# Patient Record
Sex: FEMALE | Race: WHITE | Employment: OTHER
[De-identification: names, ages, dates, MRNs, and addresses within clinical notes are randomized per-mention and may not be internally consistent; named-entity substitution may affect disease eponyms.]

---

## 2017-01-05 PROBLEM — I71.21 ASCENDING AORTIC ANEURYSM (HCC): Status: ACTIVE | Noted: 2017-01-05

## 2017-01-05 PROBLEM — J44.9 CHRONIC OBSTRUCTIVE PULMONARY DISEASE (HCC): Status: ACTIVE | Noted: 2017-01-05

## 2017-01-05 PROBLEM — E55.9 VITAMIN D DEFICIENCY: Status: ACTIVE | Noted: 2017-01-05

## 2017-01-05 PROBLEM — Z95.2 H/O MECHANICAL AORTIC VALVE REPLACEMENT: Status: ACTIVE | Noted: 2017-01-05

## 2017-01-05 PROBLEM — I10 ESSENTIAL HYPERTENSION: Status: ACTIVE | Noted: 2017-01-05

## 2017-01-05 PROBLEM — F32.A DEPRESSION: Status: ACTIVE | Noted: 2017-01-05

## 2017-01-05 PROBLEM — J45.909 UNCOMPLICATED ASTHMA: Status: ACTIVE | Noted: 2017-01-05

## 2017-01-05 PROBLEM — E78.5 HYPERLIPIDEMIA: Status: ACTIVE | Noted: 2017-01-05

## 2017-09-13 ENCOUNTER — TELEPHONE (OUTPATIENT)
Dept: CASE MANAGEMENT | Age: 57
End: 2017-09-13

## 2017-09-21 ENCOUNTER — TELEPHONE (OUTPATIENT)
Dept: CASE MANAGEMENT | Age: 57
End: 2017-09-21

## 2017-10-20 ENCOUNTER — TELEPHONE (OUTPATIENT)
Dept: CASE MANAGEMENT | Age: 57
End: 2017-10-20

## 2017-10-20 NOTE — TELEPHONE ENCOUNTER
No call, encounter opened to process CT Lung Screening.       CT Lung Screen 9/20/17 :  Impression   . 1. Atherosclerotic disease. 2. Emphysema. 3. Enlarged thoracic aorta, stable in the interval.       Lung - RADS Category 1 :  Negative -  No nodules and definitely benign   nodules - Continue annual lung CT screening in 12 months         Leanne Blanchard is a  61 y.o. female who is a current smoker with a 40 pack year smoking history. Smoking Cessation resources: mailed to patient.     Results reviewed by Dr. Yayo Stevenson . Letter mailed to patient  9/21/2017 encouraging her  to call her physician for results and follow up recommendations if needed.       10/20/2017 -    Patient requested information on smoking cessation. Smoking Cessation packet that contains \"Time to [address] [place], [place] [address] [facility] brochure and  Nav [facility] Quit Now? \"- [address] [place] [address],  both mailed to patients home. Riky Chau was  encouraged to call and talk to one of our counselors or our Lung Screening patient navigator for more information/support.      Clementina Iverson., R.T.(R)(T), Radiology Patient Navigator

## 2017-10-20 NOTE — LETTER
Medical Imaging Services      10/20/2017      Nola Dias  [address] [address]      Dear Nola Dias,    Thank you for participating in the [address].  We would like to let you know about a special program that is available only to our lung screening patients. The Tobacco Cessation Program at [facility] is offering telephone counseling sessions to help you quit smoking.   This program allows you to take advantage of counseling without coming in to the program.      Please call us at [phone] if you would like a referral.            Sincerely,        Pulmonary Nodule Program  [address]:  [phone]  F:  [phone]

## 2018-03-22 ENCOUNTER — NURSE ONLY (OUTPATIENT)
Dept: INTERNAL MEDICINE | Age: 58
End: 2018-03-22
Payer: MEDICARE

## 2018-03-22 DIAGNOSIS — Z95.2 H/O MECHANICAL AORTIC VALVE REPLACEMENT: Primary | ICD-10-CM

## 2018-03-22 LAB
INTERNATIONAL NORMALIZATION RATIO, POC: 3.2
PROTHROMBIN TIME, POC: 37.9

## 2018-03-22 PROCEDURE — 85610 PROTHROMBIN TIME: CPT

## 2018-03-22 PROCEDURE — 93793 ANTICOAG MGMT PT WARFARIN: CPT | Performed by: INTERNAL MEDICINE

## 2018-03-28 ENCOUNTER — HOSPITAL ENCOUNTER (EMERGENCY)
Age: 58
Discharge: HOME | End: 2018-03-28
Payer: MEDICARE

## 2018-03-28 VITALS
TEMPERATURE: 97 F | SYSTOLIC BLOOD PRESSURE: 141 MMHG | HEART RATE: 83 BPM | DIASTOLIC BLOOD PRESSURE: 65 MMHG | OXYGEN SATURATION: 94 % | RESPIRATION RATE: 17 BRPM | HEIGHT: 63 IN

## 2018-03-28 DIAGNOSIS — S61.212A LACERATION OF RIGHT MIDDLE FINGER WITHOUT FOREIGN BODY WITHOUT DAMAGE TO NAIL, INITIAL ENCOUNTER: Primary | ICD-10-CM

## 2018-03-28 LAB
INR BLD: 3.4
PROTHROMBIN TIME: 38.1 SEC (ref 9.3–12.4)

## 2018-03-28 PROCEDURE — 85610 PROTHROMBIN TIME: CPT

## 2018-03-28 PROCEDURE — 36415 COLL VENOUS BLD VENIPUNCTURE: CPT

## 2018-03-28 PROCEDURE — 99282 EMERGENCY DEPT VISIT SF MDM: CPT

## 2018-03-28 RX ORDER — DIAPER,BRIEF,INFANT-TODD,DISP
EACH MISCELLANEOUS ONCE
Status: DISCONTINUED | OUTPATIENT
Start: 2018-03-28 | End: 2018-03-28 | Stop reason: HOSPADM

## 2018-03-28 RX ORDER — LIDOCAINE HYDROCHLORIDE 10 MG/ML
5 INJECTION, SOLUTION INFILTRATION; PERINEURAL ONCE
Status: DISCONTINUED | OUTPATIENT
Start: 2018-03-28 | End: 2018-03-28 | Stop reason: HOSPADM

## 2018-03-29 ENCOUNTER — ANTI-COAG VISIT (OUTPATIENT)
Dept: INTERNAL MEDICINE | Age: 58
End: 2018-03-29
Payer: MEDICARE

## 2018-03-29 DIAGNOSIS — Z95.2 H/O MECHANICAL AORTIC VALVE REPLACEMENT: ICD-10-CM

## 2018-03-29 PROCEDURE — 93793 ANTICOAG MGMT PT WARFARIN: CPT | Performed by: INTERNAL MEDICINE

## 2018-03-29 RX ORDER — WARFARIN SODIUM 5 MG/1
TABLET ORAL
Qty: 30 TABLET | Refills: 0 | Status: SHIPPED | OUTPATIENT
Start: 2018-03-29 | End: 2018-04-18 | Stop reason: DRUGHIGH

## 2018-04-09 ENCOUNTER — OFFICE VISIT (OUTPATIENT)
Dept: INTERNAL MEDICINE | Age: 58
End: 2018-04-09
Payer: MEDICARE

## 2018-04-09 VITALS
TEMPERATURE: 98 F | DIASTOLIC BLOOD PRESSURE: 60 MMHG | WEIGHT: 167.7 LBS | SYSTOLIC BLOOD PRESSURE: 123 MMHG | BODY MASS INDEX: 28.63 KG/M2 | HEIGHT: 64 IN | OXYGEN SATURATION: 93 % | RESPIRATION RATE: 18 BRPM | HEART RATE: 93 BPM

## 2018-04-09 DIAGNOSIS — G47.00 INSOMNIA, UNSPECIFIED TYPE: ICD-10-CM

## 2018-04-09 DIAGNOSIS — J01.10 ACUTE NON-RECURRENT FRONTAL SINUSITIS: ICD-10-CM

## 2018-04-09 DIAGNOSIS — Z95.2 H/O MECHANICAL AORTIC VALVE REPLACEMENT: Primary | ICD-10-CM

## 2018-04-09 LAB
INTERNATIONAL NORMALIZATION RATIO, POC: 2.4
PROTHROMBIN TIME, POC: 29.3

## 2018-04-09 PROCEDURE — G8419 CALC BMI OUT NRM PARAM NOF/U: HCPCS | Performed by: STUDENT IN AN ORGANIZED HEALTH CARE EDUCATION/TRAINING PROGRAM

## 2018-04-09 PROCEDURE — 4004F PT TOBACCO SCREEN RCVD TLK: CPT | Performed by: STUDENT IN AN ORGANIZED HEALTH CARE EDUCATION/TRAINING PROGRAM

## 2018-04-09 PROCEDURE — 99213 OFFICE O/P EST LOW 20 MIN: CPT | Performed by: STUDENT IN AN ORGANIZED HEALTH CARE EDUCATION/TRAINING PROGRAM

## 2018-04-09 PROCEDURE — 85610 PROTHROMBIN TIME: CPT | Performed by: STUDENT IN AN ORGANIZED HEALTH CARE EDUCATION/TRAINING PROGRAM

## 2018-04-09 PROCEDURE — 99212 OFFICE O/P EST SF 10 MIN: CPT | Performed by: STUDENT IN AN ORGANIZED HEALTH CARE EDUCATION/TRAINING PROGRAM

## 2018-04-09 PROCEDURE — 3017F COLORECTAL CA SCREEN DOC REV: CPT | Performed by: STUDENT IN AN ORGANIZED HEALTH CARE EDUCATION/TRAINING PROGRAM

## 2018-04-09 PROCEDURE — G8427 DOCREV CUR MEDS BY ELIG CLIN: HCPCS | Performed by: STUDENT IN AN ORGANIZED HEALTH CARE EDUCATION/TRAINING PROGRAM

## 2018-04-09 RX ORDER — TRAZODONE HYDROCHLORIDE 100 MG/1
100 TABLET ORAL NIGHTLY
Qty: 30 TABLET | Refills: 1 | Status: SHIPPED | OUTPATIENT
Start: 2018-04-09 | End: 2018-04-18 | Stop reason: SDUPTHER

## 2018-04-09 RX ORDER — FLUTICASONE PROPIONATE 50 MCG
1 SPRAY, SUSPENSION (ML) NASAL DAILY
Qty: 1 BOTTLE | Refills: 3 | Status: SHIPPED | OUTPATIENT
Start: 2018-04-09 | End: 2018-07-18 | Stop reason: ALTCHOICE

## 2018-04-09 RX ORDER — AMOXICILLIN AND CLAVULANATE POTASSIUM 500; 125 MG/1; MG/1
1 TABLET, FILM COATED ORAL 2 TIMES DAILY
Qty: 14 TABLET | Refills: 0 | Status: SHIPPED | OUTPATIENT
Start: 2018-04-09 | End: 2018-07-11 | Stop reason: SDUPTHER

## 2018-04-09 ASSESSMENT — ENCOUNTER SYMPTOMS
COUGH: 0
ABDOMINAL PAIN: 0
HEARTBURN: 0
DIARRHEA: 0
NAUSEA: 0
SORE THROAT: 1
SINUS PAIN: 1
SHORTNESS OF BREATH: 1
SPUTUM PRODUCTION: 0
CONSTIPATION: 0
EYES NEGATIVE: 1
VOMITING: 0
WHEEZING: 0

## 2018-04-18 ENCOUNTER — OFFICE VISIT (OUTPATIENT)
Dept: INTERNAL MEDICINE | Age: 58
End: 2018-04-18
Payer: MEDICARE

## 2018-04-18 VITALS
RESPIRATION RATE: 18 BRPM | WEIGHT: 162.8 LBS | TEMPERATURE: 98.9 F | SYSTOLIC BLOOD PRESSURE: 126 MMHG | BODY MASS INDEX: 27.12 KG/M2 | HEIGHT: 65 IN | DIASTOLIC BLOOD PRESSURE: 74 MMHG | HEART RATE: 75 BPM

## 2018-04-18 DIAGNOSIS — G47.00 INSOMNIA, UNSPECIFIED TYPE: ICD-10-CM

## 2018-04-18 DIAGNOSIS — Z95.2 H/O MECHANICAL AORTIC VALVE REPLACEMENT: Primary | ICD-10-CM

## 2018-04-18 DIAGNOSIS — Z79.01 CHRONIC ANTICOAGULATION: ICD-10-CM

## 2018-04-18 DIAGNOSIS — H91.92 HEARING LOSS OF LEFT EAR, UNSPECIFIED HEARING LOSS TYPE: ICD-10-CM

## 2018-04-18 LAB
INTERNATIONAL NORMALIZATION RATIO, POC: 1.5
PROTHROMBIN TIME, POC: 17.8

## 2018-04-18 PROCEDURE — G8419 CALC BMI OUT NRM PARAM NOF/U: HCPCS | Performed by: INTERNAL MEDICINE

## 2018-04-18 PROCEDURE — G8427 DOCREV CUR MEDS BY ELIG CLIN: HCPCS | Performed by: INTERNAL MEDICINE

## 2018-04-18 PROCEDURE — 85610 PROTHROMBIN TIME: CPT | Performed by: INTERNAL MEDICINE

## 2018-04-18 PROCEDURE — 99213 OFFICE O/P EST LOW 20 MIN: CPT | Performed by: INTERNAL MEDICINE

## 2018-04-18 PROCEDURE — 4004F PT TOBACCO SCREEN RCVD TLK: CPT | Performed by: INTERNAL MEDICINE

## 2018-04-18 PROCEDURE — 99214 OFFICE O/P EST MOD 30 MIN: CPT | Performed by: INTERNAL MEDICINE

## 2018-04-18 PROCEDURE — 3014F SCREEN MAMMO DOC REV: CPT | Performed by: INTERNAL MEDICINE

## 2018-04-18 PROCEDURE — 3017F COLORECTAL CA SCREEN DOC REV: CPT | Performed by: INTERNAL MEDICINE

## 2018-04-18 RX ORDER — TRAZODONE HYDROCHLORIDE 100 MG/1
100 TABLET ORAL NIGHTLY
Qty: 30 TABLET | Refills: 2 | Status: SHIPPED | OUTPATIENT
Start: 2018-04-18 | End: 2018-07-18 | Stop reason: SDUPTHER

## 2018-04-18 RX ORDER — WARFARIN SODIUM 6 MG/1
6 TABLET ORAL DAILY
Qty: 30 TABLET | Refills: 3 | Status: SHIPPED | OUTPATIENT
Start: 2018-04-18 | End: 2018-07-18 | Stop reason: SDUPTHER

## 2018-04-18 ASSESSMENT — ENCOUNTER SYMPTOMS
HEARTBURN: 0
ABDOMINAL PAIN: 0
BLURRED VISION: 0
HEMOPTYSIS: 0
CONSTIPATION: 0
DIARRHEA: 0
COUGH: 0
NAUSEA: 0
SHORTNESS OF BREATH: 0
VOMITING: 0
DOUBLE VISION: 0
EYE PAIN: 0
BLOOD IN STOOL: 0

## 2018-04-20 ENCOUNTER — OFFICE VISIT (OUTPATIENT)
Dept: PULMONOLOGY | Age: 58
End: 2018-04-20
Payer: MEDICARE

## 2018-04-20 VITALS
DIASTOLIC BLOOD PRESSURE: 60 MMHG | BODY MASS INDEX: 26.99 KG/M2 | OXYGEN SATURATION: 93 % | HEART RATE: 76 BPM | HEIGHT: 65 IN | WEIGHT: 162 LBS | RESPIRATION RATE: 16 BRPM | TEMPERATURE: 99.8 F | SYSTOLIC BLOOD PRESSURE: 123 MMHG

## 2018-04-20 DIAGNOSIS — J44.9 CHRONIC OBSTRUCTIVE PULMONARY DISEASE, UNSPECIFIED COPD TYPE (HCC): ICD-10-CM

## 2018-04-20 PROCEDURE — 4004F PT TOBACCO SCREEN RCVD TLK: CPT | Performed by: INTERNAL MEDICINE

## 2018-04-20 PROCEDURE — 99213 OFFICE O/P EST LOW 20 MIN: CPT | Performed by: INTERNAL MEDICINE

## 2018-04-20 PROCEDURE — G8427 DOCREV CUR MEDS BY ELIG CLIN: HCPCS | Performed by: INTERNAL MEDICINE

## 2018-04-20 PROCEDURE — G8419 CALC BMI OUT NRM PARAM NOF/U: HCPCS | Performed by: INTERNAL MEDICINE

## 2018-04-20 PROCEDURE — 3014F SCREEN MAMMO DOC REV: CPT | Performed by: INTERNAL MEDICINE

## 2018-04-20 PROCEDURE — G8926 SPIRO NO PERF OR DOC: HCPCS | Performed by: INTERNAL MEDICINE

## 2018-04-20 PROCEDURE — 3017F COLORECTAL CA SCREEN DOC REV: CPT | Performed by: INTERNAL MEDICINE

## 2018-04-20 PROCEDURE — 3023F SPIROM DOC REV: CPT | Performed by: INTERNAL MEDICINE

## 2018-04-20 RX ORDER — PREDNISONE 20 MG/1
TABLET ORAL
Qty: 5 TABLET | Refills: 0 | Status: SHIPPED | OUTPATIENT
Start: 2018-04-20 | End: 2018-07-18 | Stop reason: ALTCHOICE

## 2018-05-07 ENCOUNTER — NURSE ONLY (OUTPATIENT)
Dept: INTERNAL MEDICINE | Age: 58
End: 2018-05-07
Payer: MEDICARE

## 2018-05-07 DIAGNOSIS — I71.21 ASCENDING AORTIC ANEURYSM: ICD-10-CM

## 2018-05-07 DIAGNOSIS — Z95.2 H/O MECHANICAL AORTIC VALVE REPLACEMENT: Primary | ICD-10-CM

## 2018-05-07 LAB
INTERNATIONAL NORMALIZATION RATIO, POC: 3.2
PROTHROMBIN TIME, POC: 38.5

## 2018-05-07 PROCEDURE — 85610 PROTHROMBIN TIME: CPT | Performed by: INTERNAL MEDICINE

## 2018-05-07 PROCEDURE — 93793 ANTICOAG MGMT PT WARFARIN: CPT | Performed by: INTERNAL MEDICINE

## 2018-05-10 ENCOUNTER — OFFICE VISIT (OUTPATIENT)
Dept: ENT CLINIC | Age: 58
End: 2018-05-10
Payer: MEDICARE

## 2018-05-10 ENCOUNTER — PROCEDURE VISIT (OUTPATIENT)
Dept: AUDIOLOGY | Age: 58
End: 2018-05-10
Payer: MEDICARE

## 2018-05-10 VITALS
SYSTOLIC BLOOD PRESSURE: 128 MMHG | BODY MASS INDEX: 27.32 KG/M2 | WEIGHT: 164 LBS | HEIGHT: 65 IN | DIASTOLIC BLOOD PRESSURE: 58 MMHG | HEART RATE: 77 BPM

## 2018-05-10 DIAGNOSIS — H93.12 TINNITUS OF LEFT EAR: ICD-10-CM

## 2018-05-10 DIAGNOSIS — H91.92 DECREASED HEARING OF LEFT EAR: ICD-10-CM

## 2018-05-10 DIAGNOSIS — J30.2 CHRONIC SEASONAL ALLERGIC RHINITIS, UNSPECIFIED TRIGGER: ICD-10-CM

## 2018-05-10 DIAGNOSIS — J32.9 CHRONIC SINUSITIS, UNSPECIFIED LOCATION: Primary | ICD-10-CM

## 2018-05-10 DIAGNOSIS — H93.8X3 EAR PRESSURE, BILATERAL: Primary | ICD-10-CM

## 2018-05-10 DIAGNOSIS — H69.82 DYSFUNCTION OF LEFT EUSTACHIAN TUBE: ICD-10-CM

## 2018-05-10 PROCEDURE — G8419 CALC BMI OUT NRM PARAM NOF/U: HCPCS | Performed by: PHYSICIAN ASSISTANT

## 2018-05-10 PROCEDURE — 99203 OFFICE O/P NEW LOW 30 MIN: CPT | Performed by: PHYSICIAN ASSISTANT

## 2018-05-10 PROCEDURE — 4004F PT TOBACCO SCREEN RCVD TLK: CPT | Performed by: PHYSICIAN ASSISTANT

## 2018-05-10 PROCEDURE — G8427 DOCREV CUR MEDS BY ELIG CLIN: HCPCS | Performed by: PHYSICIAN ASSISTANT

## 2018-05-10 PROCEDURE — 3017F COLORECTAL CA SCREEN DOC REV: CPT | Performed by: PHYSICIAN ASSISTANT

## 2018-05-10 PROCEDURE — 92557 COMPREHENSIVE HEARING TEST: CPT | Performed by: AUDIOLOGIST

## 2018-05-10 PROCEDURE — 92567 TYMPANOMETRY: CPT | Performed by: AUDIOLOGIST

## 2018-05-10 RX ORDER — AZELASTINE 1 MG/ML
2 SPRAY, METERED NASAL DAILY
Qty: 1 BOTTLE | Refills: 1 | Status: SHIPPED | OUTPATIENT
Start: 2018-05-10 | End: 2018-06-04 | Stop reason: SDUPTHER

## 2018-05-10 ASSESSMENT — ENCOUNTER SYMPTOMS
GASTROINTESTINAL NEGATIVE: 1
NAUSEA: 0
EYES NEGATIVE: 1
VOMITING: 0
SINUS PAIN: 0
RHINORRHEA: 0
RESPIRATORY NEGATIVE: 1
CHOKING: 0
COUGH: 0

## 2018-05-14 ENCOUNTER — NURSE ONLY (OUTPATIENT)
Dept: INTERNAL MEDICINE | Age: 58
End: 2018-05-14
Payer: MEDICARE

## 2018-05-14 DIAGNOSIS — Z95.2 H/O MECHANICAL AORTIC VALVE REPLACEMENT: Primary | ICD-10-CM

## 2018-05-14 LAB
INTERNATIONAL NORMALIZATION RATIO, POC: 2.8
PROTHROMBIN TIME, POC: 33.5

## 2018-05-14 PROCEDURE — 93793 ANTICOAG MGMT PT WARFARIN: CPT | Performed by: INTERNAL MEDICINE

## 2018-05-14 PROCEDURE — 85610 PROTHROMBIN TIME: CPT | Performed by: INTERNAL MEDICINE

## 2018-05-14 RX ORDER — WARFARIN SODIUM 5 MG/1
TABLET ORAL
Qty: 30 TABLET | Refills: 0 | Status: SHIPPED | OUTPATIENT
Start: 2018-05-14 | End: 2018-07-18 | Stop reason: ALTCHOICE

## 2018-05-18 RX ORDER — MONTELUKAST SODIUM 10 MG/1
TABLET ORAL
Qty: 30 TABLET | Refills: 3 | Status: SHIPPED | OUTPATIENT
Start: 2018-05-18 | End: 2018-09-08 | Stop reason: SDUPTHER

## 2018-05-21 ENCOUNTER — NURSE ONLY (OUTPATIENT)
Dept: INTERNAL MEDICINE | Age: 58
End: 2018-05-21
Payer: MEDICARE

## 2018-05-21 DIAGNOSIS — Z95.2 H/O MECHANICAL AORTIC VALVE REPLACEMENT: Primary | ICD-10-CM

## 2018-05-21 LAB
INTERNATIONAL NORMALIZATION RATIO, POC: 2.4
PROTHROMBIN TIME, POC: 29.2

## 2018-05-21 PROCEDURE — 85610 PROTHROMBIN TIME: CPT | Performed by: INTERNAL MEDICINE

## 2018-05-21 PROCEDURE — 93793 ANTICOAG MGMT PT WARFARIN: CPT | Performed by: INTERNAL MEDICINE

## 2018-06-04 ENCOUNTER — OFFICE VISIT (OUTPATIENT)
Dept: ENT CLINIC | Age: 58
End: 2018-06-04
Payer: MEDICARE

## 2018-06-04 VITALS
WEIGHT: 165.3 LBS | BODY MASS INDEX: 27.54 KG/M2 | HEART RATE: 82 BPM | SYSTOLIC BLOOD PRESSURE: 133 MMHG | DIASTOLIC BLOOD PRESSURE: 64 MMHG | HEIGHT: 65 IN

## 2018-06-04 DIAGNOSIS — H69.82 DYSFUNCTION OF LEFT EUSTACHIAN TUBE: ICD-10-CM

## 2018-06-04 DIAGNOSIS — J30.2 CHRONIC SEASONAL ALLERGIC RHINITIS, UNSPECIFIED TRIGGER: ICD-10-CM

## 2018-06-04 DIAGNOSIS — J32.9 CHRONIC SINUSITIS, UNSPECIFIED LOCATION: ICD-10-CM

## 2018-06-04 PROCEDURE — 4004F PT TOBACCO SCREEN RCVD TLK: CPT | Performed by: OTOLARYNGOLOGY

## 2018-06-04 PROCEDURE — G8427 DOCREV CUR MEDS BY ELIG CLIN: HCPCS | Performed by: OTOLARYNGOLOGY

## 2018-06-04 PROCEDURE — 3017F COLORECTAL CA SCREEN DOC REV: CPT | Performed by: OTOLARYNGOLOGY

## 2018-06-04 PROCEDURE — G8419 CALC BMI OUT NRM PARAM NOF/U: HCPCS | Performed by: OTOLARYNGOLOGY

## 2018-06-04 PROCEDURE — 99213 OFFICE O/P EST LOW 20 MIN: CPT | Performed by: OTOLARYNGOLOGY

## 2018-06-04 RX ORDER — AZELASTINE 1 MG/ML
2 SPRAY, METERED NASAL DAILY
Qty: 1 BOTTLE | Refills: 5 | Status: SHIPPED | OUTPATIENT
Start: 2018-06-04 | End: 2019-02-09 | Stop reason: SDUPTHER

## 2018-06-04 ASSESSMENT — ENCOUNTER SYMPTOMS
RHINORRHEA: 0
CHOKING: 0
EYES NEGATIVE: 1
SINUS PAIN: 0
RESPIRATORY NEGATIVE: 1
COUGH: 0
GASTROINTESTINAL NEGATIVE: 1
VOMITING: 0
NAUSEA: 0

## 2018-06-18 ENCOUNTER — NURSE ONLY (OUTPATIENT)
Dept: INTERNAL MEDICINE | Age: 58
End: 2018-06-18
Payer: MEDICARE

## 2018-06-18 DIAGNOSIS — Z95.2 H/O MECHANICAL AORTIC VALVE REPLACEMENT: Primary | ICD-10-CM

## 2018-06-18 LAB
INTERNATIONAL NORMALIZATION RATIO, POC: 1.5
PROTHROMBIN TIME, POC: 18.1

## 2018-06-18 PROCEDURE — 93793 ANTICOAG MGMT PT WARFARIN: CPT | Performed by: INTERNAL MEDICINE

## 2018-06-18 PROCEDURE — 85610 PROTHROMBIN TIME: CPT | Performed by: INTERNAL MEDICINE

## 2018-06-25 ENCOUNTER — OFFICE VISIT (OUTPATIENT)
Dept: INTERNAL MEDICINE | Age: 58
End: 2018-06-25
Payer: MEDICARE

## 2018-06-25 ENCOUNTER — HOSPITAL ENCOUNTER (OUTPATIENT)
Age: 58
Discharge: HOME | End: 2018-06-25
Payer: MEDICARE

## 2018-06-25 VITALS
HEART RATE: 88 BPM | DIASTOLIC BLOOD PRESSURE: 62 MMHG | WEIGHT: 162.8 LBS | RESPIRATION RATE: 18 BRPM | BODY MASS INDEX: 24.67 KG/M2 | TEMPERATURE: 98.2 F | SYSTOLIC BLOOD PRESSURE: 125 MMHG | HEIGHT: 68 IN

## 2018-06-25 DIAGNOSIS — N95.0 POSTMENOPAUSAL VAGINAL BLEEDING: ICD-10-CM

## 2018-06-25 DIAGNOSIS — R10.13 EPIGASTRIC PAIN: Primary | ICD-10-CM

## 2018-06-25 DIAGNOSIS — Z95.2 H/O MECHANICAL AORTIC VALVE REPLACEMENT: ICD-10-CM

## 2018-06-25 DIAGNOSIS — R10.13 EPIGASTRIC PAIN: ICD-10-CM

## 2018-06-25 LAB
ALBUMIN SERPL-MCNC: 4.2 G/DL (ref 3.5–5.2)
ALP BLD-CCNC: 58 U/L (ref 35–104)
ALT SERPL-CCNC: 18 U/L (ref 0–32)
ANION GAP SERPL CALCULATED.3IONS-SCNC: 12 MMOL/L (ref 7–16)
AST SERPL-CCNC: 17 U/L (ref 0–31)
BACTERIA: NORMAL /HPF
BASOPHILS ABSOLUTE: 0.05 E9/L (ref 0–0.2)
BASOPHILS RELATIVE PERCENT: 0.6 % (ref 0–2)
BILIRUB SERPL-MCNC: 0.3 MG/DL (ref 0–1.2)
BILIRUBIN DIRECT: <0.2 MG/DL (ref 0–0.3)
BILIRUBIN URINE: NEGATIVE
BILIRUBIN, INDIRECT: NORMAL MG/DL (ref 0–1)
BILIRUBIN, POC: ABNORMAL
BLOOD URINE, POC: ABNORMAL
BLOOD, URINE: ABNORMAL
BUN BLDV-MCNC: 13 MG/DL (ref 6–20)
CALCIUM SERPL-MCNC: 9.3 MG/DL (ref 8.6–10.2)
CHLORIDE BLD-SCNC: 103 MMOL/L (ref 98–107)
CLARITY, POC: CLEAR
CLARITY: CLEAR
CO2: 29 MMOL/L (ref 22–29)
COLOR, POC: YELLOW
COLOR: YELLOW
CREAT SERPL-MCNC: 0.8 MG/DL (ref 0.5–1)
EOSINOPHILS ABSOLUTE: 0.17 E9/L (ref 0.05–0.5)
EOSINOPHILS RELATIVE PERCENT: 1.9 % (ref 0–6)
GFR AFRICAN AMERICAN: >60
GFR NON-AFRICAN AMERICAN: >60 ML/MIN/1.73
GLUCOSE BLD-MCNC: 90 MG/DL (ref 74–109)
GLUCOSE URINE, POC: ABNORMAL
GLUCOSE URINE: NEGATIVE MG/DL
HCT VFR BLD CALC: 36.9 % (ref 34–48)
HEMOGLOBIN: 12.6 G/DL (ref 11.5–15.5)
IMMATURE GRANULOCYTES #: 0.04 E9/L
IMMATURE GRANULOCYTES %: 0.5 % (ref 0–5)
INTERNATIONAL NORMALIZATION RATIO, POC: 3.2
KETONES, POC: ABNORMAL
KETONES, URINE: NEGATIVE MG/DL
LEUKOCYTE EST, POC: ABNORMAL
LEUKOCYTE ESTERASE, URINE: NEGATIVE
LIPASE: 38 U/L (ref 13–60)
LYMPHOCYTES ABSOLUTE: 2.16 E9/L (ref 1.5–4)
LYMPHOCYTES RELATIVE PERCENT: 24.7 % (ref 20–42)
MCH RBC QN AUTO: 32.3 PG (ref 26–35)
MCHC RBC AUTO-ENTMCNC: 34.1 % (ref 32–34.5)
MCV RBC AUTO: 94.6 FL (ref 80–99.9)
MONOCYTES ABSOLUTE: 0.6 E9/L (ref 0.1–0.95)
MONOCYTES RELATIVE PERCENT: 6.8 % (ref 2–12)
NEUTROPHILS ABSOLUTE: 5.74 E9/L (ref 1.8–7.3)
NEUTROPHILS RELATIVE PERCENT: 65.5 % (ref 43–80)
NITRITE, POC: ABNORMAL
NITRITE, URINE: NEGATIVE
PDW BLD-RTO: 13.2 FL (ref 11.5–15)
PH UA: 5.5 (ref 5–9)
PH, POC: 5
PLATELET # BLD: 203 E9/L (ref 130–450)
PMV BLD AUTO: 10.2 FL (ref 7–12)
POTASSIUM SERPL-SCNC: 4.2 MMOL/L (ref 3.5–5)
PROTEIN UA: NEGATIVE MG/DL
PROTEIN, POC: ABNORMAL
PROTHROMBIN TIME, POC: 38.9
RBC # BLD: 3.9 E12/L (ref 3.5–5.5)
RBC UA: NORMAL /HPF (ref 0–2)
SODIUM BLD-SCNC: 144 MMOL/L (ref 132–146)
SPECIFIC GRAVITY UA: 1.02 (ref 1–1.03)
SPECIFIC GRAVITY, POC: 1.03
TOTAL PROTEIN: 6.6 G/DL (ref 6.4–8.3)
UROBILINOGEN, POC: 0.2
UROBILINOGEN, URINE: 0.2 E.U./DL
WBC # BLD: 8.8 E9/L (ref 4.5–11.5)
WBC UA: NORMAL /HPF (ref 0–5)

## 2018-06-25 PROCEDURE — 3017F COLORECTAL CA SCREEN DOC REV: CPT | Performed by: INTERNAL MEDICINE

## 2018-06-25 PROCEDURE — 99213 OFFICE O/P EST LOW 20 MIN: CPT | Performed by: INTERNAL MEDICINE

## 2018-06-25 PROCEDURE — 83690 ASSAY OF LIPASE: CPT

## 2018-06-25 PROCEDURE — 36415 COLL VENOUS BLD VENIPUNCTURE: CPT

## 2018-06-25 PROCEDURE — G8427 DOCREV CUR MEDS BY ELIG CLIN: HCPCS | Performed by: INTERNAL MEDICINE

## 2018-06-25 PROCEDURE — 4004F PT TOBACCO SCREEN RCVD TLK: CPT | Performed by: INTERNAL MEDICINE

## 2018-06-25 PROCEDURE — 99214 OFFICE O/P EST MOD 30 MIN: CPT | Performed by: INTERNAL MEDICINE

## 2018-06-25 PROCEDURE — 81001 URINALYSIS AUTO W/SCOPE: CPT

## 2018-06-25 PROCEDURE — G8420 CALC BMI NORM PARAMETERS: HCPCS | Performed by: INTERNAL MEDICINE

## 2018-06-25 PROCEDURE — 80048 BASIC METABOLIC PNL TOTAL CA: CPT

## 2018-06-25 PROCEDURE — 85610 PROTHROMBIN TIME: CPT | Performed by: INTERNAL MEDICINE

## 2018-06-25 PROCEDURE — 85025 COMPLETE CBC W/AUTO DIFF WBC: CPT

## 2018-06-25 PROCEDURE — 81002 URINALYSIS NONAUTO W/O SCOPE: CPT | Performed by: INTERNAL MEDICINE

## 2018-06-25 PROCEDURE — 80076 HEPATIC FUNCTION PANEL: CPT

## 2018-06-25 RX ORDER — OMEPRAZOLE 40 MG/1
40 CAPSULE, DELAYED RELEASE ORAL DAILY
Qty: 30 CAPSULE | Refills: 3 | Status: SHIPPED | OUTPATIENT
Start: 2018-06-25 | End: 2018-10-22 | Stop reason: SDUPTHER

## 2018-06-25 ASSESSMENT — ENCOUNTER SYMPTOMS
CONSTIPATION: 1
ABDOMINAL PAIN: 1
ORTHOPNEA: 0
BLOOD IN STOOL: 0
COUGH: 0
SHORTNESS OF BREATH: 0
HEMOPTYSIS: 0
VOMITING: 0
NAUSEA: 1
DIARRHEA: 1
SPUTUM PRODUCTION: 0
WHEEZING: 0

## 2018-07-02 ENCOUNTER — OFFICE VISIT (OUTPATIENT)
Dept: INTERNAL MEDICINE | Age: 58
End: 2018-07-02
Payer: MEDICARE

## 2018-07-02 VITALS
HEIGHT: 65 IN | RESPIRATION RATE: 16 BRPM | WEIGHT: 163.4 LBS | HEART RATE: 83 BPM | TEMPERATURE: 98 F | SYSTOLIC BLOOD PRESSURE: 120 MMHG | BODY MASS INDEX: 27.22 KG/M2 | DIASTOLIC BLOOD PRESSURE: 82 MMHG

## 2018-07-02 DIAGNOSIS — Z95.2 H/O MECHANICAL AORTIC VALVE REPLACEMENT: ICD-10-CM

## 2018-07-02 DIAGNOSIS — R10.13 EPIGASTRIC ABDOMINAL PAIN: Primary | ICD-10-CM

## 2018-07-02 DIAGNOSIS — I71.21 ASCENDING AORTIC ANEURYSM: ICD-10-CM

## 2018-07-02 LAB
INTERNATIONAL NORMALIZATION RATIO, POC: 2.7
PROTHROMBIN TIME, POC: 32.6

## 2018-07-02 PROCEDURE — 99213 OFFICE O/P EST LOW 20 MIN: CPT | Performed by: STUDENT IN AN ORGANIZED HEALTH CARE EDUCATION/TRAINING PROGRAM

## 2018-07-02 PROCEDURE — 3017F COLORECTAL CA SCREEN DOC REV: CPT | Performed by: STUDENT IN AN ORGANIZED HEALTH CARE EDUCATION/TRAINING PROGRAM

## 2018-07-02 PROCEDURE — G8427 DOCREV CUR MEDS BY ELIG CLIN: HCPCS | Performed by: STUDENT IN AN ORGANIZED HEALTH CARE EDUCATION/TRAINING PROGRAM

## 2018-07-02 PROCEDURE — G8419 CALC BMI OUT NRM PARAM NOF/U: HCPCS | Performed by: STUDENT IN AN ORGANIZED HEALTH CARE EDUCATION/TRAINING PROGRAM

## 2018-07-02 PROCEDURE — 85610 PROTHROMBIN TIME: CPT | Performed by: STUDENT IN AN ORGANIZED HEALTH CARE EDUCATION/TRAINING PROGRAM

## 2018-07-02 PROCEDURE — 4004F PT TOBACCO SCREEN RCVD TLK: CPT | Performed by: STUDENT IN AN ORGANIZED HEALTH CARE EDUCATION/TRAINING PROGRAM

## 2018-07-02 NOTE — PATIENT INSTRUCTIONS
Patient Education        Learning About Benefits From Quitting Smoking  How does quitting smoking make you healthier? If you're thinking about quitting smoking, you may have a few reasons to be smoke-free. Your health may be one of them. · When you quit smoking, you lower your risks for cancer, lung disease, heart attack, stroke, blood vessel disease, and blindness from macular degeneration. · When you're smoke-free, you get sick less often, and you heal faster. You are less likely to get colds, flu, bronchitis, and pneumonia. · As a nonsmoker, you may find that your mood is better and you are less stressed. When and how will you feel healthier? Quitting has real health benefits that start from day 1 of being smoke-free. And the longer you stay smoke-free, the healthier you get and the better you feel. The first hours  · After just 20 minutes, your blood pressure and heart rate go down. That means there's less stress on your heart and blood vessels. · Within 12 hours, the level of carbon monoxide in your blood drops back to normal. That makes room for more oxygen. With more oxygen in your body, you may notice that you have more energy than when you smoked. After 2 weeks  · Your lungs start to work better. · Your risk of heart attack starts to drop. After 1 month  · When your lungs are clear, you cough less and breathe deeper, so it's easier to be active. · Your sense of taste and smell return. That means you can enjoy food more than you have since you started smoking. Over the years  · After 1 year, your risk of heart disease is half what it would be if you kept smoking. · After 5 years, your risk of stroke starts to shrink. Within a few years after that, it's about the same as if you'd never smoked. · After 10 years, your risk of dying from lung cancer is cut by about half. And your risk for many other types of cancer is lower too. How would quitting help others in your life?   When you quit yourself  · You may get dizzy if you do not drink enough water. To prevent dehydration, drink plenty of fluids, enough so that your urine is light yellow or clear like water. Choose water and other caffeine-free clear liquids. If you have kidney, heart, or liver disease and have to limit fluids, talk with your doctor before you increase the amount of fluids you drink. · Exercise regularly to improve your strength, muscle tone, and balance. Walk if you can. Swimming may be a good choice if you cannot walk easily. · Have your vision and hearing checked each year or any time you notice a change. If you have trouble seeing and hearing, you might not be able to avoid objects and could lose your balance. · Know the side effects of the medicines you take. Ask your doctor or pharmacist whether the medicines you take can affect your balance. Sleeping pills or sedatives can affect your balance. · Limit the amount of alcohol you drink. Alcohol can impair your balance and other senses. · Ask your doctor whether calluses or corns on your feet need to be removed. If you wear loose-fitting shoes because of calluses or corns, you can lose your balance and fall. · Talk to your doctor if you have numbness in your feet. Preventing falls at home  · Remove raised doorway thresholds, throw rugs, and clutter. Repair loose carpet or raised areas in the floor. · Move furniture and electrical cords to keep them out of walking paths. · Use nonskid floor wax, and wipe up spills right away, especially on ceramic tile floors. · If you use a walker or cane, put rubber tips on it. If you use crutches, clean the bottoms of them regularly with an abrasive pad, such as steel wool. · Keep your house well lit, especially [facility], and outside walkways. Use night-lights in areas such as hallways and bathrooms.  Add extra light switches or use remote switches (such as switches that go on or off when you clap your hands) to make it easier to turn lights on if you have to get up during the night. · Install sturdy handrails on stairways. · Move items in your cabinets so that the things you use a lot are on the lower shelves (about waist level). · Keep a cordless phone and a flashlight with new batteries by your bed. If possible, put a phone in each of the main rooms of your house, or carry a cell phone in case you fall and cannot reach a phone. Or, you can wear a device around your neck or wrist. You push a button that sends a signal for help. · Wear low-heeled shoes that fit well and give your feet good support. Use footwear with nonskid soles. Check the heels and soles of your shoes for wear. Repair or replace worn heels or soles. · Do not wear socks without shoes on wood floors. · Walk on the grass when the sidewalks are slippery. If you live in an area that gets snow and ice in the winter, sprinkle salt on slippery steps and sidewalks. Preventing falls in the bath  · Install grab bars and nonskid mats inside and outside your shower or tub and near the toilet and sinks. · Use shower chairs and bath benches. · Use a hand-held shower head that will allow you to sit while showering. · Get into a tub or shower by putting the weaker leg in first. Get out of a tub or shower with your strong side first.  · Repair loose toilet seats and consider installing a raised toilet seat to make getting on and off the toilet easier. · Keep your bathroom door unlocked while you are in the shower. Where can you learn more? Go to https://chpepiceweb.Naviswiss. org and sign in to your MiTÃº account. Enter 0476 [id number] in the Sensdata box to learn more about \"Preventing Falls: Care Instructions. \"     If you do not have an account, please click on the \"Sign Up Now\" link. Current as of: May 12, 2017  Content Version: 11.6  © 9228-9966 Access Media 3, Incorporated. Care instructions adapted under license by [facility] ([facility]).  If you have questions about a medical condition or this instruction, always ask your healthcare professional. [place] [address] any warranty or liability for your use of this information.

## 2018-07-03 ENCOUNTER — TELEPHONE (OUTPATIENT)
Dept: SURGERY | Age: 58
End: 2018-07-03

## 2018-07-03 ENCOUNTER — HOSPITAL ENCOUNTER (OUTPATIENT)
Age: 58
Discharge: HOME | End: 2018-07-05
Payer: MEDICARE

## 2018-07-03 ENCOUNTER — OFFICE VISIT (OUTPATIENT)
Dept: OBGYN | Age: 58
End: 2018-07-03
Payer: MEDICARE

## 2018-07-03 VITALS
WEIGHT: 163 LBS | HEART RATE: 83 BPM | TEMPERATURE: 98.9 F | DIASTOLIC BLOOD PRESSURE: 52 MMHG | HEIGHT: 65 IN | SYSTOLIC BLOOD PRESSURE: 112 MMHG | BODY MASS INDEX: 27.16 KG/M2

## 2018-07-03 DIAGNOSIS — N95.0 POSTMENOPAUSAL VAGINAL BLEEDING: ICD-10-CM

## 2018-07-03 DIAGNOSIS — R10.13 EPIGASTRIC PAIN: ICD-10-CM

## 2018-07-03 DIAGNOSIS — N95.0 POSTMENOPAUSAL VAGINAL BLEEDING: Primary | ICD-10-CM

## 2018-07-03 PROCEDURE — 99213 OFFICE O/P EST LOW 20 MIN: CPT | Performed by: OBSTETRICS & GYNECOLOGY

## 2018-07-03 PROCEDURE — 99212 OFFICE O/P EST SF 10 MIN: CPT | Performed by: OBSTETRICS & GYNECOLOGY

## 2018-07-03 PROCEDURE — 87088 URINE BACTERIA CULTURE: CPT

## 2018-07-03 PROCEDURE — 87077 CULTURE AEROBIC IDENTIFY: CPT

## 2018-07-03 PROCEDURE — 4004F PT TOBACCO SCREEN RCVD TLK: CPT | Performed by: OBSTETRICS & GYNECOLOGY

## 2018-07-03 PROCEDURE — 87186 SC STD MICRODIL/AGAR DIL: CPT

## 2018-07-03 PROCEDURE — G8427 DOCREV CUR MEDS BY ELIG CLIN: HCPCS | Performed by: OBSTETRICS & GYNECOLOGY

## 2018-07-03 PROCEDURE — 3017F COLORECTAL CA SCREEN DOC REV: CPT | Performed by: OBSTETRICS & GYNECOLOGY

## 2018-07-03 PROCEDURE — G8419 CALC BMI OUT NRM PARAM NOF/U: HCPCS | Performed by: OBSTETRICS & GYNECOLOGY

## 2018-07-03 NOTE — TELEPHONE ENCOUNTER
Patient was referred to Dr Faina Hoover for epigastric abdominal pain by the IM clinic. Patient has seen Dr Curly Da Silva in the past so she would need to be scheduled with Dr Curly Da Silva. 1st attempt    JOSIAS Durbin attempted to contact patient to schedule her appointment. Patient did not answer so MA left message asking for patient to call office back at [phone].     Electronically signed by Nery Swann on 7/3/18 at 12:42 PM

## 2018-07-03 NOTE — PROGRESS NOTES
Here now for vaginal spotting. Hysterectomy 10 years ago. Had some abdominal cramps as well. Denies urinary symptoms except for nocturia  Noted the spotting on the toilet tissue. Hyst was done for endometriosis. Denies intercourse    Pelvic: Vulva:Clear, a bit atrophic   Vagina:Clear, vault clear. No bleeding or lacerations seen   Cx:Absent   Ut:Absent   Jaswinder:No masses. IMP: vaginal bleeding, r/o urinary tract infection    PLAN: Urine culture. See back prn.   If further bleeding may need to see urology or get a colonoscopy

## 2018-07-03 NOTE — PATIENT INSTRUCTIONS
Discharged as per Dr. Astrid Gaucher maintain well woman health--to return if symptomatic--Possible referral for colonoscopy or urology for followup

## 2018-07-05 LAB
ORGANISM: ABNORMAL
URINE CULTURE, ROUTINE: ABNORMAL
URINE CULTURE, ROUTINE: ABNORMAL

## 2018-07-06 DIAGNOSIS — N30.01 ACUTE CYSTITIS WITH HEMATURIA: Primary | ICD-10-CM

## 2018-07-06 RX ORDER — NITROFURANTOIN 25; 75 MG/1; MG/1
100 CAPSULE ORAL 2 TIMES DAILY
Qty: 20 CAPSULE | Refills: 0 | Status: SHIPPED | OUTPATIENT
Start: 2018-07-06 | End: 2018-07-16

## 2018-07-09 ENCOUNTER — TELEPHONE (OUTPATIENT)
Dept: OBGYN | Age: 58
End: 2018-07-09

## 2018-07-11 ENCOUNTER — TELEPHONE (OUTPATIENT)
Dept: PULMONOLOGY | Age: 58
End: 2018-07-11

## 2018-07-11 DIAGNOSIS — J01.10 ACUTE NON-RECURRENT FRONTAL SINUSITIS: ICD-10-CM

## 2018-07-11 RX ORDER — AMOXICILLIN AND CLAVULANATE POTASSIUM 500; 125 MG/1; MG/1
1 TABLET, FILM COATED ORAL 2 TIMES DAILY
Qty: 14 TABLET | Refills: 0 | Status: SHIPPED | OUTPATIENT
Start: 2018-07-11 | End: 2018-07-18

## 2018-07-18 ENCOUNTER — OFFICE VISIT (OUTPATIENT)
Dept: INTERNAL MEDICINE | Age: 58
End: 2018-07-18
Payer: MEDICARE

## 2018-07-18 ENCOUNTER — HOSPITAL ENCOUNTER (OUTPATIENT)
Age: 58
Discharge: HOME | End: 2018-07-18
Payer: MEDICARE

## 2018-07-18 VITALS
WEIGHT: 163 LBS | HEIGHT: 65 IN | DIASTOLIC BLOOD PRESSURE: 70 MMHG | OXYGEN SATURATION: 92 % | HEART RATE: 82 BPM | BODY MASS INDEX: 27.16 KG/M2 | RESPIRATION RATE: 18 BRPM | SYSTOLIC BLOOD PRESSURE: 110 MMHG | TEMPERATURE: 98.5 F

## 2018-07-18 DIAGNOSIS — Z95.2 H/O MECHANICAL AORTIC VALVE REPLACEMENT: ICD-10-CM

## 2018-07-18 DIAGNOSIS — G47.00 INSOMNIA, UNSPECIFIED TYPE: ICD-10-CM

## 2018-07-18 DIAGNOSIS — I10 ESSENTIAL HYPERTENSION: ICD-10-CM

## 2018-07-18 DIAGNOSIS — R19.7 DIARRHEA, UNSPECIFIED TYPE: ICD-10-CM

## 2018-07-18 DIAGNOSIS — F32.89 OTHER DEPRESSION: ICD-10-CM

## 2018-07-18 DIAGNOSIS — J44.9 COPD, MILD (HCC): ICD-10-CM

## 2018-07-18 DIAGNOSIS — E78.5 HYPERLIPIDEMIA, UNSPECIFIED HYPERLIPIDEMIA TYPE: ICD-10-CM

## 2018-07-18 DIAGNOSIS — F32.A DEPRESSION, UNSPECIFIED DEPRESSION TYPE: ICD-10-CM

## 2018-07-18 DIAGNOSIS — Z79.01 CHRONIC ANTICOAGULATION: ICD-10-CM

## 2018-07-18 DIAGNOSIS — N39.46 MIXED STRESS AND URGE URINARY INCONTINENCE: ICD-10-CM

## 2018-07-18 DIAGNOSIS — R19.7 DIARRHEA, UNSPECIFIED TYPE: Primary | ICD-10-CM

## 2018-07-18 LAB
ANION GAP SERPL CALCULATED.3IONS-SCNC: 12 MMOL/L (ref 7–16)
BASOPHILS ABSOLUTE: 0.04 E9/L (ref 0–0.2)
BASOPHILS RELATIVE PERCENT: 0.4 % (ref 0–2)
BUN BLDV-MCNC: 12 MG/DL (ref 6–20)
CALCIUM SERPL-MCNC: 9.2 MG/DL (ref 8.6–10.2)
CHLORIDE BLD-SCNC: 101 MMOL/L (ref 98–107)
CO2: 29 MMOL/L (ref 22–29)
CREAT SERPL-MCNC: 0.6 MG/DL (ref 0.5–1)
EOSINOPHILS ABSOLUTE: 0.19 E9/L (ref 0.05–0.5)
EOSINOPHILS RELATIVE PERCENT: 1.9 % (ref 0–6)
GFR AFRICAN AMERICAN: >60
GFR NON-AFRICAN AMERICAN: >60 ML/MIN/1.73
GLUCOSE BLD-MCNC: 82 MG/DL (ref 74–109)
HCT VFR BLD CALC: 35.5 % (ref 34–48)
HEMOGLOBIN: 12.3 G/DL (ref 11.5–15.5)
IMMATURE GRANULOCYTES #: 0.1 E9/L
IMMATURE GRANULOCYTES %: 1 % (ref 0–5)
LYMPHOCYTES ABSOLUTE: 2.52 E9/L (ref 1.5–4)
LYMPHOCYTES RELATIVE PERCENT: 24.8 % (ref 20–42)
MCH RBC QN AUTO: 31.8 PG (ref 26–35)
MCHC RBC AUTO-ENTMCNC: 34.6 % (ref 32–34.5)
MCV RBC AUTO: 91.7 FL (ref 80–99.9)
MONOCYTES ABSOLUTE: 0.79 E9/L (ref 0.1–0.95)
MONOCYTES RELATIVE PERCENT: 7.8 % (ref 2–12)
NEUTROPHILS ABSOLUTE: 6.54 E9/L (ref 1.8–7.3)
NEUTROPHILS RELATIVE PERCENT: 64.1 % (ref 43–80)
PDW BLD-RTO: 12.7 FL (ref 11.5–15)
PLATELET # BLD: 252 E9/L (ref 130–450)
PMV BLD AUTO: 9.5 FL (ref 7–12)
POTASSIUM SERPL-SCNC: 3.2 MMOL/L (ref 3.5–5)
RBC # BLD: 3.87 E12/L (ref 3.5–5.5)
SODIUM BLD-SCNC: 142 MMOL/L (ref 132–146)
WBC # BLD: 10.2 E9/L (ref 4.5–11.5)

## 2018-07-18 PROCEDURE — 3017F COLORECTAL CA SCREEN DOC REV: CPT | Performed by: INTERNAL MEDICINE

## 2018-07-18 PROCEDURE — G8926 SPIRO NO PERF OR DOC: HCPCS | Performed by: INTERNAL MEDICINE

## 2018-07-18 PROCEDURE — 80048 BASIC METABOLIC PNL TOTAL CA: CPT

## 2018-07-18 PROCEDURE — 36415 COLL VENOUS BLD VENIPUNCTURE: CPT

## 2018-07-18 PROCEDURE — 4004F PT TOBACCO SCREEN RCVD TLK: CPT | Performed by: INTERNAL MEDICINE

## 2018-07-18 PROCEDURE — G8419 CALC BMI OUT NRM PARAM NOF/U: HCPCS | Performed by: INTERNAL MEDICINE

## 2018-07-18 PROCEDURE — 3023F SPIROM DOC REV: CPT | Performed by: INTERNAL MEDICINE

## 2018-07-18 PROCEDURE — 99213 OFFICE O/P EST LOW 20 MIN: CPT | Performed by: INTERNAL MEDICINE

## 2018-07-18 PROCEDURE — 85025 COMPLETE CBC W/AUTO DIFF WBC: CPT

## 2018-07-18 PROCEDURE — 99214 OFFICE O/P EST MOD 30 MIN: CPT | Performed by: INTERNAL MEDICINE

## 2018-07-18 PROCEDURE — G8427 DOCREV CUR MEDS BY ELIG CLIN: HCPCS | Performed by: INTERNAL MEDICINE

## 2018-07-18 RX ORDER — SERTRALINE HYDROCHLORIDE 100 MG/1
TABLET, FILM COATED ORAL
Qty: 45 TABLET | Refills: 5 | Status: SHIPPED | OUTPATIENT
Start: 2018-07-18 | End: 2018-12-05 | Stop reason: SDUPTHER

## 2018-07-18 RX ORDER — LISINOPRIL 5 MG/1
TABLET ORAL
Qty: 60 TABLET | Refills: 5 | Status: SHIPPED | OUTPATIENT
Start: 2018-07-18 | End: 2018-12-05 | Stop reason: SDUPTHER

## 2018-07-18 RX ORDER — WARFARIN SODIUM 6 MG/1
6 TABLET ORAL DAILY
Qty: 30 TABLET | Refills: 3 | Status: SHIPPED | OUTPATIENT
Start: 2018-07-18 | End: 2018-12-05 | Stop reason: DRUGHIGH

## 2018-07-18 RX ORDER — ALBUTEROL SULFATE 90 UG/1
2 AEROSOL, METERED RESPIRATORY (INHALATION) EVERY 4 HOURS PRN
Qty: 1 INHALER | Refills: 3 | Status: SHIPPED | OUTPATIENT
Start: 2018-07-18 | End: 2018-10-26 | Stop reason: SDUPTHER

## 2018-07-18 RX ORDER — ATORVASTATIN CALCIUM 10 MG/1
10 TABLET, FILM COATED ORAL DAILY
Qty: 30 TABLET | Refills: 5 | Status: SHIPPED | OUTPATIENT
Start: 2018-07-18 | End: 2018-12-05 | Stop reason: SDUPTHER

## 2018-07-18 RX ORDER — TRAZODONE HYDROCHLORIDE 100 MG/1
100 TABLET ORAL NIGHTLY
Qty: 30 TABLET | Refills: 2 | Status: SHIPPED | OUTPATIENT
Start: 2018-07-18 | End: 2018-10-20 | Stop reason: SDUPTHER

## 2018-07-18 ASSESSMENT — ENCOUNTER SYMPTOMS
DIARRHEA: 1
ABDOMINAL PAIN: 1
HEARTBURN: 0
COUGH: 1
HEMOPTYSIS: 0
NAUSEA: 0
SHORTNESS OF BREATH: 0
EYE PAIN: 0
DOUBLE VISION: 0
SORE THROAT: 0
VOMITING: 0
BLURRED VISION: 0
BLOOD IN STOOL: 0
WHEEZING: 0

## 2018-07-18 NOTE — PROGRESS NOTES
Tameka Rodriguez 89  Internal Medicine Residency Program  [facility] Note      SUBJECTIVE:  CC: had concerns including Check-Up and Medication Refill. Luz Elena Sutherland presented to the [facility] for a routine visit. PMHx of h/o mechanical aortic valve replacement, chronic anticoagulation with coumadin, chronic urinary incontinence, COPD, tobacco abuse, restless leg syndrome, depression, GERD and vitamin D deficiency    Leak of urine when cough, urge to urinate, large amount of urine, frequency,   Urine Cx positive for E. Coli (07/03/2018) was prescribed with 10 day course of Abx of unknown by OBGYN. Does not improve urinary incontinence. Had a recent URI and treated with Augmentin for 7 days (07/11-07/18/2018) by Mary Parmar    Started to have watery diarrhea after taking antibiotics, more than 4x daily, sometimes has fecal incontinence, eating and drinking worsened it, associated with mild lower abdominal pain. Worsening depression, loss of interest of doing thing (don't like to around people, does not like to get up get up dressing herself, woken 3+ at night , cry for no reason for a months  Chronic urinary incontinence for about 5 years, wearing diaper at night. Headache daily located on top of head, constant during all day, felt witching in the face, sleeping make it better, does not like the migraine she had before    ENT  prescribed her with Azelastine nasal spray    Audiology vist with , Audiometry revealed borderline-normal-to-mild hearing loss, through the frequency hearing loss, bilaterally. Reliability was good. Speech reception thresholds were in good agreement with the pure tone averages, bilaterally. Speech discrimination scores were 100%, bilaterally at 55dBHL.    Tympanometry revealed normal middle ear peak pressure and compliance, bilaterally. Ipsilateral acoustic reflexes were present, bilaterally at 1000Hz.     Does not has to use albuterol very often, only use it when sweeping or any kind of exertion, use 1-2x a day. Still smokes 1/2- 1 PPD daily, was not prescribed with nicotine patch but not using it. Will revisit issue after health and depression getting, may consider smoking class. Last INR (07/02/2018)2.7 at goal taking warfarin 6 daily. Review of Systems   Constitutional: Positive for malaise/fatigue. Negative for chills and fever. HENT: Negative for congestion, hearing loss and sore throat. Eyes: Negative for blurred vision, double vision and pain. Respiratory: Positive for cough (chronic). Negative for hemoptysis, shortness of breath and wheezing. Cardiovascular: Negative for chest pain, claudication and leg swelling. Gastrointestinal: Positive for abdominal pain (lower quandra and epigastric) and diarrhea. Negative for blood in stool, heartburn, nausea and vomiting. Genitourinary: Positive for frequency and urgency. Negative for dysuria. Musculoskeletal: Negative for joint pain and myalgias. Skin: Negative for itching and rash. Neurological: Positive for headaches. Negative for dizziness and focal weakness. Psychiatric/Behavioral: Positive for depression. Negative for suicidal ideas.        Current Outpatient Prescriptions on File Prior to Visit   Medication Sig Dispense Refill    omeprazole (PRILOSEC) 40 MG delayed release capsule Take 1 capsule by mouth daily 30 capsule 3    azelastine (ASTELIN) 0.1 % nasal spray 2 sprays by Nasal route daily Use in each nostril as directed 1 Bottle 5    montelukast (SINGULAIR) 10 MG tablet take 1 tablet by mouth at bedtime 30 tablet 3    vitamin D (CHOLECALCIFEROL) 1000 units TABS tablet Take 1,000 Units by mouth 2 times daily      Calcium Carbonate-Vitamin D (OYSTER SHELL CALCIUM/D) 500-200 MG-UNIT TABS Take 1 tablet by mouth 2 times daily 60 tablet 5    rOPINIRole (REQUIP) 0.25 MG tablet Take 1 tablet by mouth 3 times daily 90 tablet 5    vitamin C (ASCORBIC ACID) 500 MG tablet Take 1,000

## 2018-07-18 NOTE — PATIENT INSTRUCTIONS
Patient Education   Thank you for coming to your appointment today. Please make sure to do the following:  - Get labs drawn  - Schedule your appointments in this Friday  - Continue the medications as listed    Referrals have been made to [facility] for urinary incontinence: If you do not hear from the office in 1 week, please call the number listed. If your symptoms worsen or do not improve, call for a sooner appointment or call [phone] for the nurse's line. Era Cam MD       Learning About Benefits From Quitting Smoking  How does quitting smoking make you healthier? If you're thinking about quitting smoking, you may have a few reasons to be smoke-free. Your health may be one of them. · When you quit smoking, you lower your risks for cancer, lung disease, heart attack, stroke, blood vessel disease, and blindness from macular degeneration. · When you're smoke-free, you get sick less often, and you heal faster. You are less likely to get colds, flu, bronchitis, and pneumonia. · As a nonsmoker, you may find that your mood is better and you are less stressed. When and how will you feel healthier? Quitting has real health benefits that start from day 1 of being smoke-free. And the longer you stay smoke-free, the healthier you get and the better you feel. The first hours  · After just 20 minutes, your blood pressure and heart rate go down. That means there's less stress on your heart and blood vessels. · Within 12 hours, the level of carbon monoxide in your blood drops back to normal. That makes room for more oxygen. With more oxygen in your body, you may notice that you have more energy than when you smoked. After 2 weeks  · Your lungs start to work better. · Your risk of heart attack starts to drop. After 1 month  · When your lungs are clear, you cough less and breathe deeper, so it's easier to be active. · Your sense of taste and smell return.  That means you can enjoy food more than you have since you started smoking. Over the years  · After 1 year, your risk of heart disease is half what it would be if you kept smoking. · After 5 years, your risk of stroke starts to shrink. Within a few years after that, it's about the same as if you'd never smoked. · After 10 years, your risk of dying from lung cancer is cut by about half. And your risk for many other types of cancer is lower too. How would quitting help others in your life? When you quit smoking, you improve the health of everyone who now breathes in your smoke. · Their heart, lung, and cancer risks drop, much like yours. · They are sick less. For babies and small children, living smoke-free means they're less likely to have ear infections, pneumonia, and bronchitis. · If you're a woman who is or will be pregnant someday, quitting smoking means a healthier . · Children who are close to you are less likely to become adult smokers. Where can you learn more? Go to https://WeembapeQuantum Technologies Worldwide.Beaumaris Networks. org and sign in to your EPIS account. Enter [phone] [id number] [phone] in the GPal box to learn more about \"Learning About Benefits From Quitting Smoking. \"     If you do not have an account, please click on the \"Sign Up Now\" link. Current as of: 2017  Content Version: 11.6  © 5002-6881 Walk Score, Incorporated. Care instructions adapted under license by [facility] ([facility]). If you have questions about a medical condition or this instruction, always ask your healthcare professional. [place] [address] any warranty or liability for your use of this information.

## 2018-07-18 NOTE — PROGRESS NOTES
[address]  Internal Medicine Clinic    Attending Physician Statement:  Gisele Clark M.D., F.A.C.P. I have discussed the case, including pertinent history and exam findings with the resident. I have seen and examined the patient and the key elements of the encounter have been performed by me. I agree with the assessment, plan and orders as documented by the resident. Patient here for routine follow up of medical problems. Chronic incontinence - refer to Dr. Yvonne Healy (GYN uro) for evaluation. COPD with recent exacerbation - doing well - s/p now off ATBx    Abdominal pain and diarrhea post antibiotic - check C Diff and BMP and CBC and have her back in 24-48 hours    Mechanical Aortic valve - stable on Coumadin    HTN - Stable - the current medical regimen is effective - continue present plan and medications - there are no side effects of the mediations at this point. Remainder of medical problems as per resident note.

## 2018-07-18 NOTE — PROGRESS NOTES
Pt received discharge instructions/lab scripts/AVS. Pt is to follow up in 2 days to see PCP per Dr. Ben Torrez. Appointment ha been made. Patient advised, verbalized understanding.  Pt referred to OB/GYN

## 2018-07-19 ENCOUNTER — TELEPHONE (OUTPATIENT)
Dept: INTERNAL MEDICINE | Age: 58
End: 2018-07-19

## 2018-07-19 NOTE — TELEPHONE ENCOUNTER
Eliezer Yanes from the lab called into [facility] IM at 3:04. A lab was ordered for CDiff, it could not be done because the sample needs to be a watery liquid consistency and it was not. The patient was not called by the lab, if this needs redone the patient will need to be called. Any questions  [phone]. Eliezer Yanes will not be there tomorrow.

## 2018-07-20 ENCOUNTER — OFFICE VISIT (OUTPATIENT)
Dept: INTERNAL MEDICINE | Age: 58
End: 2018-07-20
Payer: MEDICARE

## 2018-07-20 VITALS
BODY MASS INDEX: 27.17 KG/M2 | HEIGHT: 65 IN | DIASTOLIC BLOOD PRESSURE: 63 MMHG | TEMPERATURE: 98.8 F | SYSTOLIC BLOOD PRESSURE: 114 MMHG | WEIGHT: 163.1 LBS | RESPIRATION RATE: 16 BRPM | HEART RATE: 77 BPM

## 2018-07-20 DIAGNOSIS — E87.6 HYPOKALEMIA: ICD-10-CM

## 2018-07-20 DIAGNOSIS — R19.7 DIARRHEA, UNSPECIFIED TYPE: ICD-10-CM

## 2018-07-20 DIAGNOSIS — R32 INCONTINENCE OF URINE IN FEMALE: ICD-10-CM

## 2018-07-20 DIAGNOSIS — Z95.2 H/O MECHANICAL AORTIC VALVE REPLACEMENT: Primary | ICD-10-CM

## 2018-07-20 LAB
INTERNATIONAL NORMALIZATION RATIO, POC: 3.2
PROTHROMBIN TIME, POC: 38.6

## 2018-07-20 PROCEDURE — 3017F COLORECTAL CA SCREEN DOC REV: CPT | Performed by: STUDENT IN AN ORGANIZED HEALTH CARE EDUCATION/TRAINING PROGRAM

## 2018-07-20 PROCEDURE — G8419 CALC BMI OUT NRM PARAM NOF/U: HCPCS | Performed by: STUDENT IN AN ORGANIZED HEALTH CARE EDUCATION/TRAINING PROGRAM

## 2018-07-20 PROCEDURE — 99212 OFFICE O/P EST SF 10 MIN: CPT | Performed by: STUDENT IN AN ORGANIZED HEALTH CARE EDUCATION/TRAINING PROGRAM

## 2018-07-20 PROCEDURE — 99213 OFFICE O/P EST LOW 20 MIN: CPT | Performed by: STUDENT IN AN ORGANIZED HEALTH CARE EDUCATION/TRAINING PROGRAM

## 2018-07-20 PROCEDURE — 4004F PT TOBACCO SCREEN RCVD TLK: CPT | Performed by: STUDENT IN AN ORGANIZED HEALTH CARE EDUCATION/TRAINING PROGRAM

## 2018-07-20 PROCEDURE — G8427 DOCREV CUR MEDS BY ELIG CLIN: HCPCS | Performed by: STUDENT IN AN ORGANIZED HEALTH CARE EDUCATION/TRAINING PROGRAM

## 2018-07-20 PROCEDURE — 85610 PROTHROMBIN TIME: CPT | Performed by: STUDENT IN AN ORGANIZED HEALTH CARE EDUCATION/TRAINING PROGRAM

## 2018-07-20 RX ORDER — POTASSIUM CHLORIDE 20 MEQ/1
20 TABLET, EXTENDED RELEASE ORAL DAILY
Qty: 60 TABLET | Refills: 3 | Status: SHIPPED | OUTPATIENT
Start: 2018-07-20 | End: 2019-07-03 | Stop reason: ALTCHOICE

## 2018-07-20 NOTE — PROGRESS NOTES
Attending Physician Statement  I have discussed the case, including pertinent history and exam findings with the resident. I have seen and examined the patient and the key elements of the encounter have been performed by me. I agree with the assessment, plan and orders as documented by the resident. Patient is seen for an urgent care visit today. Presents to review meds today       Remainder of medical problems as per resident note.
16   Ht 5' 5\" (1.651 m)   Wt 163 lb 1.6 oz (74 kg)   BMI 27.14 kg/m²   General appearance: Alert, Awake, Oriented times 3, no distress  Lungs: Lungs clear to auscultation bilaterally. No rhonchi, crackles or wheezes  Heart: S1 S2  Regular rate and rhythm. No rub, murmur or gallop  Abdomen: Abdomen soft but obese, non-tender. non-distended BS normal. No masses, organomegaly, no guarding rebound or rigidity.   Extremities: No edema, Peripheral pulses palpable 2/4    ASSESSMENT/PLAN:  1. H/O mechanical aortic valve replacement    -Today, POCT INR is 3.2 on 6 mg Coumadin.   - Goal is 3, switch to alternating doses of 5mg/ 6mg   - Repeat INR in a week          I have reviewed my findings and recommendations with Kemar Yan and Dr Sujey Sears MD PGY1  7/20/2018 3:38 PM

## 2018-07-20 NOTE — PATIENT INSTRUCTIONS
· You passed out (lost consciousness).     · You have a seizure.    Call your doctor now or seek immediate medical care if:    · You feel weak or unusually tired.     · You have severe arm or leg cramps.     · You have tingling or numbness.     · You feel sick to your stomach, or you vomit.     · You have belly cramps.     · You feel bloated or constipated.     · You have to urinate a lot.     · You feel very thirsty most of the time.     · You are dizzy or lightheaded, or you feel like you may faint.     · You feel depressed, or you lose touch with reality.    Watch closely for changes in your health, and be sure to contact your doctor if:    · You do not get better as expected. Where can you learn more? Go to https://spigitpemarthaeb.Justworks. org and sign in to your Virtualmin account. Enter [id number] in the Expedit.us box to learn more about \"Hypokalemia: Care Instructions. \"     If you do not have an account, please click on the \"Sign Up Now\" link. Current as of: May 12, 2017  Content Version: 11.6  © 6492-4196 CivilGEO. Care instructions adapted under license by [facility] ([facility]). If you have questions about a medical condition or this instruction, always ask your healthcare professional. [address] any warranty or liability for your use of this information. Patient Education        Potassium-Rich Diet: Care Instructions  Your Care Instructions    Potassium is a mineral. It helps keep the right mix of fluids in your body. It also helps your nerves and muscles work as they should. You'll find it in milk and meats. It's also in all fresh foods, including fruits and vegetables. Most adults need about 5 grams of potassium a day. The foods you eat should supply all that you need. Some health conditions can cause a loss of potassium.  For example, kidney problems and stomach problems with vomiting and diarrhea can cause you to lose this mineral. Some medicines,

## 2018-07-23 ENCOUNTER — HOSPITAL ENCOUNTER (OUTPATIENT)
Age: 58
Discharge: HOME | End: 2018-07-23
Payer: MEDICARE

## 2018-07-23 DIAGNOSIS — E87.6 HYPOKALEMIA: ICD-10-CM

## 2018-07-23 DIAGNOSIS — Z95.2 H/O MECHANICAL AORTIC VALVE REPLACEMENT: ICD-10-CM

## 2018-07-23 LAB
ANION GAP SERPL CALCULATED.3IONS-SCNC: 11 MMOL/L (ref 7–16)
BUN BLDV-MCNC: 8 MG/DL (ref 6–20)
CALCIUM SERPL-MCNC: 9.4 MG/DL (ref 8.6–10.2)
CHLORIDE BLD-SCNC: 99 MMOL/L (ref 98–107)
CO2: 31 MMOL/L (ref 22–29)
CREAT SERPL-MCNC: 0.6 MG/DL (ref 0.5–1)
GFR AFRICAN AMERICAN: >60
GFR NON-AFRICAN AMERICAN: >60 ML/MIN/1.73
GLUCOSE BLD-MCNC: 77 MG/DL (ref 74–109)
POTASSIUM SERPL-SCNC: 4.5 MMOL/L (ref 3.5–5)
SODIUM BLD-SCNC: 141 MMOL/L (ref 132–146)

## 2018-07-23 PROCEDURE — 36415 COLL VENOUS BLD VENIPUNCTURE: CPT

## 2018-07-23 PROCEDURE — 80048 BASIC METABOLIC PNL TOTAL CA: CPT

## 2018-07-24 ENCOUNTER — TELEPHONE (OUTPATIENT)
Dept: INTERNAL MEDICINE | Age: 58
End: 2018-07-24

## 2018-07-27 ENCOUNTER — NURSE ONLY (OUTPATIENT)
Dept: INTERNAL MEDICINE | Age: 58
End: 2018-07-27
Payer: MEDICARE

## 2018-07-27 DIAGNOSIS — Z95.2 H/O MECHANICAL AORTIC VALVE REPLACEMENT: ICD-10-CM

## 2018-07-27 LAB
INTERNATIONAL NORMALIZATION RATIO, POC: 3
PROTHROMBIN TIME, POC: 35.9

## 2018-07-27 PROCEDURE — 93793 ANTICOAG MGMT PT WARFARIN: CPT | Performed by: INTERNAL MEDICINE

## 2018-07-27 PROCEDURE — 85610 PROTHROMBIN TIME: CPT | Performed by: INTERNAL MEDICINE

## 2018-07-27 NOTE — PATIENT INSTRUCTIONS
Continue same dose of coumadin, 5 mg tues/thurs and 6 mg all other days  Repeat INR in 2 weeks  Bleeding precautions as reviewed  Please call if any questions or concerns. Patient Education        Taking Warfarin Safely: Care Instructions  Your Care Instructions    Warfarin is a medicine that you take to prevent blood clots. It is often called a blood thinner. Doctors give warfarin (such as Coumadin) to reduce the risk of blood clots. You may be at risk for blood clots if you have atrial fibrillation or deep vein thrombosis. Some other health problems may also put you at risk. Warfarin slows the amount of time it takes for your blood to clot. It can cause bleeding problems. Even if you've been taking warfarin for a while, it's important to know how to take it safely. Foods and other medicines can affect the way warfarin works. Some can make warfarin work too well. This can cause bleeding problems. And some can make it work poorly, so that it does not prevent blood clots very well. You will need regular blood tests to check how long it takes for your blood to form a clot. This test is called a PT or prothrombin time test. The result of the test is called an INR level. Depending on the test results, your doctor or anticoagulation clinic may adjust your dose of warfarin. Follow-up care is a key part of your treatment and safety. Be sure to make and go to all appointments, and call your doctor if you are having problems. It's also a good idea to know your test results and keep a list of the medicines you take. How can you care for yourself at home? Take warfarin safely  · Take your warfarin at the same time each day. · If you miss a dose of warfarin, don't take an extra dose to make up for it. Your doctor can tell you exactly what to do so you don't take too much or too little. · Wear medical alert jewelry that lets others know that you take warfarin. You can buy this at most drugstores.   · Don't take warfarin

## 2018-08-07 ENCOUNTER — OFFICE VISIT (OUTPATIENT)
Dept: SURGERY | Age: 58
End: 2018-08-07
Payer: MEDICARE

## 2018-08-07 VITALS
HEART RATE: 89 BPM | BODY MASS INDEX: 27.32 KG/M2 | SYSTOLIC BLOOD PRESSURE: 102 MMHG | OXYGEN SATURATION: 94 % | HEIGHT: 65 IN | RESPIRATION RATE: 18 BRPM | WEIGHT: 164 LBS | DIASTOLIC BLOOD PRESSURE: 63 MMHG

## 2018-08-07 DIAGNOSIS — K21.9 GASTROESOPHAGEAL REFLUX DISEASE, ESOPHAGITIS PRESENCE NOT SPECIFIED: Primary | ICD-10-CM

## 2018-08-07 PROCEDURE — G8427 DOCREV CUR MEDS BY ELIG CLIN: HCPCS | Performed by: SURGERY

## 2018-08-07 PROCEDURE — 99212 OFFICE O/P EST SF 10 MIN: CPT | Performed by: SURGERY

## 2018-08-07 PROCEDURE — 3017F COLORECTAL CA SCREEN DOC REV: CPT | Performed by: SURGERY

## 2018-08-07 PROCEDURE — 4004F PT TOBACCO SCREEN RCVD TLK: CPT | Performed by: SURGERY

## 2018-08-07 PROCEDURE — G8419 CALC BMI OUT NRM PARAM NOF/U: HCPCS | Performed by: SURGERY

## 2018-08-07 NOTE — PROGRESS NOTES
GENERAL SURGERY  OFFICE NOTE  2018      HPI  Malinda Cyr is a 61 y.o. female who presents for evaluation of reflux. Recent suspected viral type illness and UTI 2018 - at that time felt as though she was having epigastric fullness not related to PO. History of reflux - on prilosec, dose increased 18. She states she feels no symptoms normally but things were exacerbated at time of illness. Denies nausea, vomiting, fevers, chills, CP, SOB, 1x blood w wiping 2 weeks ago - no melena or hematochezia, stable urinary incontinence, denies dysuria. Small weight gain, denies abdominal pain. Last colonoscopy <10 years ago - Cscope . Last EGD >10 years     Smokes - 1ppd 46 years. Occasional EtOH . Occasional naproxen. Denies steroids for COPD. Takes coumadin for mechanical valve. Stable SOB w exertion d/t COPD. Past Medical History:   Diagnosis Date    Anticoagulant long-term use     Anxiety     Asthma     CAD (coronary artery disease)     Chronic back pain     COPD (chronic obstructive pulmonary disease) (HCC)     Depression     GERD (gastroesophageal reflux disease)     Headache     Hyperlipidemia     Hypertension     Restless legs syndrome     Urinary incontinence        Past Surgical History:   Procedure Laterality Date    APPENDECTOMY      CARDIAC VALVE REPLACEMENT      Aortic valve  [facility]      SECTION      HYSTERECTOMY      TUBAL LIGATION         Medications Prior to Admission:    Prior to Admission medications    Medication Sig Start Date End Date Taking?  Authorizing Provider   Calcium Carbonate-Vitamin D (OYSTER SHELL CALCIUM/D) 500-200 MG-UNIT TABS take 1 tablet by mouth twice a day 18  Yes Clyde Hongsin, DO   mometasone-formoterol [facility]) 100-5 MCG/ACT inhaler Inhale 2 puffs into the lungs 2 times daily 18  Yes Heber Montoya MD   albuterol sulfate HFA (VENTOLIN HFA) 108 (90 Base) MCG/ACT inhaler Inhale 2 puffs into the lungs every 4 hours as needed for Wheezing 7/18/18  Yes Roberth Phillip MD   tiotropium (SPIRIVA RESPIMAT) 2.5 MCG/ACT AERS inhaler Inhale 2 puffs into the lungs daily 7/18/18  Yes Roberth Phillip MD   lisinopril (PRINIVIL;ZESTRIL) 5 MG tablet take 1 tablet by mouth twice a day 7/18/18  Yes Roberth Phillip MD   warfarin (COUMADIN) 6 MG tablet Take 1 tablet by mouth daily 7/18/18  Yes Roberth Phillip MD   sertraline (ZOLOFT) 100 MG tablet take 1 and 1/2 tablets by mouth once daily 7/18/18  Yes Roberth Phillip MD   traZODone (DESYREL) 100 MG tablet Take 1 tablet by mouth nightly 7/18/18  Yes Roberth Phillip MD   aspirin 81 MG tablet Take 1 tablet by mouth daily 7/18/18  Yes Roberth Phillip MD   atorvastatin (LIPITOR) 10 MG tablet Take 1 tablet by mouth daily 7/18/18  Yes Roberth Phillip MD   omeprazole (PRILOSEC) 40 MG delayed release capsule Take 1 capsule by mouth daily 6/25/18  Yes Frank Au DO   azelastine (ASTELIN) 0.1 % nasal spray 2 sprays by Nasal route daily Use in each nostril as directed 6/4/18  Yes OCOPER Zavala   montelukast (SINGULAIR) 10 MG tablet take 1 tablet by mouth at bedtime 5/18/18  Yes Wily Jamison MD   vitamin D (CHOLECALCIFEROL) 1000 units TABS tablet Take 1,000 Units by mouth 2 times daily   Yes Historical Provider, MD   rOPINIRole (REQUIP) 0.25 MG tablet Take 1 tablet by mouth 3 times daily 2/9/18  Yes Maryanne Reeves MD   vitamin C (ASCORBIC ACID) 500 MG tablet Take 1,000 mg by mouth daily   Yes Historical Provider, MD   niacin 500 MG tablet Take 2 tablets by mouth 2 times daily (with meals)  Patient taking differently: Take 1,000 mg by mouth 2 times daily (with meals) Takes OTC only 5/5/17  Yes Townsend Ormond   potassium chloride (KLOR-CON M) 20 MEQ extended release tablet Take 1 tablet by mouth daily 7/20/18   Jack Doe MD   nicotine (NICODERM CQ) 21 MG/24HR Place 1 patch onto the skin every 24 hours Please put 1 patch of 21 mg daily for 6 weeks, then use 1 patch of sounds  Heart:  Heart regular rate and rhythm, mechanical valve noted  Abdomen:  Soft, non-tender, non distended  Extremities: Extremities warm to touch, pink, with no edema. LABS:    CBC  No results for input(s): WBC, HGB, HCT, PLT in the last 72 hours. BMP  No results for input(s): NA, K, CL, CO2, BUN, CREATININE, CALCIUM in the last 72 hours. Invalid input(s): GLU  Liver Function  No results for input(s): AMYLASE, LIPASE, BILITOT, BILIDIR, AST, ALT, ALKPHOS, PROT, LABALBU in the last 72 hours. No results for input(s): LACTATE in the last 72 hours. No results for input(s): INR, PTT in the last 72 hours. Invalid input(s): PT    RADIOLOGY    No results found. ASSESSMENT:  61 y.o. female with GERD    PLAN:    Continue Prilosec  Advised smoking cessation and lifestyle modifications re: reflux disease  Would recommend endoscopy only if symptoms returned     Ricci Benavides DO  8/7/18  4:28 PM    Attending Supervising [de-identified] Attestation Statement  I performed a history and physical examination on the patient and discussed the management with the resident physician. I reviewed and agree with the findings and plan as documented in his note . Patient presents with 6 months of GERD. However since starting PPI and recovering from an URI, she has improved and no longer has any symptoms. She is under the age of 60 and symptoms have resolved, ok to continue PPI and RTC as needed.       Electronically signed by Vik Aguayo MD on 8/7/18 at 5:21 PM

## 2018-08-10 ENCOUNTER — NURSE ONLY (OUTPATIENT)
Dept: INTERNAL MEDICINE | Age: 58
End: 2018-08-10
Payer: MEDICARE

## 2018-08-10 DIAGNOSIS — Z95.2 H/O MECHANICAL AORTIC VALVE REPLACEMENT: ICD-10-CM

## 2018-08-10 LAB
INTERNATIONAL NORMALIZATION RATIO, POC: 2.8
PROTHROMBIN TIME, POC: 33.6

## 2018-08-10 PROCEDURE — 85610 PROTHROMBIN TIME: CPT | Performed by: INTERNAL MEDICINE

## 2018-08-10 PROCEDURE — 93793 ANTICOAG MGMT PT WARFARIN: CPT | Performed by: INTERNAL MEDICINE

## 2018-08-10 RX ORDER — WARFARIN SODIUM 5 MG/1
5 TABLET ORAL DAILY
Qty: 30 TABLET | Refills: 0 | Status: SHIPPED | OUTPATIENT
Start: 2018-08-10 | End: 2018-11-16 | Stop reason: SDUPTHER

## 2018-08-20 ENCOUNTER — TELEPHONE (OUTPATIENT)
Dept: CASE MANAGEMENT | Age: 58
End: 2018-08-20

## 2018-09-10 RX ORDER — MONTELUKAST SODIUM 10 MG/1
TABLET ORAL
Qty: 30 TABLET | Refills: 3 | Status: SHIPPED | OUTPATIENT
Start: 2018-09-10 | End: 2019-01-02 | Stop reason: SDUPTHER

## 2018-09-25 ENCOUNTER — HOSPITAL ENCOUNTER (OUTPATIENT)
Age: 58
Discharge: HOME | End: 2018-09-27
Payer: MEDICARE

## 2018-09-25 LAB
BACTERIA: ABNORMAL /HPF
BILIRUBIN URINE: NEGATIVE
BLOOD, URINE: ABNORMAL
CLARITY: CLEAR
COLOR: YELLOW
GLUCOSE URINE: NEGATIVE MG/DL
KETONES, URINE: NEGATIVE MG/DL
LEUKOCYTE ESTERASE, URINE: NEGATIVE
NITRITE, URINE: NEGATIVE
PH UA: 6 (ref 5–9)
PROTEIN UA: NEGATIVE MG/DL
RBC UA: ABNORMAL /HPF (ref 0–2)
SPECIFIC GRAVITY UA: 1.02 (ref 1–1.03)
UROBILINOGEN, URINE: 0.2 E.U./DL
WBC UA: ABNORMAL /HPF (ref 0–5)

## 2018-09-25 PROCEDURE — 87088 URINE BACTERIA CULTURE: CPT

## 2018-09-25 PROCEDURE — 87077 CULTURE AEROBIC IDENTIFY: CPT

## 2018-09-25 PROCEDURE — 81001 URINALYSIS AUTO W/SCOPE: CPT

## 2018-09-25 PROCEDURE — 87186 SC STD MICRODIL/AGAR DIL: CPT

## 2018-09-27 LAB
ORGANISM: ABNORMAL
URINE CULTURE, ROUTINE: ABNORMAL
URINE CULTURE, ROUTINE: ABNORMAL

## 2018-10-11 ENCOUNTER — OFFICE VISIT (OUTPATIENT)
Dept: PULMONOLOGY | Age: 58
End: 2018-10-11
Payer: MEDICARE

## 2018-10-11 VITALS
HEART RATE: 87 BPM | SYSTOLIC BLOOD PRESSURE: 126 MMHG | BODY MASS INDEX: 27.49 KG/M2 | RESPIRATION RATE: 16 BRPM | DIASTOLIC BLOOD PRESSURE: 59 MMHG | HEIGHT: 65 IN | WEIGHT: 165 LBS | TEMPERATURE: 98.7 F

## 2018-10-11 DIAGNOSIS — Z23 NEED FOR IMMUNIZATION AGAINST INFLUENZA: Primary | ICD-10-CM

## 2018-10-11 PROCEDURE — 99406 BEHAV CHNG SMOKING 3-10 MIN: CPT | Performed by: INTERNAL MEDICINE

## 2018-10-11 PROCEDURE — 4004F PT TOBACCO SCREEN RCVD TLK: CPT | Performed by: INTERNAL MEDICINE

## 2018-10-11 PROCEDURE — 90686 IIV4 VACC NO PRSV 0.5 ML IM: CPT

## 2018-10-11 PROCEDURE — G8427 DOCREV CUR MEDS BY ELIG CLIN: HCPCS | Performed by: INTERNAL MEDICINE

## 2018-10-11 PROCEDURE — 99213 OFFICE O/P EST LOW 20 MIN: CPT | Performed by: INTERNAL MEDICINE

## 2018-10-11 PROCEDURE — G8419 CALC BMI OUT NRM PARAM NOF/U: HCPCS | Performed by: INTERNAL MEDICINE

## 2018-10-11 PROCEDURE — G0008 ADMIN INFLUENZA VIRUS VAC: HCPCS

## 2018-10-11 PROCEDURE — 6360000002 HC RX W HCPCS

## 2018-10-11 PROCEDURE — 3017F COLORECTAL CA SCREEN DOC REV: CPT | Performed by: INTERNAL MEDICINE

## 2018-10-11 PROCEDURE — G8482 FLU IMMUNIZE ORDER/ADMIN: HCPCS | Performed by: INTERNAL MEDICINE

## 2018-10-11 RX ORDER — VARENICLINE TARTRATE 25 MG
KIT ORAL
Qty: 1 EACH | Refills: 1 | Status: SHIPPED | OUTPATIENT
Start: 2018-10-11 | End: 2019-04-30 | Stop reason: SDUPTHER

## 2018-10-11 NOTE — PROGRESS NOTES
Department of Internal Medicine  Division of Pulmonary, Critical Care & Sleep Medicine  Pulmonary [address] [facility]                                             Pulmonary Clinic Consult     I had the pleasure of seeing  Celina Herron in the [facility] regarding their COPD, lung CT screening for lung cancer      Chief complaint on file.   SOB ,Cough     HISTORY OF PRESENT ILLNESS:    Celina Herron is a 60y.o. year old female  follow with the internal medicine clinic came in for follow-up of CT scan that she has done in the workup for screening for lung cancer also she has a history of advanced COPD    The patient tells me that she has shortness of breath has been a chronic she cannot walk more than one block, and she stated that she gets short-winded sometimes less than one block    Also she stated that she has cough that has been chronic usually with clear sputum sometimes it gets yellow    The patient has to follow pulmonologist Nadya Ley and the albuterol rescue inhaler)  He should use the albuterol inhaler 3 times in average in a day and then she use her grandson nebulizer once to twice weekly    The patient does not feel energetic but she contributed that her shortness of breath    Unfortunately the patient is a smoker since the age of 24 she used to smoke 1 pack daily for the last 40 years and then she cut back to half pack daily    She denies any hemoptysis or weight loss, she denies any history of lung cancer in the family    She has open heart surgery with 2 valve replacement done in 2009 and she is on long-term anticoagulation      Today visit  She has been doing ok except that his SOB still about the same and she still with SOB  There is no fever or chills   She tried and work some and she want to try chantix as she tried in the past and work well ,so will try again     ALLERGIES:    Allergies   Allergen Reactions    Keflex [Cephalexin] Itching    Phenergan been no weight changes. No fevers, fatigue or weakness. Head: Patient denies any history of trauma, convulsive disorder or syncope. Skin:  Patient denies history of changes in pigmentation, eruptions or pruritus. No easy bruising or bleeding. EENT: no nasal congestion   Cardiovascular ,No chest pain ,No edema ,  Respiration:SOB:None   ,OTT :None   Gastrointestinal:No GI bleed ,no melena  ,no hematemesis    Musculoskeletal: no joint pain ,no swelling  Neurological:no , syncope. Denies twitching, convulsions, loss of consciousness or memory. Endocrine:  . No history of goiter, exophthalmos or dryness of skin. The patient has no history of diabetes. Hematopoietic:  No history of bleeding disorders or easy bruising. Rheumatic:  No connective tissue disease history or polyarthritis/inflammatory joint disease. PHYSICAL EXAMINATION:  Vitals:    10/11/18 1312   BP: (!) 126/59   Pulse: 87   Resp: 16   Temp: 98.7 °F (37.1 °C)     Constitutional: This is a well developed, well nourished 60y.o. year old female who is alert, oriented, coopative and in no apparent distress. Head was normocephalic and atraumatic. EENT: Mallampati class :  Extraocular muscles intact. External canals are patent and no discharge was appreciated. Septum was midline,   mucosa was without erythema, exudates or cobblestoning. No thrush was noted. Neck: Supple without thyromegaly. No elevated JVP. Trachea was midline. No carotid bruits were auscultated. Respiratory:decrease breath sound ,Rhonchi bilateral     Cardiovascular: Regular without murmur, clicks, gallops or rubs. There is no left or right ventricular heave. Pulses:  Carotid, radial and femoral pulses were equally bilaterally. Abdomen: Slightly rounded and soft without organomegaly. No rebound, rigidity or guarding was appreciated. Lymphatic: No lymphadenopathy.   Musculoskeletal: normal range of motion     Extremities: no edema ,or cyanosis   Skin:

## 2018-10-18 ENCOUNTER — HOSPITAL ENCOUNTER (OUTPATIENT)
Age: 58
Discharge: HOME | End: 2018-10-20
Payer: MEDICARE

## 2018-10-18 LAB
BACTERIA: ABNORMAL /HPF
BILIRUBIN URINE: NEGATIVE
BLOOD, URINE: ABNORMAL
CLARITY: CLEAR
COLOR: YELLOW
GLUCOSE URINE: NEGATIVE MG/DL
KETONES, URINE: NEGATIVE MG/DL
LEUKOCYTE ESTERASE, URINE: NEGATIVE
NITRITE, URINE: NEGATIVE
PH UA: 5.5 (ref 5–9)
PROTEIN UA: NEGATIVE MG/DL
RBC UA: ABNORMAL /HPF (ref 0–2)
SPECIFIC GRAVITY UA: >=1.03 (ref 1–1.03)
UROBILINOGEN, URINE: 0.2 E.U./DL
WBC UA: ABNORMAL /HPF (ref 0–5)

## 2018-10-18 PROCEDURE — 81001 URINALYSIS AUTO W/SCOPE: CPT

## 2018-10-18 PROCEDURE — 87088 URINE BACTERIA CULTURE: CPT

## 2018-10-20 DIAGNOSIS — G47.00 INSOMNIA, UNSPECIFIED TYPE: ICD-10-CM

## 2018-10-20 DIAGNOSIS — R10.13 EPIGASTRIC PAIN: ICD-10-CM

## 2018-10-20 LAB — URINE CULTURE, ROUTINE: NORMAL

## 2018-10-22 RX ORDER — TRAZODONE HYDROCHLORIDE 100 MG/1
TABLET ORAL
Qty: 30 TABLET | Refills: 0 | Status: SHIPPED | OUTPATIENT
Start: 2018-10-22 | End: 2018-12-05 | Stop reason: SDUPTHER

## 2018-10-22 RX ORDER — OMEPRAZOLE 40 MG/1
40 CAPSULE, DELAYED RELEASE ORAL DAILY
Qty: 30 CAPSULE | Refills: 0 | Status: SHIPPED | OUTPATIENT
Start: 2018-10-22 | End: 2018-11-24 | Stop reason: SDUPTHER

## 2018-11-05 DIAGNOSIS — J44.9 COPD, MILD (HCC): ICD-10-CM

## 2018-11-07 ENCOUNTER — TELEPHONE (OUTPATIENT)
Dept: PULMONOLOGY | Age: 58
End: 2018-11-07

## 2018-11-07 NOTE — TELEPHONE ENCOUNTER
Pt calling into office to report scratchy throat/sandpaper feeling started with increase in chatix dose. States it isn't hard to breathe but I have some sores in my mouth that have come and gone and also sometimes I get nausea from drinking water. Pt reports \"all I can drink is pop\". Pt encouraged to stop taking Chantix due to side effects she is describing. Pt really wants to try reducing the dosage of the pills; cutting them in half to see if maybe that will help decrease these side effects. Pt has pill splitter and will try cutting the 1mg tablets (blue tabs) in 1/2 and then report to nurse next week if the sores in her mouth are still an issue along with the other side effects. Pt really wants to keep the not smoking thing going., Encouraged pt to stop taking the Chantix if side effects continue or become worse. Pt agrees and will call office next week.

## 2018-11-08 ENCOUNTER — HOSPITAL ENCOUNTER (OUTPATIENT)
Age: 58
Discharge: HOME | End: 2018-11-10
Payer: MEDICARE

## 2018-11-08 ENCOUNTER — NURSE ONLY (OUTPATIENT)
Dept: INTERNAL MEDICINE | Age: 58
End: 2018-11-08
Payer: MEDICARE

## 2018-11-08 DIAGNOSIS — Z95.2 H/O MECHANICAL AORTIC VALVE REPLACEMENT: Primary | ICD-10-CM

## 2018-11-08 DIAGNOSIS — Z95.2 H/O MECHANICAL AORTIC VALVE REPLACEMENT: ICD-10-CM

## 2018-11-08 LAB
INR BLD: 4.7
PROTHROMBIN TIME: 53.3 SEC (ref 9.3–12.4)

## 2018-11-08 PROCEDURE — 36415 COLL VENOUS BLD VENIPUNCTURE: CPT

## 2018-11-08 PROCEDURE — 93793 ANTICOAG MGMT PT WARFARIN: CPT | Performed by: INTERNAL MEDICINE

## 2018-11-08 PROCEDURE — 85610 PROTHROMBIN TIME: CPT

## 2018-11-16 ENCOUNTER — HOSPITAL ENCOUNTER (OUTPATIENT)
Age: 58
Discharge: HOME | End: 2018-11-18
Payer: MEDICARE

## 2018-11-16 ENCOUNTER — NURSE ONLY (OUTPATIENT)
Dept: INTERNAL MEDICINE | Age: 58
End: 2018-11-16
Payer: MEDICARE

## 2018-11-16 DIAGNOSIS — Z95.2 H/O MECHANICAL AORTIC VALVE REPLACEMENT: ICD-10-CM

## 2018-11-16 LAB
INR BLD: 4.9
INTERNATIONAL NORMALIZATION RATIO, POC: 4.8
PROTHROMBIN TIME, POC: 57.6
PROTHROMBIN TIME: 55.5 SEC (ref 9.3–12.4)

## 2018-11-16 PROCEDURE — 85610 PROTHROMBIN TIME: CPT | Performed by: INTERNAL MEDICINE

## 2018-11-16 PROCEDURE — 36415 COLL VENOUS BLD VENIPUNCTURE: CPT

## 2018-11-16 PROCEDURE — 85610 PROTHROMBIN TIME: CPT

## 2018-11-16 PROCEDURE — 93793 ANTICOAG MGMT PT WARFARIN: CPT | Performed by: INTERNAL MEDICINE

## 2018-11-16 RX ORDER — NITROFURANTOIN 25; 75 MG/1; MG/1
100 CAPSULE ORAL 2 TIMES DAILY
COMMUNITY
End: 2018-12-05 | Stop reason: ALTCHOICE

## 2018-11-21 ENCOUNTER — NURSE ONLY (OUTPATIENT)
Dept: INTERNAL MEDICINE | Age: 58
End: 2018-11-21
Payer: MEDICARE

## 2018-11-21 DIAGNOSIS — Z95.2 H/O MECHANICAL AORTIC VALVE REPLACEMENT: Primary | ICD-10-CM

## 2018-11-21 LAB
INTERNATIONAL NORMALIZATION RATIO, POC: 1.2
PROTHROMBIN TIME, POC: 14.8

## 2018-11-21 PROCEDURE — 85610 PROTHROMBIN TIME: CPT | Performed by: INTERNAL MEDICINE

## 2018-11-21 PROCEDURE — 93793 ANTICOAG MGMT PT WARFARIN: CPT | Performed by: INTERNAL MEDICINE

## 2018-11-21 RX ORDER — WARFARIN SODIUM 4 MG/1
4 TABLET ORAL DAILY
Qty: 30 TABLET | Refills: 0 | Status: SHIPPED | OUTPATIENT
Start: 2018-11-21 | End: 2018-12-05 | Stop reason: SDUPTHER

## 2018-11-22 RX ORDER — WARFARIN SODIUM 5 MG/1
5 TABLET ORAL DAILY
Qty: 30 TABLET | Refills: 0 | Status: SHIPPED | OUTPATIENT
Start: 2018-11-22 | End: 2018-12-05 | Stop reason: ALTCHOICE

## 2018-11-22 RX ORDER — WARFARIN SODIUM 4 MG/1
4 TABLET ORAL DAILY
Qty: 30 TABLET | Refills: 0 | Status: SHIPPED | OUTPATIENT
Start: 2018-11-22 | End: 2018-12-05 | Stop reason: SDUPTHER

## 2018-11-24 DIAGNOSIS — R10.13 EPIGASTRIC PAIN: ICD-10-CM

## 2018-11-27 RX ORDER — OMEPRAZOLE 40 MG/1
CAPSULE, DELAYED RELEASE ORAL
Qty: 30 CAPSULE | Refills: 0 | Status: SHIPPED | OUTPATIENT
Start: 2018-11-27 | End: 2018-12-05 | Stop reason: SDUPTHER

## 2018-11-28 ENCOUNTER — TELEPHONE (OUTPATIENT)
Dept: INTERNAL MEDICINE | Age: 58
End: 2018-11-28

## 2018-11-28 ENCOUNTER — ANTI-COAG VISIT (OUTPATIENT)
Dept: INTERNAL MEDICINE | Age: 58
End: 2018-11-28

## 2018-11-28 ENCOUNTER — NURSE ONLY (OUTPATIENT)
Dept: INTERNAL MEDICINE | Age: 58
End: 2018-11-28
Payer: MEDICARE

## 2018-11-28 DIAGNOSIS — Z95.2 H/O MECHANICAL AORTIC VALVE REPLACEMENT: Primary | ICD-10-CM

## 2018-11-28 LAB
INTERNATIONAL NORMALIZATION RATIO, POC: 2.1
PROTHROMBIN TIME, POC: 25.1

## 2018-11-28 PROCEDURE — 85610 PROTHROMBIN TIME: CPT

## 2018-12-05 ENCOUNTER — OFFICE VISIT (OUTPATIENT)
Dept: INTERNAL MEDICINE | Age: 58
End: 2018-12-05
Payer: MEDICARE

## 2018-12-05 ENCOUNTER — TELEPHONE (OUTPATIENT)
Dept: INTERNAL MEDICINE | Age: 58
End: 2018-12-05

## 2018-12-05 VITALS
HEART RATE: 68 BPM | TEMPERATURE: 97.3 F | WEIGHT: 169.2 LBS | SYSTOLIC BLOOD PRESSURE: 134 MMHG | HEIGHT: 65 IN | RESPIRATION RATE: 16 BRPM | BODY MASS INDEX: 28.19 KG/M2 | DIASTOLIC BLOOD PRESSURE: 68 MMHG

## 2018-12-05 DIAGNOSIS — R29.6 FREQUENT FALLS: Primary | ICD-10-CM

## 2018-12-05 DIAGNOSIS — Z79.01 ANTICOAGULATED ON COUMADIN: ICD-10-CM

## 2018-12-05 DIAGNOSIS — Z13.1 DIABETES MELLITUS SCREENING: ICD-10-CM

## 2018-12-05 DIAGNOSIS — J44.9 COPD, MILD (HCC): ICD-10-CM

## 2018-12-05 DIAGNOSIS — G47.00 INSOMNIA, UNSPECIFIED TYPE: ICD-10-CM

## 2018-12-05 DIAGNOSIS — Z12.39 BREAST CANCER SCREENING: ICD-10-CM

## 2018-12-05 DIAGNOSIS — K21.9 GASTROESOPHAGEAL REFLUX DISEASE WITHOUT ESOPHAGITIS: ICD-10-CM

## 2018-12-05 DIAGNOSIS — G25.81 RESTLESS LEG SYNDROME: ICD-10-CM

## 2018-12-05 DIAGNOSIS — I10 ESSENTIAL HYPERTENSION: ICD-10-CM

## 2018-12-05 DIAGNOSIS — E55.9 VITAMIN D DEFICIENCY: ICD-10-CM

## 2018-12-05 DIAGNOSIS — F32.A DEPRESSION, UNSPECIFIED DEPRESSION TYPE: ICD-10-CM

## 2018-12-05 DIAGNOSIS — R10.13 EPIGASTRIC PAIN: ICD-10-CM

## 2018-12-05 DIAGNOSIS — Z12.2 ENCOUNTER FOR SCREENING FOR LUNG CANCER: ICD-10-CM

## 2018-12-05 DIAGNOSIS — E78.5 HYPERLIPIDEMIA, UNSPECIFIED HYPERLIPIDEMIA TYPE: ICD-10-CM

## 2018-12-05 DIAGNOSIS — Z87.891 PERSONAL HISTORY OF TOBACCO USE: ICD-10-CM

## 2018-12-05 DIAGNOSIS — Z53.20 LUNG CANCER SCREENING DECLINED BY PATIENT: ICD-10-CM

## 2018-12-05 PROBLEM — J45.909 UNCOMPLICATED ASTHMA: Status: RESOLVED | Noted: 2017-01-05 | Resolved: 2018-12-05

## 2018-12-05 LAB
HBA1C MFR BLD: 6.2 %
INTERNATIONAL NORMALIZATION RATIO, POC: 2.1
PROTHROMBIN TIME, POC: 25.6

## 2018-12-05 PROCEDURE — 99213 OFFICE O/P EST LOW 20 MIN: CPT | Performed by: INTERNAL MEDICINE

## 2018-12-05 PROCEDURE — 3017F COLORECTAL CA SCREEN DOC REV: CPT | Performed by: INTERNAL MEDICINE

## 2018-12-05 PROCEDURE — 85610 PROTHROMBIN TIME: CPT | Performed by: INTERNAL MEDICINE

## 2018-12-05 PROCEDURE — 3023F SPIROM DOC REV: CPT | Performed by: INTERNAL MEDICINE

## 2018-12-05 PROCEDURE — 4004F PT TOBACCO SCREEN RCVD TLK: CPT | Performed by: INTERNAL MEDICINE

## 2018-12-05 PROCEDURE — 99214 OFFICE O/P EST MOD 30 MIN: CPT | Performed by: INTERNAL MEDICINE

## 2018-12-05 PROCEDURE — 83036 HEMOGLOBIN GLYCOSYLATED A1C: CPT | Performed by: INTERNAL MEDICINE

## 2018-12-05 PROCEDURE — G8427 DOCREV CUR MEDS BY ELIG CLIN: HCPCS | Performed by: INTERNAL MEDICINE

## 2018-12-05 PROCEDURE — G8419 CALC BMI OUT NRM PARAM NOF/U: HCPCS | Performed by: INTERNAL MEDICINE

## 2018-12-05 PROCEDURE — G8482 FLU IMMUNIZE ORDER/ADMIN: HCPCS | Performed by: INTERNAL MEDICINE

## 2018-12-05 PROCEDURE — G8926 SPIRO NO PERF OR DOC: HCPCS | Performed by: INTERNAL MEDICINE

## 2018-12-05 PROCEDURE — G0296 VISIT TO DETERM LDCT ELIG: HCPCS | Performed by: INTERNAL MEDICINE

## 2018-12-05 RX ORDER — TRAZODONE HYDROCHLORIDE 50 MG/1
50 TABLET ORAL NIGHTLY
Qty: 14 TABLET | Refills: 0 | Status: SHIPPED | OUTPATIENT
Start: 2018-12-05 | End: 2019-01-16 | Stop reason: SDUPTHER

## 2018-12-05 RX ORDER — ATORVASTATIN CALCIUM 10 MG/1
10 TABLET, FILM COATED ORAL DAILY
Qty: 30 TABLET | Refills: 5 | Status: SHIPPED | OUTPATIENT
Start: 2018-12-05 | End: 2019-04-30 | Stop reason: SDUPTHER

## 2018-12-05 RX ORDER — OMEPRAZOLE 40 MG/1
40 CAPSULE, DELAYED RELEASE ORAL
Qty: 30 CAPSULE | Refills: 3 | Status: SHIPPED | OUTPATIENT
Start: 2018-12-05 | End: 2019-04-24 | Stop reason: SDUPTHER

## 2018-12-05 RX ORDER — LISINOPRIL 5 MG/1
TABLET ORAL
Qty: 60 TABLET | Refills: 5 | Status: SHIPPED | OUTPATIENT
Start: 2018-12-05 | End: 2019-04-30 | Stop reason: SDUPTHER

## 2018-12-05 RX ORDER — OXYBUTYNIN CHLORIDE 5 MG/1
5 TABLET ORAL 2 TIMES DAILY
COMMUNITY
End: 2019-07-03 | Stop reason: SINTOL

## 2018-12-05 RX ORDER — SERTRALINE HYDROCHLORIDE 100 MG/1
TABLET, FILM COATED ORAL
Qty: 45 TABLET | Refills: 5 | Status: SHIPPED | OUTPATIENT
Start: 2018-12-05 | End: 2019-04-30 | Stop reason: SDUPTHER

## 2018-12-05 RX ORDER — ALBUTEROL SULFATE 90 UG/1
1 AEROSOL, METERED RESPIRATORY (INHALATION) EVERY 4 HOURS PRN
Qty: 18 G | Refills: 2 | Status: SHIPPED | OUTPATIENT
Start: 2018-12-05 | End: 2019-03-15 | Stop reason: SDUPTHER

## 2018-12-05 RX ORDER — WARFARIN SODIUM 4 MG/1
4 TABLET ORAL DAILY
Qty: 30 TABLET | Refills: 0 | Status: SHIPPED | OUTPATIENT
Start: 2018-12-05 | End: 2019-01-08 | Stop reason: SDUPTHER

## 2018-12-05 RX ORDER — B-COMPLEX WITH VITAMIN C
TABLET ORAL
Qty: 60 TABLET | Refills: 3 | Status: SHIPPED | OUTPATIENT
Start: 2018-12-05 | End: 2019-04-03 | Stop reason: SDUPTHER

## 2018-12-05 RX ORDER — ROPINIROLE 0.25 MG/1
TABLET, FILM COATED ORAL
Qty: 90 TABLET | Refills: 0 | Status: SHIPPED | OUTPATIENT
Start: 2018-12-05 | End: 2019-01-08 | Stop reason: SDUPTHER

## 2018-12-05 ASSESSMENT — ENCOUNTER SYMPTOMS
CONSTIPATION: 0
SORE THROAT: 0
WHEEZING: 0
SINUS PAIN: 0
NAUSEA: 0
SHORTNESS OF BREATH: 0
COUGH: 1
DIARRHEA: 0
EYE PAIN: 0
VOMITING: 0
ABDOMINAL PAIN: 0
VOICE CHANGE: 1

## 2018-12-05 NOTE — PROGRESS NOTES
Orthostatic blood pressure  Lying 140/80  Pulse 70    Sitting 142/80  Pulse 76    Standing 136/76  Pulse 80

## 2018-12-05 NOTE — PROGRESS NOTES
(horseness since octobor). Negative for sinus pain and sore throat. Eyes: Negative for pain. Respiratory: Positive for cough. Negative for shortness of breath and wheezing. Cardiovascular: Negative for chest pain, palpitations and leg swelling. Gastrointestinal: Negative for abdominal pain, constipation, diarrhea, nausea and vomiting. Endocrine: Negative for polydipsia, polyphagia and polyuria. Genitourinary: Negative for dysuria, frequency and urgency. Musculoskeletal: Negative for myalgias. Skin: Negative for rash. Neurological: Positive for dizziness and weakness. Negative for syncope and headaches. Psychiatric/Behavioral: Negative for suicidal ideas. Current Outpatient Prescriptions on File Prior to Visit   Medication Sig Dispense Refill    DULERA 100-5 MCG/ACT inhaler INHALE 2 PUFFS BY MOUTH TWICE A DAY 13 g 3    varenicline (CHANTIX STARTING MONTH PAK) 0.5 MG X 11 & 1 MG X 42 tablet Take as directed on package. 1 each 1    montelukast (SINGULAIR) 10 MG tablet take 1 tablet by mouth at bedtime 30 tablet 3    potassium chloride (KLOR-CON M) 20 MEQ extended release tablet Take 1 tablet by mouth daily 60 tablet 3    tiotropium (SPIRIVA RESPIMAT) 2.5 MCG/ACT AERS inhaler Inhale 2 puffs into the lungs daily 1 Inhaler 3    azelastine (ASTELIN) 0.1 % nasal spray 2 sprays by Nasal route daily Use in each nostril as directed 1 Bottle 5    vitamin C (ASCORBIC ACID) 500 MG tablet Take 1,000 mg by mouth daily      niacin 500 MG tablet Take 2 tablets by mouth 2 times daily (with meals) (Patient taking differently: Take 1,000 mg by mouth 2 times daily (with meals) Takes OTC only) 120 tablet 3    nicotine (NICODERM CQ) 21 MG/24HR Place 1 patch onto the skin every 24 hours Please put 1 patch of 21 mg daily for 6 weeks, then use 1 patch of 14 mg daily for 2 weeks, then use 1 patch of 7 mg daily for 2 weeks then stop.  42 patch 0    nicotine (NICODERM CQ) 14 MG/24HR Please put 1 patch of 21 mg daily for 6 weeks, then use 1 patch of 14 mg daily for 2 weeks, then use 1 patch of 7 mg daily for 2 weeks then stop. 14 patch 0    nicotine (NICODERM CQ) 7 MG/24HR Place 1 patch onto the skin every 24 hours Please put 1 patch of 21 mg daily for 6 weeks, then use 1 patch of 14 mg daily for 2 weeks, then use 1 patch of 7 mg daily for 2 weeks then stop. 14 patch 0     No current facility-administered medications on file prior to visit. OBJECTIVE:    VS: /68   Pulse 68   Temp 97.3 °F (36.3 °C) (Oral)   Resp 16   Ht 5' 5\" (1.651 m)   Wt 169 lb 3.2 oz (76.7 kg)   BMI 28.16 kg/m²   Physical Exam   Constitutional: She is oriented to person, place, and time. She appears well-developed and well-nourished. No distress. HENT:   Head: Normocephalic and atraumatic. Right typamic membrane erythema    Eyes: Conjunctivae and EOM are normal.   Neck: Normal range of motion. Neck supple. Cardiovascular: Normal rate, regular rhythm, normal heart sounds and intact distal pulses. Exam reveals no friction rub. No murmur heard. Pulmonary/Chest: Effort normal and breath sounds normal. No respiratory distress. She has no wheezes. Abdominal: Soft. Bowel sounds are normal. She exhibits no distension. There is no tenderness. Musculoskeletal: Normal range of motion. She exhibits no edema or tenderness. Neurological: She is alert and oriented to person, place, and time. She has normal reflexes. She displays normal reflexes. No cranial nerve deficit or sensory deficit. She exhibits normal muscle tone. Coordination normal.   Skin: Skin is warm. No rash noted. She is not diaphoretic. Psychiatric: She has a normal mood and affect. Her behavior is normal. Thought content normal.   Nursing note and vitals reviewed. ASSESSMENT/PLAN:    I have reviewed all pertient PMHx, PSHx, FamHx, Social Hx, medications, and allergies andupdated history as appropriate.       Pedro Luis Chin was seen today for check-up and medication

## 2018-12-05 NOTE — PROGRESS NOTES
Discharge instructions reviewed with patient per Dr. J Luis Lugo. Patient directed to  to  [facility] and schedule follow up appt.  Pt had A1C & PT/INR in office today

## 2018-12-06 DIAGNOSIS — I10 ESSENTIAL HYPERTENSION: Primary | ICD-10-CM

## 2018-12-10 ENCOUNTER — PROCEDURE VISIT (OUTPATIENT)
Dept: AUDIOLOGY | Age: 58
End: 2018-12-10
Payer: MEDICARE

## 2018-12-10 ENCOUNTER — OFFICE VISIT (OUTPATIENT)
Dept: ENT CLINIC | Age: 58
End: 2018-12-10
Payer: MEDICARE

## 2018-12-10 VITALS
BODY MASS INDEX: 27.49 KG/M2 | HEART RATE: 80 BPM | HEIGHT: 65 IN | DIASTOLIC BLOOD PRESSURE: 72 MMHG | WEIGHT: 165 LBS | SYSTOLIC BLOOD PRESSURE: 130 MMHG

## 2018-12-10 DIAGNOSIS — R09.89 GLOBUS SENSATION: ICD-10-CM

## 2018-12-10 DIAGNOSIS — H93.8X2 EAR PRESSURE, LEFT: Primary | ICD-10-CM

## 2018-12-10 DIAGNOSIS — H69.82 DYSFUNCTION OF LEFT EUSTACHIAN TUBE: Primary | ICD-10-CM

## 2018-12-10 PROCEDURE — 92567 TYMPANOMETRY: CPT | Performed by: AUDIOLOGIST

## 2018-12-10 PROCEDURE — G8428 CUR MEDS NOT DOCUMENT: HCPCS | Performed by: OTOLARYNGOLOGY

## 2018-12-10 PROCEDURE — 4004F PT TOBACCO SCREEN RCVD TLK: CPT | Performed by: OTOLARYNGOLOGY

## 2018-12-10 PROCEDURE — 3017F COLORECTAL CA SCREEN DOC REV: CPT | Performed by: OTOLARYNGOLOGY

## 2018-12-10 PROCEDURE — 31575 DIAGNOSTIC LARYNGOSCOPY: CPT | Performed by: OTOLARYNGOLOGY

## 2018-12-10 PROCEDURE — G8419 CALC BMI OUT NRM PARAM NOF/U: HCPCS | Performed by: OTOLARYNGOLOGY

## 2018-12-10 PROCEDURE — G8482 FLU IMMUNIZE ORDER/ADMIN: HCPCS | Performed by: OTOLARYNGOLOGY

## 2018-12-10 PROCEDURE — 99213 OFFICE O/P EST LOW 20 MIN: CPT | Performed by: OTOLARYNGOLOGY

## 2018-12-12 RX ORDER — TIOTROPIUM BROMIDE INHALATION SPRAY 3.12 UG/1
SPRAY, METERED RESPIRATORY (INHALATION)
Qty: 1 INHALER | Refills: 0 | Status: SHIPPED | OUTPATIENT
Start: 2018-12-12 | End: 2019-01-08 | Stop reason: SDUPTHER

## 2018-12-13 ENCOUNTER — HOSPITAL ENCOUNTER (OUTPATIENT)
Age: 58
Discharge: HOME | End: 2018-12-13
Payer: MEDICARE

## 2018-12-13 DIAGNOSIS — I10 ESSENTIAL HYPERTENSION: ICD-10-CM

## 2018-12-13 LAB
ANION GAP SERPL CALCULATED.3IONS-SCNC: 14 MMOL/L (ref 7–16)
BUN BLDV-MCNC: 11 MG/DL (ref 6–20)
CALCIUM SERPL-MCNC: 9.4 MG/DL (ref 8.6–10.2)
CHLORIDE BLD-SCNC: 102 MMOL/L (ref 98–107)
CO2: 28 MMOL/L (ref 22–29)
CREAT SERPL-MCNC: 0.7 MG/DL (ref 0.5–1)
GFR AFRICAN AMERICAN: >60
GFR NON-AFRICAN AMERICAN: >60 ML/MIN/1.73
GLUCOSE BLD-MCNC: 106 MG/DL (ref 74–99)
POTASSIUM SERPL-SCNC: 4.6 MMOL/L (ref 3.5–5)
SODIUM BLD-SCNC: 144 MMOL/L (ref 132–146)

## 2018-12-13 PROCEDURE — 36415 COLL VENOUS BLD VENIPUNCTURE: CPT

## 2018-12-13 PROCEDURE — 80048 BASIC METABOLIC PNL TOTAL CA: CPT

## 2018-12-17 ENCOUNTER — HOSPITAL ENCOUNTER (OUTPATIENT)
Dept: CT IMAGING | Age: 58
Discharge: HOME | End: 2018-12-19
Payer: MEDICARE

## 2018-12-17 DIAGNOSIS — Z87.891 PERSONAL HISTORY OF TOBACCO USE: ICD-10-CM

## 2018-12-17 DIAGNOSIS — Z12.2 ENCOUNTER FOR SCREENING FOR LUNG CANCER: ICD-10-CM

## 2018-12-17 DIAGNOSIS — Z79.01 ANTICOAGULATED ON COUMADIN: ICD-10-CM

## 2018-12-17 DIAGNOSIS — R29.6 FREQUENT FALLS: ICD-10-CM

## 2018-12-17 PROCEDURE — 70470 CT HEAD/BRAIN W/O & W/DYE: CPT

## 2018-12-17 PROCEDURE — 2580000003 HC RX 258: Performed by: RADIOLOGY

## 2018-12-17 PROCEDURE — 6360000004 HC RX CONTRAST MEDICATION: Performed by: RADIOLOGY

## 2018-12-17 PROCEDURE — G0297 LDCT FOR LUNG CA SCREEN: HCPCS

## 2018-12-17 RX ORDER — SODIUM CHLORIDE 0.9 % (FLUSH) 0.9 %
10 SYRINGE (ML) INJECTION
Status: COMPLETED | OUTPATIENT
Start: 2018-12-17 | End: 2018-12-17

## 2018-12-17 RX ADMIN — Medication 10 ML: at 09:13

## 2018-12-17 RX ADMIN — IOPAMIDOL 90 ML: 755 INJECTION, SOLUTION INTRAVENOUS at 09:13

## 2018-12-18 ENCOUNTER — TELEPHONE (OUTPATIENT)
Dept: CASE MANAGEMENT | Age: 58
End: 2018-12-18

## 2018-12-20 ENCOUNTER — HOSPITAL ENCOUNTER (OUTPATIENT)
Dept: GENERAL RADIOLOGY | Age: 58
Discharge: HOME | End: 2018-12-22
Payer: MEDICARE

## 2018-12-20 DIAGNOSIS — Z12.31 VISIT FOR SCREENING MAMMOGRAM: ICD-10-CM

## 2018-12-20 PROCEDURE — 77063 BREAST TOMOSYNTHESIS BI: CPT

## 2019-01-02 RX ORDER — MONTELUKAST SODIUM 10 MG/1
TABLET ORAL
Qty: 30 TABLET | Refills: 3 | Status: SHIPPED | OUTPATIENT
Start: 2019-01-02 | End: 2019-04-30 | Stop reason: SDUPTHER

## 2019-01-08 ENCOUNTER — ANTI-COAG VISIT (OUTPATIENT)
Dept: INTERNAL MEDICINE | Age: 59
End: 2019-01-08

## 2019-01-08 ENCOUNTER — OFFICE VISIT (OUTPATIENT)
Dept: INTERNAL MEDICINE | Age: 59
End: 2019-01-08
Payer: MEDICARE

## 2019-01-08 ENCOUNTER — TELEPHONE (OUTPATIENT)
Dept: INTERNAL MEDICINE | Age: 59
End: 2019-01-08

## 2019-01-08 VITALS
RESPIRATION RATE: 18 BRPM | HEIGHT: 65 IN | WEIGHT: 168 LBS | DIASTOLIC BLOOD PRESSURE: 88 MMHG | BODY MASS INDEX: 27.99 KG/M2 | TEMPERATURE: 97.6 F | HEART RATE: 74 BPM | SYSTOLIC BLOOD PRESSURE: 138 MMHG

## 2019-01-08 DIAGNOSIS — G47.00 INSOMNIA, UNSPECIFIED TYPE: ICD-10-CM

## 2019-01-08 DIAGNOSIS — Z79.01 ANTICOAGULATED ON COUMADIN: ICD-10-CM

## 2019-01-08 DIAGNOSIS — R93.0 ABNORMAL CT OF THE HEAD: Primary | ICD-10-CM

## 2019-01-08 DIAGNOSIS — G25.81 RESTLESS LEG SYNDROME: ICD-10-CM

## 2019-01-08 LAB
INTERNATIONAL NORMALIZATION RATIO, POC: 1.6
PROTHROMBIN TIME, POC: 19.2

## 2019-01-08 PROCEDURE — 3017F COLORECTAL CA SCREEN DOC REV: CPT | Performed by: INTERNAL MEDICINE

## 2019-01-08 PROCEDURE — G8427 DOCREV CUR MEDS BY ELIG CLIN: HCPCS | Performed by: INTERNAL MEDICINE

## 2019-01-08 PROCEDURE — 85610 PROTHROMBIN TIME: CPT | Performed by: INTERNAL MEDICINE

## 2019-01-08 PROCEDURE — G8419 CALC BMI OUT NRM PARAM NOF/U: HCPCS | Performed by: INTERNAL MEDICINE

## 2019-01-08 PROCEDURE — G8482 FLU IMMUNIZE ORDER/ADMIN: HCPCS | Performed by: INTERNAL MEDICINE

## 2019-01-08 PROCEDURE — 99213 OFFICE O/P EST LOW 20 MIN: CPT | Performed by: INTERNAL MEDICINE

## 2019-01-08 PROCEDURE — 99212 OFFICE O/P EST SF 10 MIN: CPT

## 2019-01-08 PROCEDURE — 4004F PT TOBACCO SCREEN RCVD TLK: CPT | Performed by: INTERNAL MEDICINE

## 2019-01-08 RX ORDER — ROPINIROLE 0.25 MG/1
TABLET, FILM COATED ORAL
Qty: 90 TABLET | Refills: 2 | Status: SHIPPED | OUTPATIENT
Start: 2019-01-08 | End: 2019-04-03 | Stop reason: SDUPTHER

## 2019-01-08 RX ORDER — WARFARIN SODIUM 5 MG/1
TABLET ORAL
Qty: 30 TABLET | Refills: 2 | Status: SHIPPED | OUTPATIENT
Start: 2019-01-08 | End: 2019-04-03 | Stop reason: SDUPTHER

## 2019-01-08 RX ORDER — TRAZODONE HYDROCHLORIDE 50 MG/1
50 TABLET ORAL NIGHTLY
Qty: 14 TABLET | Status: CANCELLED | OUTPATIENT
Start: 2019-01-08

## 2019-01-08 RX ORDER — WARFARIN SODIUM 4 MG/1
4 TABLET ORAL DAILY
Qty: 30 TABLET | Refills: 3 | Status: SHIPPED | OUTPATIENT
Start: 2019-01-08 | End: 2019-01-08 | Stop reason: SDUPTHER

## 2019-01-08 ASSESSMENT — ENCOUNTER SYMPTOMS
WHEEZING: 0
ABDOMINAL PAIN: 0
NAUSEA: 0
SINUS PAIN: 0
SHORTNESS OF BREATH: 0
VOMITING: 0
BACK PAIN: 0

## 2019-01-16 ENCOUNTER — NURSE ONLY (OUTPATIENT)
Dept: INTERNAL MEDICINE | Age: 59
End: 2019-01-16
Payer: MEDICARE

## 2019-01-16 DIAGNOSIS — G47.00 INSOMNIA, UNSPECIFIED TYPE: ICD-10-CM

## 2019-01-16 DIAGNOSIS — Z95.2 H/O MECHANICAL AORTIC VALVE REPLACEMENT: Primary | ICD-10-CM

## 2019-01-16 LAB
INTERNATIONAL NORMALIZATION RATIO, POC: 1.7
PROTHROMBIN TIME, POC: 20.5

## 2019-01-16 PROCEDURE — 85610 PROTHROMBIN TIME: CPT | Performed by: INTERNAL MEDICINE

## 2019-01-16 PROCEDURE — 93793 ANTICOAG MGMT PT WARFARIN: CPT | Performed by: INTERNAL MEDICINE

## 2019-01-16 RX ORDER — TRAZODONE HYDROCHLORIDE 50 MG/1
50 TABLET ORAL NIGHTLY
Qty: 14 TABLET | Refills: 0 | Status: SHIPPED | OUTPATIENT
Start: 2019-01-16 | End: 2019-02-03 | Stop reason: SDUPTHER

## 2019-01-17 ENCOUNTER — HOSPITAL ENCOUNTER (OUTPATIENT)
Dept: MRI IMAGING | Age: 59
Discharge: HOME | End: 2019-01-19
Payer: MEDICARE

## 2019-01-17 DIAGNOSIS — R93.0 ABNORMAL CT OF THE HEAD: ICD-10-CM

## 2019-01-17 PROCEDURE — 70551 MRI BRAIN STEM W/O DYE: CPT

## 2019-01-18 ENCOUNTER — TELEPHONE (OUTPATIENT)
Dept: INTERNAL MEDICINE | Age: 59
End: 2019-01-18

## 2019-01-28 ENCOUNTER — OFFICE VISIT (OUTPATIENT)
Dept: INTERNAL MEDICINE | Age: 59
End: 2019-01-28
Payer: MEDICARE

## 2019-01-28 VITALS
WEIGHT: 166 LBS | HEIGHT: 65 IN | DIASTOLIC BLOOD PRESSURE: 71 MMHG | SYSTOLIC BLOOD PRESSURE: 122 MMHG | HEART RATE: 75 BPM | BODY MASS INDEX: 27.66 KG/M2 | RESPIRATION RATE: 16 BRPM | TEMPERATURE: 98.2 F

## 2019-01-28 DIAGNOSIS — Z95.2 H/O MECHANICAL AORTIC VALVE REPLACEMENT: Primary | ICD-10-CM

## 2019-01-28 DIAGNOSIS — R91.1 LUNG NODULE: ICD-10-CM

## 2019-01-28 LAB
INTERNATIONAL NORMALIZATION RATIO, POC: 3.6
PROTHROMBIN TIME, POC: 42.8

## 2019-01-28 PROCEDURE — G8419 CALC BMI OUT NRM PARAM NOF/U: HCPCS | Performed by: INTERNAL MEDICINE

## 2019-01-28 PROCEDURE — 99213 OFFICE O/P EST LOW 20 MIN: CPT | Performed by: INTERNAL MEDICINE

## 2019-01-28 PROCEDURE — G8427 DOCREV CUR MEDS BY ELIG CLIN: HCPCS | Performed by: INTERNAL MEDICINE

## 2019-01-28 PROCEDURE — 3017F COLORECTAL CA SCREEN DOC REV: CPT | Performed by: INTERNAL MEDICINE

## 2019-01-28 PROCEDURE — G8482 FLU IMMUNIZE ORDER/ADMIN: HCPCS | Performed by: INTERNAL MEDICINE

## 2019-01-28 PROCEDURE — 99212 OFFICE O/P EST SF 10 MIN: CPT | Performed by: INTERNAL MEDICINE

## 2019-01-28 PROCEDURE — 85610 PROTHROMBIN TIME: CPT | Performed by: INTERNAL MEDICINE

## 2019-01-28 PROCEDURE — 4004F PT TOBACCO SCREEN RCVD TLK: CPT | Performed by: INTERNAL MEDICINE

## 2019-01-28 PROCEDURE — 96160 PT-FOCUSED HLTH RISK ASSMT: CPT | Performed by: INTERNAL MEDICINE

## 2019-01-28 RX ORDER — WARFARIN SODIUM 6 MG/1
6 TABLET ORAL DAILY
COMMUNITY
End: 2019-04-30 | Stop reason: SDUPTHER

## 2019-01-28 ASSESSMENT — ENCOUNTER SYMPTOMS
COUGH: 1
GASTROINTESTINAL NEGATIVE: 1

## 2019-01-28 ASSESSMENT — PATIENT HEALTH QUESTIONNAIRE - PHQ9
4. FEELING TIRED OR HAVING LITTLE ENERGY: 3
7. TROUBLE CONCENTRATING ON THINGS, SUCH AS READING THE NEWSPAPER OR WATCHING TELEVISION: 3
10. IF YOU CHECKED OFF ANY PROBLEMS, HOW DIFFICULT HAVE THESE PROBLEMS MADE IT FOR YOU TO DO YOUR WORK, TAKE CARE OF THINGS AT HOME, OR GET ALONG WITH OTHER PEOPLE: 3
5. POOR APPETITE OR OVEREATING: 3
2. FEELING DOWN, DEPRESSED OR HOPELESS: 0
SUM OF ALL RESPONSES TO PHQ9 QUESTIONS 1 & 2: 3
1. LITTLE INTEREST OR PLEASURE IN DOING THINGS: 3
SUM OF ALL RESPONSES TO PHQ QUESTIONS 1-9: 15
8. MOVING OR SPEAKING SO SLOWLY THAT OTHER PEOPLE COULD HAVE NOTICED. OR THE OPPOSITE, BEING SO FIGETY OR RESTLESS THAT YOU HAVE BEEN MOVING AROUND A LOT MORE THAN USUAL: 0
6. FEELING BAD ABOUT YOURSELF - OR THAT YOU ARE A FAILURE OR HAVE LET YOURSELF OR YOUR FAMILY DOWN: 0
9. THOUGHTS THAT YOU WOULD BE BETTER OFF DEAD, OR OF HURTING YOURSELF: 0
3. TROUBLE FALLING OR STAYING ASLEEP: 3
SUM OF ALL RESPONSES TO PHQ QUESTIONS 1-9: 15

## 2019-02-03 DIAGNOSIS — G47.00 INSOMNIA, UNSPECIFIED TYPE: ICD-10-CM

## 2019-02-05 ENCOUNTER — NURSE ONLY (OUTPATIENT)
Dept: INTERNAL MEDICINE | Age: 59
End: 2019-02-05
Payer: MEDICARE

## 2019-02-05 VITALS — SYSTOLIC BLOOD PRESSURE: 150 MMHG | DIASTOLIC BLOOD PRESSURE: 80 MMHG

## 2019-02-05 DIAGNOSIS — Z95.2 H/O MECHANICAL AORTIC VALVE REPLACEMENT: ICD-10-CM

## 2019-02-05 LAB
INTERNATIONAL NORMALIZATION RATIO, POC: 1.4
PROTHROMBIN TIME, POC: 17

## 2019-02-05 PROCEDURE — 93793 ANTICOAG MGMT PT WARFARIN: CPT | Performed by: INTERNAL MEDICINE

## 2019-02-05 PROCEDURE — 85610 PROTHROMBIN TIME: CPT | Performed by: INTERNAL MEDICINE

## 2019-02-05 RX ORDER — TRAZODONE HYDROCHLORIDE 50 MG/1
50 TABLET ORAL NIGHTLY
Qty: 14 TABLET | Refills: 0 | Status: SHIPPED | OUTPATIENT
Start: 2019-02-05 | End: 2019-07-03 | Stop reason: SINTOL

## 2019-02-07 ENCOUNTER — TELEPHONE (OUTPATIENT)
Dept: PULMONOLOGY | Age: 59
End: 2019-02-07

## 2019-02-07 ENCOUNTER — OFFICE VISIT (OUTPATIENT)
Dept: PULMONOLOGY | Age: 59
End: 2019-02-07
Payer: MEDICARE

## 2019-02-07 VITALS
SYSTOLIC BLOOD PRESSURE: 147 MMHG | TEMPERATURE: 98.3 F | DIASTOLIC BLOOD PRESSURE: 75 MMHG | OXYGEN SATURATION: 92 % | WEIGHT: 165 LBS | HEART RATE: 83 BPM | RESPIRATION RATE: 18 BRPM | BODY MASS INDEX: 27.49 KG/M2 | HEIGHT: 65 IN

## 2019-02-07 DIAGNOSIS — J44.9 COPD, VERY SEVERE (HCC): ICD-10-CM

## 2019-02-07 DIAGNOSIS — J44.9 CHRONIC OBSTRUCTIVE PULMONARY DISEASE, UNSPECIFIED COPD TYPE (HCC): ICD-10-CM

## 2019-02-07 DIAGNOSIS — R91.8 LUNG NODULES: ICD-10-CM

## 2019-02-07 PROCEDURE — 3023F SPIROM DOC REV: CPT | Performed by: INTERNAL MEDICINE

## 2019-02-07 PROCEDURE — G8926 SPIRO NO PERF OR DOC: HCPCS | Performed by: INTERNAL MEDICINE

## 2019-02-07 PROCEDURE — 3017F COLORECTAL CA SCREEN DOC REV: CPT | Performed by: INTERNAL MEDICINE

## 2019-02-07 PROCEDURE — G8482 FLU IMMUNIZE ORDER/ADMIN: HCPCS | Performed by: INTERNAL MEDICINE

## 2019-02-07 PROCEDURE — G8419 CALC BMI OUT NRM PARAM NOF/U: HCPCS | Performed by: INTERNAL MEDICINE

## 2019-02-07 PROCEDURE — 4004F PT TOBACCO SCREEN RCVD TLK: CPT | Performed by: INTERNAL MEDICINE

## 2019-02-07 PROCEDURE — 99214 OFFICE O/P EST MOD 30 MIN: CPT | Performed by: INTERNAL MEDICINE

## 2019-02-07 PROCEDURE — G8427 DOCREV CUR MEDS BY ELIG CLIN: HCPCS | Performed by: INTERNAL MEDICINE

## 2019-02-07 PROCEDURE — 99213 OFFICE O/P EST LOW 20 MIN: CPT | Performed by: INTERNAL MEDICINE

## 2019-02-12 ENCOUNTER — NURSE ONLY (OUTPATIENT)
Dept: INTERNAL MEDICINE | Age: 59
End: 2019-02-12
Payer: MEDICARE

## 2019-02-12 DIAGNOSIS — Z95.2 H/O MECHANICAL AORTIC VALVE REPLACEMENT: ICD-10-CM

## 2019-02-12 LAB
INTERNATIONAL NORMALIZATION RATIO, POC: 2.2
PROTHROMBIN TIME, POC: 26.2

## 2019-02-12 PROCEDURE — 85610 PROTHROMBIN TIME: CPT | Performed by: INTERNAL MEDICINE

## 2019-02-12 PROCEDURE — 93793 ANTICOAG MGMT PT WARFARIN: CPT | Performed by: INTERNAL MEDICINE

## 2019-02-21 ENCOUNTER — TELEPHONE (OUTPATIENT)
Dept: INTERNAL MEDICINE | Age: 59
End: 2019-02-21

## 2019-02-27 ENCOUNTER — NURSE ONLY (OUTPATIENT)
Dept: INTERNAL MEDICINE | Age: 59
End: 2019-02-27
Payer: MEDICARE

## 2019-02-27 DIAGNOSIS — Z95.2 H/O MECHANICAL AORTIC VALVE REPLACEMENT: ICD-10-CM

## 2019-02-27 LAB
INTERNATIONAL NORMALIZATION RATIO, POC: 2.5
PROTHROMBIN TIME, POC: 29.5

## 2019-02-27 PROCEDURE — 93793 ANTICOAG MGMT PT WARFARIN: CPT | Performed by: INTERNAL MEDICINE

## 2019-02-27 PROCEDURE — 85610 PROTHROMBIN TIME: CPT | Performed by: INTERNAL MEDICINE

## 2019-03-07 DIAGNOSIS — J44.9 COPD, MILD (HCC): ICD-10-CM

## 2019-03-12 ENCOUNTER — HOSPITAL ENCOUNTER (OUTPATIENT)
Dept: CT IMAGING | Age: 59
Discharge: HOME | End: 2019-03-14
Payer: MEDICARE

## 2019-03-12 DIAGNOSIS — R91.8 LUNG NODULES: ICD-10-CM

## 2019-03-12 DIAGNOSIS — J44.9 COPD, VERY SEVERE (HCC): ICD-10-CM

## 2019-03-12 PROCEDURE — 71250 CT THORAX DX C-: CPT

## 2019-03-13 ENCOUNTER — NURSE ONLY (OUTPATIENT)
Dept: INTERNAL MEDICINE | Age: 59
End: 2019-03-13
Payer: MEDICARE

## 2019-03-13 DIAGNOSIS — Z95.2 H/O MECHANICAL AORTIC VALVE REPLACEMENT: ICD-10-CM

## 2019-03-13 LAB
INTERNATIONAL NORMALIZATION RATIO, POC: 2.9
PROTHROMBIN TIME, POC: 35.2

## 2019-03-13 PROCEDURE — 85610 PROTHROMBIN TIME: CPT

## 2019-03-15 ENCOUNTER — OFFICE VISIT (OUTPATIENT)
Dept: ENT CLINIC | Age: 59
End: 2019-03-15
Payer: MEDICARE

## 2019-03-15 ENCOUNTER — TELEPHONE (OUTPATIENT)
Dept: CASE MANAGEMENT | Age: 59
End: 2019-03-15

## 2019-03-15 VITALS
BODY MASS INDEX: 27.49 KG/M2 | SYSTOLIC BLOOD PRESSURE: 138 MMHG | DIASTOLIC BLOOD PRESSURE: 76 MMHG | HEIGHT: 65 IN | HEART RATE: 70 BPM | WEIGHT: 165 LBS

## 2019-03-15 DIAGNOSIS — J30.1 SEASONAL ALLERGIC RHINITIS DUE TO POLLEN: Primary | ICD-10-CM

## 2019-03-15 DIAGNOSIS — J44.9 COPD, MILD (HCC): ICD-10-CM

## 2019-03-15 PROCEDURE — 99213 OFFICE O/P EST LOW 20 MIN: CPT | Performed by: OTOLARYNGOLOGY

## 2019-03-15 PROCEDURE — G8419 CALC BMI OUT NRM PARAM NOF/U: HCPCS | Performed by: OTOLARYNGOLOGY

## 2019-03-15 PROCEDURE — 4004F PT TOBACCO SCREEN RCVD TLK: CPT | Performed by: OTOLARYNGOLOGY

## 2019-03-15 PROCEDURE — 3017F COLORECTAL CA SCREEN DOC REV: CPT | Performed by: OTOLARYNGOLOGY

## 2019-03-15 PROCEDURE — G8482 FLU IMMUNIZE ORDER/ADMIN: HCPCS | Performed by: OTOLARYNGOLOGY

## 2019-03-15 PROCEDURE — G8427 DOCREV CUR MEDS BY ELIG CLIN: HCPCS | Performed by: OTOLARYNGOLOGY

## 2019-03-15 RX ORDER — AZELASTINE 1 MG/ML
2 SPRAY, METERED NASAL 2 TIMES DAILY
Qty: 1 BOTTLE | Refills: 1 | Status: SHIPPED | OUTPATIENT
Start: 2019-03-15 | End: 2019-06-07 | Stop reason: SDUPTHER

## 2019-03-23 ASSESSMENT — ENCOUNTER SYMPTOMS
COUGH: 0
VOMITING: 0
SHORTNESS OF BREATH: 0

## 2019-04-03 DIAGNOSIS — Z79.01 ANTICOAGULATED ON COUMADIN: ICD-10-CM

## 2019-04-03 DIAGNOSIS — G25.81 RESTLESS LEG SYNDROME: ICD-10-CM

## 2019-04-03 DIAGNOSIS — E55.9 VITAMIN D DEFICIENCY: ICD-10-CM

## 2019-04-03 RX ORDER — WARFARIN SODIUM 5 MG/1
TABLET ORAL
Qty: 30 TABLET | Refills: 0 | Status: SHIPPED | OUTPATIENT
Start: 2019-04-03 | End: 2019-04-30 | Stop reason: SDUPTHER

## 2019-04-03 RX ORDER — ROPINIROLE 0.25 MG/1
TABLET, FILM COATED ORAL
Qty: 90 TABLET | Refills: 0 | Status: SHIPPED | OUTPATIENT
Start: 2019-04-03 | End: 2019-06-16 | Stop reason: SDUPTHER

## 2019-04-03 RX ORDER — B-COMPLEX WITH VITAMIN C
TABLET ORAL
Qty: 60 TABLET | Refills: 0 | Status: SHIPPED | OUTPATIENT
Start: 2019-04-03 | End: 2019-06-02 | Stop reason: SDUPTHER

## 2019-04-17 ENCOUNTER — NURSE ONLY (OUTPATIENT)
Dept: INTERNAL MEDICINE | Age: 59
End: 2019-04-17
Payer: MEDICARE

## 2019-04-17 DIAGNOSIS — Z95.2 H/O MECHANICAL AORTIC VALVE REPLACEMENT: ICD-10-CM

## 2019-04-17 LAB
INTERNATIONAL NORMALIZATION RATIO, POC: 2.3
PROTHROMBIN TIME, POC: 27.1

## 2019-04-17 PROCEDURE — 93793 ANTICOAG MGMT PT WARFARIN: CPT | Performed by: INTERNAL MEDICINE

## 2019-04-17 PROCEDURE — 85610 PROTHROMBIN TIME: CPT

## 2019-04-24 DIAGNOSIS — K21.9 GASTROESOPHAGEAL REFLUX DISEASE WITHOUT ESOPHAGITIS: ICD-10-CM

## 2019-04-24 RX ORDER — OMEPRAZOLE 40 MG/1
40 CAPSULE, DELAYED RELEASE ORAL
Qty: 30 CAPSULE | Refills: 0 | Status: SHIPPED | OUTPATIENT
Start: 2019-04-24 | End: 2019-04-30 | Stop reason: SDUPTHER

## 2019-04-30 ENCOUNTER — OFFICE VISIT (OUTPATIENT)
Dept: INTERNAL MEDICINE | Age: 59
End: 2019-04-30
Payer: MEDICARE

## 2019-04-30 ENCOUNTER — TELEPHONE (OUTPATIENT)
Dept: PULMONOLOGY | Age: 59
End: 2019-04-30

## 2019-04-30 VITALS
WEIGHT: 165.9 LBS | HEIGHT: 65 IN | BODY MASS INDEX: 27.64 KG/M2 | SYSTOLIC BLOOD PRESSURE: 137 MMHG | HEART RATE: 79 BPM | RESPIRATION RATE: 16 BRPM | TEMPERATURE: 98.3 F | DIASTOLIC BLOOD PRESSURE: 62 MMHG

## 2019-04-30 DIAGNOSIS — R32 INCONTINENCE OF URINE IN FEMALE: ICD-10-CM

## 2019-04-30 DIAGNOSIS — E55.9 VITAMIN D DEFICIENCY: ICD-10-CM

## 2019-04-30 DIAGNOSIS — K21.9 GASTROESOPHAGEAL REFLUX DISEASE WITHOUT ESOPHAGITIS: ICD-10-CM

## 2019-04-30 DIAGNOSIS — J44.9 CHRONIC OBSTRUCTIVE PULMONARY DISEASE, UNSPECIFIED COPD TYPE (HCC): ICD-10-CM

## 2019-04-30 DIAGNOSIS — F32.A DEPRESSION, UNSPECIFIED DEPRESSION TYPE: ICD-10-CM

## 2019-04-30 DIAGNOSIS — E78.5 HYPERLIPIDEMIA, UNSPECIFIED HYPERLIPIDEMIA TYPE: ICD-10-CM

## 2019-04-30 DIAGNOSIS — G25.81 RESTLESS LEG SYNDROME: Primary | ICD-10-CM

## 2019-04-30 DIAGNOSIS — Z79.01 ANTICOAGULATED ON COUMADIN: ICD-10-CM

## 2019-04-30 DIAGNOSIS — I10 ESSENTIAL HYPERTENSION: ICD-10-CM

## 2019-04-30 DIAGNOSIS — D64.9 ANEMIA, UNSPECIFIED TYPE: ICD-10-CM

## 2019-04-30 LAB
INTERNATIONAL NORMALIZATION RATIO, POC: 2.7
PROTHROMBIN TIME, POC: 32.1

## 2019-04-30 PROCEDURE — 3017F COLORECTAL CA SCREEN DOC REV: CPT | Performed by: INTERNAL MEDICINE

## 2019-04-30 PROCEDURE — G8427 DOCREV CUR MEDS BY ELIG CLIN: HCPCS | Performed by: INTERNAL MEDICINE

## 2019-04-30 PROCEDURE — 99212 OFFICE O/P EST SF 10 MIN: CPT | Performed by: INTERNAL MEDICINE

## 2019-04-30 PROCEDURE — G8926 SPIRO NO PERF OR DOC: HCPCS | Performed by: INTERNAL MEDICINE

## 2019-04-30 PROCEDURE — 85610 PROTHROMBIN TIME: CPT | Performed by: INTERNAL MEDICINE

## 2019-04-30 PROCEDURE — 4004F PT TOBACCO SCREEN RCVD TLK: CPT | Performed by: INTERNAL MEDICINE

## 2019-04-30 PROCEDURE — 3023F SPIROM DOC REV: CPT | Performed by: INTERNAL MEDICINE

## 2019-04-30 PROCEDURE — 99214 OFFICE O/P EST MOD 30 MIN: CPT | Performed by: INTERNAL MEDICINE

## 2019-04-30 PROCEDURE — G8419 CALC BMI OUT NRM PARAM NOF/U: HCPCS | Performed by: INTERNAL MEDICINE

## 2019-04-30 RX ORDER — LISINOPRIL 5 MG/1
TABLET ORAL
Qty: 60 TABLET | Refills: 2 | Status: SHIPPED | OUTPATIENT
Start: 2019-04-30 | End: 2019-07-03 | Stop reason: SDUPTHER

## 2019-04-30 RX ORDER — MONTELUKAST SODIUM 10 MG/1
TABLET ORAL
Qty: 30 TABLET | Refills: 2 | Status: SHIPPED | OUTPATIENT
Start: 2019-04-30 | End: 2019-07-03 | Stop reason: SDUPTHER

## 2019-04-30 RX ORDER — ATORVASTATIN CALCIUM 10 MG/1
10 TABLET, FILM COATED ORAL DAILY
Qty: 90 TABLET | Refills: 0 | Status: SHIPPED | OUTPATIENT
Start: 2019-04-30 | End: 2019-07-03 | Stop reason: SDUPTHER

## 2019-04-30 RX ORDER — WARFARIN SODIUM 5 MG/1
TABLET ORAL
Qty: 30 TABLET | Refills: 2 | Status: SHIPPED | OUTPATIENT
Start: 2019-04-30 | End: 2019-07-03 | Stop reason: SDUPTHER

## 2019-04-30 RX ORDER — VARENICLINE TARTRATE 25 MG
KIT ORAL
Qty: 1 BOX | Refills: 0 | Status: SHIPPED | OUTPATIENT
Start: 2019-04-30 | End: 2019-10-15

## 2019-04-30 RX ORDER — WARFARIN SODIUM 6 MG/1
6 TABLET ORAL DAILY
Qty: 30 TABLET | Refills: 1 | Status: SHIPPED | OUTPATIENT
Start: 2019-04-30 | End: 2019-07-03 | Stop reason: SDUPTHER

## 2019-04-30 RX ORDER — SERTRALINE HYDROCHLORIDE 100 MG/1
TABLET, FILM COATED ORAL
Qty: 45 TABLET | Refills: 2 | Status: SHIPPED | OUTPATIENT
Start: 2019-04-30 | End: 2019-07-03 | Stop reason: SDUPTHER

## 2019-04-30 RX ORDER — POTASSIUM CHLORIDE 20 MEQ/1
20 TABLET, EXTENDED RELEASE ORAL DAILY
Qty: 60 TABLET | Refills: 3 | Status: CANCELLED | OUTPATIENT
Start: 2019-04-30

## 2019-04-30 RX ORDER — OMEPRAZOLE 40 MG/1
40 CAPSULE, DELAYED RELEASE ORAL
Qty: 30 CAPSULE | Refills: 2 | Status: SHIPPED | OUTPATIENT
Start: 2019-04-30 | End: 2019-07-03 | Stop reason: SDUPTHER

## 2019-04-30 ASSESSMENT — ENCOUNTER SYMPTOMS
SORE THROAT: 0
SINUS PAIN: 0
VOMITING: 0
DIARRHEA: 0
WHEEZING: 0
SHORTNESS OF BREATH: 0
COUGH: 1
CONSTIPATION: 0
NAUSEA: 0
VOICE CHANGE: 0
ABDOMINAL PAIN: 0
EYE PAIN: 0

## 2019-04-30 NOTE — PROGRESS NOTES
Tameka Rodriguez 89  InternalMedicine Residency Program  ACC Note      SUBJECTIVE:  CC: had concerns including 3 Month Follow-Up; Medication Refill; and Other (Also INR Check). Flower Johansen presented to the [facility] for a routine visit. PMHx of  h/o mechanical aortic valve replacement, chronic anticoagulation with coumadin, chronic urinary incontinence, COPD, tobacco abuse, restless leg syndrome, depression, GERD and vitamin D deficiency    10/27/2018 -01/2019 stopped smoking on Chantix but rebounced. Back to smoke 0.5-1 PPD, Dr.Al byrne prescribed Chantix. Denies SI or depression. Only complains was mouth sore/ulcer. When smoking, still uses albuterol 3x daily. Encourged smoking cerssaion, it will save her 1,600 a year. Her mood improved from last visit, denies depression or SI. INR 2. 7 today, taking coumadin 5/6 alternates    Reports that Requip does not help with her Restless leg syndrome, she dried some  Muscle relaxer of her son's, she think that helped with her symptoms and urinary incontinence. Review of Systems   Constitutional: Negative for chills, diaphoresis and fever. HENT: Positive for tinnitus. Negative for ear pain, sinus pain, sore throat and voice change. Eyes: Negative for pain. Respiratory: Positive for cough (chronic). Negative for shortness of breath and wheezing. Cardiovascular: Negative for chest pain, palpitations and leg swelling. Gastrointestinal: Negative for abdominal pain, constipation, diarrhea, nausea and vomiting. Endocrine: Negative for polydipsia, polyphagia and polyuria. Genitourinary: Negative for dysuria, frequency and urgency. Musculoskeletal: Negative for myalgias. Skin: Negative for rash. Neurological: Positive for dizziness. Negative for syncope, weakness and headaches. Psychiatric/Behavioral: Negative for suicidal ideas.        Current Outpatient Medications on File Prior to Visit   Medication Sig Dispense Refill    omeprazole (PRILOSEC) 40 MG delayed release capsule take 1 capsule by mouth every morning (BEFORE BREAKFAST) 30 capsule 0    Calcium Carbonate-Vitamin D (OYSTER SHELL CALCIUM/D) 500-200 MG-UNIT TABS take 1 tablet by mouth twice a day 60 tablet 0    rOPINIRole (REQUIP) 0.25 MG tablet take 1 tablet by mouth three times a day 90 tablet 0    warfarin (COUMADIN) 5 MG tablet take 1 tablet by mouth once daily 30 tablet 0    azelastine (ASTELIN) 0.1 % nasal spray 2 sprays by Nasal route 2 times daily Use in each nostril as directed 1 Bottle 1    VENTOLIN  (90 Base) MCG/ACT inhaler inhale 1 puff by mouth every 4 hours if needed for wheezing 18 g 2    tiotropium (SPIRIVA RESPIMAT) 2.5 MCG/ACT AERS inhaler INHALE 2 PUFFS BY MOUTH DAILY.  PAP Medication 3 Inhaler 3    DULERA 100-5 MCG/ACT inhaler inhale 2 puffs by mouth twice a day 13 g 3    Azelastine HCl 137 MCG/SPRAY SOLN instill 2 sprays into each nostril as directed by prescriber 30 mL 5    zoster recombinant adjuvanted vaccine (SHINGRIX) 50 MCG/0.5ML SUSR injection 1 dose now and repeat in 2-6 months 0.5 mL 0    warfarin (COUMADIN) 6 MG tablet Take 6 mg by mouth daily      montelukast (SINGULAIR) 10 MG tablet take 1 tablet by mouth at bedtime 30 tablet 3    oxybutynin (DITROPAN) 5 MG tablet Take 5 mg by mouth 2 times daily      atorvastatin (LIPITOR) 10 MG tablet Take 1 tablet by mouth daily 30 tablet 5    aspirin 81 MG tablet Take 1 tablet by mouth daily 30 tablet 5    lisinopril (PRINIVIL;ZESTRIL) 5 MG tablet take 1 tablet by mouth twice a day 60 tablet 5    sertraline (ZOLOFT) 100 MG tablet take 1 and 1/2 tablets by mouth once daily 45 tablet 5    potassium chloride (KLOR-CON M) 20 MEQ extended release tablet Take 1 tablet by mouth daily (Patient taking differently: Take 20 mEq by mouth daily as needed ) 60 tablet 3    vitamin C (ASCORBIC ACID) 500 MG tablet Take 1,000 mg by mouth daily      niacin 500 MG tablet Take 2 tablets by mouth 2 times daily (with meals) (Patient taking differently: Take 1,000 mg by mouth 2 times daily (with meals) Takes OTC only) 120 tablet 3    traZODone (DESYREL) 50 MG tablet take 1 tablet by mouth NIGHTLY 14 tablet 0    nicotine (NICODERM CQ) 21 MG/24HR Place 1 patch onto the skin every 24 hours Please put 1 patch of 21 mg daily for 6 weeks, then use 1 patch of 14 mg daily for 2 weeks, then use 1 patch of 7 mg daily for 2 weeks then stop. 42 patch 0    nicotine (NICODERM CQ) 14 MG/24HR Please put 1 patch of 21 mg daily for 6 weeks, then use 1 patch of 14 mg daily for 2 weeks, then use 1 patch of 7 mg daily for 2 weeks then stop. 14 patch 0    nicotine (NICODERM CQ) 7 MG/24HR Place 1 patch onto the skin every 24 hours Please put 1 patch of 21 mg daily for 6 weeks, then use 1 patch of 14 mg daily for 2 weeks, then use 1 patch of 7 mg daily for 2 weeks then stop. 14 patch 0     No current facility-administered medications on file prior to visit. OBJECTIVE:    VS: /62   Pulse 79   Temp 98.3 °F (36.8 °C) (Oral)   Resp 16   Ht 5' 5\" (1.651 m)   Wt 165 lb 14.4 oz (75.3 kg)   BMI 27.61 kg/m²   Physical Exam   Constitutional: She is oriented to person, place, and time. She appears well-developed and well-nourished. No distress. HENT:   Head: Normocephalic and atraumatic. Right typamic membrane erythema    Eyes: Conjunctivae and EOM are normal.   Neck: Normal range of motion. Neck supple. Cardiovascular: Normal rate, regular rhythm, normal heart sounds and intact distal pulses. Exam reveals no friction rub. No murmur heard. Pulmonary/Chest: Effort normal and breath sounds normal. No respiratory distress. She has no wheezes. Abdominal: Soft. Bowel sounds are normal. She exhibits no distension. There is no tenderness. Musculoskeletal: Normal range of motion. She exhibits no edema or tenderness. Neurological: She is alert and oriented to person, place, and time. She has normal reflexes.  She displays normal reflexes. No cranial nerve deficit or sensory deficit. She exhibits normal muscle tone. Coordination normal.   No nystagmus bl eyes     Skin: Skin is warm. No rash noted. She is not diaphoretic. Psychiatric: She has a normal mood and affect. Her behavior is normal. Thought content normal.   Nursing note and vitals reviewed. ASSESSMENT/PLAN:    I have reviewed all pertient PMHx, PSHx, FamHx, Social Hx, medications, and allergies andupdated history as appropriate. Cira Montemayor was seen today for check-up and medication refill. 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         -   Urologist Wes Avalos following    Personal history of tobacco use  Encounter for screening for lung cancer  -     CT Lung Screening (Annual) 12/17/2018 showed pulmonary nodules on the right largest 10 mm  -     Repeat CT chest 03/2019 no lung nodules, only small scattered scars  -     Follows with Dr.Al Tobin, next apt this August  -     On Chantix    Encephalomalacia in the right temporal lobe        -   MRI brain on 1/17/2019        -   Negative neurological exam        -   Hx of sculp fx in [facility] in 1974        -   Monitor for now    Insomnia, unspecified type  -     traZODone (DESYREL) 50 MG tablet; Take 1 tablet by mouth nightly  -     Will tapering down on Trazodone from 100mg to 50 mg to 25 mg to off within 1-2 months due to multiple interactions with other medication    Vitamin D deficiency  -     Calcium Carbonate-Vitamin D (OYSTER SHELL CALCIUM/D) 500-200 MG-UNIT TABS; take 1 tablet by mouth twice a day    Hyperlipidemia, unspecified hyperlipidemia type  -     atorvastatin (LIPITOR) 10 MG tablet; Take 1 tablet by mouth daily    COPD, mild (HCC)  -     Continue Dullera, albuterol, tiotropium  -     Following Dr.Al Tobin    Essential hypertension-controlled  -     lisinopril (PRINIVIL;ZESTRIL) 5 MG tablet; take 1 tablet by mouth twice a day    Depression, unspecified depression type  -     sertraline (ZOLOFT) 100 MG tablet; take 1 and 1/2 tablets by mouth once daily    GERD  -     omeprazole (PRILOSEC) 40 MG delayed release capsule;  Take 1 capsule by mouth every morning (before breakfast)    Restless leg syndrome  -     rOPINIRole (REQUIP) 0.25 MG tablet; take 1 tablet by mouth three times a day-not helping  -     She tried ome muscle relaxer which she said helped  -     Obtain fasting Ferrtin, CBC    Breast cancer screening  -     [facility] CAD SCREENING;(12/2018) calcifications in both breasts are benign, recommend 1 year follow up    Diabetes mellitus screening  -     POCT glycosylated hemoglobin (Hb A1C) 12/5/2018 6.2 up from 5.7 of last year    Hx of Mechanical aortic valve replacement on Chronic Anticoagulated on Coumadin  -     POCT INR today 2.7,  continue 5/6 mg coumadin alternate daily  -     Next INR in 1 weeks      RTC: 3 months             INR in 1 month             F/U ferrtin for RLS             Stopped oxybutrinin due to its strong interaction with Spiriva, will need to follow up with               Tapering down on Trazodone from 100mg to 50 mg to

## 2019-04-30 NOTE — LETTER
[facility] AT [facility]  [address]  Phone: [phone]  Fax: [phone]    Dr. Igor Barajas MD        April 30, 2019     Patient: Maria Leggett   YOB: 1960   Date of Visit: 4/30/2019       To Whom It May Concern: It is my medical opinion that Vira Garnica requires a disability parking placard for the following reasons:COPD  Duration of need: 5 years    If you have any questions or concerns, please don't hesitate to call.     Sincerely,        Dr. Igor Barajas MD

## 2019-04-30 NOTE — PROGRESS NOTES
Discharge instructions reviewed with patient per Dr. Garcia Lawson. Patient directed to  to  AVS and schedule follow up appt.

## 2019-04-30 NOTE — PROGRESS NOTES
Attending Physician Statement  I have discussed the case, including pertinent history and exam findings with the resident. I have seen and examined the patient and the key elements of the encounter have been performed by me. I reviewed medical, surgical, social histories, meds and any pertinent labs. I agree with the assessment, plan and orders as documented by the resident. Patient has hx mechanical aortic valve replacement and is on chronic anticoagulation with therapeutic INR. She is currently a smoker and has been trying to quit. She also has COPD and has a hx pulmonary nodules and sees pulmonary service (Dr. Fahad Ray). Patient also has chronic dizziness (?vertigo) has seen ENT.

## 2019-05-16 ENCOUNTER — HOSPITAL ENCOUNTER (OUTPATIENT)
Age: 59
Discharge: HOME | End: 2019-05-16
Payer: MEDICARE

## 2019-05-16 DIAGNOSIS — G25.81 RESTLESS LEG SYNDROME: ICD-10-CM

## 2019-05-16 DIAGNOSIS — D64.9 ANEMIA, UNSPECIFIED TYPE: ICD-10-CM

## 2019-05-16 LAB
FERRITIN: 71 NG/ML
HCT VFR BLD CALC: 39 % (ref 34–48)
HEMOGLOBIN: 12.9 G/DL (ref 11.5–15.5)
MCH RBC QN AUTO: 31.3 PG (ref 26–35)
MCHC RBC AUTO-ENTMCNC: 33.1 % (ref 32–34.5)
MCV RBC AUTO: 94.7 FL (ref 80–99.9)
PDW BLD-RTO: 13.6 FL (ref 11.5–15)
PLATELET # BLD: 229 E9/L (ref 130–450)
PMV BLD AUTO: 10.6 FL (ref 7–12)
RBC # BLD: 4.12 E12/L (ref 3.5–5.5)
WBC # BLD: 9 E9/L (ref 4.5–11.5)

## 2019-05-16 PROCEDURE — 85027 COMPLETE CBC AUTOMATED: CPT

## 2019-05-16 PROCEDURE — 82728 ASSAY OF FERRITIN: CPT

## 2019-05-16 PROCEDURE — 36415 COLL VENOUS BLD VENIPUNCTURE: CPT

## 2019-05-17 ENCOUNTER — HOSPITAL ENCOUNTER (OUTPATIENT)
Age: 59
Discharge: HOME | End: 2019-05-17
Payer: MEDICARE

## 2019-06-02 DIAGNOSIS — E55.9 VITAMIN D DEFICIENCY: ICD-10-CM

## 2019-06-03 RX ORDER — B-COMPLEX WITH VITAMIN C
TABLET ORAL
Qty: 60 TABLET | Refills: 2 | Status: SHIPPED | OUTPATIENT
Start: 2019-06-03 | End: 2019-07-03 | Stop reason: SDUPTHER

## 2019-06-07 ENCOUNTER — TELEPHONE (OUTPATIENT)
Dept: ENT CLINIC | Age: 59
End: 2019-06-07

## 2019-06-09 RX ORDER — AZELASTINE HYDROCHLORIDE 137 UG/1
SPRAY, METERED NASAL
Qty: 30 ML | Refills: 1 | Status: SHIPPED | OUTPATIENT
Start: 2019-06-09 | End: 2019-08-01 | Stop reason: SDUPTHER

## 2019-06-16 DIAGNOSIS — G25.81 RESTLESS LEG SYNDROME: ICD-10-CM

## 2019-06-18 RX ORDER — ROPINIROLE 0.25 MG/1
TABLET, FILM COATED ORAL
Qty: 90 TABLET | Refills: 1 | Status: SHIPPED | OUTPATIENT
Start: 2019-06-18 | End: 2019-07-03 | Stop reason: SDUPTHER

## 2019-07-03 ENCOUNTER — OFFICE VISIT (OUTPATIENT)
Dept: INTERNAL MEDICINE | Age: 59
End: 2019-07-03
Payer: MEDICARE

## 2019-07-03 VITALS
BODY MASS INDEX: 27.04 KG/M2 | DIASTOLIC BLOOD PRESSURE: 60 MMHG | WEIGHT: 162.3 LBS | TEMPERATURE: 98 F | HEART RATE: 85 BPM | RESPIRATION RATE: 16 BRPM | HEIGHT: 65 IN | SYSTOLIC BLOOD PRESSURE: 134 MMHG

## 2019-07-03 DIAGNOSIS — E78.5 HYPERLIPIDEMIA, UNSPECIFIED HYPERLIPIDEMIA TYPE: ICD-10-CM

## 2019-07-03 DIAGNOSIS — Z79.01 ANTICOAGULATED ON COUMADIN: Primary | ICD-10-CM

## 2019-07-03 DIAGNOSIS — I10 ESSENTIAL HYPERTENSION: ICD-10-CM

## 2019-07-03 DIAGNOSIS — E55.9 VITAMIN D DEFICIENCY: ICD-10-CM

## 2019-07-03 DIAGNOSIS — J44.9 COPD, MILD (HCC): ICD-10-CM

## 2019-07-03 DIAGNOSIS — G25.81 RESTLESS LEG SYNDROME: ICD-10-CM

## 2019-07-03 DIAGNOSIS — S13.9XXA SPRAIN OF CERVICAL NECK, INITIAL ENCOUNTER: ICD-10-CM

## 2019-07-03 DIAGNOSIS — K21.9 GASTROESOPHAGEAL REFLUX DISEASE WITHOUT ESOPHAGITIS: ICD-10-CM

## 2019-07-03 DIAGNOSIS — Z13.31 POSITIVE DEPRESSION SCREENING: ICD-10-CM

## 2019-07-03 DIAGNOSIS — F32.A DEPRESSION, UNSPECIFIED DEPRESSION TYPE: ICD-10-CM

## 2019-07-03 LAB
INTERNATIONAL NORMALIZATION RATIO, POC: 2.6
PROTHROMBIN TIME, POC: 30.8

## 2019-07-03 PROCEDURE — G8427 DOCREV CUR MEDS BY ELIG CLIN: HCPCS | Performed by: INTERNAL MEDICINE

## 2019-07-03 PROCEDURE — 99213 OFFICE O/P EST LOW 20 MIN: CPT | Performed by: INTERNAL MEDICINE

## 2019-07-03 PROCEDURE — 85610 PROTHROMBIN TIME: CPT | Performed by: INTERNAL MEDICINE

## 2019-07-03 PROCEDURE — 3023F SPIROM DOC REV: CPT | Performed by: INTERNAL MEDICINE

## 2019-07-03 PROCEDURE — G8419 CALC BMI OUT NRM PARAM NOF/U: HCPCS | Performed by: INTERNAL MEDICINE

## 2019-07-03 PROCEDURE — 4004F PT TOBACCO SCREEN RCVD TLK: CPT | Performed by: INTERNAL MEDICINE

## 2019-07-03 PROCEDURE — G8926 SPIRO NO PERF OR DOC: HCPCS | Performed by: INTERNAL MEDICINE

## 2019-07-03 PROCEDURE — 99212 OFFICE O/P EST SF 10 MIN: CPT | Performed by: INTERNAL MEDICINE

## 2019-07-03 PROCEDURE — 3017F COLORECTAL CA SCREEN DOC REV: CPT | Performed by: INTERNAL MEDICINE

## 2019-07-03 PROCEDURE — G8431 POS CLIN DEPRES SCRN F/U DOC: HCPCS | Performed by: INTERNAL MEDICINE

## 2019-07-03 RX ORDER — ATORVASTATIN CALCIUM 10 MG/1
10 TABLET, FILM COATED ORAL DAILY
Qty: 90 TABLET | Refills: 0 | Status: SHIPPED | OUTPATIENT
Start: 2019-07-03 | End: 2019-10-15 | Stop reason: SDUPTHER

## 2019-07-03 RX ORDER — ROPINIROLE 0.25 MG/1
0.25 TABLET, FILM COATED ORAL 3 TIMES DAILY
Qty: 270 TABLET | Refills: 0 | Status: SHIPPED | OUTPATIENT
Start: 2019-07-03 | End: 2019-10-15 | Stop reason: SDUPTHER

## 2019-07-03 RX ORDER — HYDROCORTISONE 0.5 %
CREAM (GRAM) TOPICAL 3 TIMES DAILY PRN
Refills: 0 | Status: CANCELLED | OUTPATIENT
Start: 2019-07-03

## 2019-07-03 RX ORDER — LIDOCAINE 50 MG/G
OINTMENT TOPICAL
Qty: 240 G | Refills: 1 | Status: CANCELLED | OUTPATIENT
Start: 2019-07-03

## 2019-07-03 RX ORDER — MONTELUKAST SODIUM 10 MG/1
TABLET ORAL
Qty: 90 TABLET | Refills: 0 | Status: SHIPPED | OUTPATIENT
Start: 2019-07-03 | End: 2019-10-15 | Stop reason: SDUPTHER

## 2019-07-03 RX ORDER — WARFARIN SODIUM 5 MG/1
TABLET ORAL
Qty: 30 TABLET | Refills: 2 | Status: SHIPPED | OUTPATIENT
Start: 2019-07-03 | End: 2020-01-14

## 2019-07-03 RX ORDER — WARFARIN SODIUM 6 MG/1
6 TABLET ORAL DAILY
Qty: 30 TABLET | Refills: 1 | Status: SHIPPED | OUTPATIENT
Start: 2019-07-03 | End: 2020-01-14

## 2019-07-03 RX ORDER — SERTRALINE HYDROCHLORIDE 100 MG/1
TABLET, FILM COATED ORAL
Qty: 135 TABLET | Refills: 0 | Status: SHIPPED | OUTPATIENT
Start: 2019-07-03 | End: 2019-10-15 | Stop reason: SDUPTHER

## 2019-07-03 RX ORDER — OMEPRAZOLE 40 MG/1
40 CAPSULE, DELAYED RELEASE ORAL
Qty: 90 CAPSULE | Refills: 0 | Status: SHIPPED | OUTPATIENT
Start: 2019-07-03 | End: 2019-10-15 | Stop reason: SDUPTHER

## 2019-07-03 RX ORDER — LISINOPRIL 5 MG/1
TABLET ORAL
Qty: 180 TABLET | Refills: 0 | Status: SHIPPED | OUTPATIENT
Start: 2019-07-03 | End: 2019-10-15 | Stop reason: DRUGHIGH

## 2019-07-03 RX ORDER — B-COMPLEX WITH VITAMIN C
1 TABLET ORAL 2 TIMES DAILY
Qty: 180 TABLET | Refills: 0 | Status: SHIPPED | OUTPATIENT
Start: 2019-07-03 | End: 2019-10-10 | Stop reason: SDUPTHER

## 2019-07-03 ASSESSMENT — ENCOUNTER SYMPTOMS
CONSTIPATION: 0
SINUS PAIN: 0
VOMITING: 0
SHORTNESS OF BREATH: 1
EYE PAIN: 0
NAUSEA: 0
SORE THROAT: 0
DIARRHEA: 0
COUGH: 1
ABDOMINAL PAIN: 0

## 2019-07-03 NOTE — PATIENT INSTRUCTIONS
seconds. 4. Repeat 2 to 4 times to each side. Neck stretches    1. Look straight ahead, and tip your right ear to your right shoulder. Do not let your left shoulder rise up as you tip your head to the right. 2. Hold for 15 to 30 seconds. 3. Tilt your head to the left. Do not let your right shoulder rise up as you tip your head to the left. 4. Hold for 15 to 30 seconds. 5. Repeat 2 to 4 times to each side. Forward neck flexion    1. Sit in a firm chair, or stand up straight. 2. Bend your head forward. 3. Hold for 15 to 30 seconds. 4. Repeat 2 to 4 times. Lateral (side) bend strengthening    1. With your right hand, place your first two fingers on your right temple. 2. Start to bend your head to the side while using gentle pressure from your fingers to keep your head from bending. 3. Hold for about 6 seconds. 4. Repeat 8 to 12 times. 5. Switch hands and repeat the same exercise on your left side. Forward bend strengthening    1. Place your first two fingers of either hand on your forehead. 2. Start to bend your head forward while using gentle pressure from your fingers to keep your head from bending. 3. Hold for about 6 seconds. 4. Repeat 8 to 12 times. Neutral position strengthening    1. Using one hand, place your fingertips on the back of your head at the top of your neck. 2. Start to bend your head backward while using gentle pressure from your fingers to keep your head from bending. 3. Hold for about 6 seconds. 4. Repeat 8 to 12 times. Chin tuck    1. Lie on the floor with a rolled-up towel under your neck. Your head should be touching the floor. 2. Slowly bring your chin toward your chest.  3. Hold for a count of 6, and then relax for up to 10 seconds. 4. Repeat 8 to 12 times. Follow-up care is a key part of your treatment and safety. Be sure to make and go to all appointments, and call your doctor if you are having problems.  It's also a good idea to know your test

## 2019-07-03 NOTE — PROGRESS NOTES
Tame[facility]  InternalMedicine Residency Program  ACC Note      SUBJECTIVE:  CC: had concerns including Office Visit for Anticoagulation Management; Hypertension; and Medication Refill. Sophia Wick presented to the [facility] for a routine visit. PMHx of  h/o mechanical aortic valve replacement, chronic anticoagulation with coumadin, chronic urinary incontinence, COPD, tobacco abuse, restless leg syndrome, depression, GERD and vitamin D deficiency    Neck stiffness x 1 month, after she wake up , tried warm compression, helped a little bit. Injured 3ird right finger in the past month, now complaining of pain. Denies fever or chills. Still need to use albuterol 4 x daily. Restless leg syndrom helped with Requip. Ferritin 71    Last INR (4/30/2019) 2.7, still takes coumadin 5 or 6 mg daily alternate. INR today 2.6  Still smokes about 0.5 pack per day. She brought a bottle of her daughter in law Cyclobenzaprine but let her know that it was controlled substance. We cannot order it. Review of Systems   Constitutional: Negative for chills, diaphoresis and fever. HENT: Negative for ear pain, sinus pain and sore throat. Eyes: Negative for pain. Respiratory: Positive for cough (chronic, dry) and shortness of breath (chronic, from her COPD and smoking). Cardiovascular: Negative for chest pain, palpitations and leg swelling. Gastrointestinal: Negative for abdominal pain, constipation, diarrhea, nausea and vomiting. Genitourinary: Negative for dysuria, frequency and urgency. Musculoskeletal: Negative for myalgias. Skin: Negative for rash. Neurological: Negative for dizziness and headaches. Psychiatric/Behavioral: Negative for suicidal ideas.        Current Outpatient Medications on File Prior to Visit   Medication Sig Dispense Refill    rOPINIRole (REQUIP) 0.25 MG tablet One tablet tid 90 tablet 1    Azelastine HCl 137 MCG/SPRAY SOLN instill 2 sprays into each nostril INR in 1 month            F/U neck sprain    I have reviewed my findings andrecommendations with Jered Alcala and Jose Alfredo Marsh MD PGY-3  7/3/2019 2:53 PM

## 2019-08-01 RX ORDER — AZELASTINE HYDROCHLORIDE 137 UG/1
2 SPRAY, METERED NASAL DAILY
Qty: 30 ML | Refills: 1 | Status: SHIPPED | OUTPATIENT
Start: 2019-08-01 | End: 2021-04-23 | Stop reason: SDUPTHER

## 2019-08-08 ENCOUNTER — NURSE ONLY (OUTPATIENT)
Dept: INTERNAL MEDICINE | Age: 59
End: 2019-08-08
Payer: MEDICARE

## 2019-08-08 DIAGNOSIS — Z95.2 H/O MECHANICAL AORTIC VALVE REPLACEMENT: Primary | ICD-10-CM

## 2019-08-08 LAB
INTERNATIONAL NORMALIZATION RATIO, POC: 2.9
PROTHROMBIN TIME, POC: 35

## 2019-08-08 PROCEDURE — 85610 PROTHROMBIN TIME: CPT | Performed by: INTERNAL MEDICINE

## 2019-08-08 PROCEDURE — 93793 ANTICOAG MGMT PT WARFARIN: CPT | Performed by: INTERNAL MEDICINE

## 2019-08-08 NOTE — PROGRESS NOTES
INR results reviewed with Dr. Tennille Young and orders received. Pt notified via phone of same. AVS mailed to patient.

## 2019-08-16 ENCOUNTER — OFFICE VISIT (OUTPATIENT)
Dept: PULMONOLOGY | Age: 59
End: 2019-08-16
Payer: MEDICARE

## 2019-08-16 VITALS
TEMPERATURE: 98 F | BODY MASS INDEX: 28.41 KG/M2 | HEIGHT: 65 IN | SYSTOLIC BLOOD PRESSURE: 131 MMHG | WEIGHT: 170.5 LBS | OXYGEN SATURATION: 95 % | HEART RATE: 83 BPM | DIASTOLIC BLOOD PRESSURE: 63 MMHG

## 2019-08-16 DIAGNOSIS — J44.9 CHRONIC OBSTRUCTIVE PULMONARY DISEASE, UNSPECIFIED COPD TYPE (HCC): ICD-10-CM

## 2019-08-16 PROCEDURE — 4004F PT TOBACCO SCREEN RCVD TLK: CPT | Performed by: INTERNAL MEDICINE

## 2019-08-16 PROCEDURE — G8419 CALC BMI OUT NRM PARAM NOF/U: HCPCS | Performed by: INTERNAL MEDICINE

## 2019-08-16 PROCEDURE — G8427 DOCREV CUR MEDS BY ELIG CLIN: HCPCS | Performed by: INTERNAL MEDICINE

## 2019-08-16 PROCEDURE — 3017F COLORECTAL CA SCREEN DOC REV: CPT | Performed by: INTERNAL MEDICINE

## 2019-08-16 PROCEDURE — G8926 SPIRO NO PERF OR DOC: HCPCS | Performed by: INTERNAL MEDICINE

## 2019-08-16 PROCEDURE — 99214 OFFICE O/P EST MOD 30 MIN: CPT | Performed by: INTERNAL MEDICINE

## 2019-08-16 PROCEDURE — 99213 OFFICE O/P EST LOW 20 MIN: CPT | Performed by: INTERNAL MEDICINE

## 2019-08-16 PROCEDURE — 3023F SPIROM DOC REV: CPT | Performed by: INTERNAL MEDICINE

## 2019-08-16 RX ORDER — VARENICLINE TARTRATE 25 MG
KIT ORAL
Qty: 1 BOX | Refills: 0 | Status: SHIPPED | OUTPATIENT
Start: 2019-08-16 | End: 2019-10-15

## 2019-08-16 NOTE — PROGRESS NOTES
Anxiety     Asthma     CAD (coronary artery disease)     Chronic back pain     COPD [facility]chronic obstructive pulmonary disease) ([facility])     Depression     GERD (gastroesophageal reflux disease)     Headache     Hyperlipidemia     Hypertension     Restless legs syndrome     Urinary incontinence        MEDICATIONS:   Current Outpatient Medications   Medication Sig Dispense Refill    Azelastine HCl 137 MCG/SPRAY SOLN 2 sprays by Nasal route daily 30 mL 1    atorvastatin (LIPITOR) 10 MG tablet Take 1 tablet by mouth daily 90 tablet 0    sertraline (ZOLOFT) 100 MG tablet take 1 and 1/2 tablets by mouth once daily 135 tablet 0    omeprazole (PRILOSEC) 40 MG delayed release capsule Take 1 capsule by mouth every morning (before breakfast) 90 capsule 0    montelukast (SINGULAIR) 10 MG tablet take 1 tablet by mouth at bedtime 90 tablet 0    Calcium Carbonate-Vitamin D (OYSTER SHELL CALCIUM/D) 500-200 MG-UNIT TABS Take 1 tablet by mouth 2 times daily 180 tablet 0    lisinopril (PRINIVIL;ZESTRIL) 5 MG tablet take 1 tablet by mouth twice a day 180 tablet 0    aspirin 81 MG tablet Take 1 tablet by mouth daily 90 tablet 0    warfarin (COUMADIN) 5 MG tablet take 1 tablet by mouth once daily 30 tablet 2    warfarin (COUMADIN) 6 MG tablet Take 1 tablet by mouth daily 30 tablet 1    tiotropium (SPIRIVA RESPIMAT) 2.5 MCG/ACT AERS inhaler INHALE 2 PUFFS BY MOUTH DAILY.  PAP Medication 3 Inhaler 3    rOPINIRole (REQUIP) 0.25 MG tablet Take 1 tablet by mouth 3 times daily One tablet tid 270 tablet 0    VENTOLIN  (90 Base) MCG/ACT inhaler inhale 1 puff by mouth every 4 hours if needed for wheezing 18 g 2    DULERA 100-5 MCG/ACT inhaler inhale 2 puffs by mouth twice a day 13 g 3    vitamin C (ASCORBIC ACID) 500 MG tablet Take 1,000 mg by mouth daily      niacin 500 MG tablet Take 2 tablets by mouth 2 times daily (with meals) (Patient taking differently: Take 1,000 mg by mouth 2 times daily (with meals) Takes OTC eosinophilic type   3-Tobacco abuse  4-severe emphysema  5 Lung nodules              PLAN:        -Very pleasant 48-year-old female who [facility] into [facility] for follow-up after CAT scan and COPD     In terms of COPD the treatment will continue  ,SpirivaDeulra     Normal  alpha-1 antitrypsin  Normal IgE level  eosinophilic count is 828,TTEJD she still is doing well ,with her COPD I doubt much benefit     Not interested  pulmonary rehab ,she still not interested Had sleep study and no significant sleep apnea     Now agreed to [facility]        Ct chest shows mild  adenopathy ,aortic ectasis for which she follow with medicine team .also shows multi other nodules right? Left ,will get follow up CT in 1 year and go from there ,should be March 2020     [address] after done with Chantix   Also will consider Triology if breathing continue to get worse    Flu and Pneumovax given ,updated     Thank you for allowing me to participate in [facility].  I will keep following with you ,should you have any concerns ,please contact me at [phone]     Sincerely,        Quan Steward MD  Pulmonary & Critical Care Medicine

## 2019-09-03 ENCOUNTER — HOSPITAL ENCOUNTER (OUTPATIENT)
Dept: PULMONOLOGY | Age: 59
Setting detail: THERAPIES SERIES
Discharge: HOME | End: 2019-09-03
Payer: MEDICARE

## 2019-09-04 ENCOUNTER — TELEPHONE (OUTPATIENT)
Dept: SURGERY | Age: 59
End: 2019-09-04

## 2019-09-04 NOTE — TELEPHONE ENCOUNTER
MA received a call from patient stating she was seen several years ago by  for a rectal bleeding issue. Patient stated she had an episode yesterday 09/03/19, patient states that doctor told her that if it happened again to call our office. MA scheduled patient a appointment on 09/10/19 to come in and discuss situation. Patient verbalized appointment date, time and location. MA offered her an appointment on 09/5/19 in [place] to be seen sooner but patient wanted to wait and come to [facility] office.         Electronically signed by Dunia Forde MA on 9/4/19 at 1:06 PM

## 2019-09-05 ENCOUNTER — HOSPITAL ENCOUNTER (OUTPATIENT)
Dept: PULMONOLOGY | Age: 59
Setting detail: THERAPIES SERIES
Discharge: HOME | End: 2019-09-05
Payer: MEDICARE

## 2019-09-05 PROCEDURE — G0424 PULMONARY REHAB W EXER: HCPCS

## 2019-09-06 ENCOUNTER — NURSE ONLY (OUTPATIENT)
Dept: INTERNAL MEDICINE | Age: 59
End: 2019-09-06
Payer: MEDICARE

## 2019-09-06 DIAGNOSIS — Z95.2 MECHANICAL HEART VALVE PRESENT: Primary | ICD-10-CM

## 2019-09-06 LAB
INTERNATIONAL NORMALIZATION RATIO, POC: 2.6
PROTHROMBIN TIME, POC: 31.1

## 2019-09-06 PROCEDURE — 85610 PROTHROMBIN TIME: CPT | Performed by: INTERNAL MEDICINE

## 2019-09-06 PROCEDURE — 93793 ANTICOAG MGMT PT WARFARIN: CPT | Performed by: INTERNAL MEDICINE

## 2019-09-09 ENCOUNTER — TELEPHONE (OUTPATIENT)
Dept: CARDIAC REHAB | Age: 59
End: 2019-09-09

## 2019-09-10 ENCOUNTER — OFFICE VISIT (OUTPATIENT)
Dept: SURGERY | Age: 59
End: 2019-09-10
Payer: MEDICARE

## 2019-09-10 VITALS
HEIGHT: 65 IN | OXYGEN SATURATION: 96 % | HEART RATE: 86 BPM | RESPIRATION RATE: 14 BRPM | BODY MASS INDEX: 28.32 KG/M2 | SYSTOLIC BLOOD PRESSURE: 154 MMHG | DIASTOLIC BLOOD PRESSURE: 67 MMHG | TEMPERATURE: 98 F | WEIGHT: 170 LBS

## 2019-09-10 DIAGNOSIS — K62.5 RECTAL BLEEDING: ICD-10-CM

## 2019-09-10 DIAGNOSIS — R10.84 GENERALIZED ABDOMINAL PAIN: ICD-10-CM

## 2019-09-10 DIAGNOSIS — K92.1 MELENA: ICD-10-CM

## 2019-09-10 PROCEDURE — G8427 DOCREV CUR MEDS BY ELIG CLIN: HCPCS | Performed by: SURGERY

## 2019-09-10 PROCEDURE — 3017F COLORECTAL CA SCREEN DOC REV: CPT | Performed by: SURGERY

## 2019-09-10 PROCEDURE — G8419 CALC BMI OUT NRM PARAM NOF/U: HCPCS | Performed by: SURGERY

## 2019-09-10 PROCEDURE — 99212 OFFICE O/P EST SF 10 MIN: CPT | Performed by: SURGERY

## 2019-09-10 PROCEDURE — 99213 OFFICE O/P EST LOW 20 MIN: CPT | Performed by: SURGERY

## 2019-09-10 PROCEDURE — 4004F PT TOBACCO SCREEN RCVD TLK: CPT | Performed by: SURGERY

## 2019-09-10 ASSESSMENT — ENCOUNTER SYMPTOMS
WHEEZING: 1
BLOOD IN STOOL: 1
DIARRHEA: 1
EYES NEGATIVE: 1
ALLERGIC/IMMUNOLOGIC NEGATIVE: 1
ABDOMINAL PAIN: 1
ANAL BLEEDING: 1
CONSTIPATION: 1

## 2019-09-10 NOTE — PROGRESS NOTES
Family History   Problem Relation Age of Onset    Heart Disease Mother     Arthritis Mother     Asthma Mother     Diabetes Mother     High Blood Pressure Mother     High Cholesterol Mother     Stroke Mother     Heart Disease Father     Arthritis Father     Asthma Father     Diabetes Father     High Blood Pressure Father     High Cholesterol Father     Breast Cancer Sister     Asthma Brother     Diabetes Maternal Aunt     Diabetes Paternal Aunt     High Blood Pressure Paternal Aunt     Arthritis Paternal Grandmother     Asthma Paternal Grandmother      Social History     Tobacco Use    Smoking status: Current Every Day Smoker     Packs/day: 0.50     Years: 45.00     Pack years: 22.50     Types: Cigarettes    Smokeless tobacco: Never Used   Substance Use Topics    Alcohol use: Yes     Comment: rare    Drug use: No     Allergies   Allergen Reactions    Keflex [Cephalexin] Itching    Phenergan [Promethazine Hcl] Other (See Comments)     Current Outpatient Medications   Medication Sig Dispense Refill    magnesium citrate solution Take 592 mLs by mouth once for 1 dose 592 mL 0    bisacodyl (BISACODYL) 5 MG EC tablet Take 2 tablets by mouth once for 1 dose 4 tablet 0    varenicline (CHANTIX STARTING MONTH PAK) 0.5 MG X 11 & 1 MG X 42 tablet Take as directed on package.  1 box 0    Azelastine HCl 137 MCG/SPRAY SOLN 2 sprays by Nasal route daily 30 mL 1    atorvastatin (LIPITOR) 10 MG tablet Take 1 tablet by mouth daily 90 tablet 0    sertraline (ZOLOFT) 100 MG tablet take 1 and 1/2 tablets by mouth once daily 135 tablet 0    omeprazole (PRILOSEC) 40 MG delayed release capsule Take 1 capsule by mouth every morning (before breakfast) 90 capsule 0    montelukast (SINGULAIR) 10 MG tablet take 1 tablet by mouth at bedtime 90 tablet 0    Calcium Carbonate-Vitamin D (OYSTER SHELL CALCIUM/D) 500-200 MG-UNIT TABS Take 1 tablet by mouth 2 times daily 180 tablet 0    lisinopril (PRINIVIL;ZESTRIL) 5 MG tablet take 1 tablet by mouth twice a day 180 tablet 0    aspirin 81 MG tablet Take 1 tablet by mouth daily 90 tablet 0    Menthol, Topical Analgesic, 5 % GEL Apply 1 each topically 3 times daily as needed (neck pain) 113 g 2    warfarin (COUMADIN) 5 MG tablet take 1 tablet by mouth once daily 30 tablet 2    warfarin (COUMADIN) 6 MG tablet Take 1 tablet by mouth daily 30 tablet 1    tiotropium (SPIRIVA RESPIMAT) 2.5 MCG/ACT AERS inhaler INHALE 2 PUFFS BY MOUTH DAILY. PAP Medication 3 Inhaler 3    rOPINIRole (REQUIP) 0.25 MG tablet Take 1 tablet by mouth 3 times daily One tablet tid 270 tablet 0    varenicline (CHANTIX STARTING MONTH BRAEDEN) 0.5 MG X 11 & 1 MG X 42 tablet Take as directed on package. 1 box 0    VENTOLIN  (90 Base) MCG/ACT inhaler inhale 1 puff by mouth every 4 hours if needed for wheezing 18 g 2    DULERA 100-5 MCG/ACT inhaler inhale 2 puffs by mouth twice a day 13 g 3    zoster recombinant adjuvanted vaccine (SHINGRIX) 50 MCG/0.5ML SUSR injection 1 dose now and repeat in 2-6 months 0.5 mL 0    vitamin C (ASCORBIC ACID) 500 MG tablet Take 1,000 mg by mouth daily      niacin 500 MG tablet Take 2 tablets by mouth 2 times daily (with meals) (Patient taking differently: Take 1,000 mg by mouth 2 times daily (with meals) Takes OTC only) 120 tablet 3     No current facility-administered medications for this visit. Review of Systems   Constitutional: Negative for unexpected weight change. HENT: Negative. Eyes: Negative. Respiratory: Positive for wheezing. Cardiovascular: Negative. Gastrointestinal: Positive for abdominal pain, anal bleeding, blood in stool, constipation and diarrhea. Endocrine: Negative. Genitourinary: Negative. Musculoskeletal: Negative. Skin: Negative. Allergic/Immunologic: Negative. Neurological: Negative. Hematological: Negative. Psychiatric/Behavioral: Negative.       BP (!) 154/67 (Site: Right Upper Arm, Position: Sitting,

## 2019-09-10 NOTE — PATIENT INSTRUCTIONS
the lining. A tissue sample or polyps may be removed during the procedure. How Long Will It Take? Usually it takes about 30 to 45 minutes     Will It Hurt? Most people do not feel anything during the procedure and will not remember the procedure. After the procedure, gas pains and cramping are common. These pains should go away with the passing of gas. Post-procedure Care   If any tissue was removed: It will be sent to a lab to be examined. It may take 1-2 weeks for results. The doctor will usually give an initial report after the scope is removed. Other tests may be recommended. A small amount of bleeding may occur during the first few days after the procedure. When you return home after the procedure, be sure to follow your doctor's instructions, which may include:   Resume medicines as instructed by your doctor. Resume normal diet, unless directed otherwise by your doctor. The sedative will make you drowsy. Avoid driving, operating machinery, or making important decisions for the rest of the day. Rest for the remainder of the day. After arriving home, contact your doctor if any of the following occurs:   Bleeding from your rectum, notify your doctor if you pass a teaspoonful of blood or more. Black, tarry stools   Severe abdominal pain   Hard, swollen abdomen   Signs of infection, including fever or chills   Inability to pass gas or stool   Coughing, shortness of breath, chest pain, severe nausea or vomiting     In case of an emergency, CALL 911 . Instructions for Clear liquid diet  Definition  A clear liquid diet consists of clear liquids, such as water, broth and plain gelatin, that are easily digested and leave no undigested residue in your intestinal tract. Your doctor may prescribe a clear liquid diet before certain medical procedures or if you have certain digestive problems.  Because a clear liquid diet can't provide you with adequate calories and nutrients, it breakfast (7am to 8am) and lunch (11:30am to 12:30pm).  12 Noon Drink a 10 oz bottle of Magnesium Citrate followed immediately by at least 8oz of clear liquids.  1:00pm Drink at least 8oz of clear liquids.  2:00pm Drink at least 8oz of clear liquids.  3:00pm Drink at least 8oz of clear liquids.  4:00pm Take 4 Dulcolux tablets and drink at least 8oz of clear liquids.  5:00pm Drink at least 8oz of clear liquids.  6:00pm Dinner - all clear liquids.  7:00pm Drink a 10oz bottle of Magnesium Citrate followed immediately by at least 8oz of clear liquids.  8:00pm Drink at least 8oz of clear liquids.  Can continue to take liquids until 12 midnight then nothing to eat or drink    Day of Endoscopy:   Nothing to eat or drink after midnight. However, if you are taking any blood pressure medications or heart medications, you should take them with a sip of water.       Any questions or concerns call [facility] at [phone]

## 2019-09-12 ENCOUNTER — HOSPITAL ENCOUNTER (OUTPATIENT)
Dept: PULMONOLOGY | Age: 59
Setting detail: THERAPIES SERIES
End: 2019-09-12
Payer: MEDICARE

## 2019-09-16 ENCOUNTER — OFFICE VISIT (OUTPATIENT)
Dept: ENT CLINIC | Age: 59
End: 2019-09-16
Payer: MEDICARE

## 2019-09-16 VITALS
DIASTOLIC BLOOD PRESSURE: 80 MMHG | SYSTOLIC BLOOD PRESSURE: 142 MMHG | HEIGHT: 65 IN | WEIGHT: 170 LBS | HEART RATE: 76 BPM | BODY MASS INDEX: 28.32 KG/M2

## 2019-09-16 DIAGNOSIS — J30.1 SEASONAL ALLERGIC RHINITIS DUE TO POLLEN: Primary | ICD-10-CM

## 2019-09-16 PROCEDURE — G8419 CALC BMI OUT NRM PARAM NOF/U: HCPCS | Performed by: PHYSICIAN ASSISTANT

## 2019-09-16 PROCEDURE — 99213 OFFICE O/P EST LOW 20 MIN: CPT | Performed by: PHYSICIAN ASSISTANT

## 2019-09-16 PROCEDURE — G8427 DOCREV CUR MEDS BY ELIG CLIN: HCPCS | Performed by: PHYSICIAN ASSISTANT

## 2019-09-16 PROCEDURE — 4004F PT TOBACCO SCREEN RCVD TLK: CPT | Performed by: PHYSICIAN ASSISTANT

## 2019-09-16 PROCEDURE — 3017F COLORECTAL CA SCREEN DOC REV: CPT | Performed by: PHYSICIAN ASSISTANT

## 2019-09-16 ASSESSMENT — ENCOUNTER SYMPTOMS
SHORTNESS OF BREATH: 0
VOMITING: 0
COUGH: 0

## 2019-09-16 NOTE — PROGRESS NOTES
package. , Disp: 1 box, Rfl: 0    Azelastine HCl 137 MCG/SPRAY SOLN, 2 sprays by Nasal route daily, Disp: 30 mL, Rfl: 1    atorvastatin (LIPITOR) 10 MG tablet, Take 1 tablet by mouth daily, Disp: 90 tablet, Rfl: 0    sertraline (ZOLOFT) 100 MG tablet, take 1 and 1/2 tablets by mouth once daily, Disp: 135 tablet, Rfl: 0    omeprazole (PRILOSEC) 40 MG delayed release capsule, Take 1 capsule by mouth every morning (before breakfast), Disp: 90 capsule, Rfl: 0    montelukast (SINGULAIR) 10 MG tablet, take 1 tablet by mouth at bedtime, Disp: 90 tablet, Rfl: 0    Calcium Carbonate-Vitamin D (OYSTER SHELL CALCIUM/D) 500-200 MG-UNIT TABS, Take 1 tablet by mouth 2 times daily, Disp: 180 tablet, Rfl: 0    lisinopril (PRINIVIL;ZESTRIL) 5 MG tablet, take 1 tablet by mouth twice a day, Disp: 180 tablet, Rfl: 0    aspirin 81 MG tablet, Take 1 tablet by mouth daily, Disp: 90 tablet, Rfl: 0    Menthol, Topical Analgesic, 5 % GEL, Apply 1 each topically 3 times daily as needed (neck pain), Disp: 113 g, Rfl: 2    warfarin (COUMADIN) 5 MG tablet, take 1 tablet by mouth once daily, Disp: 30 tablet, Rfl: 2    warfarin (COUMADIN) 6 MG tablet, Take 1 tablet by mouth daily, Disp: 30 tablet, Rfl: 1    tiotropium (SPIRIVA RESPIMAT) 2.5 MCG/ACT AERS inhaler, INHALE 2 PUFFS BY MOUTH DAILY. PAP Medication, Disp: 3 Inhaler, Rfl: 3    rOPINIRole (REQUIP) 0.25 MG tablet, Take 1 tablet by mouth 3 times daily One tablet tid, Disp: 270 tablet, Rfl: 0    varenicline (CHANTIX STARTING MONTH BRAEDEN) 0.5 MG X 11 & 1 MG X 42 tablet, Take as directed on package. , Disp: 1 box, Rfl: 0    VENTOLIN  (90 Base) MCG/ACT inhaler, inhale 1 puff by mouth every 4 hours if needed for wheezing, Disp: 18 g, Rfl: 2    DULERA 100-5 MCG/ACT inhaler, inhale 2 puffs by mouth twice a day, Disp: 13 g, Rfl: 3    zoster recombinant adjuvanted vaccine (SHINGRIX) 50 MCG/0.5ML SUSR injection, 1 dose now and repeat in 2-6 months, Disp: 0.5 mL, Rfl: 0    vitamin C (ASCORBIC ACID) 500 MG tablet, Take 1,000 mg by mouth daily, Disp: , Rfl:     niacin 500 MG tablet, Take 2 tablets by mouth 2 times daily (with meals) (Patient taking differently: Take 1,000 mg by mouth 2 times daily (with meals) Takes OTC only), Disp: 120 tablet, Rfl: 3    magnesium citrate solution, Take 592 mLs by mouth once for 1 dose, Disp: 592 mL, Rfl: 0  Keflex [cephalexin] and Phenergan [promethazine hcl]  Social History     Tobacco Use    Smoking status: Current Every Day Smoker     Packs/day: 0.50     Years: 45.00     Pack years: 22.50     Types: Cigarettes    Smokeless tobacco: Never Used   Substance Use Topics    Alcohol use: Yes     Comment: rare    Drug use: No     Family History   Problem Relation Age of Onset    Heart Disease Mother     Arthritis Mother     Asthma Mother     Diabetes Mother     High Blood Pressure Mother     High Cholesterol Mother     Stroke Mother     Heart Disease Father     Arthritis Father     Asthma Father     Diabetes Father     High Blood Pressure Father     High Cholesterol Father     Breast Cancer Sister     Asthma Brother     Diabetes Maternal Aunt     Diabetes Paternal Aunt     High Blood Pressure Paternal Aunt     Arthritis Paternal Grandmother     Asthma Paternal Grandmother        Review of Systems   Constitutional: Negative for chills and fever. HENT: Positive for congestion. Negative for ear discharge and hearing loss. Respiratory: Negative for cough and shortness of breath. Gastrointestinal: Negative for vomiting. Skin: Negative for rash. Allergic/Immunologic: Negative for environmental allergies. Neurological: Negative for dizziness and headaches. Hematological: Does not bruise/bleed easily. All other systems reviewed and are negative. BP (!) 142/80   Pulse 76   Ht 5' 5\" (1.651 m)   Wt 170 lb (77.1 kg)   BMI 28.29 kg/m²   Physical Exam   Constitutional: She appears well-developed and well-nourished.    HENT:

## 2019-09-17 ENCOUNTER — HOSPITAL ENCOUNTER (OUTPATIENT)
Dept: PULMONOLOGY | Age: 59
Setting detail: THERAPIES SERIES
Discharge: HOME | End: 2019-09-17
Payer: MEDICARE

## 2019-09-17 ENCOUNTER — TELEPHONE (OUTPATIENT)
Dept: SURGERY | Age: 59
End: 2019-09-17

## 2019-09-17 VITALS — HEIGHT: 65 IN | BODY MASS INDEX: 28.29 KG/M2 | WEIGHT: 169.8 LBS

## 2019-09-17 PROCEDURE — G0424 PULMONARY REHAB W EXER: HCPCS

## 2019-09-19 ENCOUNTER — HOSPITAL ENCOUNTER (OUTPATIENT)
Dept: PULMONOLOGY | Age: 59
Setting detail: THERAPIES SERIES
Discharge: HOME | End: 2019-09-19
Payer: MEDICARE

## 2019-09-19 PROCEDURE — G0424 PULMONARY REHAB W EXER: HCPCS

## 2019-09-20 ENCOUNTER — OFFICE VISIT (OUTPATIENT)
Dept: INTERNAL MEDICINE | Age: 59
End: 2019-09-20
Payer: MEDICARE

## 2019-09-20 ENCOUNTER — HOSPITAL ENCOUNTER (OUTPATIENT)
Age: 59
Discharge: HOME | End: 2019-09-22
Payer: MEDICARE

## 2019-09-20 ENCOUNTER — HOSPITAL ENCOUNTER (OUTPATIENT)
Dept: GENERAL RADIOLOGY | Age: 59
Discharge: HOME | End: 2019-09-22
Payer: MEDICARE

## 2019-09-20 VITALS
TEMPERATURE: 98 F | WEIGHT: 172.4 LBS | DIASTOLIC BLOOD PRESSURE: 65 MMHG | HEART RATE: 69 BPM | RESPIRATION RATE: 16 BRPM | HEIGHT: 65 IN | BODY MASS INDEX: 28.72 KG/M2 | SYSTOLIC BLOOD PRESSURE: 140 MMHG

## 2019-09-20 DIAGNOSIS — M25.521 RIGHT ELBOW PAIN: ICD-10-CM

## 2019-09-20 DIAGNOSIS — Z95.2 H/O MECHANICAL AORTIC VALVE REPLACEMENT: ICD-10-CM

## 2019-09-20 DIAGNOSIS — I10 ESSENTIAL HYPERTENSION: Primary | ICD-10-CM

## 2019-09-20 LAB
INTERNATIONAL NORMALIZATION RATIO, POC: 2.6
PROTHROMBIN TIME, POC: 31.6

## 2019-09-20 PROCEDURE — 73070 X-RAY EXAM OF ELBOW: CPT

## 2019-09-20 PROCEDURE — G8419 CALC BMI OUT NRM PARAM NOF/U: HCPCS | Performed by: INTERNAL MEDICINE

## 2019-09-20 PROCEDURE — G8427 DOCREV CUR MEDS BY ELIG CLIN: HCPCS | Performed by: INTERNAL MEDICINE

## 2019-09-20 PROCEDURE — 99213 OFFICE O/P EST LOW 20 MIN: CPT | Performed by: INTERNAL MEDICINE

## 2019-09-20 PROCEDURE — 85610 PROTHROMBIN TIME: CPT | Performed by: INTERNAL MEDICINE

## 2019-09-20 PROCEDURE — 3017F COLORECTAL CA SCREEN DOC REV: CPT | Performed by: INTERNAL MEDICINE

## 2019-09-20 PROCEDURE — 99212 OFFICE O/P EST SF 10 MIN: CPT | Performed by: INTERNAL MEDICINE

## 2019-09-20 PROCEDURE — 4004F PT TOBACCO SCREEN RCVD TLK: CPT | Performed by: INTERNAL MEDICINE

## 2019-09-20 RX ORDER — LISINOPRIL 10 MG/1
10 TABLET ORAL DAILY
Qty: 30 TABLET | Refills: 5 | Status: SHIPPED | OUTPATIENT
Start: 2019-09-20 | End: 2020-01-28 | Stop reason: SDUPTHER

## 2019-09-20 RX ORDER — ADHESIVE BANDAGE 3/4"
BANDAGE TOPICAL
Qty: 1 EACH | Refills: 0 | Status: SHIPPED | OUTPATIENT
Start: 2019-09-20

## 2019-09-20 NOTE — PROGRESS NOTES
[facility]  Internal Medicine Residency Program  ACC Note      SUBJECTIVE:  CC: had concerns including Elbow Pain (right elbow bruised and swollen). Kelli Murray is a 60-year-old female with PMH of HTN, mechanical aortic valve replacement, COPD, restless leg syndrome who presented to the [facility] for a new bruise on her arm. Patient states that a month ago she was sitting on her couch and she felt pain in her right over time the bruise but denies pain. Patient states that the bruise went away. Yesterday, she was at her pulmonology rehab where somebody noticed the bruise on her arm. Patient states that that was her arm they took her blood pressure on. Patient states that she feels like a adina/gritty feeling in her elbow. Denies pain, fever, Loss of range of motion. Patient also states in 787C systolics  over the past month. Denies headaches, dizziness, changes in medication or lifestyle. Patient takes Coumadin last INR on 9/6/19 was 2.6. Patient also takes aspirin 81 mg. Review Of Systems:  General: no fevers, chills, weight loss or gain.    Ears/Nose/Throat: no hearing loss, tinnitus, vertigo, nosebleed, nasal congestion, rhinorrhea, sore throat  Respiratory: no cough, pleuritic chest pain, dyspnea, or wheezing  Cardiovascular: no chest pain, angina, dyspnea on exertion, orthopnea, PND, palpitations, or claudication  Gastrointestinal: no nausea, vomiting, heartburn, diarrhea, constipation, abdominal pain, hematochezia or melena  Genitourinary: no urinary urgency, frequency, dysuria, nocturia, hesitancy, or incontinence  Musculoskeletal: no arthritis, arthralgia, myalgia, weakness, or morning stiffness  Skin: (+) redness/bruising of elbow, rash, itching, masses, hair or nail changes    Current Outpatient Medications on File Prior to Visit   Medication Sig Dispense Refill    varenicline (CHANTIX STARTING MONTH PAK) 0.5 MG X 11 & 1 MG X 42 tablet Take as directed on
medical problems, physical exam, assessment and plan per medical resident's note.     Perry Garcia M.D.  9/20/2019 10:21 AM

## 2019-09-24 ENCOUNTER — HOSPITAL ENCOUNTER (OUTPATIENT)
Dept: PULMONOLOGY | Age: 59
Setting detail: THERAPIES SERIES
Discharge: HOME | End: 2019-09-24
Payer: MEDICARE

## 2019-09-24 PROCEDURE — G0424 PULMONARY REHAB W EXER: HCPCS

## 2019-09-26 ENCOUNTER — HOSPITAL ENCOUNTER (OUTPATIENT)
Dept: PULMONOLOGY | Age: 59
Setting detail: THERAPIES SERIES
Discharge: HOME | End: 2019-09-26
Payer: MEDICARE

## 2019-09-26 PROCEDURE — G0424 PULMONARY REHAB W EXER: HCPCS

## 2019-09-30 ENCOUNTER — TELEPHONE (OUTPATIENT)
Dept: INTERNAL MEDICINE | Age: 59
End: 2019-09-30

## 2019-10-01 ENCOUNTER — HOSPITAL ENCOUNTER (OUTPATIENT)
Dept: PULMONOLOGY | Age: 59
Setting detail: THERAPIES SERIES
Discharge: HOME | End: 2019-10-01
Payer: MEDICARE

## 2019-10-01 PROCEDURE — G0424 PULMONARY REHAB W EXER: HCPCS

## 2019-10-03 ENCOUNTER — HOSPITAL ENCOUNTER (OUTPATIENT)
Dept: PULMONOLOGY | Age: 59
Setting detail: THERAPIES SERIES
Discharge: HOME | End: 2019-10-03
Payer: MEDICARE

## 2019-10-03 PROCEDURE — G0424 PULMONARY REHAB W EXER: HCPCS

## 2019-10-04 ENCOUNTER — TELEPHONE (OUTPATIENT)
Dept: INTERNAL MEDICINE | Age: 59
End: 2019-10-04

## 2019-10-08 ENCOUNTER — HOSPITAL ENCOUNTER (OUTPATIENT)
Dept: PULMONOLOGY | Age: 59
Setting detail: THERAPIES SERIES
Discharge: HOME | End: 2019-10-08
Payer: MEDICARE

## 2019-10-08 PROCEDURE — G0424 PULMONARY REHAB W EXER: HCPCS

## 2019-10-10 ENCOUNTER — HOSPITAL ENCOUNTER (OUTPATIENT)
Dept: PULMONOLOGY | Age: 59
Setting detail: THERAPIES SERIES
Discharge: HOME | End: 2019-10-10
Payer: MEDICARE

## 2019-10-10 DIAGNOSIS — E55.9 VITAMIN D DEFICIENCY: ICD-10-CM

## 2019-10-10 PROCEDURE — G0424 PULMONARY REHAB W EXER: HCPCS

## 2019-10-10 RX ORDER — B-COMPLEX WITH VITAMIN C
TABLET ORAL
Qty: 180 TABLET | Refills: 0 | Status: SHIPPED | OUTPATIENT
Start: 2019-10-10 | End: 2020-01-13

## 2019-10-15 ENCOUNTER — TELEPHONE (OUTPATIENT)
Dept: PULMONOLOGY | Age: 59
End: 2019-10-15

## 2019-10-15 ENCOUNTER — OFFICE VISIT (OUTPATIENT)
Dept: INTERNAL MEDICINE | Age: 59
End: 2019-10-15
Payer: MEDICARE

## 2019-10-15 VITALS
HEART RATE: 82 BPM | DIASTOLIC BLOOD PRESSURE: 63 MMHG | TEMPERATURE: 98.7 F | BODY MASS INDEX: 28.99 KG/M2 | RESPIRATION RATE: 16 BRPM | SYSTOLIC BLOOD PRESSURE: 136 MMHG | WEIGHT: 174 LBS | HEIGHT: 65 IN

## 2019-10-15 DIAGNOSIS — Z95.2 H/O MECHANICAL AORTIC VALVE REPLACEMENT: Primary | ICD-10-CM

## 2019-10-15 DIAGNOSIS — Z23 FLU VACCINE NEED: ICD-10-CM

## 2019-10-15 DIAGNOSIS — G47.33 OSA (OBSTRUCTIVE SLEEP APNEA): ICD-10-CM

## 2019-10-15 DIAGNOSIS — G25.81 RESTLESS LEG SYNDROME: ICD-10-CM

## 2019-10-15 DIAGNOSIS — E78.5 HYPERLIPIDEMIA, UNSPECIFIED HYPERLIPIDEMIA TYPE: ICD-10-CM

## 2019-10-15 DIAGNOSIS — F32.A DEPRESSION, UNSPECIFIED DEPRESSION TYPE: ICD-10-CM

## 2019-10-15 DIAGNOSIS — M54.2 NECK PAIN: ICD-10-CM

## 2019-10-15 DIAGNOSIS — Z79.01 ANTICOAGULATED ON COUMADIN: ICD-10-CM

## 2019-10-15 DIAGNOSIS — Z13.31 POSITIVE DEPRESSION SCREENING: ICD-10-CM

## 2019-10-15 DIAGNOSIS — Z00.00 ROUTINE GENERAL MEDICAL EXAMINATION AT A HEALTH CARE FACILITY: ICD-10-CM

## 2019-10-15 DIAGNOSIS — Z87.891 PERSONAL HISTORY OF TOBACCO USE, PRESENTING HAZARDS TO HEALTH: ICD-10-CM

## 2019-10-15 DIAGNOSIS — J44.9 COPD, MILD (HCC): ICD-10-CM

## 2019-10-15 DIAGNOSIS — K21.9 GASTROESOPHAGEAL REFLUX DISEASE WITHOUT ESOPHAGITIS: ICD-10-CM

## 2019-10-15 LAB
INTERNATIONAL NORMALIZATION RATIO, POC: 2.6
PROTHROMBIN TIME, POC: 31.1

## 2019-10-15 PROCEDURE — 85610 PROTHROMBIN TIME: CPT | Performed by: INTERNAL MEDICINE

## 2019-10-15 PROCEDURE — 6360000002 HC RX W HCPCS

## 2019-10-15 PROCEDURE — 99406 BEHAV CHNG SMOKING 3-10 MIN: CPT | Performed by: INTERNAL MEDICINE

## 2019-10-15 PROCEDURE — 3017F COLORECTAL CA SCREEN DOC REV: CPT | Performed by: INTERNAL MEDICINE

## 2019-10-15 PROCEDURE — 99212 OFFICE O/P EST SF 10 MIN: CPT | Performed by: INTERNAL MEDICINE

## 2019-10-15 PROCEDURE — 90686 IIV4 VACC NO PRSV 0.5 ML IM: CPT

## 2019-10-15 PROCEDURE — G0402 INITIAL PREVENTIVE EXAM: HCPCS | Performed by: INTERNAL MEDICINE

## 2019-10-15 PROCEDURE — G0008 ADMIN INFLUENZA VIRUS VAC: HCPCS

## 2019-10-15 PROCEDURE — G8431 POS CLIN DEPRES SCRN F/U DOC: HCPCS | Performed by: INTERNAL MEDICINE

## 2019-10-15 RX ORDER — ATORVASTATIN CALCIUM 10 MG/1
10 TABLET, FILM COATED ORAL DAILY
Qty: 90 TABLET | Refills: 0 | Status: SHIPPED | OUTPATIENT
Start: 2019-10-15 | End: 2020-01-28 | Stop reason: SDUPTHER

## 2019-10-15 RX ORDER — OMEPRAZOLE 40 MG/1
40 CAPSULE, DELAYED RELEASE ORAL
Qty: 90 CAPSULE | Refills: 0 | Status: SHIPPED | OUTPATIENT
Start: 2019-10-15 | End: 2020-01-28 | Stop reason: SDUPTHER

## 2019-10-15 RX ORDER — MONTELUKAST SODIUM 10 MG/1
TABLET ORAL
Qty: 90 TABLET | Refills: 0 | Status: SHIPPED | OUTPATIENT
Start: 2019-10-15 | End: 2020-01-13

## 2019-10-15 RX ORDER — ROPINIROLE 0.25 MG/1
0.25 TABLET, FILM COATED ORAL 3 TIMES DAILY
Qty: 270 TABLET | Refills: 0 | Status: SHIPPED | OUTPATIENT
Start: 2019-10-15 | End: 2020-01-28 | Stop reason: SDUPTHER

## 2019-10-15 RX ORDER — SERTRALINE HYDROCHLORIDE 100 MG/1
TABLET, FILM COATED ORAL
Qty: 135 TABLET | Refills: 0 | Status: SHIPPED | OUTPATIENT
Start: 2019-10-15 | End: 2020-01-28 | Stop reason: SDUPTHER

## 2019-10-15 SDOH — HEALTH STABILITY: MENTAL HEALTH: HOW OFTEN DO YOU HAVE A DRINK CONTAINING ALCOHOL?: MONTHLY OR LESS

## 2019-10-15 ASSESSMENT — ENCOUNTER SYMPTOMS
CONSTIPATION: 0
SINUS PAIN: 0
COUGH: 1
SHORTNESS OF BREATH: 1
ABDOMINAL PAIN: 0
NAUSEA: 0
VOMITING: 0
DIARRHEA: 0
SORE THROAT: 0
EYE PAIN: 0

## 2019-10-15 ASSESSMENT — LIFESTYLE VARIABLES
HOW OFTEN DURING THE LAST YEAR HAVE YOU FAILED TO DO WHAT WAS NORMALLY EXPECTED FROM YOU BECAUSE OF DRINKING: 0
HOW OFTEN DURING THE LAST YEAR HAVE YOU NEEDED AN ALCOHOLIC DRINK FIRST THING IN THE MORNING TO GET YOURSELF GOING AFTER A NIGHT OF HEAVY DRINKING: 0
HOW OFTEN DO YOU HAVE SIX OR MORE DRINKS ON ONE OCCASION: 0
HAVE YOU OR SOMEONE ELSE BEEN INJURED AS A RESULT OF YOUR DRINKING: 0
HOW MANY STANDARD DRINKS CONTAINING ALCOHOL DO YOU HAVE ON A TYPICAL DAY: 0
HOW OFTEN DURING THE LAST YEAR HAVE YOU HAD A FEELING OF GUILT OR REMORSE AFTER DRINKING: 0
AUDIT TOTAL SCORE: 1
AUDIT-C TOTAL SCORE: 1
HAS A RELATIVE, FRIEND, DOCTOR, OR ANOTHER HEALTH PROFESSIONAL EXPRESSED CONCERN ABOUT YOUR DRINKING OR SUGGESTED YOU CUT DOWN: 0
HOW OFTEN DO YOU HAVE A DRINK CONTAINING ALCOHOL: 1
HOW OFTEN DURING THE LAST YEAR HAVE YOU FOUND THAT YOU WERE NOT ABLE TO STOP DRINKING ONCE YOU HAD STARTED: 0
HOW OFTEN DURING THE LAST YEAR HAVE YOU BEEN UNABLE TO REMEMBER WHAT HAPPENED THE NIGHT BEFORE BECAUSE YOU HAD BEEN DRINKING: 0

## 2019-10-15 ASSESSMENT — PATIENT HEALTH QUESTIONNAIRE - PHQ9: SUM OF ALL RESPONSES TO PHQ QUESTIONS 1-9: 19

## 2019-10-17 ENCOUNTER — HOSPITAL ENCOUNTER (OUTPATIENT)
Dept: PULMONOLOGY | Age: 59
Setting detail: THERAPIES SERIES
End: 2019-10-17
Payer: MEDICARE

## 2019-10-22 ENCOUNTER — TELEPHONE (OUTPATIENT)
Dept: PULMONOLOGY | Age: 59
End: 2019-10-22

## 2019-10-22 ENCOUNTER — HOSPITAL ENCOUNTER (OUTPATIENT)
Dept: PULMONOLOGY | Age: 59
Setting detail: THERAPIES SERIES
End: 2019-10-22
Payer: MEDICARE

## 2019-10-24 ENCOUNTER — HOSPITAL ENCOUNTER (OUTPATIENT)
Dept: PULMONOLOGY | Age: 59
Setting detail: THERAPIES SERIES
Discharge: HOME | End: 2019-10-24
Payer: MEDICARE

## 2019-10-24 PROCEDURE — G0424 PULMONARY REHAB W EXER: HCPCS

## 2019-10-25 ENCOUNTER — OFFICE VISIT (OUTPATIENT)
Dept: INTERNAL MEDICINE | Age: 59
End: 2019-10-25
Payer: MEDICARE

## 2019-10-25 DIAGNOSIS — F32.A DEPRESSION, UNSPECIFIED DEPRESSION TYPE: Primary | ICD-10-CM

## 2019-10-25 PROCEDURE — 90834 PSYTX W PT 45 MINUTES: CPT | Performed by: SOCIAL WORKER

## 2019-10-28 ENCOUNTER — HOSPITAL ENCOUNTER (OUTPATIENT)
Dept: PHYSICAL THERAPY | Age: 59
Setting detail: THERAPIES SERIES
Discharge: HOME | End: 2019-10-28
Payer: MEDICARE

## 2019-10-28 PROCEDURE — 97161 PT EVAL LOW COMPLEX 20 MIN: CPT | Performed by: PHYSICAL THERAPIST

## 2019-10-28 ASSESSMENT — PAIN DESCRIPTION - DESCRIPTORS: DESCRIPTORS: ACHING;CONSTANT

## 2019-10-28 ASSESSMENT — PAIN DESCRIPTION - PAIN TYPE: TYPE: CHRONIC PAIN

## 2019-10-28 ASSESSMENT — PAIN DESCRIPTION - LOCATION: LOCATION: NECK

## 2019-10-28 ASSESSMENT — PAIN SCALES - GENERAL: PAINLEVEL_OUTOF10: 6

## 2019-10-28 ASSESSMENT — PAIN - FUNCTIONAL ASSESSMENT: PAIN_FUNCTIONAL_ASSESSMENT: PREVENTS OR INTERFERES SOME ACTIVE ACTIVITIES AND ADLS

## 2019-10-28 ASSESSMENT — PAIN DESCRIPTION - ORIENTATION: ORIENTATION: RIGHT;LEFT

## 2019-10-29 ENCOUNTER — HOSPITAL ENCOUNTER (OUTPATIENT)
Dept: PULMONOLOGY | Age: 59
Setting detail: THERAPIES SERIES
Discharge: HOME | End: 2019-10-29
Payer: MEDICARE

## 2019-10-29 PROCEDURE — G0424 PULMONARY REHAB W EXER: HCPCS

## 2019-10-30 ENCOUNTER — HOSPITAL ENCOUNTER (OUTPATIENT)
Dept: PHYSICAL THERAPY | Age: 59
Setting detail: THERAPIES SERIES
Discharge: HOME | End: 2019-10-30
Payer: MEDICARE

## 2019-10-30 PROCEDURE — G0283 ELEC STIM OTHER THAN WOUND: HCPCS | Performed by: PHYSICAL THERAPIST

## 2019-10-30 PROCEDURE — 97012 MECHANICAL TRACTION THERAPY: CPT | Performed by: PHYSICAL THERAPIST

## 2019-10-30 PROCEDURE — 97530 THERAPEUTIC ACTIVITIES: CPT | Performed by: PHYSICAL THERAPIST

## 2019-10-31 ENCOUNTER — HOSPITAL ENCOUNTER (OUTPATIENT)
Dept: PULMONOLOGY | Age: 59
Setting detail: THERAPIES SERIES
Discharge: HOME | End: 2019-10-31
Payer: MEDICARE

## 2019-11-05 ENCOUNTER — HOSPITAL ENCOUNTER (OUTPATIENT)
Dept: PULMONOLOGY | Age: 59
Setting detail: THERAPIES SERIES
End: 2019-11-05
Payer: MEDICARE

## 2019-11-06 ENCOUNTER — HOSPITAL ENCOUNTER (OUTPATIENT)
Dept: PHYSICAL THERAPY | Age: 59
Setting detail: THERAPIES SERIES
Discharge: HOME | End: 2019-11-06
Payer: MEDICARE

## 2019-11-06 PROCEDURE — G0283 ELEC STIM OTHER THAN WOUND: HCPCS | Performed by: PHYSICAL THERAPIST

## 2019-11-06 PROCEDURE — 97110 THERAPEUTIC EXERCISES: CPT | Performed by: PHYSICAL THERAPIST

## 2019-11-07 ENCOUNTER — HOSPITAL ENCOUNTER (OUTPATIENT)
Dept: PULMONOLOGY | Age: 59
Setting detail: THERAPIES SERIES
Discharge: HOME | End: 2019-11-07
Payer: MEDICARE

## 2019-11-07 PROCEDURE — G0424 PULMONARY REHAB W EXER: HCPCS

## 2019-11-11 ENCOUNTER — HOSPITAL ENCOUNTER (OUTPATIENT)
Dept: PHYSICAL THERAPY | Age: 59
Setting detail: THERAPIES SERIES
Discharge: HOME | End: 2019-11-11
Payer: MEDICARE

## 2019-11-12 ENCOUNTER — TELEPHONE (OUTPATIENT)
Dept: INTERNAL MEDICINE | Age: 59
End: 2019-11-12

## 2019-11-12 ENCOUNTER — HOSPITAL ENCOUNTER (OUTPATIENT)
Dept: PULMONOLOGY | Age: 59
Setting detail: THERAPIES SERIES
End: 2019-11-12
Payer: MEDICARE

## 2019-11-12 DIAGNOSIS — Z95.2 H/O MECHANICAL AORTIC VALVE REPLACEMENT: Primary | ICD-10-CM

## 2019-11-13 ENCOUNTER — HOSPITAL ENCOUNTER (OUTPATIENT)
Age: 59
Discharge: HOME | End: 2019-11-13
Payer: MEDICARE

## 2019-11-13 DIAGNOSIS — E78.5 HYPERLIPIDEMIA, UNSPECIFIED HYPERLIPIDEMIA TYPE: ICD-10-CM

## 2019-11-13 LAB
CHOLESTEROL, TOTAL: 160 MG/DL (ref 0–199)
HDLC SERPL-MCNC: 62 MG/DL
LDL CHOLESTEROL CALCULATED: 79 MG/DL (ref 0–99)
TRIGL SERPL-MCNC: 95 MG/DL (ref 0–149)
VLDLC SERPL CALC-MCNC: 19 MG/DL

## 2019-11-13 PROCEDURE — 36415 COLL VENOUS BLD VENIPUNCTURE: CPT

## 2019-11-13 PROCEDURE — 80061 LIPID PANEL: CPT

## 2019-11-14 ENCOUNTER — HOSPITAL ENCOUNTER (OUTPATIENT)
Dept: PULMONOLOGY | Age: 59
Setting detail: THERAPIES SERIES
Discharge: HOME | End: 2019-11-14
Payer: MEDICARE

## 2019-11-14 PROCEDURE — G0424 PULMONARY REHAB W EXER: HCPCS

## 2019-11-15 ENCOUNTER — OFFICE VISIT (OUTPATIENT)
Dept: INTERNAL MEDICINE | Age: 59
End: 2019-11-15
Payer: MEDICARE

## 2019-11-15 ENCOUNTER — TELEPHONE (OUTPATIENT)
Dept: FAMILY MEDICINE CLINIC | Age: 59
End: 2019-11-15

## 2019-11-15 DIAGNOSIS — F32.A DEPRESSION, UNSPECIFIED DEPRESSION TYPE: Primary | ICD-10-CM

## 2019-11-15 PROCEDURE — 90832 PSYTX W PT 30 MINUTES: CPT | Performed by: SOCIAL WORKER

## 2019-11-18 ENCOUNTER — ANESTHESIA (OUTPATIENT)
Dept: ENDOSCOPY | Age: 59
End: 2019-11-18
Payer: MEDICARE

## 2019-11-18 ENCOUNTER — PREP FOR PROCEDURE (OUTPATIENT)
Dept: SURGERY | Age: 59
End: 2019-11-18

## 2019-11-18 ENCOUNTER — HOSPITAL ENCOUNTER (OUTPATIENT)
Age: 59
Setting detail: OUTPATIENT SURGERY
Discharge: HOME | End: 2019-11-18
Attending: SURGERY | Admitting: SURGERY
Payer: MEDICARE

## 2019-11-18 ENCOUNTER — ANESTHESIA EVENT (OUTPATIENT)
Dept: ENDOSCOPY | Age: 59
End: 2019-11-18
Payer: MEDICARE

## 2019-11-18 VITALS
WEIGHT: 175 LBS | DIASTOLIC BLOOD PRESSURE: 59 MMHG | RESPIRATION RATE: 16 BRPM | HEART RATE: 79 BPM | OXYGEN SATURATION: 95 % | SYSTOLIC BLOOD PRESSURE: 128 MMHG | BODY MASS INDEX: 29.16 KG/M2 | TEMPERATURE: 97.6 F | HEIGHT: 65 IN

## 2019-11-18 VITALS
OXYGEN SATURATION: 99 % | DIASTOLIC BLOOD PRESSURE: 57 MMHG | RESPIRATION RATE: 13 BRPM | SYSTOLIC BLOOD PRESSURE: 115 MMHG

## 2019-11-18 DIAGNOSIS — Z01.812 PRE-OPERATIVE LABORATORY EXAMINATION: Primary | ICD-10-CM

## 2019-11-18 LAB
INR BLD: 1.1
PROTHROMBIN TIME: 12 SEC (ref 9.3–12.4)

## 2019-11-18 PROCEDURE — 3700000001 HC ADD 15 MINUTES (ANESTHESIA): Performed by: SURGERY

## 2019-11-18 PROCEDURE — 88305 TISSUE EXAM BY PATHOLOGIST: CPT

## 2019-11-18 PROCEDURE — 7100000011 HC PHASE II RECOVERY - ADDTL 15 MIN: Performed by: SURGERY

## 2019-11-18 PROCEDURE — 43239 EGD BIOPSY SINGLE/MULTIPLE: CPT | Performed by: SURGERY

## 2019-11-18 PROCEDURE — 85610 PROTHROMBIN TIME: CPT

## 2019-11-18 PROCEDURE — 2709999900 HC NON-CHARGEABLE SUPPLY: Performed by: SURGERY

## 2019-11-18 PROCEDURE — 3700000000 HC ANESTHESIA ATTENDED CARE: Performed by: SURGERY

## 2019-11-18 PROCEDURE — 45380 COLONOSCOPY AND BIOPSY: CPT | Performed by: SURGERY

## 2019-11-18 PROCEDURE — 2580000003 HC RX 258: Performed by: SURGERY

## 2019-11-18 PROCEDURE — 3609010600 HC COLONOSCOPY POLYPECTOMY SNARE/COLD BIOPSY: Performed by: SURGERY

## 2019-11-18 PROCEDURE — 6360000002 HC RX W HCPCS: Performed by: NURSE ANESTHETIST, CERTIFIED REGISTERED

## 2019-11-18 PROCEDURE — 88342 IMHCHEM/IMCYTCHM 1ST ANTB: CPT

## 2019-11-18 PROCEDURE — 94664 DEMO&/EVAL PT USE INHALER: CPT

## 2019-11-18 PROCEDURE — 36415 COLL VENOUS BLD VENIPUNCTURE: CPT

## 2019-11-18 PROCEDURE — 3609012400 HC EGD TRANSORAL BIOPSY SINGLE/MULTIPLE: Performed by: SURGERY

## 2019-11-18 PROCEDURE — 6370000000 HC RX 637 (ALT 250 FOR IP): Performed by: ANESTHESIOLOGY

## 2019-11-18 PROCEDURE — 7100000010 HC PHASE II RECOVERY - FIRST 15 MIN: Performed by: SURGERY

## 2019-11-18 RX ORDER — IPRATROPIUM BROMIDE AND ALBUTEROL SULFATE 2.5; .5 MG/3ML; MG/3ML
1 SOLUTION RESPIRATORY (INHALATION) ONCE
Status: COMPLETED | OUTPATIENT
Start: 2019-11-18 | End: 2019-11-18

## 2019-11-18 RX ORDER — SODIUM CHLORIDE 0.9 % (FLUSH) 0.9 %
10 SYRINGE (ML) INJECTION EVERY 12 HOURS SCHEDULED
Status: CANCELLED | OUTPATIENT
Start: 2019-11-18

## 2019-11-18 RX ORDER — FENTANYL CITRATE 50 UG/ML
INJECTION, SOLUTION INTRAMUSCULAR; INTRAVENOUS PRN
Status: DISCONTINUED | OUTPATIENT
Start: 2019-11-18 | End: 2019-11-18 | Stop reason: SDUPTHER

## 2019-11-18 RX ORDER — SODIUM CHLORIDE 0.9 % (FLUSH) 0.9 %
10 SYRINGE (ML) INJECTION PRN
Status: CANCELLED | OUTPATIENT
Start: 2019-11-18

## 2019-11-18 RX ORDER — SODIUM CHLORIDE 0.9 % (FLUSH) 0.9 %
10 SYRINGE (ML) INJECTION PRN
Status: DISCONTINUED | OUTPATIENT
Start: 2019-11-18 | End: 2019-11-18 | Stop reason: HOSPADM

## 2019-11-18 RX ORDER — PROPOFOL 10 MG/ML
INJECTION, EMULSION INTRAVENOUS PRN
Status: DISCONTINUED | OUTPATIENT
Start: 2019-11-18 | End: 2019-11-18 | Stop reason: SDUPTHER

## 2019-11-18 RX ORDER — SODIUM CHLORIDE 9 MG/ML
INJECTION, SOLUTION INTRAVENOUS CONTINUOUS
Status: DISCONTINUED | OUTPATIENT
Start: 2019-11-18 | End: 2019-11-18 | Stop reason: HOSPADM

## 2019-11-18 RX ORDER — SODIUM CHLORIDE 0.9 % (FLUSH) 0.9 %
10 SYRINGE (ML) INJECTION EVERY 12 HOURS SCHEDULED
Status: DISCONTINUED | OUTPATIENT
Start: 2019-11-18 | End: 2019-11-18 | Stop reason: HOSPADM

## 2019-11-18 RX ORDER — SODIUM CHLORIDE 9 MG/ML
INJECTION, SOLUTION INTRAVENOUS CONTINUOUS
Status: CANCELLED | OUTPATIENT
Start: 2019-11-18

## 2019-11-18 RX ADMIN — PROPOFOL 250 MG: 10 INJECTION, EMULSION INTRAVENOUS at 09:33

## 2019-11-18 RX ADMIN — SODIUM CHLORIDE: 9 INJECTION, SOLUTION INTRAVENOUS at 08:59

## 2019-11-18 RX ADMIN — FENTANYL CITRATE 50 MCG: 50 INJECTION, SOLUTION INTRAMUSCULAR; INTRAVENOUS at 09:09

## 2019-11-18 RX ADMIN — FENTANYL CITRATE 50 MCG: 50 INJECTION, SOLUTION INTRAMUSCULAR; INTRAVENOUS at 09:14

## 2019-11-18 RX ADMIN — SODIUM CHLORIDE: 9 INJECTION, SOLUTION INTRAVENOUS at 08:48

## 2019-11-18 RX ADMIN — IPRATROPIUM BROMIDE AND ALBUTEROL SULFATE 1 AMPULE: .5; 3 SOLUTION RESPIRATORY (INHALATION) at 10:36

## 2019-11-18 ASSESSMENT — PAIN SCALES - GENERAL
PAINLEVEL_OUTOF10: 0

## 2019-11-18 ASSESSMENT — PAIN - FUNCTIONAL ASSESSMENT: PAIN_FUNCTIONAL_ASSESSMENT: 0-10

## 2019-11-19 ENCOUNTER — HOSPITAL ENCOUNTER (OUTPATIENT)
Dept: PULMONOLOGY | Age: 59
Setting detail: THERAPIES SERIES
End: 2019-11-19
Payer: MEDICARE

## 2019-11-21 ENCOUNTER — HOSPITAL ENCOUNTER (OUTPATIENT)
Dept: PULMONOLOGY | Age: 59
Setting detail: THERAPIES SERIES
End: 2019-11-21
Payer: MEDICARE

## 2019-11-22 ENCOUNTER — NURSE ONLY (OUTPATIENT)
Dept: INTERNAL MEDICINE | Age: 59
End: 2019-11-22
Payer: MEDICARE

## 2019-11-22 ENCOUNTER — HOSPITAL ENCOUNTER (OUTPATIENT)
Dept: PSYCHIATRY | Age: 59
Discharge: HOME | End: 2019-11-22
Payer: MEDICARE

## 2019-11-22 DIAGNOSIS — Z79.01 ANTICOAGULATED ON COUMADIN: ICD-10-CM

## 2019-11-22 DIAGNOSIS — Z95.2 H/O MECHANICAL AORTIC VALVE REPLACEMENT: Primary | ICD-10-CM

## 2019-11-22 LAB
INTERNATIONAL NORMALIZATION RATIO, POC: 1.5
PROTHROMBIN TIME, POC: 17.9

## 2019-11-22 PROCEDURE — 85610 PROTHROMBIN TIME: CPT | Performed by: INTERNAL MEDICINE

## 2019-11-22 PROCEDURE — 94250 HC DIFFUSION: CPT | Performed by: COUNSELOR

## 2019-11-22 PROCEDURE — 93793 ANTICOAG MGMT PT WARFARIN: CPT | Performed by: INTERNAL MEDICINE

## 2019-11-25 ENCOUNTER — NURSE ONLY (OUTPATIENT)
Dept: INTERNAL MEDICINE | Age: 59
End: 2019-11-25
Payer: MEDICARE

## 2019-11-25 DIAGNOSIS — Z95.2 H/O MECHANICAL AORTIC VALVE REPLACEMENT: Primary | ICD-10-CM

## 2019-11-25 DIAGNOSIS — J44.9 CHRONIC OBSTRUCTIVE PULMONARY DISEASE, UNSPECIFIED COPD TYPE (HCC): ICD-10-CM

## 2019-11-25 DIAGNOSIS — F17.200 CURRENT SMOKER: Primary | ICD-10-CM

## 2019-11-25 LAB
INTERNATIONAL NORMALIZATION RATIO, POC: 1.8
PROTHROMBIN TIME, POC: 21.1

## 2019-11-25 PROCEDURE — 93793 ANTICOAG MGMT PT WARFARIN: CPT | Performed by: INTERNAL MEDICINE

## 2019-11-25 PROCEDURE — 85610 PROTHROMBIN TIME: CPT | Performed by: INTERNAL MEDICINE

## 2019-11-25 RX ORDER — VARENICLINE TARTRATE 25 MG
KIT ORAL
Qty: 1 BOX | Refills: 0 | Status: SHIPPED | OUTPATIENT
Start: 2019-11-25 | End: 2019-12-18

## 2019-12-05 ENCOUNTER — NURSE ONLY (OUTPATIENT)
Dept: INTERNAL MEDICINE | Age: 59
End: 2019-12-05
Payer: MEDICARE

## 2019-12-05 ENCOUNTER — HOSPITAL ENCOUNTER (OUTPATIENT)
Dept: PSYCHIATRY | Age: 59
Discharge: HOME | End: 2019-12-05
Payer: MEDICARE

## 2019-12-05 DIAGNOSIS — Z95.2 H/O MECHANICAL AORTIC VALVE REPLACEMENT: Primary | ICD-10-CM

## 2019-12-05 LAB
INTERNATIONAL NORMALIZATION RATIO, POC: 1.9
PROTHROMBIN TIME, POC: 22.5

## 2019-12-05 PROCEDURE — 85610 PROTHROMBIN TIME: CPT | Performed by: INTERNAL MEDICINE

## 2019-12-05 PROCEDURE — 94250 HC DIFFUSION: CPT | Performed by: COUNSELOR

## 2019-12-05 PROCEDURE — 93793 ANTICOAG MGMT PT WARFARIN: CPT | Performed by: INTERNAL MEDICINE

## 2019-12-09 ENCOUNTER — NURSE ONLY (OUTPATIENT)
Dept: INTERNAL MEDICINE | Age: 59
End: 2019-12-09
Payer: MEDICARE

## 2019-12-09 DIAGNOSIS — I71.21 ASCENDING AORTIC ANEURYSM: Primary | ICD-10-CM

## 2019-12-09 DIAGNOSIS — Z79.01 ANTICOAGULATED ON COUMADIN: ICD-10-CM

## 2019-12-09 LAB
INTERNATIONAL NORMALIZATION RATIO, POC: 2
PROTHROMBIN TIME, POC: 23.9

## 2019-12-09 PROCEDURE — 85610 PROTHROMBIN TIME: CPT | Performed by: INTERNAL MEDICINE

## 2019-12-10 ENCOUNTER — TELEPHONE (OUTPATIENT)
Dept: PULMONOLOGY | Age: 59
End: 2019-12-10

## 2019-12-12 ENCOUNTER — HOSPITAL ENCOUNTER (OUTPATIENT)
Dept: PSYCHIATRY | Age: 59
Discharge: HOME | End: 2019-12-12
Payer: MEDICARE

## 2019-12-16 ENCOUNTER — NURSE ONLY (OUTPATIENT)
Dept: INTERNAL MEDICINE | Age: 59
End: 2019-12-16
Payer: MEDICARE

## 2019-12-16 DIAGNOSIS — Z95.2 H/O MECHANICAL AORTIC VALVE REPLACEMENT: Primary | ICD-10-CM

## 2019-12-16 LAB
INTERNATIONAL NORMALIZATION RATIO, POC: 2.7
PROTHROMBIN TIME, POC: 32.6

## 2019-12-16 PROCEDURE — 93793 ANTICOAG MGMT PT WARFARIN: CPT | Performed by: INTERNAL MEDICINE

## 2019-12-16 PROCEDURE — 85610 PROTHROMBIN TIME: CPT | Performed by: INTERNAL MEDICINE

## 2019-12-18 DIAGNOSIS — F17.200 CURRENT SMOKER: Primary | ICD-10-CM

## 2019-12-18 RX ORDER — VARENICLINE TARTRATE 1 MG/1
1 TABLET, FILM COATED ORAL 2 TIMES DAILY
Qty: 28 TABLET | Refills: 0 | Status: ON HOLD
Start: 2019-12-18 | End: 2020-09-08

## 2019-12-19 ENCOUNTER — HOSPITAL ENCOUNTER (OUTPATIENT)
Dept: SLEEP CENTER | Age: 59
Discharge: HOME | End: 2019-12-19
Payer: MEDICARE

## 2019-12-19 DIAGNOSIS — G25.81 RESTLESS LEG SYNDROME: ICD-10-CM

## 2019-12-19 DIAGNOSIS — G47.33 OSA (OBSTRUCTIVE SLEEP APNEA): ICD-10-CM

## 2019-12-19 PROCEDURE — 95810 POLYSOM 6/> YRS 4/> PARAM: CPT

## 2019-12-20 VITALS
HEART RATE: 47 BPM | BODY MASS INDEX: 28.66 KG/M2 | SYSTOLIC BLOOD PRESSURE: 165 MMHG | DIASTOLIC BLOOD PRESSURE: 86 MMHG | OXYGEN SATURATION: 93 % | WEIGHT: 172 LBS | HEIGHT: 65 IN

## 2019-12-20 PROCEDURE — 95810 POLYSOM 6/> YRS 4/> PARAM: CPT | Performed by: INTERNAL MEDICINE

## 2019-12-20 ASSESSMENT — SLEEP AND FATIGUE QUESTIONNAIRES
HOW LIKELY ARE YOU TO NOD OFF OR FALL ASLEEP WHILE LYING DOWN TO REST IN THE AFTERNOON WHEN CIRCUMSTANCES PERMIT: 0
HOW LIKELY ARE YOU TO NOD OFF OR FALL ASLEEP WHILE WATCHING TV: 3
HOW LIKELY ARE YOU TO NOD OFF OR FALL ASLEEP WHILE SITTING AND TALKING TO SOMEONE: 2
HOW LIKELY ARE YOU TO NOD OFF OR FALL ASLEEP WHILE SITTING INACTIVE IN A PUBLIC PLACE: 1
HOW LIKELY ARE YOU TO NOD OFF OR FALL ASLEEP WHEN YOU ARE A PASSENGER IN A CAR FOR AN HOUR WITHOUT A BREAK: 0
HOW LIKELY ARE YOU TO NOD OFF OR FALL ASLEEP IN A CAR, WHILE STOPPED FOR A FEW MINUTES IN TRAFFIC: 0
HOW LIKELY ARE YOU TO NOD OFF OR FALL ASLEEP WHILE SITTING AND READING: 3
HOW LIKELY ARE YOU TO NOD OFF OR FALL ASLEEP WHILE SITTING QUIETLY AFTER LUNCH WITHOUT ALCOHOL: 2
ESS TOTAL SCORE: 11

## 2019-12-23 ENCOUNTER — HOSPITAL ENCOUNTER (OUTPATIENT)
Dept: PSYCHIATRY | Age: 59
Discharge: HOME | End: 2019-12-23
Payer: MEDICARE

## 2019-12-23 ENCOUNTER — NURSE ONLY (OUTPATIENT)
Dept: INTERNAL MEDICINE | Age: 59
End: 2019-12-23
Payer: MEDICARE

## 2019-12-23 DIAGNOSIS — Z95.2 H/O MECHANICAL AORTIC VALVE REPLACEMENT: Primary | ICD-10-CM

## 2019-12-23 LAB
INTERNATIONAL NORMALIZATION RATIO, POC: 2.9
PROTHROMBIN TIME, POC: 34.3

## 2019-12-23 PROCEDURE — 94250 HC DIFFUSION: CPT | Performed by: COUNSELOR

## 2019-12-23 PROCEDURE — 93793 ANTICOAG MGMT PT WARFARIN: CPT | Performed by: INTERNAL MEDICINE

## 2019-12-23 PROCEDURE — 85610 PROTHROMBIN TIME: CPT | Performed by: INTERNAL MEDICINE

## 2020-01-07 ENCOUNTER — HOSPITAL ENCOUNTER (OUTPATIENT)
Dept: PULMONOLOGY | Age: 60
Setting detail: THERAPIES SERIES
End: 2020-01-07
Payer: MEDICARE

## 2020-01-08 RX ORDER — ASPIRIN 81 MG/1
TABLET, COATED ORAL
Qty: 90 TABLET | Refills: 3 | Status: SHIPPED | OUTPATIENT
Start: 2020-01-08 | End: 2020-01-14

## 2020-01-09 ENCOUNTER — HOSPITAL ENCOUNTER (OUTPATIENT)
Dept: PULMONOLOGY | Age: 60
Setting detail: THERAPIES SERIES
Discharge: HOME | End: 2020-01-09
Payer: MEDICARE

## 2020-01-09 PROCEDURE — G0424 PULMONARY REHAB W EXER: HCPCS

## 2020-01-13 ENCOUNTER — TELEPHONE (OUTPATIENT)
Dept: PULMONOLOGY | Age: 60
End: 2020-01-13

## 2020-01-13 RX ORDER — MONTELUKAST SODIUM 10 MG/1
TABLET ORAL
Qty: 90 TABLET | Refills: 3 | Status: SHIPPED
Start: 2020-01-13 | End: 2020-09-15 | Stop reason: SDUPTHER

## 2020-01-13 RX ORDER — B-COMPLEX WITH VITAMIN C
TABLET ORAL
Qty: 180 TABLET | Refills: 2 | Status: ON HOLD
Start: 2020-01-13 | End: 2020-09-08

## 2020-01-14 ENCOUNTER — TELEPHONE (OUTPATIENT)
Dept: PULMONOLOGY | Age: 60
End: 2020-01-14

## 2020-01-14 ENCOUNTER — HOSPITAL ENCOUNTER (OUTPATIENT)
Dept: PULMONOLOGY | Age: 60
Setting detail: THERAPIES SERIES
Discharge: HOME | End: 2020-01-14
Payer: MEDICARE

## 2020-01-14 VITALS — WEIGHT: 172.2 LBS | HEIGHT: 65 IN | BODY MASS INDEX: 28.69 KG/M2

## 2020-01-14 PROCEDURE — G0424 PULMONARY REHAB W EXER: HCPCS

## 2020-01-14 RX ORDER — WARFARIN SODIUM 6 MG/1
TABLET ORAL
Qty: 30 TABLET | Refills: 0 | Status: SHIPPED
Start: 2020-01-14 | End: 2020-04-01 | Stop reason: SDUPTHER

## 2020-01-14 NOTE — PROGRESS NOTES
Outpatient Dietitian Follow Up  1/14/2020    Chance Ko 60 y.o. female    PCP:   Iglesia Munson MD    Assessment:  Patient in for follow up weight loss counseling. Patient reports she gained weight over the holiday and wants to lose weight. Patient has not seen RD over 3 months due to illness. Patient reports she is eating more green vegetables than before but watches intake because on Coumadin. Patient reports Coumadin medication will continue indefinitely. Patient reports she had diverticulosis and is increasing her fiber intake and taking Fibercon. Patient advised to have increased water consumption. Patient verbalized understanding. Patient avoids nuts, seeds and the skin of potatoes. Patient reports she quit smoking on December 14, 2019. Patient reports weight gain. RD offered support. Patient reports she has dry and scratchy throat from illness and smoking. Drinking water during visit which helped per pt comments. Patient appears overweight. Goals reviewed. Patient weight same over 1 month however gained 3 pounds over quarter. Weight gain not beneficial. Goal not for losing weight. Goal to continue. Patient reports she has increased water consumption. Goal met and goal to continue. Patient reports she still struggles with decreasing pop consumption. Patient reports she is now drinking one can caffeine containing pop. Goal in progress and goal to continue. Patient reports she struggles with following heart healthy diet. Discussed food availability at length. Patient does utilize SNAP fruit and vegetable benefit. Goal in progress and to continue. 24 hour dietary recall done and is as followed:  Breakfast: 10:00 am- 11:00 am, at home, 2 fried eggs, 2 pieces white toast with butter and 1 cup decaf coffee, no condiments.   AM snack: time?, at home, 1 can regular Coke  Lunch: none  PM snack: none  Dinner: 5:30 pm, at home, 4 to 6 ounces fried stewed beef with seasoning, 2 asparagus gipson and 1/2 cup corn and 1/2 cup to 1 cup red skin mased potatoes with 12-16 ounces tap water. Evening snack:none    Ht Readings from Last 1 Encounters:   01/14/20 5' 5\" (1.651 m)     Wt Readings from Last 3 Encounters:   01/14/20 172 lb 3.2 oz (78.1 kg)   12/20/19 172 lb (78 kg)   11/18/19 175 lb (79.4 kg)     Body mass index is 28.66 kg/m². Waist circumference:deferred  Body Fat %:deferred    Diagnosis:  Overweight related to history of excessive caloric consumption as evident by BMI 28.66. ( update: BMI increased)    Intervention:  Continue to meet estimated needs. Continue to try to lose weight. Continued adequate hydration. Continued increased knowledge. Continued improved compliance. Continue to follow individualized diet plan. Education provided: Medical Nutrition Therapy 3 day food log, Diverticulosis diet for discharge and cooking tips for Fiber. Interdisciplinary Treatment Plan ( ITP) note: Patient is phase II participant and ITP done. Monitoring/Evaluation:  RD will monitor PO intake and weight data as available. The goals set by the patient are to try to decrease pop to 1/2 can pop 3 times per week, do 3 day food record, follow diverticulosis diet instructions and try to lose at least 2 pounds by next review. The patient will be followed on February 11, 2020. Janey Turk MA, RD, LD  Contact information: [phone].

## 2020-01-16 ENCOUNTER — HOSPITAL ENCOUNTER (OUTPATIENT)
Dept: PULMONOLOGY | Age: 60
Setting detail: THERAPIES SERIES
Discharge: HOME | End: 2020-01-16
Payer: MEDICARE

## 2020-01-16 ENCOUNTER — TELEPHONE (OUTPATIENT)
Dept: INTERNAL MEDICINE | Age: 60
End: 2020-01-16

## 2020-01-16 PROCEDURE — G0424 PULMONARY REHAB W EXER: HCPCS

## 2020-01-17 ENCOUNTER — TELEPHONE (OUTPATIENT)
Dept: INTERNAL MEDICINE | Age: 60
End: 2020-01-17

## 2020-01-20 ENCOUNTER — HOSPITAL ENCOUNTER (OUTPATIENT)
Dept: PHYSICAL THERAPY | Age: 60
Setting detail: THERAPIES SERIES
Discharge: HOME | End: 2020-01-20
Payer: MEDICARE

## 2020-01-20 NOTE — PROGRESS NOTES
Date:  1/20/2020          Dear Dr. Yamilka Melissa: This is to inform you that, as per [facility] Physical Therapy department policy, your patient Melissa Vivas is as of todays date being discharged from Physical Therapy secondary to the following reasons:    1. If a Physical Therapy outpatient misses three consecutive scheduled appointments   without any prior notification, he or she will be discharged from physical therapy services. A new prescription will be necessary to resume physical therapy. 2. If a client is absent for 50% of scheduled visits during a one-month period, he or she will be discharged from physical therapy services. A new prescription will be necessary to resume physical therapy. Last date of service:  11/6/19    If you have any questions, please feel free to call at [phone]      Thank you          Donnie Guerrero    [phone]    The information contained in this Fax the designated recipients intend message only for the personal and confidential use named above. This information is to be handled as privileged and confidential.  If the reader of this message is not the intended recipient, you are hereby notified that you have received this document in error and that any review, dissemination, distribution or copying of this message is strictly prohibited. If you receive this communication in error, please notify us immediately at the above number and return the original to us by mail.   Thank you      [address] [place] [address] [phone]

## 2020-01-23 ENCOUNTER — HOSPITAL ENCOUNTER (OUTPATIENT)
Dept: PULMONOLOGY | Age: 60
Setting detail: THERAPIES SERIES
End: 2020-01-23
Payer: MEDICARE

## 2020-01-23 ENCOUNTER — NURSE ONLY (OUTPATIENT)
Dept: INTERNAL MEDICINE | Age: 60
End: 2020-01-23
Payer: MEDICARE

## 2020-01-23 LAB
INTERNATIONAL NORMALIZATION RATIO, POC: 2.8
PROTHROMBIN TIME, POC: 33

## 2020-01-23 PROCEDURE — 85610 PROTHROMBIN TIME: CPT | Performed by: INTERNAL MEDICINE

## 2020-01-23 PROCEDURE — 93793 ANTICOAG MGMT PT WARFARIN: CPT | Performed by: INTERNAL MEDICINE

## 2020-01-28 ENCOUNTER — HOSPITAL ENCOUNTER (OUTPATIENT)
Age: 60
Discharge: HOME | End: 2020-01-28
Payer: MEDICARE

## 2020-01-28 ENCOUNTER — APPOINTMENT (OUTPATIENT)
Dept: PULMONOLOGY | Age: 60
End: 2020-01-28
Payer: MEDICARE

## 2020-01-28 ENCOUNTER — OFFICE VISIT (OUTPATIENT)
Dept: INTERNAL MEDICINE | Age: 60
End: 2020-01-28
Payer: MEDICARE

## 2020-01-28 VITALS
TEMPERATURE: 98.9 F | WEIGHT: 174 LBS | HEIGHT: 65 IN | DIASTOLIC BLOOD PRESSURE: 67 MMHG | BODY MASS INDEX: 28.99 KG/M2 | HEART RATE: 77 BPM | RESPIRATION RATE: 18 BRPM | SYSTOLIC BLOOD PRESSURE: 145 MMHG

## 2020-01-28 LAB
ANION GAP SERPL CALCULATED.3IONS-SCNC: 16 MMOL/L (ref 7–16)
BUN BLDV-MCNC: 13 MG/DL (ref 8–23)
CALCIUM SERPL-MCNC: 9.8 MG/DL (ref 8.6–10.2)
CHLORIDE BLD-SCNC: 100 MMOL/L (ref 98–107)
CO2: 24 MMOL/L (ref 22–29)
CREAT SERPL-MCNC: 0.7 MG/DL (ref 0.5–1)
GFR AFRICAN AMERICAN: >60
GFR NON-AFRICAN AMERICAN: >60 ML/MIN/1.73
GLUCOSE BLD-MCNC: 100 MG/DL (ref 74–99)
HBA1C MFR BLD: 6 %
POTASSIUM SERPL-SCNC: 4 MMOL/L (ref 3.5–5)
SODIUM BLD-SCNC: 140 MMOL/L (ref 132–146)

## 2020-01-28 PROCEDURE — G8419 CALC BMI OUT NRM PARAM NOF/U: HCPCS | Performed by: INTERNAL MEDICINE

## 2020-01-28 PROCEDURE — G8482 FLU IMMUNIZE ORDER/ADMIN: HCPCS | Performed by: INTERNAL MEDICINE

## 2020-01-28 PROCEDURE — 3017F COLORECTAL CA SCREEN DOC REV: CPT | Performed by: INTERNAL MEDICINE

## 2020-01-28 PROCEDURE — G8427 DOCREV CUR MEDS BY ELIG CLIN: HCPCS | Performed by: INTERNAL MEDICINE

## 2020-01-28 PROCEDURE — 1036F TOBACCO NON-USER: CPT | Performed by: INTERNAL MEDICINE

## 2020-01-28 PROCEDURE — 99213 OFFICE O/P EST LOW 20 MIN: CPT | Performed by: INTERNAL MEDICINE

## 2020-01-28 PROCEDURE — 36415 COLL VENOUS BLD VENIPUNCTURE: CPT

## 2020-01-28 PROCEDURE — G8926 SPIRO NO PERF OR DOC: HCPCS | Performed by: INTERNAL MEDICINE

## 2020-01-28 PROCEDURE — 80048 BASIC METABOLIC PNL TOTAL CA: CPT

## 2020-01-28 PROCEDURE — 99214 OFFICE O/P EST MOD 30 MIN: CPT | Performed by: INTERNAL MEDICINE

## 2020-01-28 PROCEDURE — 83036 HEMOGLOBIN GLYCOSYLATED A1C: CPT | Performed by: INTERNAL MEDICINE

## 2020-01-28 PROCEDURE — 3023F SPIROM DOC REV: CPT | Performed by: INTERNAL MEDICINE

## 2020-01-28 RX ORDER — OMEPRAZOLE 40 MG/1
40 CAPSULE, DELAYED RELEASE ORAL
Qty: 90 CAPSULE | Refills: 0 | Status: SHIPPED
Start: 2020-01-28 | End: 2020-04-08 | Stop reason: SDUPTHER

## 2020-01-28 RX ORDER — CALCIUM POLYCARBOPHIL 625 MG
625 TABLET ORAL DAILY
Qty: 30 TABLET | Refills: 2 | Status: SHIPPED
Start: 2020-01-28 | End: 2020-09-15 | Stop reason: SDUPTHER

## 2020-01-28 RX ORDER — SERTRALINE HYDROCHLORIDE 100 MG/1
TABLET, FILM COATED ORAL
Qty: 135 TABLET | Refills: 1 | Status: SHIPPED
Start: 2020-01-28 | End: 2020-04-30

## 2020-01-28 RX ORDER — LISINOPRIL 10 MG/1
20 TABLET ORAL DAILY
Qty: 60 TABLET | Refills: 2 | Status: SHIPPED
Start: 2020-01-28 | End: 2020-03-20

## 2020-01-28 RX ORDER — ATORVASTATIN CALCIUM 10 MG/1
10 TABLET, FILM COATED ORAL DAILY
Qty: 90 TABLET | Refills: 1 | Status: SHIPPED
Start: 2020-01-28 | End: 2020-04-30

## 2020-01-28 RX ORDER — ROPINIROLE 0.25 MG/1
0.25 TABLET, FILM COATED ORAL 3 TIMES DAILY
Qty: 270 TABLET | Refills: 1 | Status: SHIPPED
Start: 2020-01-28 | End: 2020-04-30

## 2020-01-28 RX ORDER — VARENICLINE TARTRATE 1 MG/1
1 TABLET, FILM COATED ORAL 2 TIMES DAILY
Qty: 28 TABLET | Status: CANCELLED | OUTPATIENT
Start: 2020-01-28

## 2020-01-28 ASSESSMENT — ENCOUNTER SYMPTOMS
COUGH: 1
CONSTIPATION: 0
EYE PAIN: 0
DIARRHEA: 0
SORE THROAT: 0
VOMITING: 0
SINUS PAIN: 0
NAUSEA: 0
ABDOMINAL PAIN: 0
SHORTNESS OF BREATH: 1

## 2020-01-28 NOTE — PROGRESS NOTES
[facility]    Attending Physician Statement:  John Mccord M.D., F.A.C.P. I have discussed the case, including pertinent history and exam findings with the resident. I have seen and examined the patient and the key elements of the encounter have been performed by me. I agree with the assessment, plan and orders as documented by the resident. Patient is seen for fu visit today. Last office notes reviewed, relative labs and imaging. Aortic stenosis- valve replaced  -- now new OTT/lightheadness  Looking for recurrent aortic stenosis    requip helpful for restless legs  Sleep test negative - sinus adele in 40s acceptable    BP (!) 145/67   Pulse 77   Temp 98.9 °F (37.2 °C) (Oral)   Resp 18   Ht 5' 5\" (1.651 m)   Wt 174 lb (78.9 kg)   BMI 28.96 kg/m²   bp still high, plan to inc ACE  heatlh maintenance addressed  Remainder of medical problems as per resident note.

## 2020-01-28 NOTE — PROGRESS NOTES
Patient verbalized understanding of office instructions. She will call with questions or concerns. She was given discharge instructions, and script for lab work to be done. All questions were fully answered.    Instructed to stop a  for printed AVS

## 2020-01-28 NOTE — PROGRESS NOTES
Azelastine HCl 137 MCG/SPRAY SOLN 2 sprays by Nasal route daily 30 mL 1    warfarin (COUMADIN) 5 MG tablet take 1 tablet by mouth once daily 30 tablet 2    warfarin (COUMADIN) 6 MG tablet Take 1 tablet by mouth daily 30 tablet 1    tiotropium (SPIRIVA RESPIMAT) 2.5 MCG/ACT AERS inhaler INHALE 2 PUFFS BY MOUTH DAILY. PAP Medication 3 Inhaler 3    VENTOLIN  (90 Base) MCG/ACT inhaler inhale 1 puff by mouth every 4 hours if needed for wheezing 18 g 2    DULERA 100-5 MCG/ACT inhaler inhale 2 puffs by mouth twice a day 13 g 3    vitamin C (ASCORBIC ACID) 500 MG tablet Take 1,000 mg by mouth daily      niacin 500 MG tablet Take 2 tablets by mouth 2 times daily (with meals) (Patient taking differently: Take 1,000 mg by mouth 2 times daily (with meals) Takes OTC only) 120 tablet 3    Blood Pressure Monitoring (BLOOD PRESSURE CUFF) MISC Dx:  Hypertension with labile blood pressure 1 each 0    Menthol, Topical Analgesic, 5 % GEL Apply 1 each topically 3 times daily as needed (neck pain) 113 g 2    zoster recombinant adjuvanted vaccine (SHINGRIX) 50 MCG/0.5ML SUSR injection 1 dose now and repeat in 2-6 months (Patient not taking: Reported on 9/20/2019) 0.5 mL 0     No current facility-administered medications on file prior to visit. OBJECTIVE:    VS: /63   Pulse 82   Temp 98.7 °F (37.1 °C) (Oral)   Resp 16   Ht 5' 5\" (1.651 m)   Wt 174 lb (78.9 kg)   BMI 28.96 kg/m²    Physical Exam  Vitals signs and nursing note reviewed. Constitutional:       General: She is not in acute distress. Appearance: She is well-developed. She is not diaphoretic. HENT:      Head: Normocephalic and atraumatic. Eyes:      Conjunctiva/sclera: Conjunctivae normal.   Neck:      Musculoskeletal: Normal range of motion and neck supple. Cardiovascular:      Rate and Rhythm: Normal rate and regular rhythm. Heart sounds: Normal heart sounds. No murmur. No friction rub.    Pulmonary:      Effort: Pulmonary send a notes to Wes Moura         -   Patient will need to make an apt with   to further discuss other options. , patients has not seen him        -   Urologist [facility] HELEN Moura  following       -   She brought a bedside commode, she does not have to use diaper any more, she is confortable.     Personal history of tobacco use  Encounter for screening for lung cancer  -     CT Lung Screening (Annual) 12/17/2018 showed pulmonary nodules on the right largest 10 mm  -     Repeat CT chest 03/2019 no lung nodules, only small scattered scars  -     Follows with Dr.Al Tobin  -     On Chantix, mouth ulcer resolved  -      Quit smoking since 12/2019     Encephalomalacia in the right temporal lobe        -   MRI brain on 1/17/2019        -   Negative neurological exam        -   Hx of sculp fx in [facility]        -   Monitor for now     Insomnia, unspecified type  -     traZODone (DESYREL) 50 MG tablet; Take 1 tablet by mouth nightly  -     Will tapering down on Trazodone from 100mg to 50 mg to 25 mg to off within 1-2 months due to multiple interactions with other medication  -     Stopped trazodone  -     Obtain Sleep study     Vitamin D deficiency  -     Calcium Carbonate-Vitamin D (OYSTER SHELL CALCIUM/D) 500-200 MG-UNIT TABS; take 1 tablet by mouth twice a day     Hyperlipidemia, unspecified hyperlipidemia type  -     atorvastatin (LIPITOR) 10 MG tablet; Take 1 tablet by mouth daily     COPD, mild (HCC)  -     Continue Dullera, albuterol, tiotropium  -     Following Dr.Al Tobin     Essential hypertension-controlled  -     lisinopril (PRINIVIL;ZESTRIL) 10 MG tablet; take 1 tablet by mouth daily     Depression, unspecified depression type  -     sertraline (ZOLOFT) 100 MG tablet; take 1 and 1/2 tablets by mouth once daily     GERD  -     omeprazole (PRILOSEC) 40 MG delayed release capsule;  Take 1 capsule by mouth every morning (before breakfast)     Restless leg syndrome  -     rOPINIRole (REQUIP) 0.25 MG tablet; take 1 tablet

## 2020-01-30 ENCOUNTER — HOSPITAL ENCOUNTER (OUTPATIENT)
Dept: PULMONOLOGY | Age: 60
Setting detail: THERAPIES SERIES
End: 2020-01-30
Payer: MEDICARE

## 2020-01-30 ENCOUNTER — TELEPHONE (OUTPATIENT)
Dept: INTERNAL MEDICINE | Age: 60
End: 2020-01-30

## 2020-01-30 NOTE — TELEPHONE ENCOUNTER
Spoke with patient, she has been vomiting up acid & can't eat also having Diarrhea & Dry Heaves. Patient was taken off of Prevacid on 1-. Patient states she gave in and took 1 of the Prevacid. She would like to know what should she do now?  Please Advise

## 2020-01-30 NOTE — TELEPHONE ENCOUNTER
Called patient to discuss concerns regarding PPI use. Notes significant nausea/upset stomach and retook Prilosec today. States already feeling improved. FYI to provider to discuss with patient regarding long-term goals of PPI use.

## 2020-01-30 NOTE — TELEPHONE ENCOUNTER
Patient LM at 11:44. She was here the other day and was told to stop taking Prevacid. She has not taken it for two days, she now has dry heaves and diarrhea. Could it be from that and should she start taking it again?

## 2020-02-04 ENCOUNTER — TELEPHONE (OUTPATIENT)
Dept: CARDIAC REHAB | Age: 60
End: 2020-02-04

## 2020-02-04 NOTE — TELEPHONE ENCOUNTER
2/4/2020 9:57 am Nutrition: Spoke with patient on this date confirming appointment scheduled for 2/11/2020 at 9:00 am. Will remain available.  Electronically signed by Jesus Torres RD, LD on 2/4/20 at 9:57 AM

## 2020-02-11 ENCOUNTER — TELEPHONE (OUTPATIENT)
Dept: PULMONOLOGY | Age: 60
End: 2020-02-11

## 2020-02-12 ENCOUNTER — HOSPITAL ENCOUNTER (OUTPATIENT)
Dept: CT IMAGING | Age: 60
Discharge: HOME | End: 2020-02-14
Payer: MEDICARE

## 2020-02-12 PROCEDURE — 71250 CT THORAX DX C-: CPT

## 2020-02-18 ENCOUNTER — HOSPITAL ENCOUNTER (OUTPATIENT)
Dept: NON INVASIVE DIAGNOSTICS | Age: 60
Discharge: HOME | End: 2020-02-18
Payer: MEDICARE

## 2020-02-18 ENCOUNTER — TELEPHONE (OUTPATIENT)
Dept: PULMONOLOGY | Age: 60
End: 2020-02-18

## 2020-02-18 ENCOUNTER — HOSPITAL ENCOUNTER (OUTPATIENT)
Dept: PULMONOLOGY | Age: 60
Setting detail: THERAPIES SERIES
Discharge: HOME | End: 2020-02-18
Payer: MEDICARE

## 2020-02-18 LAB
LV EF: 58 %
LVEF MODALITY: NORMAL

## 2020-02-18 PROCEDURE — 93306 TTE W/DOPPLER COMPLETE: CPT

## 2020-02-20 ENCOUNTER — NURSE ONLY (OUTPATIENT)
Dept: INTERNAL MEDICINE | Age: 60
End: 2020-02-20
Payer: MEDICARE

## 2020-02-20 LAB
INTERNATIONAL NORMALIZATION RATIO, POC: 34
PROTHROMBIN TIME, POC: 2.8

## 2020-02-20 PROCEDURE — 85610 PROTHROMBIN TIME: CPT | Performed by: INTERNAL MEDICINE

## 2020-02-20 PROCEDURE — 93793 ANTICOAG MGMT PT WARFARIN: CPT | Performed by: INTERNAL MEDICINE

## 2020-02-21 ENCOUNTER — OFFICE VISIT (OUTPATIENT)
Dept: PULMONOLOGY | Age: 60
End: 2020-02-21
Payer: MEDICARE

## 2020-02-21 VITALS
HEIGHT: 65 IN | OXYGEN SATURATION: 94 % | WEIGHT: 177 LBS | SYSTOLIC BLOOD PRESSURE: 155 MMHG | RESPIRATION RATE: 18 BRPM | DIASTOLIC BLOOD PRESSURE: 72 MMHG | HEART RATE: 85 BPM | BODY MASS INDEX: 29.49 KG/M2

## 2020-02-21 PROCEDURE — G8427 DOCREV CUR MEDS BY ELIG CLIN: HCPCS | Performed by: INTERNAL MEDICINE

## 2020-02-21 PROCEDURE — 4004F PT TOBACCO SCREEN RCVD TLK: CPT | Performed by: INTERNAL MEDICINE

## 2020-02-21 PROCEDURE — G8419 CALC BMI OUT NRM PARAM NOF/U: HCPCS | Performed by: INTERNAL MEDICINE

## 2020-02-21 PROCEDURE — G8482 FLU IMMUNIZE ORDER/ADMIN: HCPCS | Performed by: INTERNAL MEDICINE

## 2020-02-21 PROCEDURE — 3023F SPIROM DOC REV: CPT | Performed by: INTERNAL MEDICINE

## 2020-02-21 PROCEDURE — 99214 OFFICE O/P EST MOD 30 MIN: CPT | Performed by: INTERNAL MEDICINE

## 2020-02-21 PROCEDURE — 99213 OFFICE O/P EST LOW 20 MIN: CPT | Performed by: INTERNAL MEDICINE

## 2020-02-21 PROCEDURE — 3017F COLORECTAL CA SCREEN DOC REV: CPT | Performed by: INTERNAL MEDICINE

## 2020-02-21 PROCEDURE — G8926 SPIRO NO PERF OR DOC: HCPCS | Performed by: INTERNAL MEDICINE

## 2020-02-21 RX ORDER — VARENICLINE TARTRATE 25 MG
KIT ORAL
Qty: 1 BOX | Refills: 0 | Status: ON HOLD
Start: 2020-02-21 | End: 2020-09-08

## 2020-02-21 RX ORDER — ALBUTEROL SULFATE 2.5 MG/3ML
2.5 SOLUTION RESPIRATORY (INHALATION) EVERY 6 HOURS PRN
Qty: 120 EACH | Refills: 5 | Status: ON HOLD
Start: 2020-02-21 | End: 2020-09-08

## 2020-02-21 NOTE — PROGRESS NOTES
PAST MEDICAL HISTORY:       Diagnosis Date    Anticoagulant long-term use     Anxiety     Asthma     CAD (coronary artery disease)     Chronic back pain     [facility] obstructive pulmonary disease[facility] ([facility])     Depression     GERD (gastroesophageal reflux disease)     Headache     Hyperlipidemia     Hypertension     Restless legs syndrome     Urinary incontinence        MEDICATIONS:   Current Outpatient Medications   Medication Sig Dispense Refill    warfarin (COUMADIN) 6 MG tablet take 1 tablet by mouth once daily 30 tablet 0    montelukast (SINGULAIR) 10 MG tablet take 1 tablet by mouth at bedtime 90 tablet 3    tiotropium (SPIRIVA RESPIMAT) 2.5 MCG/ACT AERS inhaler INHALE 2 PUFFS BY MOUTH DAILY.  PAP Medication 3 Inhaler 3    VENTOLIN  (90 Base) MCG/ACT inhaler inhale 1 puff by mouth every 4 hours if needed for wheezing 18 g 2    DULERA 100-5 MCG/ACT inhaler inhale 2 puffs by mouth twice a day 13 g 3    Calcium Polycarbophil (FIBERCON PO) Take 2 tablets by mouth every morning (before breakfast)      atorvastatin (LIPITOR) 10 MG tablet Take 1 tablet by mouth daily 90 tablet 1    sertraline (ZOLOFT) 100 MG tablet take 1 and 1/2 tablets by mouth once daily 135 tablet 1    omeprazole (PRILOSEC) 40 MG delayed release capsule Take 1 capsule by mouth every morning (before breakfast) 90 capsule 0    rOPINIRole (REQUIP) 0.25 MG tablet Take 1 tablet by mouth 3 times daily One tablet tid 270 tablet 1    lisinopril (PRINIVIL;ZESTRIL) 10 MG tablet Take 2 tablets by mouth daily 60 tablet 2    Calcium Polycarbophil (FIBER) 625 MG TABS Take 1 tablet by mouth daily 30 tablet 2    Calcium Carbonate-Vitamin D (OYSTER SHELL CALCIUM/D) 500-200 MG-UNIT TABS take 1 tablet by mouth twice a day 180 tablet 2    varenicline (CHANTIX) 1 MG tablet Take 1 tablet by mouth 2 times daily Indications: Smoking Cessation Program weeks 5-6 28 tablet 0    Blood Pressure Monitoring (BLOOD PRESSURE CUFF) MISC swelling  Neurological:no , syncope. Denies twitching, convulsions, loss of consciousness or memory. Endocrine:  . No history of goiter, exophthalmos or dryness of skin. The patient has no history of diabetes. Hematopoietic:  No history of bleeding disorders or easy bruising. Rheumatic:  No connective tissue disease history or polyarthritis/inflammatory joint disease. PHYSICAL EXAMINATION:  Vitals:    02/21/20 0941   BP: (!) 155/72   Pulse: 85   Resp: 18   SpO2: 94%     Constitutional: This is a well developed, well nourished 60y.o. year old female who is alert, oriented, coopative and in no apparent distress. Head was normocephalic and atraumatic. EENT: Mallampati class :  Extraocular muscles intact. External canals are patent and no discharge was appreciated. Septum was midline,   mucosa was without erythema, exudates or cobblestoning. No thrush was noted. Neck: Supple without thyromegaly. No elevated JVP. Trachea was midline. No carotid bruits were auscultated. Respiratory:decrease breath sound ,Rhonchi bilateral     Cardiovascular: Regular without murmur, clicks, gallops or rubs. There is no left or right ventricular heave. Pulses:  Carotid, radial and femoral pulses were equally bilaterally. Abdomen: Slightly rounded and soft without organomegaly. No rebound, rigidity or guarding was appreciated. Lymphatic: No lymphadenopathy. Musculoskeletal: normal range of motion     Extremities: no edema ,or cyanosis   Skin:  Warm and dry. Good color, turgor and pigmentation. No lesions or scars. Neurological/Psychiatric: The patient's general behavior, level of consciousness, thought content and emotional status is normal.  Cranial nerves II-XII are intact.       DATA:   FVC 2.7 which is 69 of predicted    FEV1:0.75 which is 28 of predicted  FEV1/FVC 53% which is 41 of predicted  There Was significant bronchodilator response  MVV 37 which is not 39 of predicted    ERV 0.19 which

## 2020-02-21 NOTE — LETTER
[facility] and [address]  Phone: [phone]  Fax: [phone]    Sai Redding MD        February 21, 2020     Patient: Zack Thrasher   YOB: 1960   Date of Visit: 2/21/2020       To Whom It May Concern: It is my medical opinion that Hernandez Larose requires a disability parking placard for the following reasons:COPD  Duration of need: 5 years    If you have any questions or concerns, please don't hesitate to call.     Sincerely,        Sai Redding MD

## 2020-02-24 ENCOUNTER — TELEPHONE (OUTPATIENT)
Dept: PULMONOLOGY | Age: 60
End: 2020-02-24

## 2020-02-28 ENCOUNTER — TELEPHONE (OUTPATIENT)
Dept: INTERNAL MEDICINE | Age: 60
End: 2020-02-28

## 2020-02-28 RX ORDER — OSELTAMIVIR PHOSPHATE 75 MG/1
75 CAPSULE ORAL DAILY
Qty: 10 CAPSULE | Refills: 0 | Status: SHIPPED | OUTPATIENT
Start: 2020-02-28 | End: 2020-03-09

## 2020-02-28 NOTE — TELEPHONE ENCOUNTER
Dr Luis Flower notified. Last note reviewed as well as pt's last office visit with Pulm. New orders received. Spoke with patient via phone and pt was notified of same.

## 2020-03-09 ENCOUNTER — TELEPHONE (OUTPATIENT)
Dept: PULMONOLOGY | Age: 60
End: 2020-03-09

## 2020-03-09 RX ORDER — OMEPRAZOLE 40 MG/1
CAPSULE, DELAYED RELEASE ORAL
Qty: 90 CAPSULE | Refills: 0 | OUTPATIENT
Start: 2020-03-09

## 2020-03-20 ENCOUNTER — TELEPHONE (OUTPATIENT)
Dept: PULMONOLOGY | Age: 60
End: 2020-03-20

## 2020-03-20 RX ORDER — LISINOPRIL 10 MG/1
TABLET ORAL
Qty: 90 TABLET | Refills: 1 | Status: SHIPPED
Start: 2020-03-20 | End: 2020-09-15 | Stop reason: SDUPTHER

## 2020-03-24 NOTE — PROGRESS NOTES
 vitamin C (ASCORBIC ACID) 500 MG tablet Take 1,000 mg by mouth daily      niacin 500 MG tablet Take 2 tablets by mouth 2 times daily (with meals) (Patient taking differently: Take 1,000 mg by mouth 2 times daily (with meals) Takes OTC only) 120 tablet 3     No current facility-administered medications for this visit.           Allergies as of 2020 - Review Complete 2020   Allergen Reaction Noted    Keflex [cephalexin] Itching 2016    Phenergan [promethazine hcl] Other (See Comments) 2016       Social History     Socioeconomic History    Marital status: Single     Spouse name: Not on file    Number of children: Not on file    Years of education: Not on file    Highest education level: Not on file   Occupational History    Not on file   Social Needs    Financial resource strain: Not on file    Food insecurity     Worry: Not on file     Inability: Not on file   [employer] needs     Medical: Not on file     Non-medical: Not on file   Tobacco Use    Smoking status: Current Every Day Smoker     Packs/day: 0.10     Years: 45.00     Pack years: 4.50     Types: Cigarettes     Last attempt to quit: 2019     Years since quittin.2    Smokeless tobacco: Never Used    Tobacco comment: started smoking 4-5/ day   Substance and Sexual Activity    Alcohol use: Not Currently     Frequency: Monthly or less     Comment: 2 cups of coffee a day REgula and Decaf    Drug use: No    Sexual activity: Not Currently   Lifestyle    Physical activity     Days per week: Not on file     Minutes per session: Not on file    Stress: Not on file   Relationships    Social connections     Talks on phone: Not on file     Gets together: Not on file     Attends Gnosticism service: Not on file     Active member of club or organization: Not on file     Attends meetings of clubs or organizations: Not on file     Relationship status: Not on file    Intimate partner violence     Fear of current or ex partner: Not on file     Emotionally abused: Not on file     Physically abused: Not on file     Forced sexual activity: Not on file   Other Topics Concern    Not on file   Social History Narrative    Not on file       Family History   Problem Relation Age of Onset    Heart Disease Mother     Arthritis Mother     Asthma Mother     Diabetes Mother     High Blood Pressure Mother     High Cholesterol Mother     Stroke Mother     Heart Disease Father     Arthritis Father     Asthma Father     Diabetes Father     High Blood Pressure Father     High Cholesterol Father     Breast Cancer Sister     Asthma Brother     Diabetes Maternal Aunt     Diabetes Paternal Aunt     High Blood Pressure Paternal Aunt     Arthritis Paternal Grandmother     Asthma Paternal Grandmother        REVIEW OF SYSTEMS:     CONSTITUTIONAL:  negative for  fevers, chills, sweats, + fatigue  EYES:  negative for  double vision, blurred vision and blind spots  HEENT:  negative for  tinnitus, earaches, nasal congestion and epistaxis  RESPIRATORY: Occasional dry cough, cough with sputum,wheezing, negative for hemoptysis  CARDIOVASCULAR: as per HPI  GASTROINTESTINAL:  negative for nausea, vomiting, diarrhea, constipation, pruritus and jaundice  GENITOURINARY:  negative for frequency, dysuria, nocturia, urinary incontinence and hesitancy  HEMATOLOGIC/LYMPHATIC:  negative for easy bruising, bleeding, lymphadenopathy and petechiae  ALLERGIC/IMMUNOLOGIC:  negative for urticaria, hay fever and angioedema  ENDOCRINE:  negative for heat intolerance, cold intolerance, tremor, hair loss and diabetic symptoms including neither polyuria nor polydipsia nor blurred vision  MUSCULOSKELETAL:  negative for  myalgias, arthralgias, joint swelling, stiff joints and decreased range of motion  NEUROLOGICAL:  negative for memory problems, speech problems, visual disturbance, dysphagia, weakness and numbness      PHYSICAL EXAM:   CONSTITUTIONAL:  awake, accuracy; however, inadvertent computerized transcription errors may be present

## 2020-03-25 ENCOUNTER — NURSE ONLY (OUTPATIENT)
Dept: CARDIOLOGY CLINIC | Age: 60
End: 2020-03-25

## 2020-03-25 ENCOUNTER — NURSE ONLY (OUTPATIENT)
Dept: INTERNAL MEDICINE | Age: 60
End: 2020-03-25
Payer: MEDICARE

## 2020-03-25 ENCOUNTER — OFFICE VISIT (OUTPATIENT)
Dept: CARDIOLOGY CLINIC | Age: 60
End: 2020-03-25
Payer: MEDICARE

## 2020-03-25 VITALS
WEIGHT: 172 LBS | HEART RATE: 79 BPM | BODY MASS INDEX: 28.66 KG/M2 | HEIGHT: 65 IN | DIASTOLIC BLOOD PRESSURE: 84 MMHG | SYSTOLIC BLOOD PRESSURE: 128 MMHG

## 2020-03-25 LAB — INTERNATIONAL NORMALIZATION RATIO, POC: 3.5

## 2020-03-25 PROCEDURE — 93000 ELECTROCARDIOGRAM COMPLETE: CPT | Performed by: INTERNAL MEDICINE

## 2020-03-25 PROCEDURE — 99204 OFFICE O/P NEW MOD 45 MIN: CPT | Performed by: INTERNAL MEDICINE

## 2020-03-25 PROCEDURE — 85610 PROTHROMBIN TIME: CPT | Performed by: INTERNAL MEDICINE

## 2020-03-25 NOTE — PROGRESS NOTES
Patient in office for anti coag management, poct inr obtained, anti coag encounter created and routed to physician. Repeat INR 1 week. Patient advised, verbalized understanding.

## 2020-04-01 ENCOUNTER — NURSE ONLY (OUTPATIENT)
Dept: INTERNAL MEDICINE | Age: 60
End: 2020-04-01
Payer: MEDICARE

## 2020-04-01 LAB
INTERNATIONAL NORMALIZATION RATIO, POC: 2.6
PROTHROMBIN TIME, POC: 30.9

## 2020-04-01 PROCEDURE — 93793 ANTICOAG MGMT PT WARFARIN: CPT | Performed by: INTERNAL MEDICINE

## 2020-04-01 PROCEDURE — 85610 PROTHROMBIN TIME: CPT | Performed by: INTERNAL MEDICINE

## 2020-04-01 RX ORDER — WARFARIN SODIUM 6 MG/1
TABLET ORAL
Qty: 30 TABLET | Refills: 1 | Status: SHIPPED
Start: 2020-04-01 | End: 2020-06-01

## 2020-04-08 ENCOUNTER — TELEPHONE (OUTPATIENT)
Dept: INTERNAL MEDICINE | Age: 60
End: 2020-04-08

## 2020-04-08 RX ORDER — OMEPRAZOLE 40 MG/1
40 CAPSULE, DELAYED RELEASE ORAL
Qty: 90 CAPSULE | Refills: 0 | Status: SHIPPED
Start: 2020-04-08 | End: 2020-07-08

## 2020-04-08 NOTE — TELEPHONE ENCOUNTER
Pre-visit planning call to discuss patient care during COVID-19 pandemic. Discussed with the patient  / left message. Offered video and e-visits through [employer] to facilitate patient care continuity. Patient contacted and verified    No acute complaints   Appt on 4/10- patient would like to cancel and reschedule with PCP in 6-8 weeks   Planned INR repeat is on 4/29- not to have INR at upcoming appt   Denies any new symptoms   Refill for omeprazole sent to pharmacy    INR standing order to be sent to lab- to be completed AM on 4/29 and to call clinic with results     Last office visit date: 1/28 (PCP); 4/1- INR- at goal   Outstanding labs/imaging: NONE   Medication refill needs: YES- completed   MyChart activity: YES     Discussed that if the patient develops viral symptoms (fever, chills, body aches, soar throat, abdominal pain or diarrhea) OR has exposure to a sick contact they are to call the [facility] at [phone] for evaluation at one of three locations:    [facility] at [address]: [place], [phone]    [facility]: [address]: [address] on 4/10 with Dr. Pasquale Garcia- reschedule PCP appt in 6-8 weeks as available.      [facility]

## 2020-04-09 ENCOUNTER — TELEPHONE (OUTPATIENT)
Dept: CARDIOLOGY CLINIC | Age: 60
End: 2020-04-09

## 2020-04-09 RX ORDER — METOPROLOL SUCCINATE 25 MG/1
25 TABLET, EXTENDED RELEASE ORAL DAILY
Qty: 30 TABLET | Refills: 3 | Status: SHIPPED | OUTPATIENT
Start: 2020-04-09 | End: 2020-07-31 | Stop reason: SDUPTHER

## 2020-04-20 VITALS
DIASTOLIC BLOOD PRESSURE: 63 MMHG | HEART RATE: 82 BPM | HEIGHT: 65 IN | SYSTOLIC BLOOD PRESSURE: 158 MMHG | BODY MASS INDEX: 29.16 KG/M2 | WEIGHT: 175 LBS

## 2020-04-21 ENCOUNTER — VIRTUAL VISIT (OUTPATIENT)
Dept: CARDIOTHORACIC SURGERY | Age: 60
End: 2020-04-21
Payer: MEDICARE

## 2020-04-21 PROCEDURE — 4004F PT TOBACCO SCREEN RCVD TLK: CPT | Performed by: THORACIC SURGERY (CARDIOTHORACIC VASCULAR SURGERY)

## 2020-04-21 PROCEDURE — G8427 DOCREV CUR MEDS BY ELIG CLIN: HCPCS | Performed by: THORACIC SURGERY (CARDIOTHORACIC VASCULAR SURGERY)

## 2020-04-21 PROCEDURE — 99442 PR PHYS/QHP TELEPHONE EVALUATION 11-20 MIN: CPT | Performed by: THORACIC SURGERY (CARDIOTHORACIC VASCULAR SURGERY)

## 2020-04-21 PROCEDURE — 3017F COLORECTAL CA SCREEN DOC REV: CPT | Performed by: THORACIC SURGERY (CARDIOTHORACIC VASCULAR SURGERY)

## 2020-04-21 PROCEDURE — G8419 CALC BMI OUT NRM PARAM NOF/U: HCPCS | Performed by: THORACIC SURGERY (CARDIOTHORACIC VASCULAR SURGERY)

## 2020-04-21 ASSESSMENT — ENCOUNTER SYMPTOMS
CHEST TIGHTNESS: 0
SHORTNESS OF BREATH: 1
BACK PAIN: 0
COUGH: 1

## 2020-04-21 NOTE — PROGRESS NOTES
Subjective:      Patient ID: Lala Richards is a 60 y.o. female. Chief Complaint   Patient presents with    Consultation     Referral dr Martin Brunson       HPI   Ms. Lakesha Quinn is a 54-year-old female who was evaluated via telephone to the COVID-19 pandemic. Her past medical history includes aortic valve replacement in 2009. She is referred by Dr. Martin Brunson for evaluation of an ascending thoracic aneurysm. She recently underwent a CT scan for lung screening for lung cancer with pulmonary, which incidentally revealed an ascending aortic aneurysm measured at 4.5cm per the CT scan. Review of Systems   Respiratory: Positive for cough and shortness of breath. Negative for chest tightness. Cardiovascular: Negative for chest pain. Musculoskeletal: Negative for back pain. Neurological: Negative for dizziness, syncope and light-headedness. Objective:   Physical Exam  Deferred due to telephone visit. Assessment:      Asc AA, history of AVR      Plan:      She likely had a bicuspid valve. Her CT in 2016 looks about the same. CT in 2 years. Lala Richards is a 60 y.o. female evaluated via telephone on 4/21/2020. Consent:  She and/or health care decision maker is aware that that she may receive a bill for this telephone service, depending on her insurance coverage, and has provided verbal consent to proceed: Yes      Documentation:  I communicated with the patient and/or health care decision maker about ascending aortic aneurysm. Details of this discussion including any medical advice provided: as above      I affirm this is a Patient Initiated Episode with an Established Patient who has not had a related appointment within my department in the past 7 days or scheduled within the next 24 hours.     Total Time: minutes: 11-20 minutes    Note: not billable if this call serves to triage the patient into an appointment for the relevant concern      Sharath Marks

## 2020-04-27 RX ORDER — OMEPRAZOLE 40 MG/1
CAPSULE, DELAYED RELEASE ORAL
Qty: 90 CAPSULE | Refills: 0 | OUTPATIENT
Start: 2020-04-27

## 2020-05-01 ENCOUNTER — HOSPITAL ENCOUNTER (OUTPATIENT)
Age: 60
Discharge: HOME | End: 2020-05-01
Payer: MEDICARE

## 2020-05-01 ENCOUNTER — ANTI-COAG VISIT (OUTPATIENT)
Dept: INTERNAL MEDICINE | Age: 60
End: 2020-05-01
Payer: MEDICARE

## 2020-05-01 LAB
INR BLD: 2.7
PROTHROMBIN TIME: 31.3 SEC (ref 9.3–12.4)

## 2020-05-01 PROCEDURE — 93793 ANTICOAG MGMT PT WARFARIN: CPT | Performed by: INTERNAL MEDICINE

## 2020-05-01 PROCEDURE — 85610 PROTHROMBIN TIME: CPT

## 2020-05-01 PROCEDURE — 36415 COLL VENOUS BLD VENIPUNCTURE: CPT

## 2020-05-01 RX ORDER — ROPINIROLE 0.25 MG/1
TABLET, FILM COATED ORAL
Qty: 270 TABLET | Refills: 0 | Status: SHIPPED
Start: 2020-05-01 | End: 2020-09-15 | Stop reason: SDUPTHER

## 2020-05-01 RX ORDER — ATORVASTATIN CALCIUM 10 MG/1
TABLET, FILM COATED ORAL
Qty: 90 TABLET | Refills: 0 | Status: SHIPPED
Start: 2020-05-01 | End: 2020-09-15 | Stop reason: SDUPTHER

## 2020-05-01 RX ORDER — SERTRALINE HYDROCHLORIDE 100 MG/1
TABLET, FILM COATED ORAL
Qty: 135 TABLET | Refills: 0 | Status: SHIPPED
Start: 2020-05-01 | End: 2020-09-15 | Stop reason: SDUPTHER

## 2020-05-01 NOTE — TELEPHONE ENCOUNTER
Called patient, needs INR done today. Standing order placed and faxed to lab.     Electronically signed by Medhat Nguyen DO on 5/1/2020 at 10:39 AM

## 2020-05-01 NOTE — PATIENT INSTRUCTIONS
Continue Coumadin 6 mg daily and repeat INR in 1 month, June 1, 2020. If you go to the lab, please call [phone] and ask to speak with the nurse doing INR that day. Please have done in AM so that we can make sure and have the results available by the end of the day.

## 2020-06-01 ENCOUNTER — TELEPHONE (OUTPATIENT)
Dept: INTERNAL MEDICINE | Age: 60
End: 2020-06-01

## 2020-06-01 RX ORDER — WARFARIN SODIUM 6 MG/1
TABLET ORAL
Qty: 30 TABLET | Refills: 0 | Status: SHIPPED
Start: 2020-06-01 | End: 2020-08-10 | Stop reason: SDUPTHER

## 2020-06-01 NOTE — TELEPHONE ENCOUNTER
Pt due for INR today 6/1/2020. Voicemail message left for patient to remind of need to have INR done today or tomorrow.

## 2020-06-03 ENCOUNTER — HOSPITAL ENCOUNTER (OUTPATIENT)
Age: 60
Discharge: HOME | End: 2020-06-03
Payer: MEDICARE

## 2020-06-03 ENCOUNTER — ANTI-COAG VISIT (OUTPATIENT)
Dept: INTERNAL MEDICINE | Age: 60
End: 2020-06-03

## 2020-06-03 ENCOUNTER — ANTI-COAG VISIT (OUTPATIENT)
Dept: INTERNAL MEDICINE | Age: 60
End: 2020-06-03
Payer: MEDICARE

## 2020-06-03 LAB
INR BLD: 3.7
PROTHROMBIN TIME: 42.9 SEC (ref 9.3–12.4)

## 2020-06-03 PROCEDURE — 93793 ANTICOAG MGMT PT WARFARIN: CPT | Performed by: INTERNAL MEDICINE

## 2020-06-03 PROCEDURE — 36415 COLL VENOUS BLD VENIPUNCTURE: CPT

## 2020-06-03 PROCEDURE — 85610 PROTHROMBIN TIME: CPT

## 2020-06-03 NOTE — PROGRESS NOTES
Coumadin instructions reviewed with patient by phone. AVS mailed. Pt verbalized understanding if any worsening of eye or return of head pain go to ER.

## 2020-06-03 NOTE — PATIENT INSTRUCTIONS
Will reduce coumadin to 5 mg on M/W/F/Sunday and 6 mg all other days (Tues/Thursday/Saturday). Repeat INR 1 week.      If any further or worsening of blood shot eye or further head pain go to ER for evaluation

## 2020-06-10 ENCOUNTER — HOSPITAL ENCOUNTER (OUTPATIENT)
Age: 60
Discharge: HOME | End: 2020-06-10
Payer: MEDICARE

## 2020-06-10 ENCOUNTER — ANTI-COAG VISIT (OUTPATIENT)
Dept: INTERNAL MEDICINE | Age: 60
End: 2020-06-10

## 2020-06-10 LAB
INR BLD: 2.5
PROTHROMBIN TIME: 28.8 SEC (ref 9.3–12.4)

## 2020-06-10 PROCEDURE — 85610 PROTHROMBIN TIME: CPT

## 2020-06-10 PROCEDURE — 36415 COLL VENOUS BLD VENIPUNCTURE: CPT

## 2020-06-10 NOTE — PATIENT INSTRUCTIONS
Continue coumadin 5 mg on M/W/F/Sunday and 6 mg all other days (Tues/Thursday/Saturday).  Repeat INR  1 month

## 2020-07-08 ENCOUNTER — ANTI-COAG VISIT (OUTPATIENT)
Dept: INTERNAL MEDICINE | Age: 60
End: 2020-07-08
Payer: MEDICARE

## 2020-07-08 ENCOUNTER — HOSPITAL ENCOUNTER (OUTPATIENT)
Age: 60
Discharge: HOME | End: 2020-07-08
Payer: MEDICARE

## 2020-07-08 LAB
INR BLD: 2.3
PROTHROMBIN TIME: 26.7 SEC (ref 9.3–12.4)

## 2020-07-08 PROCEDURE — 85610 PROTHROMBIN TIME: CPT

## 2020-07-08 PROCEDURE — 36415 COLL VENOUS BLD VENIPUNCTURE: CPT

## 2020-07-08 PROCEDURE — 93793 ANTICOAG MGMT PT WARFARIN: CPT | Performed by: INTERNAL MEDICINE

## 2020-07-08 RX ORDER — OMEPRAZOLE 40 MG/1
CAPSULE, DELAYED RELEASE ORAL
Qty: 90 CAPSULE | Refills: 0 | Status: SHIPPED
Start: 2020-07-08 | End: 2020-09-15 | Stop reason: SDUPTHER

## 2020-07-08 NOTE — TELEPHONE ENCOUNTER
Last Appointment:  4-10-20  Future Appointments   Date Time Provider Esmer Lee   9/4/2020 10:00 AM Preeti Chandler MD [facility]

## 2020-07-31 RX ORDER — METOPROLOL SUCCINATE 25 MG/1
25 TABLET, EXTENDED RELEASE ORAL DAILY
Qty: 90 TABLET | Refills: 0 | Status: SHIPPED
Start: 2020-07-31 | End: 2020-09-15 | Stop reason: SDUPTHER

## 2020-08-10 ENCOUNTER — NURSE ONLY (OUTPATIENT)
Dept: INTERNAL MEDICINE | Age: 60
End: 2020-08-10
Payer: MEDICARE

## 2020-08-10 VITALS — TEMPERATURE: 97.3 F

## 2020-08-10 LAB — INTERNATIONAL NORMALIZATION RATIO, POC: 3

## 2020-08-10 PROCEDURE — 85610 PROTHROMBIN TIME: CPT | Performed by: INTERNAL MEDICINE

## 2020-08-10 RX ORDER — WARFARIN SODIUM 6 MG/1
TABLET ORAL
Qty: 30 TABLET | Refills: 1 | Status: SHIPPED
Start: 2020-08-10 | End: 2020-10-19

## 2020-08-10 NOTE — PROGRESS NOTES
Patient in office for anti coag management, poct inr obtained, anti coag encounter created and routed to physician.  Repeat INR 1 month f/u

## 2020-09-08 ENCOUNTER — APPOINTMENT (OUTPATIENT)
Dept: CT IMAGING | Age: 60
DRG: 262 | End: 2020-09-08
Payer: MEDICARE

## 2020-09-08 ENCOUNTER — TELEPHONE (OUTPATIENT)
Dept: INTERNAL MEDICINE CLINIC | Age: 60
End: 2020-09-08

## 2020-09-08 ENCOUNTER — APPOINTMENT (OUTPATIENT)
Dept: GENERAL RADIOLOGY | Age: 60
DRG: 262 | End: 2020-09-08
Payer: MEDICARE

## 2020-09-08 ENCOUNTER — HOSPITAL ENCOUNTER (INPATIENT)
Age: 60
LOS: 2 days | Discharge: HOME | DRG: 262 | End: 2020-09-12
Attending: EMERGENCY MEDICINE | Admitting: INTERNAL MEDICINE
Payer: MEDICARE

## 2020-09-08 PROBLEM — R55 SYNCOPE AND COLLAPSE: Status: ACTIVE | Noted: 2020-09-08

## 2020-09-08 LAB
ALBUMIN SERPL-MCNC: 4 G/DL (ref 3.5–5.2)
ALP BLD-CCNC: 73 U/L (ref 35–104)
ALT SERPL-CCNC: 22 U/L (ref 0–32)
ANION GAP SERPL CALCULATED.3IONS-SCNC: 10 MMOL/L (ref 7–16)
APTT: 38 SEC (ref 24.5–35.1)
AST SERPL-CCNC: 24 U/L (ref 0–31)
BACTERIA: ABNORMAL /HPF
BASOPHILS ABSOLUTE: 0.05 E9/L (ref 0–0.2)
BASOPHILS RELATIVE PERCENT: 0.6 % (ref 0–2)
BILIRUB SERPL-MCNC: 0.2 MG/DL (ref 0–1.2)
BILIRUBIN URINE: NEGATIVE
BLOOD, URINE: ABNORMAL
BUN BLDV-MCNC: 13 MG/DL (ref 8–23)
CALCIUM SERPL-MCNC: 9 MG/DL (ref 8.6–10.2)
CHLORIDE BLD-SCNC: 100 MMOL/L (ref 98–107)
CLARITY: CLEAR
CO2: 26 MMOL/L (ref 22–29)
COLOR: YELLOW
CREAT SERPL-MCNC: 0.7 MG/DL (ref 0.5–1)
EOSINOPHILS ABSOLUTE: 0.13 E9/L (ref 0.05–0.5)
EOSINOPHILS RELATIVE PERCENT: 1.5 % (ref 0–6)
GFR AFRICAN AMERICAN: >60
GFR NON-AFRICAN AMERICAN: >60 ML/MIN/1.73
GLUCOSE BLD-MCNC: 97 MG/DL (ref 74–99)
GLUCOSE URINE: NEGATIVE MG/DL
HCT VFR BLD CALC: 36.6 % (ref 34–48)
HEMOGLOBIN: 12.8 G/DL (ref 11.5–15.5)
IMMATURE GRANULOCYTES #: 0.05 E9/L
IMMATURE GRANULOCYTES %: 0.6 % (ref 0–5)
INR BLD: 2.8
KETONES, URINE: NEGATIVE MG/DL
LEUKOCYTE ESTERASE, URINE: ABNORMAL
LYMPHOCYTES ABSOLUTE: 2.77 E9/L (ref 1.5–4)
LYMPHOCYTES RELATIVE PERCENT: 31.6 % (ref 20–42)
MCH RBC QN AUTO: 33.7 PG (ref 26–35)
MCHC RBC AUTO-ENTMCNC: 35 % (ref 32–34.5)
MCV RBC AUTO: 96.3 FL (ref 80–99.9)
MONOCYTES ABSOLUTE: 0.67 E9/L (ref 0.1–0.95)
MONOCYTES RELATIVE PERCENT: 7.6 % (ref 2–12)
NEUTROPHILS ABSOLUTE: 5.09 E9/L (ref 1.8–7.3)
NEUTROPHILS RELATIVE PERCENT: 58.1 % (ref 43–80)
NITRITE, URINE: NEGATIVE
PDW BLD-RTO: 13 FL (ref 11.5–15)
PH UA: 6 (ref 5–9)
PLATELET # BLD: 231 E9/L (ref 130–450)
PMV BLD AUTO: 10.6 FL (ref 7–12)
POTASSIUM REFLEX MAGNESIUM: 4 MMOL/L (ref 3.5–5)
PRO-BNP: 373 PG/ML (ref 0–125)
PROTEIN UA: NEGATIVE MG/DL
PROTHROMBIN TIME: 32.1 SEC (ref 9.3–12.4)
RBC # BLD: 3.8 E12/L (ref 3.5–5.5)
RBC UA: ABNORMAL /HPF (ref 0–2)
SODIUM BLD-SCNC: 136 MMOL/L (ref 132–146)
SPECIFIC GRAVITY UA: 1.01 (ref 1–1.03)
TOTAL PROTEIN: 6.4 G/DL (ref 6.4–8.3)
TROPONIN: <0.01 NG/ML (ref 0–0.03)
UROBILINOGEN, URINE: 0.2 E.U./DL
WBC # BLD: 8.8 E9/L (ref 4.5–11.5)
WBC UA: ABNORMAL /HPF (ref 0–5)

## 2020-09-08 PROCEDURE — 84484 ASSAY OF TROPONIN QUANT: CPT

## 2020-09-08 PROCEDURE — 85025 COMPLETE CBC W/AUTO DIFF WBC: CPT

## 2020-09-08 PROCEDURE — 99283 EMERGENCY DEPT VISIT LOW MDM: CPT

## 2020-09-08 PROCEDURE — 80053 COMPREHEN METABOLIC PANEL: CPT

## 2020-09-08 PROCEDURE — 93005 ELECTROCARDIOGRAM TRACING: CPT | Performed by: NURSE PRACTITIONER

## 2020-09-08 PROCEDURE — G0378 HOSPITAL OBSERVATION PER HR: HCPCS

## 2020-09-08 PROCEDURE — 72125 CT NECK SPINE W/O DYE: CPT

## 2020-09-08 PROCEDURE — 70450 CT HEAD/BRAIN W/O DYE: CPT

## 2020-09-08 PROCEDURE — 85610 PROTHROMBIN TIME: CPT

## 2020-09-08 PROCEDURE — 71045 X-RAY EXAM CHEST 1 VIEW: CPT

## 2020-09-08 PROCEDURE — 85730 THROMBOPLASTIN TIME PARTIAL: CPT

## 2020-09-08 PROCEDURE — 83880 ASSAY OF NATRIURETIC PEPTIDE: CPT

## 2020-09-08 PROCEDURE — 2580000003 HC RX 258: Performed by: EMERGENCY MEDICINE

## 2020-09-08 PROCEDURE — 81001 URINALYSIS AUTO W/SCOPE: CPT

## 2020-09-08 RX ORDER — LISINOPRIL 10 MG/1
10 TABLET ORAL DAILY
Status: DISCONTINUED | OUTPATIENT
Start: 2020-09-09 | End: 2020-09-12 | Stop reason: HOSPADM

## 2020-09-08 RX ORDER — SODIUM CHLORIDE 9 MG/ML
INJECTION, SOLUTION INTRAVENOUS CONTINUOUS
Status: DISCONTINUED | OUTPATIENT
Start: 2020-09-08 | End: 2020-09-09

## 2020-09-08 RX ORDER — PANTOPRAZOLE SODIUM 40 MG/1
40 TABLET, DELAYED RELEASE ORAL DAILY
Status: DISCONTINUED | OUTPATIENT
Start: 2020-09-09 | End: 2020-09-12 | Stop reason: HOSPADM

## 2020-09-08 RX ORDER — MULTIVIT-MIN/IRON/FOLIC ACID/K 18-600-40
4000 CAPSULE ORAL DAILY
COMMUNITY
End: 2020-11-04 | Stop reason: ALTCHOICE

## 2020-09-08 RX ORDER — POLYETHYLENE GLYCOL 3350 17 G/17G
17 POWDER, FOR SOLUTION ORAL DAILY PRN
Status: DISCONTINUED | OUTPATIENT
Start: 2020-09-08 | End: 2020-09-12 | Stop reason: HOSPADM

## 2020-09-08 RX ORDER — SERTRALINE HYDROCHLORIDE 100 MG/1
150 TABLET, FILM COATED ORAL DAILY
Status: DISCONTINUED | OUTPATIENT
Start: 2020-09-09 | End: 2020-09-12 | Stop reason: HOSPADM

## 2020-09-08 RX ORDER — PROMETHAZINE HYDROCHLORIDE 25 MG/1
12.5 TABLET ORAL EVERY 6 HOURS PRN
Status: DISCONTINUED | OUTPATIENT
Start: 2020-09-08 | End: 2020-09-12 | Stop reason: HOSPADM

## 2020-09-08 RX ORDER — DEXTROSE MONOHYDRATE 25 G/50ML
12.5 INJECTION, SOLUTION INTRAVENOUS PRN
Status: DISCONTINUED | OUTPATIENT
Start: 2020-09-08 | End: 2020-09-12 | Stop reason: HOSPADM

## 2020-09-08 RX ORDER — ALBUTEROL SULFATE 2.5 MG/3ML
2.5 SOLUTION RESPIRATORY (INHALATION) EVERY 6 HOURS PRN
Status: DISCONTINUED | OUTPATIENT
Start: 2020-09-08 | End: 2020-09-12 | Stop reason: HOSPADM

## 2020-09-08 RX ORDER — ACETAMINOPHEN 325 MG/1
650 TABLET ORAL EVERY 6 HOURS PRN
Status: DISCONTINUED | OUTPATIENT
Start: 2020-09-08 | End: 2020-09-12 | Stop reason: HOSPADM

## 2020-09-08 RX ORDER — WARFARIN SODIUM 5 MG/1
5 TABLET ORAL DAILY
COMMUNITY
End: 2021-01-06 | Stop reason: SDUPTHER

## 2020-09-08 RX ORDER — DEXTROSE MONOHYDRATE 50 MG/ML
100 INJECTION, SOLUTION INTRAVENOUS PRN
Status: DISCONTINUED | OUTPATIENT
Start: 2020-09-08 | End: 2020-09-12 | Stop reason: HOSPADM

## 2020-09-08 RX ORDER — MONTELUKAST SODIUM 10 MG/1
10 TABLET ORAL NIGHTLY
Status: DISCONTINUED | OUTPATIENT
Start: 2020-09-09 | End: 2020-09-12 | Stop reason: HOSPADM

## 2020-09-08 RX ORDER — WARFARIN SODIUM 5 MG/1
5 TABLET ORAL DAILY
Status: DISCONTINUED | OUTPATIENT
Start: 2020-09-09 | End: 2020-09-09

## 2020-09-08 RX ORDER — SODIUM CHLORIDE 0.9 % (FLUSH) 0.9 %
10 SYRINGE (ML) INJECTION EVERY 12 HOURS SCHEDULED
Status: DISCONTINUED | OUTPATIENT
Start: 2020-09-09 | End: 2020-09-12 | Stop reason: HOSPADM

## 2020-09-08 RX ORDER — ARFORMOTEROL TARTRATE 15 UG/2ML
15 SOLUTION RESPIRATORY (INHALATION) 2 TIMES DAILY
Status: DISCONTINUED | OUTPATIENT
Start: 2020-09-09 | End: 2020-09-12 | Stop reason: HOSPADM

## 2020-09-08 RX ORDER — NICOTINE POLACRILEX 4 MG
15 LOZENGE BUCCAL PRN
Status: DISCONTINUED | OUTPATIENT
Start: 2020-09-08 | End: 2020-09-12 | Stop reason: HOSPADM

## 2020-09-08 RX ORDER — BUDESONIDE 0.25 MG/2ML
250 INHALANT ORAL 2 TIMES DAILY
Status: DISCONTINUED | OUTPATIENT
Start: 2020-09-09 | End: 2020-09-12 | Stop reason: HOSPADM

## 2020-09-08 RX ORDER — ATORVASTATIN CALCIUM 10 MG/1
10 TABLET, FILM COATED ORAL DAILY
Status: DISCONTINUED | OUTPATIENT
Start: 2020-09-09 | End: 2020-09-12 | Stop reason: HOSPADM

## 2020-09-08 RX ORDER — ACETAMINOPHEN 650 MG/1
650 SUPPOSITORY RECTAL EVERY 6 HOURS PRN
Status: DISCONTINUED | OUTPATIENT
Start: 2020-09-08 | End: 2020-09-12 | Stop reason: HOSPADM

## 2020-09-08 RX ORDER — ONDANSETRON 2 MG/ML
4 INJECTION INTRAMUSCULAR; INTRAVENOUS EVERY 6 HOURS PRN
Status: DISCONTINUED | OUTPATIENT
Start: 2020-09-08 | End: 2020-09-12 | Stop reason: HOSPADM

## 2020-09-08 RX ORDER — METOPROLOL SUCCINATE 25 MG/1
25 TABLET, EXTENDED RELEASE ORAL DAILY
Status: DISCONTINUED | OUTPATIENT
Start: 2020-09-09 | End: 2020-09-09

## 2020-09-08 RX ORDER — ROPINIROLE 0.25 MG/1
0.25 TABLET, FILM COATED ORAL NIGHTLY
Status: DISCONTINUED | OUTPATIENT
Start: 2020-09-09 | End: 2020-09-11

## 2020-09-08 RX ORDER — SODIUM CHLORIDE 0.9 % (FLUSH) 0.9 %
10 SYRINGE (ML) INJECTION PRN
Status: DISCONTINUED | OUTPATIENT
Start: 2020-09-08 | End: 2020-09-12 | Stop reason: HOSPADM

## 2020-09-08 RX ADMIN — SODIUM CHLORIDE: 9 INJECTION, SOLUTION INTRAVENOUS at 21:25

## 2020-09-08 ASSESSMENT — PAIN DESCRIPTION - DESCRIPTORS: DESCRIPTORS: ACHING

## 2020-09-08 ASSESSMENT — PAIN DESCRIPTION - PAIN TYPE: TYPE: ACUTE PAIN

## 2020-09-08 ASSESSMENT — PAIN DESCRIPTION - FREQUENCY: FREQUENCY: CONTINUOUS

## 2020-09-08 ASSESSMENT — PAIN SCALES - GENERAL: PAINLEVEL_OUTOF10: 0

## 2020-09-08 ASSESSMENT — PAIN DESCRIPTION - LOCATION: LOCATION: HEAD

## 2020-09-08 NOTE — ED NOTES
[facility] CONTACT ASSESSMENT NOTE      Department of Emergency Medicine   ED  First Provider Note   9/8/20  5:54 PM EDT    Chief Complaint: Loss of Consciousness (was getting laundry out of dryer and woke up on floor, c/o HA)      History of Present Illness:    Francesca Mederos is a 63 y.o. female who presents to the ED by private car for a syncopal episode while doing laundry. Patient stated that she was slightly bending down to put a closing tray show she is got a pass out and told her grandson to go get her elderly grandson. Focused Screening Exam:  Constitutional:  Alert, appears stated age and is in no distress.       *ALLERGIES*     Keflex [cephalexin] and Phenergan [promethazine hcl]     ED Triage Vitals [09/08/20 8257]   BP Temp Temp Source Pulse Resp SpO2 Height Weight   -- 97 °F (36.1 °C) Skin 76 16 94 % 5' 5\" (1.651 m) 170 lb (77.1 kg)        Initial Plan of Care:  Initiate Treatment-Testing, Proceed toTreatment Area When Bed Available for ED Attending/MLP to Continue Care    -----------------END OF FIRST PROVIDER CONTACT ASSESSMENT NOTE--------------  Electronically signed by ANNABELLE Lazcano NP   DD: 9/8/20       ANNABELLE Terry NP  09/08/20 [id number]

## 2020-09-09 LAB
ALBUMIN SERPL-MCNC: 3.7 G/DL (ref 3.5–5.2)
ALP BLD-CCNC: 61 U/L (ref 35–104)
ALT SERPL-CCNC: 18 U/L (ref 0–32)
ANION GAP SERPL CALCULATED.3IONS-SCNC: 9 MMOL/L (ref 7–16)
APTT: 31.4 SEC (ref 24.5–35.1)
APTT: 85.2 SEC (ref 24.5–35.1)
AST SERPL-CCNC: 19 U/L (ref 0–31)
BASOPHILS ABSOLUTE: 0.04 E9/L (ref 0–0.2)
BASOPHILS RELATIVE PERCENT: 0.5 % (ref 0–2)
BILIRUB SERPL-MCNC: 0.2 MG/DL (ref 0–1.2)
BUN BLDV-MCNC: 10 MG/DL (ref 8–23)
CALCIUM SERPL-MCNC: 8.6 MG/DL (ref 8.6–10.2)
CHLORIDE BLD-SCNC: 106 MMOL/L (ref 98–107)
CO2: 29 MMOL/L (ref 22–29)
CORTISOL TOTAL: 4.06 MCG/DL (ref 2.68–18.4)
CORTISOL TOTAL: 4.16 MCG/DL (ref 2.68–18.4)
CREAT SERPL-MCNC: 0.7 MG/DL (ref 0.5–1)
EOSINOPHILS ABSOLUTE: 0.12 E9/L (ref 0.05–0.5)
EOSINOPHILS RELATIVE PERCENT: 1.6 % (ref 0–6)
GFR AFRICAN AMERICAN: >60
GFR NON-AFRICAN AMERICAN: >60 ML/MIN/1.73
GLUCOSE BLD-MCNC: 91 MG/DL (ref 74–99)
HCT VFR BLD CALC: 35.8 % (ref 34–48)
HEMOGLOBIN: 11.7 G/DL (ref 11.5–15.5)
IMMATURE GRANULOCYTES #: 0.03 E9/L
IMMATURE GRANULOCYTES %: 0.4 % (ref 0–5)
INR BLD: 2.3
LV EF: 58 %
LVEF MODALITY: NORMAL
LYMPHOCYTES ABSOLUTE: 2.34 E9/L (ref 1.5–4)
LYMPHOCYTES RELATIVE PERCENT: 30.5 % (ref 20–42)
MAGNESIUM: 1.8 MG/DL (ref 1.6–2.6)
MCH RBC QN AUTO: 32.1 PG (ref 26–35)
MCHC RBC AUTO-ENTMCNC: 32.7 % (ref 32–34.5)
MCV RBC AUTO: 98.1 FL (ref 80–99.9)
MONOCYTES ABSOLUTE: 0.64 E9/L (ref 0.1–0.95)
MONOCYTES RELATIVE PERCENT: 8.3 % (ref 2–12)
NEUTROPHILS ABSOLUTE: 4.51 E9/L (ref 1.8–7.3)
NEUTROPHILS RELATIVE PERCENT: 58.7 % (ref 43–80)
PDW BLD-RTO: 13.2 FL (ref 11.5–15)
PLATELET # BLD: 177 E9/L (ref 130–450)
PMV BLD AUTO: 10.8 FL (ref 7–12)
POTASSIUM REFLEX MAGNESIUM: 4.1 MMOL/L (ref 3.5–5)
PROTHROMBIN TIME: 26.8 SEC (ref 9.3–12.4)
RBC # BLD: 3.65 E12/L (ref 3.5–5.5)
SODIUM BLD-SCNC: 144 MMOL/L (ref 132–146)
TOTAL PROTEIN: 5.8 G/DL (ref 6.4–8.3)
TROPONIN: <0.01 NG/ML (ref 0–0.03)
WBC # BLD: 7.7 E9/L (ref 4.5–11.5)

## 2020-09-09 PROCEDURE — 85610 PROTHROMBIN TIME: CPT

## 2020-09-09 PROCEDURE — 99223 1ST HOSP IP/OBS HIGH 75: CPT | Performed by: INTERNAL MEDICINE

## 2020-09-09 PROCEDURE — 83735 ASSAY OF MAGNESIUM: CPT

## 2020-09-09 PROCEDURE — 84484 ASSAY OF TROPONIN QUANT: CPT

## 2020-09-09 PROCEDURE — 93005 ELECTROCARDIOGRAM TRACING: CPT | Performed by: STUDENT IN AN ORGANIZED HEALTH CARE EDUCATION/TRAINING PROGRAM

## 2020-09-09 PROCEDURE — 2580000003 HC RX 258: Performed by: STUDENT IN AN ORGANIZED HEALTH CARE EDUCATION/TRAINING PROGRAM

## 2020-09-09 PROCEDURE — 85730 THROMBOPLASTIN TIME PARTIAL: CPT

## 2020-09-09 PROCEDURE — 6370000000 HC RX 637 (ALT 250 FOR IP): Performed by: STUDENT IN AN ORGANIZED HEALTH CARE EDUCATION/TRAINING PROGRAM

## 2020-09-09 PROCEDURE — 6370000000 HC RX 637 (ALT 250 FOR IP): Performed by: INTERNAL MEDICINE

## 2020-09-09 PROCEDURE — 82533 TOTAL CORTISOL: CPT

## 2020-09-09 PROCEDURE — 2580000003 HC RX 258: Performed by: EMERGENCY MEDICINE

## 2020-09-09 PROCEDURE — 6360000002 HC RX W HCPCS: Performed by: NURSE PRACTITIONER

## 2020-09-09 PROCEDURE — 96365 THER/PROPH/DIAG IV INF INIT: CPT

## 2020-09-09 PROCEDURE — 36415 COLL VENOUS BLD VENIPUNCTURE: CPT

## 2020-09-09 PROCEDURE — 85025 COMPLETE CBC W/AUTO DIFF WBC: CPT

## 2020-09-09 PROCEDURE — 96366 THER/PROPH/DIAG IV INF ADDON: CPT

## 2020-09-09 PROCEDURE — 99205 OFFICE O/P NEW HI 60 MIN: CPT | Performed by: INTERNAL MEDICINE

## 2020-09-09 PROCEDURE — G0378 HOSPITAL OBSERVATION PER HR: HCPCS

## 2020-09-09 PROCEDURE — 80053 COMPREHEN METABOLIC PANEL: CPT

## 2020-09-09 PROCEDURE — 96375 TX/PRO/DX INJ NEW DRUG ADDON: CPT

## 2020-09-09 PROCEDURE — 6360000002 HC RX W HCPCS: Performed by: INTERNAL MEDICINE

## 2020-09-09 PROCEDURE — APPSS60 APP SPLIT SHARED TIME 46-60 MINUTES: Performed by: CLINICAL NURSE SPECIALIST

## 2020-09-09 PROCEDURE — 6360000002 HC RX W HCPCS: Performed by: STUDENT IN AN ORGANIZED HEALTH CARE EDUCATION/TRAINING PROGRAM

## 2020-09-09 PROCEDURE — 94664 DEMO&/EVAL PT USE INHALER: CPT

## 2020-09-09 PROCEDURE — 93306 TTE W/DOPPLER COMPLETE: CPT

## 2020-09-09 PROCEDURE — 99224 PR SBSQ OBSERVATION CARE/DAY 15 MINUTES: CPT | Performed by: INTERNAL MEDICINE

## 2020-09-09 PROCEDURE — 94640 AIRWAY INHALATION TREATMENT: CPT

## 2020-09-09 RX ORDER — COSYNTROPIN 0.25 MG/ML
250 INJECTION, POWDER, FOR SOLUTION INTRAMUSCULAR; INTRAVENOUS ONCE
Status: COMPLETED | OUTPATIENT
Start: 2020-09-10 | End: 2020-09-10

## 2020-09-09 RX ORDER — HEPARIN SODIUM 1000 [USP'U]/ML
4000 INJECTION, SOLUTION INTRAVENOUS; SUBCUTANEOUS PRN
Status: DISCONTINUED | OUTPATIENT
Start: 2020-09-09 | End: 2020-09-11 | Stop reason: ALTCHOICE

## 2020-09-09 RX ORDER — HEPARIN SODIUM 1000 [USP'U]/ML
2000 INJECTION, SOLUTION INTRAVENOUS; SUBCUTANEOUS PRN
Status: DISCONTINUED | OUTPATIENT
Start: 2020-09-09 | End: 2020-09-11 | Stop reason: ALTCHOICE

## 2020-09-09 RX ORDER — MAGNESIUM SULFATE IN WATER 40 MG/ML
2 INJECTION, SOLUTION INTRAVENOUS ONCE
Status: DISCONTINUED | OUTPATIENT
Start: 2020-09-09 | End: 2020-09-09

## 2020-09-09 RX ORDER — HEPARIN SODIUM 1000 [USP'U]/ML
4000 INJECTION, SOLUTION INTRAVENOUS; SUBCUTANEOUS ONCE
Status: COMPLETED | OUTPATIENT
Start: 2020-09-09 | End: 2020-09-09

## 2020-09-09 RX ORDER — COSYNTROPIN 0.25 MG/ML
250 INJECTION, POWDER, FOR SOLUTION INTRAMUSCULAR; INTRAVENOUS ONCE
Status: DISCONTINUED | OUTPATIENT
Start: 2020-09-10 | End: 2020-09-09

## 2020-09-09 RX ORDER — METOPROLOL SUCCINATE 50 MG/1
50 TABLET, EXTENDED RELEASE ORAL DAILY
Status: DISCONTINUED | OUTPATIENT
Start: 2020-09-10 | End: 2020-09-11

## 2020-09-09 RX ORDER — HYDROCORTISONE 5 MG/1
10 TABLET ORAL 2 TIMES DAILY
Status: DISCONTINUED | OUTPATIENT
Start: 2020-09-09 | End: 2020-09-09

## 2020-09-09 RX ORDER — NICOTINE 21 MG/24HR
1 PATCH, TRANSDERMAL 24 HOURS TRANSDERMAL DAILY
Status: DISCONTINUED | OUTPATIENT
Start: 2020-09-09 | End: 2020-09-12 | Stop reason: HOSPADM

## 2020-09-09 RX ORDER — HEPARIN SODIUM 10000 [USP'U]/100ML
12 INJECTION, SOLUTION INTRAVENOUS CONTINUOUS
Status: DISCONTINUED | OUTPATIENT
Start: 2020-09-09 | End: 2020-09-11

## 2020-09-09 RX ADMIN — ACETAMINOPHEN 650 MG: 325 TABLET, FILM COATED ORAL at 23:14

## 2020-09-09 RX ADMIN — ALBUTEROL SULFATE 2.5 MG: 2.5 SOLUTION RESPIRATORY (INHALATION) at 11:43

## 2020-09-09 RX ADMIN — ROPINIROLE 0.25 MG: 0.25 TABLET, FILM COATED ORAL at 01:14

## 2020-09-09 RX ADMIN — SODIUM CHLORIDE, PRESERVATIVE FREE 10 ML: 5 INJECTION INTRAVENOUS at 20:47

## 2020-09-09 RX ADMIN — SODIUM CHLORIDE: 9 INJECTION, SOLUTION INTRAVENOUS at 11:35

## 2020-09-09 RX ADMIN — ARFORMOTEROL TARTRATE 15 MCG: 15 SOLUTION RESPIRATORY (INHALATION) at 18:52

## 2020-09-09 RX ADMIN — ROPINIROLE 0.25 MG: 0.25 TABLET, FILM COATED ORAL at 20:47

## 2020-09-09 RX ADMIN — BUDESONIDE 250 MCG: 0.25 SUSPENSION RESPIRATORY (INHALATION) at 18:52

## 2020-09-09 RX ADMIN — MAGNESIUM SULFATE 2 G: 2 INJECTION INTRAVENOUS at 18:17

## 2020-09-09 RX ADMIN — HEPARIN SODIUM 4000 UNITS: 1000 INJECTION INTRAVENOUS; SUBCUTANEOUS at 14:57

## 2020-09-09 RX ADMIN — HEPARIN SODIUM 12 UNITS/KG/HR: 10000 INJECTION, SOLUTION INTRAVENOUS at 14:57

## 2020-09-09 RX ADMIN — BUDESONIDE 250 MCG: 0.25 SUSPENSION RESPIRATORY (INHALATION) at 08:33

## 2020-09-09 RX ADMIN — ATORVASTATIN CALCIUM 10 MG: 10 TABLET, FILM COATED ORAL at 10:05

## 2020-09-09 RX ADMIN — PANTOPRAZOLE SODIUM 40 MG: 40 TABLET, DELAYED RELEASE ORAL at 10:06

## 2020-09-09 RX ADMIN — MONTELUKAST SODIUM 10 MG: 10 TABLET, COATED ORAL at 01:14

## 2020-09-09 RX ADMIN — ROFLUMILAST 250 MCG: 500 TABLET ORAL at 10:04

## 2020-09-09 RX ADMIN — SODIUM CHLORIDE: 9 INJECTION, SOLUTION INTRAVENOUS at 01:14

## 2020-09-09 RX ADMIN — MAGNESIUM GLUCONATE 500 MG ORAL TABLET 400 MG: 500 TABLET ORAL at 20:47

## 2020-09-09 RX ADMIN — ALBUTEROL SULFATE 2.5 MG: 2.5 SOLUTION RESPIRATORY (INHALATION) at 01:28

## 2020-09-09 RX ADMIN — SERTRALINE HYDROCHLORIDE 150 MG: 100 TABLET ORAL at 10:06

## 2020-09-09 RX ADMIN — METOPROLOL SUCCINATE 25 MG: 25 TABLET, EXTENDED RELEASE ORAL at 10:05

## 2020-09-09 RX ADMIN — SODIUM CHLORIDE, PRESERVATIVE FREE 10 ML: 5 INJECTION INTRAVENOUS at 10:05

## 2020-09-09 RX ADMIN — ARFORMOTEROL TARTRATE 15 MCG: 15 SOLUTION RESPIRATORY (INHALATION) at 08:33

## 2020-09-09 RX ADMIN — MONTELUKAST SODIUM 10 MG: 10 TABLET, COATED ORAL at 20:46

## 2020-09-09 RX ADMIN — LISINOPRIL 10 MG: 10 TABLET ORAL at 10:05

## 2020-09-09 ASSESSMENT — PAIN DESCRIPTION - LOCATION
LOCATION: HEAD
LOCATION: HEAD

## 2020-09-09 ASSESSMENT — PAIN SCALES - GENERAL
PAINLEVEL_OUTOF10: 4
PAINLEVEL_OUTOF10: 4
PAINLEVEL_OUTOF10: 0

## 2020-09-09 NOTE — PROGRESS NOTES
[facility]  Progress Note  - House Team 1    Patient:  Jose Paulino 63 y.o. female MRN: [id number]     Date of Service: 9/9/2020     CC: LOC  Overnight events: No acute event overnight. Repeat EKG preliminary result showed NSR with frequent PVC and nonspecific ST+ T wave abnormalities. Subjective       Upon entering the room patient is resting in bed comfortably. Today, patient states she is feeling well overall. She states she is no longer feeling lightheaded, but has not yet tried to walk unassisted. She continues to have constant diffuse headache that is now 4/10 tolerable pain. Patient has persistent productive cough (clear sputum) and SOB at baseline that has not worsened. No fever, chills, chest pain, palpitation, abdominal pain. Objective     Physical Exam:  · Vitals: BP (!) 168/73   Pulse 69   Temp 97.8 °F (36.6 °C) (Temporal)   Resp 18   Ht 5' 5\" (1.651 m)   Wt 173 lb (78.5 kg)   LMP 11/08/2008 (LMP Unknown)   SpO2 97%   BMI 28.79 kg/m²     I & O - 24hr: I/O this shift: In: 480 [P.O.:480]  · Out: 300 [Urine:300]   · General Appearance: alert, appears stated age, cooperative and no distress  · HEENT:  Head: Normocephalic, no lesions, without obvious abnormality. · Neck: supple, symmetrical, trachea midline  · Lung: clear to auscultation bilaterally and wheezes bilaterally No respiratory distress. · Heart: regular rate and rhythm, S1, S2 normal, no murmur, click, rub or gallop  · Abdomen: soft, non-tender; bowel sounds normal; no masses,  no organomegaly  · Extremities:  extremities normal, atraumatic, no cyanosis or edema  · Musculokeletal: No joint swelling, no muscle tenderness. ROM normal in all joints of extremities.    · Neurologic: Mental status: Alert, oriented, thought content appropriate    Pertinent Labs & Imaging Studies     CBC:   Lab Results   Component Value Date    WBC 7.7 09/09/2020    RBC 3.65 09/09/2020    HGB 11.7 09/09/2020    HCT 35.8 09/09/2020    MCV 98.1 09/09/2020    MCH 32.1 09/09/2020    MCHC 32.7 09/09/2020    RDW 13.2 09/09/2020     09/09/2020    MPV 10.8 09/09/2020     CMP:    Lab Results   Component Value Date     09/09/2020    K 4.1 09/09/2020     09/09/2020    CO2 29 09/09/2020    BUN 10 09/09/2020    CREATININE 0.7 09/09/2020    GFRAA >60 09/09/2020    LABGLOM >60 09/09/2020    GLUCOSE 91 09/09/2020    PROT 5.8 09/09/2020    LABALBU 3.7 09/09/2020    CALCIUM 8.6 09/09/2020    BILITOT 0.2 09/09/2020    ALKPHOS 61 09/09/2020    AST 19 09/09/2020    ALT 18 09/09/2020     PT/INR:    Lab Results   Component Value Date    PROTIME 26.8 09/09/2020    PROTIME 30.9 04/01/2020    INR 2.3 09/09/2020     EKG 9-9-20   -Preliminary Impression; Sinus rhythm with frequent premature ventricular complexes + Nonspecific ST and T wave abnormality    Student's Assessment and Plan     Laura Leung is a 63 y.o. female with PMHx of asthma/COPD, anxiety/depression, restless leg syndrome, HLP, HTN, CAD, status post aortic valve replacement that was admitted from ED with cc of lightheadedness + one syncopal episode.     Acute Syncopal episode  Unknown etiology; likely cardiac  -One syncopal episode with prodrome of ligheadness and nausea  -Denies post ictal confusion, incontinence, tongue-biting  -EKG shows NSR with PVC and nonspecific ST anterior changes  -CT head/cervical spine negative on admission  -CXR negative on admission  -BP on admission 161/85; today 123/57  -proBNP 373 with negative trop 0.01  -Orthstats negative  - Cortisol low; 4.06, 4.16  -Follows   -Previous Holter monitor (March 25,2020) showed ventricular -ectopy<12% and Vtach with burden less than 1%   -Previous echo shows EF 77-02%, stage 1 diastolic dysfunction  with no prostatic wall dysfunction function  -Previous stress test (2016) unremarkable for stress ischemia    Consult cardiology; [facility]  Consider Electrophysiology consult per cards recommendation  Consider ECHO or event monitoring per cards recommendation  Follow ACTH; Cosyntropin test tommorow AM    Hx of COPD w Asthma  -Baseline SOB with productive cough (clear sputum); Has not  Worsened  -Sating 97% on room air  -On Brovana, Pulmicort nebulizer  -Continue Home medications; Spiriva, Stelara, Roflumilast,Proventil, singular  -Proventil PRN  -Continue to monitor respiratory status    HTN/HLP  -On admission to ED; /85  -Latest /73  -Continue home medications; Toprol XL, lisinopril, Lipitor  -Low-sodium diet    Hx of Anxiety/Depression  -Patient denies suicidal ideation  -Continue home medications; sertraline      Hx of restless leg syndrome  -Continue home medications;  Ropinirole 0.25mg    Hx of chronic tobacco use  - Current smoker; 1ppd since age 14  -Continue Nicotine patch  -Tobacco abuse education as outpatient     PT/OT evaluation:Not needed at this time  DVT prophylaxis/ GI prophylaxis: Protonix 40mg  Disposition: Continue to monitor inpatient    Lilia Lopez  MS4  Attending physician: Dr. Carly Thomason

## 2020-09-09 NOTE — H&P
[facility]  Internal Medicine Residency Program  History and Physical    Patient:  Beryle Minister 63 y.o. female MRN: [id number]     Date of Service: 9/8/2020    Hospital Day: 1      Chief complaint: had concerns including Loss of Consciousness (was getting laundry out of dryer and woke up on floor, c/o HA). History of Present Illness     The patient is a 63 y.o. female with past medical history COPD/asthma on Proventil, Roflumilast 250 mg last month Singulair 10 mg, Spiriva Respimat, Dulera, Ventolin HFA, hypertension coronary artery disease on Toprol-XL 25 mg, lisinopril 10 mg, depression/anxiety on sertraline 100 mg, GERD on omeprazole 40 mg, restless leg syndrome on Ropinirole, hyperlipidemia on Lipitor 10 mg, status post aortic valve replacement in 2009 on warfarin 5 and 6 mg on alternate days admitted to the floor from ED with chief complaints of lightheadedness and one episode syncope. Per patient she was getting clothes from the dryer this afternoon she got lightheaded, nauseous and blurred vision, fell down and hit her left knee. The episode lasted for 3 minutes. She had no confusion, incontinence or tongue bite after the event. She immediately called her grandson to take her to the hospital.  Patient is also complaining of headache, continues, constant, diffuse, 7/10, radiates to her back neck, relieved with over-the-counter naproxen. She endorses nausea, productive cough which is her baseline, problems with balance \"staggering on walking \" when she gets up quickly form sitting positon. She denies fever, chills, numbness and tingling, weakness, hearing loss, focal neurological deficit, chest pain, shortness of breath, recent infection, no history of seizure or syncopal episode in the past.    She is our clinic patient and follow with Sebas Arguelles and Juvenal Tobin.     ED Course:   Patient arrived in the ED with vitals temperature 97 °F, blood pressure 161/85, pulse 76, respiratory 16 satting 94% on room air. She was complaining of headache and being nauseous. Labs: APTT 38, PT 32.1 and INR 2.8.  proBNP 373, troponin less than 0.01, CMP and CBC unremarkable  Urinalysis urinalysis: Rare bacteria, moderate blood and trace leukocyte Estrace  Twelve-lead EKG: Normal sinus rhythm with premature ventricular contraction nonspecific ST and T waves abnormalities. Imaging: CT head and cervical spine without contrast unremarkable. Chest x-ray unremarkable    ED Meds: Patient was given no medication  ED Fluids: Patient was started on 100 mL/h continuous normal saline infusion    Past Medical History:      Diagnosis Date    Anticoagulant long-term use     Anxiety     Asthma     CAD (coronary artery disease)     Chronic back pain     COPD (chronic obstructive pulmonary disease) (HCC)     Depression     GERD (gastroesophageal reflux disease)     Headache     Hyperlipidemia     Hypertension     Restless legs syndrome     Urinary incontinence        Past Surgical History:        Procedure Laterality Date    APPENDECTOMY      CARDIAC VALVE REPLACEMENT      Aortic valve 2009 [facility]      SECTION      COLONOSCOPY N/A 2019    COLONOSCOPY POLYPECTOMY SNARE/COLD BIOPSY performed by Eli Cosme MD at [place] [id number] ENDOSCOPY N/A 2019    EGD BIOPSY performed by Eli Cosme MD at [facility] ENDOSCOPY       Medications Prior to Admission:    Prior to Admission medications    Medication Sig Start Date End Date Taking? Authorizing Provider   warfarin (COUMADIN) 6 MG tablet Take one tablet on Tuesday and Thursday.  8/10/20   Maru Ruvalcaba MD   metoprolol succinate (TOPROL XL) 25 MG extended release tablet Take 1 tablet by mouth daily 20   Zack Doll MD   omeprazole (PRILOSEC) 40 MG delayed release capsule take 1 capsule by mouth every morning BEFORE BREAKFAST 20 Jocelynn Morris, DO   rOPINIRole (REQUIP) 0.25 MG tablet Take 1 tab in morning and 2 tabs before bed 5/1/20   Lesly Aguilera,    atorvastatin (LIPITOR) 10 MG tablet take 1 tablet by mouth once daily 5/1/20   Lesly Aguilera, DO   sertraline (ZOLOFT) 100 MG tablet take 1 and 1/2 tablet by mouth once daily 5/1/20   Lesly Aguilera, DO   lisinopril (PRINIVIL;ZESTRIL) 10 MG tablet take 1 tablet by mouth once daily 3/20/20   Rena Caputo,    albuterol (PROVENTIL) (2.5 MG/3ML) 0.083% nebulizer solution Take 3 mLs by nebulization every 6 hours as needed for Wheezing or Shortness of Breath  Patient not taking: Reported on 4/20/2020 2/21/20   Jimena Pinto MD   varenicline (CHANTIX STARTING MONTH PAK) 0.5 MG X 11 & 1 MG X 42 tablet Take as directed on package. Patient not taking: Reported on 4/20/2020 2/21/20   Jimena Pinto MD   Roflumilast (DALIRESP) 250 MCG TABS Take 250 mcg by mouth daily 2/21/20   Jimena Pinto MD   Calcium Polycarbophil (FIBERCON PO) Take 2 tablets by mouth every morning (before breakfast)    Historical Provider, MD   Calcium Polycarbophil (FIBER) 625 MG TABS Take 1 tablet by mouth daily 1/28/20   Hanna Cuellar MD   montelukast (SINGULAIR) 10 MG tablet take 1 tablet by mouth at bedtime 1/13/20   Wanda Colon MD   Calcium Carbonate-Vitamin D (OYSTER SHELL CALCIUM/D) 500-200 MG-UNIT TABS take 1 tablet by mouth twice a day 1/13/20   Wanda Colon MD   varenicline (CHANTIX) 1 MG tablet Take 1 tablet by mouth 2 times daily Indications: Smoking Cessation Program weeks 5-6  Patient not taking: Reported on 4/20/2020 12/18/19   Jimena Pinto MD   Blood Pressure Monitoring (BLOOD PRESSURE CUFF) MISC Dx:  Hypertension with labile blood pressure 9/20/19   Deng Burr MD   Azelastine HCl 137 MCG/SPRAY SOLN 2 sprays by Nasal route daily 8/1/19   COOPER Zavala   tiotropium (SPIRIVA RESPIMAT) 2.5 MCG/ACT AERS inhaler INHALE 2 PUFFS BY MOUTH DAILY.  PAP Medication 7/3/19   Cheng Rodriguez MD   VENTOLIN  (40 Base) MCG/ACT inhaler inhale 1 puff by mouth every 4 hours if needed for wheezing 3/15/19   Royal Arciniega DO   DULERA 100-5 MCG/ACT inhaler inhale 2 puffs by mouth twice a day 3/7/19   Gela Fall MD   vitamin C (ASCORBIC ACID) 500 MG tablet Take 1,000 mg by mouth daily    Historical Provider, MD   niacin 500 MG tablet Take 2 tablets by mouth 2 times daily (with meals)  Patient taking differently: Take 1,000 mg by mouth 2 times daily (with meals) Takes OTC only 5/5/17   Aquiles Velasco MD       Allergies:  Keflex [cephalexin] and Phenergan [promethazine hcl]    Social History:   TOBACCO:   reports that she has been smoking cigarettes. She has a 4.50 pack-year smoking history. She has never used smokeless tobacco.  ETOH:   reports previous alcohol use. Family History:       Problem Relation Age of Onset    Heart Disease Mother     Arthritis Mother     Asthma Mother     Diabetes Mother     High Blood Pressure Mother     High Cholesterol Mother     Stroke Mother     Heart Disease Father     Arthritis Father     Asthma Father     Diabetes Father     High Blood Pressure Father     High Cholesterol Father     Breast Cancer Sister     Asthma Brother     Diabetes Maternal Aunt     Diabetes Paternal Aunt     High Blood Pressure Paternal Aunt     Arthritis Paternal Grandmother     Asthma Paternal Grandmother        REVIEW OF SYSTEMS:    · Constitutional: No fever, no chills, no change in weight; good appetite  · HEENT: No blurred vision, no ear problems, no sore throat, no rhinorrhea, (+) occipital headache. · Respiratory: No cough, no sputum production, no pleuritic chest pain, no shortness of breath  · Cardiology: No angina, no dyspnea on exertion, no paroxysmal nocturnal dyspnea, no orthopnea, no palpitation, no leg swelling.    · Gastroenterology: No dysphagia, no reflux; no abdominal pain, (+) nausea or vomiting; no constipation or diarrhea.  No hematochezia   · Genitourinary: No dysuria, no frequency, hesitancy; no hematuria  · Musculoskeletal: no joint pain, no myalgia, no change in range of movement  · Neurology: no focal weakness in extremities, no slurred speech, no double vision, no tingling or numbness sensation  · Endocrinology: no temperature intolerance, no polyphagia, polydipsia or polyuria  · Hematology: no increased bleeding, no bruising, no lymphadenopathy  · Skin: no skin changes noticed by patient  · Psychology: no depressed mood, no suicidal ideation    Physical Exam   · Vitals: BP (!) 160/72   Pulse 65   Temp 97 °F (36.1 °C) (Skin)   Resp 18   Ht 5' 5\" (1.651 m)   Wt 170 lb (77.1 kg)   SpO2 99%   BMI 28.29 kg/m²     · General Appearance: alert and oriented to person, place and time, well developed and well- nourished, in no acute distress  · Skin: warm and dry, no rash or erythema  · Head: normocephalic and atraumatic  · Eyes: pupils equal, round, and reactive to light, extraocular eye movements intact, conjunctivae normal  · ENT: tympanic membrane, external ear and ear canal normal bilaterally, nose without deformity, nasal mucosa and turbinates normal without polyps  · Neck: supple and non-tender without mass, no thyromegaly or thyroid nodules, no cervical lymphadenopathy  · Pulmonary/Chest: clear to auscultation bilaterally- (+) wheezes, rales or rhonchi, normal air movement, no respiratory distress  · Cardiovascular: normal rate, regular rhythm, normal S1 and S2, no murmurs, rubs, clicks, or gallops, distal pulses intact, no carotid bruits  · Abdomen: soft, non-tender, non-distended, normal bowel sounds, no masses or organomegaly  · Extremities: no cyanosis, clubbing or edema  · Musculoskeletal: normal range of motion, no joint swelling, deformity or tenderness  · Neurologic: reflexes normal and symmetric, no cranial nerve deficit, gait, coordination and speech normal   Labs and Imaging Studies   Basic Labs  Recent Labs     20  1809      K 4.0      CO2 26   BUN 13   CREATININE 0.7   GLUCOSE 97   CALCIUM 9.0       Recent Labs     20  1809   WBC 8.8   RBC 3.80   HGB 12.8   HCT 36.6   MCV 96.3   MCH 33.7   MCHC 35.0*   RDW 13.0      MPV 10.6       CBC:   Lab Results   Component Value Date    WBC 8.8 2020    RBC 3.80 2020    HGB 12.8 2020    HCT 36.6 2020    MCV 96.3 2020    RDW 13.0 2020     2020     BMP:    Lab Results   Component Value Date     2020    K 4.0 2020     2020    CO2 26 2020    BUN 13 2020     HFP:    Lab Results   Component Value Date    PROT 6.4 2020     CMP:  Lab Results   Component Value Date     2020    K 4.0 2020     2020    CO2 26 2020    BUN 13 2020    PROT 6.4 2020     FLP:    Lab Results   Component Value Date    CHOL 160 2019    TRIG 95 2019    HDL 62 2019     U/A:  No components found for: Suni Munguiaing, USPGRAV, UPH, Meli Grooms, UKETONE, UBILI, UBLOOD, UNITRITE, UUROBIL, ULEUKEST, USQEPI, URENEPI, UWBC, Mani, Synchari, UHYALINE  TSH:  No results found for: TSH  PT/INR:  No results found for: PTINR  PTT:    Lab Results   Component Value Date    APTT 38.0 2020     Iron Studies:    Lab Results   Component Value Date    FERRITIN 71 2019       Imaging Studies:     Ct Head Wo Contrast    Result Date: 2020  Patient MRN:  [id number] : 1960 Age: 63 years Gender: Female Order Date:  2020 8:11 PM EXAM: CT HEAD WO CONTRAST NUMBER OF IMAGES:  136 INDICATION:  syncope syncope COMPARISON: Brain MRI 2019 Technique: Low-dose CT  acquisition technique included one of following options; 1 . Automated exposure control, 2. Adjustment of MA and or KV according to patient's size or 3. Use of iterative reconstruction.  Multiple CT sections were obtained with sagittal and coronal MPR reconstructions. FINDINGS: There is no evidence of acute intracranial hemorrhage, loss of distinction between gray and white matter, mass, significant mass effect or abnormal extra-axial collection. Encephalomalacia within the anterior right temporal lobe. Moderate burden of periventricular and subcortical white matter hypodensities, likely sequela of microangiopathic disease. The ventricles are unchanged in caliber. The basilar cisterns are patent. The density in the dural venous sinuses is normal. The skull base and calvarium are unchanged. The paranasal sinuses and mastoid air cells are predominantly clear. The visualized orbits are unremarkable. No CT evidence of acute intracranial pathology. Ct Cervical Spine Wo Contrast    Result Date: 2020  Patient MRN:  [id number] : 1960 Age: 63 years Gender: Female Order Date:  2020 8:11 PM EXAM: CT CERVICAL SPINE WO CONTRAST COMPARISON: None INDICATION:  fall fall TECHNIQUE: Axial unenhanced CT scanning was performed through the cervical spine. Reformatted coronal and sagittal views also obtained. Low-dose CT acquisition technique included one of the following options; 1. Automated exposure control, 2. Adjustment of mA and/or kV according to the patient's size or 3. Use of iterative reconstruction. FINDINGS: No fracture or malalignment of the cervical spine. There is moderate disc space narrowing at level of C5/C6. Significant facet arthritis is present at multiple levels notable on the left at level of C4/C5. No fracture or malalignment of the cervical spine. EKG: normal sinus rhythm, nonspecific ST and T waves changes.     Resident's Assessment and Plan     Brandt Aguayo is a 63 y.o. female with past medical history o asthma/COPD, anxiety/depression, restless leg syndrome, hyperlipidemia, hypertension, coronary artery disease, status post aortic valve replacement, admitted from ED with chief complaint of lightheadedness and one syncopal episode. We are treating her for the following:    Acute Syncopal episode likely secondary to orthostatic hypotension vs cardiac arrhythmias  Patient had one episode syncopal episode  With prodromal lightheadedness and nausea  Denies confusion, incontinence  Patient is a 25-year-old consider autonomic dysfunction  EKG shows normal sinus rhythm premature ventricular contraction and nonspecific ST anterior changes  CT head and cervical spine negative  Blood pressure 161/85 mm Hg   proBNP 373 and troponin less than 0.01  Follows with Dr. Marbella Back  Last Holter monitor revealed nonsustained ventricular ectopy and ventricular tachycardia with burden less than 12% and less than 1% respectively (March 25, 2020)  Last echo revealed ejection fraction 55 to 47%, stage I diastolic dysfunction no prostatic wall dysfunction (February 2020)  Last stress test 2016 unremarkable for stress ischemia    Medication list reviewed  Started on 100 ml/h normal saline continuous infusion  Follow orthostatic vital signs  Consult cardiology   Repeat EKG  cortisol low 4.06--> 4.16  Follow ACTH  Consider event monitor for longer period of time  Consider stress test    History of COPD and asthma overlap  Patient follows with Dr. Mague Mehta  Last visit was in February 2020  Home meds include Spiriva, Stelara, Roflumilast, proventil, singular    Patient does not remember last exacerbation of COPD  wheezes bilaterally on PE  Denies shortness of breath, increase cough or sputum production  Patient sating 96% on room air  1 pack/day smoking history since age 14  Last PFT review severe obstructive lung disease, with severe air trapping and significant bronchodilator response and severe decrease in DLCO suggesting overlap syndrome and concurrent interstitial lung disease.   (September 2017)    Started on Brovana, Pulmicort nebulizer  Continue Singulair 10 mg, Roflumilast 250 MCG  Proventil as needed  Monitor respiratory status and pulse ox  Administer influenza vaccine  Consider pulmonology consult    Status post aortic valve replacement in 2009  Patient is on warfarin 5 mg and 6 mg alternate days  Last echo showed no dysfunction of the prosthetic valve (February 2020)  INR 2.8, PT 32.1  Pro time INR daily  Consult Pharmacy   Continue 5 mg warfarin daily    History of hypertension/hyperlipidemia  Blood pressure in the /85 mmHg  Latest blood pressure 166/79 with heart rate 72  Home meds include Toprol-XL 25 mg lisinopril 10 mg and Lipitor 10 mg  Last lipid panel:  Total cholesterol 160, triglyceride 95, HDL 62, LDL 79 (November 2019)  Continue lisinopril, Toprol-XL, Lipitor  Monitor vitals  Follow lipid panel  Low-sodium diet  Hydralazine PRN    History of anxiety/depression/restless leg syndrome/GERD  Patient denies suicidal ideation  Home meds include Ropinirole 0.25 mg, sertraline 150 mg, omeprazole 40 mg  Continue home medication and Protonix 40 mg for GERD    History of chronic tobacco use  Endorses history of 1 pack/day since age 14  Tried Varenicline 1 mg in the past   Continue Nicotine patch   Tobacco abuse education as outpatient     Code: Full   Diet: Low sodium diet   PT/OT evaluation: Not needed at this time   DVT prophylaxis/ GI prophylaxis: Protonix 40 mg  Disposition: Admit for inpatient care     Ean Rich MD, PGY-1   Attending physician: Dr. Agustina Key

## 2020-09-09 NOTE — PROGRESS NOTES
Admitted to floor from ER no IV Fluid infusing noted charted IV fluid started in ER but not infusing on admit started IV fluid as per order for 21:00

## 2020-09-09 NOTE — ED PROVIDER NOTES
HPI:  20, Time: 8:06 PM EDT         Susan Olivarez is a 63 y.o. female presenting to the ED for syncope, beginning 1 day ago. The complaint has been persistent, moderate in severity, and worsened by nothing. Patient reporting syncopal episode. Patient reporting no fever chills or cough. Patient was putting laundry away. Patient reporting no nausea vomit she does report headache she reports some mild neck pain there is no fever chills or cough. Patient reports history of coronary disease history of COPD. There is no leg pain or swelling. There is no numbness or tingling. There is no photophobia    ROS:   Pertinent positives and negatives are stated within HPI, all other systems reviewed and are negative.  --------------------------------------------- PAST HISTORY ---------------------------------------------  Past Medical History:  has a past medical history of Anticoagulant long-term use, Anxiety, Asthma, CAD (coronary artery disease), Chronic back pain, COPD (chronic obstructive pulmonary disease) ([facility] [address] Depression, GERD (gastroesophageal reflux disease), Headache, Hyperlipidemia, Hypertension, Restless legs syndrome, and Urinary incontinence. Past Surgical History:  has a past surgical history that includes Appendectomy; Hysterectomy;  section; Tubal ligation; Cardiac valve replacement (); Upper gastrointestinal endoscopy (N/A, 2019); and Colonoscopy (N/A, 2019). Social History:  reports that she has been smoking cigarettes. She has a 45.00 pack-year smoking history. She has never used smokeless tobacco. She reports previous alcohol use. She reports that she does not use drugs. Family History: family history includes Arthritis in her father, mother, and paternal grandmother; Asthma in her brother, father, mother, and paternal grandmother; Breast Cancer in her sister; Diabetes in her father, maternal aunt, mother, and paternal aunt;  Heart Disease in her father and mother; High Blood Pressure in her father, mother, and paternal aunt; High Cholesterol in her father and mother; Stroke in her mother. The patients home medications have been reviewed. Allergies: Keflex [cephalexin] and Phenergan [promethazine hcl]    ---------------------------------------------------PHYSICAL EXAM--------------------------------------    Constitutional/General: Alert and oriented x3, well appearing, non toxic in NAD  Head: Normocephalic and atraumatic  Eyes: PERRL, EOMI  Mouth: Oropharynx clear, handling secretions, no trismus  Neck: Supple, full ROM, tender to palpation in the midline, no stridor, no crepitus, no meningeal signs  Pulmonary: Lungs scant wheezes bilaterally  Cardiovascular:  Regular rate. Regular rhythm. No murmurs, gallops, or rubs. 2+ distal pulses  Chest: no chest wall tenderness  Abdomen: Soft. Non tender. Non distended. +BS. No rebound, guarding, or rigidity. No pulsatile masses appreciated. Musculoskeletal: Moves all extremities x 4. Warm and well perfused, no clubbing, cyanosis, or edema. Capillary refill <3 seconds  Skin: warm and dry. No rashes. Neurologic: GCS 15, CN 2-12 grossly intact, no focal deficits, symmetric strength 5/5 in the upper and lower extremities bilaterally  Psych: Normal Affect    -------------------------------------------------- RESULTS -------------------------------------------------  I have personally reviewed all laboratory and imaging results for this patient. Results are listed below.      LABS:  Results for orders placed or performed during the hospital encounter of 09/08/20   Comprehensive Metabolic Panel w/ Reflex to MG   Result Value Ref Range    Sodium 136 132 - 146 mmol/L    Potassium reflex Magnesium 4.0 3.5 - 5.0 mmol/L    Chloride 100 98 - 107 mmol/L    CO2 26 22 - 29 mmol/L    Anion Gap 10 7 - 16 mmol/L    Glucose 97 74 - 99 mg/dL    BUN 13 8 - 23 mg/dL    CREATININE 0.7 0.5 - 1.0 mg/dL    GFR Non-African American >60 >=60 mL/min/1.73    GFR African American >60     Calcium 9.0 8.6 - 10.2 mg/dL    Total Protein 6.4 6.4 - 8.3 g/dL    Alb 4.0 3.5 - 5.2 g/dL    Total Bilirubin 0.2 0.0 - 1.2 mg/dL    Alkaline Phosphatase 73 35 - 104 U/L    ALT 22 0 - 32 U/L    AST 24 0 - 31 U/L   CBC Auto Differential   Result Value Ref Range    WBC 8.8 4.5 - 11.5 E9/L    RBC 3.80 3.50 - 5.50 E12/L    Hemoglobin 12.8 11.5 - 15.5 g/dL    Hematocrit 36.6 34.0 - 48.0 %    MCV 96.3 80.0 - 99.9 fL    MCH 33.7 26.0 - 35.0 pg    MCHC 35.0 (H) 32.0 - 34.5 %    RDW 13.0 11.5 - 15.0 fL    Platelets 226 997 - 996 E9/L    MPV 10.6 7.0 - 12.0 fL    Neutrophils % 58.1 43.0 - 80.0 %    Immature Granulocytes % 0.6 0.0 - 5.0 %    Lymphocytes % 31.6 20.0 - 42.0 %    Monocytes % 7.6 2.0 - 12.0 %    Eosinophils % 1.5 0.0 - 6.0 %    Basophils % 0.6 0.0 - 2.0 %    Neutrophils Absolute 5.09 1.80 - 7.30 E9/L    Immature Granulocytes # 0.05 E9/L    Lymphocytes Absolute 2.77 1.50 - 4.00 E9/L    Monocytes Absolute 0.67 0.10 - 0.95 E9/L    Eosinophils Absolute 0.13 0.05 - 0.50 E9/L    Basophils Absolute 0.05 0.00 - 0.20 E9/L   Troponin   Result Value Ref Range    Troponin <0.01 0.00 - 0.03 ng/mL   Brain Natriuretic Peptide   Result Value Ref Range    Pro- (H) 0 - 125 pg/mL   Urinalysis, reflex to microscopic   Result Value Ref Range    Color, UA Yellow Straw/Yellow    Clarity, UA Clear Clear    Glucose, Ur Negative Negative mg/dL    Bilirubin Urine Negative Negative    Ketones, Urine Negative Negative mg/dL    Specific Gravity, UA 1.010 1.005 - 1.030    Blood, Urine MODERATE (A) Negative    pH, UA 6.0 5.0 - 9.0    Protein, UA Negative Negative mg/dL    Urobilinogen, Urine 0.2 <2.0 E.U./dL    Nitrite, Urine Negative Negative    Leukocyte Esterase, Urine TRACE (A) Negative   Protime-INR   Result Value Ref Range    Protime 32.1 (H) 9.3 - 12.4 sec    INR 2.8    APTT   Result Value Ref Range    aPTT 38.0 (H) 24.5 - 35.1 sec   Microscopic Urinalysis   Result Value Ref Range    WBC, UA 0-1 0 - 5 /HPF    RBC, UA 2-5 0 - 2 /HPF    Bacteria, UA RARE (A) None Seen /HPF   EKG 12 Lead   Result Value Ref Range    Ventricular Rate 73 BPM    Atrial Rate 73 BPM    P-R Interval 152 ms    QRS Duration 86 ms    Q-T Interval 410 ms    QTc Calculation (Bazett) 451 ms    P Axis 82 degrees    R Axis 74 degrees    T Axis 83 degrees       RADIOLOGY:  Interpreted by Radiologist.  XR CHEST PORTABLE   Final Result      No focal airspace opacity or pleural effusion. CT Head WO Contrast   Final Result      No CT evidence of acute intracranial pathology. CT Cervical Spine WO Contrast   Final Result      No fracture or malalignment of the cervical spine. EKG:  This EKG is signed and interpreted by me. Rate: 73  Rhythm: Sinus  Interpretation: non-specific EKG  Comparison: stable as compared to patient's most recent EKG          ------------------------- NURSING NOTES AND VITALS REVIEWED ---------------------------   The nursing notes within the ED encounter and vital signs as below have been reviewed by myself. BP (!) 166/71   Pulse 72   Temp 98 °F (36.7 °C) (Temporal)   Resp 14   Ht 5' 5\" (1.651 m)   Wt 173 lb (78.5 kg)   LMP 11/08/2008 (LMP Unknown)   SpO2 96%   BMI 28.79 kg/m²   Oxygen Saturation Interpretation: Normal    The patients available past medical records and past encounters were reviewed.         ------------------------------ ED COURSE/MEDICAL DECISION MAKING----------------------  Medications   0.9 % sodium chloride infusion ( Intravenous New Bag 9/8/20 2675)   influenza quadrivalent split vaccine (FLUZONE;FLUARIX;FLULAVAL;AFLURIA) injection 0.5 mL (has no administration in time range)   atorvastatin (LIPITOR) tablet 10 mg (has no administration in time range)   albuterol (PROVENTIL) nebulizer solution 2.5 mg (has no administration in time range)   lisinopril (PRINIVIL;ZESTRIL) tablet 10 mg (has no administration in time range)   metoprolol succinate (TOPROL change    Patient rechecked and having no chest pain no difficulty breathing patient made aware plan for admission  Consultations:             I spoke to house attending patient will be admitted call was placed to resident    Critical Care: This patient's ED course included: a personal history and physicial eaxmination    This patient has been closely monitored during their ED course. Counseling: The emergency provider has spoken with the patient and discussed todays results, in addition to providing specific details for the plan of care and counseling regarding the diagnosis and prognosis. Questions are answered at this time and they are agreeable with the plan.       --------------------------------- IMPRESSION AND DISPOSITION ---------------------------------    IMPRESSION  1. Syncope and collapse    2. Headache, unspecified headache type        DISPOSITION  Disposition: Admit to monitored bed  Patient condition is stable        NOTE: This report was transcribed using voice recognition software.  Every effort was made to ensure accuracy; however, inadvertent computerized transcription errors may be present          Sandeep Jay MD  09/08/20 1121       Sandeep Jay MD  09/09/20 5012

## 2020-09-09 NOTE — PROGRESS NOTES
[address]   Internal Medicine Residency / [address]    Attending Physician Statement  I have discussed the case, including pertinent history and exam findings with the resident and the team.  I have seen and examined the patient and the key elements of the encounter have been performed by me. I agree with the assessment, plan and orders as documented by the resident. Admitted with syncope  And hx of mechanical valve replacement for bicuspid AV  NO CLINICAL EVIDENCE FOR CHF  PE negative   No OH  Impression; Cardiac syncope  Plan; R/O V tachy vs domitila degree AV Block  Plan: Consult EP    Remainder of medical problems as per resident note.       Samantha Alvarado  Internal Medicine Residency Faculty

## 2020-09-09 NOTE — PROGRESS NOTES
After administering Mag IV, pt c/o burning to arm and a pink/red streak was noted above site. Stopped IV and flushed, then perfect served Dr Alexandria Yip. IV does not appear to be infiltrated.

## 2020-09-09 NOTE — CONSULTS
unifocal PVCs rate 70 bpm.         Cardiac electrophysiology service is consulted for Syncope with NSVT.     Patient Active Problem List    Diagnosis Date Noted    Syncope and collapse 2020    Depression 2017    H/O mechanical aortic valve replacement 2017    Hyperlipidemia 2017    Chronic obstructive pulmonary disease ([facility] 2017    Essential hypertension 2017    Ascending aortic aneurysm ([facility] 2017    Vitamin D deficiency 2017    Current smoker 10/09/2013    Acid reflux 2011       Past Medical History:   Diagnosis Date    Anticoagulant long-term use     Anxiety     Asthma     CAD (coronary artery disease)     Chronic back pain     COPD (chronic obstructive pulmonary disease) ([facility]     Depression     GERD (gastroesophageal reflux disease)     Headache     Hyperlipidemia     Hypertension     Restless legs syndrome     Urinary incontinence        Family History   Problem Relation Age of Onset    Heart Disease Mother     Arthritis Mother     Asthma Mother     Diabetes Mother     High Blood Pressure Mother     High Cholesterol Mother     Stroke Mother     Heart Disease Father     Arthritis Father     Asthma Father     Diabetes Father     High Blood Pressure Father     High Cholesterol Father     Breast Cancer Sister     Asthma Brother     Diabetes Maternal Aunt     Diabetes Paternal Aunt     High Blood Pressure Paternal Aunt     Arthritis Paternal Grandmother     Asthma Paternal Grandmother        Social History     Tobacco Use    Smoking status: Current Every Day Smoker     Packs/day: 1.00     Years: 45.00     Pack years: 45.00     Types: Cigarettes     Last attempt to quit: 2019     Years since quittin.7    Smokeless tobacco: Never Used   Substance Use Topics    Alcohol use: Not Currently     Frequency: Monthly or less       Current Facility-Administered Medications   Medication Dose Route Frequency Provider Last Rate Last Dose    nicotine (NICODERM CQ) 21 MG/24HR 1 patch  1 patch Transdermal Daily Ivonne Bower MD   1 patch at 09/09/20 1005    [START ON 9/10/2020] cosyntropin (CORTROSYN) injection 250 mcg  250 mcg Intravenous Once Fabio Lowery MD        [START ON 9/10/2020] metoprolol succinate (TOPROL XL) extended release tablet 50 mg  50 mg Oral Daily Maximiliano Kumar MD        [START ON 9/10/2020] regadenoson (LEXISCAN) injection 0.4 mg  0.4 mg Intravenous ONCE PRN Maximiliano Kumar MD        heparin (porcine) injection 4,000 Units  4,000 Units Intravenous Once Maximiliano Kumar MD        heparin (porcine) injection 4,000 Units  4,000 Units Intravenous PRN Maximiliano Kumar MD        heparin (porcine) injection 2,000 Units  2,000 Units Intravenous PRN Maximiliano Kumar MD        heparin 25,000 units in dextrose 5% 250 mL infusion  12 Units/kg/hr Intravenous Continuous Maximiliano Kumar MD        perflutren lipid microspheres (DEFINITY) injection 1.65 mg  1.5 mL Intravenous ONCE PRN Maximiliano Kumar MD        atorvastatin (LIPITOR) tablet 10 mg  10 mg Oral Daily Ivonne Bower MD   10 mg at 09/09/20 1005    albuterol (PROVENTIL) nebulizer solution 2.5 mg  2.5 mg Nebulization Q6H PRN Ivonne Bower MD   2.5 mg at 09/09/20 1143    lisinopril (PRINIVIL;ZESTRIL) tablet 10 mg  10 mg Oral Daily Ivonne Bower MD   10 mg at 09/09/20 1005    montelukast (SINGULAIR) tablet 10 mg  10 mg Oral Nightly Ivonne Bower MD   10 mg at 09/09/20 0114    pantoprazole (PROTONIX) tablet 40 mg  40 mg Oral Daily Ivonne Bower MD   40 mg at 09/09/20 1006    Roflumilast (DALIRESP) tablet 250 mcg  250 mcg Oral Daily Ivonne Bower MD   250 mcg at 09/09/20 1004    rOPINIRole (REQUIP) tablet 0.25 mg  0.25 mg Oral Nightly Ivonne Bower MD   0.25 mg at 09/09/20 0114    sertraline (ZOLOFT) tablet 150 mg  150 mg Oral Daily Ivonne Bower MD   150 mg at 09/09/20 1006    glucose (GLUTOSE) 40 % oral gel 15 g  15 g Oral PRN Matty Oral  Temporal   SpO2: 95% 97% 96% 97%   Weight:       Height:          Constitutional: Well-developed, no acute distress, examined in room without family   Eyes: conjunctivae normal, no xanthelasma   Ears, Nose, Throat: oral mucosa moist, no cyanosis   CV: no JVD. Regular rate and rhythm. Normal S1 mechanical S2 and no S3. No murmurs, rubs, or gallops. PMI is nondisplaced  Lungs: wheezy to auscultation bilaterally, normal respiratory effort without used of accessory muscles  Abdomen: soft, non-tender, bowel sounds present, no masses or hepatomegaly   Musculoskeletal: no digital clubbing, no edema   Skin: warm, no rashes     I have personally reviewed the laboratory, cardiac diagnostic and radiographic testing as outlined below:    Data:    Recent Labs     09/08/20  1809 09/09/20  0450   WBC 8.8 7.7   HGB 12.8 11.7   HCT 36.6 35.8    177     Recent Labs     09/08/20  1809 09/09/20  0450    144   K 4.0 4.1    106   CO2 26 29   BUN 13 10   CREATININE 0.7 0.7   CALCIUM 9.0 8.6      No results found for: MG  No results for input(s): TSH in the last 72 hours. Recent Labs     09/08/20  1809 09/09/20  0450   INR 2.8 2.3       CXR 09/08/20:      No focal airspace opacity or pleural effusion. Telemetry 09/09/20: Sinus with multi focal PVCs NSVT at 0432 26 beats of Monomorphic     EKG 09/09/20: sinus with frequent monomorphic PVCs QRS 84ms QTc 466ms  Please see scan in Cardiology.     Echocardiogram 02/18/2020:   Transthoracic Echocardiography Report (TTE)      Demographics      Patient Name    Sara Lipscomb  Gender            Female                   J      Medical Record  [id number]     Room Number   Number      Account #       [de-identified]    Procedure Date    02/18/2020      Corporate ID                 Ordering                                Physician      Accession       [id number]    Referring         Edgard Hurtado MD   Number                       Physician      Date of Birth   1960   Sonographer [facility]      Age             60 year(s)   Interpreting      [address] [place] [address]                                Physician         Physician Cardiology                                                  Trista Serrano MD                                   Any Other     Procedure     Type of Study      TTE procedure:Echo Complete W/Doppler & Color Flow. Procedure Date  Date: 02/18/2020 Start: 09:50 AM     Study Location: Echo Lab  Technical Quality: Adequate visualization     Indications: Aortic Valve Replacement.     Patient Status: Routine     Height: 65 inches Weight: 175 pounds BSA: 1.87 m^2 BMI: 29.12 kg/m^2     HR: 77 bpm BP: 144/71 mmHg      Findings      Left Ventricle   Normal left ventricle size and systolic function. Ejection fraction is visually estimated at 55-60%. Septal motion consistent with post bypass surgery. Mild concentric left ventricular hypertrophy. Stage I diastolic dysfunction. Right Ventricle   Normal right ventricular size. Right ventricle global systolic function is mildly reduced . TAPSE 15 mm. Left Atrium   Left atrium is of normal size. Interatrial septum appears intact. No evidence of patent foramen ovale. Right Atrium   Mildly enlarged right atrium size. Mitral Valve   Mild mitral annular calcification. No evidence of mitral valve stenosis. Physiologic and/or trace mitral regurgitation is present. Tricuspid Valve   The tricuspid valve appears structurally normal.   Mild tricuspid regurgitation. RVSP is 41 mmHg. Pulmonary hypertension is mild . Aortic Valve   Well seated mechanical prosthetic valve in aortic position. Physiologic and/or trace aortic regurgitation is noted. Aortic valve peak velocity 2.4 m/s, mean gradient 14 mmHg, DVI 0.52, no   prosthetic valve dysfunction. Pulmonic Valve   Pulmonic valve is structurally normal.   No evidence of any pulmonic regurgitation.    No evidence of pulmonic valve stenosis. Pericardial Effusion   No evidence for hemodynamically significant pericardial effusion. Pleural Effusion   No evidence of pleural effusion. Aorta   Dilatation of ascending aorta (4.1cm) . Miscellaneous   The inferior vena cava diameter is normal with normal respiratory   variation. Conclusions      Summary   Normal left ventricle size and systolic function. Ejection fraction is visually estimated at 55-60%. Septal motion consistent with post bypass surgery. Mild concentric left ventricular hypertrophy. Stage I diastolic dysfunction. Normal right ventricular size. Right ventricle global systolic function is mildly reduced . TAPSE 15 mm. Well seated mechanical prosthetic valve in aortic position. Aortic valve peak velocity 2.4 m/s, mean gradient 14 mmHg, DVI 0.52, no   prosthetic valve dysfunction. Mild tricuspid regurgitation. RVSP is 41 mmHg. Pulmonary hypertension is mild . No evidence for hemodynamically significant pericardial effusion. No previous echo for comparison.       Signature      ----------------------------------------------------------------   Electronically signed by Hany Acevedo MD(Interpreting   physician) on 02/18/2020 09:55 PM   ----------------------------------------------------------------     M-Mode/2D Measurements & Calculations      LV Diastolic   LV Systolic Dimension: 3.5 AV Cusp Separation: 1.4 cmLA   Dimension: 5   cm                         Dimension: 3.2 cmAO Root   cm             LV Volume Diastolic: 545.9 Dimension: 3.2 cmLA Area: 14.2   LV FS:30 %     ml                         cm^2   LV PW          LV Volume Systolic: 12.5   Diastolic: 1.2 ml   cm             LV EDV/LV EDV Index: 116.5   LV PW          ml/62 ml/m^2LV ESV/LV ESV  RV Diastolic Dimension: 2.6 cm   Systolic: 1.4  Index: 68.9 ml/28ml/ m^2   cm             EF Calculated: 54.9 %      LA/Aorta: 0.88   Septum         LV Mass Index: 125         Ascending Aorta: 4.1 cm   Diastolic: 1.2 l/min*m^2                  LA volume/Index: 44.8 ml   cm             LV Length: 8.5 cm          /23.9ml/m^2   Septum                                    RA Area: 31.3 cm^2   Systolic: 1.4  LVOT: 2 cm   cm   CO: 7.37 l/min   CI: 3.94   l/m*m^2   LV Mass:   233.75 g     Doppler Measurements & Calculations      MV Peak E-Wave: 1.05   AV Peak Velocity: 2.41 m/s  LVOT Peak Velocity:   m/s                    AV Peak Gradient: 23.31     1.26 m/s   MV Peak A-Wave: 0.85   mmHg                        LVOT Mean Velocity:   m/s                    AV Mean Velocity: 1.72 m/s  0.94 m/s   MV E/A Ratio: 1.24     AV Mean Gradient: 13.6 mmHg LVOT Peak Gradient: 6.4   MV Peak Gradient: 4.7  AV VTI: 58.1 cm             mmHgLVOT Mean Gradient:   mmHg                   AV Area (Continuity):1.65   3.9 mmHg   MV Mean Gradient: 2.3  cm^2                        Estimated RVSP: 41.4   mmHg                                               mmHg   MV Mean Velocity: 0.72 LVOT VTI: 30.5 cm           Estimated RAP:3 mmHg   m/s   MV Deceleration Time:  Estimated PASP: 41.42 mmHg   223.7 msec             Pulm. Vein A Reversal       TR Velocity:3.1 m/s   MV P1/2t: 93.1 msec    Duration:129.2 msec         TR Gradient:38.42 mmHg   MVA by PHT:2.36 cm^2   Pulm. Vein D Velocity:0.58  PV Peak Velocity: 1.1   MV Area (continuity):  m/sPulm. Vein A Reversal    m/s   3.1 cm^2               Velocity:0.26 m/s           PV Peak Gradient: 4.8   MV E' Septal Velocity: Pulm.  Vein S Velocity: 0.59 mmHg   0.08 m/s               m/s                         PV Mean Velocity: 0.71   MV E' Lateral                                      m/s   Velocity: 12 m/s                                   PV Mean Gradient: 2.4   MR Velocity: 5.11 m/s                              mmHg   MV LAKEISHA PISA: 0.09 cm^2   MR VTI: 145.5 cm   Alias Velocity: 0.21   m/sPISA Radius: 0.6 cm      PISA area: 2.26 cm^2MR   flow rate: 46.33   ml/sMR volume:13.1 ml [email or web address] [id number]      Stress Test 12/30/2016:      TECHNIQUE:  Using Lexiscan stress, 35 mCi of Tc-99m MIBI was injected    intravenously followed by SPECT myocardial perfusion imaging. Previously a resting SPECT myocardial perfusion study had been    acquired following the intravenous injection of 12 mCi of Tc-99m MIBI.         FINDINGS: Srinivas Moat is a normal distribution of left ventricular    myocardial activity on both the Lexiscan stress and rest SPECT    myocardial perfusion images. Note is that of a breast shadow over the    anterior wall. Gated study shows normal left ventricular wall motion    and myocardial thickening during systole.  Resting left ventricular    ejection fraction 57%.           Impression    Normal examination.  In particular, there is no evidence    of left ventricular myocardium at risk for stress-induced ischemia. Outpatient Monitor 03/25/2020: I have independently reviewed all of the ECGs and rhythm strips per above     Assessment/Plan: This is a 63 y.o. female with a history of   Patient Active Problem List   Diagnosis    Depression    H/O mechanical aortic valve replacement    Hyperlipidemia    Chronic obstructive pulmonary disease ([address]    Essential hypertension    Ascending aortic aneurysm ([facility])    Vitamin D deficiency    Acid reflux    Current smoker    Syncope and collapse    who presents with Syncope. 1. Syncope   - NSVT seen 09/09/20  - Clinically can not rule out orthostatic hypotension VS arrhythmia     2. NSVT   - Monomorphic  - 26 beats   - LVEF 55-60% 02/18/20  - last stress 12/30/2016     3. Frequent PVCs   - Symptomatic   - PVC burden 12% seen 04/09/2020  -  At least 2 different morphology   - Toprol 25 mg daily reduced symptome now 50 QD    4.  Mechanical aortic valve   - 2009   - Normal function on last echo in 02/2020  - Maintained on coumadin now on heparin drip     5. Ascending aortic aneurysm   - Follows with Dr. Jensen Level   - 4.5 CM    6. COPD   - Albuterol, brovana, pulmicort, singulair, Daliresp  - Dr. Enma Mendenhall     7. Hypertension  - Lisinopril      8. HLD  - statin    9. Tobacco use   - Nicoderm patch     10. GERD   - Protonix    11. Depression/Anxity   - Requip, Zoloft       Recommendations to follow per Dr. Kavya Lezama. Thank you for allowing me to participate in your patient's care. Please call me if there are any questions or concerns. Discussed with Dr. Kavya Lezama. ANNABELLE Parmar - CNP  Cardiac Electrophysiology  [address] [facility] [place]  [facility] [place]: [place] [facility]  2:58 PM  9/9/2020    ______________________________________________________________________    I have personally seen and evaluated the patient. I personally obtained the history and performed the physical exam. I personally reviewed all of the above labs and data. All of the assessments and recommendations are from me. I have reviewed the above documentation completed by the [facility]. Please see my additional contributions to the HPI, physical exam, and assessment, and recommendations below. History of Present Illness: The patient is a 63year-old lady with significant past medical history of status post mechanical aortic valve replacement, symptomatic PVCs, hyperlipidemia, COPD, depression and GERD who presented to the hospital on 9/8/20 after syncopal episode. The patient states that she was at home and while he was taking clothing out of her dryer, she began to feel hot flushing sensation, palpitations, dizziness and then pass out for less than a minute and found herself on the floor. She denies any previous syncope. She reports occasional dizziness hen getting up quickly. This morning while she was sleeping, she was noted to have 26 beats of monomorphic ventricular tachycardia.  She denies any SCD in family. Her last echocardiogram on 02/18/20 showed LV EF of 55 to 60%. A 24 hour Holter monitor on 04/09/20 showed a PVC burden of 12% with ventricular triplet. Cardiac electrophysiology service is consulted for syncope with NSVT. ROS:   Constitutional: Negative for fever, activity change and appetite change. HENT: Negative for epistaxis. Eyes: Negative for diploplia, blurred vision. Respiratory: Negative for cough and chest tightness. Positive for shortness of breath and wheezing. Cardiovascular: pertinent positives in HPI  Gastrointestinal: Negative for abdominal pain and blood in stool. All other review of systems are negative     PHYSICAL EXAM:   Vitals:    09/09/20 0834 09/09/20 0945 09/09/20 1143 09/09/20 1228   BP:  (!) 164/68  (!) 168/73   Pulse:  75  69   Resp:  18  18   Temp:  98 °F (36.7 °C)  97.8 °F (36.6 °C)   TempSrc:  Oral  Temporal   SpO2: 95% 97% 96% 97%   Weight:       Height:          Constitutional: Well-developed, no acute distress  Eyes: conjunctivae normal, no xanthelasma   Ears, Nose, Throat: oral mucosa moist, no cyanosis   CV: no JVD. Regular rate and rhythm. Crisp valve sound. No murmurs, rubs, or gallops. PMI is nondisplaced  Lungs: Wheezing bilaterally, normal respiratory effort without used of accessory muscles  Abdomen: soft, non-tender, bowel sounds present, no masses or hepatomegaly   Musculoskeletal: no digital clubbing, trace edema of LEs  Skin: warm, no rashes     EKG 9/9/20: Normal sinus rhythm, PVCs (RBBB, inferior axis), QTc 466 msec    Telemetry 9/9/20: NSR, NSVT of 26 beats at 4.32 am 9/9/20. Echocardiogram 02/18/20:    Findings      Left Ventricle   Normal left ventricle size and systolic function. Ejection fraction is visually estimated at 55-60%. Septal motion consistent with post bypass surgery. Mild concentric left ventricular hypertrophy. Stage I diastolic dysfunction. Right Ventricle   Normal right ventricular size.    Right ventricle global systolic function is mildly reduced . TAPSE 15 mm. Left Atrium   Left atrium is of normal size. Interatrial septum appears intact. No evidence of patent foramen ovale. Right Atrium   Mildly enlarged right atrium size. Mitral Valve   Mild mitral annular calcification. No evidence of mitral valve stenosis. Physiologic and/or trace mitral regurgitation is present. Tricuspid Valve   The tricuspid valve appears structurally normal.   Mild tricuspid regurgitation. RVSP is 41 mmHg. Pulmonary hypertension is mild . Aortic Valve   Well seated mechanical prosthetic valve in aortic position. Physiologic and/or trace aortic regurgitation is noted. Aortic valve peak velocity 2.4 m/s, mean gradient 14 mmHg, DVI 0.52, no   prosthetic valve dysfunction. Pulmonic Valve   Pulmonic valve is structurally normal.   No evidence of any pulmonic regurgitation. No evidence of pulmonic valve stenosis. Pericardial Effusion   No evidence for hemodynamically significant pericardial effusion. Pleural Effusion   No evidence of pleural effusion. Aorta   Dilatation of ascending aorta (4.1cm) . Miscellaneous   The inferior vena cava diameter is normal with normal respiratory   variation. Conclusions      Summary   Normal left ventricle size and systolic function. Ejection fraction is visually estimated at 55-60%. Septal motion consistent with post bypass surgery. Mild concentric left ventricular hypertrophy. Stage I diastolic dysfunction. Normal right ventricular size. Right ventricle global systolic function is mildly reduced . TAPSE 15 mm. Well seated mechanical prosthetic valve in aortic position. Aortic valve peak velocity 2.4 m/s, mean gradient 14 mmHg, DVI 0.52, no   prosthetic valve dysfunction. Mild tricuspid regurgitation. RVSP is 41 mmHg. Pulmonary hypertension is mild . No evidence for hemodynamically significant pericardial effusion.    No previous echo for comparison.       Signature      ----------------------------------------------------------------   Electronically signed by GAGE DowdInterpreting   physician) on 02/18/2020 09:55 PM   ----------------------------------------------------------------     M-Mode/2D Measurements & Calculations      LV Diastolic   LV Systolic Dimension: 3.5 AV Cusp Separation: 1.4 cmLA   Dimension: 5   cm                         Dimension: 3.2 cmAO Root   cm             LV Volume Diastolic: 673.4 Dimension: 3.2 cmLA Area: 14.2   LV FS:30 %     ml                         cm^2   LV PW          LV Volume Systolic: 79.2   Diastolic: 1.2 ml   cm             LV EDV/LV EDV Index: 116.5   LV PW          ml/62 ml/m^2LV ESV/LV ESV  RV Diastolic Dimension: 2.6 cm   Systolic: 1.4  Index: 52.7 ml/28ml/ m^2   cm             EF Calculated: 54.9 %      LA/Aorta: 0.88   Septum         LV Mass Index: 125         Ascending Aorta: 4.1 cm   Diastolic: 1.2 l/min*m^2                  LA volume/Index: 44.8 ml   cm             LV Length: 8.5 cm          /23.9ml/m^2   Septum                                    RA Area: 90.8 cm^2   Systolic: 1.4  LVOT: 2 cm   cm   CO: 7.37 l/min   CI: 3.94   l/m*m^2   LV Mass:   233.75 g     Doppler Measurements & Calculations      MV Peak E-Wave: 1.05   AV Peak Velocity: 2.41 m/s  LVOT Peak Velocity:   m/s                    AV Peak Gradient: 23.31     1.26 m/s   MV Peak A-Wave: 0.85   mmHg                        LVOT Mean Velocity:   m/s                    AV Mean Velocity: 1.72 m/s  0.94 m/s   MV E/A Ratio: 1.24     AV Mean Gradient: 13.6 mmHg LVOT Peak Gradient: 6.4   MV Peak Gradient: 4.7  AV VTI: 58.1 cm             mmHgLVOT Mean Gradient:   mmHg                   AV Area (Continuity):1.65   3.9 mmHg   MV Mean Gradient: 2.3  cm^2                        Estimated RVSP: 41.4   mmHg                                               mmHg   MV Mean Velocity: 0.72 LVOT VTI: 30.5 cm           Estimated RAP:3 mmHg m/s   MV Deceleration Time:  Estimated PASP: 41.42 mmHg   223.7 msec             Pulm. Vein A Reversal       TR Velocity:3.1 m/s   MV P1/2t: 93.1 msec    Duration:129.2 msec         TR Gradient:38.42 mmHg   MVA by PHT:2.36 cm^2   Pulm. Vein D Velocity:0.58  PV Peak Velocity: 1.1   MV Area (continuity):  m/sPulm. Vein A Reversal    m/s   3.1 cm^2               Velocity:0.26 m/s           PV Peak Gradient: 4.8   MV E' Septal Velocity: Pulm. Vein S Velocity: 0.59 mmHg   0.08 m/s               m/s                         PV Mean Velocity: 0.71   MV E' Lateral                                      m/s   Velocity: 12 m/s                                   PV Mean Gradient: 2.4   MR Velocity: 5.11 m/s                              mmHg   MV LAKEISHA PISA: 0.09 cm^2   MR VTI: 145.5 cm   Alias Velocity: 0.21   m/sPISA Radius: 0.6 cm      PISA area: 2.26 cm^2MR   flow rate: 46.33   ml/sMR volume:13.1 ml     Stress Test 12/30/16:      TECHNIQUE:  Using Lexiscan stress, 35 mCi of Tc-99m MIBI was injected    intravenously followed by SPECT myocardial perfusion imaging. Previously a resting SPECT myocardial perfusion study had been    acquired following the intravenous injection of 12 mCi of Tc-99m MIBI.         FINDINGS: Levada Correa is a normal distribution of left ventricular    myocardial activity on both the Lexiscan stress and rest SPECT    myocardial perfusion images. Note is that of a breast shadow over the    anterior wall. Gated study shows normal left ventricular wall motion    and myocardial thickening during systole.  Resting left ventricular    ejection fraction 57%.           Impression    Normal examination.  In particular, there is no evidence    of left ventricular myocardium at risk for stress-induced ischemia. Outpatient Monitor 03/25/20:       Assessment/Plan:    1. Syncope   - Unclear etiology.   - History suggested of vasovagal but can not totally exclude arhythmia cuase  - Documented 26 beast of NSVT on 09/09/20 while sleeping.  - Echocardiogram 2/28/20 showed LV EF 55-60%. - Plan for nuclear stress test and possible LHC with CAG per Cardiology. - Consider EP study with possible ICD or implantable loop recorder insertion if CAG showed no significant CAD. 2. Nonsustained ventricular tachycardia  - Monomorphic in nature  - 26 beats in duration.  - Occurred while sleeping.   - LVEF 55-60%  On TTE 02/18/20  - Last stress was normal on 12/30/16     3. PVCs   - Symptomatic   - PVC burden of 12% noted on 24 hour Holter on 04/09/20.  - Started on Toprol 25 mg daily and now increase to 50 mg daily. 4. Status post mechanical aortic valve   - in 2009   - Normal mechanical valve function on TTE 02/18/20  - On Coumadin and currently on held and now on heparin drip     5. Ascending aortic aneurysm   - 4.5 CM  - Follows with Dr. Chinedu Moon     6. COPD   - On Albuterol, Brovana, Pulmicort, Singulair and Daliresp  - Follows with Dr. Son Aguirre     7. Hypertension  - On Toprol and Lisinopril      8. Hyperlipidemia  - On Lipitor    9. Tobacco use   - On Nicoderm patch     10. GERD   - On Protonix    11. Depression and anxiety   - On Requip and Zoloft     Recommendations:  1. Ischemic work up per Cardiology. 2. Consider EP study with possible ICD or implantable loop recorder insertion if CAG showed no significant CAD. 3. Continue Toprol XL for now and consider hold at least 24 to 48 hours before EP study. 4. Keep potassium > 4 and magnesium > 2. I have spent a total of 110 minutes with the patient and the family reviewing the above stated recommendations. And a total of >50% of that time involved face-to-face time providing counseling and or coordination of care with the other providers. Thank you for allowing me to participate in your patient's care. Please call me if there are any questions or concerns.      Carlos Vincent MD  Cardiac Electrophysiology  [facility]) Physicians  [facility]: [facility]  4:01 PM  9/9/2020

## 2020-09-09 NOTE — CONSULTS
Inpatient Cardiology Consultation      Reason for Consult:  Syncope    Consulting Physician: Dr. Pippa Armijo    Requesting Physician:  Dr. Monica Campbell     Date of Consultation: 9/9/2020    History of Present Illness:    Mrs. Primitivo Hill is a 63year old lady who is followed at our practice by Dr. Marbella Back; her past medical history includes AVR with mechanical prosthesis in 2009, emphysema and asthma, and hypertension. She complained of palpitations on her visit with Dr. Marbella Back in March 2020, and subsequent Holter monitor showed 12.4% ventricular burden; she was started on Toprol XL and reports significant improvement in her symptoms. She had been in her usual state of health until yesterday when she transferring clothes from her washer to dryer, and then out of the dryer. She became very hot and lightheaded, and felt as though her heart was racing. The next thing she remembers is lying on the floor; she denies loss of bowel or bladder control, and had no chest pain or dyspnea. Her grandson brought to her to the ER, and she was hypertensive on arrival. Initial troponin was negative, and EKG showed SR with no acute changes. She was started on IV fluids at 100 ml/hour and orthostatic vital signs were negative. Following admission, telemetry showed two episodes of NSVT, with the longest being 18 beats. Currently, Mrs. Primitivo Hill is sitting up in bed and in no apparent distress. She denies recent chest pain or change in functional capacity; she has some degree of chronic dyspnea and feels this has been unchanged, although she does wheeze more than usual in the hot, humid weather. She denies previous episodes of syncope. Past Medical History:    1. S/p mechanical AVR in 2009 (neither preop cath report nor surgical report available)  2. Most recent echocardiogram 2/2020 (Dr. Beryle Canner):  Normal left ventricle size and systolic function. Ejection fraction is visually estimated at 55-60%.   Septal motion consistent with post bypass surgery. Mild concentric left ventricular hypertrophy. Stage I diastolic dysfunction. Normal right ventricular size. Right ventricle global systolic function is mildly reduced . TAPSE 15 mm. Well seated mechanical prosthetic valve in aortic position. Aortic valve peak velocity 2.4 m/s, mean gradient 14 mmHg, DVI 0.52, no prosthetic valve dysfunction. Mild tricuspid regurgitation. RVSP is 41 mmHg. Pulmonary hypertension is mild. No evidence for hemodynamically significant pericardial effusion. No previous echo for comparison. 3. 4.5 cm ascending aortic aneurysm on CT of chest 2/2020  4. Palpitations  A. Holter monitor 4/2020: SR with average heart rate of 85 (minimum 63, maximum 161). <0.1% SVE burden. 12.4% VE burden. She was started on Toprol XL 25 mg daily. 5. Hypertension  6. Hyperlipidemia  7. Asthma/Severe emphysema/Lung nodules  8. Tobacco abuse   9. GERD  10. Restless leg syndrome  11. Urinary incontinence/stress incontinence  12. Depression and anxiety  13. Appendectomy  14. Hysterectomy  15. Melena in 11/2019: EGD showed mild gastritis and hiatal hernia. Colonoscopy showed diverticulosis    Medications Prior to Admit:  Prior to Admission medications    Medication Sig Start Date End Date Taking? Authorizing Provider   warfarin (COUMADIN) 5 MG tablet Take 5 mg by mouth daily Sunday, Monday, Wednesday, and Fridays   Yes Historical Provider, MD   Cholecalciferol (VITAMIN D) 50 MCG (2000 UT) CAPS capsule Take 4,000 Units by mouth daily   Yes Historical Provider, MD   warfarin (COUMADIN) 6 MG tablet Take one tablet on Tuesday and Thursday.   Patient taking differently: Take one tablet on Tuesday and Thursday and Saturday 8/10/20  Yes Hollice Cabot, MD   metoprolol succinate (TOPROL XL) 25 MG extended release tablet Take 1 tablet by mouth daily 7/31/20  Yes Toney Polk MD   omeprazole (PRILOSEC) 40 MG delayed release capsule take 1 capsule by mouth every morning BEFORE BREAKFAST 7/8/20  Yes Jocelynn Morris, DO   rOPINIRole (REQUIP) 0.25 MG tablet Take 1 tab in morning and 2 tabs before bed 5/1/20  Yes Isi Bacca, DO   atorvastatin (LIPITOR) 10 MG tablet take 1 tablet by mouth once daily 5/1/20  Yes Isi Bacca, DO   sertraline (ZOLOFT) 100 MG tablet take 1 and 1/2 tablet by mouth once daily 5/1/20  Yes Isi Bacca, DO   lisinopril (PRINIVIL;ZESTRIL) 10 MG tablet take 1 tablet by mouth once daily 3/20/20  Yes Niko Caputo, DO   Roflumilast (DALIRESP) 250 MCG TABS Take 250 mcg by mouth daily 2/21/20  Yes Preeti Ahn MD   Calcium Polycarbophil (FIBER) 625 MG TABS Take 1 tablet by mouth daily 1/28/20  Yes Gladys Blue MD   montelukast (SINGULAIR) 10 MG tablet take 1 tablet by mouth at bedtime 1/13/20  Yes Skye Doss MD   Azelastine HCl 137 MCG/SPRAY SOLN 2 sprays by Nasal route daily  Patient taking differently: 2 sprays by Nasal route daily as needed  8/1/19  Yes COOPER Zavala   tiotropium (SPIRIVA RESPIMAT) 2.5 MCG/ACT AERS inhaler INHALE 2 PUFFS BY MOUTH DAILY.  PAP Medication 7/3/19  Yes Gladys Blue MD   VENTOLIN  (23 Base) MCG/ACT inhaler inhale 1 puff by mouth every 4 hours if needed for wheezing 3/15/19  Yes Cristy Colindres DO   DULERA 100-5 MCG/ACT inhaler inhale 2 puffs by mouth twice a day 3/7/19  Yes Talita Tyson MD   vitamin C (ASCORBIC ACID) 500 MG tablet Take 1,000 mg by mouth daily   Yes Historical Provider, MD   niacin 500 MG tablet Take 2 tablets by mouth 2 times daily (with meals)  Patient taking differently: Take 1,000 mg by mouth 2 times daily (with meals) Takes OTC only 5/5/17  Yes Keila Dangelo MD   Calcium Polycarbophil (FIBERCON PO) Take 2 tablets by mouth every morning (before breakfast)    Historical Provider, MD   Blood Pressure Monitoring (BLOOD PRESSURE CUFF) MISC Dx:  Hypertension with labile blood pressure 9/20/19   Deng Burr MD       Current Medications:    Current Facility-Administered Medications: nicotine (NICODERM CQ) 21 MG/24HR 1 patch, 1 patch, Transdermal, Daily  [START ON 9/10/2020] cosyntropin (CORTROSYN) injection 250 mcg, 250 mcg, Intravenous, Once  [START ON 9/10/2020] metoprolol succinate (TOPROL XL) extended release tablet 50 mg, 50 mg, Oral, Daily  [START ON 9/10/2020] regadenoson (LEXISCAN) injection 0.4 mg, 0.4 mg, Intravenous, ONCE PRN  heparin (porcine) injection 4,000 Units, 4,000 Units, Intravenous, PRN  heparin (porcine) injection 2,000 Units, 2,000 Units, Intravenous, PRN  heparin 25,000 units in dextrose 5% 250 mL infusion, 12 Units/kg/hr, Intravenous, Continuous  perflutren lipid microspheres (DEFINITY) injection 1.65 mg, 1.5 mL, Intravenous, ONCE PRN  atorvastatin (LIPITOR) tablet 10 mg, 10 mg, Oral, Daily  albuterol (PROVENTIL) nebulizer solution 2.5 mg, 2.5 mg, Nebulization, Q6H PRN  lisinopril (PRINIVIL;ZESTRIL) tablet 10 mg, 10 mg, Oral, Daily  montelukast (SINGULAIR) tablet 10 mg, 10 mg, Oral, Nightly  pantoprazole (PROTONIX) tablet 40 mg, 40 mg, Oral, Daily  Roflumilast (DALIRESP) tablet 250 mcg, 250 mcg, Oral, Daily  rOPINIRole (REQUIP) tablet 0.25 mg, 0.25 mg, Oral, Nightly  sertraline (ZOLOFT) tablet 150 mg, 150 mg, Oral, Daily  glucose (GLUTOSE) 40 % oral gel 15 g, 15 g, Oral, PRN  dextrose 50 % IV solution, 12.5 g, Intravenous, PRN  glucagon (rDNA) injection 1 mg, 1 mg, Intramuscular, PRN  dextrose 5 % solution, 100 mL/hr, Intravenous, PRN  sodium chloride flush 0.9 % injection 10 mL, 10 mL, Intravenous, 2 times per day  sodium chloride flush 0.9 % injection 10 mL, 10 mL, Intravenous, PRN  acetaminophen (TYLENOL) tablet 650 mg, 650 mg, Oral, Q6H PRN **OR** acetaminophen (TYLENOL) suppository 650 mg, 650 mg, Rectal, Q6H PRN  polyethylene glycol (GLYCOLAX) packet 17 g, 17 g, Oral, Daily PRN  promethazine (PHENERGAN) tablet 12.5 mg, 12.5 mg, Oral, Q6H PRN **OR** ondansetron (ZOFRAN) injection 4 mg, 4 mg, Intravenous, Q6H PRN  budesonide (PULMICORT) nebulizer suspension 250 mcg, 250 mcg, Nebulization, BID  Arformoterol Tartrate (BROVANA) nebulizer solution 15 mcg, 15 mcg, Nebulization, BID    Allergies:  Keflex [cephalexin] and Phenergan [promethazine hcl]    Social History:  She has been a smoker for 45 years, and has smoked up to 1 PPD. She denies alcohol or illicit drug use. She lives at home and denies the use of assistive devices. Family History: Mother  at 78 and was a diabetic, hypertension, hypercholestolemia  Father  at 71 of heart failure and had an \"enlarged heart\"  Two brothers with lung disease who wear oxygen  One sister who  of cancer     Review of Systems:     · Constitutional: Denies fatigue, fevers, chills or night sweats  · ENT: Denies headaches or bleeding gums. She has occasional self limiting nose bleeds when she blows her nose. · Cardiovascular: Denies chest pain or lower extremity swelling. Palpitations as above. · Respiratory: Denies cough, orthopnea or PND. Exertional dyspnea as above. · Gastrointestinal: Denies nausea, vomiting, diarrhea, abdominal pain, or dark/bloody stools. · Genitourinary: Denies urgency, dysuria or hematuria. Stress incontinence at times. · Musculoskeletal: Denies gait disturbance or falls, myalgias or arthralgias. · Integumentary: Denies rash or ulcerations. · Neurological: Lightheadedness and syncope as above. No numbness or tingling  · Psychiatric: Denies recent anxiety or depression. · Endocrine: Denies temperature intolerance. She has gained about 10 lbs over the past six months, which she attributes to being less active due to the pandemic. · Hematologic/Lymphatic: Denies abnormal bruising or bleeding. swollen lymph nodes    Physical Exam:   BP (!) 168/73   Pulse 69   Temp 97.8 °F (36.6 °C) (Temporal)   Resp 18   Ht 5' 5\" (1.651 m)   Wt 173 lb (78.5 kg)   LMP 2008 (LMP Unknown)   SpO2 97%   BMI 28.79 kg/m²   CONST:  Well developed, well nourished  lady who appears of stated age.  Awake, 09/09/20  0450   AST 24 19   ALT 22 18   LABALBU 4.0 3.7     CXR 9/08/2020: Impression:       No focal airspace opacity or pleural effusion. CT cervical spine 9/08/2020: Impression:       No fracture or malalignment of the cervical spine. CT head without contrast 9/08/2020: Impression:      No CT evidence of acute intracranial pathology. Assessment and Recommendations to follow by Dr. Wilian Haynes. Electronically signed by ANNABELLE Hirsch on 9/9/2020 at 3:07 PM     I personally and independently saw and examined patient and reviewed all done pertinent laboratory data, imaging studies, ECGs and rhythm strips. I have reviewed and agree with the APN history and physical exam as documented in the above note. Electronically signed by Javier Messina MD on 9/9/2020 at 3:47 PM     We are asked to see the patient for syncope     IMPRESSION:  1. Syncopal episode  2. Nonsustained VT. History of frequent ventricular ectopy on Holter monitor in 4/2020.   3. History of aortic valve replacement with mechanical prosthesis in 2009. Adequately functioning mechanical prosthesis on echo in 2/2020 with normal LV systolic function (with mild TR and mild pulmonary hypertension)  4. Chronic anticoagulation with Coumadin   5. Asthma/Emphysema (severe)/Lung nodules with continued tobacco abuse  6. 4.5 cm ascending aortic aneurysm on CT scan 2/2020  7. Hypertension. Negative for orthostatic hypotension   8. Hyperlipidemia  9. Hepatic steatosis  10. Cholelithiasis  11. Stress incontinence  12. Restless leg syndrome  13. Anxiety/Depression   14. GERD  15. Chronic back pain      PLAN:   1. Hold Coumadin and start Heparin  2. Increase Toprol XL   3. Check  Echo  4. Lexiscan nuclear stress test. Consider coronary angiography  5. Consult EP  6. Further recommendations to follow: will follow      Thank you for allowing me to participate in your patient's care.  Please feel free to contact me if you have any questions or

## 2020-09-09 NOTE — CARE COORDINATION
Social Work Discharge/ Planning:    SW met with pt who reports being independent and able to drive prior to this admission. Pt lives is a 1 story home with daughter Anay Wise) where bedroom is located in the basement. Pt has no DME, just a BSC. Pt does not wear oxygen. Pt has an inhaler. Pt does not have [facility]/SNF hx. Pt has [facility] hx but can't recall the company. Pt receives services at the [facility]. Pt's pharmacy is [facility] on [address]. Pt's reports having no needs at this time, pt's plan is to return home upon d/c. Pt's daughter will transport pt home. Pt reports no needs at this time. LONNY/BRYCE to follow.     Alyx Adair, PAVITHRA  [phone]

## 2020-09-10 ENCOUNTER — APPOINTMENT (OUTPATIENT)
Dept: NUCLEAR MEDICINE | Age: 60
DRG: 262 | End: 2020-09-10
Payer: MEDICARE

## 2020-09-10 PROBLEM — R55 SYNCOPE, CARDIOGENIC: Status: ACTIVE | Noted: 2020-09-10

## 2020-09-10 LAB
ALBUMIN SERPL-MCNC: 4.3 G/DL (ref 3.5–5.2)
ALP BLD-CCNC: 73 U/L (ref 35–104)
ALT SERPL-CCNC: 21 U/L (ref 0–32)
ANION GAP SERPL CALCULATED.3IONS-SCNC: 15 MMOL/L (ref 7–16)
APTT: 46.4 SEC (ref 24.5–35.1)
APTT: 55.9 SEC (ref 24.5–35.1)
AST SERPL-CCNC: 20 U/L (ref 0–31)
BASOPHILS ABSOLUTE: 0.03 E9/L (ref 0–0.2)
BASOPHILS RELATIVE PERCENT: 0.4 % (ref 0–2)
BILIRUB SERPL-MCNC: 0.4 MG/DL (ref 0–1.2)
BUN BLDV-MCNC: 13 MG/DL (ref 8–23)
CALCIUM SERPL-MCNC: 9.8 MG/DL (ref 8.6–10.2)
CHLORIDE BLD-SCNC: 100 MMOL/L (ref 98–107)
CHOLESTEROL, TOTAL: 168 MG/DL (ref 0–199)
CO2: 25 MMOL/L (ref 22–29)
CORTISOL TOTAL: 11.79 MCG/DL (ref 2.68–18.4)
CORTISOL TOTAL: 23.81 MCG/DL (ref 2.68–18.4)
CORTISOL TOTAL: 27.63 MCG/DL (ref 2.68–18.4)
CREAT SERPL-MCNC: 0.6 MG/DL (ref 0.5–1)
EKG ATRIAL RATE: 70 BPM
EKG ATRIAL RATE: 73 BPM
EKG P AXIS: 82 DEGREES
EKG P AXIS: 85 DEGREES
EKG P-R INTERVAL: 130 MS
EKG P-R INTERVAL: 152 MS
EKG Q-T INTERVAL: 410 MS
EKG Q-T INTERVAL: 432 MS
EKG QRS DURATION: 84 MS
EKG QRS DURATION: 86 MS
EKG QTC CALCULATION (BAZETT): 451 MS
EKG QTC CALCULATION (BAZETT): 466 MS
EKG R AXIS: 44 DEGREES
EKG R AXIS: 74 DEGREES
EKG T AXIS: 75 DEGREES
EKG T AXIS: 83 DEGREES
EKG VENTRICULAR RATE: 70 BPM
EKG VENTRICULAR RATE: 73 BPM
EOSINOPHILS ABSOLUTE: 0.08 E9/L (ref 0.05–0.5)
EOSINOPHILS RELATIVE PERCENT: 1.1 % (ref 0–6)
GFR AFRICAN AMERICAN: >60
GFR NON-AFRICAN AMERICAN: >60 ML/MIN/1.73
GLUCOSE BLD-MCNC: 112 MG/DL (ref 74–99)
HBA1C MFR BLD: 5.8 % (ref 4–5.6)
HCT VFR BLD CALC: 40.1 % (ref 34–48)
HDLC SERPL-MCNC: 50 MG/DL
HEMOGLOBIN: 13.4 G/DL (ref 11.5–15.5)
IMMATURE GRANULOCYTES #: 0.06 E9/L
IMMATURE GRANULOCYTES %: 0.9 % (ref 0–5)
INR BLD: 1.3
INR BLD: 1.3
LDL CHOLESTEROL CALCULATED: 82 MG/DL (ref 0–99)
LV EF: 49 %
LVEF MODALITY: NORMAL
LYMPHOCYTES ABSOLUTE: 2.11 E9/L (ref 1.5–4)
LYMPHOCYTES RELATIVE PERCENT: 29.9 % (ref 20–42)
MAGNESIUM: 2.1 MG/DL (ref 1.6–2.6)
MCH RBC QN AUTO: 32.4 PG (ref 26–35)
MCHC RBC AUTO-ENTMCNC: 33.4 % (ref 32–34.5)
MCV RBC AUTO: 96.9 FL (ref 80–99.9)
MONOCYTES ABSOLUTE: 0.59 E9/L (ref 0.1–0.95)
MONOCYTES RELATIVE PERCENT: 8.4 % (ref 2–12)
NEUTROPHILS ABSOLUTE: 4.18 E9/L (ref 1.8–7.3)
NEUTROPHILS RELATIVE PERCENT: 59.3 % (ref 43–80)
PDW BLD-RTO: 12.9 FL (ref 11.5–15)
PLATELET # BLD: 196 E9/L (ref 130–450)
PMV BLD AUTO: 11.4 FL (ref 7–12)
POTASSIUM REFLEX MAGNESIUM: 3.6 MMOL/L (ref 3.5–5)
PROTHROMBIN TIME: 14.4 SEC (ref 9.3–12.4)
PROTHROMBIN TIME: 14.4 SEC (ref 9.3–12.4)
RBC # BLD: 4.14 E12/L (ref 3.5–5.5)
SODIUM BLD-SCNC: 140 MMOL/L (ref 132–146)
TOTAL PROTEIN: 6.7 G/DL (ref 6.4–8.3)
TRIGL SERPL-MCNC: 182 MG/DL (ref 0–149)
TSH SERPL DL<=0.05 MIU/L-ACNC: 2.63 UIU/ML (ref 0.27–4.2)
VLDLC SERPL CALC-MCNC: 36 MG/DL
WBC # BLD: 7.1 E9/L (ref 4.5–11.5)

## 2020-09-10 PROCEDURE — 93010 ELECTROCARDIOGRAM REPORT: CPT | Performed by: INTERNAL MEDICINE

## 2020-09-10 PROCEDURE — 96366 THER/PROPH/DIAG IV INF ADDON: CPT

## 2020-09-10 PROCEDURE — 80061 LIPID PANEL: CPT

## 2020-09-10 PROCEDURE — 82533 TOTAL CORTISOL: CPT

## 2020-09-10 PROCEDURE — 93018 CV STRESS TEST I&R ONLY: CPT | Performed by: INTERNAL MEDICINE

## 2020-09-10 PROCEDURE — 36415 COLL VENOUS BLD VENIPUNCTURE: CPT

## 2020-09-10 PROCEDURE — 96375 TX/PRO/DX INJ NEW DRUG ADDON: CPT

## 2020-09-10 PROCEDURE — 94640 AIRWAY INHALATION TREATMENT: CPT

## 2020-09-10 PROCEDURE — 6370000000 HC RX 637 (ALT 250 FOR IP): Performed by: STUDENT IN AN ORGANIZED HEALTH CARE EDUCATION/TRAINING PROGRAM

## 2020-09-10 PROCEDURE — 99233 SBSQ HOSP IP/OBS HIGH 50: CPT | Performed by: INTERNAL MEDICINE

## 2020-09-10 PROCEDURE — 83036 HEMOGLOBIN GLYCOSYLATED A1C: CPT

## 2020-09-10 PROCEDURE — 6370000000 HC RX 637 (ALT 250 FOR IP): Performed by: INTERNAL MEDICINE

## 2020-09-10 PROCEDURE — 6360000002 HC RX W HCPCS: Performed by: INTERNAL MEDICINE

## 2020-09-10 PROCEDURE — 85610 PROTHROMBIN TIME: CPT

## 2020-09-10 PROCEDURE — 84443 ASSAY THYROID STIM HORMONE: CPT

## 2020-09-10 PROCEDURE — 80053 COMPREHEN METABOLIC PANEL: CPT

## 2020-09-10 PROCEDURE — 3430000000 HC RX DIAGNOSTIC RADIOPHARMACEUTICAL: Performed by: RADIOLOGY

## 2020-09-10 PROCEDURE — 2580000003 HC RX 258: Performed by: STUDENT IN AN ORGANIZED HEALTH CARE EDUCATION/TRAINING PROGRAM

## 2020-09-10 PROCEDURE — 93016 CV STRESS TEST SUPVJ ONLY: CPT | Performed by: INTERNAL MEDICINE

## 2020-09-10 PROCEDURE — 78452 HT MUSCLE IMAGE SPECT MULT: CPT

## 2020-09-10 PROCEDURE — 83735 ASSAY OF MAGNESIUM: CPT

## 2020-09-10 PROCEDURE — 2140000000 HC CCU INTERMEDIATE R&B

## 2020-09-10 PROCEDURE — A9500 TC99M SESTAMIBI: HCPCS | Performed by: RADIOLOGY

## 2020-09-10 PROCEDURE — 85730 THROMBOPLASTIN TIME PARTIAL: CPT

## 2020-09-10 PROCEDURE — 85025 COMPLETE CBC W/AUTO DIFF WBC: CPT

## 2020-09-10 PROCEDURE — 6360000002 HC RX W HCPCS: Performed by: STUDENT IN AN ORGANIZED HEALTH CARE EDUCATION/TRAINING PROGRAM

## 2020-09-10 PROCEDURE — 93017 CV STRESS TEST TRACING ONLY: CPT

## 2020-09-10 PROCEDURE — 99231 SBSQ HOSP IP/OBS SF/LOW 25: CPT | Performed by: INTERNAL MEDICINE

## 2020-09-10 RX ORDER — POTASSIUM CHLORIDE 7.45 MG/ML
10 INJECTION INTRAVENOUS
Status: DISCONTINUED | OUTPATIENT
Start: 2020-09-10 | End: 2020-09-10

## 2020-09-10 RX ORDER — POTASSIUM CHLORIDE 20 MEQ/1
40 TABLET, EXTENDED RELEASE ORAL 2 TIMES DAILY WITH MEALS
Status: DISCONTINUED | OUTPATIENT
Start: 2020-09-10 | End: 2020-09-12 | Stop reason: HOSPADM

## 2020-09-10 RX ADMIN — MONTELUKAST SODIUM 10 MG: 10 TABLET, COATED ORAL at 21:14

## 2020-09-10 RX ADMIN — Medication 35 MILLICURIE: at 10:33

## 2020-09-10 RX ADMIN — ARFORMOTEROL TARTRATE 15 MCG: 15 SOLUTION RESPIRATORY (INHALATION) at 19:37

## 2020-09-10 RX ADMIN — ALBUTEROL SULFATE 2.5 MG: 2.5 SOLUTION RESPIRATORY (INHALATION) at 02:59

## 2020-09-10 RX ADMIN — POTASSIUM CHLORIDE 40 MEQ: 1500 TABLET, EXTENDED RELEASE ORAL at 11:59

## 2020-09-10 RX ADMIN — SERTRALINE HYDROCHLORIDE 150 MG: 100 TABLET ORAL at 11:59

## 2020-09-10 RX ADMIN — COSYNTROPIN 250 MCG: 0.25 INJECTION, POWDER, LYOPHILIZED, FOR SOLUTION INTRAMUSCULAR; INTRAVENOUS at 07:58

## 2020-09-10 RX ADMIN — PANTOPRAZOLE SODIUM 40 MG: 40 TABLET, DELAYED RELEASE ORAL at 16:53

## 2020-09-10 RX ADMIN — REGADENOSON 0.4 MG: 0.08 INJECTION, SOLUTION INTRAVENOUS at 10:32

## 2020-09-10 RX ADMIN — ROFLUMILAST 250 MCG: 500 TABLET ORAL at 12:01

## 2020-09-10 RX ADMIN — POTASSIUM CHLORIDE 40 MEQ: 1500 TABLET, EXTENDED RELEASE ORAL at 16:53

## 2020-09-10 RX ADMIN — METOPROLOL SUCCINATE 50 MG: 50 TABLET, EXTENDED RELEASE ORAL at 12:03

## 2020-09-10 RX ADMIN — ATORVASTATIN CALCIUM 10 MG: 10 TABLET, FILM COATED ORAL at 21:14

## 2020-09-10 RX ADMIN — SODIUM CHLORIDE, PRESERVATIVE FREE 10 ML: 5 INJECTION INTRAVENOUS at 07:54

## 2020-09-10 RX ADMIN — ACETAMINOPHEN 650 MG: 325 TABLET, FILM COATED ORAL at 22:55

## 2020-09-10 RX ADMIN — LISINOPRIL 10 MG: 10 TABLET ORAL at 12:00

## 2020-09-10 RX ADMIN — ROPINIROLE 0.25 MG: 0.25 TABLET, FILM COATED ORAL at 21:14

## 2020-09-10 RX ADMIN — BUDESONIDE 250 MCG: 0.25 SUSPENSION RESPIRATORY (INHALATION) at 19:37

## 2020-09-10 RX ADMIN — ALBUTEROL SULFATE 2.5 MG: 2.5 SOLUTION RESPIRATORY (INHALATION) at 12:30

## 2020-09-10 RX ADMIN — ALBUTEROL SULFATE 2.5 MG: 2.5 SOLUTION RESPIRATORY (INHALATION) at 19:36

## 2020-09-10 RX ADMIN — Medication 12 MILLICURIE: at 09:15

## 2020-09-10 ASSESSMENT — PAIN DESCRIPTION - DESCRIPTORS: DESCRIPTORS: ACHING

## 2020-09-10 ASSESSMENT — PAIN SCALES - GENERAL
PAINLEVEL_OUTOF10: 0
PAINLEVEL_OUTOF10: 4
PAINLEVEL_OUTOF10: 2
PAINLEVEL_OUTOF10: 0

## 2020-09-10 ASSESSMENT — PAIN DESCRIPTION - LOCATION: LOCATION: BACK;LEG

## 2020-09-10 ASSESSMENT — PAIN DESCRIPTION - ORIENTATION: ORIENTATION: LOWER

## 2020-09-10 ASSESSMENT — PAIN DESCRIPTION - FREQUENCY: FREQUENCY: INTERMITTENT

## 2020-09-10 NOTE — PROCEDURES
Lexiscan MPS  No chest pain  No ischemic ECG changes when compared to baseline  Nuclear images reported separately    Electronically signed by Emily Csat MD on 9/10/2020 at 10:36 AM

## 2020-09-10 NOTE — PROGRESS NOTES
[address]   Internal Medicine Residency / [address]    Attending Physician Statement  I have discussed the case, including pertinent history and exam findings with the resident and the team.  I have seen and examined the patient and the key elements of the encounter have been performed by me. I agree with the assessment, plan and orders as documented by the resident. Hx of syncope and hx of remote  AV mechanical replacement for AS due to bicuspid valve  On Warfarin   A&O in stress lab  Nuclear stress test being done  EP has seen and will be doing Loop recorder  Plan: Continue same            Follow post stress test  Remainder of medical problems as per resident note.       [facility]  Internal Medicine Residency Faculty

## 2020-09-10 NOTE — PROGRESS NOTES
[facility]  Internal Medicine Residency Program  Progress Note - House Team 1    Patient:  Wilian Powers 63 y.o. female MRN: [id number]     Date of Service: 9/10/2020     CC: Syncope   Overnight events: None      Subjective     The patient was seen and examined at bedside today. She is doing well. She has a little nausea attributed to her NPO for her procedure. Otherwise she has no complaints, no other pre-syncopal episodes, chest pain or palpitations. Objective     Physical Exam:  · Vitals: BP (!) 148/69   Pulse 73   Temp 97.6 °F (36.4 °C)   Resp 16   Ht 5' 5\" (1.651 m)   Wt 173 lb (78.5 kg)   LMP 11/08/2008 (LMP Unknown)   SpO2 96%   BMI 28.79 kg/m²     · I & O - 24hr: No intake/output data recorded. · General Appearance: alert, appears stated age and cooperative  · HEENT:  Head: Normal, normocephalic, atraumatic. · Neck: no adenopathy, no carotid bruit, no JVD, supple, symmetrical, trachea midline and thyroid not enlarged, symmetric, no tenderness/mass/nodules  · Lung: clear to auscultation bilaterally  · Heart: regular rate and rhythm, S1, S2 normal, no murmur, click, rub or gallop  · Abdomen: soft, non-tender; bowel sounds normal; no masses,  no organomegaly  · Extremities:  extremities normal, atraumatic, no cyanosis or edema  · Musculokeletal: No joint swelling, no muscle tenderness. ROM normal in all joints of extremities.    · Neurologic: Mental status: Alert, oriented, thought content appropriate  Subject  Pertinent Labs & Imaging Studies   rob  CBC:   Lab Results   Component Value Date    WBC 7.1 09/10/2020    RBC 4.14 09/10/2020    HGB 13.4 09/10/2020    HCT 40.1 09/10/2020    MCV 96.9 09/10/2020    MCH 32.4 09/10/2020    MCHC 33.4 09/10/2020    RDW 12.9 09/10/2020     09/10/2020    MPV 11.4 09/10/2020     BMP:    Lab Results   Component Value Date     09/10/2020    K 3.6 09/10/2020     09/10/2020    CO2 25 09/10/2020    BUN 13 09/10/2020    EDGARD continue     8. Hx of Restless leg syndrome   - maintained on Ropinerole, will continue      9. Hx of anxiety and depression   - maintained on zoloft, will continue     10. Hx of GERD   - protonix    11.  Hypocortisolism   - cortisol level 09/09 4.4   - Follow cosyntropin test today      PT/OT evaluation: not warranted at present time   DVT prophylaxis/ GI prophylaxis: Heparin gtt, Protonix   Disposition: continue current care     Alyce Edmonds MD, PGY-1  Attending physician: Dr. Maradiaga Mins

## 2020-09-10 NOTE — PROGRESS NOTES
Cardiac Electrophysiology Inpatient Progress Note    Yary Ahmadi  1960  Date of Service: 9/10/2020  PCP: Verona Dwyer MD  Cardiologist: tM Mcdermott MD  Electrophysiologist: Terri López MD         Subjective: Yary Ahmadi is seen in hospital follow-up and management of: NSVT and syncope       09/09/2020: The patient is a 63year-old lady with significant past medical history of status post mechanical aortic valve replacement, symptomatic PVCs, hyperlipidemia, COPD, depression and GERD who presented to the hospital on 9/8/20 after syncopal episode. The patient states that she was at home and while he was taking clothing out of her dryer, she began to feel hot flushing sensation, palpitations, dizziness and then pass out for less than a minute and found herself on the floor. She denies any previous syncope. She reports occasional dizziness hen getting up quickly. This morning while she was sleeping, she was noted to have 26 beats of monomorphic ventricular tachycardia. She denies any SCD in family. Her last echocardiogram on 02/18/20 showed LV EF of 55 to 60%. A 24 hour Holter monitor on 04/09/20 showed a PVC burden of 12% with ventricular triplet. Cardiac electrophysiology service is consulted for syncope with NSVT.    09/10/20: Yary Ahmadi is seen in hospital follow-up, she has had no recurrent NSVT to this point, and has no cardiac complaints.        Patient Active Problem List   Diagnosis    Depression    H/O mechanical aortic valve replacement    Hyperlipidemia    Chronic obstructive pulmonary disease ([facility] [address]    Essential hypertension    Ascending aortic aneurysm ([facility])    Vitamin D deficiency    Acid reflux    Current smoker    Syncope and collapse    NSVT [facility]nonsustained ventricular tachycardia) ([facility])    Syncope, cardiogenic         Scheduled Medications:   potassium chloride  40 mEq Oral BID WC    nicotine  1 patch Transdermal Daily    metoprolol succinate  50 mg Oral Daily    2019 Technique: Low-dose CT  acquisition technique included one of following options; 1 . Automated exposure control, 2. Adjustment of MA and or KV according to patient's size or 3. Use of iterative reconstruction. Multiple CT sections were obtained with sagittal and coronal MPR reconstructions. FINDINGS: There is no evidence of acute intracranial hemorrhage, loss of distinction between gray and white matter, mass, significant mass effect or abnormal extra-axial collection. Encephalomalacia within the anterior right temporal lobe. Moderate burden of periventricular and subcortical white matter hypodensities, likely sequela of microangiopathic disease. The ventricles are unchanged in caliber. The basilar cisterns are patent. The density in the dural venous sinuses is normal. The skull base and calvarium are unchanged. The paranasal sinuses and mastoid air cells are predominantly clear. The visualized orbits are unremarkable. No CT evidence of acute intracranial pathology. Ct Cervical Spine Wo Contrast    Result Date: 2020  Patient MRN:  [id number] : 1960 Age: 63 years Gender: Female Order Date:  2020 8:11 PM EXAM: CT CERVICAL SPINE WO CONTRAST COMPARISON: None INDICATION:  fall fall TECHNIQUE: Axial unenhanced CT scanning was performed through the cervical spine. Reformatted coronal and sagittal views also obtained. Low-dose CT acquisition technique included one of the following options; 1. Automated exposure control, 2. Adjustment of mA and/or kV according to the patient's size or 3. Use of iterative reconstruction. FINDINGS: No fracture or malalignment of the cervical spine. There is moderate disc space narrowing at level of C5/C6. Significant facet arthritis is present at multiple levels notable on the left at level of C4/C5. No fracture or malalignment of the cervical spine.      Xr Chest Portable    Result Date: 2020  Patient MRN:  [id number] : 1960 Age: 63 years Gender: Female Order Date:  9/8/2020 9:32 PM EXAM: XR CHEST PORTABLE COMPARISON: None INDICATION:  syncope syncope FINDINGS: Median sternotomy wires are present. No focal airspace opacity or pleural effusion. No pneumothorax. The heart is normal in size. No focal airspace opacity or pleural effusion. Pertinent Cardiac Testing:   Echocardiogram 02/18/20:    Findings      Left Ventricle   Normal left ventricle size and systolic function.   Ejection fraction is visually estimated at 55-60%.  Septal motion consistent with post bypass surgery.   Mild concentric left ventricular hypertrophy.   Stage I diastolic dysfunction.      Right Ventricle   Normal right ventricular size.   Right ventricle global systolic function is mildly reduced . TAPSE 15 mm.      Left Atrium   Left atrium is of normal size.   Interatrial septum appears intact.   No evidence of patent foramen ovale.      Right Atrium   Mildly enlarged right atrium size.      Mitral Valve   Mild mitral annular calcification.   No evidence of mitral valve stenosis.   Physiologic and/or trace mitral regurgitation is present.      Tricuspid Valve   The tricuspid valve appears structurally normal.   Mild tricuspid regurgitation.  RVSP is 41 mmHg. Pulmonary hypertension is mild .      Aortic Valve   Well seated mechanical prosthetic valve in aortic position.   Physiologic and/or trace aortic regurgitation is noted.   Aortic valve peak velocity 2.4 m/s, mean gradient 14 mmHg, DVI 0.52, no   prosthetic valve dysfunction.      Pulmonic Valve   Pulmonic valve is structurally normal.   No evidence of any pulmonic regurgitation.   No evidence of pulmonic valve stenosis.       Pericardial Effusion   No evidence for hemodynamically significant pericardial effusion.      Pleural Effusion   No evidence of pleural effusion.      Aorta   Dilatation of ascending aorta (4.1cm) .   Miscellaneous   The inferior vena cava diameter is normal with normal respiratory   variation.      Conclusions      Summary   Normal left ventricle size and systolic function.   Ejection fraction is visually estimated at 55-60%.  Septal motion consistent with post bypass surgery.   Mild concentric left ventricular hypertrophy.   Stage I diastolic dysfunction.   Normal right ventricular size.   Right ventricle global systolic function is mildly reduced . TAPSE 15 mm.   Well seated mechanical prosthetic valve in aortic position.   Aortic valve peak velocity 2.4 m/s, mean gradient 14 mmHg, DVI 0.52, no   prosthetic valve dysfunction.   Mild tricuspid regurgitation.  RVSP is 41 mmHg.  Pulmonary hypertension is mild .   No evidence for hemodynamically significant pericardial effusion.   No previous echo for comparison.      Signature      ----------------------------------------------------------------   Electronically signed by Hany Acevedo MD(Interpreting   physician) on 02/18/2020 09:55 PM   ----------------------------------------------------------------     M-Mode/2D Measurements & Calculations      LV Diastolic   LV Systolic Dimension: 3.5 AV Cusp Separation: 1.4 cmLA   Dimension: 5   cm                         Dimension: 3.2 cmAO Root   cm             LV Volume Diastolic: 722.3 Dimension: 3.2 cmLA Area: 14.2   LV FS:30 %     ml                         cm^2   LV PW          LV Volume Systolic: 76.7   Diastolic: 1.2 ml   cm             LV EDV/LV EDV Index: 116.5   LV PW          ml/62 ml/m^2LV ESV/LV ESV  RV Diastolic Dimension: 2.6 cm   Systolic: 1.4  Index: 49.1 ml/28ml/ m^2   cm             EF Calculated: 54.9 %      LA/Aorta: 0.88   Septum         LV Mass Index: 125         Ascending Aorta: 4.1 cm   Diastolic: 1.2 l/min*m^2                  LA volume/Index: 44.8 ml   cm             LV Length: 8.5 cm          /23.9ml/m^2   Septum                                    RA Area: 53.4 cm^2   Systolic: 1.4  LVOT: 2 cm   cm   CO: 7.37 l/min   CI: 3.94   l/m*m^2   LV Mass:   233.75 g     Doppler Measurements & Calculations      MV Peak E-Wave: 1.05   AV Peak Velocity: 2.41 m/s  LVOT Peak Velocity:   m/s                    AV Peak Gradient: 23.31     1.26 m/s   MV Peak A-Wave: 0.85   mmHg                        LVOT Mean Velocity:   m/s                    AV Mean Velocity: 1.72 m/s  0.94 m/s   MV E/A Ratio: 1.24     AV Mean Gradient: 13.6 mmHg LVOT Peak Gradient: 6.4   MV Peak Gradient: 4.7  AV VTI: 58.1 cm             mmHgLVOT Mean Gradient:   mmHg                   AV Area (Continuity):1.65   3.9 mmHg   MV Mean Gradient: 2.3  cm^2                        Estimated RVSP: 41.4   mmHg                                               mmHg   MV Mean Velocity: 0.72 LVOT VTI: 30.5 cm           Estimated RAP:3 mmHg   m/s   MV Deceleration Time:  Estimated PASP: 41.42 mmHg   223.7 msec             Pulm. Vein A Reversal       TR Velocity:3.1 m/s   MV P1/2t: 93.1 msec    Duration:129.2 msec         TR Gradient:38.42 mmHg   MVA by PHT:2.36 cm^2   Pulm. Vein D Velocity:0.58  PV Peak Velocity: 1.1   MV Area (continuity):  m/sPulm. Vein A Reversal    m/s   3.1 cm^2               Velocity:0.26 m/s           PV Peak Gradient: 4.8   MV E' Septal Velocity: Pulm. Vein S Velocity: 0.59 mmHg   0.08 m/s               m/s                         PV Mean Velocity: 0.71   MV E' Lateral                                      m/s   Velocity: 12 m/s                                   PV Mean Gradient: 2.4   MR Velocity: 5.11 m/s                              mmHg   MV LAKEISHA PISA: 0.09 cm^2   MR VTI: 145.5 cm   Alias Velocity: 0.21   m/sPISA Radius: 0.6 cm      PISA area: 2.26 cm^2MR   flow rate: 46.33   ml/sMR volume:13.1 ml    Outpatient Monitor 03/25/20:         Stress test 09/10/20:      TECHNIQUE:    12.0 mCi of Tc-99m MIBI was injected intravenously at rest and cardiac    SPECT images were performed.  In addition 34.0 mCi of Tc-99m MIBI was    injected intravenously at maximum stress by using Lexiscan.  Stress    SPECT images and gated study were performed.          FINDINGS:    Perfusion images demonstrate no reversible perfusion defect. There is    a fixed perfusion abnormality at the anterior wall. Wall motion is globally very mildly hypokinetic. The right ventricle    is hypertrophied. The end diastolic volume is 86 ml. The end systolic volume is 44 ml. The estimated ejection fraction is 49 %.           Impression    1. No reversible perfusion defect. Fixed anterior wall perfusion    abnormality. 2. Ejection fraction is 49 %. 3. Global very mild hypokinesis. RVH. Telemetry9/10/2020: sinus rhythm     EKG 09/09/20: sinus with frequent monomorphic PVCs QRS 84ms QTc 466ms  Please see scan in Cardiology. I have independently reviewed all of the ECGs and rhythm strips per above. I have personally reviewed the laboratory, cardiac diagnostic and radiographic testing as outlined above. I have reviewed previous records noted in [address]. Impression:    1. Syncope   - Unclear etiology. - History suggested of vasovagal but can not totally exclude arhythmia cuase  - Documented 26 beast of NSVT on 09/09/20 while sleeping.  - Echocardiogram 2/28/20 showed LV EF 55-60%. - Plan for nuclear stress test and possible LHC with CAG per Cardiology. - Consider EP study with possible ICD or implantable loop recorder insertion if CAG showed no significant CAD.     2. Nonsustained ventricular tachycardia  - Monomorphic in nature  - 26 beats in duration.  - Occurred while sleeping.   - no recurrence 09/10/2020   - LVEF 55-60%  On TTE 02/18/20  - Last stress was normal on 12/30/16      3.   PVCs   - Symptomatic   - PVC burden of 12% noted on 24 hour Holter on 04/09/20.  - Started on Toprol 25 mg daily and now increase to 50 mg daily.     4. Status post mechanical aortic valve   - in 2009   - Normal mechanical valve function on TTE 02/18/20  - On Coumadin and currently on held and now on heparin drip    5. Ascending aortic aneurysm   - 4.5 CM  - Follows with Dr. Ernst Meredith      6. COPD   - On Albuterol, Brovana, Pulmicort, Singulair and Daliresp  - Follows with Dr. Felix Ugalde      7. Hypertension  - On Toprol and Lisinopril       8. Hyperlipidemia  - On Lipitor     9. Tobacco use   - On Nicoderm patch      10. GERD   - On Protonix     11. Depression and anxiety   - On Requip and Zoloft       Recommendations:    1. Ischemic work up per Cardiology. 2. Consider EP study with possible ICD or implantable loop recorder insertion if CAG showed no significant CAD. 3. Hold Toprol in anticipate of EP study  4. Keep potassium > 4 and magnesium > 2. Thank you for allowing me to participate in your patient's care. Discussed with Dr. Zoila Preist   Electronically signed by ANNABELLE Calhoun CNP on 9/10/2020 at 3:37 PM   [id number] [facility]    Attending Physician's Statement    Patient seen with the ANP. Agree with the findings, assessment and plan. Management plan was discussed. I have personally interviewed the patient, independently performed a focused cardiac examination, reviewed the pertinent laboratory and diagnostic testing with the patient and directly participated in the medical decision-making as noted above with the following additions:     Feeling well. No chest pain, dyspnea or palpitations. No VT overnight. Seven beats of PAT noted. Echo showed LV EF 55-60%. Nuclear stress test showed no reversible ischemia and fixed anterior wall defect. LV EF was 49%. Physical exam showed no JVD, RRR, normal S1S2, no murmur, clear lungs bilaterally, trace edema    Plan for CAG tomorrow. Will proceed with EP study with possible ICD or ILR insertion tomorrow if CAG showed no significant CAD.     Rock Uriel MD  Cardiac Electrophysiology  [facility]  [facility]  4:18 PM  9/10/2020

## 2020-09-10 NOTE — PROGRESS NOTES
Labs     09/09/20  0450 09/10/20  0503   AST 19 20   ALT 18 21   LABALBU 3.7 4.3       CXR 9/8/2020: No focal airspace opacity or pleural effusion. Telemetry: SB    12 lead EKG 9/9/2020: SR with frequent PVC's and with non specific ST changes    Echo: pending    ASSESSMENT:   1. Syncopal episode  2. Nonsustained VT 9/9/2020. History of frequent ventricular ectopy on Holter monitor in 4/2020. Frequent PVC's also noted on telemetry this admission. .   3. History of aortic valve replacement with mechanical prosthesis in 2009. Adequately functioning mechanical prosthesis on echo in 2/2020 with normal LV systolic function (with mild TR and mild pulmonary hypertension)  4. Chronic anticoagulation with Coumadin, coumadin on hold ---> on IV Heparin   5. Asthma/Emphysema (severe)/Lung nodules with continued tobacco abuse  6.         4.5 cm ascending aortic aneurysm on CT scan 2/2020  7. Hypertension. Negative for orthostatic hypotension   8. Low serum cortisol level, undergoing cosyntropin stimulation test  8. Hyperlipidemia  9. Hepatic steatosis  10. Cholelithiasis  11. Stress incontinence  12. Restless leg syndrome  13. Anxiety/Depression   14. GERD  15. Chronic back pain   16.       ? Pediabetes, hemoglobin A1C 6.2 in 12/2018 and 6.0 in 1/2020        PLAN:  1. Check TSH  2. Check  hemoglobin A1C  3. Monitor INR  4. Will review Echo  5. For [place] nuclear stress test today ---> will likely need coronary angiography  6. Appreciate EP input  7. Rest as per the primary service  8.  Will follow    Electronically signed by Dov Cerrato MD on 9/10/2020 at 8:27 AM

## 2020-09-10 NOTE — PROGRESS NOTES
[facility]  Internal Medicine Residency Program  Progress Note  - House Team 1    Patient:  Cb Barron 63 y.o. female MRN: [id number]     Date of Service: 9/10/2020     CC: LOC  Overnight events: No acute events. Subjective       Patient resting comfortably in bed. Today, patient states she feels slightly worse than yesterday because of some new onset nausea without associated vomiting. She believes attributes this is due to not eating this morning. She continues to deny any reoccurrence of syncopal episodes, dizziness, or blurred vision. She states she able to ambulate with her IV pole without experiencing any lightheadedness. No fever,chills, abdominal pain, headaches. Endorses baseline SOB due to COPD that has not worsened in hospital. She has no other complaints this morning. Objective     Physical Exam:  · Vitals: BP (!) 148/69   Pulse 73   Temp 97.6 °F (36.4 °C)   Resp 16   Ht 5' 5\" (1.651 m)   Wt 173 lb (78.5 kg)   LMP 11/08/2008 (LMP Unknown)   SpO2 96%   BMI 28.79 kg/m²     · I & O - 24hr: No intake/output data recorded. · General Appearance: alert, appears stated age and cooperative  · HEENT:  Head: Normocephalic, no lesions, without obvious abnormality. · Neck: supple, symmetrical, trachea midline  · Lung: wheezes bilaterally  · Heart: regular rate and rhythm, S1, S2 normal, no murmur, click, rub or gallop  · Abdomen: soft, non-tender; bowel sounds normal; no masses,  no organomegaly  · Extremities:  extremities normal, atraumatic, no cyanosis or edema and varicose veins noted  · Musculokeletal: No joint swelling, no muscle tenderness. ROM normal in all joints of extremities.    · Neurologic: Mental status: Alert, oriented, thought content appropriate    Pertinent Labs & Imaging Studies     CBC:   Lab Results   Component Value Date    WBC 7.1 09/10/2020    RBC 4.14 09/10/2020    HGB 13.4 09/10/2020    HCT 40.1 09/10/2020    MCV 96.9 09/10/2020    MCH 32.4 09/10/2020    MCHC 33.4 09/10/2020    RDW 12.9 09/10/2020     09/10/2020    MPV 11.4 09/10/2020     CMP:    Lab Results   Component Value Date     09/10/2020    K 3.6 09/10/2020     09/10/2020    CO2 25 09/10/2020    BUN 13 09/10/2020    CREATININE 0.6 09/10/2020    GFRAA >60 09/10/2020    LABGLOM >60 09/10/2020    GLUCOSE 112 09/10/2020    PROT 6.7 09/10/2020    LABALBU 4.3 09/10/2020    CALCIUM 9.8 09/10/2020    BILITOT 0.4 09/10/2020    ALKPHOS 73 09/10/2020    AST 20 09/10/2020    ALT 21 09/10/2020     PT/INR:    Lab Results   Component Value Date    PROTIME 14.4 09/10/2020    PROTIME 30.9 04/01/2020    INR 1.3 09/10/2020       Student's Assessment and Plan     Eliot Johnny is a 63 y.o. female with PMHx of asthma/COPD, anxiety/depression, restless leg syndrome, HLP, HTN, CAD, status post aortic valve replacement that was admitted from ED with cc of lightheadedness + one syncopal episode.     Acute Syncopal episode  Unknown etiology; likely cardiac vs orthostatic hypotension  -One syncopal episode with prodrome of ligheadness and nausea  -Denies post ictal confusion, incontinence, tongue-biting  -EKG shows NSR with PVC and nonspecific ST anterior changes  -CT head/cervical spine negative on admission  -CXR negative on admission  -BP on admission 161/85; today 123/57  -proBNP 373 with negative trop 0.01  -Orthstats negative in ED  - Cortisol low; 4.06, 4.16  -Follows   -Previous Holter monitor (March 25,2020) showed ventricular -ectopy<12% and Vtach with burden less than 1%   -Previous echo shows EF 12-30%, stage 1 diastolic dysfunction  with no prostatic wall dysfunction function  -Previous stress test (2016) unremarkable for stress ischemia     Cardiology, Electrophysiology following   Stress test today with possible coronary angiography  Toprol XL increased to 50mg PO daily  Possible ICD or loop recorder pending CAG results per EP recs  Cosyntropin test in progress  Continue to follow on recs from Cardiology and Electrophysiology     Nausea  -New onset, not associated with vomitting  -Patient believes this is from lack of food  -Currently NPO  -PRN Zofran; patient has allergy to Phenergan    Electrolytes  - K>4, Mg> 2 per EP recs  - Today K is 3.6; will replete  - Today Mg is 2.1    Hx of COPD w Asthma  -Baseline SOB with productive cough (clear sputum); Has not  Worsened  -Sating 96% on room air  -On Brovana, Pulmicort nebulizer  -Continue Home medications; Spiriva, Stelara, Roflumilast,Proventil, singular  -Proventil PRN  -Continue to monitor respiratory status     HTN/HLP  -On admission to ED; /85  -Latest /69  -Continue home medications; Toprol XL, lisinopril, Lipitor  -Low-sodium diet     Hx of Anxiety/Depression  -Patient denies suicidal ideation  -Continue home medications; sertraline                 Hx of restless leg syndrome  -Continue home medications;  Ropinirole 0.25mg     Hx of chronic tobacco use  - Current smoker; 1ppd since age 14  -Continue Nicotine patch  -Tobacco abuse education as outpatient     PT/OT evaluation: Not needed at this time   DVT prophylaxis/ GI prophylaxis: Protonix 40mg  Disposition: Will follow Cardiology recs; continue to monitor inpatient at this time    Gonzalo [id number]  MS4  Attending physician: Dr. Demetri Norman

## 2020-09-11 ENCOUNTER — ANESTHESIA EVENT (OUTPATIENT)
Dept: CARDIAC CATH/INVASIVE PROCEDURES | Age: 60
DRG: 262 | End: 2020-09-11
Payer: MEDICARE

## 2020-09-11 ENCOUNTER — ANESTHESIA (OUTPATIENT)
Dept: CARDIAC CATH/INVASIVE PROCEDURES | Age: 60
DRG: 262 | End: 2020-09-11
Payer: MEDICARE

## 2020-09-11 VITALS
DIASTOLIC BLOOD PRESSURE: 57 MMHG | SYSTOLIC BLOOD PRESSURE: 79 MMHG | RESPIRATION RATE: 9 BRPM | OXYGEN SATURATION: 99 %

## 2020-09-11 LAB
ABO/RH: NORMAL
ALBUMIN SERPL-MCNC: 4 G/DL (ref 3.5–5.2)
ALP BLD-CCNC: 64 U/L (ref 35–104)
ALT SERPL-CCNC: 21 U/L (ref 0–32)
ANION GAP SERPL CALCULATED.3IONS-SCNC: 13 MMOL/L (ref 7–16)
ANTIBODY SCREEN: NORMAL
APTT: 67.1 SEC (ref 24.5–35.1)
AST SERPL-CCNC: 20 U/L (ref 0–31)
BASOPHILS ABSOLUTE: 0.06 E9/L (ref 0–0.2)
BASOPHILS RELATIVE PERCENT: 0.6 % (ref 0–2)
BILIRUB SERPL-MCNC: 0.6 MG/DL (ref 0–1.2)
BUN BLDV-MCNC: 12 MG/DL (ref 8–23)
CALCIUM SERPL-MCNC: 9.3 MG/DL (ref 8.6–10.2)
CHLORIDE BLD-SCNC: 98 MMOL/L (ref 98–107)
CO2: 26 MMOL/L (ref 22–29)
CREAT SERPL-MCNC: 0.7 MG/DL (ref 0.5–1)
EOSINOPHILS ABSOLUTE: 0.2 E9/L (ref 0.05–0.5)
EOSINOPHILS RELATIVE PERCENT: 1.9 % (ref 0–6)
GFR AFRICAN AMERICAN: >60
GFR NON-AFRICAN AMERICAN: >60 ML/MIN/1.73
GLUCOSE BLD-MCNC: 103 MG/DL (ref 74–99)
HCT VFR BLD CALC: 39.1 % (ref 34–48)
HEMOGLOBIN: 12.8 G/DL (ref 11.5–15.5)
IMMATURE GRANULOCYTES #: 0.09 E9/L
IMMATURE GRANULOCYTES %: 0.9 % (ref 0–5)
LYMPHOCYTES ABSOLUTE: 2.84 E9/L (ref 1.5–4)
LYMPHOCYTES RELATIVE PERCENT: 27.2 % (ref 20–42)
MAGNESIUM: 1.9 MG/DL (ref 1.6–2.6)
MCH RBC QN AUTO: 32 PG (ref 26–35)
MCHC RBC AUTO-ENTMCNC: 32.7 % (ref 32–34.5)
MCV RBC AUTO: 97.8 FL (ref 80–99.9)
MONOCYTES ABSOLUTE: 0.86 E9/L (ref 0.1–0.95)
MONOCYTES RELATIVE PERCENT: 8.2 % (ref 2–12)
NEUTROPHILS ABSOLUTE: 6.41 E9/L (ref 1.8–7.3)
NEUTROPHILS RELATIVE PERCENT: 61.2 % (ref 43–80)
PDW BLD-RTO: 13 FL (ref 11.5–15)
PLATELET # BLD: 213 E9/L (ref 130–450)
PMV BLD AUTO: 11 FL (ref 7–12)
POTASSIUM REFLEX MAGNESIUM: 4.1 MMOL/L (ref 3.5–5)
RBC # BLD: 4 E12/L (ref 3.5–5.5)
SODIUM BLD-SCNC: 137 MMOL/L (ref 132–146)
TOTAL PROTEIN: 6.3 G/DL (ref 6.4–8.3)
WBC # BLD: 10.5 E9/L (ref 4.5–11.5)

## 2020-09-11 PROCEDURE — 2709999900 HC NON-CHARGEABLE SUPPLY

## 2020-09-11 PROCEDURE — 36415 COLL VENOUS BLD VENIPUNCTURE: CPT

## 2020-09-11 PROCEDURE — C1732 CATH, EP, DIAG/ABL, 3D/VECT: HCPCS

## 2020-09-11 PROCEDURE — 4A023N8 MEASUREMENT OF CARDIAC SAMPLING AND PRESSURE, BILATERAL, PERCUTANEOUS APPROACH: ICD-10-PCS | Performed by: INTERNAL MEDICINE

## 2020-09-11 PROCEDURE — 6370000000 HC RX 637 (ALT 250 FOR IP): Performed by: INTERNAL MEDICINE

## 2020-09-11 PROCEDURE — C1894 INTRO/SHEATH, NON-LASER: HCPCS

## 2020-09-11 PROCEDURE — 6370000000 HC RX 637 (ALT 250 FOR IP): Performed by: STUDENT IN AN ORGANIZED HEALTH CARE EDUCATION/TRAINING PROGRAM

## 2020-09-11 PROCEDURE — 86850 RBC ANTIBODY SCREEN: CPT

## 2020-09-11 PROCEDURE — 93454 CORONARY ARTERY ANGIO S&I: CPT | Performed by: INTERNAL MEDICINE

## 2020-09-11 PROCEDURE — 93623 PRGRMD STIMJ&PACG IV RX NFS: CPT | Performed by: INTERNAL MEDICINE

## 2020-09-11 PROCEDURE — 6360000002 HC RX W HCPCS: Performed by: INTERNAL MEDICINE

## 2020-09-11 PROCEDURE — 94640 AIRWAY INHALATION TREATMENT: CPT

## 2020-09-11 PROCEDURE — 80053 COMPREHEN METABOLIC PANEL: CPT

## 2020-09-11 PROCEDURE — 93621 COMP EP EVL L PAC&REC C SINS: CPT | Performed by: INTERNAL MEDICINE

## 2020-09-11 PROCEDURE — C1764 EVENT RECORDER, CARDIAC: HCPCS

## 2020-09-11 PROCEDURE — 93620 COMP EP EVL R AT VEN PAC&REC: CPT

## 2020-09-11 PROCEDURE — 33285 INSJ SUBQ CAR RHYTHM MNTR: CPT

## 2020-09-11 PROCEDURE — 6360000002 HC RX W HCPCS

## 2020-09-11 PROCEDURE — 85730 THROMBOPLASTIN TIME PARTIAL: CPT

## 2020-09-11 PROCEDURE — 33285 INSJ SUBQ CAR RHYTHM MNTR: CPT | Performed by: INTERNAL MEDICINE

## 2020-09-11 PROCEDURE — 2500000003 HC RX 250 WO HCPCS

## 2020-09-11 PROCEDURE — 82024 ASSAY OF ACTH: CPT

## 2020-09-11 PROCEDURE — 0JH602Z INSERTION OF MONITORING DEVICE INTO CHEST SUBCUTANEOUS TISSUE AND FASCIA, OPEN APPROACH: ICD-10-PCS | Performed by: INTERNAL MEDICINE

## 2020-09-11 PROCEDURE — 86901 BLOOD TYPING SEROLOGIC RH(D): CPT

## 2020-09-11 PROCEDURE — B2111ZZ FLUOROSCOPY OF MULTIPLE CORONARY ARTERIES USING LOW OSMOLAR CONTRAST: ICD-10-PCS | Performed by: INTERNAL MEDICINE

## 2020-09-11 PROCEDURE — 2580000003 HC RX 258: Performed by: ANESTHESIOLOGIST ASSISTANT

## 2020-09-11 PROCEDURE — 99233 SBSQ HOSP IP/OBS HIGH 50: CPT | Performed by: INTERNAL MEDICINE

## 2020-09-11 PROCEDURE — 86900 BLOOD TYPING SEROLOGIC ABO: CPT

## 2020-09-11 PROCEDURE — 85025 COMPLETE CBC W/AUTO DIFF WBC: CPT

## 2020-09-11 PROCEDURE — 93623 PRGRMD STIMJ&PACG IV RX NFS: CPT

## 2020-09-11 PROCEDURE — 93620 COMP EP EVL R AT VEN PAC&REC: CPT | Performed by: INTERNAL MEDICINE

## 2020-09-11 PROCEDURE — C1769 GUIDE WIRE: HCPCS

## 2020-09-11 PROCEDURE — 83735 ASSAY OF MAGNESIUM: CPT

## 2020-09-11 PROCEDURE — C1730 CATH, EP, 19 OR FEW ELECT: HCPCS

## 2020-09-11 PROCEDURE — 6360000002 HC RX W HCPCS: Performed by: ANESTHESIOLOGIST ASSISTANT

## 2020-09-11 PROCEDURE — 4A0234Z MEASUREMENT OF CARDIAC ELECTRICAL ACTIVITY, PERCUTANEOUS APPROACH: ICD-10-PCS | Performed by: INTERNAL MEDICINE

## 2020-09-11 PROCEDURE — 2140000000 HC CCU INTERMEDIATE R&B

## 2020-09-11 PROCEDURE — 2580000003 HC RX 258: Performed by: INTERNAL MEDICINE

## 2020-09-11 RX ORDER — HEPARIN SODIUM 1000 [USP'U]/ML
80 INJECTION, SOLUTION INTRAVENOUS; SUBCUTANEOUS ONCE
Status: DISCONTINUED | OUTPATIENT
Start: 2020-09-11 | End: 2020-09-11

## 2020-09-11 RX ORDER — IPRATROPIUM BROMIDE AND ALBUTEROL SULFATE 2.5; .5 MG/3ML; MG/3ML
1 SOLUTION RESPIRATORY (INHALATION) EVERY 4 HOURS PRN
Status: DISCONTINUED | OUTPATIENT
Start: 2020-09-11 | End: 2020-09-12 | Stop reason: HOSPADM

## 2020-09-11 RX ORDER — HYDRALAZINE HYDROCHLORIDE 20 MG/ML
5 INJECTION INTRAMUSCULAR; INTRAVENOUS EVERY 10 MIN PRN
Status: DISCONTINUED | OUTPATIENT
Start: 2020-09-11 | End: 2020-09-12 | Stop reason: HOSPADM

## 2020-09-11 RX ORDER — ROPINIROLE 0.25 MG/1
0.25 TABLET, FILM COATED ORAL EVERY MORNING
Status: DISCONTINUED | OUTPATIENT
Start: 2020-09-11 | End: 2020-09-12 | Stop reason: HOSPADM

## 2020-09-11 RX ORDER — OXYCODONE HYDROCHLORIDE AND ACETAMINOPHEN 5; 325 MG/1; MG/1
1 TABLET ORAL
Status: ACTIVE | OUTPATIENT
Start: 2020-09-11 | End: 2020-09-11

## 2020-09-11 RX ORDER — HEPARIN SODIUM 1000 [USP'U]/ML
80 INJECTION, SOLUTION INTRAVENOUS; SUBCUTANEOUS PRN
Status: DISCONTINUED | OUTPATIENT
Start: 2020-09-11 | End: 2020-09-12 | Stop reason: CLARIF

## 2020-09-11 RX ORDER — METOPROLOL SUCCINATE 25 MG/1
25 TABLET, EXTENDED RELEASE ORAL DAILY
Status: DISCONTINUED | OUTPATIENT
Start: 2020-09-12 | End: 2020-09-12 | Stop reason: HOSPADM

## 2020-09-11 RX ORDER — HEPARIN SODIUM 1000 [USP'U]/ML
40 INJECTION, SOLUTION INTRAVENOUS; SUBCUTANEOUS PRN
Status: DISCONTINUED | OUTPATIENT
Start: 2020-09-11 | End: 2020-09-11

## 2020-09-11 RX ORDER — HEPARIN SODIUM 10000 [USP'U]/100ML
18 INJECTION, SOLUTION INTRAVENOUS CONTINUOUS
Status: DISCONTINUED | OUTPATIENT
Start: 2020-09-11 | End: 2020-09-11

## 2020-09-11 RX ORDER — FENTANYL CITRATE 50 UG/ML
INJECTION, SOLUTION INTRAMUSCULAR; INTRAVENOUS PRN
Status: DISCONTINUED | OUTPATIENT
Start: 2020-09-11 | End: 2020-09-11 | Stop reason: SDUPTHER

## 2020-09-11 RX ORDER — HEPARIN SODIUM 1000 [USP'U]/ML
80 INJECTION, SOLUTION INTRAVENOUS; SUBCUTANEOUS PRN
Status: DISCONTINUED | OUTPATIENT
Start: 2020-09-11 | End: 2020-09-11

## 2020-09-11 RX ORDER — HEPARIN SODIUM 1000 [USP'U]/ML
40 INJECTION, SOLUTION INTRAVENOUS; SUBCUTANEOUS PRN
Status: DISCONTINUED | OUTPATIENT
Start: 2020-09-11 | End: 2020-09-12 | Stop reason: CLARIF

## 2020-09-11 RX ORDER — PROPOFOL 10 MG/ML
INJECTION, EMULSION INTRAVENOUS CONTINUOUS PRN
Status: DISCONTINUED | OUTPATIENT
Start: 2020-09-11 | End: 2020-09-11 | Stop reason: SDUPTHER

## 2020-09-11 RX ORDER — ROPINIROLE 0.5 MG/1
0.5 TABLET, FILM COATED ORAL NIGHTLY
Status: DISCONTINUED | OUTPATIENT
Start: 2020-09-11 | End: 2020-09-12 | Stop reason: HOSPADM

## 2020-09-11 RX ORDER — LABETALOL HYDROCHLORIDE 5 MG/ML
5 INJECTION, SOLUTION INTRAVENOUS EVERY 10 MIN PRN
Status: DISCONTINUED | OUTPATIENT
Start: 2020-09-11 | End: 2020-09-12 | Stop reason: HOSPADM

## 2020-09-11 RX ORDER — MIDAZOLAM HYDROCHLORIDE 1 MG/ML
INJECTION INTRAMUSCULAR; INTRAVENOUS PRN
Status: DISCONTINUED | OUTPATIENT
Start: 2020-09-11 | End: 2020-09-11 | Stop reason: SDUPTHER

## 2020-09-11 RX ORDER — MEPERIDINE HYDROCHLORIDE 25 MG/ML
12.5 INJECTION INTRAMUSCULAR; INTRAVENOUS; SUBCUTANEOUS EVERY 5 MIN PRN
Status: DISCONTINUED | OUTPATIENT
Start: 2020-09-11 | End: 2020-09-12

## 2020-09-11 RX ORDER — SODIUM CHLORIDE 9 MG/ML
INJECTION, SOLUTION INTRAVENOUS CONTINUOUS PRN
Status: DISCONTINUED | OUTPATIENT
Start: 2020-09-11 | End: 2020-09-11 | Stop reason: SDUPTHER

## 2020-09-11 RX ORDER — HEPARIN SODIUM 10000 [USP'U]/100ML
12 INJECTION, SOLUTION INTRAVENOUS CONTINUOUS
Status: DISCONTINUED | OUTPATIENT
Start: 2020-09-11 | End: 2020-09-12 | Stop reason: HOSPADM

## 2020-09-11 RX ORDER — ASPIRIN 325 MG
325 TABLET ORAL ONCE
Status: COMPLETED | OUTPATIENT
Start: 2020-09-11 | End: 2020-09-11

## 2020-09-11 RX ADMIN — FENTANYL CITRATE 50 MCG: 50 INJECTION, SOLUTION INTRAMUSCULAR; INTRAVENOUS at 15:26

## 2020-09-11 RX ADMIN — ASPIRIN 325 MG: 325 TABLET, FILM COATED ORAL at 09:29

## 2020-09-11 RX ADMIN — MONTELUKAST SODIUM 10 MG: 10 TABLET, COATED ORAL at 21:49

## 2020-09-11 RX ADMIN — ROPINIROLE HYDROCHLORIDE 0.5 MG: 0.5 TABLET, FILM COATED ORAL at 21:50

## 2020-09-11 RX ADMIN — ROFLUMILAST 250 MCG: 500 TABLET ORAL at 10:39

## 2020-09-11 RX ADMIN — BUDESONIDE 250 MCG: 0.25 SUSPENSION RESPIRATORY (INHALATION) at 20:33

## 2020-09-11 RX ADMIN — FENTANYL CITRATE 50 MCG: 50 INJECTION, SOLUTION INTRAMUSCULAR; INTRAVENOUS at 15:05

## 2020-09-11 RX ADMIN — ATORVASTATIN CALCIUM 10 MG: 10 TABLET, FILM COATED ORAL at 21:49

## 2020-09-11 RX ADMIN — POTASSIUM CHLORIDE 40 MEQ: 1500 TABLET, EXTENDED RELEASE ORAL at 21:49

## 2020-09-11 RX ADMIN — ROPINIROLE HYDROCHLORIDE 0.25 MG: 0.25 TABLET, FILM COATED ORAL at 10:39

## 2020-09-11 RX ADMIN — ARFORMOTEROL TARTRATE 15 MCG: 15 SOLUTION RESPIRATORY (INHALATION) at 20:33

## 2020-09-11 RX ADMIN — MIDAZOLAM 2 MG: 1 INJECTION INTRAMUSCULAR; INTRAVENOUS at 15:03

## 2020-09-11 RX ADMIN — SODIUM CHLORIDE, PRESERVATIVE FREE 10 ML: 5 INJECTION INTRAVENOUS at 21:49

## 2020-09-11 RX ADMIN — BUDESONIDE 250 MCG: 0.25 SUSPENSION RESPIRATORY (INHALATION) at 10:30

## 2020-09-11 RX ADMIN — ARFORMOTEROL TARTRATE 15 MCG: 15 SOLUTION RESPIRATORY (INHALATION) at 10:30

## 2020-09-11 RX ADMIN — LISINOPRIL 10 MG: 10 TABLET ORAL at 08:38

## 2020-09-11 RX ADMIN — SERTRALINE HYDROCHLORIDE 150 MG: 100 TABLET ORAL at 10:40

## 2020-09-11 RX ADMIN — PANTOPRAZOLE SODIUM 40 MG: 40 TABLET, DELAYED RELEASE ORAL at 10:40

## 2020-09-11 RX ADMIN — SODIUM CHLORIDE: 9 INJECTION, SOLUTION INTRAVENOUS at 15:00

## 2020-09-11 RX ADMIN — SODIUM CHLORIDE, PRESERVATIVE FREE 10 ML: 5 INJECTION INTRAVENOUS at 21:16

## 2020-09-11 RX ADMIN — HEPARIN SODIUM 12 UNITS/KG/HR: 10000 INJECTION, SOLUTION INTRAVENOUS at 21:11

## 2020-09-11 RX ADMIN — PROPOFOL 50 MCG/KG/MIN: 10 INJECTION, EMULSION INTRAVENOUS at 15:17

## 2020-09-11 ASSESSMENT — PULMONARY FUNCTION TESTS
PIF_VALUE: 0

## 2020-09-11 ASSESSMENT — LIFESTYLE VARIABLES: SMOKING_STATUS: 1

## 2020-09-11 ASSESSMENT — PAIN SCALES - GENERAL
PAINLEVEL_OUTOF10: 0

## 2020-09-11 NOTE — ANESTHESIA POSTPROCEDURE EVALUATION
Department of Anesthesiology  Postprocedure Note    Patient: Luis E Girard  MRN: [id number]  YOB: 1960  Date of evaluation: 9/11/2020  Time:  5:59 PM     Procedure Summary     Date:  09/11/20 Room / Location:  [facility] [place] [facility] CATH LAB    Anesthesia Start:  1500 Anesthesia Stop:  5326    Procedure:  ELECTROPHYSIOLOGY STUDY W ANESTHESIA Diagnosis:      Scheduled Providers:  Janeal Bumpers, APRN - CRNA; Candelario Cantrell MD Responsible Provider:  Tamiko Piedra DO    Anesthesia Type:  MAC ASA Status:  3          Anesthesia Type: MAC    Rm Phase I:      Rm Phase II:      Last vitals: Reviewed and per EMR flowsheets.        Anesthesia Post Evaluation    Patient location during evaluation: bedside  Patient participation: complete - patient cannot participate  Level of consciousness: awake and alert  Airway patency: patent  Nausea & Vomiting: no nausea and no vomiting  Complications: no  Cardiovascular status: blood pressure returned to baseline  Respiratory status: acceptable  Hydration status: euvolemic

## 2020-09-11 NOTE — PROGRESS NOTES
Inpatient Cardiology Progress note     PATIENT IS BEING FOLLOWED FOR: Syncope    Luis E Girard is a 63 y.o. female known to Dr. Demario Randolphover: Denies CP, lightheadedness, palpitations or worsening SOB. No problems overnight  OBJECTIVE: No apparent distress     ROS:  Consist: Denies fevers, chills or night sweats  Heart: Denies chest pain, palpitations, lightheadedness, dizziness or syncope  Lungs: Denies SOB, cough, wheezing, orthopnea or PND  GI: Denies abdominal pain, vomiting or diarrhea    PHYSICAL EXAM:   BP (!) 175/81   Pulse 68   Temp 96.5 °F (35.8 °C)   Resp 16   Ht 5' 5\" (1.651 m)   Wt 173 lb (78.5 kg)   LMP 11/08/2008 (LMP Unknown)   SpO2 97%   BMI 28.79 kg/m²    B/P Range last 24 hours: Systolic (35JYP), BFR:474 , Min:130 , MMQ:366    Diastolic (24DKX), UNF:89, Min:64, Max:81    CONST: Well developed, well nourished who appears stated age. Awake, alert and cooperative. No apparent distress  HEENT:   Head- Normocephalic, atraumatic   Eyes- Conjunctivae pink, anicteric  Throat- Oral mucosa pink and moist  Neck-  No stridor, trachea midline, no jugular venous distention. No carotid bruit  CHEST: Chest symmetrical and non-tender to palpation. No accessory muscle use or intercostal retractions  RESPIRATORY:  Lung sounds - markedly diminished breath sounds with expiratory wheezes  CARDIOVASCULAR:     Heart Inspection- shows no noted pulsations  Heart Palpation- no heaves or thrills; PMI is non-displaced   Heart Ausculation- Regular rate and rhythm. Mechanical valve sounds aortic area with I/VI SM aortic area. No s3, s4 or rub   PV: No lower extremity edema. No varicosities. Pedal pulses palpable, no clubbing or cyanosis   ABDOMEN: Soft, non-tender to light palpation. Bowel sounds present. No palpable masses no organomegaly; no abdominal bruit  MS: Good muscle strength and tone. No atrophy or abnormal movements.    : Deferred  SKIN: Warm and dry no statis dermatitis or ulcers   NEURO / PSYCH: Oriented to person, place and time. Speech clear and appropriate. Follows all commands.  Pleasant affect       Intake/Output Summary (Last 24 hours) at 9/11/2020 0934  Last data filed at 9/11/2020 9934  Gross per 24 hour   Intake 572 ml   Output 600 ml   Net -28 ml       Weight:   Wt Readings from Last 3 Encounters:   09/08/20 173 lb (78.5 kg)   04/20/20 175 lb (79.4 kg)   03/25/20 172 lb (78 kg)     Current Inpatient Medications:   rOPINIRole  0.5 mg Oral Nightly    rOPINIRole  0.25 mg Oral QAM    potassium chloride  40 mEq Oral BID WC    nicotine  1 patch Transdermal Daily    [Held by provider] metoprolol succinate  50 mg Oral Daily    atorvastatin  10 mg Oral Daily    lisinopril  10 mg Oral Daily    montelukast  10 mg Oral Nightly    pantoprazole  40 mg Oral Daily    Roflumilast  250 mcg Oral Daily    sertraline  150 mg Oral Daily    sodium chloride flush  10 mL Intravenous 2 times per day    budesonide  250 mcg Nebulization BID    Arformoterol Tartrate  15 mcg Nebulization BID       IV Infusions (if any):   heparin (porcine) 12 Units/kg/hr (09/10/20 2120)    dextrose         DIAGNOSTIC/ LABORATORY DATA:  Labs:   CBC:   Recent Labs     09/10/20  0503 09/11/20  0405   WBC 7.1 10.5   HGB 13.4 12.8   HCT 40.1 39.1    213     BMP:   Recent Labs     09/10/20  0503 09/11/20  0405    137   K 3.6 4.1   CO2 25 26   BUN 13 12   CREATININE 0.6 0.7   LABGLOM >60 >60   CALCIUM 9.8 9.3     Mag:   Recent Labs     09/10/20  0503 09/11/20  0405   MG 2.1 1.9     HgA1c:   Lab Results   Component Value Date    LABA1C 5.8 (H) 09/10/2020     PT/INR:   Recent Labs     09/10/20  0749 09/10/20  0903   PROTIME 14.4* 14.4*   INR 1.3 1.3     APTT:  Recent Labs     09/10/20  1622 09/11/20  0405   APTT 55.9* 67.1*     CARDIAC ENZYMES:  Recent Labs     09/08/20  1809 09/09/20  0047   TROPONINI <0.01 <0.01     FASTING LIPID PANEL:  Lab Results   Component Value Date    CHOL 168 09/10/2020    HDL 50 09/10/2020 LDLCALC 82 09/10/2020    TRIG 182 09/10/2020     LIVER PROFILE:  Recent Labs     09/10/20  0503 09/11/20  0405   AST 20 20   ALT 21 21   LABALBU 4.3 4.0       CXR 9/8/2020: No focal airspace opacity or pleural effusion. Telemetry: SR / SB.     12 lead EKG 9/9/2020: SR with frequent PVC's and with non specific ST changes    Echo 9/9/2020 () Dr. Audrey Jackson ):   Summary   Left ventricle is normal in size . Borderline concentric left ventricular hypertrophy. Septal motion consistent with post open heart state . Ejection fraction is visually estimated at 55-60%. Normal diastolic function. Normal right ventricular size and function. Normal sized left atrium. There is a mechanical aortic prosthetic valve which is well seated with a   mean gradient of 8.14 mmHg and trace aortic regurgitation   Mildly dilated aortic root. Lexiscan MPS 9/10/2020:  1. No reversible perfusion defect. Fixed anterior wall perfusion abnormality. 2. Ejection fraction is 49 %. 3. Global very mild hypokinesis. 4. RVH. ASSESSMENT:   1. Syncopal episode  2. Nonsustained VT 9/9/2020. History of frequent ventricular ectopy on Holter monitor in 4/2020. Frequent PVC's also noted on telemetry this admission. .   3. History of aortic valve replacement with mechanical prosthesis in 2009. Adequately functioning mechanical prosthesis on echo in 2/2020 with normal LV systolic function (with mild TR and mild pulmonary hypertension)  4. Chronic anticoagulation with Coumadin, coumadin on hold ---> on IV Heparin   5. Asthma/Emphysema (severe)/Lung nodules with continued tobacco abuse  6.         4.5 cm ascending aortic aneurysm on CT scan 2/2020  7. Hypertension. Negative for orthostatic hypotension   8. Low serum cortisol level, underwent cosyntropin stimulation test with adequate stimulation response  8. Hyperlipidemia  9. Hepatic steatosis  10. Cholelithiasis  11. Stress incontinence  12. Restless leg syndrome  13. Anxiety/Depression   14. GERD  15. Chronic back pain   16.       ? Pediabetes, hemoglobin A1C 6.2 in 12/2018 and 6.0 in 1/2020. Repeat Hemoglobin A1C 9/10/2020 = 5.8  17. Normal TSH 9/10/2020       PLAN:  1. For Cath +/- PCI this am. Risks, benefits and alternative therapies to the procedure explained. She understood and consented to proceed  2. ? EP study pending the results of the cardiac cath  3.  Further recommendations to followWill follow    Electronically signed by Maria Fernanda Carreon MD on 9/11/2020 at 9:43 AM

## 2020-09-11 NOTE — OP NOTE
Baseline intervals were as follows:   - Sinus cycle length 1110 ms  - AH interval 62 ms   - HV interval 44 ms   - VA Wenckebach cycle length revealed no VA conduction at baseline  The AV Wenckebach cycle length was 410 ms  Ventricular preexcitation was absent during pacing from both the high right atrium and coronary sinus (left atrial pacing and recording). The ERP of the AV lor was 600/320 ms  AERP 600/320ms  AVNERP 400/310ms  AERP 400/290ms    At ventricular apex  VERP 600/270ms   No inducible VT with single, double and triple extra- stimuli.   600/270/230  600/250/290/260  400/250  400/250/240  400/230/240/220     At ventricular base  ERP noted and no inducible VT with single, double and triple extra- stimuli.   500/270/230  500/270/220/220  400/270  400/240/220  400/230/220/220    On Isuprel up to 2 mcg/min at ventricular outflow track  500/280/280  400/280/280/300    At ventricular apex with Isuprel  400/220/210  400/230/220/210    Burst pacing was done with and without Isuprel from a drive cycle length of 500ms down to 220 ms on and off isuprel. No inducible arrhythmia noted    No evidence of inducible arrhythmais (SVT/VT) or pathologic AV block or sinus node dysfunction were found. Based on this the EP study was concluded and it was negative    She did develop catheter induced RBBB during the procedure    Catheters were removed under flouroscopic guidance. Sheath were aspirated and flushed and removed and manual compression was held for hemostasis. She was hemodynamically stable and under the care of anesthesia and was brought back to the recovery area for post-procedural monitoring. SUMMARY:   1. Normal AV node and His-Purkinje system function  2. No inducible arrhythmias    RECOMMENDATIONS:   1. Sheath pull now and resume heparin in 4 hours  2.  Recommend Implantable loop recorder to monitor for arrhythmias    Ure Meenakshi Brennan MD  Cardiac Electrophysiology  [facility]) Physicians  [facility] [place]: [place] [facility]  4:45 PM  9/11/2020

## 2020-09-11 NOTE — PROCEDURES
[address]                  [address] [facility],  [facility]                            CARDIAC CATHETERIZATION    PATIENT NAME: Charity Tovar                      :        1960  MED REC NO:   [id number]                            ROOM:       [id number]  ACCOUNT NO:   [de-identified]                           ADMIT DATE: 2020  PROVIDER:     Chris Guerrero MD    DATE OF PROCEDURE:  2020    PROCEDURE:  Selective coronary angiography. The procedure was done through right radial approach using ultrasound  guidance. The patient received intravenous fentanyl for sedation. INDICATION:  Nonsustained ventricular tachycardia and syncope. PRESSURES:  Aorta 133/69 with a mean of 88. A bileaflet mechanical aortic valve was noted on fluoroscopy with  adequate opening of the bileaflets. CORONARY ANGIOGRAPHY:  Left main. The left main artery is a large caliber vessel. It has an  aneurysmal dilatation in its middle segment. There, however, did not  appear to be any significant angiographic luminal narrowings in the left  main artery. LAD. The left anterior descending artery is a large vessel that wraps  around the left ventricular apex. The left anterior descending artery  and its branches did not appear to have any significant angiographic  disease. There was blushing of contract at the level of the left  ventricular apex with the possibility of an arteriovenous fistula at  that level. LCX. The left circumflex and its branches did not appear to have any  significant angiographic disease. RCA. The right coronary artery is a dominant vessel. It has luminal  irregularities in its proximal and middle segment. There was around 20%  disease in the proximal vessel. There is around 40-50% disease in the  mid vessel just at the level of the crux.   The distal vessel, the  posterior descending artery branch and the posterolateral branch did not  appear to have any significant disease. The right radial arterial sheath was removed at the end of the procedure  and a TR band was applied with adequate hemostasis and with preservation  of pulse. The patient tolerated the procedure well and left the cardiac  catheterization laboratory in stable condition. CONCLUSIONS:  1. Coronary artery. A.  Left main. Aneurysmal formation in the mid left main without any  significant angiographic luminal narrowings. B.  LAD. No significant angiographic disease. C.  LCX. No significant angiographic disease. D.  RCA. Dominant vessel with around 40-50% mid vessel narrowing. 2.  Bileaflet mechanical valve with adequate leaflet mobility noted on  fluoroscopy.         Sherly Lopez MD    D: 09/11/2020 10:35:45       T: 09/11/2020 10:46:56     [email or web address]  Job#: [id number]     Doc#: [id number]    CC:

## 2020-09-11 NOTE — ANESTHESIA PRE PROCEDURE
Department of Anesthesiology  Preprocedure Note       Name:  Nhung Steve   Age:  63 y.o.  :  1960                                          MRN:  [id number]         Date:  2020      Surgeon: * No surgeons listed *    Procedure: ELECTROPHYSIOLOGY STUDY W ANESTHESIA    Medications prior to admission:   Prior to Admission medications    Medication Sig Start Date End Date Taking? Authorizing Provider   warfarin (COUMADIN) 5 MG tablet Take 5 mg by mouth daily , Monday, Wednesday, and     Historical Provider, MD   Cholecalciferol (VITAMIN D) 50 MCG (2000) CAPS capsule Take 4,000 Units by mouth daily    Historical Provider, MD   warfarin (COUMADIN) 6 MG tablet Take one tablet on Tuesday and Thursday.   Patient taking differently: Take one tablet on Tuesday and Thursday and Saturday 8/10/20   Maryse Bird MD   metoprolol succinate (TOPROL XL) 25 MG extended release tablet Take 1 tablet by mouth daily 20   Tin Carrizales MD   omeprazole (PRILOSEC) 40 MG delayed release capsule take 1 capsule by mouth every morning BEFORE BREAKFAST 20   Jocelynn Morris,    rOPINIRole (REQUIP) 0.25 MG tablet Take 1 tab in morning and 2 tabs before bed 20   Tin Edwards DO   atorvastatin (LIPITOR) 10 MG tablet take 1 tablet by mouth once daily 20   Tin Edwards DO   sertraline (ZOLOFT) 100 MG tablet take 1 and 1/2 tablet by mouth once daily 20   Tin Edwards DO   lisinopril (PRINIVIL;ZESTRIL) 10 MG tablet take 1 tablet by mouth once daily 3/20/20   Tin Edwards DO   Roflumilast (DALIRESP) 250 MCG TABS Take 250 mcg by mouth daily 20   Preeti Fortune MD   Calcium Polycarbophil (FIBERCON PO) Take 2 tablets by mouth every morning (before breakfast)    Historical Provider, MD   Calcium Polycarbophil (FIBER) 625 MG TABS Take 1 tablet by mouth daily 20   Veronica March MD   montelukast (SINGULAIR) 10 MG tablet take 1 tablet by mouth at bedtime 20   Jaqui Wang MD [place]   Blood Pressure Monitoring (BLOOD PRESSURE CUFF) MISC Dx:  Hypertension with labile blood pressure 9/20/19   Deng Burr MD   Azelastine HCl 137 MCG/SPRAY SOLN 2 sprays by Nasal route daily  Patient taking differently: 2 sprays by Nasal route daily as needed  8/1/19   COOPER Zavala   tiotropium (SPIRIVA RESPIMAT) 2.5 MCG/ACT AERS inhaler INHALE 2 PUFFS BY MOUTH DAILY. PAP Medication 7/3/19   Jake Estevez MD   VENTOLIN  (13 Base) MCG/ACT inhaler inhale 1 puff by mouth every 4 hours if needed for wheezing 3/15/19   Melvina Frank DO   DULERA 100-5 MCG/ACT inhaler inhale 2 puffs by mouth twice a day 3/7/19   Naima Riojas MD   vitamin C (ASCORBIC ACID) 500 MG tablet Take 1,000 mg by mouth daily    Historical Provider, MD   niacin 500 MG tablet Take 2 tablets by mouth 2 times daily (with meals)  Patient taking differently: Take 1,000 mg by mouth 2 times daily (with meals) Takes OTC only 5/5/17   Francisco Mcdowell MD       Current medications:    No current facility-administered medications for this visit. No current outpatient medications on file.      Facility-Administered Medications Ordered in Other Visits   Medication Dose Route Frequency Provider Last Rate Last Dose    rOPINIRole (REQUIP) tablet 0.5 mg  0.5 mg Oral Nightly Massiel Bryan MD        rOPINIRole (REQUIP) tablet 0.25 mg  0.25 mg Oral QAM Massiel Bryan MD   0.25 mg at 09/11/20 1039    [START ON 9/12/2020] metoprolol succinate (TOPROL XL) extended release tablet 25 mg  25 mg Oral Daily Massiel Bryan MD        ipratropium-albuterol (DUONEB) nebulizer solution 1 ampule  1 ampule Inhalation Q4H PRN Yonatan Camargo MD        potassium chloride (KLOR-CON M) extended release tablet 40 mEq  40 mEq Oral BID WC Massiel Bryan MD   Stopped at 09/11/20 1113    nicotine (NICODERM CQ) 21 MG/24HR 1 patch  1 patch Transdermal Daily Massiel Bryan MD   1 patch at 09/11/20 1042    heparin (porcine) polyethylene glycol (GLYCOLAX) packet 17 g  17 g Oral Daily PRN Isaiah Frazier MD        promethazine (PHENERGAN) tablet 12.5 mg  12.5 mg Oral Q6H PRN Isaiah Frazier MD        Or    [facility]ron [facility] injection 4 mg  4 mg Intravenous Q6H PRN Isaiah Frazier MD        budesonide (PULMICORT) nebulizer suspension 250 mcg  250 mcg Nebulization BID Isaiah Frazier MD   250 mcg at 20 1030    Arformoterol Tartrate (BROVANA) nebulizer solution 15 mcg  15 mcg Nebulization BID Isaiah Frazier MD   15 mcg at 20 1030       Allergies:     Allergies   Allergen Reactions    Keflex [Cephalexin] Itching    Phenergan [Promethazine Hcl] Other (See Comments)       Problem List:    Patient Active Problem List   Diagnosis Code    Depression F32.9    H/O mechanical aortic valve replacement Z95.2    Hyperlipidemia E78.5    Chronic obstructive pulmonary disease ([facility] [address] J44.9    Essential hypertension I10    Ascending aortic aneurysm (HCC) I71.2    Vitamin D deficiency E55.9    Acid reflux K21.9    Current smoker F17.200    Syncope and collapse R55    NSVT (nonsustained ventricular tachycardia) ([facility]) I47.2    Syncope, cardiogenic R55       Past Medical History:        Diagnosis Date    Anticoagulant long-term use     Anxiety     Ascending aortic aneurysm (HCC)     Asthma     CAD (coronary artery disease)     Chronic back pain     COPD (chronic obstructive pulmonary disease) (HCC)     Depression     Depression     GERD (gastroesophageal reflux disease)     Headache     Hyperlipidemia     Hypertension     On warfarin at home     for AVR    Restless legs syndrome     S/P AVR     Syncope     Tobacco abuse     Urinary incontinence        Past Surgical History:        Procedure Laterality Date    APPENDECTOMY      CARDIAC CATHETERIZATION  2020    Dr. Brandyn Burrell REPLACEMENT  2009    Aortic valve [facility]      SECTION      COLONOSCOPY N/A 2019    COLONOSCOPY POLYPECTOMY SNARE/COLD BIOPSY performed by Arabella Sanchez MD at [address] [facility] GASTROINTESTINAL ENDOSCOPY N/A 2019    EGD BIOPSY performed by Arabella Sanchez MD at [address] [facility] History:    Social History     Tobacco Use    Smoking status: Current Every Day Smoker     Packs/day: 1.00     Years: 45.00     Pack years: 45.00     Types: Cigarettes     Last attempt to quit: 2019     Years since quittin.7    Smokeless tobacco: Never Used   Substance Use Topics    Alcohol use: Not Currently     Frequency: Monthly or less                                Ready to quit: Not Answered  Counseling given: Not Answered      Vital Signs (Current): There were no vitals filed for this visit. BP Readings from Last 3 Encounters:   20 135/65   20 (!) 158/63   20 128/84       NPO Status:                                                                                 BMI:   Wt Readings from Last 3 Encounters:   20 173 lb (78.5 kg)   20 175 lb (79.4 kg)   20 172 lb (78 kg)     There is no height or weight on file to calculate BMI.    CBC:   Lab Results   Component Value Date    WBC 10.5 2020    RBC 4.00 2020    HGB 12.8 2020    HCT 39.1 2020    MCV 97.8 2020    RDW 13.0 2020     2020       CMP:   Lab Results   Component Value Date     2020    K 4.1 2020    CL 98 2020    CO2 26 2020    BUN 12 2020    CREATININE 0.7 2020    GFRAA >60 2020    LABGLOM >60 2020    GLUCOSE 103 2020    PROT 6.3 2020    CALCIUM 9.3 2020    BILITOT 0.6 2020    ALKPHOS 64 2020    AST 20 2020    ALT 21 2020       POC Tests: No results for input(s): POCGLU, POCNA, POCK, POCCL, POCBUN, POCHEMO, POCHCT in the last 72 hours.     Coags: Lab Results   Component Value Date    PROTIME 14.4 09/10/2020    PROTIME 30.9 04/01/2020    INR 1.3 09/10/2020    APTT 67.1 09/11/2020       HCG (If Applicable): No results found for: PREGTESTUR, PREGSERUM, HCG, HCGQUANT     ABGs: No results found for: PHART, PO2ART, MAL1RAQ, FVG5QGD, BEART, J7VOMTLW     Type & Screen (If Applicable):  No results found for: LABABO, [address]    Anesthesia Evaluation  Patient summary reviewed and Nursing notes reviewed no history of anesthetic complications:   Airway: Mallampati: III        Dental:          Pulmonary: breath sounds clear to auscultation  (+) COPD (emphysema on CT Chest):  asthma: current smoker                           Cardiovascular:  Exercise tolerance: good (>4 METS),   (+) hypertension:, valvular problems/murmurs (S/P AVR- mechanical): AS, CAD:, dysrhythmias (nonsustained VT):, hyperlipidemia      ECG reviewed  Rhythm: regular  Rate: normal  Echocardiogram reviewed  Stress test reviewed             ROS comment: Ascending aortic aneurysm    Stress test 9/2020:  1. No reversible perfusion defect. Fixed anterior wall perfusion   abnormality. 2. Ejection fraction is 49 %. 3. Global very mild hypokinesis. RVH. Echo 9/2020:  Summary   Left ventricle is normal in size . Borderline concentric left ventricular hypertrophy. Septal motion consistent with post open heart state . Ejection fraction is visually estimated at 55-60%. Normal diastolic function. Normal right ventricular size and function. Normal sized left atrium. There is a mechanical aortic prosthetic valve which is well seated with a   mean gradient of 8.14 mmHg and trace aortic regurgitation   Mildly dilated aortic root. Cardiac cath 9/8/2020:  CONCLUSIONS:  1. Coronary artery. A.  Left main. Aneurysmal formation in the mid left main without any  significant angiographic luminal narrowings. B.  LAD. No significant angiographic disease. C.  LCX.   No significant angiographic disease. D.  RCA. Dominant vessel with around 40-50% mid vessel narrowing. 2.  Bileaflet mechanical valve with adequate leaflet mobility noted on  fluoroscopy. Neuro/Psych:   (+) neuromuscular disease (chronic back pain):, headaches:, psychiatric history:             ROS comment: restless legs syndrome GI/Hepatic/Renal:   (+) GERD:, liver disease (hepatic steatosis):,           Endo/Other:    (+) blood dyscrasia: anticoagulation therapy:., .                  ROS comment: Hypocortisolism Abdominal:           Vascular: negative vascular ROS. Anesthesia Plan      MAC     ASA 3       Induction: intravenous. MIPS: Postoperative opioids intended, Prophylactic antiemetics administered and Postoperative trial extubation. Anesthetic plan and risks discussed with patient.                     Jw Santizo MD   9/11/2020

## 2020-09-11 NOTE — PROGRESS NOTES
Pharmacy Consultation Note  (Anticoagulant Dosing and Monitoring)    Initial consult date: 9-  Consulting physician: Dr. Nereida Vega:  Keflex [cephalexin] and Phenergan [promethazine hcl]    63 y.o. female; 165.1 cm, 78.5 kg    Warfarin Indication Target   INR Range Home   Dose  (if applicable) Diet/Feeding Tube   Mechanical AVR 2.5 - 3.5 5 mg SMWF and 6 mg TRS 9/11: cardiac     Warfarin drug-drug interactions  Start  Stop Home Med? Comments                          TSH:    Lab Results   Component Value Date    TSH 2.630 09/10/2020        Hepatic Function Panel:                            Lab Results   Component Value Date    ALKPHOS 64 09/11/2020    ALT 21 09/11/2020    AST 20 09/11/2020    PROT 6.3 09/11/2020    BILITOT 0.6 09/11/2020    BILIDIR <0.2 06/25/2018    IBILI see below 06/25/2018    LABALBU 4.0 09/11/2020       Date Warfarin Dose INR Heparin or LMWH HBG/HCT PLT Comment   9/11 No Dose 1.3 (9/10) UFH infusion  resume @ 2100 12.8/39.1 213    9/12                                      Assessment:  · 59 yo/F admitted 9-8-2020 for syncopal episode. On warfarin PTA for Hx of mechanical AVR in 2009. Warfarin dose PTA was 5 mg SMWF and 6 mg TRS. INR goal = 2.5 - 3.5. · Warfarin has been held during this admission d/t syncopal w/u by Cards and EP. UFH infusion ordered for anticoagulation. · OK to resume warfarin on 9/11 after Linq recorder implanted. · Heparin infusion is not going to resume until 2100 tonight per EP. Plan:   · No warfarin dose tonight since heparin will not be starting until 2100. · Will resume warfarin dose on 9/12 AM after anticoagulation is established with heparin infusion. · Daily PT/INR until the INR is stable within the therapeutic range. · Pharmacist will follow and monitor/adjust dosing as necessary.     Cele Santos, PharmD  9/11/2020  7:47 PM  Pager: [phone]

## 2020-09-11 NOTE — OP NOTE
[facility] CARDIOLOGY  CARDIAC ELECTROPHYSIOLOGY DEPARTMENT/DIVISION OF CARDIOLOGY  PROCEDURE REPORT  PATIENT: Heidi RECORD NUMBER: [id number]  DATE OF PROCEDURE:  9/11/2020  ATTENDING ELECTROPHYSIOLOGIST:  Rey Hernández M.D  REFERRING PHYSICIAN: Dr. Arlene Harry:   1. Reveal Linq Loop Recorder Implantation     INDICATION:   Syncope/NSVT    PROCEDURE PERFORMED BY: Miguel A Cox M.D,     ASSISTANT: None     ESTIMATED BLOOD LOSS: Approximately <10 cc     PROCEDURE TIME: 15 min     COMPLICATIONS: None immediately apparent    PROCEDURE:   1. Reveal Linq Loop Recorder Implantation     INDICATION:   1. Syncope, NSVT. PROCEDURE PERFORMED BY: Miguel A Cox M.D    ASSISTANT: None     ESTIMATED BLOOD LOSS: Approximately <1 cc     PROCEDURE TIME: 15 min     COMPLICATIONS: None immediately apparent    DESCRIPTION OF PROCEDURE: The risks, benefits, and alternatives to the procedure were discussed with the patient, and informed consent was obtained. The patient was brought to the Electrophysiology lab and after postioning the patient and prepping and draping a sterile field, the region lateral to the sternum, was liberally infiltrated with 2% Lidocaine. Using the skin blade, a small nick was made. Using the introducer kit, the device was deployed into the subcutaneous space. Hemostasis was achieved with brief manual pressure. The wound was closed with 4-0 Vicryl and surgical glue. Excellent P wave and QRS sensing were obtained. The patient was transferred to the recovery room in stable condition. DEVICE INFORMATION: The ILR is a [employer] Linq, model #: [id number], serial [id number]    SUMMARY:   1. Successful implantation of a Reveal Linq implantable loop recorder. RECOMMENDATIONS:   1. Discharge to floor  2. Follow-up in the office in 1-2 weeks for a postoperative incision check.   See discharge instructions for details        Rey Hernández M.D, [address] [address] Electrophysiology  [address] [place] [address]

## 2020-09-11 NOTE — PROGRESS NOTES
[facility]  Internal Medicine Residency Program  Progress Note - House Team 1    Patient:  Ronel Juarez 63 y.o. female MRN: [id number]     Date of Service: 9/11/2020     CC: Syncope   Overnight events: None      Subjective     The patient was seen and examined at bedside today. She is doing well. No complaints this morning, no other pre-syncopal episodes, chest pain or palpitations. No fever or chills. Down for cath today. EP and cardiology following. Objective     Physical Exam:  · Vitals: /65   Pulse 64   Temp 97.5 °F (36.4 °C)   Resp 16   Ht 5' 5\" (1.651 m)   Wt 173 lb (78.5 kg)   LMP 11/08/2008 (LMP Unknown)   SpO2 97%   BMI 28.79 kg/m²     · I & O - 24hr: No intake/output data recorded. · General Appearance: alert, appears stated age and cooperative  · HEENT:  Head: Normal, normocephalic, atraumatic. · Neck: no adenopathy, no carotid bruit, no JVD, supple, symmetrical, trachea midline and thyroid not enlarged, symmetric, no tenderness/mass/nodules  · Lung: wheezes bilaterally, MICHELLE, RML, RUL and LML  · Heart: regular rate and rhythm, S1, S2 normal, no murmur, click, rub or gallop  · Abdomen: soft, non-tender; bowel sounds normal; no masses,  no organomegaly  · Extremities:  extremities normal, atraumatic, no cyanosis or edema  · Musculokeletal: No joint swelling, no muscle tenderness. ROM normal in all joints of extremities.    · Neurologic: Mental status: Alert, oriented, thought content appropriate  Subject  Pertinent Labs & Imaging Studies   rob  CBC:   Lab Results   Component Value Date    WBC 10.5 09/11/2020    RBC 4.00 09/11/2020    HGB 12.8 09/11/2020    HCT 39.1 09/11/2020    MCV 97.8 09/11/2020    MCH 32.0 09/11/2020    MCHC 32.7 09/11/2020    RDW 13.0 09/11/2020     09/11/2020    MPV 11.0 09/11/2020     BMP:    Lab Results   Component Value Date     09/11/2020    K 4.1 09/11/2020    CL 98 09/11/2020    CO2 26 09/11/2020    BUN 12 09/11/2020 LABALBU 4.0 09/11/2020    CREATININE 0.7 09/11/2020    CALCIUM 9.3 09/11/2020    GFRAA >60 09/11/2020    LABGLOM >60 09/11/2020    GLUCOSE 103 09/11/2020     Magnesium:    Lab Results   Component Value Date    MG 1.9 09/11/2020     Phosphorus:  No results found for: PHOS    Resident's Assessment and Plan     Concha Cross is a 63 y.o. female with a PMHx of HTN, HLD, AVR maintained on warfarin, GERD, depression, restless leg syndrome, AAA who presented at the ED for Syncope. Cardiology and EP were consulted for cardiac workup. 1.Syncope   - Patient had syncopal episode reportedly lasting 2-5 mins. No report of incontinence or confusion   - EKG Normal sinus rhythm with PVCs, nonspecific ST segment changes   - CT cervical spine and head were negative for acute processes   - Cardiology and EP following  - Stress test done and revealed no ischemic ecg changes,   EF 49%, no reversible perfusion defect, fixed ant wall abn.   - tsh 2.6  - Cath done, patent arteries, mild/mod disease of RCA.   - EP will likely plan studys, possible ICD, ILR as CAG was negative. 2. AVR (2009) on chronic anticoagulation   - AVR for bicuspid aortic valve, she was on warfarin 5 mg and 6 mg alternately   - Echo on 2/2020 shows no valvular dysfunction  - warfarin was switched to heparin drip, remains on heparin today. Will f/u and switch as appropriate. - Follow daily PT and INR 1.3 today    3. Hx of PVCs and non-sustained v-tach   - Follows with Dr. Diaz Concepcion, (03/2020) Holter monitoring otrrra60% burden Ventricular ectopy and <1% v-tach \  - EP is following, planning for EP study, possible ICD or ILR as CAG was negative     4. Hx of Ascending Aortic Aneurysm, stable    - 4.5 cm Ascending Aortic Aneurysm (2/2020)     5.  Hx of COPD and Asthma    - Maintained on Spiriva Stelara, Roflumilast proventil and singular   - Follows with Dr. Franki Valdovinos Pulmicort, singulair  and Roflumilast   - Wheezes bilaterally today, added breathing treatments. q4 PRN    6. Hx of HTN    -Maintained at home on Toprol XL 25 mg and Lisinopril 10 mg, will continue   - monitor BP   - Hydralazine PRN for BP elevations     7. Hx of HLD   - lipitor 10 mg at home, will continue     8. Hx of Restless leg syndrome   - maintained on Ropinerole, will continue      9. Hx of anxiety and depression   - maintained on zoloft, will continue     10. 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  23.81, 27.63     DVT prophylaxis/ GI prophylaxis: Heparin , Protonix   Disposition: continue current care     Scotty Smith MD, PGY-1  Attending physician: Dr. Dylan Latif

## 2020-09-11 NOTE — PROGRESS NOTES
[facility]  Internal Medicine Residency Program  Progress Note  - House Team 1    Patient:  Eliot Johnny 63 y.o. female MRN: [id number]     Date of Service: 9/11/2020     CC: LOC  Overnight events: No acute events. Patient will have CAG done this AM.    Subjective   Upon entering room, patient is resting in bed comfortably. Patient states she feel tired today because she got little sleep during the night, but is otherwise fine. She no longer reports nausea. She denies any new syncopal episodes or feelings of dizziness. Denies fevers, chills, sob, N/V, abdominal pain, headaches, worsening SOB. Endorse no complaints this morning. Objective     Physical Exam:  · Vitals: /65   Pulse 64   Temp 97.5 °F (36.4 °C)   Resp 16   Ht 5' 5\" (1.651 m)   Wt 173 lb (78.5 kg)   LMP 11/08/2008 (LMP Unknown)   SpO2 97%   BMI 28.79 kg/m²     · I & O - 24hr: No intake/output data recorded. · General Appearance: alert, appears stated age and cooperative  · HEENT:  Head: Normocephalic, no lesions, without obvious abnormality. · Neck: supple, symmetrical, trachea midline  · Lung: wheezes bilaterally  · Heart: regular rate and rhythm, S1, S2 normal, no murmur, click, rub or gallop  · Abdomen: soft, non-tender; bowel sounds normal; no masses,  no organomegaly  · Extremities:  extremities normal, atraumatic, no cyanosis or edema  · Musculokeletal: No joint swelling, no muscle tenderness. ROM normal in all joints of extremities.    · Neurologic: Mental status: Alert, oriented, thought content appropriate    Pertinent Labs & Imaging Studies     CBC:   Lab Results   Component Value Date    WBC 10.5 09/11/2020    RBC 4.00 09/11/2020    HGB 12.8 09/11/2020    HCT 39.1 09/11/2020    MCV 97.8 09/11/2020    MCH 32.0 09/11/2020    MCHC 32.7 09/11/2020    RDW 13.0 09/11/2020     09/11/2020    MPV 11.0 09/11/2020     BMP:    Lab Results   Component Value Date     09/11/2020    K 4.1 09/11/2020    CL

## 2020-09-11 NOTE — OP NOTE
Operative Note      Patient: Sergio Zhang  YOB: 1960  MRN: [id number]    Date of Procedure: 9/11/2020    Indication:  1. VT / syncope  2. AUC score: 8  3. AUC indication: 58    Procedure: Coronary angiography    Anesthesia: Fentanyl  Time sedation was administered: 09:59. I was present in the room when sedation was administered. Procedure end time: 10:13  Time spent with face to face monitoring of moderate sedation: 26 min    LHC performed via right radial approach using a 6 F sheath. 2.5mg of diluted Verapamil and 200mcg of nitroglycerine administered through the sheath. 5000 U heparin administered IV. Findings:  Left main: 0%  stenosis  LAD: 0. %  stenosis  Circumflex: 0. %   stenosis  RCA: Dominant. 40-50 %  stenosis  LV angio: not performed    Hemodynamics: Ao: 113/69 ( 88 ). Sheath removed and TR band applied. There was good hemostasis achieved and the distal pulses were intact. Complication: None   Estimated blood loss: 5 cc  Contrast use: 40 cc    Post op diagnosis:  Patent coronary arteries with mild to moderate disease of the RCA    PLAN:  Continued medical therapy / risk factor modification  Smoking cessation  Rest as per EP ( ? EP study later today ) and per the primary service  Cardiology will sign off.  Please call if needed          Electronically signed by Bonita Varela MD on 9/11/2020 at 10:24 AM

## 2020-09-12 VITALS
WEIGHT: 173 LBS | SYSTOLIC BLOOD PRESSURE: 130 MMHG | HEIGHT: 65 IN | DIASTOLIC BLOOD PRESSURE: 60 MMHG | HEART RATE: 73 BPM | BODY MASS INDEX: 28.82 KG/M2 | OXYGEN SATURATION: 100 % | RESPIRATION RATE: 16 BRPM | TEMPERATURE: 97.3 F

## 2020-09-12 LAB
ANION GAP SERPL CALCULATED.3IONS-SCNC: 11 MMOL/L (ref 7–16)
APTT: 47.1 SEC (ref 24.5–35.1)
APTT: 97.5 SEC (ref 24.5–35.1)
BUN BLDV-MCNC: 13 MG/DL (ref 8–23)
CALCIUM SERPL-MCNC: 9.1 MG/DL (ref 8.6–10.2)
CHLORIDE BLD-SCNC: 98 MMOL/L (ref 98–107)
CO2: 26 MMOL/L (ref 22–29)
CREAT SERPL-MCNC: 0.7 MG/DL (ref 0.5–1)
GFR AFRICAN AMERICAN: >60
GFR NON-AFRICAN AMERICAN: >60 ML/MIN/1.73
GLUCOSE BLD-MCNC: 104 MG/DL (ref 74–99)
INR BLD: 1.1
POTASSIUM REFLEX MAGNESIUM: 4.1 MMOL/L (ref 3.5–5)
PROTHROMBIN TIME: 12.1 SEC (ref 9.3–12.4)
SODIUM BLD-SCNC: 135 MMOL/L (ref 132–146)

## 2020-09-12 PROCEDURE — 94640 AIRWAY INHALATION TREATMENT: CPT

## 2020-09-12 PROCEDURE — 6370000000 HC RX 637 (ALT 250 FOR IP): Performed by: INTERNAL MEDICINE

## 2020-09-12 PROCEDURE — 6360000002 HC RX W HCPCS: Performed by: INTERNAL MEDICINE

## 2020-09-12 PROCEDURE — 85610 PROTHROMBIN TIME: CPT

## 2020-09-12 PROCEDURE — 99233 SBSQ HOSP IP/OBS HIGH 50: CPT | Performed by: INTERNAL MEDICINE

## 2020-09-12 PROCEDURE — 80048 BASIC METABOLIC PNL TOTAL CA: CPT

## 2020-09-12 PROCEDURE — 85730 THROMBOPLASTIN TIME PARTIAL: CPT

## 2020-09-12 PROCEDURE — 6370000000 HC RX 637 (ALT 250 FOR IP)

## 2020-09-12 PROCEDURE — 2580000003 HC RX 258: Performed by: INTERNAL MEDICINE

## 2020-09-12 PROCEDURE — 36415 COLL VENOUS BLD VENIPUNCTURE: CPT

## 2020-09-12 PROCEDURE — 99239 HOSP IP/OBS DSCHRG MGMT >30: CPT | Performed by: INTERNAL MEDICINE

## 2020-09-12 RX ORDER — WARFARIN SODIUM 6 MG/1
6 TABLET ORAL
Status: COMPLETED | OUTPATIENT
Start: 2020-09-12 | End: 2020-09-12

## 2020-09-12 RX ORDER — HEPARIN SODIUM 1000 [USP'U]/ML
30 INJECTION, SOLUTION INTRAVENOUS; SUBCUTANEOUS PRN
Status: DISCONTINUED | OUTPATIENT
Start: 2020-09-12 | End: 2020-09-12 | Stop reason: HOSPADM

## 2020-09-12 RX ORDER — HEPARIN SODIUM 1000 [USP'U]/ML
60 INJECTION, SOLUTION INTRAVENOUS; SUBCUTANEOUS PRN
Status: DISCONTINUED | OUTPATIENT
Start: 2020-09-12 | End: 2020-09-12 | Stop reason: HOSPADM

## 2020-09-12 RX ADMIN — PANTOPRAZOLE SODIUM 40 MG: 40 TABLET, DELAYED RELEASE ORAL at 08:57

## 2020-09-12 RX ADMIN — WARFARIN SODIUM 6 MG: 6 TABLET ORAL at 15:46

## 2020-09-12 RX ADMIN — ROPINIROLE HYDROCHLORIDE 0.25 MG: 0.25 TABLET, FILM COATED ORAL at 08:58

## 2020-09-12 RX ADMIN — ATORVASTATIN CALCIUM 10 MG: 10 TABLET, FILM COATED ORAL at 09:00

## 2020-09-12 RX ADMIN — METOPROLOL SUCCINATE 25 MG: 25 TABLET, EXTENDED RELEASE ORAL at 08:56

## 2020-09-12 RX ADMIN — LISINOPRIL 10 MG: 10 TABLET ORAL at 08:55

## 2020-09-12 RX ADMIN — POTASSIUM CHLORIDE 40 MEQ: 1500 TABLET, EXTENDED RELEASE ORAL at 08:56

## 2020-09-12 RX ADMIN — SODIUM CHLORIDE, PRESERVATIVE FREE 10 ML: 5 INJECTION INTRAVENOUS at 08:59

## 2020-09-12 RX ADMIN — BUDESONIDE 250 MCG: 0.25 SUSPENSION RESPIRATORY (INHALATION) at 07:50

## 2020-09-12 RX ADMIN — HEPARIN SODIUM 2360 UNITS: 1000 INJECTION INTRAVENOUS; SUBCUTANEOUS at 05:13

## 2020-09-12 RX ADMIN — POTASSIUM CHLORIDE 40 MEQ: 1500 TABLET, EXTENDED RELEASE ORAL at 16:34

## 2020-09-12 RX ADMIN — SERTRALINE HYDROCHLORIDE 150 MG: 100 TABLET ORAL at 08:55

## 2020-09-12 RX ADMIN — ROFLUMILAST 250 MCG: 500 TABLET ORAL at 08:58

## 2020-09-12 RX ADMIN — ARFORMOTEROL TARTRATE 15 MCG: 15 SOLUTION RESPIRATORY (INHALATION) at 07:49

## 2020-09-12 ASSESSMENT — PAIN SCALES - GENERAL: PAINLEVEL_OUTOF10: 0

## 2020-09-12 NOTE — PROGRESS NOTES
Cardiac Electrophysiology Inpatient Progress Note    Brandt Aguayo  1960  Date of Service: 9/12/2020  PCP: Pato Marshall MD  Cardiologist: Hao Coyle MD  Electrophysiologist: Carlos Vincent MD         Subjective: Brandt Aguayo is seen in hospital follow-up and management of: NSVT and syncope       09/09/20: The patient is a 63year-old lady with significant past medical history of status post mechanical aortic valve replacement, symptomatic PVCs, hyperlipidemia, COPD, depression and GERD who presented to the hospital on 9/8/20 after syncopal episode. The patient states that she was at home and while he was taking clothing out of her dryer, she began to feel hot flushing sensation, palpitations, dizziness and then pass out for less than a minute and found herself on the floor. She denies any previous syncope. She reports occasional dizziness hen getting up quickly. This morning while she was sleeping, she was noted to have 26 beats of monomorphic ventricular tachycardia. She denies any SCD in family. Her last echocardiogram on 02/18/20 showed LV EF of 55 to 60%. A 24 hour Holter monitor on 04/09/20 showed a PVC burden of 12% with ventricular triplet. Cardiac electrophysiology service is consulted for syncope with NSVT.    09/10/20: Brandt Aguayo is seen in hospital follow-up, she has had no recurrent NSVT to this point, and has no cardiac complaints. 9/12/20: The patient is seen in the hospital for follow up. She underwent LHC yesterday which showed mild to moderate disease of RCA. She subsequently underwent EP study which showed no inducible VT. She underwent implantable loop recorder insertion for long term cardiac rhythm monitoring. She states that she is feeling well. She denies any chest pain, dyspnea, palpations, dizziness or syncope. No VT noted overnight.     Patient Active Problem List   Diagnosis    Depression    H/O mechanical aortic valve replacement    Hyperlipidemia    Chronic obstructive pulmonary disease (HCC)    Essential hypertension    Ascending aortic aneurysm (HCC)    Vitamin D deficiency    Acid reflux    Current smoker    Syncope and collapse    NSVT (nonsustained ventricular tachycardia) (HCC)    Syncope, cardiogenic         Scheduled Medications:   rOPINIRole  0.5 mg Oral Nightly    rOPINIRole  0.25 mg Oral QAM    metoprolol succinate  25 mg Oral Daily    warfarin (COUMADIN) daily dosing (placeholder)   Other RX Placeholder    potassium chloride  40 mEq Oral BID WC    nicotine  1 patch Transdermal Daily    atorvastatin  10 mg Oral Daily    lisinopril  10 mg Oral Daily    montelukast  10 mg Oral Nightly    pantoprazole  40 mg Oral Daily    Roflumilast  250 mcg Oral Daily    sertraline  150 mg Oral Daily    sodium chloride flush  10 mL Intravenous 2 times per day    budesonide  250 mcg Nebulization BID    Arformoterol Tartrate  15 mcg Nebulization BID       Infusion Medications:   heparin (porcine) 14 Units/kg/hr (09/12/20 0514)    dextrose         PRN Medications:  heparin (porcine), heparin (porcine), ipratropium-albuterol, labetalol, hydrALAZINE, meperidine, HYDROmorphone, HYDROmorphone, perflutren lipid microspheres, albuterol, glucose, dextrose, glucagon (rDNA), dextrose, sodium chloride flush, acetaminophen **OR** acetaminophen, polyethylene glycol, promethazine **OR** ondansetron    Allergies   Allergen Reactions    Keflex [Cephalexin] Itching    Phenergan [Promethazine Hcl] Other (See Comments)       Wt Readings from Last 3 Encounters:   09/08/20 173 lb (78.5 kg)   04/20/20 175 lb (79.4 kg)   03/25/20 172 lb (78 kg)     Temp Readings from Last 3 Encounters:   09/12/20 97.7 °F (36.5 °C) (Temporal)   08/10/20 97.3 °F (36.3 °C) (Temporal)   01/28/20 98.9 °F (37.2 °C) (Oral)     BP Readings from Last 3 Encounters:   09/12/20 136/60   09/11/20 (!) 79/57   04/20/20 (!) 158/63     Pulse Readings from Last 3 Encounters:   09/12/20 73   04/20/20 82 03/25/20 79         Intake/Output Summary (Last 24 hours) at 9/12/2020 0811  Last data filed at 9/11/2020 2153  Gross per 24 hour   Intake 918 ml   Output 100 ml   Net 818 ml       ROS:   Constitutional: Negative for fever, activity change and appetite change. HENT: Negative for epistaxis. Eyes: Negative for diploplia, blurred vision. Respiratory: Negative for cough and chest tightness. Positive for shortness of breath and wheezing. Cardiovascular: pertinent positives in HPI  Gastrointestinal: Negative for abdominal pain and blood in stool. All other review of systems are negative       PHYSICAL EXAM:  Vitals:    09/11/20 1930 09/12/20 0145 09/12/20 0307 09/12/20 0758   BP: (!) 115/55  131/60 136/60   Pulse: 78 70 70 73   Resp: 16 16 18 18   Temp: 97.5 °F (36.4 °C)  97.5 °F (36.4 °C) 97.7 °F (36.5 °C)   TempSrc: Temporal  Temporal Temporal   SpO2: 92%   100%   Weight:       Height:         Constitutional: Well-developed, no acute distress  Eyes: conjunctivae normal, no xanthelasma   Ears, Nose, Throat: oral mucosa moist, no cyanosis   CV: no JVD. Regular rate and rhythm. Crisp valve sound. No murmurs, rubs, or gallops.  PMI is nondisplaced  Lungs: Wheezing bilaterally, normal respiratory effort without used of accessory muscles  Abdomen: soft, non-tender, bowel sounds present, no masses or hepatomegaly   Musculoskeletal: no digital clubbing, trace edema of LEs, no both groin hematoma  Skin: warm, no rashes    ILR insertion site: no bleeding or hematoma    Pertinent Labs:  CBC:   Recent Labs     09/10/20  0503 09/11/20  0405   WBC 7.1 10.5   HGB 13.4 12.8   HCT 40.1 39.1    213     BMP:  Recent Labs     09/10/20  0503 09/11/20  0405 09/12/20  0305    137 135   K 3.6 4.1 4.1    98 98   CO2 25 26 26   BUN 13 12 13   CREATININE 0.6 0.7 0.7   CALCIUM 9.8 9.3 9.1     ABGs: No results found for: PHART, PO2ART, RXO4VHV  INR:   Recent Labs     09/10/20  0749 09/10/20  0903 09/12/20  0305   INR 1. 3 1.3 1.1     BNP: No results for input(s): BNP in the last 72 hours. TSH:   Lab Results   Component Value Date    TSH 2.630 09/10/2020      Cardiac Injury Profile:   No results for input(s): CKTOTAL, CKMB, CKMBINDEX, TROPONINI in the last 72 hours. Lipid Profile:   Lab Results   Component Value Date    TRIG 182 09/10/2020    HDL 50 09/10/2020    LDLCALC 82 09/10/2020    CHOL 168 09/10/2020      Hemoglobin A1C: No components found for: HGBA1C     Pertinent Cardiac Testing:   Echocardiogram 02/18/20:    Findings      Left Ventricle   Normal left ventricle size and systolic function.   Ejection fraction is visually estimated at 55-60%.  Septal motion consistent with post bypass surgery.   Mild concentric left ventricular hypertrophy.   Stage I diastolic dysfunction.      Right Ventricle   Normal right ventricular size.   Right ventricle global systolic function is mildly reduced . TAPSE 15 mm.      Left Atrium   Left atrium is of normal size.   Interatrial septum appears intact.   No evidence of patent foramen ovale.      Right Atrium   Mildly enlarged right atrium size.      Mitral Valve   Mild mitral annular calcification.   No evidence of mitral valve stenosis.   Physiologic and/or trace mitral regurgitation is present.      Tricuspid Valve   The tricuspid valve appears structurally normal.   Mild tricuspid regurgitation.  RVSP is 41 mmHg. Pulmonary hypertension is mild .      Aortic Valve   Well seated mechanical prosthetic valve in aortic position.   Physiologic and/or trace aortic regurgitation is noted.   Aortic valve peak velocity 2.4 m/s, mean gradient 14 mmHg, DVI 0.52, no   prosthetic valve dysfunction.      Pulmonic Valve   Pulmonic valve is structurally normal.   No evidence of any pulmonic regurgitation.   No evidence of pulmonic valve stenosis.       Pericardial Effusion   No evidence for hemodynamically significant pericardial effusion.      Pleural Effusion   No evidence of pleural effusion.      Aorta   Dilatation of ascending aorta (4.1cm) .   Miscellaneous   The inferior vena cava diameter is normal with normal respiratory   variation.      Conclusions      Summary   Normal left ventricle size and systolic function.   Ejection fraction is visually estimated at 55-60%.  Septal motion consistent with post bypass surgery.   Mild concentric left ventricular hypertrophy.   Stage I diastolic dysfunction.   Normal right ventricular size.   Right ventricle global systolic function is mildly reduced . TAPSE 15 mm.   Well seated mechanical prosthetic valve in aortic position.   Aortic valve peak velocity 2.4 m/s, mean gradient 14 mmHg, DVI 0.52, no   prosthetic valve dysfunction.   Mild tricuspid regurgitation.  RVSP is 41 mmHg.  Pulmonary hypertension is mild .   No evidence for hemodynamically significant pericardial effusion.   No previous echo for comparison.      Signature      ----------------------------------------------------------------   Electronically signed by Davi Walters MD(Interpreting   physician) on 02/18/2020 09:55 PM   ----------------------------------------------------------------     M-Mode/2D Measurements & Calculations      LV Diastolic   LV Systolic Dimension: 3.5 AV Cusp Separation: 1.4 cmLA   Dimension: 5   cm                         Dimension: 3.2 cmAO Root   cm             LV Volume Diastolic: 694.4 Dimension: 3.2 cmLA Area: 14.2   LV FS:30 %     ml                         cm^2   LV PW          LV Volume Systolic: 92.8   Diastolic: 1.2 ml   cm             LV EDV/LV EDV Index: 116.5   LV PW          ml/62 ml/m^2LV ESV/LV ESV  RV Diastolic Dimension: 2.6 cm   Systolic: 1.4  Index: 71.6 ml/28ml/ m^2   cm             EF Calculated: 54.9 %      LA/Aorta: 0.88   Septum         LV Mass Index: 125         Ascending Aorta: 4.1 cm   Diastolic: 1.2 l/min*m^2                  LA volume/Index: 44.8 ml   cm             LV Length: 8.5 cm          /23.9ml/m^2   Septum                                    RA Area: 92.7 cm^2   Systolic: 1.4  LVOT: 2 cm   cm   CO: 7.37 l/min   CI: 3.94   l/m*m^2   LV Mass:   233.75 g     Doppler Measurements & Calculations      MV Peak E-Wave: 1.05   AV Peak Velocity: 2.41 m/s  LVOT Peak Velocity:   m/s                    AV Peak Gradient: 23.31     1.26 m/s   MV Peak A-Wave: 0.85   mmHg                        LVOT Mean Velocity:   m/s                    AV Mean Velocity: 1.72 m/s  0.94 m/s   MV E/A Ratio: 1.24     AV Mean Gradient: 13.6 mmHg LVOT Peak Gradient: 6.4   MV Peak Gradient: 4.7  AV VTI: 58.1 cm             mmHgLVOT Mean Gradient:   mmHg                   AV Area (Continuity):1.65   3.9 mmHg   MV Mean Gradient: 2.3  cm^2                        Estimated RVSP: 41.4   mmHg                                               mmHg   MV Mean Velocity: 0.72 LVOT VTI: 30.5 cm           Estimated RAP:3 mmHg   m/s   MV Deceleration Time:  Estimated PASP: 41.42 mmHg   223.7 msec             Pulm. Vein A Reversal       TR Velocity:3.1 m/s   MV P1/2t: 93.1 msec    Duration:129.2 msec         TR Gradient:38.42 mmHg   MVA by PHT:2.36 cm^2   Pulm. Vein D Velocity:0.58  PV Peak Velocity: 1.1   MV Area (continuity):  m/sPulm. Vein A Reversal    m/s   3.1 cm^2               Velocity:0.26 m/s           PV Peak Gradient: 4.8   MV E' Septal Velocity: Pulm.  Vein S Velocity: 0.59 mmHg   0.08 m/s               m/s                         PV Mean Velocity: 0.71   MV E' Lateral                                      m/s   Velocity: 12 m/s                                   PV Mean Gradient: 2.4   MR Velocity: 5.11 m/s                              mmHg   MV LAKEISHA PISA: 0.09 cm^2   MR VTI: 145.5 cm   Alias Velocity: 0.21   m/sPISA Radius: 0.6 cm      PISA area: 2.26 cm^2MR   flow rate: 46.33   ml/sMR volume:13.1 ml    Outpatient Monitor 03/25/20:         Stress test 09/10/20:      TECHNIQUE:    12.0 mCi of Tc-99m MIBI was injected intravenously at rest and cardiac    SPECT images were performed. In addition 34.0 mCi of Tc-99m MIBI was    injected intravenously at maximum stress by using Lexiscan.  Stress    SPECT images and gated study were performed.          FINDINGS:    Perfusion images demonstrate no reversible perfusion defect. There is    a fixed perfusion abnormality at the anterior wall. Wall motion is globally very mildly hypokinetic. The right ventricle    is hypertrophied. The end diastolic volume is 86 ml. The end systolic volume is 44 ml. The estimated ejection fraction is 49 %.           Impression    1. No reversible perfusion defect. Fixed anterior wall perfusion    abnormality. 2. Ejection fraction is 49 %. 3. Global very mild hypokinesis. RVH. Telemetry9/12/2020: Sinus rhythm, no VT    EKG 09/09/20: sinus with frequent monomorphic PVCs QRS 84ms QTc 466ms  Please see scan in Cardiology. I have independently reviewed all of the ECGs and rhythm strips per above. I have personally reviewed the laboratory, cardiac diagnostic and radiographic testing as outlined above. I have reviewed previous records noted in [address]. Impression:    1. Syncope   - Unclear etiology. - History suggested of vasovagal but can not totally exclude arhythmia cuase  - Documented 26 beast of NSVT on 09/09/20 while sleeping.  - Echocardiogram 2/28/20 showed LV EF 55-60%. - C 9/11/20 showed mild to moderate disease at RCA  - EP study 9/11/20 showed no inducible VT  - Status post ILR insertion 9/11/20.     2. Nonsustained ventricular tachycardia  - Monomorphic in nature  - 26 beats in duration.  - Occurred while sleeping.   - No recurrence since 09/10/20   - LVEF 55-60%  On TTE 02/18/20  - [facility] 9/11/20 showed no significant CAD. - Negative EP study on 9/11/20 for inducible VT  - On Toprol XL.     3.   PVCs   - Symptomatic   - PVC burden of 12% noted on 24 hour Holter on 04/09/20.  - On Toprol 25 mg daily.     4. Status post mechanical aortic valve   - in 2009   - Normal mechanical valve function on TTE 02/18/20  - On Coumadin and IV Heparin drip      5. Ascending aortic aneurysm   - 4.5 CM  - Follows with Dr. Trevino      6. COPD   - On Albuterol, Brovana, Pulmicort, Singulair and Daliresp  - Follows with Dr. John Leon      7. Hypertension  - On Toprol and Lisinopril       8. Hyperlipidemia  - On Lipitor     9. Tobacco use   - On Nicoderm patch      10. GERD   - On Protonix     11. Depression and anxiety   - On Requip and Zoloft     Recommendations:    1. Continue Toprol XL 25 mg daily and titrate dose up as tolerated. 2. Keep potassium > 4 and magnesium > 2.  3. OK to discharge home per EP perspective when OK with others. 4. EP will sign off. Please call for any questions or concerns. 5. Follow up in 2 weeks in device clinic for wound check. Thank you for allowing me to participate in your patient's care. I have spent a total of 35 minutes with the patient and the family reviewing the above stated recommendations. And a total of >50% of that time involved face-to-face time providing counseling and or coordination of care with the other providers.     Ольга Guevara MD  Cardiac Electrophysiology  [address]  [facility]  8:11 AM  9/12/2020

## 2020-09-12 NOTE — PROGRESS NOTES
[facility] Res[facility] Program  Progress Note - House Team 1    Patient:  Amanda Davis 63 y.o. female MRN: [id number]     Date of Service: 9/12/2020     CC: Syncope   Overnight events: None      Subjective     The patient was seen and examined at bedside today. She is doing well. No dizziness, lightheadedness, chest pain or palpitations. S/p implantable loop recorder and cardiac cath 9/11. She is to be started back on warfarin today. Objective     Physical Exam:  · Vitals: /60   Pulse 73   Temp 97.7 °F (36.5 °C) (Temporal)   Resp 18   Ht 5' 5\" (1.651 m)   Wt 173 lb (78.5 kg)   LMP 11/08/2008 (LMP Unknown)   SpO2 100%   BMI 28.79 kg/m²     · I & O - 24hr: No intake/output data recorded. · General Appearance: alert, appears stated age and cooperative  · HEENT:  Head: Normal, normocephalic, atraumatic. · Neck: no adenopathy, no carotid bruit, no JVD, supple, symmetrical, trachea midline and thyroid not enlarged, symmetric, no tenderness/mass/nodules  · Lung: clear to auscultation bilaterally  · Heart: regular rate and rhythm, S1, S2 normal, no murmur, click, rub or gallop  · Abdomen: soft, non-tender; bowel sounds normal; no masses,  no organomegaly  · Extremities:  extremities normal, atraumatic, no cyanosis or edema  · Musculokeletal: No joint swelling, no muscle tenderness. ROM normal in all joints of extremities.    · Neurologic: Mental status: Alert, oriented, thought content appropriate  Subject  Pertinent Labs & Imaging Studies   rob  CBC:   Lab Results   Component Value Date    WBC 10.5 09/11/2020    RBC 4.00 09/11/2020    HGB 12.8 09/11/2020    HCT 39.1 09/11/2020    MCV 97.8 09/11/2020    MCH 32.0 09/11/2020    MCHC 32.7 09/11/2020    RDW 13.0 09/11/2020     09/11/2020    MPV 11.0 09/11/2020     BMP:    Lab Results   Component Value Date     09/12/2020    K 4.1 09/12/2020    CL 98 09/12/2020    CO2 26 09/12/2020    BUN 13 09/12/2020 LABALBU 4.0 09/11/2020    CREATININE 0.7 09/12/2020    CALCIUM 9.1 09/12/2020    GFRAA >60 09/12/2020    LABGLOM >60 09/12/2020    GLUCOSE 104 09/12/2020     Magnesium:    Lab Results   Component Value Date    MG 1.9 09/11/2020     Phosphorus:  No results found for: PHOS    Resident's Assessment and Plan     Adonis Gray is a 63 y.o. female with a PMHx of HTN, HLD, AVR maintained on warfarin, GERD, depression, restless leg syndrome, AAA who presented at the ED for Syncope. Cardiology and EP were consulted for cardiac workup. 1.Syncope   - Patient had syncopal episode reportedly lasting 2-5 mins. No report of incontinence or confusion   - EKG Normal sinus rhythm with PVCs, nonspecific ST segment changes   - CT of the cervical spine and head are negative for acute processes   - could be from arrythmia, orthostatic negative   - TSH 2.6   - s/p cardiac cath 9/11 showing mild to mod RCA disease (40-50%)   - s/p EP study 9/11 Normal AV node and His purkinje system function, no inducible arrythmias. S/p Implantable loop recorder placement 9/11   - Cardiology has signed off  - EP following, recommendations appreciated     2. AVR (2009) on chronic anticoagulation   - AVR for bicuspid aortic valve, she is maintained on warfarin 5 mg and 6 mg alternately   - Echo on 2/2020 shows no valvular dysfunction  - warfarin switched to heparin gtt , held for procedures yesterday, was resumed 2100 last night   - plan to start back to warfarin today, dosing per pharmacy   - Follow daily PT and INR     3. Hx of PVCs and non-sustained v-tach   - Follows with Dr. Rossi Overall, (03/2020) Holter monitoring [id number]% burden Ventricular ectopy and <1% v-tach   - Last stress test 2016, unremarkable   - s/p EP study 9/11 Normal AV node and His purkinje system function, no inducible arrythmias. S/p Implantable loop recorder placement 9/11   - EP is following, recommendations appreciated     4.  Hx of Ascending Aortic Aneurysm, stable    - 4.5 cm Ascending Aortic Aneurysm (2/2020)     5. Hx of COPD and Asthma    - Maintained on Spiriva Stelara, Roflumilast proventil and singular   - Follows with Dr. Eladio Govea Pulmicort, singulair and Roflumilast  - monitor respiratory status     6. Hx of HTN    -Maintained at home on Toprol XL 25 mg and Lisinopril 10 mg, will continue   - monitor BP   - Hydralazine PRN for BP elevations     7. Hx of HLD   - lipitor 10 mg at home, will continue     8. Hx of Restless leg syndrome   - maintained on Ropinerole, will continue      9. Hx of anxiety and depression   - maintained on zoloft, will continue     10. Hx of GERD   - protonix    11. Hypocortisolism   - cortisol level 09/09 4.4, however this was taken in the early AM    - Cosyntropin test did have increase in cortisol 23.81 and 27.63 post stimulation    PT/OT evaluation: not warranted at present time   DVT prophylaxis/ GI prophylaxis: Heparin gtt plan to bridge today, Protonix   Disposition: continue current care      Nba Conley MD, PGY-1  Attending physician: Dr. Jdoi Ponce  [facility]    Attending Physician Statement:  Ravi Irwin M.D., F.A.C.P. I have discussed the case, including pertinent history and exam findings with the resident/NP. I have seen and examined the patient and the key elements of the encounter have been performed by me. I agree with the resident ROS, PMHx, PSHx, meds reviewed and assessment, plan and orders as documented by the resident/NP      Hospital charts reviewed, including other providers notes, relevant labs and imaging. arotic valve, mechanical replacement  - TAVR- ?need for bridging anticoagulation  Syncopal workup - undetermined etiology- possible rhythem disturbance  Negative stress test    Aortic valve NOT stenotic now- during cath--Bileaflet mechanical valve with adequate leaflet mobility noted on  Fluoroscopy.     NO major CAD,  Nothing stented-- Bileaflet mechanical valve with adequate leaflet mobility noted on fluoroscopy  EF 58%   Syncopal episode- possible vtach/vfib. -- linq  HTN- negative orthostatics    Negative adrenal insufficiency  Copd stable- not in exac.     Coumadin,  +/- lovenox- bridging optional  -- ?need for coumadin even--- optional for lovenox- per up to date  >50% of time spent coordinating care with other providers and/or counseling patient/family  Remainder of medical problems as per resident note.  >30min DC time

## 2020-09-12 NOTE — PROGRESS NOTES
Pharmacy Consultation Note  (Anticoagulant Dosing and Monitoring)    Initial consult date: 9-  Consulting physician: Dr. Lott Bowels:  Keflex [cephalexin] and Phenergan [promethazine hcl]    63 y.o. female; 165.1 cm, 78.5 kg  Warfarin Indication Target   INR Rangef Home Dose  (if applicable) Diet/Feeding Tube   Mechanical AVR 2.5 - 3.5 5 mg SMWF and 6 mg TRS 9/11: cardiac     Warfarin drug-drug interactions  Start  Stop Home Med? Comments                          TSH: 2.630 (9-)   Hepatic Function Panel:                            Lab Results   Component Value Date    ALKPHOS 64 09/11/2020    ALT 21 09/11/2020    AST 20 09/11/2020    PROT 6.3 09/11/2020    BILITOT 0.6 09/11/2020    BILIDIR <0.2 06/25/2018    IBILI see below 06/25/2018    LABALBU 4.0 09/11/2020     Date Warfarin Dose INR Heparin or LMWH HBG/HCT PLT Comment   9/11 No Dose 1.3 (9/10) U[facility] infusion  resume @ 2111 12.8/39.1 213    9/12 6 mg (1100) 1.1 (0305) U[facility] infusion                                   Assessment:  · 61 yo/F admitted 9-8-2020 for syncopal episode. On warfarin PTA for Hx of mechanical AVR in 2009. Warfarin dose PTA was 5 mg SMWF and 6 mg TRS. INR goal = 2.5 - 3.5. · Warfarin has been held during this admission d/t syncopal w/u by Cards and EP. UFH infusion ordered for anticoagulation. · OK to resume warfarin on 9/11 after Linq recorder implanted. · Heparin infusion is not going to resume until 2100 tonight per EP. · 9/12: INR = 1.1; UFH resumed 9/11 PM.      Plan:   · Give warfarin 6 mg x1 @ 1100. · Daily PT/INR until the INR is stable within the therapeutic range. · Pharmacist will follow and monitor/adjust dosing as necessary.     Edward Anne, PharmD  9/12/2020  9:08 AM  Pager: [phone]

## 2020-09-14 ENCOUNTER — CARE COORDINATION (OUTPATIENT)
Dept: CASE MANAGEMENT | Age: 60
End: 2020-09-14

## 2020-09-14 ENCOUNTER — TELEPHONE (OUTPATIENT)
Dept: CARDIOLOGY | Age: 60
End: 2020-09-14

## 2020-09-14 ENCOUNTER — TELEPHONE (OUTPATIENT)
Dept: INTERNAL MEDICINE | Age: 60
End: 2020-09-14

## 2020-09-14 NOTE — TELEPHONE ENCOUNTER
I called Edmundo Hawley to see how she's doing since her Linq insertion on 9/11/20. She states she's doing well. Denies any bleeding, drainage, or swelling from insertion site. Aquacel dsg remains intact. I reminded her to remove the aquacel dsg on Friday 9/18/20. She's aware that she needs to make a 2 week fu appt at the [address] [place] [address]. She plans on doing that today. She offers no complaints and is thankful for the call.

## 2020-09-14 NOTE — CARE COORDINATION
Patient contacted regarding recent discharge and COVID-19 risk. Discussed COVID-19 related testing which was not done at this time. Test results were not done. Patient informed of results, if available? N/A     Care Transition Nurse/ Ambulatory Care Manager contacted the patient by telephone to perform post discharge assessment. Verified name and  with patient as identifiers. Patient has following risk factors of: COPD and asthma. CTN/ACM reviewed discharge instructions, medical action plan and red flags related to discharge diagnosis. Reviewed and educated them on any new and changed medications related to discharge diagnosis. Advised obtaining a 90-day supply of all daily and as-needed medications. Education provided regarding infection prevention, and signs and symptoms of COVID-19 and when to seek medical attention with patient who verbalized understanding. Discussed exposure protocols and quarantine from [address] you at higher risk for severe illness  and given an opportunity for questions and concerns. The patient agrees to contact the COVID-19 hotline [phone] or PCP office for questions related to their healthcare. CTN/ACM provided contact information for future reference. From CDC: Are you at higher risk for severe illness?  Wash your hands often.  Avoid close contact (6 feet, which is about two arm lengths) with people who are sick.  Put distance between yourself and other people if COVID-19 is spreading in your community.  Clean and disinfect frequently touched surfaces.  Avoid all cruise travel and non-essential air travel.  Call your healthcare professional if you have concerns about COVID-19 and your underlying condition or if you are sick. For more information on steps you can take to protect yourself, see CDC's How to Protect Yourself  Patient declined LOOP enrollment.     Plan for follow-up call in 7-14 days based on severity of symptoms and risk in positions.  -Patient declined full med review. CTN able to confirm with patient new medication of lovenox and that she is taking coumadin as prescribed. Patient will have INR check tomorrow at PCP visit.  -Patient states LOOP recorder dressing is D/I. -Healthcare decision makers updated in advance care planning tab. -CTN confirmed patient is active with MyChart. -CTN explained that a member of the Care Transition Central Team will be contacting her for further follow up calls, patient is in agreement and denies any other needs or concerns at this time.            Follow Up  Future Appointments   Date Time Provider Esmer Lee   9/15/2020  8:30 AM Maryan Ngo DO ACC IM [facility]   9/24/2020  1:30 PM SCHEDULE, DEVICE CLINIC 2 REI ELECTRO PHYS [facility]   11/5/2020  2:45 PM Judy Weeks MD [facility] CARDIO [facility]   1/12/2021  9:30 AM Preeti Luna MD ACC Pulm [facility]       Suzie Dunne RN

## 2020-09-15 ENCOUNTER — OFFICE VISIT (OUTPATIENT)
Dept: INTERNAL MEDICINE | Age: 60
End: 2020-09-15
Payer: MEDICARE

## 2020-09-15 VITALS
HEIGHT: 65 IN | TEMPERATURE: 97.6 F | SYSTOLIC BLOOD PRESSURE: 113 MMHG | OXYGEN SATURATION: 95 % | HEART RATE: 76 BPM | RESPIRATION RATE: 16 BRPM | DIASTOLIC BLOOD PRESSURE: 64 MMHG | WEIGHT: 166 LBS | BODY MASS INDEX: 27.66 KG/M2

## 2020-09-15 LAB
ADRENOCORTICOTROPIC HORMONE: 6.5 PG/ML (ref 7.2–63.3)
INTERNATIONAL NORMALIZATION RATIO, POC: 1.7

## 2020-09-15 PROCEDURE — 99212 OFFICE O/P EST SF 10 MIN: CPT | Performed by: INTERNAL MEDICINE

## 2020-09-15 PROCEDURE — 99214 OFFICE O/P EST MOD 30 MIN: CPT | Performed by: INTERNAL MEDICINE

## 2020-09-15 PROCEDURE — G0444 DEPRESSION SCREEN ANNUAL: HCPCS | Performed by: INTERNAL MEDICINE

## 2020-09-15 PROCEDURE — 1111F DSCHRG MED/CURRENT MED MERGE: CPT | Performed by: INTERNAL MEDICINE

## 2020-09-15 PROCEDURE — 85610 PROTHROMBIN TIME: CPT | Performed by: INTERNAL MEDICINE

## 2020-09-15 RX ORDER — MONTELUKAST SODIUM 10 MG/1
TABLET ORAL
Qty: 90 TABLET | Refills: 1 | Status: SHIPPED
Start: 2020-09-15 | End: 2021-04-23 | Stop reason: SDUPTHER

## 2020-09-15 RX ORDER — METOPROLOL SUCCINATE 25 MG/1
25 TABLET, EXTENDED RELEASE ORAL DAILY
Qty: 90 TABLET | Refills: 1 | Status: SHIPPED
Start: 2020-09-15 | End: 2021-04-23 | Stop reason: SDUPTHER

## 2020-09-15 RX ORDER — LISINOPRIL 10 MG/1
TABLET ORAL
Qty: 90 TABLET | Refills: 1 | Status: SHIPPED
Start: 2020-09-15 | End: 2021-04-23 | Stop reason: SDUPTHER

## 2020-09-15 RX ORDER — OMEPRAZOLE 40 MG/1
CAPSULE, DELAYED RELEASE ORAL
Qty: 90 CAPSULE | Refills: 1 | Status: SHIPPED
Start: 2020-09-15 | End: 2021-03-23 | Stop reason: SDUPTHER

## 2020-09-15 RX ORDER — CALCIUM POLYCARBOPHIL 625 MG
625 TABLET ORAL DAILY
Qty: 30 TABLET | Refills: 3 | Status: SHIPPED
Start: 2020-09-15 | End: 2021-04-23 | Stop reason: SDUPTHER

## 2020-09-15 RX ORDER — SERTRALINE HYDROCHLORIDE 100 MG/1
TABLET, FILM COATED ORAL
Qty: 135 TABLET | Refills: 1 | Status: SHIPPED
Start: 2020-09-15 | End: 2021-03-23 | Stop reason: SDUPTHER

## 2020-09-15 RX ORDER — ROPINIROLE 0.25 MG/1
TABLET, FILM COATED ORAL
Qty: 270 TABLET | Refills: 1 | Status: SHIPPED
Start: 2020-09-15 | End: 2021-04-23 | Stop reason: SDUPTHER

## 2020-09-15 RX ORDER — ATORVASTATIN CALCIUM 10 MG/1
TABLET, FILM COATED ORAL
Qty: 90 TABLET | Refills: 1 | Status: SHIPPED
Start: 2020-09-15 | End: 2021-04-23 | Stop reason: SDUPTHER

## 2020-09-15 SDOH — ECONOMIC STABILITY: FOOD INSECURITY: WITHIN THE PAST 12 MONTHS, YOU WORRIED THAT YOUR FOOD WOULD RUN OUT BEFORE YOU GOT MONEY TO BUY MORE.: SOMETIMES TRUE

## 2020-09-15 SDOH — ECONOMIC STABILITY: INCOME INSECURITY: HOW HARD IS IT FOR YOU TO PAY FOR THE VERY BASICS LIKE FOOD, HOUSING, MEDICAL CARE, AND HEATING?: VERY HARD

## 2020-09-15 SDOH — ECONOMIC STABILITY: TRANSPORTATION INSECURITY
IN THE PAST 12 MONTHS, HAS LACK OF TRANSPORTATION KEPT YOU FROM MEETINGS, WORK, OR FROM GETTING THINGS NEEDED FOR DAILY LIVING?: NO

## 2020-09-15 SDOH — ECONOMIC STABILITY: TRANSPORTATION INSECURITY
IN THE PAST 12 MONTHS, HAS THE LACK OF TRANSPORTATION KEPT YOU FROM MEDICAL APPOINTMENTS OR FROM GETTING MEDICATIONS?: NO

## 2020-09-15 SDOH — ECONOMIC STABILITY: FOOD INSECURITY: WITHIN THE PAST 12 MONTHS, THE FOOD YOU BOUGHT JUST DIDN'T LAST AND YOU DIDN'T HAVE MONEY TO GET MORE.: SOMETIMES TRUE

## 2020-09-15 ASSESSMENT — PATIENT HEALTH QUESTIONNAIRE - PHQ9
9. THOUGHTS THAT YOU WOULD BE BETTER OFF DEAD, OR OF HURTING YOURSELF: 0
4. FEELING TIRED OR HAVING LITTLE ENERGY: 3
10. IF YOU CHECKED OFF ANY PROBLEMS, HOW DIFFICULT HAVE THESE PROBLEMS MADE IT FOR YOU TO DO YOUR WORK, TAKE CARE OF THINGS AT HOME, OR GET ALONG WITH OTHER PEOPLE: 1
SUM OF ALL RESPONSES TO PHQ9 QUESTIONS 1 & 2: 4
SUM OF ALL RESPONSES TO PHQ QUESTIONS 1-9: 19
2. FEELING DOWN, DEPRESSED OR HOPELESS: 2
3. TROUBLE FALLING OR STAYING ASLEEP: 3
8. MOVING OR SPEAKING SO SLOWLY THAT OTHER PEOPLE COULD HAVE NOTICED. OR THE OPPOSITE, BEING SO FIGETY OR RESTLESS THAT YOU HAVE BEEN MOVING AROUND A LOT MORE THAN USUAL: 0
5. POOR APPETITE OR OVEREATING: 3
7. TROUBLE CONCENTRATING ON THINGS, SUCH AS READING THE NEWSPAPER OR WATCHING TELEVISION: 3
6. FEELING BAD ABOUT YOURSELF - OR THAT YOU ARE A FAILURE OR HAVE LET YOURSELF OR YOUR FAMILY DOWN: 3
1. LITTLE INTEREST OR PLEASURE IN DOING THINGS: 2

## 2020-09-15 ASSESSMENT — ENCOUNTER SYMPTOMS
WHEEZING: 0
VOMITING: 0
NAUSEA: 0
SHORTNESS OF BREATH: 0
CONSTIPATION: 0
DIARRHEA: 0
COUGH: 1
SINUS PAIN: 0
SINUS PRESSURE: 0
BACK PAIN: 0

## 2020-09-15 NOTE — PROGRESS NOTES
injection  Inject 0.8 mLs into the skin 2 times daily for 7 days             lisinopril (PRINIVIL;ZESTRIL) 10 MG tablet  take 1 tablet by mouth once daily             metoprolol succinate (TOPROL XL) 25 MG extended release tablet  Take 1 tablet by mouth daily             montelukast (SINGULAIR) 10 MG tablet  take 1 tablet by mouth at bedtime             niacin 500 MG tablet  Take 2 tablets by mouth 2 times daily (with meals)             omeprazole (PRILOSEC) 40 MG delayed release capsule  take 1 capsule by mouth every morning BEFORE BREAKFAST             Roflumilast (DALIRESP) 250 MCG TABS  Take 250 mcg by mouth daily             rOPINIRole (REQUIP) 0.25 MG tablet  Take 1 tab in morning and 2 tabs before bed             sertraline (ZOLOFT) 100 MG tablet  take 1 and 1/2 tablet by mouth once daily             tiotropium (SPIRIVA RESPIMAT) 2.5 MCG/ACT AERS inhaler  INHALE 2 PUFFS BY MOUTH DAILY. PAP Medication             VENTOLIN  (90 Base) MCG/ACT inhaler  inhale 1 puff by mouth every 4 hours if needed for wheezing             vitamin C (ASCORBIC ACID) 500 MG tablet  Take 1,000 mg by mouth daily             warfarin (COUMADIN) 5 MG tablet  Take 5 mg by mouth daily Sunday, Monday, Wednesday, and Fridays             warfarin (COUMADIN) 6 MG tablet  Take one tablet on Tuesday and Thursday.                    Medications marked \"taking\" at this time  Outpatient Medications Marked as Taking for the 9/15/20 encounter (Office Visit) with Rosaura Perry, DO   Medication Sig Dispense Refill    omeprazole (PRILOSEC) 40 MG delayed release capsule take 1 capsule by mouth every morning BEFORE BREAKFAST 90 capsule 1    atorvastatin (LIPITOR) 10 MG tablet take 1 tablet by mouth once daily 90 tablet 1    lisinopril (PRINIVIL;ZESTRIL) 10 MG tablet take 1 tablet by mouth once daily 90 tablet 1    metoprolol succinate (TOPROL XL) 25 MG extended release tablet Take 1 tablet by mouth daily 90 tablet 1    Calcium Polycarbophil (FIBER) 625 MG TABS Take 1 tablet by mouth daily 30 tablet 3    montelukast (SINGULAIR) 10 MG tablet take 1 tablet by mouth at bedtime 90 tablet 1    rOPINIRole (REQUIP) 0.25 MG tablet Take 1 tab in morning and 2 tabs before bed 270 tablet 1    sertraline (ZOLOFT) 100 MG tablet take 1 and 1/2 tablet by mouth once daily 135 tablet 1    tiotropium (SPIRIVA RESPIMAT) 2.5 MCG/ACT AERS inhaler INHALE 2 PUFFS BY MOUTH DAILY. PAP Medication 3 Inhaler 3    enoxaparin (LOVENOX) 80 MG/0.8ML injection Inject 0.8 mLs into the skin 2 times daily for 7 days 14 Syringe 0    warfarin (COUMADIN) 5 MG tablet Take 5 mg by mouth daily Sunday, Monday, Wednesday, and Fridays      Cholecalciferol (VITAMIN D) 50 MCG (2000 UT) CAPS capsule Take 4,000 Units by mouth daily      warfarin (COUMADIN) 6 MG tablet Take one tablet on Tuesday and Thursday.  (Patient taking differently: Take one tablet on Tuesday and Thursday and Saturday) 30 tablet 1    Roflumilast (DALIRESP) 250 MCG TABS Take 250 mcg by mouth daily 28 tablet 2    Blood Pressure Monitoring (BLOOD PRESSURE CUFF) MISC Dx:  Hypertension with labile blood pressure 1 each 0    Azelastine HCl 137 MCG/SPRAY SOLN 2 sprays by Nasal route daily (Patient taking differently: 2 sprays by Nasal route daily as needed ) 30 mL 1    VENTOLIN  (90 Base) MCG/ACT inhaler inhale 1 puff by mouth every 4 hours if needed for wheezing 18 g 2    DULERA 100-5 MCG/ACT inhaler inhale 2 puffs by mouth twice a day 13 g 3    vitamin C (ASCORBIC ACID) 500 MG tablet Take 1,000 mg by mouth daily      niacin 500 MG tablet Take 2 tablets by mouth 2 times daily (with meals) (Patient taking differently: Take 1,000 mg by mouth 2 times daily (with meals) Takes OTC only) 120 tablet 3        Medications patient taking as of now reconciled against medications ordered at time of hospital discharge: Yes    Chief Complaint   Patient presents with    Follow-Up from [facility] Other Bleeding Incision, left lower stomach area       HPI    Inpatient course: Discharge summary reviewed- see chart. Interval history/Current status: Patient reports no CC to be addressed since discharge. Had no fainting episodes since the admission. She reports that she does feel down due to the admission and is concerned about her health. Ho COPD. Current smoker 1/2 pack a day. Trying to quit. Denies alcohol or illegal drugs. INR today 1.7. Reports taking lovenox injections 80mg bid. And also taking warfarin as prescribed 5mg and 6mg alternating days. Valve replace Jan 2009. Bileaflet mechanical aortic valve noted in the latest cath. Goal INR 2.5. Review of Systems   Constitutional: Negative for activity change, appetite change, chills and fever. HENT: Negative for sinus pressure, sinus pain and sneezing. Respiratory: Positive for cough. Negative for shortness of breath and wheezing. Ho COPD   Cardiovascular: Negative for chest pain, palpitations and leg swelling. Gastrointestinal: Negative for constipation, diarrhea, nausea and vomiting. Endocrine: Negative for polydipsia, polyphagia and polyuria. Genitourinary: Negative for difficulty urinating, dysuria and hematuria. Musculoskeletal: Negative for arthralgias, back pain and myalgias. Skin:        Bruises on UEs; lower abdomen where she gives the lovenox shots. Neurological: Negative for dizziness, seizures, syncope and light-headedness. Psychiatric/Behavioral: Negative for dysphoric mood and sleep disturbance. The patient is not nervous/anxious. On Zoloft. Sleeping ok. No interest in her regular routine. No guilt. Loss of energy. Not able to concentrate. Appetite intact. No suicidal homicidal thoughts. Vitals:    09/15/20 0808   BP: 113/64   Pulse: 76   Resp: 16   Temp: 97.6 °F (36.4 °C)   TempSrc: Temporal   SpO2: 95%   Weight: 166 lb (75.3 kg)   Height: 5' 5\" (1.651 m)     Body mass index is 27.62 kg/m².    Wt Readings from Last 3 Encounters:   09/15/20 166 lb (75.3 kg)   09/08/20 173 lb (78.5 kg)   04/20/20 175 lb (79.4 kg)     BP Readings from Last 3 Encounters:   09/15/20 113/64   09/12/20 130/60   09/11/20 (!) 79/57       Physical Exam  Constitutional:       Appearance: Normal appearance. She is normal weight. HENT:      Head: Normocephalic and atraumatic. Mouth/Throat:      Mouth: Mucous membranes are moist.   Eyes:      Extraocular Movements: Extraocular movements intact. Pupils: Pupils are equal, round, and reactive to light. Neck:      Musculoskeletal: Normal range of motion. No neck rigidity or muscular tenderness. Cardiovascular:      Rate and Rhythm: Normal rate and regular rhythm. Heart sounds: No murmur. Pulmonary:      Effort: Pulmonary effort is normal.      Breath sounds: Normal breath sounds. No wheezing. Abdominal:      General: Bowel sounds are normal.      Palpations: Abdomen is soft. There is no mass. Tenderness: There is no abdominal tenderness. Musculoskeletal: Normal range of motion. Right lower leg: No edema. Left lower leg: No edema. Skin:     Comments: Bruises and echymoses on UEs and lower abdomen where she gives the lovenox shots. She has a healing wound about 0.5 cm diameter on the left lower abdomen likely where shot got lovenox shots. Advised about skin care. Will evaluate further in next visit if not healed. Neurological:      General: No focal deficit present. Mental Status: She is alert and oriented to person, place, and time. Cranial Nerves: No cranial nerve deficit. Psychiatric:         Mood and Affect: Mood normal.         Behavior: Behavior normal.         Thought Content: Thought content normal.         Assessment/Plan:  1. Gastroesophageal reflux disease without esophagitis    - omeprazole (PRILOSEC) 40 MG delayed release capsule; take 1 capsule by mouth every morning BEFORE BREAKFAST  Dispense: 90 capsule;  Refill: 1  - TN DISCHARGE MEDS RECONCILED W/ CURRENT OUTPATIENT MED LIST    2. Hyperlipidemia, unspecified hyperlipidemia type    - atorvastatin (LIPITOR) 10 MG tablet; take 1 tablet by mouth once daily  Dispense: 90 tablet; Refill: 1    3. Essential hypertension    - lisinopril (PRINIVIL;ZESTRIL) 10 MG tablet; take 1 tablet by mouth once daily  Dispense: 90 tablet; Refill: 1  - NM DISCHARGE MEDS RECONCILED W/ CURRENT OUTPATIENT MED LIST    4. Constipation, unspecified constipation type    - Calcium Polycarbophil (FIBER) 625 MG TABS; Take 1 tablet by mouth daily  Dispense: 30 tablet; Refill: 3    5. Diverticulitis    - Calcium Polycarbophil (FIBER) 625 MG TABS; Take 1 tablet by mouth daily  Dispense: 30 tablet; Refill: 3    6. COPD, mild (HCC)    - montelukast (SINGULAIR) 10 MG tablet; take 1 tablet by mouth at bedtime  Dispense: 90 tablet; Refill: 1    7. Restless leg syndrome    - rOPINIRole (REQUIP) 0.25 MG tablet; Take 1 tab in morning and 2 tabs before bed  Dispense: 270 tablet; Refill: 1    8. Depression, unspecified depression type    - sertraline (ZOLOFT) 100 MG tablet; take 1 and 1/2 tablet by mouth once daily  Dispense: 135 tablet; Refill: 1    9. H/O mechanical aortic valve replacement    - POCT INR  - NM DISCHARGE MEDS RECONCILED W/ CURRENT OUTPATIENT MED LIST        Plan is to complete her lovenoc bid therapy. Continue Coumadin 5 and 6 alternating (INR 1.7 today improved from 1.1 3 days prior; on track). Revisit early visit for an INR check. INR Goal around 2 or 2.5  is sufficient per literature. Will adjust Zoloft on further visits after understanding baseline better. Advised about skin care. Will reassess the LLabdominal scab next visit.       Medical Decision Making: moderate complexity

## 2020-09-15 NOTE — PROGRESS NOTES
[facility]    Attending Physician Statement:  Elaine Mcneal M.D., F.A.C.P. I have discussed the case, including pertinent history and exam findings with the resident. I have seen and examined the patient and the key elements of the encounter have been performed by me. I agree with the assessment, plan and orders as documented by the resident. Patient is seen for hospital  fu visit today. Last Hospital charts reviewed, including other providers notes, relevant labs and imaging. arotic valve, mechanical replacement  - TAVR- ?need for bridging anticoagulation  Syncopal workup - undetermined etiology- possible rhythem disturbance  Negative stress test--but EF 48% prior MI  \"No reversible perfusion defect. Fixed anterior wall perfusion abnormality. 2. Ejection fraction is 49 %. 3. Global very mild hypokinesis. RVH. \"     Aortic valve NOT stenotic now- during cath--Bileaflet mechanical valve with adequate leaflet mobility noted on  Fluoroscopy.     s/p EP study 9/11 Normal AV node and His purkinje system function, no inducible arrythmias. She had Implantable Loop recorder placement on the same day  NO major CAD,  Nothing stented-- Bileaflet mechanical valve with adequate leaflet mobility noted on fluoroscopy  EF 58%   Syncopal episode- possible vtach/vfib. --sp linq  HTN- negative orthostatics     Negative adrenal insufficiency  Copd stable- not in exac.     Coumadin,  +/- lovenox- bridging optional --inr 2.0-2.5 acceptable- currently 1.9  -- ?need for coumadin even--- optional for lovenox- per up to date    No more syncopal episodes  roprinole for restless legs - re eval- for now continue  >50% of time spent coordinating care with other providers and/or counseling patient/family  Moderate complex care,  <7 days post hospital DC  Some depression - baseline mood- defer change on zoloft for now  Remainder of medical problems as per resident note.

## 2020-09-17 ENCOUNTER — TELEPHONE (OUTPATIENT)
Dept: INTERNAL MEDICINE | Age: 60
End: 2020-09-17

## 2020-09-17 NOTE — TELEPHONE ENCOUNTER
Patient called in stating she has a sore on the lower part of her abdomen that is bleeding but not excessively and also is forming a new sore. Per Dr. Shane Guajardo patient is to d/c Lovenox for now but continue to take coumadin and if bleed continues and or worsens to call back to get a sooner appt.

## 2020-09-18 ENCOUNTER — TELEPHONE (OUTPATIENT)
Dept: INTERNAL MEDICINE | Age: 60
End: 2020-09-18

## 2020-09-18 ENCOUNTER — OFFICE VISIT (OUTPATIENT)
Dept: INTERNAL MEDICINE | Age: 60
End: 2020-09-18
Payer: MEDICARE

## 2020-09-18 VITALS
TEMPERATURE: 97.1 F | SYSTOLIC BLOOD PRESSURE: 113 MMHG | WEIGHT: 167 LBS | RESPIRATION RATE: 12 BRPM | BODY MASS INDEX: 27.82 KG/M2 | HEART RATE: 83 BPM | DIASTOLIC BLOOD PRESSURE: 63 MMHG | HEIGHT: 65 IN

## 2020-09-18 PROCEDURE — G8427 DOCREV CUR MEDS BY ELIG CLIN: HCPCS | Performed by: INTERNAL MEDICINE

## 2020-09-18 PROCEDURE — 99213 OFFICE O/P EST LOW 20 MIN: CPT | Performed by: INTERNAL MEDICINE

## 2020-09-18 PROCEDURE — 3023F SPIROM DOC REV: CPT | Performed by: INTERNAL MEDICINE

## 2020-09-18 PROCEDURE — G8419 CALC BMI OUT NRM PARAM NOF/U: HCPCS | Performed by: INTERNAL MEDICINE

## 2020-09-18 PROCEDURE — 1111F DSCHRG MED/CURRENT MED MERGE: CPT | Performed by: INTERNAL MEDICINE

## 2020-09-18 PROCEDURE — 3017F COLORECTAL CA SCREEN DOC REV: CPT | Performed by: INTERNAL MEDICINE

## 2020-09-18 PROCEDURE — 4004F PT TOBACCO SCREEN RCVD TLK: CPT | Performed by: INTERNAL MEDICINE

## 2020-09-18 PROCEDURE — G8926 SPIRO NO PERF OR DOC: HCPCS | Performed by: INTERNAL MEDICINE

## 2020-09-18 PROCEDURE — 99212 OFFICE O/P EST SF 10 MIN: CPT | Performed by: INTERNAL MEDICINE

## 2020-09-18 ASSESSMENT — ENCOUNTER SYMPTOMS
COUGH: 0
SHORTNESS OF BREATH: 0
ABDOMINAL DISTENTION: 0

## 2020-09-18 NOTE — TELEPHONE ENCOUNTER
Patient called in stating she is still having bleeding on her abdomen from a sore. Pt was  instructed to stop Lovenox yesterday and if bleeding continues to call back today. Scheduled patient appt for this afternoon.

## 2020-09-18 NOTE — PROGRESS NOTES
[facility]    Attending Physician Statement:  Arik Johnson M.D., F.A.C.P. I have discussed the case, including pertinent history and exam findings with the resident. I have seen and examined the patient and the key elements of the encounter have been performed by me. I agree with the assessment, plan and orders as documented by the resident. Patient is seen for fu visit today. Last office notes reviewed, relative labs and imaging. No more syncopal episodes  Hx of severe Aortic stenosis but sp mechanical valve replacment-good function  Coumadin optional aortic mechanical valve- good EF, no afib Aortic  - coumadin over ASA chosen  bridgning optional- she got lovenox shots till yesterday, when excessive bruising and bleeding noted  OK to stop lovenox  Noted that also zoloft can exac bruising  inr goal 2-2.5--inr 1.7 2 days ago  Continue coumadin  On PE - no major rectus sheath hematoma noted  No reversible perfusion defect. Fixed anterior wall perfusion abnormality. 2. Ejection fraction is 49 %. 3. Global very mild hypokinesis. RVH. \"     Aortic valve NOT stenotic now- during cath--Bileaflet mechanical valve with adequate leaflet mobility noted on  Fluoroscopy.     s/p EP study 9/11 Normal AV node and His purkinje system function, no inducible arrythmias. She had Implantable Loop recorder placement on the same day  NO major CAD,  Nothing stented-- Bileaflet mechanical valve with adequate leaflet mobility noted on fluoroscopy  EF 58%   Syncopal episode- possible vtach/vfib. --sp linq  HTN- negative orthostatics     Negative adrenal insufficiency  Remainder of medical problems as per resident note.

## 2020-09-18 NOTE — PROGRESS NOTES
[facility]  Internal[facility]  ACC Note      SUBJECTIVE:  CC: had concerns including Wound Check (lower left abdomen). Zoe Gordonan presented to the [facility] for a routine visit. Ms. Samina Lopez was recently hospitalized due to a syncopal episode from 9/8/2020 - 9/12/2020. She has a hx of PVCs and non-sustained V-tach and and ILC was inserted. She was placed on heparin drip and was discharged on Lovenox to bridge back to warfarin. She was then seen at out office on 9/15/2020 for her hospital follow-up. She called in the clinic yesterday for concerns of a bleed in her abdomen. She said it soaked 4 4x4 gauze pads. She was advised to discontinue the lovenox and the bleeding has subsided/     She has no syncopal episodes, chest pain or palpitations. She does has some increased depressed mood. She notes bawling and crying unexpectedly during some times of the day. She feels more emotional, is moving to a new place and has been recently hospitalized. Reassured patient regarding adjustment that could be ongoing and talked to her about going out and exercising slowly. Review of Systems   Constitutional: Negative for fever. Respiratory: Negative for cough and shortness of breath. Cardiovascular: Negative for chest pain and palpitations. Gastrointestinal: Negative for abdominal distention.        Outpatient Medications Marked as Taking for the 9/18/20 encounter (Office Visit) with Seferino Chung MD   Medication Sig Dispense Refill    omeprazole (PRILOSEC) 40 MG delayed release capsule take 1 capsule by mouth every morning BEFORE BREAKFAST 90 capsule 1    atorvastatin (LIPITOR) 10 MG tablet take 1 tablet by mouth once daily 90 tablet 1    lisinopril (PRINIVIL;ZESTRIL) 10 MG tablet take 1 tablet by mouth once daily 90 tablet 1    metoprolol succinate (TOPROL XL) 25 MG extended release tablet Take 1 tablet by mouth daily 90 tablet 1    Calcium Polycarbophil

## 2020-09-21 ENCOUNTER — CARE COORDINATION (OUTPATIENT)
Dept: CASE MANAGEMENT | Age: 60
End: 2020-09-21

## 2020-09-22 ENCOUNTER — NURSE ONLY (OUTPATIENT)
Dept: INTERNAL MEDICINE | Age: 60
End: 2020-09-22
Payer: MEDICARE

## 2020-09-22 LAB
INTERNATIONAL NORMALIZATION RATIO, POC: 2
PROTHROMBIN TIME, POC: 24.4

## 2020-09-22 PROCEDURE — 85610 PROTHROMBIN TIME: CPT | Performed by: INTERNAL MEDICINE

## 2020-09-22 PROCEDURE — 93793 ANTICOAG MGMT PT WARFARIN: CPT | Performed by: INTERNAL MEDICINE

## 2020-09-22 NOTE — PROGRESS NOTES
INR results reviewed with Dr. Marva Barthel and orders received. Pt notified via phone of same. AVS mailed to patient.

## 2020-09-24 ENCOUNTER — NURSE ONLY (OUTPATIENT)
Dept: NON INVASIVE DIAGNOSTICS | Age: 60
End: 2020-09-24
Payer: MEDICARE

## 2020-09-24 VITALS — TEMPERATURE: 97.3 F

## 2020-09-24 PROCEDURE — 93285 PRGRMG DEV EVAL SCRMS IP: CPT | Performed by: INTERNAL MEDICINE

## 2020-09-28 ENCOUNTER — CARE COORDINATION (OUTPATIENT)
Dept: CASE MANAGEMENT | Age: 60
End: 2020-09-28

## 2020-09-28 NOTE — CARE COORDINATION
[facility] Transitions Follow Up Call    2020    Patient: Michelet Mast  Patient : 1960   MRN: <[id number]>  Reason for Admission:   Discharge Date: 20 RARS: Readmission Risk Score: 13    Follow Up:  2020 Final  for admission 20-20. Patient no longer eligible for BPCI bundle due to excluded DRG.      Alec Gross RN

## 2020-09-29 ENCOUNTER — NURSE ONLY (OUTPATIENT)
Dept: INTERNAL MEDICINE | Age: 60
End: 2020-09-29
Payer: MEDICARE

## 2020-09-29 LAB
INTERNATIONAL NORMALIZATION RATIO, POC: 2.9
PROTHROMBIN TIME, POC: 34.4

## 2020-09-29 PROCEDURE — 93793 ANTICOAG MGMT PT WARFARIN: CPT | Performed by: INTERNAL MEDICINE

## 2020-09-29 PROCEDURE — 85610 PROTHROMBIN TIME: CPT | Performed by: INTERNAL MEDICINE

## 2020-09-29 NOTE — PATIENT INSTRUCTIONS
Continue same dose of coumadin, 6 mg Tuesday/thursday/saturday and 5 mg coumadin all other days  Repeat INR in 2 weeks [oct 13 at 1pm]  Bleeding precautions as reviewed  Please call if any questions or concerns  Patient Education        Taking Warfarin Safely: Care Instructions  Your Care Instructions     Warfarin is a medicine that you take to prevent blood clots. It is often called a blood thinner. Doctors give warfarin (such as Coumadin) to reduce the risk of blood clots. You may be at risk for blood clots if you have atrial fibrillation or deep vein thrombosis. Some other health problems may also put you at risk. Warfarin slows the amount of time it takes for your blood to clot. It can cause bleeding problems. Even if you've been taking warfarin for a while, it's important to know how to take it safely. Foods and other medicines can affect the way warfarin works. Some can make warfarin work too well. This can cause bleeding problems. And some can make it work poorly, so that it does not prevent blood clots very well. You will need regular blood tests to check how long it takes for your blood to form a clot. This test is called a PT or prothrombin time test. The result of the test is called an INR level. Depending on the test results, your doctor or anticoagulation clinic may adjust your dose of warfarin. Follow-up care is a key part of your treatment and safety. Be sure to make and go to all appointments, and call your doctor if you are having problems. It's also a good idea to know your test results and keep a list of the medicines you take. How can you care for yourself at home? Take warfarin safely  · Take your warfarin at the same time each day. · If you miss a dose of warfarin, don't take an extra dose to make up for it. Your doctor can tell you exactly what to do so you don't take too much or too little. · Wear medical alert jewelry that lets others know that you take warfarin.  You can buy this at most drugstores. · Don't take warfarin if you are pregnant or planning to get pregnant. Talk to your doctor about how you can prevent getting pregnant while you are taking it. · Don't change your dose or stop taking warfarin unless your doctor tells you to. Effects of medicines and food on warfarin  · Don't start or stop taking any medicines, vitamins, or natural remedies unless you first talk to your doctor. Many medicines can affect how warfarin works. These include aspirin and other pain relievers, over-the-counter medicines, multivitamins, dietary supplements, and herbal products. · Tell all of your doctors and pharmacists that you take warfarin. Some prescription medicines can affect how warfarin works. · Keep the amount of vitamin K in your diet about the same from day to day. Do not suddenly eat a lot more or a lot less food that is rich in vitamin K than you usually do. Vitamin K affects how warfarin works and how your blood clots. Talk with your doctor before making big changes in your diet. Foods that have a lot of vitamin K include cooked green vegetables, such as:  ? Kale, spinach, turnip greens, jody greens, Swiss chard, and mustard greens. ? [place] sprouts, broccoli, and cabbage. · Limit your use of alcohol. Avoid bleeding by preventing falls and injuries  · Wear slippers or shoes with nonskid soles. · Remove throw rugs and clutter. · Rearrange furniture and electrical cords to keep them out of walking paths. · Keep stairways, porches, and outside walkways well lit. Use night-lights in hallways and bathrooms. · Be extra careful when you work with sharp tools or knives. When should you call for help? [id number] anytime you think you may need emergency care. For example, call if:  · You have a sudden, severe headache that is different from past headaches. Call your doctor now or seek immediate medical care if:  · You have any abnormal bleeding, such as:  ? Nosebleeds.   ? Vaginal bleeding that is different (heavier, more frequent, at a different time of the month) than what you are used to.  ? Bloody or black stools, or rectal bleeding. ? Bloody or pink urine. Watch closely for changes in your health, and be sure to contact your doctor if you have any problems. Where can you learn more? Go to https://chperupertoewkeely.Talentag. org and sign in to your [employer] account. Enter M713 in the [facility] box to learn more about \"Taking Warfarin Safely: Care Instructions. \"     If you do not have an account, please click on the \"Sign Up Now\" link. Current as of: December 16, 2019               Content Version: 12.5  © 5809-6926 Healthwise, Incorporated. Care instructions adapted under license by [facility] ([facility]). If you have questions about a medical condition or this instruction, always ask your healthcare professional. [address] any warranty or liability for your use of this information.

## 2020-09-29 NOTE — PROGRESS NOTES
Patient notified to continue same dose of coumadin ,6 mg on tues/thurs/sat and 5 mg coumadin all other days  Repeat INR in 2 weeks  Bleeding precautions reviewed  AVS mailed

## 2020-10-13 ENCOUNTER — NURSE ONLY (OUTPATIENT)
Dept: INTERNAL MEDICINE | Age: 60
End: 2020-10-13
Payer: MEDICARE

## 2020-10-13 VITALS — TEMPERATURE: 97.2 F

## 2020-10-13 LAB — INTERNATIONAL NORMALIZATION RATIO, POC: 2.8

## 2020-10-13 PROCEDURE — 85610 PROTHROMBIN TIME: CPT | Performed by: INTERNAL MEDICINE

## 2020-10-13 NOTE — PROGRESS NOTES
Patient in office for anti coag management, poct inr obtained, anti coag encounter created and routed to physician. Repeat INR in 4 weeks.

## 2020-10-19 RX ORDER — WARFARIN SODIUM 6 MG/1
TABLET ORAL
Qty: 30 TABLET | Refills: 1 | Status: SHIPPED | OUTPATIENT
Start: 2020-10-19 | End: 2021-01-06 | Stop reason: SDUPTHER

## 2020-10-19 NOTE — TELEPHONE ENCOUNTER
Last Appointment:  9/15/2020  Future Appointments   Date Time Provider Esmer Lee   10/23/2020  1:00 PM [address], DO ACC IM [facility]   10/26/2020  8:50 AM SCHEDULE, REMOTE CHECK REI ELECTRO PHYS [facility]   11/5/2020  2:45 PM Tim Vicente MD [facility]   11/9/2020  9:15 AM SCHEDULE, SE ACC IM ACC [facility]   1/12/2021  9:30 AM [facility]

## 2020-10-26 ENCOUNTER — NURSE ONLY (OUTPATIENT)
Dept: NON INVASIVE DIAGNOSTICS | Age: 60
End: 2020-10-26
Payer: MEDICARE

## 2020-10-26 PROCEDURE — G2066 INTER DEVC REMOTE 30D: HCPCS | Performed by: INTERNAL MEDICINE

## 2020-10-26 PROCEDURE — 93298 REM INTERROG DEV EVAL SCRMS: CPT | Performed by: INTERNAL MEDICINE

## 2020-11-03 PROBLEM — R55 SYNCOPE AND COLLAPSE: Status: RESOLVED | Noted: 2020-09-08 | Resolved: 2020-11-03

## 2020-11-04 ENCOUNTER — OFFICE VISIT (OUTPATIENT)
Dept: INTERNAL MEDICINE | Age: 60
End: 2020-11-04
Payer: MEDICARE

## 2020-11-04 ENCOUNTER — TELEPHONE (OUTPATIENT)
Dept: INTERNAL MEDICINE CLINIC | Age: 60
End: 2020-11-04

## 2020-11-04 VITALS
HEART RATE: 68 BPM | RESPIRATION RATE: 20 BRPM | BODY MASS INDEX: 28.11 KG/M2 | DIASTOLIC BLOOD PRESSURE: 63 MMHG | OXYGEN SATURATION: 98 % | HEIGHT: 65 IN | SYSTOLIC BLOOD PRESSURE: 134 MMHG | TEMPERATURE: 97.3 F | WEIGHT: 168.7 LBS

## 2020-11-04 DIAGNOSIS — E55.9 VITAMIN D DEFICIENCY: ICD-10-CM

## 2020-11-04 DIAGNOSIS — Z79.01 ANTICOAGULATED ON COUMADIN: ICD-10-CM

## 2020-11-04 DIAGNOSIS — Z95.2 MECHANICAL HEART VALVE PRESENT: ICD-10-CM

## 2020-11-04 LAB
INTERNATIONAL NORMALIZATION RATIO, POC: 2.3
PROTHROMBIN TIME, POC: 28.1
VITAMIN D 25-HYDROXY: 79 NG/ML (ref 30–100)

## 2020-11-04 PROCEDURE — G8482 FLU IMMUNIZE ORDER/ADMIN: HCPCS | Performed by: INTERNAL MEDICINE

## 2020-11-04 PROCEDURE — 99213 OFFICE O/P EST LOW 20 MIN: CPT | Performed by: INTERNAL MEDICINE

## 2020-11-04 PROCEDURE — G8419 CALC BMI OUT NRM PARAM NOF/U: HCPCS | Performed by: INTERNAL MEDICINE

## 2020-11-04 PROCEDURE — 99212 OFFICE O/P EST SF 10 MIN: CPT | Performed by: INTERNAL MEDICINE

## 2020-11-04 PROCEDURE — 4004F PT TOBACCO SCREEN RCVD TLK: CPT | Performed by: INTERNAL MEDICINE

## 2020-11-04 PROCEDURE — 90686 IIV4 VACC NO PRSV 0.5 ML IM: CPT

## 2020-11-04 PROCEDURE — G8926 SPIRO NO PERF OR DOC: HCPCS | Performed by: INTERNAL MEDICINE

## 2020-11-04 PROCEDURE — 6360000002 HC RX W HCPCS

## 2020-11-04 PROCEDURE — 3017F COLORECTAL CA SCREEN DOC REV: CPT | Performed by: INTERNAL MEDICINE

## 2020-11-04 PROCEDURE — G8427 DOCREV CUR MEDS BY ELIG CLIN: HCPCS | Performed by: INTERNAL MEDICINE

## 2020-11-04 PROCEDURE — 3023F SPIROM DOC REV: CPT | Performed by: INTERNAL MEDICINE

## 2020-11-04 PROCEDURE — G0008 ADMIN INFLUENZA VIRUS VAC: HCPCS

## 2020-11-04 PROCEDURE — 85610 PROTHROMBIN TIME: CPT | Performed by: INTERNAL MEDICINE

## 2020-11-04 RX ORDER — WARFARIN SODIUM 6 MG/1
TABLET ORAL
Qty: 30 TABLET | Refills: 1 | Status: SHIPPED
Start: 2020-11-04 | End: 2020-11-05 | Stop reason: SDUPTHER

## 2020-11-04 NOTE — TELEPHONE ENCOUNTER
Called to discuss lab result. Vitamin D level elevated to 79 from 58. Advised to stop taking vitamin D for now. We will recheck Vitamin D level before coming to next visit in 3 months. If needed will restart vitamin D at a lower dose. Lab script will be sent to patient's home address. Patient verbalized understanding and appreciated the call.

## 2020-11-04 NOTE — PROGRESS NOTES
See [facility] [place] report. Remote monitoring reviewed over a 30 day period.   End of 30 day monitoring period date of service 10/26/2020

## 2020-11-04 NOTE — PROGRESS NOTES
I saw and examined the patient with Dr. Kamila Encinas. Patient here for follow up Mechanical Aortic valve, palpitations and recent hospital stay for syncope. Patient has had no further episodes of syncope. She is feeling well. She has her loop recorder through EP and has had it checked recently but unsure of results. She does admit to occassional palpitations. She denies any lightheadedness, dizziness, chest pains, shortness of breath, nausea, vomiting, diarrhea, black stools or blood in stools. Past medical, surgical, family history reviewed. Medications and allergies reviewed. Exam as per resident exam.    Martina Florsture:    11/04/20 0832   BP: 134/63   Site: Left Upper Arm   Position: Sitting   Cuff Size: Medium Adult   Pulse: 68   Resp: 20   Temp: 97.3 °F (36.3 °C)   TempSrc: Oral   SpO2: 98%   Weight: 168 lb 11.2 oz (76.5 kg)   Height: 5' 5\" (1.651 m)         I reviewed patient labs. Assessment/Plan:  1. Mechanical Aortic Valve  2. Palpitations   3. COPD   4. Tobacco abuse  5. Need for influenza vaccine     Will check INR today as last 2.8 but prior was 2.3 and goal 2.5-3.5 due to valve. Will have her continue to follow with EP for the loop recorder results. Advised to quit smoking. Ok for flu shot. Assessment and plan discussed with resident. See their note for further details.      Mabel Morris, DO
SpO2 98%   BMI 28.07 kg/m²   General appearance: Alert, Awake, Oriented times 3, no distress  Lungs: Lungs clear to auscultation bilaterally. No rhonchi, crackles or wheezes  Heart: S1 S2  Regular rate and rhythm. No rub, murmur or gallop  Abdomen: Abdomen soft, non-tender. non-distended BS normal. No masses, organomegaly, no guarding rebound or rigidity. Extremities: No edema, Peripheral pulses palpable 2/4  Neuro: alert, awake, no gross focal neurologic deficit    ASSESSMENT/PLAN:    H/O mechanical aortic valve replacement  On Warfarin 5 mg(S/M/W/F) and 6 mg (T/T/S) alternately  Has been hospitalized in September due to syncope. Had EP visit with  on 9/12. Has ILR in place.  No inducible VT noted  INR today 2.3 today  Advised to increase take warfarin on Tuesday, Thursday, Saturday and Sunday  Repeat INR in one week    Essential hypertension  Controlled  BP today 134/60 mmHg  Continue Lisinopril, Metoprolol    COPD  Stable  On Dulera, Spiriva  Continue Montelukast, Roflumilast  Follows with Pulmonology (Dr.A Carmen Stewart)    Hyperlipidemia  Continue Atorvastatin    Gastroesophageal reflux disease  EGD revealing gastritis (2019)  Continue Omeprazole    Restless leg syndrome  On Requip    Depression  Stable  No SI, HI  Continue Sertraline    Left lower lobe pulmonary nodule  Stable  CT chest revealing 4-5 mm left lower lobe pulmonary nodule (2/2020)  Follows with pulmonology    Vitamin D deficiency  Last vitamin D level 59 (2018)  Will check Vitamin D 25 Hydroxy  Continue Vitamin D supplement; she takes 4000IU twice daily  If vitamin D level is elevated, will decrease dose to 1000 IU daily    I have reviewed my findings and recommendations with Roberta Mendenhall and Dr Angie Nix MD PGY-2  11/4/2020 9:22 AM

## 2020-11-04 NOTE — PROGRESS NOTES
[facility]  Dr. Abraham Miller      Reason for Consult: CHF  Referring Physician: Dr. Sommers Nan:   Chief Complaint   Patient presents with    Congestive Heart Failure       HISTORY OF PRESENT ILLNESS:   63year old female with history of CAD, s/p aortic valve replacement, dizziness s/p loop recorder implant, COPD, HTN and hyperlipidemia is here for follow-up appointment  Still complaining of occasional episodes of lightheadedness and dizziness that last few seconds, comes and goes, shortness of breath is at baseline, denies any chest pain, no palpitations, no pedal edema, no PND, no orthopnea, no syncope, no presyncopal episodes.     Past Medical History:   Diagnosis Date    Anticoagulant long-term use     Anxiety     Ascending aortic aneurysm (HCC)     Asthma     CAD (coronary artery disease)     Chronic back pain     COPD (chronic obstructive pulmonary disease) (HCC)     Depression     Depression     GERD (gastroesophageal reflux disease)     Headache     Hyperlipidemia     Hypertension     On warfarin at home     for AVR    Restless legs syndrome     S/P AVR     Syncope     Tobacco abuse     Urinary incontinence          Past Surgical History:   Procedure Laterality Date    APPENDECTOMY      CARDIAC CATHETERIZATION  2020    Dr. Kirit Chacko  2009    Aortic valve  [facility]      SECTION      COLONOSCOPY N/A 2019    COLONOSCOPY POLYPECTOMY SNARE/COLD BIOPSY performed by Karrie Cha MD at [address] [place] [address]  2020    Linq Insertion    Dr. Mary Mccormack    TUBAL LIGATION      UPPER GASTROINTESTINAL ENDOSCOPY N/A 2019    EGD BIOPSY performed by Karrie Cha MD at [facility] ENDOSCOPY         Current Outpatient Medications   Medication Sig Dispense Refill    warfarin (COUMADIN) 6 MG tablet Take one tablet on Tuesday and Thursday and Saturday (Patient taking differently: Take 6 mg by mouth daily Sunday, Monday, Wednesday, and Fridays) 30 tablet 1    omeprazole (PRILOSEC) 40 MG delayed release capsule take 1 capsule by mouth every morning BEFORE BREAKFAST 90 capsule 1    atorvastatin (LIPITOR) 10 MG tablet take 1 tablet by mouth once daily 90 tablet 1    lisinopril (PRINIVIL;ZESTRIL) 10 MG tablet take 1 tablet by mouth once daily 90 tablet 1    metoprolol succinate (TOPROL XL) 25 MG extended release tablet Take 1 tablet by mouth daily 90 tablet 1    Calcium Polycarbophil (FIBER) 625 MG TABS Take 1 tablet by mouth daily 30 tablet 3    montelukast (SINGULAIR) 10 MG tablet take 1 tablet by mouth at bedtime 90 tablet 1    rOPINIRole (REQUIP) 0.25 MG tablet Take 1 tab in morning and 2 tabs before bed 270 tablet 1    sertraline (ZOLOFT) 100 MG tablet take 1 and 1/2 tablet by mouth once daily 135 tablet 1    tiotropium (SPIRIVA RESPIMAT) 2.5 MCG/ACT AERS inhaler INHALE 2 PUFFS BY MOUTH DAILY. PAP Medication 3 Inhaler 3    warfarin (COUMADIN) 5 MG tablet Take 5 mg by mouth daily Tuesday, Thursday, Saturday      Roflumilast (DALIRESP) 250 MCG TABS Take 250 mcg by mouth daily 28 tablet 2    Blood Pressure Monitoring (BLOOD PRESSURE CUFF) MISC Dx:  Hypertension with labile blood pressure 1 each 0    Azelastine HCl 137 MCG/SPRAY SOLN 2 sprays by Nasal route daily (Patient taking differently: 2 sprays by Nasal route daily as needed ) 30 mL 1    VENTOLIN  (90 Base) MCG/ACT inhaler inhale 1 puff by mouth every 4 hours if needed for wheezing 18 g 2    DULERA 100-5 MCG/ACT inhaler inhale 2 puffs by mouth twice a day 13 g 3    niacin 500 MG tablet Take 2 tablets by mouth 2 times daily (with meals) (Patient not taking: Reported on 11/5/2020) 120 tablet 3     No current facility-administered medications for this visit.           Allergies as of 11/05/2020 - Review Complete 11/05/2020   Allergen Reaction Noted    Keflex [cephalexin] Itching 12/29/2016    Phenergan [promethazine hcl] Other (See Comments) 12/29/2016       Social History     Socioeconomic History    Marital status: Single     Spouse name: Not on file    Number of children: 3    Years of education: 12    Highest education level: Not on file   Occupational History    Occupation: Disabled   Social Needs    Financial resource strain: Very hard    Food insecurity     Worry: Sometimes true     Inability: Sometimes true    Transportation needs     Medical: No     Non-medical: No   Tobacco Use    Smoking status: Current Every Day Smoker     Packs/day: 0.50     Years: 45.00     Pack years: 22.50     Types: Cigarettes    Smokeless tobacco: Never Used   Substance and Sexual Activity    Alcohol use: Not Currently     Frequency: Monthly or less    Drug use: No    Sexual activity: Not Currently     Partners: Male   Lifestyle    Physical activity     Days per week: Not on file     Minutes per session: Not on file    Stress: Not on file   Relationships    Social connections     Talks on phone: Not on file     Gets together: Not on file     Attends Scientologist service: Not on file     Active member of club or organization: Not on file     Attends meetings of clubs or organizations: Not on file     Relationship status: Not on file    Intimate partner violence     Fear of current or ex partner: Not on file     Emotionally abused: Not on file     Physically abused: Not on file     Forced sexual activity: Not on file   Other Topics Concern    Not on file   Social History Narrative    Not on file       Family History   Problem Relation Age of Onset    Heart Disease Mother     Arthritis Mother     Asthma Mother     Diabetes Mother     High Blood Pressure Mother     High Cholesterol Mother     Stroke Mother     Heart Disease Father     Arthritis Father     Asthma Father     Diabetes Father     High Blood Pressure Father     High Cholesterol Father     Breast Cancer Sister    [facility] significant angiographic disease. C.  LCX. No significant angiographic disease. D.  RCA. Dominant vessel with around 40-50% mid vessel narrowing. 2.  Bileaflet mechanical valve with adequate leaflet mobility noted on  fluoroscopy. Cardiology Labs: BMP:    Lab Results   Component Value Date     09/12/2020    K 4.1 09/12/2020    CL 98 09/12/2020    CO2 26 09/12/2020    BUN 13 09/12/2020    CREATININE 0.7 09/12/2020     CMP:    Lab Results   Component Value Date     09/12/2020    K 4.1 09/12/2020    CL 98 09/12/2020    CO2 26 09/12/2020    BUN 13 09/12/2020    CREATININE 0.7 09/12/2020    PROT 6.3 09/11/2020     CBC:    Lab Results   Component Value Date    WBC 10.5 09/11/2020    RBC 4.00 09/11/2020    HGB 12.8 09/11/2020    HCT 39.1 09/11/2020    MCV 97.8 09/11/2020    RDW 13.0 09/11/2020     09/11/2020     PT/INR:  No results found for: PTINR  PT/INR Warfarin:  No components found for: PTPATWAR, PTINRWAR  PTT:    Lab Results   Component Value Date    APTT 97.5 09/12/2020     PTT Heparin:  No components found for: APTTHEP  Magnesium:    Lab Results   Component Value Date    MG 1.9 09/11/2020     TSH:    Lab Results   Component Value Date    TSH 2.630 09/10/2020     TROPONIN:  No components found for: TROP  BNP:  No results found for: BNP  FASTING LIPID PANEL:    Lab Results   Component Value Date    CHOL 168 09/10/2020    HDL 50 09/10/2020    TRIG 182 09/10/2020     No orders to display     I have personally reviewed the laboratory, cardiac diagnostic and radiographic testing as outlined above:      IMPRESSION:  1. Dizziness: Etiology?,  Had loop recorder implanted in September 2020  2. Shortness of breath: Suspect secondary to COPD  4. Chronic diastolic congestive heart failure: Compensated, continue current treatment  5. S/p aortic valve replacement: Using mechanical aortic valve, echocardiogram done in February 2020 revealed normally functioning mechanical aortic valve. 6.  COPD  7.

## 2020-11-04 NOTE — PATIENT INSTRUCTIONS
balance. · Limit the amount of alcohol you drink. Alcohol can impair your balance and other senses. · Ask your doctor whether calluses or corns on your feet need to be removed. If you wear loose-fitting shoes because of calluses or corns, you can lose your balance and fall. · Talk to your doctor if you have numbness in your feet. Preventing falls at home  · Remove raised doorway thresholds, throw rugs, and clutter. Repair loose carpet or raised areas in the floor. · Move furniture and electrical cords to keep them out of walking paths. · Use nonskid floor wax, and wipe up spills right away, especially on ceramic tile floors. · If you use a walker or cane, put rubber tips on it. If you use crutches, clean the bottoms of them regularly with an abrasive pad, such as steel wool. · Keep your house well lit, especially [address], and outside walkways. Use night-lights in areas such as hallways and bathrooms. Add extra light switches or use remote switches (such as switches that go on or off when you clap your hands) to make it easier to turn lights on if you have to get up during the night. · Install sturdy handrails on stairways. · Move items in your cabinets so that the things you use a lot are on the lower shelves (about waist level). · Keep a cordless phone and a flashlight with new batteries by your bed. If possible, put a phone in each of the main rooms of your house, or carry a cell phone in case you fall and cannot reach a phone. Or, you can wear a device around your neck or wrist. You push a button that sends a signal for help. · Wear low-heeled shoes that fit well and give your feet good support. Use footwear with nonskid soles. Check the heels and soles of your shoes for wear. Repair or replace worn heels or soles. · Do not wear socks without shoes on wood floors. · Walk on the grass when the sidewalks are slippery.  If you live in an area that gets snow and ice in the winter, sprinkle salt on slippery steps and sidewalks. Preventing falls in the bath  · Install grab bars and nonskid mats inside and outside your shower or tub and near the toilet and sinks. · Use shower chairs and bath benches. · Use a hand-held shower head that will allow you to sit while showering. · Get into a tub or shower by putting the weaker leg in first. Get out of a tub or shower with your strong side first.  · Repair loose toilet seats and consider installing a raised toilet seat to make getting on and off the toilet easier. · Keep your bathroom door unlocked while you are in the shower. Where can you learn more? Go to https://Leti ArtspeRobinhood.I Gotchu. org and sign in to your [employer] account. Enter [id number] [id number] [phone] in the [facility] box to learn more about \"Preventing Falls: Care Instructions. \"     If you do not have an account, please click on the \"Sign Up Now\" link. Current as of: April 15, 2020               Content Version: 12.6  © 0914-1530 [employer]. Care instructions adapted under license by [facility] ([facility]). If you have questions about a medical condition or this instruction, always ask your healthcare professional. Kaden Meza any warranty or liability for your use of this information.

## 2020-11-05 ENCOUNTER — OFFICE VISIT (OUTPATIENT)
Dept: CARDIOLOGY CLINIC | Age: 60
End: 2020-11-05
Payer: MEDICARE

## 2020-11-05 VITALS
HEART RATE: 69 BPM | HEIGHT: 65 IN | DIASTOLIC BLOOD PRESSURE: 80 MMHG | BODY MASS INDEX: 27.99 KG/M2 | RESPIRATION RATE: 16 BRPM | SYSTOLIC BLOOD PRESSURE: 128 MMHG | WEIGHT: 168 LBS

## 2020-11-05 PROCEDURE — 93000 ELECTROCARDIOGRAM COMPLETE: CPT | Performed by: INTERNAL MEDICINE

## 2020-11-05 PROCEDURE — 99214 OFFICE O/P EST MOD 30 MIN: CPT | Performed by: INTERNAL MEDICINE

## 2020-11-11 ENCOUNTER — TELEPHONE (OUTPATIENT)
Dept: INTERNAL MEDICINE | Age: 60
End: 2020-11-11

## 2020-11-11 NOTE — TELEPHONE ENCOUNTER
Called and spoke with pt via phone. Pt realized she missed appt today. States \"I even confirmed that I was coming. \" Appt rescheduled for Friday morning per her request.

## 2020-11-13 ENCOUNTER — NURSE ONLY (OUTPATIENT)
Dept: INTERNAL MEDICINE | Age: 60
End: 2020-11-13
Payer: MEDICARE

## 2020-11-13 VITALS — TEMPERATURE: 98 F

## 2020-11-13 LAB
INTERNATIONAL NORMALIZATION RATIO, POC: 2.4
PROTHROMBIN TIME, POC: 29.1

## 2020-11-13 PROCEDURE — 36415 COLL VENOUS BLD VENIPUNCTURE: CPT | Performed by: INTERNAL MEDICINE

## 2020-11-13 PROCEDURE — 85610 PROTHROMBIN TIME: CPT | Performed by: INTERNAL MEDICINE

## 2020-11-13 PROCEDURE — 93793 ANTICOAG MGMT PT WARFARIN: CPT | Performed by: INTERNAL MEDICINE

## 2020-11-13 NOTE — PATIENT INSTRUCTIONS
Continue taking 5mgs of coumadin on tues thurs and Saturday and 6 mgs all other days of the week   Recheck your inr in 2 weeks

## 2020-12-04 ENCOUNTER — NURSE ONLY (OUTPATIENT)
Dept: INTERNAL MEDICINE | Age: 60
End: 2020-12-04
Payer: MEDICARE

## 2020-12-04 LAB
INTERNATIONAL NORMALIZATION RATIO, POC: 2.9
PROTHROMBIN TIME, POC: 35

## 2020-12-04 PROCEDURE — 85610 PROTHROMBIN TIME: CPT | Performed by: INTERNAL MEDICINE

## 2020-12-04 PROCEDURE — 93793 ANTICOAG MGMT PT WARFARIN: CPT | Performed by: INTERNAL MEDICINE

## 2020-12-04 NOTE — PROGRESS NOTES
Patient notified to continue same dose of coumadin 5mg tues/thurs/sat and 6 mg all other days  Bleeding precautions reviewed  AVS mailed

## 2020-12-04 NOTE — PATIENT INSTRUCTIONS
Continue same dose of coumadin, 5 mg tues/thurs/sat and 6 mg all other days  Repeat INR in 1 month  Bleeding precautions as reviewed   Please call if any questions or concerns  Patient Education        Taking Warfarin Safely: Care Instructions  Your Care Instructions     Warfarin is a medicine that you take to prevent blood clots. It is often called a blood thinner. Doctors give warfarin (such as Coumadin) to reduce the risk of blood clots. You may be at risk for blood clots if you have atrial fibrillation or deep vein thrombosis. Some other health problems may also put you at risk. Warfarin slows the amount of time it takes for your blood to clot. It can cause bleeding problems. Even if you've been taking warfarin for a while, it's important to know how to take it safely. Foods and other medicines can affect the way warfarin works. Some can make warfarin work too well. This can cause bleeding problems. And some can make it work poorly, so that it does not prevent blood clots very well. You will need regular blood tests to check how long it takes for your blood to form a clot. This test is called a PT or prothrombin time test. The result of the test is called an INR level. Depending on the test results, your doctor or anticoagulation clinic may adjust your dose of warfarin. Follow-up care is a key part of your treatment and safety. Be sure to make and go to all appointments, and call your doctor if you are having problems. It's also a good idea to know your test results and keep a list of the medicines you take. How can you care for yourself at home? Take warfarin safely  · Take your warfarin at the same time each day. · If you miss a dose of warfarin, don't take an extra dose to make up for it. Your doctor can tell you exactly what to do so you don't take too much or too little. · Wear medical alert jewelry that lets others know that you take warfarin. You can buy this at most drugstores.   · Don't take warfarin if you are pregnant or planning to get pregnant. Talk to your doctor about how you can prevent getting pregnant while you are taking it. · Don't change your dose or stop taking warfarin unless your doctor tells you to. Effects of medicines and food on warfarin  · Don't start or stop taking any medicines, vitamins, or natural remedies unless you first talk to your doctor. Many medicines can affect how warfarin works. These include aspirin and other pain relievers, over-the-counter medicines, multivitamins, dietary supplements, and herbal products. · Tell all of your doctors and pharmacists that you take warfarin. Some prescription medicines can affect how warfarin works. · Keep the amount of vitamin K in your diet about the same from day to day. Do not suddenly eat a lot more or a lot less food that is rich in vitamin K than you usually do. Vitamin K affects how warfarin works and how your blood clots. Talk with your doctor before making big changes in your diet. Foods that have a lot of vitamin K include cooked green vegetables, such as:  ? Kale, spinach, turnip greens, jody greens, Swiss chard, and mustard greens. ? [place] sprouts, broccoli, and cabbage. · Limit your use of alcohol. Avoid bleeding by preventing falls and injuries  · Wear slippers or shoes with nonskid soles. · Remove throw rugs and clutter. · Rearrange furniture and electrical cords to keep them out of walking paths. · Keep stairways, porches, and outside walkways well lit. Use night-lights in hallways and bathrooms. · Be extra careful when you work with sharp tools or knives. When should you call for help? Call 911 anytime you think you may need emergency care. For example, call if:    · You have a sudden, severe headache that is different from past headaches. Call your doctor now or seek immediate medical care if:    · You have any abnormal bleeding, such as:  ? Nosebleeds.   ? Vaginal bleeding that is different (heavier, more frequent, at a different time of the month) than what you are used to.  ? Bloody or black stools, or rectal bleeding. ? Bloody or pink urine. Watch closely for changes in your health, and be sure to contact your doctor if you have any problems. Where can you learn more? Go to [email or web address] and sign in to your [employer] account. Enter L070 in the [employer] box to learn more about \"Taking Warfarin Safely: Care Instructions. \"     If you do not have an account, please click on the \"Sign Up Now\" link. Current as of: December 16, 2019               Content Version: 12.6  © 6323-2686 [employer]. Care instructions adapted under license by [facility] ([facility]). If you have questions about a medical condition or this instruction, always ask your healthcare professional. [address] any warranty or liability for your use of this information.

## 2021-01-04 ENCOUNTER — TELEPHONE (OUTPATIENT)
Dept: INTERNAL MEDICINE | Age: 61
End: 2021-01-04

## 2021-01-06 ENCOUNTER — NURSE ONLY (OUTPATIENT)
Dept: INTERNAL MEDICINE | Age: 61
End: 2021-01-06
Payer: MEDICARE

## 2021-01-06 DIAGNOSIS — Z95.2 H/O MECHANICAL AORTIC VALVE REPLACEMENT: Primary | ICD-10-CM

## 2021-01-06 LAB
INTERNATIONAL NORMALIZATION RATIO, POC: 2.9
PROTHROMBIN TIME, POC: 34.6

## 2021-01-06 PROCEDURE — 85610 PROTHROMBIN TIME: CPT | Performed by: INTERNAL MEDICINE

## 2021-01-06 RX ORDER — WARFARIN SODIUM 6 MG/1
6 TABLET ORAL DAILY
Qty: 30 TABLET | Refills: 1 | Status: SHIPPED
Start: 2021-01-06 | End: 2021-01-06 | Stop reason: SDUPTHER

## 2021-01-06 RX ORDER — WARFARIN SODIUM 6 MG/1
6 TABLET ORAL DAILY
Qty: 30 TABLET | Refills: 1 | Status: SHIPPED
Start: 2021-01-06 | End: 2021-04-23 | Stop reason: SDUPTHER

## 2021-01-06 RX ORDER — WARFARIN SODIUM 5 MG/1
5 TABLET ORAL DAILY
Qty: 30 TABLET | Refills: 1 | Status: SHIPPED
Start: 2021-01-06 | End: 2021-01-06 | Stop reason: SDUPTHER

## 2021-01-06 RX ORDER — WARFARIN SODIUM 5 MG/1
5 TABLET ORAL DAILY
Qty: 30 TABLET | Refills: 1 | Status: SHIPPED
Start: 2021-01-06 | End: 2021-04-23 | Stop reason: SDUPTHER

## 2021-01-06 NOTE — PROGRESS NOTES
Continue same dose of coumadin 6 mg Sun/Mon/Wed/Fri and 5 mg coumadin all other days  Patient will repeat INR on next visit 2/5/21  Bleeding precautions reviewed  AVS mailed      Patient requests that refills for coumadin be sent to [facility] on [address]  Prescriptions cancelled at [facility] and resent to [facility] on [address] per patient request

## 2021-01-06 NOTE — PATIENT INSTRUCTIONS
Continue same dose of coumadin, 6 mg Sun/Mon/Wed/Fri and 5 mg all other days  Repeat INR on next visit with PCP on 2/5/21  Bleeding precautions as reviewed  Please call if any questions or concerns  Patient Education        Taking Warfarin Safely: Care Instructions  Your Care Instructions     Warfarin is a medicine that you take to prevent blood clots. It is often called a blood thinner. Doctors give warfarin (such as Coumadin) to reduce the risk of blood clots. You may be at risk for blood clots if you have atrial fibrillation or deep vein thrombosis. Some other health problems may also put you at risk. Warfarin slows the amount of time it takes for your blood to clot. It can cause bleeding problems. Even if you've been taking warfarin for a while, it's important to know how to take it safely. Foods and other medicines can affect the way warfarin works. Some can make warfarin work too well. This can cause bleeding problems. And some can make it work poorly, so that it does not prevent blood clots very well. You will need regular blood tests to check how long it takes for your blood to form a clot. This test is called a PT or prothrombin time test. The result of the test is called an INR level. Depending on the test results, your doctor or anticoagulation clinic may adjust your dose of warfarin. Follow-up care is a key part of your treatment and safety. Be sure to make and go to all appointments, and call your doctor if you are having problems. It's also a good idea to know your test results and keep a list of the medicines you take. How can you care for yourself at home? Take warfarin safely  · Take your warfarin at the same time each day. · If you miss a dose of warfarin, don't take an extra dose to make up for it. Your doctor can tell you exactly what to do so you don't take too much or too little. · Wear medical alert jewelry that lets others know that you take warfarin.  You can buy this at most drugstores. · Don't take warfarin if you are pregnant or planning to get pregnant. Talk to your doctor about how you can prevent getting pregnant while you are taking it. · Don't change your dose or stop taking warfarin unless your doctor tells you to. Effects of medicines and food on warfarin  · Don't start or stop taking any medicines, vitamins, or natural remedies unless you first talk to your doctor. Many medicines can affect how warfarin works. These include aspirin and other pain relievers, over-the-counter medicines, multivitamins, dietary supplements, and herbal products. · Tell all of your doctors and pharmacists that you take warfarin. Some prescription medicines can affect how warfarin works. · Keep the amount of vitamin K in your diet about the same from day to day. Do not suddenly eat a lot more or a lot less food that is rich in vitamin K than you usually do. Vitamin K affects how warfarin works and how your blood clots. Talk with your doctor before making big changes in your diet. Foods that have a lot of vitamin K include cooked green vegetables, such as:  ? Kale, spinach, turnip greens, jody greens, Swiss chard, and mustard greens. ? [place] sprouts, broccoli, and cabbage. · Limit your use of alcohol. Avoid bleeding by preventing falls and injuries  · Wear slippers or shoes with nonskid soles. · Remove throw rugs and clutter. · Rearrange furniture and electrical cords to keep them out of walking paths. · Keep stairways, porches, and outside walkways well lit. Use night-lights in hallways and bathrooms. · Be extra careful when you work with sharp tools or knives. When should you call for help? Call 911 anytime you think you may need emergency care. For example, call if:    · You have a sudden, severe headache that is different from past headaches. Call your doctor now or seek immediate medical care if:    · You have any abnormal bleeding, such as:  ? Nosebleeds.   ? Vaginal bleeding that is different (heavier, more frequent, at a different time of the month) than what you are used to.  ? Bloody or black stools, or rectal bleeding. ? Bloody or pink urine. Watch closely for changes in your health, and be sure to contact your doctor if you have any problems. Where can you learn more? Go to [email or web address]://[email or web address] [email or web address] org and sign in to your [employer] account. Enter D269 in the [employer] box to learn more about \"Taking Warfarin Safely: Care Instructions. \"     If you do not have an account, please click on the \"Sign Up Now\" link. Current as of: December 16, 2019               Content Version: 12.6  © 2169-9312 [employer]. Care instructions adapted under license by [facility] ([facility]). If you have questions about a medical condition or this instruction, always ask your healthcare professional. [address] any warranty or liability for your use of this information.

## 2021-01-10 ENCOUNTER — APPOINTMENT (OUTPATIENT)
Dept: GENERAL RADIOLOGY | Age: 61
End: 2021-01-10
Payer: MEDICARE

## 2021-01-10 ENCOUNTER — HOSPITAL ENCOUNTER (EMERGENCY)
Age: 61
Discharge: HOME | End: 2021-01-10
Attending: EMERGENCY MEDICINE
Payer: MEDICARE

## 2021-01-10 VITALS
SYSTOLIC BLOOD PRESSURE: 157 MMHG | TEMPERATURE: 97.7 F | DIASTOLIC BLOOD PRESSURE: 78 MMHG | RESPIRATION RATE: 18 BRPM | HEART RATE: 79 BPM | OXYGEN SATURATION: 96 %

## 2021-01-10 DIAGNOSIS — R04.2 HEMOPTYSIS: ICD-10-CM

## 2021-01-10 DIAGNOSIS — J18.9 PNEUMONIA DUE TO ORGANISM: Primary | ICD-10-CM

## 2021-01-10 LAB
ABO/RH: NORMAL
ALBUMIN SERPL-MCNC: 4.1 G/DL (ref 3.5–5.2)
ALP BLD-CCNC: 69 U/L (ref 35–104)
ALT SERPL-CCNC: 14 U/L (ref 0–32)
ANION GAP SERPL CALCULATED.3IONS-SCNC: 10 MMOL/L (ref 7–16)
ANTIBODY SCREEN: NORMAL
AST SERPL-CCNC: 15 U/L (ref 0–31)
BASOPHILS ABSOLUTE: 0.04 E9/L (ref 0–0.2)
BASOPHILS RELATIVE PERCENT: 0.3 % (ref 0–2)
BILIRUB SERPL-MCNC: 0.4 MG/DL (ref 0–1.2)
BUN BLDV-MCNC: 13 MG/DL (ref 8–23)
CALCIUM SERPL-MCNC: 8.7 MG/DL (ref 8.6–10.2)
CHLORIDE BLD-SCNC: 103 MMOL/L (ref 98–107)
CO2: 27 MMOL/L (ref 22–29)
CREAT SERPL-MCNC: 0.7 MG/DL (ref 0.5–1)
EOSINOPHILS ABSOLUTE: 0.14 E9/L (ref 0.05–0.5)
EOSINOPHILS RELATIVE PERCENT: 1 % (ref 0–6)
GFR AFRICAN AMERICAN: >60
GFR NON-AFRICAN AMERICAN: >60 ML/MIN/1.73
GLUCOSE BLD-MCNC: 112 MG/DL (ref 74–99)
HCT VFR BLD CALC: 37.3 % (ref 34–48)
HEMOGLOBIN: 12.1 G/DL (ref 11.5–15.5)
IMMATURE GRANULOCYTES #: 0.1 E9/L
IMMATURE GRANULOCYTES %: 0.7 % (ref 0–5)
INR BLD: 2.7
LACTIC ACID, SEPSIS: 0.6 MMOL/L (ref 0.5–1.9)
LIPASE: 24 U/L (ref 13–60)
LYMPHOCYTES ABSOLUTE: 2.2 E9/L (ref 1.5–4)
LYMPHOCYTES RELATIVE PERCENT: 15.8 % (ref 20–42)
MCH RBC QN AUTO: 31.7 PG (ref 26–35)
MCHC RBC AUTO-ENTMCNC: 32.4 % (ref 32–34.5)
MCV RBC AUTO: 97.6 FL (ref 80–99.9)
MONOCYTES ABSOLUTE: 0.79 E9/L (ref 0.1–0.95)
MONOCYTES RELATIVE PERCENT: 5.7 % (ref 2–12)
NEUTROPHILS ABSOLUTE: 10.67 E9/L (ref 1.8–7.3)
NEUTROPHILS RELATIVE PERCENT: 76.5 % (ref 43–80)
PDW BLD-RTO: 13.3 FL (ref 11.5–15)
PLATELET # BLD: 221 E9/L (ref 130–450)
PMV BLD AUTO: 10.6 FL (ref 7–12)
POTASSIUM REFLEX MAGNESIUM: 3.7 MMOL/L (ref 3.5–5)
PROTHROMBIN TIME: 31 SEC (ref 9.3–12.4)
RBC # BLD: 3.82 E12/L (ref 3.5–5.5)
SODIUM BLD-SCNC: 140 MMOL/L (ref 132–146)
TOTAL PROTEIN: 6.6 G/DL (ref 6.4–8.3)
TROPONIN: <0.01 NG/ML (ref 0–0.03)
WBC # BLD: 13.9 E9/L (ref 4.5–11.5)

## 2021-01-10 PROCEDURE — 83605 ASSAY OF LACTIC ACID: CPT

## 2021-01-10 PROCEDURE — 85025 COMPLETE CBC W/AUTO DIFF WBC: CPT

## 2021-01-10 PROCEDURE — 83690 ASSAY OF LIPASE: CPT

## 2021-01-10 PROCEDURE — 71045 X-RAY EXAM CHEST 1 VIEW: CPT

## 2021-01-10 PROCEDURE — 99284 EMERGENCY DEPT VISIT MOD MDM: CPT

## 2021-01-10 PROCEDURE — 93005 ELECTROCARDIOGRAM TRACING: CPT | Performed by: NURSE PRACTITIONER

## 2021-01-10 PROCEDURE — 86900 BLOOD TYPING SEROLOGIC ABO: CPT

## 2021-01-10 PROCEDURE — 86901 BLOOD TYPING SEROLOGIC RH(D): CPT

## 2021-01-10 PROCEDURE — 6370000000 HC RX 637 (ALT 250 FOR IP): Performed by: STUDENT IN AN ORGANIZED HEALTH CARE EDUCATION/TRAINING PROGRAM

## 2021-01-10 PROCEDURE — 86850 RBC ANTIBODY SCREEN: CPT

## 2021-01-10 PROCEDURE — 85610 PROTHROMBIN TIME: CPT

## 2021-01-10 PROCEDURE — 80053 COMPREHEN METABOLIC PANEL: CPT

## 2021-01-10 PROCEDURE — 84484 ASSAY OF TROPONIN QUANT: CPT

## 2021-01-10 RX ORDER — DOXYCYCLINE HYCLATE 100 MG/1
100 CAPSULE ORAL ONCE
Status: COMPLETED | OUTPATIENT
Start: 2021-01-10 | End: 2021-01-10

## 2021-01-10 RX ORDER — AMOXICILLIN AND CLAVULANATE POTASSIUM 875; 125 MG/1; MG/1
1 TABLET, FILM COATED ORAL 2 TIMES DAILY
Qty: 20 TABLET | Refills: 0 | Status: SHIPPED | OUTPATIENT
Start: 2021-01-10 | End: 2021-01-20

## 2021-01-10 RX ORDER — DOXYCYCLINE HYCLATE 100 MG
100 TABLET ORAL 2 TIMES DAILY
Qty: 20 TABLET | Refills: 0 | Status: SHIPPED | OUTPATIENT
Start: 2021-01-10 | End: 2021-01-20

## 2021-01-10 RX ORDER — AMOXICILLIN AND CLAVULANATE POTASSIUM 875; 125 MG/1; MG/1
1 TABLET, FILM COATED ORAL ONCE
Status: COMPLETED | OUTPATIENT
Start: 2021-01-10 | End: 2021-01-10

## 2021-01-10 RX ADMIN — AMOXICILLIN AND CLAVULANATE POTASSIUM 1 TABLET: 875; 125 TABLET, FILM COATED ORAL at 18:31

## 2021-01-10 RX ADMIN — DOXYCYCLINE HYCLATE 100 MG: 100 CAPSULE ORAL at 18:30

## 2021-01-10 ASSESSMENT — ENCOUNTER SYMPTOMS
WHEEZING: 0
DIARRHEA: 0
VOMITING: 0
PHOTOPHOBIA: 0
TROUBLE SWALLOWING: 0
SORE THROAT: 0
CONSTIPATION: 0
EYE PAIN: 0
APNEA: 0
ABDOMINAL PAIN: 0
CHEST TIGHTNESS: 0
BACK PAIN: 0
COUGH: 1
SHORTNESS OF BREATH: 0
NAUSEA: 0
RHINORRHEA: 0

## 2021-01-10 ASSESSMENT — PAIN SCALES - GENERAL: PAINLEVEL_OUTOF10: 7

## 2021-01-10 ASSESSMENT — PAIN DESCRIPTION - DESCRIPTORS: DESCRIPTORS: ACHING

## 2021-01-10 NOTE — ED PROVIDER NOTES
[address]      Pt Name: Enma Morris  MRN: [id number]  [place] 1960  Date of evaluation: 1/10/2021      CHIEF COMPLAINT       Chief Complaint   Patient presents with    Hemoptysis     coughing up blood since yesterday. states that last time she did this she had pneumonia. states on blood thinner. states negative covid 3 weeks ago.  Emesis     friday night with abd pains. states hasnt vomited since saturday at 0400        HPI  Enma Morris is a 63 y.o. female with history of COPD, pacemaker, who presents to the emergency department with cough hemoptysis. The patient states that for the last 2 weeks or so she has had intermittent cough. She states initially was productive with clear sputum. She states that she had some nausea and one episode of vomiting several days ago. She states that yesterday she began to have mild mild hemoptysis in her productive cough. She describes using small little specks of blood. She describes her symptoms intermittent, mild to moderate, nothing makes it better or worse. She states that 1 time in the past she had similar hemoptysis and had pneumonia at that time. She denies any chest pain, worsening shortness of breath, lightheadedness, diaphoresis, leg swelling or leg pain. She is on Coumadin. Except as noted above the remainder of the review of systems was reviewed and negative. Review of Systems   Constitutional: Negative for chills, diaphoresis, fatigue and fever. HENT: Negative for rhinorrhea, sore throat and trouble swallowing. Eyes: Negative for photophobia and pain. Respiratory: Positive for cough. Negative for apnea, chest tightness, shortness of breath and wheezing. Reports hemoptysis   Cardiovascular: Negative for chest pain, palpitations and leg swelling.    Gastrointestinal: Negative for abdominal pain, constipation, diarrhea, nausea and vomiting. Endocrine: Negative for polyuria. Genitourinary: Negative for difficulty urinating and dysuria. Musculoskeletal: Negative for back pain, neck pain and neck stiffness. Neurological: Negative for dizziness, speech difficulty, weakness, light-headedness and headaches. Psychiatric/Behavioral: Negative for confusion. The patient is not nervous/anxious. Physical Exam  Vitals signs and nursing note reviewed. Constitutional:       General: She is not in acute distress. Appearance: She is well-developed. Comments: Awake and alert. Sitting in the gurney in no obvious distress. HENT:      Head: Normocephalic and atraumatic. Right Ear: External ear normal.      Left Ear: External ear normal.      Mouth/Throat:      Mouth: Mucous membranes are moist.   Eyes:      General: No scleral icterus. Pupils: Pupils are equal, round, and reactive to light. Neck:      Musculoskeletal: Normal range of motion and neck supple. Cardiovascular:      Rate and Rhythm: Normal rate and regular rhythm. Heart sounds: No murmur. Comments: 2+ radial and dorsal pedis pulses bilaterally  Pulmonary:      Effort: Pulmonary effort is normal. No respiratory distress. Breath sounds: Normal breath sounds. No wheezing. Comments: No respiratory distress nonhypoxic on room air with no wheezing  Abdominal:      Palpations: Abdomen is soft. Tenderness: There is no abdominal tenderness. There is no guarding or rebound. Musculoskeletal: Normal range of motion. General: No tenderness or deformity. Right lower leg: No edema. Left lower leg: No edema. Skin:     General: Skin is warm and dry. Capillary Refill: Capillary refill takes less than 2 seconds. Neurological:      General: No focal deficit present. Mental Status: She is alert and oriented to person, place, and time. Cranial Nerves: No cranial nerve deficit. Sensory: No sensory deficit. 11/18/2019); Cardiac catheterization (09/11/2020); and Insertable Cardiac Monitor (09/11/2020). Social History:  reports that she has been smoking cigarettes. She has a 22.50 pack-year smoking history. She has never used smokeless tobacco. She reports previous alcohol use. She reports that she does not use drugs. Family History: family history includes Arthritis in her father, mother, and paternal grandmother; Asthma in her brother, father, mother, and paternal grandmother; Breast Cancer in her sister; Diabetes in her father, maternal aunt, mother, and paternal aunt; Heart Disease in her father and mother; High Blood Pressure in her father, mother, and paternal aunt; High Cholesterol in her father and mother; Stroke in her mother. The patients home medications have been reviewed.     Allergies: Keflex [cephalexin] and Phenergan [promethazine hcl]    -------------------------------------------------- RESULTS -------------------------------------------------  Labs:  Results for orders placed or performed during the hospital encounter of 01/10/21   Comprehensive Metabolic Panel w/ Reflex to MG   Result Value Ref Range    Sodium 140 132 - 146 mmol/L    Potassium reflex Magnesium 3.7 3.5 - 5.0 mmol/L    Chloride 103 98 - 107 mmol/L    CO2 27 22 - 29 mmol/L    Anion Gap 10 7 - 16 mmol/L    Glucose 112 (H) 74 - 99 mg/dL    BUN 13 8 - 23 mg/dL    CREATININE 0.7 0.5 - 1.0 mg/dL    GFR Non-African American >60 >=60 mL/min/1.73    GFR African American >60     Calcium 8.7 8.6 - 10.2 mg/dL    Total Protein 6.6 6.4 - 8.3 g/dL    Alb 4.1 3.5 - 5.2 g/dL    Total Bilirubin 0.4 0.0 - 1.2 mg/dL    Alkaline Phosphatase 69 35 - 104 U/L    ALT 14 0 - 32 U/L    AST 15 0 - 31 U/L   CBC Auto Differential   Result Value Ref Range    WBC 13.9 (H) 4.5 - 11.5 E9/L    RBC 3.82 3.50 - 5.50 E12/L    Hemoglobin 12.1 11.5 - 15.5 g/dL    Hematocrit 37.3 34.0 - 48.0 %    MCV 97.6 80.0 - 99.9 fL    MCH 31.7 26.0 - 35.0 pg    MCHC 32.4 32.0 - 34.5 %    RDW 13.3 11.5 - 15.0 fL    Platelets 820 758 - 714 E9/L    MPV 10.6 7.0 - 12.0 fL    Neutrophils % 76.5 43.0 - 80.0 %    Immature Granulocytes % 0.7 0.0 - 5.0 %    Lymphocytes % 15.8 (L) 20.0 - 42.0 %    Monocytes % 5.7 2.0 - 12.0 %    Eosinophils % 1.0 0.0 - 6.0 %    Basophils % 0.3 0.0 - 2.0 %    Neutrophils Absolute 10.67 (H) 1.80 - 7.30 E9/L    Immature Granulocytes # 0.10 E9/L    Lymphocytes Absolute 2.20 1.50 - 4.00 E9/L    Monocytes Absolute 0.79 0.10 - 0.95 E9/L    Eosinophils Absolute 0.14 0.05 - 0.50 E9/L    Basophils Absolute 0.04 0.00 - 0.20 E9/L   Lipase   Result Value Ref Range    Lipase 24 13 - 60 U/L   Troponin   Result Value Ref Range    Troponin <0.01 0.00 - 0.03 ng/mL   Protime-INR   Result Value Ref Range    Protime 31.0 (H) 9.3 - 12.4 sec    INR 2.7    Lactate, Sepsis   Result Value Ref Range    Lactic Acid, Sepsis 0.6 0.5 - 1.9 mmol/L   TYPE AND SCREEN   Result Value Ref Range    ABO/Rh A POS     Antibody Screen NEG        Radiology:  XR CHEST PORTABLE   Final Result   Patchy airspace disease in the left lung base which may represent developing   pneumonia. Follow-up PA and lateral radiographs may be helpful in further   evaluation.          ------------------------- NURSING NOTES AND VITALS REVIEWED ---------------------------  Date / Time Roomed:  1/10/2021  5:36 PM  ED Bed Assignment:  11/11    The nursing notes within the ED encounter and vital signs as below have been reviewed. BP (!) 157/78   Pulse 79   Temp 97.7 °F (36.5 °C)   Resp 18   LMP 11/08/2008 (LMP Unknown)   SpO2 96%   Oxygen Saturation Interpretation: Normal      ------------------------------------------ PROGRESS NOTES ------------------------------------------    I have spoken with the patient and discussed todays results, in addition to providing specific details for the plan of care and counseling regarding the diagnosis and prognosis.   Their questions are answered at this time and they are agreeable with the plan. I discussed at length with them reasons for immediate return here for re evaluation. They will followup with their primary care physician by calling their office tomorrow. --------------------------------- ADDITIONAL PROVIDER NOTES ---------------------------------  At this time the patient is without objective evidence of an acute process requiring hospitalization or inpatient management. They have remained hemodynamically stable throughout their entire ED visit and are stable for discharge with outpatient follow-up. The plan has been discussed in detail and they are aware of the specific conditions for emergent return, as well as the importance of follow-up. Discharge Medication List as of 1/10/2021  6:25 PM      START taking these medications    Details   amoxicillin-clavulanate (AUGMENTIN) 875-125 MG per tablet Take 1 tablet by mouth 2 times daily for 10 days, Disp-20 tablet, R-0Print      doxycycline hyclate (VIBRA-TABS) 100 MG tablet Take 1 tablet by mouth 2 times daily for 10 days, Disp-20 tablet, R-0Print             Diagnosis:  1. Pneumonia due to organism    2. Hemoptysis        Disposition:  Patient's disposition: Discharge to home  Patient's condition is stable.        Jason Hood DO  Resident  01/10/21 [id number]

## 2021-01-11 LAB
EKG ATRIAL RATE: 75 BPM
EKG P AXIS: 83 DEGREES
EKG P-R INTERVAL: 154 MS
EKG Q-T INTERVAL: 414 MS
EKG QRS DURATION: 94 MS
EKG QTC CALCULATION (BAZETT): 462 MS
EKG R AXIS: 19 DEGREES
EKG T AXIS: 86 DEGREES
EKG VENTRICULAR RATE: 75 BPM

## 2021-01-12 ENCOUNTER — TELEPHONE (OUTPATIENT)
Dept: PULMONOLOGY | Age: 61
End: 2021-01-12

## 2021-01-12 ENCOUNTER — VIRTUAL VISIT (OUTPATIENT)
Dept: PULMONOLOGY | Age: 61
End: 2021-01-12
Payer: MEDICARE

## 2021-01-12 DIAGNOSIS — J44.9 CHRONIC OBSTRUCTIVE PULMONARY DISEASE, UNSPECIFIED COPD TYPE (HCC): Primary | ICD-10-CM

## 2021-01-12 PROCEDURE — 99214 OFFICE O/P EST MOD 30 MIN: CPT | Performed by: INTERNAL MEDICINE

## 2021-01-12 RX ORDER — ALBUTEROL SULFATE 2.5 MG/3ML
2.5 SOLUTION RESPIRATORY (INHALATION) EVERY 6 HOURS PRN
Qty: 120 EACH | Refills: 5 | Status: SHIPPED
Start: 2021-01-12 | End: 2021-05-19 | Stop reason: SDUPTHER

## 2021-01-12 RX ORDER — PREDNISONE 10 MG/1
TABLET ORAL
Qty: 15 TABLET | Refills: 0 | Status: SHIPPED | OUTPATIENT
Start: 2021-01-12 | End: 2021-01-22

## 2021-01-12 NOTE — TELEPHONE ENCOUNTER
Mailed a letter to patient informing her that her CT Chest is scheduled for 2-9-20 at 11:00 am at the [facility]. She must arrive by 10:30 am. There is no prep for this test

## 2021-01-12 NOTE — PROGRESS NOTES
Department of Internal Medicine  Division of Pulmonary, Critical Care & Sleep Medicine  Pulmonary [facility]                                             Pulmonary Clinic Consult     I had the pleasure of seeing  Aaron Serrano in the [facility] regarding their COPD, lung CT screening for lung cancer      Chief complaint on file.   SOB ,Cough     HISTORY OF PRESENT ILLNESS:    Aaron Serrano is a 63y.o. year old female  follow with the internal medicine clinic came in for follow-up of CT scan that she has done in the workup for screening for lung cancer also she has a history of advanced COPD    The patient tells me that she has shortness of breath has been a chronic she cannot walk more than one block, and she stated that she gets short-winded sometimes less than one block    Also she stated that she has cough that has been chronic usually with clear sputum sometimes it gets yellow    The patient has to follow pulmonologist Armando Salazar and the albuterol rescue inhaler)  He should use the albuterol inhaler 3 times in average in a day and then she use her grandson nebulizer once to twice weekly    The patient does not feel energetic but she contributed that her shortness of breath    Unfortunately the patient is a smoker since the age of 24 she used to smoke 1 pack daily for the last 40 years and then she cut back to half pack daily    She denies any hemoptysis or weight loss, she denies any history of lung cancer in the family    She has open heart surgery with 2 valve replacement done in 2009 and she is on long-term anticoagulation      Today visit  Patient had pneumonia like symptoms on Friday  She went to ER and she had mild hemoptysis that has improved  Use more rescue inhalers   She was started Augmentin and Doxy   Still smoke Less than  1/2 pack  No fever or chills  No chest   Has some cough with yellow  sputum,also has mild hemoptysis     ALLERGIES:    Allergies tiotropium (SPIRIVA RESPIMAT) 2.5 MCG/ACT AERS inhaler INHALE 2 PUFFS BY MOUTH DAILY. PAP Medication 3 Inhaler 3    Roflumilast (DALIRESP) 250 MCG TABS Take 250 mcg by mouth daily 28 tablet 2    Blood Pressure Monitoring (BLOOD PRESSURE CUFF) MISC Dx:  Hypertension with labile blood pressure 1 each 0    Azelastine HCl 137 MCG/SPRAY SOLN 2 sprays by Nasal route daily (Patient not taking: Reported on 2020) 30 mL 1    VENTOLIN  (90 Base) MCG/ACT inhaler inhale 1 puff by mouth every 4 hours if needed for wheezing 18 g 2    DULERA 100-5 MCG/ACT inhaler inhale 2 puffs by mouth twice a day 13 g 3    niacin 500 MG tablet Take 2 tablets by mouth 2 times daily (with meals) 120 tablet 3     No current facility-administered medications for this visit. 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 Marcin Ramos  2009    Aortic valve 2009 [facility]      SECTION      COLONOSCOPY N/A 2019    COLONOSCOPY POLYPECTOMY SNARE/COLD BIOPSY performed by Ruiz Murillo MD at [address] [facility]  2020    Linq Insertion    Dr. Finesse Wang    TUBAL LIGATION      UPPER GASTROINTESTINAL ENDOSCOPY N/A 2019    EGD BIOPSY performed by Ruiz Murillo MD at [address]:   Lung cancer:None   DVT or PE :None     REVIEW OF SYSTEMS:  Constitutional: General health is good . There has been no weight changes. No fevers, fatigue or weakness.    Head: Patient denies any history of trauma, convulsive disorder or syncope. Skin:  Patient denies history of changes in pigmentation, eruptions or pruritus. No easy bruising or bleeding. EENT: no nasal congestion   Cardiovascular ,No chest pain ,No edema ,  Respiration:SOB:None   ,OTT :None   Gastrointestinal:No GI bleed ,no melena  ,no hematemesis    Musculoskeletal: no joint pain ,no swelling  Neurological:no , syncope. Denies twitching, convulsions, loss of consciousness or memory. Endocrine:  . No history of goiter, exophthalmos or dryness of skin. The patient has no history of diabetes. Hematopoietic:  No history of bleeding disorders or easy bruising. Rheumatic:  No connective tissue disease history or polyarthritis/inflammatory joint disease. PHYSICAL EXAMINATION:  There were no vitals filed for this visit. Constitutional: This is a well developed, well nourished 63y.o. year old female who is alert, oriented, coopative and in no apparent distress. Head was normocephalic and atraumatic. EENT: Mallampati class :  Extraocular muscles intact. External canals are patent and no discharge was appreciated. Septum was midline,   mucosa was without erythema, exudates or cobblestoning. No thrush was noted. Neck: Supple without thyromegaly. No elevated JVP. Trachea was midline. No carotid bruits were auscultated. Respiratory:decrease breath sound ,Rhonchi bilateral     Cardiovascular: Regular without murmur, clicks, gallops or rubs. There is no left or right ventricular heave. Pulses:  Carotid, radial and femoral pulses were equally bilaterally. Abdomen: Slightly rounded and soft without organomegaly. No rebound, rigidity or guarding was appreciated. Lymphatic: No lymphadenopathy. Musculoskeletal: normal range of motion     Extremities: no edema ,or cyanosis   Skin:  Warm and dry. Good color, turgor and pigmentation. No lesions or scars.   Neurological/Psychiatric: The patient's general behavior, level of consciousness, thought content and emotional status is normal.  Cranial nerves II-XII are intact. DATA:     FVC 2.7 which is 69 of predicted    FEV1:0.75 which is 28 of predicted  FEV1/FVC 53% which is 41 of predicted  There Was significant bronchodilator response  MVV 37 which is not 39 of predicted    ERV 0.19 which is 90% of predicted TLC is 7.4 which is 137 of predicted  RV/TLC 64 which is 168 predicted indicating of severe air trapping    DLCO is 47 and I will therefore limited correct 51 indicating of moderate reduction in DLCO    IMPRESSION:    Possible pneumonia ,Left lower lobe   1-Very COPD with possible asthmatic bronchitis   2-Possible Asthma component. especially eosinophilic type   3-Tobacco abuse  4-severe emphysema  5 Lung nodules           6-PHTN ,mild,diastolic dysfunction     PLAN:        -Very pleasant 50-year-old female who came into the [facility] for follow-up after CAT scan and COPD  Will get CT chest with out contrast  Continue with abx  Prednisone 30 mg Po X 5 days      In terms of COPD the treatment will continue  ,Spiriva,Deulra     Normal  alpha-1 antitrypsin  Normal IgE level  eosinophilic count is 432,THJED she still is doing well ,with her COPD I doubt much benefit     She goes  Pulmonary rehab ,stopped with COVID       [place] to be followed by PCP  Start Daliresp 250 mg bid   Start  Chantix again   Also will consider Triology if breathing continue to get worse    CRP,LDH     Thank you for allowing me to participate in [facility].  I will keep following with you ,should you have any concerns ,please contact me at [phone]     Sincerely,        Vikki Lucero MD  Pulmonary & Critical Care Medicine

## 2021-01-12 NOTE — PROGRESS NOTES
TeleMedicine Video Visit    This visit was performed as a virtual video visit using a synchronous, two-way, audio-video telehealth technology platform. Patient identification was verified at the start of the visit, including the patient's telephone number and physical location. I discussed with the patient the nature of our telehealth visits, that:     1. Due to the nature of an audio- video modality, the only components of a physical exam that could be done are the elements supported by direct observation. 2. I would evaluate the patient and recommend diagnostics and treatments based on my assessment. 3. If it was felt that the patient should be evaluated in clinic or an emergency room setting, then they would be directed there. 4. Our sessions are not being recorded and that personal health information is protected. 5. Our team would provide follow up care in person if/when the patient needs it. Patient does agree to proceed with telemedicine consultation. Patient's location: pt home  Physician  location [address] other people involved in call N/A      Time spent: Greater than 20    This visit was completed virtually using Doxy. me Follow up visit. Pt reports stomach pain and vomiting that began last Friday. On Saturday pt had some hemoptysis and went for CXR. Pt reports feeling \"yucky\". CXR shows Pneumonia. 3-4 weeks ago was tested for COVID and were negative. Discussed need for Chest CT and follow up labs. Pt currently on antibiotics and office will arrange for Chest CT. Plan for Chest CT and then follow up virtually after Chest CT. Pt still smoking; trying to stop but unsuccessful. Denies any fever or chills during this time. Phlegm is yellowish with some blood recently. Pt to continue antibiotics and complete all. Added prednisone x 10 days and lab work to be done. Follow up appt card to be mailed. Script for Albuterol nebulizer med sent as requested by pt to [employer]. Supplement insurance coverage will pay per pt report. Advised pt to call office if any issues with script coverage.

## 2021-01-21 ENCOUNTER — TELEPHONE (OUTPATIENT)
Dept: NON INVASIVE DIAGNOSTICS | Age: 61
End: 2021-01-21

## 2021-01-21 NOTE — TELEPHONE ENCOUNTER
Patient was due 11/30/2020 and has not yet sent a remote transmission to the office. Mailed the patient a letter to remote them to hook up their monitor and send us a remote transmission.

## 2021-01-22 ENCOUNTER — NURSE ONLY (OUTPATIENT)
Dept: NON INVASIVE DIAGNOSTICS | Age: 61
End: 2021-01-22
Payer: MEDICARE

## 2021-01-22 DIAGNOSIS — Z95.818 STATUS POST PLACEMENT OF IMPLANTABLE LOOP RECORDER: Primary | ICD-10-CM

## 2021-01-22 DIAGNOSIS — R55 SYNCOPE AND COLLAPSE: ICD-10-CM

## 2021-01-22 PROCEDURE — G2066 INTER DEVC REMOTE 30D: HCPCS | Performed by: INTERNAL MEDICINE

## 2021-01-22 PROCEDURE — 93298 REM INTERROG DEV EVAL SCRMS: CPT | Performed by: INTERNAL MEDICINE

## 2021-01-29 NOTE — PROGRESS NOTES
See [facility] [place] report. Remote monitoring reviewed over a 30 day period. End of 30 day monitoring period date of service 1.22.2021    Spoke with patient regarding successful carelink transmission. Next carelink transmission date given to patient.        Keena Marshall

## 2021-02-05 ENCOUNTER — OFFICE VISIT (OUTPATIENT)
Dept: INTERNAL MEDICINE | Age: 61
End: 2021-02-05
Payer: MEDICARE

## 2021-02-05 VITALS
SYSTOLIC BLOOD PRESSURE: 132 MMHG | OXYGEN SATURATION: 95 % | HEART RATE: 86 BPM | TEMPERATURE: 98.6 F | WEIGHT: 169 LBS | RESPIRATION RATE: 16 BRPM | BODY MASS INDEX: 28.16 KG/M2 | DIASTOLIC BLOOD PRESSURE: 64 MMHG | HEIGHT: 65 IN

## 2021-02-05 DIAGNOSIS — F17.200 CURRENT SMOKER: ICD-10-CM

## 2021-02-05 DIAGNOSIS — Z12.31 ENCOUNTER FOR SCREENING MAMMOGRAM FOR MALIGNANT NEOPLASM OF BREAST: ICD-10-CM

## 2021-02-05 DIAGNOSIS — M79.10 MUSCLE PAIN: ICD-10-CM

## 2021-02-05 DIAGNOSIS — E78.5 HYPERLIPIDEMIA, UNSPECIFIED HYPERLIPIDEMIA TYPE: ICD-10-CM

## 2021-02-05 DIAGNOSIS — J44.9 CHRONIC OBSTRUCTIVE PULMONARY DISEASE, UNSPECIFIED COPD TYPE (HCC): ICD-10-CM

## 2021-02-05 DIAGNOSIS — I71.21 ASCENDING AORTIC ANEURYSM: ICD-10-CM

## 2021-02-05 DIAGNOSIS — Z12.4 CERVICAL CANCER SCREENING: ICD-10-CM

## 2021-02-05 DIAGNOSIS — K21.9 GASTROESOPHAGEAL REFLUX DISEASE WITHOUT ESOPHAGITIS: ICD-10-CM

## 2021-02-05 DIAGNOSIS — I10 ESSENTIAL HYPERTENSION: ICD-10-CM

## 2021-02-05 DIAGNOSIS — Z95.2 H/O MECHANICAL AORTIC VALVE REPLACEMENT: Primary | ICD-10-CM

## 2021-02-05 LAB
INTERNATIONAL NORMALIZATION RATIO, POC: 3.3
PROTHROMBIN TIME, POC: 39.2

## 2021-02-05 PROCEDURE — 99213 OFFICE O/P EST LOW 20 MIN: CPT | Performed by: INTERNAL MEDICINE

## 2021-02-05 PROCEDURE — 99214 OFFICE O/P EST MOD 30 MIN: CPT | Performed by: INTERNAL MEDICINE

## 2021-02-05 PROCEDURE — 85610 PROTHROMBIN TIME: CPT | Performed by: INTERNAL MEDICINE

## 2021-02-05 RX ORDER — HYDROCORTISONE 0.5 %
CREAM (GRAM) TOPICAL 3 TIMES DAILY PRN
Qty: 1 BOTTLE | Refills: 3 | Status: SHIPPED
Start: 2021-02-05 | End: 2021-04-25 | Stop reason: ALTCHOICE

## 2021-02-05 RX ORDER — ASPIRIN 81 MG/1
81 TABLET, CHEWABLE ORAL DAILY
Qty: 30 TABLET | Refills: 3 | Status: SHIPPED
Start: 2021-02-05 | End: 2021-04-25 | Stop reason: ALTCHOICE

## 2021-02-05 RX ORDER — ASPIRIN 81 MG/1
81 TABLET, CHEWABLE ORAL DAILY
COMMUNITY
End: 2021-02-05 | Stop reason: SDUPTHER

## 2021-02-05 ASSESSMENT — ENCOUNTER SYMPTOMS
NAUSEA: 0
COUGH: 1
WHEEZING: 1
DIARRHEA: 0
SINUS PRESSURE: 0
SHORTNESS OF BREATH: 0
SINUS PAIN: 0
CONSTIPATION: 0
BACK PAIN: 0
VOMITING: 0

## 2021-02-05 NOTE — PROGRESS NOTES
[facility]  Internal Medicine Residency Clinic    Attending Physician Statement  I have discussed the case, including pertinent history and exam findings with the resident physician. I agree with the assessment, plan and orders as documented by the resident. I have reviewed all pertinent PMHx, PSHx, FamHx, SocialHx, medications, and allergies and updated history as appropriate. Patient here for routine follow up of medical problems. 1. Recent treatment for small hemoptysis pneumonia, currently doing well at this time. INR at that time was 2.7. Completed abx, hemoptysis resolved. 2. Severe COPD -on daliresp, was attending pulmonary rehab before the pandemic. Continue follow up with Pulmonary  3. MSK chest pain from coughing, would do topical analgesisc instead of prolonged oral nsaid. Stress test and cath from 9/2020 reviewed. 4. HTN, controlled  5. HLD  6. Mechanical aortic valve, target INR of ~ 2.5. Slightly elevated INR today at 3.3.  7. GERD, start weaning off PPI (has been on it since 2017)  8. RLS  9. Tobacco use with LLL pulmonary nodule, following with Dr Heather Slade - pending rpt CT chest imaging, tobacco cessation education  10. Vit D deficiency    Remainder of medical problems as per resident note.     Minerva Gutierrez MD  2/5/2021 9:45 AM

## 2021-02-05 NOTE — PATIENT INSTRUCTIONS
Changing coumadin dosing to 6mg sat and sun; 5mg M, T, W, Th, F  Return for INR check in a week. [place] for follow up visit in 2 months. [address], DO    Patient Education        Learning About COPD, Asthma, and Air Pollution  How does air pollution affect COPD and asthma? When you have COPD or asthma, air pollution may make your symptoms worse. If it does, it means that air pollution is a trigger for you. It is important to know what your triggers are and how to deal with them. If air pollution is a trigger for you, you need to learn about air quality and pay attention to weather forecasts that include how bad the air is expected to be. How can you manage a flare-up caused by air pollution? · Do not panic. Quick treatment at home may help you prevent serious breathing problems. · Take your medicines exactly as your doctor tells you.  ? Use your quick-relief inhaler as directed by your doctor. If your symptoms do not get better after you use your medicine, have someone take you to the emergency room. Call an ambulance if necessary. ? With inhaled medicines, a spacer or a nebulizer may help you get more medicine to your lungs. Ask your doctor or pharmacist how to use them properly. Practice using the spacer in front of a mirror before you have a flare-up. This may help you get the medicine into your lungs quickly. ? If your doctor has given you steroid pills, take them as directed. ? Talk to your doctor if you have any problems with your medicine. What can you do to prevent flare-ups? · Try not to be outside when air pollution levels are high. Stay at home with your windows closed. · Do not smoke. This is the most important step you can take to prevent more damage to your lungs and prevent problems. If you already smoke, it is never too late to stop. If you need help quitting, talk to your doctor about stop-smoking programs and medicines.  These can increase your chances of less stressed. When and how will you feel healthier? Quitting has real health benefits that start from day 1 of being smoke-free. And the longer you stay smoke-free, the healthier you get and the better you feel. The first hours  · After just 20 minutes, your blood pressure and heart rate go down. That means there's less stress on your heart and blood vessels. · Within 12 hours, the level of carbon monoxide in your blood drops back to normal. That makes room for more oxygen. With more oxygen in your body, you may notice that you have more energy than when you smoked. After 2 weeks  · Your lungs start to work better. · Your risk of heart attack starts to drop. After 1 month  · When your lungs are clear, you cough less and breathe deeper, so it's easier to be active. · Your sense of taste and smell return. That means you can enjoy food more than you have since you started smoking. Over the years  · Over the years, your risks of heart disease, heart attack, and stroke are lower. · After 10 years, your risk of dying from lung cancer is cut by about half. And your risk for many other types of cancer is lower too. How would quitting help others in your life? When you quit smoking, you improve the health of everyone who now breathes in your smoke. · Their heart, lung, and cancer risks drop, much like yours. · They are sick less. For babies and small children, living smoke-free means they're less likely to have ear infections, pneumonia, and bronchitis. · If you're a woman who is or will be pregnant someday, quitting smoking means a healthier . · Children who are close to you are less likely to become adult smokers. Where can you learn more? Go to https://tom."". org and sign in to your [employer] account. Enter 908 [id number] [id number] in the [employer] box to learn more about \"Learning About Benefits From Quitting Smoking. \"     If you do not have an account, please click on the \"Sign

## 2021-02-09 ENCOUNTER — HOSPITAL ENCOUNTER (OUTPATIENT)
Dept: CT IMAGING | Age: 61
Discharge: HOME | End: 2021-02-11
Payer: MEDICARE

## 2021-02-09 DIAGNOSIS — J44.9 CHRONIC OBSTRUCTIVE PULMONARY DISEASE, UNSPECIFIED COPD TYPE (HCC): ICD-10-CM

## 2021-02-09 PROCEDURE — 71250 CT THORAX DX C-: CPT

## 2021-02-12 ENCOUNTER — NURSE ONLY (OUTPATIENT)
Dept: INTERNAL MEDICINE | Age: 61
End: 2021-02-12
Payer: MEDICARE

## 2021-02-12 DIAGNOSIS — Z95.2 H/O MECHANICAL AORTIC VALVE REPLACEMENT: Primary | ICD-10-CM

## 2021-02-12 LAB
INTERNATIONAL NORMALIZATION RATIO, POC: 1.8
PROTHROMBIN TIME, POC: 22

## 2021-02-12 PROCEDURE — 93793 ANTICOAG MGMT PT WARFARIN: CPT | Performed by: INTERNAL MEDICINE

## 2021-02-12 PROCEDURE — 85610 PROTHROMBIN TIME: CPT | Performed by: INTERNAL MEDICINE

## 2021-02-12 RX ORDER — ASPIRIN 81 MG/1
81 TABLET ORAL DAILY
Qty: 90 TABLET | Refills: 0 | Status: SHIPPED
Start: 2021-02-12 | End: 2021-05-19 | Stop reason: SDUPTHER

## 2021-02-12 NOTE — PROGRESS NOTES
Patient notified to take 6 mg coumadin mon/wed/fri and 5 mg coumadin all other days.   Repeat INR in 1 week  Bleeding precautions reviewed  AVS mailed

## 2021-02-12 NOTE — PATIENT INSTRUCTIONS
Take coumadin 6 mg mon/wed/fri and coumadin 5 mg all other days  Repeat INR in 1 week  Bleeding precautions as reviewed  Please call if any questions or concerns  Patient Education        Taking Warfarin Safely: Care Instructions  Your Care Instructions     Warfarin is a medicine that you take to prevent blood clots. It is often called a blood thinner. Doctors give warfarin (such as Coumadin) to reduce the risk of blood clots. You may be at risk for blood clots if you have atrial fibrillation or deep vein thrombosis. Some other health problems may also put you at risk. Warfarin slows the amount of time it takes for your blood to clot. It can cause bleeding problems. Even if you've been taking warfarin for a while, it's important to know how to take it safely. Foods and other medicines can affect the way warfarin works. Some can make warfarin work too well. This can cause bleeding problems. And some can make it work poorly, so that it does not prevent blood clots very well. You will need regular blood tests to check how long it takes for your blood to form a clot. This test is called a PT or prothrombin time test. The result of the test is called an INR level. Depending on the test results, your doctor or anticoagulation clinic may adjust your dose of warfarin. Follow-up care is a key part of your treatment and safety. Be sure to make and go to all appointments, and call your doctor if you are having problems. It's also a good idea to know your test results and keep a list of the medicines you take. How can you care for yourself at home? Take warfarin safely  · Take your warfarin at the same time each day. · If you miss a dose of warfarin, don't take an extra dose to make up for it. Your doctor can tell you exactly what to do so you don't take too much or too little. · Wear medical alert jewelry that lets others know that you take warfarin. You can buy this at most drugstores.   · Don't take warfarin if you are pregnant or planning to get pregnant. Talk to your doctor about how you can prevent getting pregnant while you are taking it. · Don't change your dose or stop taking warfarin unless your doctor tells you to. Effects of medicines and food on warfarin  · Don't start or stop taking any medicines, vitamins, or natural remedies unless you first talk to your doctor. Many medicines can affect how warfarin works. These include aspirin and other pain relievers, over-the-counter medicines, multivitamins, dietary supplements, and herbal products. · Tell all of your doctors and pharmacists that you take warfarin. Some prescription medicines can affect how warfarin works. · Keep the amount of vitamin K in your diet about the same from day to day. Do not suddenly eat a lot more or a lot less food that is rich in vitamin K than you usually do. Vitamin K affects how warfarin works and how your blood clots. Talk with your doctor before making big changes in your diet. Foods that have a lot of vitamin K include cooked green vegetables, such as:  ? Kale, spinach, turnip greens, jody greens, Swiss chard, and mustard greens. ? [place] sprouts, broccoli, and cabbage. · Limit your use of alcohol. Avoid bleeding by preventing falls and injuries  · Wear slippers or shoes with nonskid soles. · Remove throw rugs and clutter. · Rearrange furniture and electrical cords to keep them out of walking paths. · Keep stairways, porches, and outside walkways well lit. Use night-lights in hallways and bathrooms. · Be extra careful when you work with sharp tools or knives. When should you call for help? Call 911 anytime you think you may need emergency care. For example, call if:    · You have a sudden, severe headache that is different from past headaches. Call your doctor now or seek immediate medical care if:    · You have any abnormal bleeding, such as:  ? Nosebleeds.   ? Vaginal bleeding that is different (heavier, more frequent, at a different time of the month) than what you are used to.  ? Bloody or black stools, or rectal bleeding. ? Bloody or pink urine. Watch closely for changes in your health, and be sure to contact your doctor if you have any problems. Where can you learn more? Go to [email or web address] org and sign in to your [employer] account. Enter E351 in the [employer] box to learn more about \"Taking Warfarin Safely: Care Instructions. \"     If you do not have an account, please click on the \"Sign Up Now\" link. Current as of: December 16, 2019               Content Version: 12.6  © 5569-3749 [employer]. Care instructions adapted under license by [facility] ([facility]). If you have questions about a medical condition or this instruction, always ask your healthcare professional. [address] any warranty or liability for your use of this information.

## 2021-02-22 ENCOUNTER — NURSE ONLY (OUTPATIENT)
Dept: NON INVASIVE DIAGNOSTICS | Age: 61
End: 2021-02-22
Payer: MEDICARE

## 2021-02-22 ENCOUNTER — NURSE ONLY (OUTPATIENT)
Dept: INTERNAL MEDICINE | Age: 61
End: 2021-02-22
Payer: MEDICARE

## 2021-02-22 DIAGNOSIS — Z95.818 STATUS POST PLACEMENT OF IMPLANTABLE LOOP RECORDER: Primary | ICD-10-CM

## 2021-02-22 DIAGNOSIS — R55 SYNCOPE AND COLLAPSE: ICD-10-CM

## 2021-02-22 DIAGNOSIS — Z95.2 H/O MECHANICAL AORTIC VALVE REPLACEMENT: Primary | ICD-10-CM

## 2021-02-22 LAB
INTERNATIONAL NORMALIZATION RATIO, POC: 2.5
PROTHROMBIN TIME, POC: 29.9

## 2021-02-22 PROCEDURE — 93298 REM INTERROG DEV EVAL SCRMS: CPT | Performed by: INTERNAL MEDICINE

## 2021-02-22 PROCEDURE — 93793 ANTICOAG MGMT PT WARFARIN: CPT | Performed by: INTERNAL MEDICINE

## 2021-02-22 PROCEDURE — 85610 PROTHROMBIN TIME: CPT | Performed by: INTERNAL MEDICINE

## 2021-02-22 PROCEDURE — G2066 INTER DEVC REMOTE 30D: HCPCS | Performed by: INTERNAL MEDICINE

## 2021-02-22 NOTE — PROGRESS NOTES
Spoke with patient and she will call [facility] to schedule a mammogram tomorrow.

## 2021-03-08 ENCOUNTER — HOSPITAL ENCOUNTER (OUTPATIENT)
Dept: GENERAL RADIOLOGY | Age: 61
Discharge: HOME | End: 2021-03-10
Payer: MEDICARE

## 2021-03-08 ENCOUNTER — NURSE ONLY (OUTPATIENT)
Dept: INTERNAL MEDICINE | Age: 61
End: 2021-03-08
Payer: MEDICARE

## 2021-03-08 DIAGNOSIS — Z95.2 H/O MECHANICAL AORTIC VALVE REPLACEMENT: Primary | ICD-10-CM

## 2021-03-08 DIAGNOSIS — Z12.31 ENCOUNTER FOR SCREENING MAMMOGRAM FOR MALIGNANT NEOPLASM OF BREAST: ICD-10-CM

## 2021-03-08 LAB
INTERNATIONAL NORMALIZATION RATIO, POC: 2.3
PROTHROMBIN TIME, POC: 27.2

## 2021-03-08 PROCEDURE — 77063 BREAST TOMOSYNTHESIS BI: CPT

## 2021-03-08 PROCEDURE — 85610 PROTHROMBIN TIME: CPT

## 2021-03-08 NOTE — PROGRESS NOTES
INR results reviewed with Dr. Cindi Summers and orders received. Spoke with patient via phone and notified of Coumadin instructions along with date and time of follow up appointment. AVS mailed to patient.

## 2021-03-08 NOTE — PATIENT INSTRUCTIONS
Continue 6 mg Monday-Wednesday & Friday and 5 mg all other days. Repeat INR in 4 weeks. Follow up as scheduled. Please call with any questions or concerns. Angela Suresh RN        COVID-19 vaccine appointments are not available through our practice. As you're eligible to receive the COVID-19 vaccine, as determined by your state's department of health, you will be able to schedule an appointment through [address] or by calling [phone]. Appointments are required to receive the COVID-19 vaccine. As vaccine supply continues to be limited, we anticipate open appointments to fill up quickly and appreciate your patience as we work through the process of providing vaccines to those in our communities.

## 2021-03-09 ENCOUNTER — HOSPITAL ENCOUNTER (OUTPATIENT)
Dept: GENERAL RADIOLOGY | Age: 61
Discharge: HOME | End: 2021-03-11
Payer: MEDICARE

## 2021-03-09 ENCOUNTER — TELEPHONE (OUTPATIENT)
Dept: GENERAL RADIOLOGY | Age: 61
End: 2021-03-09

## 2021-03-09 DIAGNOSIS — R92.8 ABNORMAL MAMMOGRAM: ICD-10-CM

## 2021-03-09 DIAGNOSIS — R92.8 ABNORMAL MAMMOGRAM OF RIGHT BREAST: Primary | ICD-10-CM

## 2021-03-09 DIAGNOSIS — R92.8 ABNORMAL MAMMOGRAM: Primary | ICD-10-CM

## 2021-03-09 PROCEDURE — 77065 DX MAMMO INCL CAD UNI: CPT

## 2021-03-09 PROCEDURE — 76642 ULTRASOUND BREAST LIMITED: CPT

## 2021-03-09 NOTE — TELEPHONE ENCOUNTER
Call to patient in reference to her mammogram performed at [facility] on March 8, 2021. Instructed patient that the radiologist has recommended some additional breast imaging, in order to make a final determination/result. Verbalizes understanding and is agreeable to proceed.        Tyshawn Tinoco RN, BSN, [address]

## 2021-03-10 ENCOUNTER — HOSPITAL ENCOUNTER (OUTPATIENT)
Dept: GENERAL RADIOLOGY | Age: 61
Discharge: HOME | End: 2021-03-12
Payer: MEDICARE

## 2021-03-10 DIAGNOSIS — R92.8 ABNORMAL MAMMOGRAM OF RIGHT BREAST: ICD-10-CM

## 2021-03-10 PROCEDURE — 2500000003 HC RX 250 WO HCPCS

## 2021-03-10 PROCEDURE — 88305 TISSUE EXAM BY PATHOLOGIST: CPT

## 2021-03-10 PROCEDURE — 2720000010 MAM STEREO BREAST BX W LOC DEVICE 1ST LESION RIGHT

## 2021-03-10 PROCEDURE — 77065 DX MAMMO INCL CAD UNI: CPT

## 2021-03-10 NOTE — PROGRESS NOTES
Met with patient prior to her breast biopsy. Instructed on tomosynthesis/stereotactic  breast biopsy procedure. She is on Aspirin and Warfarin. I remained with her during the biopsy. She tolerated biopsy well. Upon questioning regarding results notification, patient indicates that she would like to receive breast biopsy results by phone via the breast navigator. Instructed that results will be available in approximately 3-5 business days. Instructed that her physician will also be notified of results. Provided with folder containing my contact information, monthly breast self exam card, and post biopsy discharge instructions. Instructed to call me if she has any questions or concerns about her biopsy. Verbalizes understanding.  Alma RN, OCN, CN-BN

## 2021-03-11 NOTE — PROGRESS NOTES
See [facility] [place] [place] report. Remote monitoring reviewed over a 30 day period.   End of 30 day monitoring period date of service 2.22.2021

## 2021-03-12 ENCOUNTER — TELEPHONE (OUTPATIENT)
Dept: GENERAL RADIOLOGY | Age: 61
End: 2021-03-12

## 2021-03-15 ENCOUNTER — TELEPHONE (OUTPATIENT)
Dept: NON INVASIVE DIAGNOSTICS | Age: 61
End: 2021-03-15

## 2021-03-15 NOTE — TELEPHONE ENCOUNTER
----- Message from Erick Cobian RN sent at 3/11/2021  1:01 PM EST -----  Successful transmission received. Please call patient and give next appointment.

## 2021-03-15 NOTE — TELEPHONE ENCOUNTER
We have received your remote transmission. Our staff will contact you if there is anything that needs to be discussed. Your next appointment is 03/25/2021 remote transmission from home. Spoke to patient. Verbalized understanding of next transmission date.

## 2021-03-23 DIAGNOSIS — F32.A DEPRESSION, UNSPECIFIED DEPRESSION TYPE: ICD-10-CM

## 2021-03-23 DIAGNOSIS — K21.9 GASTROESOPHAGEAL REFLUX DISEASE WITHOUT ESOPHAGITIS: ICD-10-CM

## 2021-03-23 RX ORDER — SERTRALINE HYDROCHLORIDE 100 MG/1
TABLET, FILM COATED ORAL
Qty: 135 TABLET | Refills: 1 | Status: SHIPPED
Start: 2021-03-23 | End: 2021-05-19 | Stop reason: SDUPTHER

## 2021-03-23 RX ORDER — OMEPRAZOLE 40 MG/1
CAPSULE, DELAYED RELEASE ORAL
Qty: 90 CAPSULE | Refills: 1 | Status: SHIPPED
Start: 2021-03-23 | End: 2021-05-19 | Stop reason: SDUPTHER

## 2021-03-23 NOTE — TELEPHONE ENCOUNTER
Last Appointment:  2/5/2021  Future Appointments   Date Time Provider Esmer Lee   3/25/2021  9:05 AM SCHEDULE, REMOTE CHECK REI ELECTRO PHYS [facility]   4/5/2021  8:00 AM SCHEDULE, SE ACC IM [facility]   4/15/2021 10:00 AM Abundio Klinefelter, MD [facility]   4/23/2021  8:00 AM Jin Caceres DO [facility]   7/19/2021 10:30 AM Preeti Brown MD [facility]

## 2021-03-25 ENCOUNTER — NURSE ONLY (OUTPATIENT)
Dept: NON INVASIVE DIAGNOSTICS | Age: 61
End: 2021-03-25
Payer: MEDICARE

## 2021-03-25 DIAGNOSIS — R55 SYNCOPE AND COLLAPSE: ICD-10-CM

## 2021-03-25 DIAGNOSIS — Z95.818 STATUS POST PLACEMENT OF IMPLANTABLE LOOP RECORDER: Primary | ICD-10-CM

## 2021-03-30 PROCEDURE — G2066 INTER DEVC REMOTE 30D: HCPCS | Performed by: INTERNAL MEDICINE

## 2021-03-30 PROCEDURE — 93298 REM INTERROG DEV EVAL SCRMS: CPT | Performed by: INTERNAL MEDICINE

## 2021-04-01 ENCOUNTER — TELEPHONE (OUTPATIENT)
Dept: NON INVASIVE DIAGNOSTICS | Age: 61
End: 2021-04-01

## 2021-04-01 NOTE — TELEPHONE ENCOUNTER
We have received your remote transmission. Our staff will contact you if there is anything that needs to be discussed. Your next appointment is 04/26/2021 remote transmission from home. Spoke to patient. Verbalized understanding of next transmission date.

## 2021-04-01 NOTE — TELEPHONE ENCOUNTER
----- Message from Thad Hall RN sent at 3/30/2021  6:32 AM EDT -----  Successful transmission received. Please call patient and give next appointment.

## 2021-04-05 ENCOUNTER — NURSE ONLY (OUTPATIENT)
Dept: INTERNAL MEDICINE | Age: 61
End: 2021-04-05
Payer: MEDICARE

## 2021-04-05 DIAGNOSIS — Z95.2 H/O MECHANICAL AORTIC VALVE REPLACEMENT: Primary | ICD-10-CM

## 2021-04-05 LAB
INTERNATIONAL NORMALIZATION RATIO, POC: 2.6
PROTHROMBIN TIME, POC: 30.7

## 2021-04-05 PROCEDURE — 85610 PROTHROMBIN TIME: CPT | Performed by: INTERNAL MEDICINE

## 2021-04-05 PROCEDURE — 93793 ANTICOAG MGMT PT WARFARIN: CPT | Performed by: INTERNAL MEDICINE

## 2021-04-05 NOTE — PROGRESS NOTES
Coumadin instructions given per Dr. Charles Ulloa. Pt left will call with instructions and next INR appointment. Printed AVS mailed to pt.

## 2021-04-05 NOTE — PATIENT INSTRUCTIONS
Continue same dose 6mg on Monday, Wednesday and Friday and rest of the days take 5mg's of Coumadin   Recheck INR at next scheduled office visit on 4/23/21 with your

## 2021-04-23 ENCOUNTER — OFFICE VISIT (OUTPATIENT)
Dept: INTERNAL MEDICINE | Age: 61
End: 2021-04-23
Payer: MEDICARE

## 2021-04-23 VITALS
SYSTOLIC BLOOD PRESSURE: 137 MMHG | WEIGHT: 169.1 LBS | RESPIRATION RATE: 18 BRPM | DIASTOLIC BLOOD PRESSURE: 75 MMHG | HEART RATE: 78 BPM | BODY MASS INDEX: 28.17 KG/M2 | HEIGHT: 65 IN | TEMPERATURE: 97.1 F

## 2021-04-23 DIAGNOSIS — G25.81 RESTLESS LEG SYNDROME: ICD-10-CM

## 2021-04-23 DIAGNOSIS — I10 ESSENTIAL HYPERTENSION: ICD-10-CM

## 2021-04-23 DIAGNOSIS — R73.03 PREDIABETES: ICD-10-CM

## 2021-04-23 DIAGNOSIS — E78.5 HYPERLIPIDEMIA, UNSPECIFIED HYPERLIPIDEMIA TYPE: ICD-10-CM

## 2021-04-23 DIAGNOSIS — J30.2 SEASONAL ALLERGIES: ICD-10-CM

## 2021-04-23 DIAGNOSIS — J44.9 CHRONIC OBSTRUCTIVE PULMONARY DISEASE, UNSPECIFIED COPD TYPE (HCC): ICD-10-CM

## 2021-04-23 DIAGNOSIS — K59.00 CONSTIPATION, UNSPECIFIED CONSTIPATION TYPE: ICD-10-CM

## 2021-04-23 DIAGNOSIS — I71.21 ASCENDING AORTIC ANEURYSM: Primary | ICD-10-CM

## 2021-04-23 DIAGNOSIS — Z95.2 H/O MECHANICAL AORTIC VALVE REPLACEMENT: ICD-10-CM

## 2021-04-23 LAB
INTERNATIONAL NORMALIZATION RATIO, POC: 2.8
PROTHROMBIN TIME, POC: 33.8

## 2021-04-23 PROCEDURE — 99214 OFFICE O/P EST MOD 30 MIN: CPT | Performed by: INTERNAL MEDICINE

## 2021-04-23 PROCEDURE — 85610 PROTHROMBIN TIME: CPT | Performed by: INTERNAL MEDICINE

## 2021-04-23 PROCEDURE — 99212 OFFICE O/P EST SF 10 MIN: CPT | Performed by: INTERNAL MEDICINE

## 2021-04-23 RX ORDER — ATORVASTATIN CALCIUM 10 MG/1
TABLET, FILM COATED ORAL
Qty: 90 TABLET | Refills: 0 | Status: SHIPPED
Start: 2021-04-23 | End: 2021-05-19 | Stop reason: SDUPTHER

## 2021-04-23 RX ORDER — CALCIUM POLYCARBOPHIL 625 MG
625 TABLET ORAL DAILY
Qty: 90 TABLET | Refills: 0 | Status: SHIPPED
Start: 2021-04-23 | End: 2021-05-19 | Stop reason: SDUPTHER

## 2021-04-23 RX ORDER — FLUTICASONE PROPIONATE 50 MCG
2 SPRAY, SUSPENSION (ML) NASAL DAILY
Qty: 1 BOTTLE | Refills: 2 | Status: SHIPPED
Start: 2021-04-23 | End: 2021-05-19 | Stop reason: SDUPTHER

## 2021-04-23 RX ORDER — AZELASTINE HYDROCHLORIDE 137 UG/1
2 SPRAY, METERED NASAL DAILY
Qty: 30 ML | Refills: 1 | Status: SHIPPED
Start: 2021-04-23 | End: 2021-05-19 | Stop reason: SDUPTHER

## 2021-04-23 RX ORDER — WARFARIN SODIUM 6 MG/1
6 TABLET ORAL DAILY
Qty: 30 TABLET | Refills: 0 | Status: SHIPPED
Start: 2021-04-23 | End: 2021-05-19 | Stop reason: SDUPTHER

## 2021-04-23 RX ORDER — WARFARIN SODIUM 5 MG/1
5 TABLET ORAL DAILY
Qty: 30 TABLET | Refills: 0 | Status: SHIPPED
Start: 2021-04-23 | End: 2021-05-19 | Stop reason: SDUPTHER

## 2021-04-23 RX ORDER — MONTELUKAST SODIUM 10 MG/1
TABLET ORAL
Qty: 90 TABLET | Refills: 0 | Status: SHIPPED
Start: 2021-04-23 | End: 2021-05-19 | Stop reason: SDUPTHER

## 2021-04-23 RX ORDER — ROPINIROLE 0.25 MG/1
TABLET, FILM COATED ORAL
Qty: 270 TABLET | Refills: 0 | Status: SHIPPED
Start: 2021-04-23 | End: 2021-05-19 | Stop reason: SDUPTHER

## 2021-04-23 RX ORDER — METOPROLOL SUCCINATE 25 MG/1
25 TABLET, EXTENDED RELEASE ORAL DAILY
Qty: 90 TABLET | Refills: 0 | Status: SHIPPED
Start: 2021-04-23 | End: 2021-05-19 | Stop reason: SDUPTHER

## 2021-04-23 RX ORDER — LISINOPRIL 10 MG/1
TABLET ORAL
Qty: 90 TABLET | Refills: 0 | Status: SHIPPED
Start: 2021-04-23 | End: 2021-05-19 | Stop reason: SDUPTHER

## 2021-04-23 ASSESSMENT — ENCOUNTER SYMPTOMS
NAUSEA: 0
CONSTIPATION: 1
RECTAL PAIN: 0
VOMITING: 0
RHINORRHEA: 0
ABDOMINAL PAIN: 0
SHORTNESS OF BREATH: 1
BLOOD IN STOOL: 1
COUGH: 1

## 2021-04-23 NOTE — PROGRESS NOTES
[facility]    Attending Physician Statement  I have discussed the case, including pertinent history and exam findings with the resident physician. I have seen and examined the patient and the key elements of the encounter have been performed by me. I agree with the assessment, plan and orders as documented by the resident. I have reviewed all pertinent PMHx, PSHx, FamHx, SocialHx, medications, and allergies and updated history as appropriate. Patient presents for routine follow up of medical problems. PMH of CAD, severe COPD, HTN, HLD, ongoing tobacco use (50 pack yr hx). Reports episodic hematochezia (hx of diverticulosis) associated with constipation. Recommend continue bowel regimen. Start ICS for post-nasal allergic rhinitis. Referral for pulmonary rehab as she attended pre-pandemic. Remainder of medical problems as per resident note.     Elva Katz DO  4/23/2021 8:42 AM

## 2021-04-23 NOTE — PATIENT INSTRUCTIONS
dust, animal dander, and mold. Allergies can be mild or severe. Mild allergies can be managed with home treatment. But medicine may be needed to prevent problems. Managing your allergies is an important part of staying healthy. Your doctor may suggest that you have allergy testing to help find out what is causing your allergies. Severe allergies can cause reactions that affect your whole body (anaphylactic reactions). Your doctor may prescribe a shot of epinephrine to carry with you in case you have a severe reaction. Learn how to give yourself the shot and keep it with you at all times. Make sure it is not . Follow-up care is a key part of your treatment and safety. Be sure to make and go to all appointments, and call your doctor if you are having problems. It's also a good idea to know your test results and keep a list of the medicines you take. How can you care for yourself at home? · If you have been told by your doctor that dust or dust mites are causing your allergy, decrease the dust around your bed:  ? Wash sheets, pillowcases, and other bedding in hot water every week. ? Use dust-proof covers for pillows, duvets, and mattresses. Avoid plastic covers because they tear easily and do not \"breathe. \" Wash as instructed on the label. ? Do not use any blankets and pillows that you do not need. ? Use blankets that you can wash in your washing machine. ? Consider removing drapes and carpets, which attract and hold dust, from your bedroom. · If you are allergic to house dust and mites, do not use home humidifiers. Your doctor can suggest ways you can control dust and mites. · Look for signs of cockroaches. Cockroaches cause allergic reactions. Use cockroach baits to get rid of them. Then, clean your home well. Cockroaches like areas where grocery bags, newspapers, empty bottles, or cardboard boxes are stored. Do not keep these inside your home, and keep trash and food containers sealed.  Seal off any spots where cockroaches might enter your home. · If you are allergic to mold, get rid of furniture, rugs, and drapes that smell musty. Check for mold in the bathroom. · If you are allergic to outdoor pollen or mold spores, use air-conditioning. Change or clean all filters every month. Keep windows closed. · If you are allergic to pollen, stay inside when pollen counts are high. Use a vacuum  with a HEPA filter or a double-thickness filter at least two times each week. · Stay inside when air pollution is bad. Avoid paint fumes, perfumes, and other strong odors. · Avoid conditions that make your allergies worse. Stay away from smoke. Do not smoke or let anyone else smoke in your house. Do not use fireplaces or wood-burning stoves. · If you are allergic to your pets, change the air filter in your furnace every month. Use high-efficiency filters. · If you are allergic to pet dander, keep pets outside or out of your bedroom. Old carpet and cloth furniture can hold a lot of animal dander. You may need to replace them. When should you call for help? Give an epinephrine shot if:    · You think you are having a severe allergic reaction.     · You have symptoms in more than one body area, such as mild nausea and an itchy mouth. After giving an epinephrine shot call 911, even if you feel better. Call 911 if:    · You have symptoms of a severe allergic reaction. These may include:  ? Sudden raised, red areas (hives) all over your body. ? Swelling of the throat, mouth, lips, or tongue. ? Trouble breathing. ? Passing out (losing consciousness). Or you may feel very lightheaded or suddenly feel weak, confused, or restless.     · You have been given an epinephrine shot, even if you feel better. Call your doctor now or seek immediate medical care if:    · You have symptoms of an allergic reaction, such as:  ? A rash or hives (raised, red areas on the skin). ? Itching. ? Swelling. ?  Belly pain, nausea, or vomiting. Watch closely for changes in your health, and be sure to contact your doctor if:    · You do not get better as expected. Where can you learn more? Go to https://Mobile On Services.[employer]imecraft. org and sign in to your [employer] account. Enter H248 in the Wir[employer]s box to learn more about \"Allergies: Care Instructions. \"     If you do not have an account, please click on the \"Sign Up Now\" link. Current as of: November 6, 2020               Content Version: 12.8  © 2006-2021 [employer]. Care instructions adapted under license by [facility] ([facility]). If you have questions about a medical condition or this instruction, always ask your healthcare professional. [address] any warranty or liability for your use of this information. Patient Education        COPD Exacerbation Plan: Care Instructions  Your Care Instructions     If you have chronic obstructive pulmonary disease (COPD), your usual shortness of breath could suddenly get worse. You may start coughing more and have more mucus. This flare-up is called a COPD exacerbation (say \"za-YTJ-ey-BAY-maura\"). A lung infection or air pollution could set off an exacerbation. Sometimes it can happen after a quick change in temperature or being around chemicals. Work with your doctor to make a plan for dealing with an exacerbation. You can better manage it if you plan ahead. Follow-up care is a key part of your treatment and safety. Be sure to make and go to all appointments, and call your doctor if you are having problems. It's also a good idea to know your test results and keep a list of the medicines you take. How can you care for yourself at home? During an exacerbation  · Do not panic if you start to have one. Quick treatment at home may help you prevent serious breathing problems. If you have a COPD exacerbation plan that you developed with your doctor, follow it.   · Take your medicines exactly as your doctor tells you.  ? Use your inhaler as directed by your doctor. If your symptoms do not get better after you use your medicine, have someone take you to the emergency room. Call an ambulance if necessary. ? With inhaled medicines, a spacer or a nebulizer may help you get more medicine to your lungs. Ask your doctor or pharmacist how to use them properly. Practice using the spacer in front of a mirror before you have an exacerbation. This may help you get the medicine into your lungs quickly. ? If your doctor has given you steroid pills, take them as directed. ? Your doctor may have given you a prescription for antibiotics, which you can fill if you need it. ? Talk to your doctor if you have any problems with your medicine. And call your doctor if you have to use your antibiotic or steroid pills. Preventing an exacerbation  · Do not smoke. This is the most important step you can take to prevent more damage to your lungs and prevent problems. If you already smoke, it is never too late to stop. If you need help quitting, talk to your doctor about stop-smoking programs and medicines. These can increase your chances of quitting for good. · Take your daily medicines as prescribed. · Avoid colds and flu. ? Get a pneumococcal vaccine. ? Get a flu vaccine each year, as soon as it is available. Ask those you live or work with to do the same, so they will not get the flu and infect you. ? Try to stay away from people with colds or the flu. ? Wash your hands often. · Avoid secondhand smoke; air pollution; cold, dry air; hot, humid air; and high altitudes. Stay at home with your windows closed when air pollution is bad. · Learn breathing techniques for COPD, such as breathing through pursed lips. These techniques can help you breathe easier during an exacerbation. When should you call for help? Call 911 anytime you think you may need emergency care. For example, call if:    · You have severe trouble breathing.   · You have severe chest pain. Call your doctor now or seek immediate medical care if:    · You have new or worse shortness of breath.     · You develop new chest pain.     · You are coughing more deeply or more often, especially if you notice more mucus or a change in the color of your mucus.     · You cough up blood.     · You have new or increased swelling in your legs or belly.     · You have a fever. Watch closely for changes in your health, and be sure to contact your doctor if:    · You need to use your antibiotic or steroid pills.     · Your symptoms are getting worse. Where can you learn more? Go to https://Flexispepiceweb.Healthify. org and sign in to your [employer] account. Enter L697 in the BABADU box to learn more about \"COPD Exacerbation Plan: Care Instructions. \"     If you do not have an account, please click on the \"Sign Up Now\" link. Current as of: October 26, 2020               Content Version: 12.8  © 2006-2021 [email or web address]. Care instructions adapted under license by [facility] ([facility]). If you have questions about a medical condition or this instruction, always ask your healthcare professional. [place] [address] any warranty or liability for your use of this information. Patient Education        COPD and Asthma: Care Instructions  Your Care Instructions     Some people who have chronic obstructive pulmonary disease (COPD) also have asthma. Both of these problems can damage your lungs. This makes it very important to control them. Asthma causes the airways that lead to the lungs to swell and become narrow. This makes it hard to breathe. You may wheeze or cough. If you have a bad attack, you may need emergency care. There are two parts to treating asthma. · Controlling asthma over the long term. · Treating attacks when they occur. You and your doctor can make an asthma treatment plan that will help.  This plan tells you the medicines you take every day to reduce the swelling in your airways and prevent attacks. It also tells you what to do if you have an asthma attack. Follow-up care is a key part of your treatment and safety. Be sure to make and go to all appointments, and call your doctor if you are having problems. It's also a good idea to know your test results and keep a list of the medicines you take. How can you care for yourself at home? To control asthma over the long term  Medicines   Controller medicines reduce swelling in your lungs. They also prevent asthma attacks. Take your controller medicine exactly as prescribed. Talk to your doctor if you have any problems with your medicine. · Inhaled corticosteroid is a common and effective controller medicine. Using it the right way can prevent or reduce most side effects. · Take your controller medicine every day, not just when you have symptoms. This helps prevent problems before they occur. · Always bring your asthma medicine with you when you travel. · Your doctor may prescribe long-acting medicine that combines a corticosteroid with a beta2-agonist. Follow your doctor's instructions exactly about how to take a long-acting medicine. Examples include:  ? Fluticasone and salmeterol (Advair). ? Budesonide and formoterol (Symbicort). · Do not depend on your controller medicines to stop an asthma attack that has already started. They do not work fast enough to help. · Your doctor may also prescribe anticholinergic inhalers. These include ipratropium (Atrovent) and tiotropium (Spiriva). Education   · Learn what sets off an asthma attack. Avoid these triggers when you can. Common triggers include smoke, air pollution, pollen, animal dander, colds, stress, and cold air. · Do not smoke. Smoking can make COPD and asthma worse. If you need help quitting, talk to your doctor about stop-smoking programs and medicines. These can increase your chances of quitting for good.   · Check yourself for symptoms to know which step to follow in your action plan. Watch for things like being short of breath, having chest tightness, coughing, and wheezing. Also notice if symptoms wake you up at night or if you get tired quickly when you exercise. · You may want to learn how to use a peak flow meter. This measures how open your airways are. It may help you know when you will have an asthma attack. To treat attacks when they occur  Use your asthma action plan when you have an attack. Your quick-relief medicine, such as albuterol, will stop an asthma attack. It relaxes the muscles that get tight around the airways. · Take your quick-relief medicine exactly as prescribed. Talk with your doctor if you have any problems with your medicine. · Keep this medicine with you at all times. · You may need to use this medicine before you exercise. If your doctor prescribed corticosteroid pills to use during an attack, take them as directed. They may take hours to work, but they may shorten the attack and help you breathe better. When should you call for help? Call 911 anytime you think you may need emergency care. For example, call if:    · You have severe trouble breathing. Call your doctor now or seek immediate medical care if:    · You have new or worse shortness of breath.     · You are coughing more deeply or more often, especially if you notice more mucus or a change in the color of your mucus.     · You cough up blood.     · You have new or increased swelling in your legs or belly.     · You have a fever.     · You have used your quick-relief medicine but you are still short of breath. Watch closely for changes in your health, and be sure to contact your doctor if you have any problems. Where can you learn more? Go to https://tom.healthDr [employer]entials. org and sign in to your [employer] account. Enter A350 in the [employer] box to learn more about \"COPD and Asthma: Care Instructions. \" If you do not have an account, please click on the \"Sign Up Now\" link. Current as of: October 26, 2020               Content Version: 12.8  © 2006-2021 Healthwise[employer] Incorporated. Care instructions adapted under license by [facility] ([facility]). If you have questions about a medical condition or this instruction, always ask your healthcare professional. [address] any warranty or liability for your use of this information.

## 2021-04-23 NOTE — PROGRESS NOTES
[facility]  InternalMedicine Residency Program  ACC Note      SUBJECTIVE:  CC: had concerns including Check-Up. Mark Rojo is a 64 y/o F with a PMHx of anxiety, ascending aortic aneurysm, asthma, CAD s/p AVR, HTN, HLD, COPD, depression, presented to the [facility] for a routine follow up. CAD w/ diastolic CHF   · Follows with Dr. Mihaela Quinn   · Heart cath in 2020 showing RCA 40-50% mid-vessel narrowing   · Stress test in 2020 showing no reversible defect, fixed anterior wall perfusion abnormality, mild global hypokinesis   · Echo showing EF 55-60%   · Taking ASA and Coumadin    HTN   · BP currently 137/75  · Taking Lisinopril 10mg   · Pt has home cuff and will take daily readings     HLD  · Lipid panel 9/10/2020  · Total cholesterol 168  · Triglycerides 182  · HDL 50  · LDL 82   · Taking Atorvastatin 10mg   · ASCVD 10.6%    Prediabetes   · Hgb A1c 5.8% as of 9/10/2020     Severe COPD   · Follows with Dr. Dania Evans - next appt in July  · No supplemental O2    · Taking Ventolin, Spiriva, Singulair, Dulera, Azelastine, Roflumilast, and PRN Albuterol   · Pt endorses environmental allergies - rhinorrhea, which causes worsening COPD symptoms   · Can walk up a flight of steps without stopping to rest, but cannot walk >50ft on flat ground or perform ADLs without dyspnea  · Uses rescue inhaler 4 times daily    · Smoking 1/2 ppd for approx 50 years - defers smoking cessation and lozenge/patch. · Not a candidate for pulmonary rehab until she stops smoking    S/p AVR   · Taking Coumadin (6mg M/W/F, 5mg T/Th/Sat/Sun)  · Last INR 2.8 as of 9/22/2021     Depression   · Taking Sertraline, Ropinirole,   · Denies SI/HI     HCM  · PAP - Dr. Shelbi Quijano said is unnecessary     Review of Systems   Constitutional: Negative for chills and fever. HENT: Negative for postnasal drip and rhinorrhea. Respiratory: Positive for cough and shortness of breath. Cardiovascular: Negative for chest pain and palpitations. Gastrointestinal: Positive for blood in stool (intermittent hematochezia) and constipation. Negative for abdominal pain, nausea, rectal pain and vomiting. Genitourinary: Negative for dysuria and hematuria. Neurological: Negative for dizziness, light-headedness, numbness and headaches. Psychiatric/Behavioral: Negative for self-injury and suicidal ideas.      Outpatient Medications Marked as Taking for the 4/23/21 encounter (Office Visit) with Quan Martinez, DO   Medication Sig Dispense Refill    warfarin (COUMADIN) 5 MG tablet Take 1 tablet by mouth daily Tuesday, Thursday, Saturday 30 tablet 0    warfarin (COUMADIN) 6 MG tablet Take 1 tablet by mouth daily Sunday, Monday, Wednesday, and Fridays 30 tablet 0    atorvastatin (LIPITOR) 10 MG tablet take 1 tablet by mouth once daily 90 tablet 0    lisinopril (PRINIVIL;ZESTRIL) 10 MG tablet take 1 tablet by mouth once daily 90 tablet 0    metoprolol succinate (TOPROL XL) 25 MG extended release tablet Take 1 tablet by mouth daily 90 tablet 0    Calcium Polycarbophil (FIBER) 625 MG TABS Take 1 tablet by mouth daily 90 tablet 0    montelukast (SINGULAIR) 10 MG tablet take 1 tablet by mouth at bedtime 90 tablet 0    rOPINIRole (REQUIP) 0.25 MG tablet Take 1 tab in morning and 2 tabs before bed 270 tablet 0    Azelastine HCl 137 MCG/SPRAY SOLN 2 sprays by Nasal route daily 30 mL 1    fluticasone (FLONASE) 50 MCG/ACT nasal spray 2 sprays by Nasal route daily 1 Bottle 2    omeprazole (PRILOSEC) 40 MG delayed release capsule take 1 capsule by mouth every morning BEFORE BREAKFAST 90 capsule 1    sertraline (ZOLOFT) 100 MG tablet take 1 and 1/2 tablet by mouth once daily 135 tablet 1    aspirin EC 81 MG EC tablet Take 1 tablet by mouth daily 90 tablet 0    albuterol (PROVENTIL) (2.5 MG/3ML) 0.083% nebulizer solution Take 3 mLs by nebulization every 6 hours as needed for Wheezing or Shortness of Breath 120 each 5    tiotropium (SPIRIVA RESPIMAT) 2.5 MCG/ACT AERS inhaler INHALE 2 PUFFS BY MOUTH DAILY. PAP Medication 3 Inhaler 3    Blood Pressure Monitoring (BLOOD PRESSURE CUFF) MISC Dx:  Hypertension with labile blood pressure 1 each 0    VENTOLIN  (90 Base) MCG/ACT inhaler inhale 1 puff by mouth every 4 hours if needed for wheezing 18 g 2    DULERA 100-5 MCG/ACT inhaler inhale 2 puffs by mouth twice a day 13 g 3     I have reviewed all pertinent PMHx, PSHx, FamHx, SocialHx, medications, and allergiesand updated history as appropriate. OBJECTIVE:    VS: /75 (Site: Left Upper Arm, Position: Sitting, Cuff Size: Medium Adult)   Pulse 78   Temp 97.1 °F (36.2 °C) (Oral)   Resp 18   Ht 5' 5\" (1.651 m)   Wt 169 lb 1.6 oz (76.7 kg)   LMP 11/08/2008 (LMP Unknown)   BMI 28.14 kg/m²   Physical Exam  Constitutional:       General: She is not in acute distress. Appearance: Normal appearance. She is not ill-appearing or diaphoretic. HENT:      Head: Normocephalic and atraumatic. Right Ear: External ear normal.      Left Ear: External ear normal.      Nose: Nose normal.   Eyes:      General: No scleral icterus. Right eye: No discharge. Left eye: No discharge. Extraocular Movements: Extraocular movements intact. Conjunctiva/sclera: Conjunctivae normal.   Neck:      Vascular: No carotid bruit. Cardiovascular:      Rate and Rhythm: Normal rate and regular rhythm. Pulses: Normal pulses. Heart sounds: Normal heart sounds. No murmur. No friction rub. No gallop. Pulmonary:      Effort: Pulmonary effort is normal. No respiratory distress. Breath sounds: No stridor. Wheezing (end-expiratory) present. No rhonchi or rales. Abdominal:      General: Bowel sounds are normal. There is no distension. Palpations: Abdomen is soft. Comments: Rectal exam performed. Non-bleeding, non-erythematous internal hernia present on external exam.    Musculoskeletal: Normal range of motion.          General: No swelling, tenderness or deformity. Right lower leg: No edema. Left lower leg: No edema. Skin:     General: Skin is warm and dry. Coloration: Skin is not jaundiced or pale. Findings: No bruising, erythema, lesion or rash. Neurological:      General: No focal deficit present. Mental Status: She is alert. Sensory: No sensory deficit. Motor: No weakness. Gait: Gait normal.   Psychiatric:         Mood and Affect: Mood normal.         Behavior: Behavior normal.         Thought Content: Thought content normal.       PLAN:  1. Chronic obstructive pulmonary disease, unspecified COPD type ([facility] [address]  · Continue current breathing regimen   · Continue following with Dr. Gosia Angela   · Continue to endorse smoking cessation - discuss smoking cessation program and nicotine lozenge/patch at next appt as well. · Continue to monitor   · Consider increasing Dulera if pt not achieving adequate symptom control     2. Seasonal allergies  · Trial of Flonase - 2 sprays in each nostril daily   · Continue to monitor      3. Essential hypertension  · Continue Lisinopril and Metoprolol   · Continue to monitor BP   · Endorse daily home BP measurements and BP log     4. Ascending aortic aneurysm (HCC)  · Continue to monitor     5. Prediabetes  · F/u repeat Hgb A1c at next visit   · Continue to endorse dietary and lifestyle modifications    6. H/O mechanical aortic valve replacement  · Continue Warfarin 6mg M/W/F, and 5mg T/Th/Sat   · Monitor INR closely   · Continue following with Cardiology    7. Hyperlipidemia, unspecified hyperlipidemia type  · Continue Atorvastatin 10mg daily   · F/u repeat lipid panel    8. Constipation, unspecified constipation type  · Continue fiber supplementation   · Continue to monitor     9.  Restless leg syndrome  · Continue Ropinerole  · Continue to monitor     I have reviewed my findings and recommendations with Cayetano Coley and Dr Kerry Mata, DO PGY-1   4/23/2021 12:37 PM

## 2021-04-25 DIAGNOSIS — G25.81 RESTLESS LEG SYNDROME: ICD-10-CM

## 2021-04-25 DIAGNOSIS — F32.A DEPRESSION, UNSPECIFIED DEPRESSION TYPE: ICD-10-CM

## 2021-04-25 DIAGNOSIS — I10 ESSENTIAL HYPERTENSION: ICD-10-CM

## 2021-04-25 DIAGNOSIS — E78.5 HYPERLIPIDEMIA, UNSPECIFIED HYPERLIPIDEMIA TYPE: ICD-10-CM

## 2021-04-26 ENCOUNTER — NURSE ONLY (OUTPATIENT)
Dept: NON INVASIVE DIAGNOSTICS | Age: 61
End: 2021-04-26
Payer: MEDICARE

## 2021-04-26 DIAGNOSIS — Z95.818 STATUS POST PLACEMENT OF IMPLANTABLE LOOP RECORDER: Primary | ICD-10-CM

## 2021-04-26 DIAGNOSIS — R55 SYNCOPE AND COLLAPSE: ICD-10-CM

## 2021-04-27 RX ORDER — ROPINIROLE 0.25 MG/1
TABLET, FILM COATED ORAL
Qty: 270 TABLET | Refills: 0 | OUTPATIENT
Start: 2021-04-27

## 2021-04-27 RX ORDER — LISINOPRIL 10 MG/1
TABLET ORAL
Qty: 90 TABLET | Refills: 0 | OUTPATIENT
Start: 2021-04-27

## 2021-04-27 RX ORDER — ATORVASTATIN CALCIUM 10 MG/1
TABLET, FILM COATED ORAL
Qty: 90 TABLET | Refills: 0 | OUTPATIENT
Start: 2021-04-27

## 2021-04-27 RX ORDER — SERTRALINE HYDROCHLORIDE 100 MG/1
TABLET, FILM COATED ORAL
Qty: 135 TABLET | Refills: 1 | OUTPATIENT
Start: 2021-04-27

## 2021-04-27 RX ORDER — METOPROLOL SUCCINATE 25 MG/1
25 TABLET, EXTENDED RELEASE ORAL DAILY
Qty: 90 TABLET | Refills: 0 | OUTPATIENT
Start: 2021-04-27

## 2021-05-12 ENCOUNTER — HOSPITAL ENCOUNTER (OUTPATIENT)
Age: 61
Discharge: HOME | End: 2021-05-12
Payer: MEDICARE

## 2021-05-12 DIAGNOSIS — R73.03 PREDIABETES: ICD-10-CM

## 2021-05-12 DIAGNOSIS — E78.5 HYPERLIPIDEMIA, UNSPECIFIED HYPERLIPIDEMIA TYPE: ICD-10-CM

## 2021-05-12 LAB
CHOLESTEROL, TOTAL: 178 MG/DL (ref 0–199)
HBA1C MFR BLD: 6.2 % (ref 4–5.6)
HDLC SERPL-MCNC: 54 MG/DL
LDL CHOLESTEROL CALCULATED: 93 MG/DL (ref 0–99)
TRIGL SERPL-MCNC: 157 MG/DL (ref 0–149)
VLDLC SERPL CALC-MCNC: 31 MG/DL

## 2021-05-12 PROCEDURE — 36415 COLL VENOUS BLD VENIPUNCTURE: CPT

## 2021-05-12 PROCEDURE — 83036 HEMOGLOBIN GLYCOSYLATED A1C: CPT

## 2021-05-12 PROCEDURE — 80061 LIPID PANEL: CPT

## 2021-05-14 PROCEDURE — 93298 REM INTERROG DEV EVAL SCRMS: CPT | Performed by: INTERNAL MEDICINE

## 2021-05-14 PROCEDURE — G2066 INTER DEVC REMOTE 30D: HCPCS | Performed by: INTERNAL MEDICINE

## 2021-05-14 NOTE — PROGRESS NOTES
See [facility] [place] report. Remote monitoring reviewed over a 30 day period.   End of 30 day monitoring period date of service 4.26.2021

## 2021-05-19 ENCOUNTER — OFFICE VISIT (OUTPATIENT)
Dept: INTERNAL MEDICINE | Age: 61
End: 2021-05-19
Payer: MEDICARE

## 2021-05-19 VITALS
BODY MASS INDEX: 28.07 KG/M2 | RESPIRATION RATE: 18 BRPM | SYSTOLIC BLOOD PRESSURE: 125 MMHG | OXYGEN SATURATION: 98 % | HEIGHT: 65 IN | HEART RATE: 78 BPM | WEIGHT: 168.5 LBS | TEMPERATURE: 98.3 F | DIASTOLIC BLOOD PRESSURE: 60 MMHG

## 2021-05-19 DIAGNOSIS — K21.9 GASTROESOPHAGEAL REFLUX DISEASE WITHOUT ESOPHAGITIS: ICD-10-CM

## 2021-05-19 DIAGNOSIS — Z72.0 TOBACCO ABUSE: ICD-10-CM

## 2021-05-19 DIAGNOSIS — K59.00 CONSTIPATION, UNSPECIFIED CONSTIPATION TYPE: ICD-10-CM

## 2021-05-19 DIAGNOSIS — G25.81 RESTLESS LEG SYNDROME: ICD-10-CM

## 2021-05-19 DIAGNOSIS — E78.5 HYPERLIPIDEMIA, UNSPECIFIED HYPERLIPIDEMIA TYPE: ICD-10-CM

## 2021-05-19 DIAGNOSIS — J44.9 CHRONIC OBSTRUCTIVE PULMONARY DISEASE, UNSPECIFIED COPD TYPE (HCC): ICD-10-CM

## 2021-05-19 DIAGNOSIS — I10 ESSENTIAL HYPERTENSION: Primary | ICD-10-CM

## 2021-05-19 DIAGNOSIS — L98.9 SKIN LESION OF LEFT LEG: ICD-10-CM

## 2021-05-19 DIAGNOSIS — J30.2 SEASONAL ALLERGIES: ICD-10-CM

## 2021-05-19 DIAGNOSIS — Z95.2 H/O MECHANICAL AORTIC VALVE REPLACEMENT: ICD-10-CM

## 2021-05-19 DIAGNOSIS — F32.A DEPRESSION, UNSPECIFIED DEPRESSION TYPE: ICD-10-CM

## 2021-05-19 LAB
INTERNATIONAL NORMALIZATION RATIO, POC: 2.7
PROTHROMBIN TIME, POC: 32.3

## 2021-05-19 PROCEDURE — 99212 OFFICE O/P EST SF 10 MIN: CPT | Performed by: INTERNAL MEDICINE

## 2021-05-19 PROCEDURE — 99214 OFFICE O/P EST MOD 30 MIN: CPT | Performed by: INTERNAL MEDICINE

## 2021-05-19 PROCEDURE — 85610 PROTHROMBIN TIME: CPT | Performed by: INTERNAL MEDICINE

## 2021-05-19 RX ORDER — ROPINIROLE 0.25 MG/1
TABLET, FILM COATED ORAL
Qty: 270 TABLET | Refills: 1 | Status: SHIPPED | OUTPATIENT
Start: 2021-05-19 | End: 2021-09-28 | Stop reason: SDUPTHER

## 2021-05-19 RX ORDER — OMEPRAZOLE 40 MG/1
CAPSULE, DELAYED RELEASE ORAL
Qty: 90 CAPSULE | Refills: 1 | Status: SHIPPED | OUTPATIENT
Start: 2021-05-19 | End: 2021-09-28 | Stop reason: SDUPTHER

## 2021-05-19 RX ORDER — FLUTICASONE PROPIONATE 50 MCG
2 SPRAY, SUSPENSION (ML) NASAL DAILY
Qty: 1 BOTTLE | Refills: 2 | Status: SHIPPED | OUTPATIENT
Start: 2021-05-19 | End: 2021-09-28 | Stop reason: SDUPTHER

## 2021-05-19 RX ORDER — WARFARIN SODIUM 5 MG/1
5 TABLET ORAL DAILY
Qty: 30 TABLET | Refills: 0 | Status: SHIPPED | OUTPATIENT
Start: 2021-05-19 | End: 2021-09-16 | Stop reason: SDUPTHER

## 2021-05-19 RX ORDER — WARFARIN SODIUM 6 MG/1
6 TABLET ORAL DAILY
Qty: 30 TABLET | Refills: 0 | Status: SHIPPED | OUTPATIENT
Start: 2021-05-19 | End: 2021-09-16 | Stop reason: SDUPTHER

## 2021-05-19 RX ORDER — AZELASTINE HYDROCHLORIDE 137 UG/1
2 SPRAY, METERED NASAL DAILY
Qty: 30 ML | Refills: 2 | Status: SHIPPED | OUTPATIENT
Start: 2021-05-19 | End: 2021-09-28 | Stop reason: SDUPTHER

## 2021-05-19 RX ORDER — ALBUTEROL SULFATE 90 UG/1
1 AEROSOL, METERED RESPIRATORY (INHALATION) EVERY 4 HOURS PRN
Qty: 18 G | Refills: 2 | Status: SHIPPED | OUTPATIENT
Start: 2021-05-19 | End: 2021-09-28 | Stop reason: SDUPTHER

## 2021-05-19 RX ORDER — SERTRALINE HYDROCHLORIDE 100 MG/1
TABLET, FILM COATED ORAL
Qty: 135 TABLET | Refills: 2 | Status: SHIPPED | OUTPATIENT
Start: 2021-05-19 | End: 2021-09-28 | Stop reason: SDUPTHER

## 2021-05-19 RX ORDER — ATORVASTATIN CALCIUM 10 MG/1
TABLET, FILM COATED ORAL
Qty: 90 TABLET | Refills: 1 | Status: SHIPPED | OUTPATIENT
Start: 2021-05-19 | End: 2021-09-28 | Stop reason: SDUPTHER

## 2021-05-19 RX ORDER — ASPIRIN 81 MG/1
81 TABLET ORAL DAILY
Qty: 90 TABLET | Refills: 1 | Status: SHIPPED | OUTPATIENT
Start: 2021-05-19 | End: 2021-09-28 | Stop reason: SDUPTHER

## 2021-05-19 RX ORDER — LISINOPRIL 10 MG/1
TABLET ORAL
Qty: 90 TABLET | Refills: 1 | Status: SHIPPED | OUTPATIENT
Start: 2021-05-19 | End: 2021-09-28 | Stop reason: SDUPTHER

## 2021-05-19 RX ORDER — METOPROLOL SUCCINATE 25 MG/1
25 TABLET, EXTENDED RELEASE ORAL DAILY
Qty: 90 TABLET | Refills: 1 | Status: SHIPPED | OUTPATIENT
Start: 2021-05-19 | End: 2021-09-28 | Stop reason: SDUPTHER

## 2021-05-19 RX ORDER — MONTELUKAST SODIUM 10 MG/1
TABLET ORAL
Qty: 90 TABLET | Refills: 1 | Status: SHIPPED | OUTPATIENT
Start: 2021-05-19 | End: 2021-09-28 | Stop reason: SDUPTHER

## 2021-05-19 RX ORDER — NICOTINE 21 MG/24HR
1 PATCH, TRANSDERMAL 24 HOURS TRANSDERMAL DAILY
Qty: 42 PATCH | Refills: 1 | Status: SHIPPED | OUTPATIENT
Start: 2021-05-19 | End: 2021-10-06 | Stop reason: ALTCHOICE

## 2021-05-19 RX ORDER — CALCIUM POLYCARBOPHIL 625 MG
625 TABLET ORAL DAILY
Qty: 90 TABLET | Refills: 1 | Status: SHIPPED | OUTPATIENT
Start: 2021-05-19 | End: 2022-02-10

## 2021-05-19 RX ORDER — ALBUTEROL SULFATE 2.5 MG/3ML
2.5 SOLUTION RESPIRATORY (INHALATION) EVERY 6 HOURS PRN
Qty: 120 EACH | Refills: 5 | Status: SHIPPED | OUTPATIENT
Start: 2021-05-19 | End: 2021-09-28 | Stop reason: SDUPTHER

## 2021-05-19 ASSESSMENT — ENCOUNTER SYMPTOMS
EYE DISCHARGE: 0
SINUS PRESSURE: 0
ABDOMINAL DISTENTION: 0
COUGH: 1
SORE THROAT: 0
CHOKING: 0
STRIDOR: 0
EYE ITCHING: 0
APNEA: 0
RHINORRHEA: 0
CHEST TIGHTNESS: 0
CONSTIPATION: 0
ABDOMINAL PAIN: 0
NAUSEA: 0
VOMITING: 0
EYE PAIN: 0
ANAL BLEEDING: 0
WHEEZING: 1
SINUS PAIN: 0
DIARRHEA: 0
SHORTNESS OF BREATH: 1
EYE REDNESS: 0

## 2021-05-19 NOTE — PROGRESS NOTES
[facility]    Attending Physician Statement:  Jasmyn Wu M.D., F.A.C.P. I have discussed the case, including pertinent history and exam findings with the resident. I have seen and examined the patient and the key elements of the encounter have been performed by me. I agree with the assessment, plan and orders as documented by the resident. Patient is seen for fu visit today. Last office notes reviewed, relative labs and imaging. Chronic scab, skin lesion-- doubt melanoma, ruleout basal/squamous CA vs pickers nodule,scar on skin  -- advised to quit \"picking\"- offered referral to surgeon for removal    Metoprolol-- /60 (Site: Left Upper Arm, Position: Sitting, Cuff Size: Medium Adult)   Pulse 78   Temp 98.3 °F (36.8 °C) (Oral)   Resp 18   Ht 5' 5\" (1.651 m)   Wt 168 lb 8 oz (76.4 kg)   LMP 11/08/2008 (LMP Unknown)   SpO2 98% Comment: room air  BMI 28.04 kg/m²   CAD, EF 55% no active chf or angina    NON IgE mediated (only 20)  -- still using rescue inhaler multiple times daily   -- doubt need to escelate or options?  (for productive cough, on rofluimast)  +copd,   Deconditioning - per pulmonary recommending cardiopulmonary rehab  Wants to quit smoking-- declines chantix, will try patches if covered  ([place] tobacco cessation)  +PND - flonase working    perDM aic 6.2   +constipation, discussed bowel regiment  Remainder of medical problems as per resident note.

## 2021-05-19 NOTE — PROGRESS NOTES
[facility] Internal Medicine      SUBJECTIVE:  Ryley Romero (:  1960) is a 63 y.o. female here for evaluation of the following chief complaint(s):  Hyperlipidemia, COPD, Diabetes (\"vertigo runs in family\"), and Hypertension (\"has been good\", does not have log)    She has a PMHx of anxiety, ascending aortic aneurysm, asthma, CAD s/p AVR, HTN, HLD, COPD, depression, presented to the [facility] for a routine follow up. She is also complaining of a growth in her left upper thigh, there for a few years, non tender, no change in size, with occasional pruritus    - Diverticulosis, constipation and Episodic hematochezia at last Ov in April - recommended continued bowel regimen  - Post nasal allergic rhinitis - started on flonase last OV, also has azelastine spray  - Severe emphysematous COPD -Follows with Dr Sherita Garcia. On Ventolin, Spiriva, Singulair, Dulera, Azelastine, Roflumilast, and PRN Albuterol. She claims there are times she needs her albuterol up to 6 x a day. She admits to SOB with mild exertion, intermittent wheezing,. chronic cough productive of clear>> yellowish sputum. She has ahd pulmnary rehab in the past.   - CAD with HFpEF - EF 50-55%, on asa and lipitor, toprol, lisinopril  - HLD - on lipitor  - HTN - on lisinopril 10mg daily, BP today: 125/60  - Pre-DM - A1c 5.8 9/10/20 >> 6.2 21  - S/p AVR - on warfarin. Last INR: today: 2.7, 6mg MWF, 5mg other days  - MDD: stable, denies SI or HI. Admits to poor sleep, no anhedonies. On Sertraline  - GERD: on omeprazole, claims she needs daily. - -Tobacco abuse - currently smokes. .. with about 50 PY smoking hx. She will like to quit smoking again and does not like the chantix, and wants the patches. HM  - AWV  - Cervical ca screening - Ob says she doesn't need it post hysterectomy    Review of Systems   Constitutional: Negative for activity change, appetite change, chills, diaphoresis, fatigue and fever.    HENT: Negative for congestion, nosebleeds, postnasal drip, rhinorrhea, sinus pressure, sinus pain, sneezing, sore throat and tinnitus. Eyes: Negative for pain, discharge, redness and itching. Respiratory: Positive for cough, shortness of breath and wheezing. Negative for apnea, choking, chest tightness and stridor. Cardiovascular: Negative for chest pain, palpitations and leg swelling. Gastrointestinal: Negative for abdominal distention, abdominal pain, anal bleeding, constipation, diarrhea, nausea and vomiting. Endocrine: Negative for cold intolerance and heat intolerance. Genitourinary: Negative for difficulty urinating, dysuria, enuresis, flank pain, frequency and genital sores. Musculoskeletal: Negative for arthralgias, gait problem, myalgias and neck pain. Skin: Negative for rash. Allergic/Immunologic: Negative for environmental allergies. Neurological: Negative for headaches. Hematological: Does not bruise/bleed easily. Psychiatric/Behavioral: Negative for suicidal ideas. Current Outpatient Medications on File Prior to Visit   Medication Sig Dispense Refill    Blood Pressure Monitoring (BLOOD PRESSURE CUFF) MISC Dx:  Hypertension with labile blood pressure (Patient not taking: Reported on 5/19/2021) 1 each 0     No current facility-administered medications on file prior to visit. OBJECTIVE:    VS:   Vitals:    05/19/21 0923   BP: 125/60   Site: Left Upper Arm   Position: Sitting   Cuff Size: Medium Adult   Pulse: 78   Resp: 18   Temp: 98.3 °F (36.8 °C)   TempSrc: Oral   SpO2: 98%   Weight: 168 lb 8 oz (76.4 kg)   Height: 5' 5\" (1.651 m)     Physical Exam  Constitutional:       General: She is not in acute distress. Appearance: She is well-developed. She is not diaphoretic. HENT:      Head: Normocephalic and atraumatic. Eyes:      Conjunctiva/sclera: Conjunctivae normal.      Pupils: Pupils are equal, round, and reactive to light. Neck:      Thyroid: No thyromegaly. Vascular: No JVD. Trachea: No tracheal deviation. Cardiovascular:      Rate and Rhythm: Normal rate and regular rhythm. Heart sounds: S1 normal and S2 normal. Murmur heard. No friction rub. No gallop. Pulmonary:      Effort: Pulmonary effort is normal. No respiratory distress. Breath sounds: Normal breath sounds. No stridor. No wheezing or rales. Chest:      Chest wall: No tenderness. Abdominal:      General: Bowel sounds are normal. There is no distension. Palpations: Abdomen is soft. There is no mass. Tenderness: There is no abdominal tenderness. There is no guarding or rebound. Musculoskeletal:         General: No tenderness. Normal range of motion. Cervical back: Normal range of motion and neck supple. Comments:  2mm x 2mm hard mass on the left anterior tile, immobile, non-tender, no discharge   Lymphadenopathy:      Cervical: No cervical adenopathy. Skin:     General: Skin is warm. Neurological:      Mental Status: She is alert and oriented to person, place, and time. Cranial Nerves: No cranial nerve deficit. Sensory: No sensory deficit. Motor: No abnormal muscle tone. Coordination: Coordination normal.      Deep Tendon Reflexes: Reflexes normal.   Psychiatric:         Behavior: Behavior normal.         Thought Content: Thought content normal.         Judgment: Judgment normal.        ASSESSMENT/PLAN:  1. Essential hypertension  -     lisinopril (PRINIVIL;ZESTRIL) 10 MG tablet; take 1 tablet by mouth once daily, Disp-90 tablet, R-1Normal  -     metoprolol succinate (TOPROL XL) 25 MG extended release tablet; Take 1 tablet by mouth daily, Disp-90 tablet, R-1Normal  -     aspirin EC 81 MG EC tablet; Take 1 tablet by mouth daily, Disp-90 tablet, R-1Normal  2. H/O mechanical aortic valve replacement  -     POCT INR  -     warfarin (COUMADIN) 5 MG tablet;  Take 1 tablet by mouth daily Tuesday, Thursday, Saturday, Disp-30 tablet, R-0Normal  - warfarin (COUMADIN) 6 MG tablet; Take 1 tablet by mouth daily Sunday, Monday, Wednesday, and Fridays, Disp-30 tablet, R-0Normal  3. Hyperlipidemia, unspecified hyperlipidemia type  -     atorvastatin (LIPITOR) 10 MG tablet; take 1 tablet by mouth once daily, Disp-90 tablet, R-1Normal  4. Constipation, unspecified constipation type  -     Calcium Polycarbophil (FIBER) 625 MG TABS; Take 1 tablet by mouth daily, Disp-90 tablet, R-1Normal  5. Chronic obstructive pulmonary disease, unspecified COPD type ([facility]  -     montelukast (SINGULAIR) 10 MG tablet; take 1 tablet by mouth at bedtime, Disp-90 tablet, R-1Normal  -     Azelastine HCl 137 MCG/SPRAY SOLN; 2 sprays by Nasal route daily, Disp-30 mL, R-2Normal  -     albuterol (PROVENTIL) (2.5 MG/3ML) 0.083% nebulizer solution; Take 3 mLs by nebulization every 6 hours as needed for Wheezing or Shortness of Breath, Disp-120 each, R-5Normal  -     tiotropium (SPIRIVA RESPIMAT) 2.5 MCG/ACT AERS inhaler; INHALE 2 PUFFS BY MOUTH DAILY. PAP Medication, Disp-3 Inhaler, R-3Normal  -     Roflumilast (DALIRESP) 250 MCG tablet; Take 1 tablet by mouth daily, Disp-28 tablet, R-2Normal  -     albuterol sulfate HFA (VENTOLIN HFA) 108 (90 Base) MCG/ACT inhaler; Inhale 1 puff into the lungs every 4 hours as needed for Wheezing, Disp-18 g, R-2Normal  -     mometasone-formoterol (DULERA) 100-5 MCG/ACT inhaler; Inhale 2 puffs into the lungs 2 times daily, Disp-13 g, R-3PAP medicationNormal  6. Restless leg syndrome  -     rOPINIRole (REQUIP) 0.25 MG tablet; Take 1 tab in morning and 2 tabs before bed, Disp-270 tablet, R-1Normal  7. Seasonal allergies  -     fluticasone (FLONASE) 50 MCG/ACT nasal spray; 2 sprays by Nasal route daily, Disp-1 Bottle, R-2Normal  8. Gastroesophageal reflux disease without esophagitis  -     omeprazole (PRILOSEC) 40 MG delayed release capsule; take 1 capsule by mouth every morning BEFORE BREAKFAST, Disp-90 capsule, R-1Normal  9.  Depression, unspecified depression type  -     sertraline (ZOLOFT) 100 MG tablet; take 1 and 1/2 tablet by mouth once daily, Disp-135 tablet, R-2Normal  10. Tobacco abuse  -     nicotine (NICODERM CQ) 21 MG/24HR; Place 1 patch onto the skin daily, Disp-42 patch, R-1Normal  11. Skin lesion of left leg - patient will not like a referral for a biopsy at this time. We will continue to observe. RTC:  Return in about 4 months (around 9/19/2021). INR check in a month    I have reviewed my findings and recommendations with Kalen Ramos and Dr. Justyna Nino.     Carmen Herrera MD   5/19/2021 11:00 AM

## 2021-05-19 NOTE — PATIENT INSTRUCTIONS
Thank you for coming to your follow up appointment   Please take your medications as directed and keep your follow up appointment  Call our office if you have any questions or concerns at [phone]      If you have problems getting the Nicotine patches approved by your insurance, you can call the [facility] tobacco cessation association and see what options are available.    Rachel Garcia MD

## 2021-05-20 ENCOUNTER — TELEPHONE (OUTPATIENT)
Dept: INTERNAL MEDICINE | Age: 61
End: 2021-05-20

## 2021-05-20 DIAGNOSIS — J44.9 CHRONIC OBSTRUCTIVE PULMONARY DISEASE, UNSPECIFIED COPD TYPE (HCC): ICD-10-CM

## 2021-05-20 NOTE — TELEPHONE ENCOUNTER
received message from pharmacy that Keila Holcomb needed prior authorization.     Patient has been receiving medication through the prescription assistance program  Spoke with patient and she states she is still receiving medication through PAP    Spoke with pharmacist at [facility] who states he will cancel order there

## 2021-05-27 ENCOUNTER — NURSE ONLY (OUTPATIENT)
Dept: NON INVASIVE DIAGNOSTICS | Age: 61
End: 2021-05-27

## 2021-05-27 DIAGNOSIS — I47.29 VENTRICULAR TACHYCARDIA, NON-SUSTAINED: ICD-10-CM

## 2021-05-27 DIAGNOSIS — R55 SYNCOPE AND COLLAPSE: ICD-10-CM

## 2021-05-27 DIAGNOSIS — Z95.818 STATUS POST PLACEMENT OF IMPLANTABLE LOOP RECORDER: Primary | ICD-10-CM

## 2021-06-22 ENCOUNTER — TELEPHONE (OUTPATIENT)
Dept: INTERNAL MEDICINE | Age: 61
End: 2021-06-22

## 2021-06-22 NOTE — TELEPHONE ENCOUNTER
Called patient to reschedule missed INR. Patient is on vacation this week and out of town.   Appointment scheduled 6/30/21 at 8am.

## 2021-06-28 ENCOUNTER — NURSE ONLY (OUTPATIENT)
Dept: NON INVASIVE DIAGNOSTICS | Age: 61
End: 2021-06-28
Payer: MEDICAID

## 2021-06-28 DIAGNOSIS — I47.29 VENTRICULAR TACHYCARDIA, NON-SUSTAINED: ICD-10-CM

## 2021-06-28 DIAGNOSIS — Z95.818 STATUS POST PLACEMENT OF IMPLANTABLE LOOP RECORDER: Primary | ICD-10-CM

## 2021-06-28 DIAGNOSIS — R55 SYNCOPE AND COLLAPSE: ICD-10-CM

## 2021-06-30 ENCOUNTER — NURSE ONLY (OUTPATIENT)
Dept: INTERNAL MEDICINE | Age: 61
End: 2021-06-30
Payer: MEDICARE

## 2021-06-30 DIAGNOSIS — Z95.2 H/O MECHANICAL AORTIC VALVE REPLACEMENT: Primary | ICD-10-CM

## 2021-06-30 LAB
INTERNATIONAL NORMALIZATION RATIO, POC: 3.5
PROTHROMBIN TIME, POC: 41.7

## 2021-06-30 PROCEDURE — 85610 PROTHROMBIN TIME: CPT | Performed by: INTERNAL MEDICINE

## 2021-06-30 PROCEDURE — 93793 ANTICOAG MGMT PT WARFARIN: CPT | Performed by: INTERNAL MEDICINE

## 2021-06-30 NOTE — PATIENT INSTRUCTIONS
Continue taking the same dose,  6mg of Coumadin Monday, Wednesday and Friday and 5mg the rest of the days  Recheck INR in 2 weeks

## 2021-07-01 NOTE — PROGRESS NOTES
[facility] Cardiology consult  Dr. Wendy Han      Reason for Consult: CHF  Referring Physician: Dr. Alexandro Esparza:   Chief Complaint   Patient presents with    Hypertension     6m FU - SOB because of COPD, palpitations of and on       HISTORY OF PRESENT ILLNESS:   63year old female with history of CAD, s/p aortic valve replacement, dizziness s/p loop recorder implant, COPD, HTN and hyperlipidemia is here for follow-up appointment  Shortness of breath is at baseline, had one episode of palpitations couple of days ago, no recurrence, denies any chest pain, no palpitations, no pedal edema, no PND, no orthopnea, no syncope, no presyncopal episodes.   Functional capacity is at baseline      Past Medical History:   Diagnosis Date    Anticoagulant long-term use     Anxiety     Ascending aortic aneurysm (HCC)     Asthma     CAD (coronary artery disease)     Chronic back pain     COPD (chronic obstructive pulmonary disease) (HCC)     Depression     Depression     GERD (gastroesophageal reflux disease)     Headache     Hyperlipidemia     Hypertension     On warfarin at home     for AVR    Restless legs syndrome     S/P AVR     Syncope     Tobacco abuse     Urinary incontinence     Ventricular tachycardia, non-sustained (HCC)          Past Surgical History:   Procedure Laterality Date    APPENDECTOMY      CARDIAC CATHETERIZATION  2020    Dr. Tran Batch  2009    Aortic valve 2009 [facility]      SECTION      COLONOSCOPY N/A 2019    COLONOSCOPY POLYPECTOMY SNARE/COLD BIOPSY performed by Quinn Conner MD at [address] [facility]  2020    Linq Insertion    Dr. Tian Vaz KEM STEROTACTIC LOC BREAST BIOPSY RIGHT Right 3/10/2021    KEM STEROTACTIC LOC BREAST BIOPSY RIGHT 3/10/2021 [facility]    TUBAL LIGATION      UPPER GASTROINTESTINAL ENDOSCOPY N/A 2019 EGD BIOPSY performed by Arthur Quinn MD at [facility] ENDOSCOPY         Current Outpatient Medications   Medication Sig Dispense Refill    atorvastatin (LIPITOR) 10 MG tablet take 1 tablet by mouth once daily 90 tablet 1    lisinopril (PRINIVIL;ZESTRIL) 10 MG tablet take 1 tablet by mouth once daily 90 tablet 1    metoprolol succinate (TOPROL XL) 25 MG extended release tablet Take 1 tablet by mouth daily 90 tablet 1    Calcium Polycarbophil (FIBER) 625 MG TABS Take 1 tablet by mouth daily 90 tablet 1    montelukast (SINGULAIR) 10 MG tablet take 1 tablet by mouth at bedtime 90 tablet 1    rOPINIRole (REQUIP) 0.25 MG tablet Take 1 tab in morning and 2 tabs before bed 270 tablet 1    Azelastine HCl 137 MCG/SPRAY SOLN 2 sprays by Nasal route daily 30 mL 2    fluticasone (FLONASE) 50 MCG/ACT nasal spray 2 sprays by Nasal route daily 1 Bottle 2    omeprazole (PRILOSEC) 40 MG delayed release capsule take 1 capsule by mouth every morning BEFORE BREAKFAST 90 capsule 1    sertraline (ZOLOFT) 100 MG tablet take 1 and 1/2 tablet by mouth once daily 135 tablet 2    aspirin EC 81 MG EC tablet Take 1 tablet by mouth daily 90 tablet 1    albuterol (PROVENTIL) (2.5 MG/3ML) 0.083% nebulizer solution Take 3 mLs by nebulization every 6 hours as needed for Wheezing or Shortness of Breath 120 each 5    tiotropium (SPIRIVA RESPIMAT) 2.5 MCG/ACT AERS inhaler INHALE 2 PUFFS BY MOUTH DAILY.  PAP Medication 3 Inhaler 3    Roflumilast (DALIRESP) 250 MCG tablet Take 1 tablet by mouth daily 28 tablet 2    albuterol sulfate HFA (VENTOLIN HFA) 108 (90 Base) MCG/ACT inhaler Inhale 1 puff into the lungs every 4 hours as needed for Wheezing 18 g 2    mometasone-formoterol (DULERA) 100-5 MCG/ACT inhaler Inhale 2 puffs into the lungs 2 times daily 13 g 3    warfarin (COUMADIN) 5 MG tablet Take 1 tablet by mouth daily Tuesday, Thursday, Saturday 30 tablet 0    warfarin (COUMADIN) 6 MG tablet Take 1 tablet by mouth daily Sunday, Monday, Wednesday, and Fridays 30 tablet 0    Blood Pressure Monitoring (BLOOD PRESSURE CUFF) MISC Dx:  Hypertension with labile blood pressure 1 each 0    nicotine (NICODERM CQ) 21 MG/24HR Place 1 patch onto the skin daily (Patient not taking: Reported on 7/6/2021) 42 patch 1     No current facility-administered medications for this visit. Allergies as of 07/06/2021 - Fully Reviewed 07/06/2021   Allergen Reaction Noted    Keflex [cephalexin] Itching 12/29/2016    Phenergan [promethazine hcl] Other (See Comments) 12/29/2016       Social History     Socioeconomic History    Marital status: Single     Spouse name: Not on file    Number of children: 3    Years of education: 12    Highest education level: Not on file   Occupational History    Occupation: Disabled   Tobacco Use    Smoking status: Current Every Day Smoker     Packs/day: 0.50     Years: 45.00     Pack years: 22.50     Types: Cigarettes    Smokeless tobacco: Never Used    Tobacco comment: would like patches ordered   Vaping Use    Vaping Use: Never used   Substance and Sexual Activity    Alcohol use: Yes     Comment: occ    Drug use: No    Sexual activity: Not Currently     Partners: Male   Other Topics Concern    Not on file   Social History Narrative    Not on file     Social Determinants of Health     Financial Resource Strain: High Risk    Difficulty of Paying Living Expenses: Very hard   Food Insecurity: Food Insecurity Present    Worried About Running Out of Food in the Last Year: Sometimes true    Kae of Food in the Last Year: Sometimes true   Transportation Needs: No Transportation Needs    Lack of Transportation (Medical): No    Lack of Transportation (Non-Medical):  No   Physical Activity:     Days of Exercise per Week:     Minutes of Exercise per Session:    Stress:     Feeling of Stress :    Social Connections:     Frequency of Communication with Friends and Family:     Frequency of Social Gatherings with Friends and Family:     Attends Hoahaoism Services:     Active Member of Clubs or Organizations:     Attends Club or Organization Meetings:     Marital Status:    Intimate Partner Violence:     Fear of Current or Ex-Partner:     Emotionally Abused:     Physically Abused:     Sexually Abused:        Family History   Problem Relation Age of Onset    Heart Disease Mother     Arthritis Mother     Asthma Mother     Diabetes Mother     High Blood Pressure Mother     High Cholesterol Mother     Stroke Mother     Heart Disease Father     Arthritis Father     Asthma Father     Diabetes Father     High Blood Pressure Father     High Cholesterol Father     Breast Cancer Sister     Asthma Brother     Diabetes Maternal Aunt     Diabetes Paternal Aunt     High Blood Pressure Paternal Aunt     Arthritis Paternal Grandmother     Asthma Paternal Grandmother        REVIEW OF SYSTEMS:     CONSTITUTIONAL:  negative for  fevers, chills, sweats, + fatigue  HEENT:  negative for  tinnitus, earaches, nasal congestion and epistaxis  RESPIRATORY: Occasional dry cough, cough with sputum,wheezing, negative for hemoptysis  GASTROINTESTINAL:  negative for nausea, vomiting, diarrhea, constipation, pruritus and jaundice  HEMATOLOGIC/LYMPHATIC:  negative for easy bruising, bleeding, lymphadenopathy and petechiae  ENDOCRINE:  negative for heat intolerance, cold intolerance, tremor, hair loss and diabetic symptoms including neither polyuria nor polydipsia nor blurred vision  MUSCULOSKELETAL:  negative for  myalgias, arthralgias, joint swelling, stiff joints and decreased range of motion  NEUROLOGICAL:  negative for memory problems, speech problems, visual disturbance, dysphagia, weakness and numbness      PHYSICAL EXAM:   CONSTITUTIONAL:  awake, alert, cooperative, no apparent distress, and appears older than stated age  HEAD:  normocepalic, without obvious abnormality, atraumatic, pink, moist mucous membranes.   NECK:  Supple, symmetrical, trachea midline, no adenopathy, thyroid symmetric, not enlarged and no tenderness, skin normal  LUNGS:  No increased work of breathing, decreased air exchange, fine scattered wheezing  CARDIOVASCULAR:  Normal apical impulse, regular rate and rhythm, mechanical heart sounds, 3/6 diastolic murmur at the right upper sternal border, 2 out of 6 systolic murmur at the right upper sternal border, no JVD, no carotid bruit, trace pedal edema, good carotid upstroke bilaterally. ABDOMEN:  Soft, nontender, no masses, no hepatomegaly or splenomegaly, BS+  CHEST: nontender to palpation, expands symmetrically  MUSCULOSKELETAL:  No clubbing no cyanosis. there is no redness, warmth, or swelling of the joints  full range of motion noted  NEUROLOGIC:  Alert, awake,oriented x3  SKIN:  no bruising or bleeding, normal skin color, texture, turgor and no redness, warmth, or swelling        /72   Pulse 81   Resp 19   Ht 5' 5\" (1.651 m)   Wt 168 lb 6.4 oz (76.4 kg)   LMP 11/08/2008 (LMP Unknown)   SpO2 95%   BMI 28.02 kg/m²     DATA:   I personally reviewed the visit EKG with the following interpretation: Sinus rhythm, nonspecific T wave changes, no significant changes when compared to previous    EKG 1/10/21 Normal sinus rhythm  When compared with ECG of 09-SEP-2020 05:49,  PVCs are no longer present    ECHO: 9/9/20 Summary   Left ventricle is normal in size . Borderline concentric left ventricular hypertrophy. Septal motion consistent with post open heart state . Ejection fraction is visually estimated at 55-60%. Normal diastolic function. Normal right ventricular size and function. Normal sized left atrium. There is a mechanical aortic prosthetic valve which is well seated with a   mean gradient of 8.14 mmHg and trace aortic regurgitation   Mildly dilated aortic root. Stress Test: 9/10/20       FINDINGS:    Perfusion images demonstrate no reversible perfusion defect.  There is    a fixed perfusion abnormality at the anterior wall. Wall motion is globally very mildly hypokinetic. The right ventricle    is hypertrophied. The end diastolic volume is 86 ml. The end systolic volume is 44 ml. The estimated ejection fraction is 49 %.           Impression    1. No reversible perfusion defect. Fixed anterior wall perfusion    abnormality. 2. Ejection fraction is 49 %. 3. Global very mild hypokinesis. RVH.             Angiography 9/10/2020,CONCLUSIONS:  1. Coronary artery. A.  Left main. Aneurysmal formation in the mid left main without any  significant angiographic luminal narrowings. B.  LAD. No significant angiographic disease. C.  LCX. No significant angiographic disease. D.  RCA. Dominant vessel with around 40-50% mid vessel narrowing. 2.  Bileaflet mechanical valve with adequate leaflet mobility noted on  fluoroscopy.     Cardiology Labs: BMP:    Lab Results   Component Value Date     01/10/2021    K 3.7 01/10/2021     01/10/2021    CO2 27 01/10/2021    BUN 13 01/10/2021    CREATININE 0.7 01/10/2021     CMP:    Lab Results   Component Value Date     01/10/2021    K 3.7 01/10/2021     01/10/2021    CO2 27 01/10/2021    BUN 13 01/10/2021    CREATININE 0.7 01/10/2021    PROT 6.6 01/10/2021     CBC:    Lab Results   Component Value Date    WBC 13.9 01/10/2021    RBC 3.82 01/10/2021    HGB 12.1 01/10/2021    HCT 37.3 01/10/2021    MCV 97.6 01/10/2021    RDW 13.3 01/10/2021     01/10/2021     PT/INR:  No results found for: PTINR  PT/INR Warfarin:  No components found for: PTPATWAR, PTINRWAR  PTT:    Lab Results   Component Value Date    APTT 97.5 09/12/2020     PTT Heparin:  No components found for: APTTHEP  Magnesium:    Lab Results   Component Value Date    MG 1.9 09/11/2020     TSH:    Lab Results   Component Value Date    TSH 2.630 09/10/2020     TROPONIN:  No components found for: TROP  BNP:  No results found for: BNP  FASTING LIPID PANEL:    Lab Results Component Value Date    CHOL 178 05/12/2021    HDL 54 05/12/2021    TRIG 157 05/12/2021     No orders to display     I have personally reviewed the laboratory, cardiac diagnostic and radiographic testing as outlined above:      IMPRESSION:  1. Palpitations: Sinus tachycardia on loop recorder  2. Chronic diastolic congestive heart failure: Compensated, continue current treatment  3. S/p aortic valve replacement: Using mechanical aortic valve, echocardiogram done in February 2020 revealed normally functioning mechanical aortic valve. 4.  Shortness of breath: Secondary to COPD and asthma  5. COPD  6. Tobacco abuse: Patient was counseled regarding smoking cessation    RECOMMENDATIONS:   1. continue current treatment  2. CHF: Daily weight, take an extra Lasix for weight gain of more than 2-3 pounds in 24 hours, compliance with diuretics, low-salt diet were all advised. 3. Compliance with the Coumadin dose, PT and INR check appointments was strongly advised  4. Increase risk of bleeding due to being on anti-coagulation, symptoms and signs of bleeding discussed with patient, patient was advised to seek medical attention at the earliest symptoms or signs of bleeding. 5. Follow-up with  as scheduled  6. Follow-up with Dr. Dakotah Faith as scheduled  7. Follow-up with Dr. Cony Echavarria in 6 months, sooner if symptomatic for any reason    I have reviewed my findings and recommendations with patient        Electronically signed by Wendy Han MD on 7/6/2021 at 9:16 AM  NOTE: This report was transcribed using voice recognition software.  Every effort was made to ensure accuracy; however, inadvertent computerized transcription errors may be present

## 2021-07-02 NOTE — PROGRESS NOTES
See [employer] [place] report. Remote monitoring reviewed over a 30 day period. End of 30 day monitoring period date of service 5/27/21.     David Friedman RN, BSN  [place]

## 2021-07-06 ENCOUNTER — OFFICE VISIT (OUTPATIENT)
Dept: CARDIOLOGY CLINIC | Age: 61
End: 2021-07-06
Payer: MEDICAID

## 2021-07-06 VITALS
BODY MASS INDEX: 28.06 KG/M2 | SYSTOLIC BLOOD PRESSURE: 132 MMHG | HEIGHT: 65 IN | DIASTOLIC BLOOD PRESSURE: 72 MMHG | RESPIRATION RATE: 19 BRPM | HEART RATE: 81 BPM | WEIGHT: 168.4 LBS | OXYGEN SATURATION: 95 %

## 2021-07-06 DIAGNOSIS — I50.32 CHRONIC DIASTOLIC (CONGESTIVE) HEART FAILURE (HCC): Primary | ICD-10-CM

## 2021-07-06 PROCEDURE — G8427 DOCREV CUR MEDS BY ELIG CLIN: HCPCS | Performed by: INTERNAL MEDICINE

## 2021-07-06 PROCEDURE — G8419 CALC BMI OUT NRM PARAM NOF/U: HCPCS | Performed by: INTERNAL MEDICINE

## 2021-07-06 PROCEDURE — 93000 ELECTROCARDIOGRAM COMPLETE: CPT | Performed by: INTERNAL MEDICINE

## 2021-07-06 PROCEDURE — 4004F PT TOBACCO SCREEN RCVD TLK: CPT | Performed by: INTERNAL MEDICINE

## 2021-07-06 PROCEDURE — 3017F COLORECTAL CA SCREEN DOC REV: CPT | Performed by: INTERNAL MEDICINE

## 2021-07-06 PROCEDURE — 99214 OFFICE O/P EST MOD 30 MIN: CPT | Performed by: INTERNAL MEDICINE

## 2021-07-14 ENCOUNTER — TELEPHONE (OUTPATIENT)
Dept: CARDIOLOGY CLINIC | Age: 61
End: 2021-07-14

## 2021-07-15 ENCOUNTER — ANTI-COAG VISIT (OUTPATIENT)
Dept: CARDIOLOGY CLINIC | Age: 61
End: 2021-07-15
Payer: MEDICAID

## 2021-07-15 DIAGNOSIS — Z95.2 H/O MECHANICAL AORTIC VALVE REPLACEMENT: Primary | ICD-10-CM

## 2021-07-15 LAB
INTERNATIONAL NORMALIZATION RATIO, POC: 2.3
PROTHROMBIN TIME, POC: NORMAL

## 2021-07-15 PROCEDURE — 85610 PROTHROMBIN TIME: CPT | Performed by: INTERNAL MEDICINE

## 2021-07-15 PROCEDURE — 93793 ANTICOAG MGMT PT WARFARIN: CPT | Performed by: INTERNAL MEDICINE

## 2021-07-19 ENCOUNTER — OFFICE VISIT (OUTPATIENT)
Dept: PULMONOLOGY | Age: 61
End: 2021-07-19
Payer: MEDICARE

## 2021-07-19 VITALS
OXYGEN SATURATION: 92 % | HEART RATE: 81 BPM | DIASTOLIC BLOOD PRESSURE: 57 MMHG | WEIGHT: 168 LBS | HEIGHT: 65 IN | BODY MASS INDEX: 27.99 KG/M2 | RESPIRATION RATE: 18 BRPM | SYSTOLIC BLOOD PRESSURE: 135 MMHG | TEMPERATURE: 97.7 F

## 2021-07-19 DIAGNOSIS — R91.1 LUNG NODULE: ICD-10-CM

## 2021-07-19 DIAGNOSIS — J44.9 CHRONIC OBSTRUCTIVE PULMONARY DISEASE, UNSPECIFIED COPD TYPE (HCC): Primary | ICD-10-CM

## 2021-07-19 PROCEDURE — 99214 OFFICE O/P EST MOD 30 MIN: CPT | Performed by: INTERNAL MEDICINE

## 2021-07-19 PROCEDURE — 99213 OFFICE O/P EST LOW 20 MIN: CPT | Performed by: INTERNAL MEDICINE

## 2021-07-19 PROCEDURE — G8427 DOCREV CUR MEDS BY ELIG CLIN: HCPCS | Performed by: INTERNAL MEDICINE

## 2021-07-19 PROCEDURE — G8926 SPIRO NO PERF OR DOC: HCPCS | Performed by: INTERNAL MEDICINE

## 2021-07-19 PROCEDURE — 3023F SPIROM DOC REV: CPT | Performed by: INTERNAL MEDICINE

## 2021-07-19 PROCEDURE — G8419 CALC BMI OUT NRM PARAM NOF/U: HCPCS | Performed by: INTERNAL MEDICINE

## 2021-07-19 PROCEDURE — 4004F PT TOBACCO SCREEN RCVD TLK: CPT | Performed by: INTERNAL MEDICINE

## 2021-07-19 PROCEDURE — 3017F COLORECTAL CA SCREEN DOC REV: CPT | Performed by: INTERNAL MEDICINE

## 2021-07-19 RX ORDER — PREDNISONE 10 MG/1
TABLET ORAL
Qty: 15 TABLET | Refills: 0 | Status: SHIPPED | OUTPATIENT
Start: 2021-07-19 | End: 2021-07-29

## 2021-07-19 RX ORDER — BUDESONIDE, GLYCOPYRROLATE, AND FORMOTEROL FUMARATE 160; 9; 4.8 UG/1; UG/1; UG/1
2 AEROSOL, METERED RESPIRATORY (INHALATION) 2 TIMES DAILY
Qty: 1 INHALER | Refills: 6 | Status: SHIPPED
Start: 2021-07-19 | End: 2022-03-07

## 2021-07-19 NOTE — PROGRESS NOTES
6 mos office visit today. Pt reports some shortness of breath and she continues use of Dulera and Spiriva inhalers daily with Albuterol prn. The most she uses Albuterol for rescue inhaler is 3 times/day. Discussed smoking cessation and attempts to quit with Dr. Daniel Gracia. Orders for pt to hold Spiriva and Dulera and try Breztri samples given per orders at today's visit. Script sent to pharmacy for 5 day Steroid tx and new inhaler [employer]. Plan for Chest CT prior to next appt. Office to mail out appt letter. Plan for follow up in office in Feb 2022. Appt card given.

## 2021-07-19 NOTE — PROGRESS NOTES
PAST MEDICAL HISTORY:       Diagnosis Date    Anticoagulant long-term use     Anxiety     Ascending aortic aneurysm (HCC)     Asthma     CAD (coronary artery disease)     Chronic back pain     COPD (chronic obstructive pulmonary disease) (HCC)     Depression     Depression     GERD (gastroesophageal reflux disease)     Headache     Hyperlipidemia     Hypertension     On warfarin at home     for AVR    Restless legs syndrome     S/P AVR     Syncope     Tobacco abuse     Urinary incontinence     Ventricular tachy[facility], non-sustained ([facility])        MEDICATIONS:   Current Outpatient Medications   Medication Sig Dispense Refill    atorvastatin (LIPITOR) 10 MG tablet take 1 tablet by mouth once daily 90 tablet 1    lisinopril (PRINIVIL;ZESTRIL) 10 MG tablet take 1 tablet by mouth once daily 90 tablet 1    metoprolol succinate (TOPROL XL) 25 MG extended release tablet Take 1 tablet by mouth daily 90 tablet 1    Calcium Polycarbophil (FIBER) 625 MG TABS Take 1 tablet by mouth daily 90 tablet 1    montelukast (SINGULAIR) 10 MG tablet take 1 tablet by mouth at bedtime 90 tablet 1    rOPINIRole (REQUIP) 0.25 MG tablet Take 1 tab in morning and 2 tabs before bed 270 tablet 1    Azelastine HCl 137 MCG/SPRAY SOLN 2 sprays by Nasal route daily 30 mL 2    fluticasone (FLONASE) 50 MCG/ACT nasal spray 2 sprays by Nasal route daily 1 Bottle 2    omeprazole (PRILOSEC) 40 MG delayed release capsule take 1 capsule by mouth every morning BEFORE BREAKFAST 90 capsule 1    sertraline (ZOLOFT) 100 MG tablet take 1 and 1/2 tablet by mouth once daily 135 tablet 2    aspirin EC 81 MG EC tablet Take 1 tablet by mouth daily 90 tablet 1    albuterol (PROVENTIL) (2.5 MG/3ML) 0.083% nebulizer solution Take 3 mLs by nebulization every 6 hours as needed for Wheezing or Shortness of Breath 120 each 5    tiotropium (SPIRIVA RESPIMAT) 2.5 MCG/ACT AERS inhaler INHALE 2 PUFFS BY MOUTH DAILY.  PAP Medication 3 Inhaler 3  Roflumilast (DALIRESP) 250 MCG tablet Take 1 tablet by mouth daily 28 tablet 2    albuterol sulfate HFA (VENTOLIN HFA) 108 (90 Base) MCG/ACT inhaler Inhale 1 puff into the lungs every 4 hours as needed for Wheezing 18 g 2    mometasone-formoterol (DULERA) 100-5 MCG/ACT inhaler Inhale 2 puffs into the lungs 2 times daily 13 g 3    warfarin (COUMADIN) 5 MG tablet Take 1 tablet by mouth daily Tuesday, Thursday, Saturday (Patient taking differently: Take 5 mg by mouth daily On , Tues, Thurs & Sat) 30 tablet 0    warfarin (COUMADIN) 6 MG tablet Take 1 tablet by mouth daily , Monday, Wednesday, and  (Patient taking differently: Take 6 mg by mouth daily On Mon, Wed & Fri.) 30 tablet 0    Blood Pressure Monitoring (BLOOD PRESSURE CUFF) MISC Dx:  Hypertension with labile blood pressure 1 each 0    nicotine (NICODERM CQ) 21 MG/24HR Place 1 patch onto the skin daily (Patient not taking: Reported on 2021) 42 patch 1     No current facility-administered medications for this visit.        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 Minoo Bello  2009    Aortic valve [facility]      SECTION      COLONOSCOPY N/A 2019    COLONOSCOPY POLYPECTOMY SNARE/COLD BIOPSY performed by Tayler Gil MD at [address] [facility]  2020    Linq Insertion    Dr. Miguel Galindo KEM STEROTACTIC LOC BREAST BIOPSY RIGHT Right 3/10/2021    KEM STEROTACTIC LOC BREAST BIOPSY ventricular heave. Pulses:  Carotid, radial and femoral pulses were equally bilaterally. Abdomen: Slightly rounded and soft without organomegaly. No rebound, rigidity or guarding was appreciated. Lymphatic: No lymphadenopathy. Musculoskeletal: normal range of motion     Extremities: no edema ,or cyanosis   Skin:  Warm and dry. Good color, turgor and pigmentation. No lesions or scars. Neurological/Psychiatric: The patient's general behavior, level of consciousness, thought content and emotional status is normal.  Cranial nerves II-XII are intact. DATA:     FVC 2.7 which is 69 of predicted    FEV1:0.75 which is 28 of predicted  FEV1/FVC 53% which is 41 of predicted  There Was significant bronchodilator response  MVV 37 which is not 39 of predicted    ERV 0.19 which is 90% of predicted TLC is 7.4 which is 137 of predicted  RV/TLC 64 which is 168 predicted indicating of severe air trapping    DLCO is 47 and I will therefore limited correct 51 indicating of moderate reduction in DLCO    IMPRESSION:    Possible pneumonia ,Left lower lobe   1-Very COPD with possible asthmatic bronchitis   2-Possible Asthma component. especially eosinophilic type   3-Tobacco abuse  4-severe emphysema  5 Lung nodules           6-PHTN ,mild,diastolic dysfunction     PLAN:        -Very pleasant 79-year-old female who came [facility] for follow-up after CAT scan and COPD  Will get CT chest with out contrast  Continue with abx  Prednisone 30 mg Po X 5 days as she is very tight  Following with cardiology for thoracic aneurysms and want to talk to him about CTS evaluation      In terms of COPD the treatment will continue  ,Spiriva,Deulra ,will change to [facility] ,2 puff bid trial and t call us if any isses    Normal  alpha-1 antitrypsin  Normal IgE level  eosinophilic count is 764,BHVXR she still is doing well ,with her COPD I doubt much benefit     She goes  Pulmonary rehab ,stopped with COVID [place] to be followed by PCP  Start Daliresp 250 mg bid   Start  Chantix again   Also will consider Triology if breathing continue to get worse    \explain to her that if she continue to smoke ,nothing will help her and she is putting her life at risk of resp failure     Thank you for allowing me to participate in [facility].  I will keep following with you ,should you have any concerns ,please contact me at [phone]     Sincerely,        Juventino Greenberg MD  Pulmonary & Critical Care Medicine

## 2021-07-23 ENCOUNTER — ANTI-COAG VISIT (OUTPATIENT)
Dept: CARDIOLOGY CLINIC | Age: 61
End: 2021-07-23
Payer: MEDICAID

## 2021-07-23 DIAGNOSIS — Z95.2 H/O MECHANICAL AORTIC VALVE REPLACEMENT: ICD-10-CM

## 2021-07-23 LAB
INTERNATIONAL NORMALIZATION RATIO, POC: 2.9
PROTHROMBIN TIME, POC: NORMAL

## 2021-07-23 PROCEDURE — 85610 PROTHROMBIN TIME: CPT | Performed by: INTERNAL MEDICINE

## 2021-07-23 PROCEDURE — 93793 ANTICOAG MGMT PT WARFARIN: CPT | Performed by: INTERNAL MEDICINE

## 2021-07-23 NOTE — PROGRESS NOTES
INR today is therapeutic at 2.9 (To be kept at 2.0-3.0 range). Continue with 6mg/5mg alternating. 6mg is M-W-F. Return in 1 month 8/19/21.

## 2021-07-30 ENCOUNTER — TELEPHONE (OUTPATIENT)
Dept: NON INVASIVE DIAGNOSTICS | Age: 61
End: 2021-07-30

## 2021-07-30 NOTE — TELEPHONE ENCOUNTER
We have received your remote transmission. Our staff will contact you if there is anything that needs to be discussed. Your next appointment is 08/30/2021 remote transmission from home. Spoke to patient. Verbalized understanding of next transmission date.

## 2021-08-03 PROCEDURE — 93298 REM INTERROG DEV EVAL SCRMS: CPT | Performed by: INTERNAL MEDICINE

## 2021-08-03 PROCEDURE — G2066 INTER DEVC REMOTE 30D: HCPCS | Performed by: INTERNAL MEDICINE

## 2021-08-11 ENCOUNTER — TELEPHONE (OUTPATIENT)
Dept: INTERNAL MEDICINE | Age: 61
End: 2021-08-11

## 2021-08-11 NOTE — TELEPHONE ENCOUNTER
Spoke with [facility] pharmacy, patient has refills available for metoprolol, Singulair and requip.  phone message left for patient that refills are available.

## 2021-08-19 ENCOUNTER — ANTI-COAG VISIT (OUTPATIENT)
Dept: CARDIOLOGY CLINIC | Age: 61
End: 2021-08-19
Payer: MEDICAID

## 2021-08-19 DIAGNOSIS — Z95.2 H/O MECHANICAL AORTIC VALVE REPLACEMENT: ICD-10-CM

## 2021-08-19 LAB
INTERNATIONAL NORMALIZATION RATIO, POC: 3.4
PROTHROMBIN TIME, POC: NORMAL

## 2021-08-19 PROCEDURE — 85610 PROTHROMBIN TIME: CPT | Performed by: INTERNAL MEDICINE

## 2021-08-19 PROCEDURE — 93793 ANTICOAG MGMT PT WARFARIN: CPT | Performed by: INTERNAL MEDICINE

## 2021-08-19 NOTE — PROGRESS NOTES
INR today is 3.4. Per Dr. Tee Barnard, pt to hold today then pt is to take 5 mg daily w/ 6 mg Wed and Fri only. Recheck in 2 weeks 9/2.

## 2021-08-30 ENCOUNTER — TELEPHONE (OUTPATIENT)
Dept: PULMONOLOGY | Age: 61
End: 2021-08-30

## 2021-08-30 NOTE — TELEPHONE ENCOUNTER
Multiple calls to pt this am to return call to pt after receiving VM re: \"Nebulizer\". Recorded message says \"pt unable to take calls at this time\".

## 2021-08-31 DIAGNOSIS — J44.9 CHRONIC OBSTRUCTIVE PULMONARY DISEASE, UNSPECIFIED COPD TYPE (HCC): ICD-10-CM

## 2021-08-31 NOTE — TELEPHONE ENCOUNTER
Last Appointment:  5/19/21  Future Appointments   Date Time Provider Esmer Lee   9/2/2021  8:30 AM SCHEDULE, [facility]   9/27/2021 10:00 AM Jesi Childers MD [facility]   2/21/2022  9:00 AM [facility]

## 2021-09-14 ENCOUNTER — ANTI-COAG VISIT (OUTPATIENT)
Dept: CARDIOLOGY CLINIC | Age: 61
End: 2021-09-14
Payer: MEDICARE

## 2021-09-14 DIAGNOSIS — Z95.2 H/O MECHANICAL AORTIC VALVE REPLACEMENT: ICD-10-CM

## 2021-09-14 LAB
INTERNATIONAL NORMALIZATION RATIO, POC: 2.1
PROTHROMBIN TIME, POC: NORMAL

## 2021-09-14 PROCEDURE — 85610 PROTHROMBIN TIME: CPT | Performed by: INTERNAL MEDICINE

## 2021-09-14 PROCEDURE — 93793 ANTICOAG MGMT PT WARFARIN: CPT | Performed by: INTERNAL MEDICINE

## 2021-09-14 NOTE — PROGRESS NOTES
INR today is 2.1 Per Dr. Maylin Degroot takes none on Friday, 6mg mon and wed, 5mg all other days.  Recheck 1 month

## 2021-09-16 ENCOUNTER — TELEPHONE (OUTPATIENT)
Dept: INTERNAL MEDICINE | Age: 61
End: 2021-09-16

## 2021-09-16 DIAGNOSIS — Z95.2 H/O MECHANICAL AORTIC VALVE REPLACEMENT: ICD-10-CM

## 2021-09-16 NOTE — TELEPHONE ENCOUNTER
Called pt and advised that she will need to ask cardiology to refill her coumadin as they are now managing her inrs  Understanding verbalized she will call that office

## 2021-09-21 RX ORDER — WARFARIN SODIUM 6 MG/1
6 TABLET ORAL DAILY
Qty: 90 TABLET | Refills: 0 | Status: ON HOLD
Start: 2021-09-21 | End: 2022-04-02 | Stop reason: SDUPTHER

## 2021-09-21 RX ORDER — WARFARIN SODIUM 5 MG/1
5 TABLET ORAL DAILY
Qty: 90 TABLET | Refills: 3 | Status: SHIPPED
Start: 2021-09-21 | End: 2021-11-10 | Stop reason: SDUPTHER

## 2021-09-22 ENCOUNTER — TELEPHONE (OUTPATIENT)
Dept: INTERNAL MEDICINE | Age: 61
End: 2021-09-22

## 2021-09-22 NOTE — TELEPHONE ENCOUNTER
----- Message from Gómez Lemus sent at 9/22/2021  1:32 PM EDT -----  Subject: Message to Provider    QUESTIONS  Information for Provider? Patient has been vaccinated and lives with her   grandson tested positive for covid on Friday. Should she still come in for   her appointment on 9/27? Her family is in quarantine but she is not. Please advise.  ---------------------------------------------------------------------------  --------------  CALL BACK INFO  What is the best way for the office to contact you? OK to leave message on   voicemail  Preferred Call Back Phone Number? [phone]  ---------------------------------------------------------------------------  --------------  SCRIPT ANSWERS  Relationship to Patient?  Self

## 2021-09-22 NOTE — TELEPHONE ENCOUNTER
That technically will be 10 days from her family's diagnosis with COVID; if patient does not have any symptoms and does not have any concerns that she herself has contracted covid during that time frame then she can still come for her appointment; I would advise the patient to call the morning of her appointment to discuss with a provider to confirm she is asymptomatic. Thank you!      Franco Naylor

## 2021-09-24 NOTE — TELEPHONE ENCOUNTER
Spoke with pt via phone. States she was told appt needed to be canceled. Notified of note per Dr. Margy Rome. Appt scheduled for next Tuesday. Pt instructed to call Tuesday morning and ask for this nurse.

## 2021-09-28 ENCOUNTER — OFFICE VISIT (OUTPATIENT)
Dept: INTERNAL MEDICINE | Age: 61
End: 2021-09-28
Payer: MEDICARE

## 2021-09-28 ENCOUNTER — HOSPITAL ENCOUNTER (OUTPATIENT)
Age: 61
Discharge: HOME | End: 2021-09-28
Payer: MEDICARE

## 2021-09-28 VITALS
RESPIRATION RATE: 16 BRPM | SYSTOLIC BLOOD PRESSURE: 146 MMHG | TEMPERATURE: 98.3 F | OXYGEN SATURATION: 91 % | BODY MASS INDEX: 27.46 KG/M2 | DIASTOLIC BLOOD PRESSURE: 75 MMHG | WEIGHT: 164.8 LBS | HEIGHT: 65 IN | HEART RATE: 71 BPM

## 2021-09-28 DIAGNOSIS — G47.62 NOCTURNAL LEG CRAMPS: Primary | ICD-10-CM

## 2021-09-28 DIAGNOSIS — F32.A DEPRESSION, UNSPECIFIED DEPRESSION TYPE: ICD-10-CM

## 2021-09-28 DIAGNOSIS — J30.2 SEASONAL ALLERGIES: ICD-10-CM

## 2021-09-28 DIAGNOSIS — K21.9 GASTROESOPHAGEAL REFLUX DISEASE WITHOUT ESOPHAGITIS: ICD-10-CM

## 2021-09-28 DIAGNOSIS — E78.5 HYPERLIPIDEMIA, UNSPECIFIED HYPERLIPIDEMIA TYPE: ICD-10-CM

## 2021-09-28 DIAGNOSIS — G25.81 RESTLESS LEG SYNDROME: ICD-10-CM

## 2021-09-28 DIAGNOSIS — I10 ESSENTIAL HYPERTENSION: ICD-10-CM

## 2021-09-28 DIAGNOSIS — G47.62 NOCTURNAL LEG CRAMPS: ICD-10-CM

## 2021-09-28 DIAGNOSIS — J44.9 CHRONIC OBSTRUCTIVE PULMONARY DISEASE, UNSPECIFIED COPD TYPE (HCC): ICD-10-CM

## 2021-09-28 LAB
ANION GAP SERPL CALCULATED.3IONS-SCNC: 9 MMOL/L (ref 7–16)
BUN BLDV-MCNC: 11 MG/DL (ref 6–23)
CALCIUM IONIZED: 1.26 MMOL/L (ref 1.15–1.33)
CALCIUM SERPL-MCNC: 9.4 MG/DL (ref 8.6–10.2)
CHLORIDE BLD-SCNC: 102 MMOL/L (ref 98–107)
CO2: 28 MMOL/L (ref 22–29)
CREAT SERPL-MCNC: 0.6 MG/DL (ref 0.5–1)
GFR AFRICAN AMERICAN: >60
GFR NON-AFRICAN AMERICAN: >60 ML/MIN/1.73
GLUCOSE BLD-MCNC: 97 MG/DL (ref 74–99)
MAGNESIUM: 1.6 MG/DL (ref 1.6–2.6)
PHOSPHORUS: 3.3 MG/DL (ref 2.5–4.5)
POTASSIUM SERPL-SCNC: 3.8 MMOL/L (ref 3.5–5)
SODIUM BLD-SCNC: 139 MMOL/L (ref 132–146)

## 2021-09-28 PROCEDURE — 36415 COLL VENOUS BLD VENIPUNCTURE: CPT

## 2021-09-28 PROCEDURE — 84100 ASSAY OF PHOSPHORUS: CPT

## 2021-09-28 PROCEDURE — 3023F SPIROM DOC REV: CPT | Performed by: INTERNAL MEDICINE

## 2021-09-28 PROCEDURE — 80048 BASIC METABOLIC PNL TOTAL CA: CPT

## 2021-09-28 PROCEDURE — G8427 DOCREV CUR MEDS BY ELIG CLIN: HCPCS | Performed by: INTERNAL MEDICINE

## 2021-09-28 PROCEDURE — G8419 CALC BMI OUT NRM PARAM NOF/U: HCPCS | Performed by: INTERNAL MEDICINE

## 2021-09-28 PROCEDURE — 99212 OFFICE O/P EST SF 10 MIN: CPT | Performed by: INTERNAL MEDICINE

## 2021-09-28 PROCEDURE — 82330 ASSAY OF CALCIUM: CPT

## 2021-09-28 PROCEDURE — G8926 SPIRO NO PERF OR DOC: HCPCS | Performed by: INTERNAL MEDICINE

## 2021-09-28 PROCEDURE — 99214 OFFICE O/P EST MOD 30 MIN: CPT | Performed by: INTERNAL MEDICINE

## 2021-09-28 PROCEDURE — 3017F COLORECTAL CA SCREEN DOC REV: CPT | Performed by: INTERNAL MEDICINE

## 2021-09-28 PROCEDURE — 83735 ASSAY OF MAGNESIUM: CPT

## 2021-09-28 PROCEDURE — 4004F PT TOBACCO SCREEN RCVD TLK: CPT | Performed by: INTERNAL MEDICINE

## 2021-09-28 RX ORDER — AZELASTINE HYDROCHLORIDE 137 UG/1
2 SPRAY, METERED NASAL DAILY
Qty: 30 ML | Refills: 2 | Status: SHIPPED
Start: 2021-09-28 | End: 2022-02-02 | Stop reason: SDUPTHER

## 2021-09-28 RX ORDER — ALBUTEROL SULFATE 90 UG/1
1 AEROSOL, METERED RESPIRATORY (INHALATION) EVERY 4 HOURS PRN
Qty: 18 G | Refills: 2 | Status: SHIPPED
Start: 2021-09-28 | End: 2022-01-04

## 2021-09-28 RX ORDER — MULTIVITAMIN/IRON/FOLIC ACID 18MG-0.4MG
250 TABLET ORAL DAILY
Qty: 90 TABLET | Refills: 1 | Status: SHIPPED
Start: 2021-09-28 | End: 2022-02-02 | Stop reason: SDUPTHER

## 2021-09-28 RX ORDER — ROPINIROLE 0.25 MG/1
TABLET, FILM COATED ORAL
Qty: 270 TABLET | Refills: 1 | Status: SHIPPED
Start: 2021-09-28 | End: 2021-12-14 | Stop reason: DRUGHIGH

## 2021-09-28 RX ORDER — FLUTICASONE PROPIONATE 50 MCG
2 SPRAY, SUSPENSION (ML) NASAL DAILY
Qty: 1 EACH | Refills: 2 | Status: SHIPPED
Start: 2021-09-28 | End: 2022-02-02 | Stop reason: SDUPTHER

## 2021-09-28 RX ORDER — METOPROLOL SUCCINATE 25 MG/1
25 TABLET, EXTENDED RELEASE ORAL DAILY
Qty: 90 TABLET | Refills: 1 | Status: SHIPPED
Start: 2021-09-28 | End: 2022-02-02 | Stop reason: SDUPTHER

## 2021-09-28 RX ORDER — MONTELUKAST SODIUM 10 MG/1
TABLET ORAL
Qty: 90 TABLET | Refills: 1 | Status: SHIPPED
Start: 2021-09-28 | End: 2022-02-02 | Stop reason: SDUPTHER

## 2021-09-28 RX ORDER — ALBUTEROL SULFATE 2.5 MG/3ML
2.5 SOLUTION RESPIRATORY (INHALATION) EVERY 6 HOURS PRN
Qty: 120 EACH | Refills: 2 | Status: SHIPPED
Start: 2021-09-28 | End: 2022-02-02 | Stop reason: SDUPTHER

## 2021-09-28 RX ORDER — SERTRALINE HYDROCHLORIDE 100 MG/1
TABLET, FILM COATED ORAL
Qty: 135 TABLET | Refills: 1 | Status: SHIPPED
Start: 2021-09-28 | End: 2022-02-02 | Stop reason: SDUPTHER

## 2021-09-28 RX ORDER — OMEPRAZOLE 40 MG/1
CAPSULE, DELAYED RELEASE ORAL
Qty: 90 CAPSULE | Refills: 1 | Status: SHIPPED
Start: 2021-09-28 | End: 2022-02-02 | Stop reason: SDUPTHER

## 2021-09-28 RX ORDER — LISINOPRIL 20 MG/1
20 TABLET ORAL DAILY
Qty: 90 TABLET | Refills: 1 | Status: SHIPPED
Start: 2021-09-28 | End: 2022-02-02 | Stop reason: SDUPTHER

## 2021-09-28 RX ORDER — ASPIRIN 81 MG/1
81 TABLET ORAL DAILY
Qty: 90 TABLET | Refills: 1 | Status: SHIPPED
Start: 2021-09-28 | End: 2022-05-11 | Stop reason: SDUPTHER

## 2021-09-28 RX ORDER — ATORVASTATIN CALCIUM 10 MG/1
TABLET, FILM COATED ORAL
Qty: 90 TABLET | Refills: 1 | Status: SHIPPED
Start: 2021-09-28 | End: 2021-10-06

## 2021-09-28 NOTE — PATIENT INSTRUCTIONS
Patient Education        Nighttime Leg Cramps: Care Instructions  Your Care Instructions     Nighttime leg cramps happen when a leg muscle tightens up suddenly. This most often happens in the calf. But cramps in the thigh or foot are also common. Cramps often occur just as you fall asleep or wake up. Leg cramps can be painful. They can last a few seconds to a few minutes. Though they are common, experts don't know exactly what causes them. To treat muscle cramps, you can stretch and massage the muscle. If cramps keep coming back, your doctor may prescribe medicine that relaxes your muscles. Follow-up care is a key part of your treatment and safety. Be sure to make and go to all appointments, and call your doctor if you are having problems. It's also a good idea to know your test results and keep a list of the medicines you take. How can you care for yourself at home? · To stop a leg cramp, sit down and straighten your leg as you bend your foot up toward your knee. It may help to place a rolled towel under the ball of your foot and, while you hold the towel at both ends, gently pull the towel toward you while you keep your knee straight. This stretches the calf muscles. The cramp usually goes away after a few minutes. · Take a warm shower or bath to relax the muscle. Some people find that a heating pad placed on the muscle can also help. Others get relief by rubbing the calf with an ice pack. · Stretch your muscles every day, especially before and after exercise and at bedtime. Regular stretching can relax your muscles and may prevent cramps. · Do not suddenly increase the amount of exercise you get. Increase your exercise a little each week. · If your doctor prescribes medicine, take it exactly as prescribed. Call your doctor if you think you are having a problem with your medicine.   · Ask your doctor if you can take an over-the-counter pain medicine, such as acetaminophen (Tylenol), ibuprofen (Advil, Motrin), or naproxen (Aleve). Be safe with medicines. Read and follow all instructions on the label. · Drink plenty of fluids. If you have kidney, heart, or liver disease and have to limit fluids, talk with your doctor before you increase the amount of fluids you drink. When should you call for help? Watch closely for changes in your health, and be sure to contact your doctor if:    · You often have muscle cramps that do not go away after home treatment.     · Your muscle cramps often wake you up at night.     · You do not get better as expected. Where can you learn more? Go to https://YelagopeGameChanger Mediaeweb.RentMatch. org and sign in to your StyleZen account. Enter S910 in the [facility] box to learn more about \"Nighttime Leg Cramps: Care Instructions. \"     If you do not have an account, please click on the \"Sign Up Now\" link. Current as of: April 8, 2021               Content Version: 13.0  © 2006-2021 [employer]. Care instructions adapted under license by [facility] ([facility]). If you have questions about a medical condition or this instruction, always ask your healthcare professional. [place] [address] any warranty or liability for your use of this information. Patient Education        Muscle Cramps: Care Instructions  Your Care Instructions     A muscle cramp occurs when a muscle tightens up suddenly. A cramp often happens in the legs. A muscle cramp is also called a muscle spasm or a charley horse. Muscle cramps usually last less than a minute. However, the pain may last for several minutes. Leg cramps that occur at night may wake you up. Heavy exercise, dehydration, and being overweight can increase your risk of getting cramps. An imbalance of certain chemicals in your blood, called electrolytes, can also lead to muscle cramps. Pregnant women sometimes get muscle cramps during sleep. Muscle cramps can be treated by stretching and massaging the muscle.  If cramps keep coming back, your doctor may prescribe medicine that relaxes your muscles. Follow-up care is a key part of your treatment and safety. Be sure to make and go to all appointments, and call your doctor if you are having problems. It's also a good idea to know your test results and keep a list of the medicines you take. How can you care for yourself at home? · Drink plenty of fluids to prevent dehydration. Choose water and other clear liquids until you feel better. If you have kidney, heart, or liver disease and have to limit fluids, talk with your doctor before you increase the amount of fluids you drink. · Stretch your muscles every day, especially before and after exercise and at bedtime. Regular stretching can relax your muscles and may prevent cramps. · Do not suddenly increase the amount of exercise you get. Increase your exercise a little each week. · When you get a cramp, stretch and massage the muscle. You can also take a warm shower or bath to relax the muscle. A heating pad placed on the muscle can also help. · Take a daily multivitamin supplement. · Ask your doctor if you can take an over-the-counter pain medicine, such as acetaminophen (Tylenol), ibuprofen (Advil, Motrin), or naproxen (Aleve). Be safe with medicines. Read and follow all instructions on the label. When should you call for help? Watch closely for changes in your health, and be sure to contact your doctor if:    · You get muscle cramps often that do not go away after home treatment.     · Your muscle cramps often wake you up at night.     · You do not get better as expected. Where can you learn more? Go to https://Lilliputian Systemskurt.[employer] Technologies. org and sign in to your MyBeautyCompare account. Enter I621 in the [employer] box to learn more about \"Muscle Cramps: Care Instructions. \"     If you do not have an account, please click on the \"Sign Up Now\" link.   Current as of: July 1, 2021               Content Version: 13.0  © 2006-2021 [employer]. Care instructions adapted under license by [facility] ([facility]). If you have questions about a medical condition or this instruction, always ask your healthcare professional. [address] any warranty or liability for your use of this information.        · Follow-up in 1 month  · Trial off Prilosec  · Please obtain magnesium phosphorus BMP and calcium levels  · Please take magnesium supplement

## 2021-09-28 NOTE — PROGRESS NOTES
Printed lab scripts and all instructions given to patient by Dr.Ryhal muñoz appt scheduled and p[rinted avs given to patient

## 2021-09-28 NOTE — PROGRESS NOTES
[facility]    Attending Physician Statement  I have discussed the case, including pertinent history and exam findings with the resident physician. I have seen and examined the patient and the key elements of the encounter have been performed by me. I agree with the assessment, plan and orders as documented by the resident. I have reviewed all pertinent PMHx, PSHx, FamHx, SocialHx, medications, and allergies and updated history as appropriate. Patient presents for routine follow up of medical problems. Pt presented for regular follow up, reported shortness of breath related to COPD, has been seen pulmonology, in today's visit, O2 sat 91% that is pt's baseline. Hypertension - BP elevated to 146, recommended to increase the dose of lisinopril to 20 mg and follow up BMP. AVR with mechanical aortic valve and last INR was 2.1 and need to increase > 2.5, recommended to continue monitor INR. Hyperlipidemia, pt has been tolerating statin well without complaint. Restless lef syndrome, tolerating ropinirole well, however, pt reported nightly right thigh muscle painful spasm, also painful spasm in abdominal wall muscles frequently, recommended tonic water and magnesium supplement along with pre-bedtime stretc exercise recommended. Remainder of medical problems as per resident note. I personally encountered pt 10 minutes and total time spent 40 minutes in this visit.       Peter Mueller MD  9/28/2021 3:40 PM

## 2021-09-28 NOTE — PROGRESS NOTES
Obey Stanford (:  1960) is a 57 y.o. female,Established patient, here for evaluation of the following chief complaint(s):  Check-Up and Medication Refill         ASSESSMENT/PLAN:  S/p AVR, mechanical aortic valve  · warfarin 5 mg daily Sun,Tue,Thu,Sat  · warfarin 6 mg Mon, Wed  · last INR  = 2.1  HFpEF with hx CAD   · 2D echo - EF 55-60% (20),  trace aortic regurgitation, Mildly dilated aortic root  · Symptoms well controlled  HTN  · BP elevated today today   · We will increase lisinopril to 20 mg daily , metoprolol succinate 25 mg daily  · The 10-year ASCVD risk score (Joanie Gonzalez, et al., 2013) is: 11.1%  HLD  · atorvastatin 10 mg daily  · The 10-year ASCVD risk score (Joanie Gonzalez, et al., 2013) is: 11.1%  COPD  · Never hospitalized for COPD   · on Not on home O2  · Ambulatory O2 91%  · on budesonide-glycopyrrol-formoterol (Breztri) 2 puffs BID, roflumilast 250 mcg daily, montelukast 10 mg daily, albuterol 1 puff q4hrs prn  · azelastine nasal spray daily, fluticasone nasal spray  · Patient follows with pulmonology:Dr Engel   Restless leg syndrome  · on ropinirole 0.25 mg, 1 tab in AM, 2 tabs before bed  · Symptoms improved on Requip  Nocturnal Muscle cramps  · Electrolytes ordered  · We will start magnesium empirically  · Stretching exercises sent  · We will avoid quinine due to warfarin interaction      Return in about 1 month (around 10/28/2021). Subjective   SUBJECTIVE/OBJECTIVE:  HPI  Patient is a 41-year-old female with past medical history of COPD who presents to the clinic for routine follow-up. Patient is currently complaining of worsening shortness of breath. Ambulatory O2 was greater than 88%. Patient is currently followed by pulmonology. Instructed patient to follow-up with them. Patient also complaining of nocturnal: Cramps almost nightly including calf thigh and abdomen. Patient states no alleviating or exacerbating factors.   Instructed patient on stretching exercises. Review of Systems     Constitutional: (-) fever, (-) chills (-) weight gain or (-) weight loss. Head: (-) headache, (-) light headedness and (-) dizziness. Eyes: (-) changes in vision (-) blurry vision  Mouth: (-) difficulty swallowing. (-) pain with swallowing   Neck: (-) stiffness, (-) swelling and (-) pain. Respiratory: (+) SOB and (-) cough. Cardiovascular: (-) chest pain and (-) palpitations. GI:  (-) nausea, (-) vomiting, (-) diarrhea, (-) constipation and (-) abdomen pain. Urology: (-) pain with urination, (-) difficulty urinating, (-) urinary incontinence and (-) increased urination. Musculoskeletal: (-) muscle weakness (-) new joint pain. (-) rash   Hematology: (-) bruising (-) bleeding. Neurologic: (-) tingling, (-) numbness (-) focal neurological deficits. Objective   Physical Exam      Vitals: BP (!) 146/75 (Site: Left Upper Arm, Position: Sitting, Cuff Size: Medium Adult)   Pulse 71   Temp 98.3 °F (36.8 °C) (Oral)   Resp 16   Ht 5' 5\" (1.651 m)   Wt 164 lb 12.8 oz (74.8 kg)   LMP 11/08/2008 (LMP Unknown)   SpO2 91% Comment: ra  BMI 27.42 kg/m²       · Constitutional: Alert, AaO x3. Follows commands. In no apparent distress. · Head: Normocephalic and atraumatic. · Eyes: Conjunctiva normal, (-) scleral icterus. Mucus membranes moist.  · Mouth: Mucus membranes moist. Oropharynx clear. No deviation of the tongue or uvula. Normal dentition. · Respiratory: Decreased air movement. (+)  wheezes, (-)  rales, (-)  rhonchi. · Cardiovascular: RRR. (-)  murmurs, (-) gallops,  (-) rubs. S1 and S2 were normal.   · GI:  Abdomen soft, non-tender, non-distended. (+) BS. (-)  rebound, (-) guarding, (-) rigidity. · Extremities: Warm and well perfused. (-) clubbing, (-) cyanosis. 2+ distal pulses. (-) peripheral edema. · Neurologic:  Cranial nerves II-XII grossly intact. No focal neurological deficits.      On this date 9/28/2021 I have spent 35 minutes reviewing previous notes,

## 2021-09-29 ENCOUNTER — TELEPHONE (OUTPATIENT)
Dept: NON INVASIVE DIAGNOSTICS | Age: 61
End: 2021-09-29

## 2021-09-29 NOTE — TELEPHONE ENCOUNTER
Patient called to see when she needed to send a remote transmission this month. I informed her we received a transmission on 9/9/21. The remotes will come over automatically from now on, as long as her transmitter remains plugged in. Patient verbalized understanding.     Joanna Zamorano RN, BSN  [facility]

## 2021-10-01 ENCOUNTER — TELEPHONE (OUTPATIENT)
Dept: INTERNAL MEDICINE | Age: 61
End: 2021-10-01

## 2021-10-01 NOTE — TELEPHONE ENCOUNTER
 Called patient to up date on Labs and I was up date on patients current symptoms    Patient was identified at start of call    Labs are grossly normal.   Patient symptoms have improved but not resolved.   Reinforced stretching muscles up to 4 times daily and heating pads   No intervention at this time

## 2021-10-04 ENCOUNTER — APPOINTMENT (OUTPATIENT)
Dept: CT IMAGING | Age: 61
End: 2021-10-04
Payer: MEDICARE

## 2021-10-04 ENCOUNTER — HOSPITAL ENCOUNTER (EMERGENCY)
Age: 61
Discharge: HOME | End: 2021-10-05
Payer: MEDICARE

## 2021-10-04 DIAGNOSIS — I71.20 THORACIC AORTIC ANEURYSM WITHOUT RUPTURE: ICD-10-CM

## 2021-10-04 DIAGNOSIS — I71.40 ABDOMINAL AORTIC ANEURYSM (AAA) WITHOUT RUPTURE: ICD-10-CM

## 2021-10-04 DIAGNOSIS — S20.211A RIB CONTUSION, RIGHT, INITIAL ENCOUNTER: ICD-10-CM
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
## 2021-10-04 LAB
INR BLD: 1.7
INR BLD: 1.7
PROTHROMBIN TIME: 18.1 SEC (ref 9.3–12.4)

## 2021-10-04 PROCEDURE — 6370000000 HC RX 637 (ALT 250 FOR IP): Performed by: PHYSICIAN ASSISTANT

## 2021-10-04 PROCEDURE — 71250 CT THORAX DX C-: CPT

## 2021-10-04 PROCEDURE — 85610 PROTHROMBIN TIME: CPT

## 2021-10-04 PROCEDURE — 99284 EMERGENCY DEPT VISIT MOD MDM: CPT

## 2021-10-04 RX ORDER — OXYCODONE HYDROCHLORIDE AND ACETAMINOPHEN 5; 325 MG/1; MG/1
1 TABLET ORAL EVERY 6 HOURS PRN
Qty: 10 TABLET | Refills: 0 | Status: SHIPPED | OUTPATIENT
Start: 2021-10-04 | End: 2021-10-07

## 2021-10-04 RX ORDER — OXYCODONE HYDROCHLORIDE AND ACETAMINOPHEN 5; 325 MG/1; MG/1
1 TABLET ORAL ONCE
Status: COMPLETED | OUTPATIENT
Start: 2021-10-05 | End: 2021-10-05

## 2021-10-04 RX ORDER — OXYCODONE HYDROCHLORIDE AND ACETAMINOPHEN 5; 325 MG/1; MG/1
2 TABLET ORAL ONCE
Status: COMPLETED | OUTPATIENT
Start: 2021-10-04 | End: 2021-10-04

## 2021-10-04 RX ORDER — IPRATROPIUM BROMIDE AND ALBUTEROL SULFATE 2.5; .5 MG/3ML; MG/3ML
1 SOLUTION RESPIRATORY (INHALATION) ONCE
Status: COMPLETED | OUTPATIENT
Start: 2021-10-05 | End: 2021-10-05

## 2021-10-04 RX ADMIN — OXYCODONE HYDROCHLORIDE AND ACETAMINOPHEN 2 TABLET: 5; 325 TABLET ORAL at 17:56

## 2021-10-04 ASSESSMENT — PAIN SCALES - GENERAL
PAINLEVEL_OUTOF10: 7
PAINLEVEL_OUTOF10: 7

## 2021-10-04 ASSESSMENT — PAIN DESCRIPTION - PAIN TYPE: TYPE: ACUTE PAIN

## 2021-10-04 ASSESSMENT — PAIN DESCRIPTION - ORIENTATION: ORIENTATION: RIGHT

## 2021-10-04 ASSESSMENT — PAIN DESCRIPTION - LOCATION: LOCATION: RIB CAGE;CHEST

## 2021-10-04 NOTE — ED NOTES
smokeless tobacco. She reports current alcohol use. She reports that she does not use drugs. Family History: family history includes Arthritis in her father, mother, and paternal grandmother; Asthma in her brother, father, mother, and paternal grandmother; Breast Cancer in her sister; Diabetes in her father, maternal aunt, mother, and paternal aunt; Heart Disease in her father and mother; High Blood Pressure in her father, mother, and paternal aunt; High Cholesterol in her father and mother; Stroke in her mother.     Allergies: Keflex [cephalexin] and Phenergan [promethazine hcl]     Initial Plan of Care:  Initiate Treatment-Testing, Proceed toTreatment Area When Bed Available for ED Attending/MLP to Continue Care    -------------------------------------------------[address] CONTACT ASSESSMENT NOTE--------------------------------------------------------  Electronically signed by COOPER White   DD: 10/4/21       COOPER Mccloud  10/04/21 1739

## 2021-10-05 ENCOUNTER — ANTI-COAG VISIT (OUTPATIENT)
Dept: CARDIOLOGY CLINIC | Age: 61
End: 2021-10-05

## 2021-10-05 VITALS
HEART RATE: 70 BPM | BODY MASS INDEX: 27.32 KG/M2 | SYSTOLIC BLOOD PRESSURE: 187 MMHG | RESPIRATION RATE: 20 BRPM | WEIGHT: 164 LBS | DIASTOLIC BLOOD PRESSURE: 105 MMHG | TEMPERATURE: 98.5 F | HEIGHT: 65 IN | OXYGEN SATURATION: 96 %

## 2021-10-05 PROCEDURE — 6370000000 HC RX 637 (ALT 250 FOR IP): Performed by: NURSE PRACTITIONER

## 2021-10-05 PROCEDURE — 94664 DEMO&/EVAL PT USE INHALER: CPT

## 2021-10-05 RX ADMIN — IPRATROPIUM BROMIDE AND ALBUTEROL SULFATE 1 AMPULE: .5; 3 SOLUTION RESPIRATORY (INHALATION) at 00:00

## 2021-10-05 RX ADMIN — OXYCODONE HYDROCHLORIDE AND ACETAMINOPHEN 1 TABLET: 5; 325 TABLET ORAL at 00:18

## 2021-10-05 ASSESSMENT — PAIN SCALES - GENERAL: PAINLEVEL_OUTOF10: 7

## 2021-10-05 NOTE — ED PROVIDER NOTES
Independent   HPI:  10/4/21, Time: 11:46 PM EDT         Michelet Yusuf is a 57 y.o. female presenting to the ED for right anterior chest pain. Patient presents emergency department after having a mechanical fall earlier this afternoon. Patient reports that she was walking her dog outside but then the neighbor had their dog and the dog pushed on the leash causing her to get shoulder forward falling, states that the time she had a Yetti  Hard coffee mug in her hand and she struck her ribs on the top. She denies striking her head she denied feeling weak or dizzy prior to this fall. She reports increased pain to her right anterior rib. Denies any abdominal pain she also denies any actual chest pain or shortness of breath as well as no back or flank pain. She also denied feeling weak or dizzy prior to this fall. Patient with symptoms mild in severity and persistent. Patient is on Coumadin. She denies any loss of consciousness. Review of Systems:   A complete review of systems was performed and pertinent positives and negatives are stated within HPI, all other systems reviewed and are negative.          --------------------------------------------- PAST HISTORY ---------------------------------------------  Past Medical History:  has a past medical history of Anticoagulant long-term use, Anxiety, Ascending aortic aneurysm ([address] Asthma, CAD (coronary artery disease), Chronic back pain, COPD (chronic obstructive pulmonary disease) ([address] Depression, Depression, GERD (gastroesophageal reflux disease), Headache, Hyperlipidemia, Hypertension, On warfarin at home, Restless legs syndrome, S/P AVR, Syncope, Tobacco abuse, Urinary incontinence, and Ventricular tachycardia, non-sustained ([address] Past Surgical History:  has a past surgical history that includes Appendectomy; Hysterectomy;  section; Tubal ligation; Cardiac valve replacement (); Upper gastrointestinal endoscopy (N/A, 2019);  Colonoscopy (N/A, 11/18/2019); Cardiac catheterization (09/11/2020); Insertable Cardiac Monitor (09/11/2020); and KEM STEREO BREAST BX W LOC DEVICE 1ST LESION RIGHT (Right, 3/10/2021). Social History:  reports that she has been smoking cigarettes. She has a 22.50 pack-year smoking history. She has never used smokeless tobacco. She reports current alcohol use. She reports that she does not use drugs. Family History: family history includes Arthritis in her father, mother, and paternal grandmother; Asthma in her brother, father, mother, and paternal grandmother; Breast Cancer in her sister; Diabetes in her father, maternal aunt, mother, and paternal aunt; Heart Disease in her father and mother; High Blood Pressure in her father, mother, and paternal aunt; High Cholesterol in her father and mother; Stroke in her mother. The patients home medications have been reviewed. Allergies: Keflex [cephalexin] and Phenergan [promethazine hcl]    -------------------------------------------------- RESULTS -------------------------------------------------  All laboratory and radiology results have been personally reviewed by myself   LABS:  Results for orders placed or performed during the hospital encounter of 10/04/21   Protime-INR   Result Value Ref Range    Protime 18.1 (H) 9.3 - 12.4 sec    INR 1.7        RADIOLOGY:  Interpreted by Radiologist.  Kenya Keene   Final Result   No posttraumatic findings in the chest.  Specifically, no evidence of a rib   fracture. Stable 4.7 cm aneurysm of the ascending thoracic aorta. Additionally, a 3.1   cm infrarenal abdominal aortic aneurysm is partially imaged. A follow-up CT   of the abdomen and pelvis is recommended for further assessment of the   aneurysmal abdominal aorta. Stable 4 mm left lower lobe pulmonary nodule.   A 12 month follow-up chest CT   is recommended with a target date of February 2022, based on recommendations   from the chest CT performed February 9, 2021. Additional, incidental findings as above.             ------------------------- NURSING NOTES AND VITALS REVIEWED ---------------------------   The nursing notes within the ED encounter and vital signs as below have been reviewed. BP (!) 176/92   Pulse 70   Temp 98.5 °F (36.9 °C)   Resp 20   Ht 5' 5\" (1.651 m)   Wt 164 lb (74.4 kg)   LMP 11/08/2008 (LMP Unknown)   SpO2 96%   BMI 27.29 kg/m²   Oxygen Saturation Interpretation: Normal      ---------------------------------------------------PHYSICAL EXAM--------------------------------------      Constitutional/General: Alert and oriented x3, mildly uncomfortable  Head: Normocephalic and atraumatic  Eyes: PERRL, EOMI  Mouth: Oropharynx clear, handling secretions, no trismus  Neck: Supple, full ROM,   Pulmonary: Lungs clear to auscultation bilaterally, no wheezes, rales, or rhonchi. Not in respiratory distress, patient with point tenderness to right anterior rib #7, no flailing of the chest no area of ecchymosis noted. On reevaluation she did have some mild expiratory wheezing, she does have history of asthma and COPD and was actually due for breathing treatments. Cardiovascular:  Regular rate and rhythm, no murmurs, gallops, or rubs. 2+ distal pulses  Abdomen: Soft, non tender, non distended,   Extremities: Moves all extremities x 4. Warm and well perfused  Skin: warm and dry without rash  Neurologic: GCS 15,  Psych: Normal Affect      ------------------------------ ED COURSE/MEDICAL DECISION MAKING----------------------  Medications   oxyCODONE-acetaminophen (PERCOCET) 5-325 MG per tablet 2 tablet (2 tablets Oral Given 10/4/21 1756)   ipratropium-albuterol (DUONEB) nebulizer solution 1 ampule (1 ampule Inhalation Given 10/5/21 0000)   oxyCODONE-acetaminophen (PERCOCET) 5-325 MG per tablet 1 tablet (1 tablet Oral Given 10/5/21 0018)         ED COURSE:       Medical Decision Making: Plan will be for imaging, will obtain INR.   INR low at 1.7 she reports needing her Coumadin which she will take when she goes home. She denies any actual chest pain, shortness of breath or abdominal pain, did not strike her head. She was medicated here for symptom relief she was also provided with a DuoNeb treatment. Patient was made aware of all findings, the results are negative for any rib fracture, she was made aware of the stable 4.7 cm aneurysm of the ascending thoracic aorta and a 3.1 cm infrarenal abdominal aortic aneurysm she also does have a pulmonary nodule. Patient expressed understanding of good follow-up care she was provided with the name of a vascular surgeon. She will be discharged home with Percocet she was also educated to cough and deep breathe in the importance of using a pillow or blanket to splint in order to take deep breaths in. Patient otherwise nontoxic, neurovascular intact. Patient expressed understanding will be safely discharged home       Counseling: The emergency provider has spoken with the patient and discussed todays results, in addition to providing specific details for the plan of care and counseling regarding the diagnosis and prognosis. Questions are answered at this time and they are agreeable with the plan.      --------------------------------- IMPRESSION AND DISPOSITION ---------------------------------    IMPRESSION  1. Fall, initial encounter    2. Rib contusion, right, initial encounter    3. Thoracic aortic aneurysm without rupture ([facility]    4. Abdominal aortic aneurysm (AAA) without rupture [facility]        DISPOSITION  Disposition: Discharge to home  Patient condition is good      NOTE: This report was transcribed using voice recognition software.  Every effort was made to ensure accuracy; however, inadvertent computerized transcription errors may be present     ANNABELLE Mendez CNP  10/05/21 0469    ATTENDING PROVIDER ATTESTATION:     Supervising Physician, on-site, available for consultation, non-participatory in the evaluation or care of this patient         Darius Marr MD  10/05/21 0703

## 2021-10-06 ENCOUNTER — OFFICE VISIT (OUTPATIENT)
Dept: INTERNAL MEDICINE | Age: 61
End: 2021-10-06
Payer: MEDICARE

## 2021-10-06 VITALS
TEMPERATURE: 97.8 F | HEIGHT: 65 IN | WEIGHT: 163 LBS | SYSTOLIC BLOOD PRESSURE: 131 MMHG | RESPIRATION RATE: 16 BRPM | BODY MASS INDEX: 27.16 KG/M2 | DIASTOLIC BLOOD PRESSURE: 67 MMHG | HEART RATE: 73 BPM

## 2021-10-06 DIAGNOSIS — R07.81 RIB PAIN: ICD-10-CM

## 2021-10-06 DIAGNOSIS — J44.9 CHRONIC OBSTRUCTIVE PULMONARY DISEASE, UNSPECIFIED COPD TYPE (HCC): ICD-10-CM

## 2021-10-06 DIAGNOSIS — Z23 FLU VACCINE NEED: ICD-10-CM

## 2021-10-06 DIAGNOSIS — J44.1 COPD EXACERBATION (HCC): ICD-10-CM

## 2021-10-06 DIAGNOSIS — E78.5 HYPERLIPIDEMIA, UNSPECIFIED HYPERLIPIDEMIA TYPE: ICD-10-CM

## 2021-10-06 DIAGNOSIS — I71.20 THORACIC AORTIC ANEURYSM WITHOUT RUPTURE: Primary | ICD-10-CM

## 2021-10-06 PROCEDURE — G8427 DOCREV CUR MEDS BY ELIG CLIN: HCPCS | Performed by: INTERNAL MEDICINE

## 2021-10-06 PROCEDURE — G8419 CALC BMI OUT NRM PARAM NOF/U: HCPCS | Performed by: INTERNAL MEDICINE

## 2021-10-06 PROCEDURE — G8926 SPIRO NO PERF OR DOC: HCPCS | Performed by: INTERNAL MEDICINE

## 2021-10-06 PROCEDURE — 90694 VACC AIIV4 NO PRSRV 0.5ML IM: CPT

## 2021-10-06 PROCEDURE — 3017F COLORECTAL CA SCREEN DOC REV: CPT | Performed by: INTERNAL MEDICINE

## 2021-10-06 PROCEDURE — 99214 OFFICE O/P EST MOD 30 MIN: CPT | Performed by: INTERNAL MEDICINE

## 2021-10-06 PROCEDURE — 99212 OFFICE O/P EST SF 10 MIN: CPT | Performed by: INTERNAL MEDICINE

## 2021-10-06 PROCEDURE — 4004F PT TOBACCO SCREEN RCVD TLK: CPT | Performed by: INTERNAL MEDICINE

## 2021-10-06 PROCEDURE — 3023F SPIROM DOC REV: CPT | Performed by: INTERNAL MEDICINE

## 2021-10-06 PROCEDURE — G8482 FLU IMMUNIZE ORDER/ADMIN: HCPCS | Performed by: INTERNAL MEDICINE

## 2021-10-06 PROCEDURE — G0008 ADMIN INFLUENZA VIRUS VAC: HCPCS

## 2021-10-06 PROCEDURE — 6360000002 HC RX W HCPCS

## 2021-10-06 RX ORDER — ATORVASTATIN CALCIUM 20 MG/1
TABLET, FILM COATED ORAL
Qty: 90 TABLET | Refills: 1 | Status: SHIPPED
Start: 2021-10-06 | End: 2022-02-02 | Stop reason: SDUPTHER

## 2021-10-06 RX ORDER — PREDNISONE 20 MG/1
40 TABLET ORAL DAILY
Qty: 10 TABLET | Refills: 0 | Status: SHIPPED | OUTPATIENT
Start: 2021-10-06 | End: 2021-10-11

## 2021-10-06 RX ORDER — LIDOCAINE 50 MG/G
1 PATCH TOPICAL DAILY
Qty: 30 PATCH | Refills: 0 | Status: SHIPPED | OUTPATIENT
Start: 2021-10-06 | End: 2021-11-05

## 2021-10-06 RX ORDER — LIDOCAINE 4 G/G
1 PATCH TOPICAL DAILY
Qty: 30 PATCH | Refills: 0 | Status: SHIPPED
Start: 2021-10-06 | End: 2021-10-06 | Stop reason: ALTCHOICE

## 2021-10-06 NOTE — PATIENT INSTRUCTIONS
take can affect your balance. Sleeping pills or sedatives can affect your balance. · Limit the amount of alcohol you drink. Alcohol can impair your balance and other senses. · Ask your doctor whether calluses or corns on your feet need to be removed. If you wear loose-fitting shoes because of calluses or corns, you can lose your balance and fall. · Talk to your doctor if you have numbness in your feet. Preventing falls at home  · Remove raised doorway thresholds, throw rugs, and clutter. Repair loose carpet or raised areas in the floor. · Move furniture and electrical cords to keep them out of walking paths. · Use nonskid floor wax, and wipe up spills right away, especially on ceramic tile floors. · If you use a walker or cane, put rubber tips on it. If you use crutches, clean the bottoms of them regularly with an abrasive pad, such as steel wool. · Keep your house well lit, especially [place], and outside walkways. Use night-lights in areas such as hallways and bathrooms. Add extra light switches or use remote switches (such as switches that go on or off when you clap your hands) to make it easier to turn lights on if you have to get up during the night. · Install sturdy handrails on stairways. · Move items in your cabinets so that the things you use a lot are on the lower shelves (about waist level). · Keep a cordless phone and a flashlight with new batteries by your bed. If possible, put a phone in each of the main rooms of your house, or carry a cell phone in case you fall and cannot reach a phone. Or, you can wear a device around your neck or wrist. You push a button that sends a signal for help. · Wear low-heeled shoes that fit well and give your feet good support. Use footwear with nonskid soles. Check the heels and soles of your shoes for wear. Repair or replace worn heels or soles. · Do not wear socks without shoes on wood floors. · Walk on the grass when the sidewalks are slippery.

## 2021-10-06 NOTE — PROGRESS NOTES
Tameka Rodriguez 89  Internal Medicine Residency Program  [facility] Note      SUBJECTIVE:  CC: had concerns including Fall (ed follow up). David Wilson presented to the [facility] for a routine visit  She reports pain on the right side of the chest, 7/10 inn intensity. She has been taking oxycodone for the pain and it helps with the pain. She has been taking naproxen for the pain. Advised her to not to take naproxen as it increases bleeding risk in patient's on warfarin. Last ED visit on 10/21-due to chest pain due to rib contusion and fall. She uses albuterol inhaler when she is out of breath, especially when she exerts herself. She uses rescue inhaler 3-4 times a week. CT chest revealing stable 4.7 cm aneurysm of the ascending thoracic aorta, a 3.1cm infrarenal abdominal aortic aneurysm     She denies any nausea, vomiting, headache, dizziness, light headedness, constipation,diarrhea. She smokes half to one pack per day for the last 50 years. Review Of Systems:  General: no fevers, chills, weight loss or gain.    Ears/Nose/Throat: no hearing loss, tinnitus, vertigo, nosebleed, nasal congestion, rhinorrhea, sore throat  Respiratory: no cough, pleuritic chest pain, dyspnea, or wheezing  Cardiovascular: no chest pain, angina, dyspnea on exertion, orthopnea, PND, palpitations, or claudication  Gastrointestinal: no nausea, vomiting, heartburn, diarrhea, constipation, abdominal pain, hematochezia or melena  Genitourinary: no urinary urgency, frequency, dysuria, nocturia, hesitancy, or incontinence  Musculoskeletal: no arthritis, arthralgia, myalgia, weakness, or morning stiffness  Skin: no abnormal pigmentation, rash, itching, masses, hair or nail changes    Outpatient Medications Marked as Taking for the 10/6/21 encounter (Office Visit) with Jeannette Pardo MD   Medication Sig Dispense Refill    atorvastatin (LIPITOR) 20 MG tablet take 1 tablet by mouth once daily 90 tablet 1    Roflumilast (DALIRESP) 250 MCG tablet Take 1 tablet by mouth daily 30 tablet 3    predniSONE (DELTASONE) 20 MG tablet Take 2 tablets by mouth daily for 5 days 10 tablet 0    lidocaine (LIDODERM) 5 % Place 1 patch onto the skin daily 12 hours on, 12 hours off. 30 patch 0    oxyCODONE-acetaminophen (PERCOCET) 5-325 MG per tablet Take 1 tablet by mouth every 6 hours as needed for Pain for up to 3 days.  10 tablet 0    lisinopril (PRINIVIL;ZESTRIL) 20 MG tablet Take 1 tablet by mouth daily take 1 tablet by mouth once daily 90 tablet 1    metoprolol succinate (TOPROL XL) 25 MG extended release tablet Take 1 tablet by mouth daily 90 tablet 1    montelukast (SINGULAIR) 10 MG tablet take 1 tablet by mouth at bedtime 90 tablet 1    rOPINIRole (REQUIP) 0.25 MG tablet Take 1 tab in morning and 2 tabs before bed 270 tablet 1    Azelastine HCl 137 MCG/SPRAY SOLN 2 sprays by Nasal route daily 30 mL 2    fluticasone (FLONASE) 50 MCG/ACT nasal spray 2 sprays by Nasal route daily 1 each 2    omeprazole (PRILOSEC) 40 MG delayed release capsule take 1 capsule by mouth every morning BEFORE BREAKFAST 90 capsule 1    sertraline (ZOLOFT) 100 MG tablet take 1 and 1/2 tablet by mouth once daily 135 tablet 1    aspirin EC 81 MG EC tablet Take 1 tablet by mouth daily 90 tablet 1    albuterol (PROVENTIL) (2.5 MG/3ML) 0.083% nebulizer solution Take 3 mLs by nebulization every 6 hours as needed for Wheezing or Shortness of Breath 120 each 2    albuterol sulfate HFA (VENTOLIN HFA) 108 (90 Base) MCG/ACT inhaler Inhale 1 puff into the lungs every 4 hours as needed for Wheezing 18 g 2    Magnesium Oxide (MAGNESIUM-OXIDE) 250 MG TABS tablet Take 1 tablet by mouth daily 90 tablet 1    warfarin (COUMADIN) 5 MG tablet Take 1 tablet by mouth daily On Sunday, Tues, Thurs & Sat 90 tablet 3    warfarin (COUMADIN) 6 MG tablet Take 1 tablet by mouth daily Mondays and Wednesdays 90 tablet 0    Budeson-Glycopyrrol-Formoterol (BREZTRI AEROSPHERE) 160-9-4.8 MCG/ACT AERO Inhale 2 puffs into the lungs 2 times daily 1 Inhaler 6       I have reviewed all pertinent PMHx, PSHx, FamHx, SocialHx, medications, and allergies and updated history as appropriate. OBJECTIVE:    VS: /67   Pulse 73   Temp 97.8 °F (36.6 °C) (Temporal)   Resp 16   Ht 5' 5\" (1.651 m)   Wt 163 lb (73.9 kg)   LMP 11/08/2008 (LMP Unknown)   BMI 27.12 kg/m²   General appearance: Alert, Awake, Oriented times 3, no distress  Lungs: Lungs clear to auscultation bilaterally. No rhonchi, crackles or wheezes  Heart: S1 S2  Regular rate and rhythm. No rub, murmur or gallop  Abdomen: Abdomen soft, non-tender. non-distended BS normal. No masses, organomegaly, no guarding rebound or rigidity.   Extremities: No edema, Peripheral pulses palpable 2/4  Neuro: alert, awake, no gross focal neurologic deficit    ASSESSMENT/PLAN:    Chest pain likely due to rib contusion  Reports pain on the right side of the chest, 7/10 inn intensity  On oxycodone for the pain prescribed from ED  Advised her to not to take naproxen as it increases bleeding risk in patient's on warfarin  Tylenol as needed for pain  Lidocaine patch ordered    Abdominal and thoracic aortic aneurysm  CT abdomen revealing ascending thoracic aortic aneurysm increasing in size from 4.1 to 4.7 cm and 3.1 cm abdominal aortic aneurysm  Vascular surgery referral provided for further evaluation    COPD  Worsening; has been recurring more rescue inhaler the last few weeks  Prednisone 40 mg daily for 5 days  On budesonide-glycopyrrol-formoterol (Breztri) 2 puffs BID, roflumilast 250 mcg daily, montelukast 10 mg daily, albuterol 1 puff q4hrs prn  Follows with pulmonology:Dr Engel     S/p Fall  Fall precautions discussed    S/p AVR, mechanical aortic valve  On warfarin 5 mg daily Sun,Tue,Thu,Sat; warfarin 6 mg Mon, Wed  last INR 1.7 (10/21)  Follows with cardiology Dr. Prem Draper    HFpEF with hx CAD   Stable  Last echocardiography re- EF 55-60% (2/18/20), Stephane Jones aortic regurgitation, Mildly dilated aortic root    HTN  Controlled  Pressure today 131/67 mmHg  Continue lisinopril to 20 mg daily , metoprolol succinate 25 mg daily  The 10-year ASCVD risk score (Nicolle Almazan, et al., 2013) is: 11.1%  Advised to monitor blood pressure at home and if blood pressure more than 130/80, to contact us for blood pressure medication adjustment as she has the abdominal and thoracic aortic aneurysm    Hyperlipidemia  Continue atorvastatin 20 mg daily  The 10-year ASCVD risk score (Nicolle Almazan, et al., 2013) is: 11.1%    Restless leg syndrome  On ropinirole 0.25 mg, 1 tab in AM, 2 tabs before bed    Nocturnal Muscle cramps  Stable  Continue magnesium  Stretching exercises     PreDM  HbA1C 6.2% (5/21)  Lifestyle modification    Pulmonary nodule  CT chest revealing 4 mm left lower lobe pulmonary nodule    I have reviewed my findings and recommendations with Jw Valencia and Dr Mohinder Gomez MD PGY-3  10/6/2021 4:25 PM

## 2021-10-06 NOTE — PROGRESS NOTES
Tameka Rodriguez 026  Internal Medicine Clinic    Attending Physician Statement:  Lucio Mijares M.D., F.A.C.P. I have seen/discussed the case, including pertinent history and exam findings with the resident. I agree with the assessment, plan and orders as documented by the resident. Patient is seen for ER fu visit today. - sp fall ONE day ago, tripped dog  Rib cage pain- trial topical pain meds  Can't prescribe opioids from here  In ER  No posttraumatic findings in the chest.  Specifically, no evidence of a rib   fracture.       Stable 4.7 cm aneurysm of the ascending thoracic aorta.  Additionally, a 3.1   cm infrarenal abdominal aortic aneurysm is partially imaged. Grown from 2016-- thorasic grown from 2016:  Ascending thoracic aortic aneurysm measuring   approximately 4.1 x 4.3 cm     bp 130/80 on bp meds  Statin 10  Plan lipitor 20mg daily -- dec risk of aneurysm growth    Asthma roflumiast NOT covered- defer to pulm  Continue same puffer  +wheezing  Short course of prednisone planned  Labs Up to date    aic 6.2 preDM  Last office notes reviewed, relative labs and imaging. Remainder of medical problems as per resident note.

## 2021-10-08 ENCOUNTER — TELEPHONE (OUTPATIENT)
Dept: VASCULAR SURGERY | Age: 61
End: 2021-10-08

## 2021-10-08 NOTE — TELEPHONE ENCOUNTER
Patient called to schedule appointment with Dr. Krista Rain for evaluation of aneurysms. Notified her that Dr. Krista Rain would see her for the abdominal aortic aneurysm but she will need referred to cardiothoracic surgeon for evaluation of ascending thoracic aortic aneurysm (referral in [facility] is only to vascular surgery). Asked her to contact internal medicine clinic regarding the same. Asked her to call back if she wished to schedule appt with Dr. Krista Rain for evaluation of abdominal aortic aneurysm.

## 2021-10-12 ENCOUNTER — ANTI-COAG VISIT (OUTPATIENT)
Dept: CARDIOLOGY CLINIC | Age: 61
End: 2021-10-12
Payer: MEDICARE

## 2021-10-12 DIAGNOSIS — Z95.2 H/O MECHANICAL AORTIC VALVE REPLACEMENT: ICD-10-CM

## 2021-10-12 LAB
INTERNATIONAL NORMALIZATION RATIO, POC: 3.9
PROTHROMBIN TIME, POC: NORMAL

## 2021-10-12 PROCEDURE — 85610 PROTHROMBIN TIME: CPT | Performed by: INTERNAL MEDICINE

## 2021-10-12 PROCEDURE — 93793 ANTICOAG MGMT PT WARFARIN: CPT | Performed by: INTERNAL MEDICINE

## 2021-10-12 NOTE — PROGRESS NOTES
10/12/21 INR today is high at 3.9. Ordered to take 5 mg daily on Sun, Tues, Thurs & Sat,  none on Friday, 6mg mon and wed. Pt mistakenly took 6mg on M& F, 5 mg all other days with no day off. Also just came off of a course of Prednisone. Per Dr Jhon Preston, hold for 2 days, then 5 mg daily thereafter.  PT/INR in 1 week

## 2021-10-13 ENCOUNTER — TELEPHONE (OUTPATIENT)
Dept: INTERNAL MEDICINE | Age: 61
End: 2021-10-13

## 2021-10-13 NOTE — TELEPHONE ENCOUNTER
Prior auth attempted for Lidocaine Patch 5%. DENIED and reads in part:    Lidocaine transdermal patch is not FDA approved for your medical conditions: pleurodynia and/or rib pain. These conditions are not supported by on of the accepted references ([facility])    Please order something else appropriate for the patient. Please advise if there is anything I can do to help.   Thanks,  Rachel Dhillon LPN

## 2021-10-19 ENCOUNTER — ANTI-COAG VISIT (OUTPATIENT)
Dept: CARDIOLOGY CLINIC | Age: 61
End: 2021-10-19
Payer: MEDICARE

## 2021-10-19 DIAGNOSIS — Z95.2 H/O MECHANICAL AORTIC VALVE REPLACEMENT: ICD-10-CM

## 2021-10-19 LAB
INTERNATIONAL NORMALIZATION RATIO, POC: 2.8
PROTHROMBIN TIME, POC: NORMAL

## 2021-10-19 PROCEDURE — 93793 ANTICOAG MGMT PT WARFARIN: CPT | Performed by: INTERNAL MEDICINE

## 2021-10-19 PROCEDURE — 85610 PROTHROMBIN TIME: CPT | Performed by: INTERNAL MEDICINE

## 2021-10-21 ENCOUNTER — TELEPHONE (OUTPATIENT)
Dept: INTERNAL MEDICINE | Age: 61
End: 2021-10-21

## 2021-10-21 DIAGNOSIS — I71.20 THORACIC AORTIC ANEURYSM WITHOUT RUPTURE: Primary | ICD-10-CM

## 2021-10-21 NOTE — TELEPHONE ENCOUNTER
----- Message from Susannah Vogel sent at 10/21/2021  1:27 PM EDT -----  Subject: Referral Request    QUESTIONS   Reason for referral request? Pt needs a referral for a cardiovascular   surgeon for her two aneurisms, please advise asap as she is eager to get   this taken care of before one ruptures. Has the physician seen you for this condition before? No   Preferred Specialist (if applicable)? Do you already have an appointment scheduled? No  Additional Information for Provider?   ---------------------------------------------------------------------------  --------------  CALL BACK INFO  What is the best way for the office to contact you? OK to leave message on   voicemail  Preferred Call Back Phone Number?  [phone]

## 2021-10-22 ENCOUNTER — TELEPHONE (OUTPATIENT)
Dept: CARDIOTHORACIC SURGERY | Age: 61
End: 2021-10-22

## 2021-11-10 DIAGNOSIS — Z95.2 H/O MECHANICAL AORTIC VALVE REPLACEMENT: ICD-10-CM

## 2021-11-10 RX ORDER — WARFARIN SODIUM 5 MG/1
TABLET ORAL
Qty: 90 TABLET | Refills: 3 | Status: ON HOLD
Start: 2021-11-10 | End: 2022-04-02 | Stop reason: SDUPTHER

## 2021-11-11 ENCOUNTER — ANTI-COAG VISIT (OUTPATIENT)
Dept: CARDIOLOGY CLINIC | Age: 61
End: 2021-11-11
Payer: MEDICARE

## 2021-11-11 DIAGNOSIS — Z95.2 H/O MECHANICAL AORTIC VALVE REPLACEMENT: ICD-10-CM

## 2021-11-11 LAB
INTERNATIONAL NORMALIZATION RATIO, POC: 3
PROTHROMBIN TIME, POC: NORMAL

## 2021-11-11 PROCEDURE — 85610 PROTHROMBIN TIME: CPT | Performed by: INTERNAL MEDICINE

## 2021-11-11 PROCEDURE — 93793 ANTICOAG MGMT PT WARFARIN: CPT | Performed by: INTERNAL MEDICINE

## 2021-12-14 ENCOUNTER — OFFICE VISIT (OUTPATIENT)
Dept: INTERNAL MEDICINE | Age: 61
End: 2021-12-14
Payer: MEDICARE

## 2021-12-14 VITALS
RESPIRATION RATE: 16 BRPM | WEIGHT: 159 LBS | HEIGHT: 65 IN | DIASTOLIC BLOOD PRESSURE: 70 MMHG | TEMPERATURE: 97 F | OXYGEN SATURATION: 96 % | SYSTOLIC BLOOD PRESSURE: 133 MMHG | BODY MASS INDEX: 26.49 KG/M2 | HEART RATE: 68 BPM

## 2021-12-14 DIAGNOSIS — G25.81 RESTLESS LEG SYNDROME: ICD-10-CM

## 2021-12-14 DIAGNOSIS — J44.9 CHRONIC OBSTRUCTIVE PULMONARY DISEASE, UNSPECIFIED COPD TYPE (HCC): Primary | ICD-10-CM

## 2021-12-14 PROCEDURE — G8419 CALC BMI OUT NRM PARAM NOF/U: HCPCS | Performed by: INTERNAL MEDICINE

## 2021-12-14 PROCEDURE — 3017F COLORECTAL CA SCREEN DOC REV: CPT | Performed by: INTERNAL MEDICINE

## 2021-12-14 PROCEDURE — 3023F SPIROM DOC REV: CPT | Performed by: INTERNAL MEDICINE

## 2021-12-14 PROCEDURE — G8926 SPIRO NO PERF OR DOC: HCPCS | Performed by: INTERNAL MEDICINE

## 2021-12-14 PROCEDURE — 4004F PT TOBACCO SCREEN RCVD TLK: CPT | Performed by: INTERNAL MEDICINE

## 2021-12-14 PROCEDURE — 99213 OFFICE O/P EST LOW 20 MIN: CPT | Performed by: INTERNAL MEDICINE

## 2021-12-14 PROCEDURE — G8427 DOCREV CUR MEDS BY ELIG CLIN: HCPCS | Performed by: INTERNAL MEDICINE

## 2021-12-14 PROCEDURE — G8482 FLU IMMUNIZE ORDER/ADMIN: HCPCS | Performed by: INTERNAL MEDICINE

## 2021-12-14 RX ORDER — ROPINIROLE 0.25 MG/1
TABLET, FILM COATED ORAL
Qty: 270 TABLET | Refills: 1 | Status: SHIPPED
Start: 2021-12-14 | End: 2022-05-11 | Stop reason: SDUPTHER

## 2021-12-14 SDOH — ECONOMIC STABILITY: INCOME INSECURITY: IN THE LAST 12 MONTHS, WAS THERE A TIME WHEN YOU WERE NOT ABLE TO PAY THE MORTGAGE OR RENT ON TIME?: NO

## 2021-12-14 SDOH — ECONOMIC STABILITY: FOOD INSECURITY: WITHIN THE PAST 12 MONTHS, YOU WORRIED THAT YOUR FOOD WOULD RUN OUT BEFORE YOU GOT MONEY TO BUY MORE.: SOMETIMES TRUE

## 2021-12-14 SDOH — ECONOMIC STABILITY: HOUSING INSECURITY: IN THE LAST 12 MONTHS, HOW MANY PLACES HAVE YOU LIVED?: 1

## 2021-12-14 SDOH — ECONOMIC STABILITY: HOUSING INSECURITY
IN THE LAST 12 MONTHS, WAS THERE A TIME WHEN YOU DID NOT HAVE A STEADY PLACE TO SLEEP OR SLEPT IN A SHELTER (INCLUDING NOW)?: NO

## 2021-12-14 SDOH — ECONOMIC STABILITY: FOOD INSECURITY: WITHIN THE PAST 12 MONTHS, THE FOOD YOU BOUGHT JUST DIDN'T LAST AND YOU DIDN'T HAVE MONEY TO GET MORE.: SOMETIMES TRUE

## 2021-12-14 ASSESSMENT — PATIENT HEALTH QUESTIONNAIRE - PHQ9
SUM OF ALL RESPONSES TO PHQ9 QUESTIONS 1 & 2: 6
SUM OF ALL RESPONSES TO PHQ QUESTIONS 1-9: 14
6. FEELING BAD ABOUT YOURSELF - OR THAT YOU ARE A FAILURE OR HAVE LET YOURSELF OR YOUR FAMILY DOWN: 1
3. TROUBLE FALLING OR STAYING ASLEEP: 3
8. MOVING OR SPEAKING SO SLOWLY THAT OTHER PEOPLE COULD HAVE NOTICED. OR THE OPPOSITE, BEING SO FIGETY OR RESTLESS THAT YOU HAVE BEEN MOVING AROUND A LOT MORE THAN USUAL: 0
9. THOUGHTS THAT YOU WOULD BE BETTER OFF DEAD, OR OF HURTING YOURSELF: 0
5. POOR APPETITE OR OVEREATING: 1
7. TROUBLE CONCENTRATING ON THINGS, SUCH AS READING THE NEWSPAPER OR WATCHING TELEVISION: 0
4. FEELING TIRED OR HAVING LITTLE ENERGY: 3
SUM OF ALL RESPONSES TO PHQ QUESTIONS 1-9: 14
2. FEELING DOWN, DEPRESSED OR HOPELESS: 3
1. LITTLE INTEREST OR PLEASURE IN DOING THINGS: 3
10. IF YOU CHECKED OFF ANY PROBLEMS, HOW DIFFICULT HAVE THESE PROBLEMS MADE IT FOR YOU TO DO YOUR WORK, TAKE CARE OF THINGS AT HOME, OR GET ALONG WITH OTHER PEOPLE: 2

## 2021-12-14 ASSESSMENT — LIFESTYLE VARIABLES
HOW OFTEN DO YOU HAVE A DRINK CONTAINING ALCOHOL: MONTHLY OR LESS
HOW MANY STANDARD DRINKS CONTAINING ALCOHOL DO YOU HAVE ON A TYPICAL DAY: 1 OR 2

## 2021-12-14 ASSESSMENT — ENCOUNTER SYMPTOMS
DIARRHEA: 0
COUGH: 0
BLOOD IN STOOL: 0
EYE DISCHARGE: 0
CONSTIPATION: 0
ABDOMINAL PAIN: 0
VOMITING: 0
WHEEZING: 1
NAUSEA: 0
SORE THROAT: 0
SHORTNESS OF BREATH: 1

## 2021-12-14 ASSESSMENT — SOCIAL DETERMINANTS OF HEALTH (SDOH): HOW HARD IS IT FOR YOU TO PAY FOR THE VERY BASICS LIKE FOOD, HOUSING, MEDICAL CARE, AND HEATING?: HARD

## 2021-12-14 NOTE — PROGRESS NOTES
Tameka Rodriguez 476  InternalMedicine Residency Program  [facility] Note      SUBJECTIVE:  CC: had concerns including Follow-up (5 week follow up-s/p fall. pt states she is feeling better). HPI:    Bella Nolen is a 57 y. o.female who came in for follow up of contusion after fall and respiratory symptoms. Pain from fall minimal. No tenderness now and ROM of R arm is no longer limited. COPD continues to limit her activities. Improvement of symptoms after short course of steroids but at baseline continues to need to use her rescue inhaler 4 - 6x/day. Reviewed imaging with her. Discussed restarting pulmonary rehab and tobacco cessation program however patient would like to defer at this time. Review of Systems   Constitutional: Negative for chills and fever. HENT: Negative for congestion and sore throat. Eyes: Negative for discharge and visual disturbance. Respiratory: Positive for shortness of breath and wheezing. Negative for cough. Cardiovascular: Negative for chest pain, palpitations and leg swelling. Gastrointestinal: Negative for abdominal pain, blood in stool, constipation, diarrhea, nausea and vomiting. Endocrine: Negative for cold intolerance and heat intolerance. Genitourinary: Negative for dysuria, frequency and urgency. Musculoskeletal: Negative for myalgias. Skin: Negative for pallor and rash. Neurological: Negative for syncope, weakness and headaches.        Current Outpatient Medications on File Prior to Visit   Medication Sig Dispense Refill    warfarin (COUMADIN) 5 MG tablet Take 1 tablet by mouth daily 90 tablet 3    atorvastatin (LIPITOR) 20 MG tablet take 1 tablet by mouth once daily 90 tablet 1    Roflumilast (DALIRESP) 250 MCG tablet Take 1 tablet by mouth daily 30 tablet 3    lisinopril (PRINIVIL;ZESTRIL) 20 MG tablet Take 1 tablet by mouth daily take 1 tablet by mouth once daily 90 tablet 1    metoprolol succinate (TOPROL XL) 25 MG extended release tablet Take 1 tablet by mouth daily 90 tablet 1    montelukast (SINGULAIR) 10 MG tablet take 1 tablet by mouth at bedtime 90 tablet 1    rOPINIRole (REQUIP) 0.25 MG tablet Take 1 tab in morning and 2 tabs before bed 270 tablet 1    Azelastine HCl 137 MCG/SPRAY SOLN 2 sprays by Nasal route daily 30 mL 2    fluticasone (FLONASE) 50 MCG/ACT nasal spray 2 sprays by Nasal route daily 1 each 2    omeprazole (PRILOSEC) 40 MG delayed release capsule take 1 capsule by mouth every morning BEFORE BREAKFAST 90 capsule 1    sertraline (ZOLOFT) 100 MG tablet take 1 and 1/2 tablet by mouth once daily 135 tablet 1    aspirin EC 81 MG EC tablet Take 1 tablet by mouth daily 90 tablet 1    albuterol (PROVENTIL) (2.5 MG/3ML) 0.083% nebulizer solution Take 3 mLs by nebulization every 6 hours as needed for Wheezing or Shortness of Breath 120 each 2    albuterol sulfate HFA (VENTOLIN HFA) 108 (90 Base) MCG/ACT inhaler Inhale 1 puff into the lungs every 4 hours as needed for Wheezing 18 g 2    Magnesium Oxide (MAGNESIUM-OXIDE) 250 MG TABS tablet Take 1 tablet by mouth daily 90 tablet 1    warfarin (COUMADIN) 6 MG tablet Take 1 tablet by mouth daily Mondays and Wednesdays 90 tablet 0    Budeson-Glycopyrrol-Formoterol (BREZTRI AEROSPHERE) 160-9-4.8 MCG/ACT AERO Inhale 2 puffs into the lungs 2 times daily 1 Inhaler 6    Calcium Polycarbophil (FIBER) 625 MG TABS Take 1 tablet by mouth daily (Patient not taking: Reported on 9/28/2021) 90 tablet 1    Blood Pressure Monitoring (BLOOD PRESSURE CUFF) MISC Dx:  Hypertension with labile blood pressure 1 each 0     No current facility-administered medications on file prior to visit. OBJECTIVE:    VS: LMP 11/08/2008 (LMP Unknown)   Physical Exam  Constitutional:       Appearance: She is well-developed. HENT:      Head: Normocephalic and atraumatic. Eyes:      Conjunctiva/sclera: Conjunctivae normal.   Cardiovascular:      Rate and Rhythm: Normal rate and regular rhythm. Heart sounds: Normal heart sounds. No murmur heard. Pulmonary:      Breath sounds: No rales. Comments: Still with occasional expiratory wheezing. No respiratory distress. Speaks in complete sentences. Abdominal:      General: Bowel sounds are normal. There is no distension. Palpations: Abdomen is soft. There is no mass. Tenderness: There is no abdominal tenderness. Musculoskeletal:         General: No tenderness or deformity. Cervical back: Neck supple. Comments: R posterior arm normal ROM  Over the R posterior ribs - no bruising, no tenderness   Lymphadenopathy:      Cervical: No cervical adenopathy. Skin:     General: Skin is warm. Neurological:      Mental Status: She is alert and oriented to person, place, and time. ASSESSMENT/PLAN:    I have reviewed all pertient PMHx, PSHx, FamHx, Social Hx, medications, and allergies andupdated history as appropriate. Contusion from fall, resolved    Severe COPD with ongoing tobacco use  · Last quit for 3 months in 2019 during tobacco cessation classes  · After long discussion, she would like to defer restarting pulmonary rehab and tobacco cessation classes  · Symptoms of depression related to her illness which has been limiting her interaction with family and doing her daily activities  · We discussed referral to Palliative Services to discuss goals of care - she would really to focus on her family and feels she does not have time to attend the many follow ups if we refer her to rehab. · DME for nebulizer given today as she has been using her son's    RLS  · Can increase Requip to 0.5 mg BID      RTC: AWV with Dr Safia Huynh next month    Isidro Cifuentes MD  Internal Medicine  12/14/2021 9:46 AM

## 2021-12-14 NOTE — PATIENT INSTRUCTIONS
Dear Chong Rich,        Thank you for coming to your appointment today. I hope we have addressed all of your needs. Please make sure to do the following:  - Continue your medications as listed. - Referrals have been made to palliative care. If you do not hear from the office in 1 week, please call the number listed. - We will send request for a nebulizer. The supply office will contact you for a delivery time. Call for a sooner appointment if you develop increasing shortness of breath, cough, fever, increased sputum production. Have a great day! Sincerely,  Марина Wade M.D  12/14/2021  10:22 AM

## 2021-12-14 NOTE — PROGRESS NOTES
Pt screened positive for SDOH related to financial strain, transportation and or food insecurity and declined further contact for assessment/resources. Pt with no preference for DME-Ok with [facility] if possible. Message left for [facility].     Discharge instructions and AVS given to patient per Dr. Potter Party. [facility] returned call. Able to pull up order. Will need to verify pt's insurance and if ok will reach out to patient for either  or delivery.

## 2021-12-17 ENCOUNTER — TELEPHONE (OUTPATIENT)
Dept: PALLATIVE CARE | Age: 61
End: 2021-12-17

## 2021-12-17 NOTE — TELEPHONE ENCOUNTER
Called to [facility] regarding referral for Palliative care. No answer, left VM. Contact number left in message.

## 2021-12-21 ENCOUNTER — TELEPHONE (OUTPATIENT)
Dept: PALLATIVE CARE | Age: 61
End: 2021-12-21

## 2021-12-21 NOTE — TELEPHONE ENCOUNTER
SW contacted pt regarding referral to Palliative Med. Pt answered the phone tearful, SW introduced self, pt stated she was not doing okay at the time as she just received news that her brother had passed away. SW offered support to pt over the phone, stated I would not burden her at this time with trying to make an appt and a staff member would give her a call in a few days. Pt stated her brother lived 3 hrs away and she would need to travel and was not sure what arrangements would be. SW provided support and empathic responses. Pt would like a call back in the near future to get an appt scheduled.

## 2021-12-30 ENCOUNTER — TELEPHONE (OUTPATIENT)
Dept: PALLATIVE CARE | Age: 61
End: 2021-12-30

## 2021-12-30 NOTE — TELEPHONE ENCOUNTER
Called and spoke with Franca Bone, she is out of town to attend her brother's  and requests a call back next week to get heena with palliative care.

## 2022-01-04 ENCOUNTER — TELEPHONE (OUTPATIENT)
Dept: PULMONOLOGY | Age: 62
End: 2022-01-04

## 2022-01-04 DIAGNOSIS — J44.9 CHRONIC OBSTRUCTIVE PULMONARY DISEASE, UNSPECIFIED COPD TYPE (HCC): ICD-10-CM

## 2022-01-04 RX ORDER — ALBUTEROL SULFATE 90 UG/1
AEROSOL, METERED RESPIRATORY (INHALATION)
Qty: 8.5 G | Refills: 2 | Status: SHIPPED
Start: 2022-01-04 | End: 2022-03-28

## 2022-01-04 NOTE — TELEPHONE ENCOUNTER
Last Appointment:  9/28/2021  Future Appointments   Date Time Provider Esmer Barrerai   2/2/2022 10:30 AM Rafiq Yu DO [facility]   2/14/2022 10:00 AM [facility] CT SCAN 3 SEY[facility] CT SE [facility]   2/21/2022  9:00 AM [facility]

## 2022-01-04 NOTE — TELEPHONE ENCOUNTER
Mailed a letter to patient informing her that her CT Chest is scheduled for 2-14-22 at 10:00 am at the [facility]. She must arrive by 9:30 am. There is no prep for this test

## 2022-01-07 ENCOUNTER — TELEPHONE (OUTPATIENT)
Dept: PALLATIVE CARE | Age: 62
End: 2022-01-07

## 2022-01-07 NOTE — TELEPHONE ENCOUNTER
Call to Rachael Finley regarding referral from Dr. Gill Daly for Palliative Care. Explained Palliative care service and location of clinic. Zachary set 1/25/22.

## 2022-01-14 ENCOUNTER — TELEPHONE (OUTPATIENT)
Dept: INTERNAL MEDICINE | Age: 62
End: 2022-01-14

## 2022-01-14 NOTE — TELEPHONE ENCOUNTER
Spoke with patient. Patient said she got tested 1-9-22. Pt is positive. Patients symptoms were fatigue,nausea, a bit of congestion. But said she woke up this morning feeling better. She would like to keep her appointment on 2/2/22 with Dr. Deo Khan.

## 2022-01-14 NOTE — TELEPHONE ENCOUNTER
Patient states she tested positive for COVID-19 ON 1-9-22. Tested through [facility]). Patient states she doesn't know what to do further. She would like a call back.  Pt [phone]

## 2022-01-23 ASSESSMENT — LIFESTYLE VARIABLES
HOW OFTEN DURING THE LAST YEAR HAVE YOU FAILED TO DO WHAT WAS NORMALLY EXPECTED FROM YOU BECAUSE OF DRINKING: 0
HOW OFTEN DO YOU HAVE SIX OR MORE DRINKS ON ONE OCCASION: NEVER
HAVE YOU OR SOMEONE ELSE BEEN INJURED AS A RESULT OF YOUR DRINKING: NO
HOW OFTEN DURING THE LAST YEAR HAVE YOU NEEDED AN ALCOHOLIC DRINK FIRST THING IN THE MORNING TO GET YOURSELF GOING AFTER A NIGHT OF HEAVY DRINKING: NEVER
HOW OFTEN DURING THE LAST YEAR HAVE YOU BEEN UNABLE TO REMEMBER WHAT HAPPENED THE NIGHT BEFORE BECAUSE YOU HAD BEEN DRINKING: NEVER
HOW OFTEN DURING THE LAST YEAR HAVE YOU NEEDED AN ALCOHOLIC DRINK FIRST THING IN THE MORNING TO GET YOURSELF GOING AFTER A NIGHT OF HEAVY DRINKING: 0
HOW OFTEN DURING THE LAST YEAR HAVE YOU FAILED TO DO WHAT WAS NORMALLY EXPECTED FROM YOU BECAUSE OF DRINKING: NEVER
HOW MANY STANDARD DRINKS CONTAINING ALCOHOL DO YOU HAVE ON A TYPICAL DAY: 0
AUDIT-C TOTAL SCORE: 1
HOW OFTEN DO YOU HAVE A DRINK CONTAINING ALCOHOL: MONTHLY OR LESS
AUDIT-C TOTAL SCORE: 0
HOW OFTEN DURING THE LAST YEAR HAVE YOU FOUND THAT YOU WERE NOT ABLE TO STOP DRINKING ONCE YOU HAD STARTED: NEVER
HAS A RELATIVE, FRIEND, DOCTOR, OR ANOTHER HEALTH PROFESSIONAL EXPRESSED CONCERN ABOUT YOUR DRINKING OR SUGGESTED YOU CUT DOWN: 0
HOW OFTEN DO YOU HAVE A DRINK CONTAINING ALCOHOL: 1
HAS A RELATIVE, FRIEND, DOCTOR, OR ANOTHER HEALTH PROFESSIONAL EXPRESSED CONCERN ABOUT YOUR DRINKING OR SUGGESTED YOU CUT DOWN: NO
HOW OFTEN DURING THE LAST YEAR HAVE YOU HAD A FEELING OF GUILT OR REMORSE AFTER DRINKING: NEVER
HOW OFTEN DO YOU HAVE SIX OR MORE DRINKS ON ONE OCCASION: 0
HOW OFTEN DURING THE LAST YEAR HAVE YOU FOUND THAT YOU WERE NOT ABLE TO STOP DRINKING ONCE YOU HAD STARTED: 0
AUDIT TOTAL SCORE: 1
HOW OFTEN DURING THE LAST YEAR HAVE YOU BEEN UNABLE TO REMEMBER WHAT HAPPENED THE NIGHT BEFORE BECAUSE YOU HAD BEEN DRINKING: 0
AUDIT TOTAL SCORE: 0
HOW MANY STANDARD DRINKS CONTAINING ALCOHOL DO YOU HAVE ON A TYPICAL DAY: ONE OR TWO
HAVE YOU OR SOMEONE ELSE BEEN INJURED AS A RESULT OF YOUR DRINKING: 0
HOW OFTEN DURING THE LAST YEAR HAVE YOU HAD A FEELING OF GUILT OR REMORSE AFTER DRINKING: 0

## 2022-01-23 ASSESSMENT — PATIENT HEALTH QUESTIONNAIRE - PHQ9
SUM OF ALL RESPONSES TO PHQ QUESTIONS 1-9: 8
6. FEELING BAD ABOUT YOURSELF - OR THAT YOU ARE A FAILURE OR HAVE LET YOURSELF OR YOUR FAMILY DOWN: 1
2. FEELING DOWN, DEPRESSED OR HOPELESS: 1
9. THOUGHTS THAT YOU WOULD BE BETTER OFF DEAD, OR OF HURTING YOURSELF: 0
1. LITTLE INTEREST OR PLEASURE IN DOING THINGS: 2
SUM OF ALL RESPONSES TO PHQ9 QUESTIONS 1 & 2: 3
SUM OF ALL RESPONSES TO PHQ QUESTIONS 1-9: 8
3. TROUBLE FALLING OR STAYING ASLEEP: 1
SUM OF ALL RESPONSES TO PHQ QUESTIONS 1-9: 8
8. MOVING OR SPEAKING SO SLOWLY THAT OTHER PEOPLE COULD HAVE NOTICED. OR THE OPPOSITE, BEING SO FIGETY OR RESTLESS THAT YOU HAVE BEEN MOVING AROUND A LOT MORE THAN USUAL: 0
5. POOR APPETITE OR OVEREATING: 1
7. TROUBLE CONCENTRATING ON THINGS, SUCH AS READING THE NEWSPAPER OR WATCHING TELEVISION: 0
SUM OF ALL RESPONSES TO PHQ QUESTIONS 1-9: 8
10. IF YOU CHECKED OFF ANY PROBLEMS, HOW DIFFICULT HAVE THESE PROBLEMS MADE IT FOR YOU TO DO YOUR WORK, TAKE CARE OF THINGS AT HOME, OR GET ALONG WITH OTHER PEOPLE: 1
4. FEELING TIRED OR HAVING LITTLE ENERGY: 2

## 2022-01-24 DIAGNOSIS — J44.9 CHRONIC OBSTRUCTIVE PULMONARY DISEASE, UNSPECIFIED COPD TYPE (HCC): ICD-10-CM

## 2022-01-24 RX ORDER — ROFLUMILAST 250 UG/1
TABLET ORAL
Qty: 30 TABLET | Refills: 1 | Status: SHIPPED
Start: 2022-01-24 | End: 2022-03-29

## 2022-01-27 ENCOUNTER — ANTI-COAG VISIT (OUTPATIENT)
Dept: CARDIOLOGY CLINIC | Age: 62
End: 2022-01-27
Payer: MEDICARE

## 2022-01-27 DIAGNOSIS — Z95.2 H/O MECHANICAL AORTIC VALVE REPLACEMENT: ICD-10-CM

## 2022-01-27 LAB
INTERNATIONAL NORMALIZATION RATIO, POC: 2
PROTHROMBIN TIME, POC: NORMAL

## 2022-01-27 PROCEDURE — 93793 ANTICOAG MGMT PT WARFARIN: CPT | Performed by: INTERNAL MEDICINE

## 2022-01-27 PROCEDURE — 85610 PROTHROMBIN TIME: CPT | Performed by: INTERNAL MEDICINE

## 2022-01-27 NOTE — PROGRESS NOTES
INR today is lower limits of therapeutic at 2.0.  (Pt to decrease greens). Per Dr Elizabeth Pina, take 7.5mg today, then continue 5mg daily. Recheck INR in 2 weeks.

## 2022-02-02 ENCOUNTER — TELEPHONE (OUTPATIENT)
Dept: NON INVASIVE DIAGNOSTICS | Age: 62
End: 2022-02-02

## 2022-02-02 ENCOUNTER — OFFICE VISIT (OUTPATIENT)
Dept: INTERNAL MEDICINE | Age: 62
End: 2022-02-02
Payer: MEDICARE

## 2022-02-02 ENCOUNTER — SOCIAL WORK (OUTPATIENT)
Dept: INTERNAL MEDICINE | Age: 62
End: 2022-02-02

## 2022-02-02 VITALS
HEIGHT: 65 IN | RESPIRATION RATE: 16 BRPM | TEMPERATURE: 99.4 F | DIASTOLIC BLOOD PRESSURE: 60 MMHG | BODY MASS INDEX: 25.62 KG/M2 | WEIGHT: 153.8 LBS | SYSTOLIC BLOOD PRESSURE: 139 MMHG | HEART RATE: 75 BPM

## 2022-02-02 DIAGNOSIS — Z00.00 ROUTINE GENERAL MEDICAL EXAMINATION AT A HEALTH CARE FACILITY: ICD-10-CM

## 2022-02-02 DIAGNOSIS — Z72.0 TOBACCO ABUSE: Primary | ICD-10-CM

## 2022-02-02 DIAGNOSIS — Z65.9 SOCIAL PROBLEM: ICD-10-CM

## 2022-02-02 DIAGNOSIS — G47.62 NOCTURNAL LEG CRAMPS: ICD-10-CM

## 2022-02-02 DIAGNOSIS — I10 ESSENTIAL HYPERTENSION: ICD-10-CM

## 2022-02-02 DIAGNOSIS — K21.9 GASTROESOPHAGEAL REFLUX DISEASE WITHOUT ESOPHAGITIS: ICD-10-CM

## 2022-02-02 DIAGNOSIS — Z12.31 ENCOUNTER FOR SCREENING MAMMOGRAM FOR MALIGNANT NEOPLASM OF BREAST: ICD-10-CM

## 2022-02-02 DIAGNOSIS — F32.A DEPRESSION, UNSPECIFIED DEPRESSION TYPE: ICD-10-CM

## 2022-02-02 DIAGNOSIS — J44.9 CHRONIC OBSTRUCTIVE PULMONARY DISEASE, UNSPECIFIED COPD TYPE (HCC): ICD-10-CM

## 2022-02-02 DIAGNOSIS — E78.5 HYPERLIPIDEMIA, UNSPECIFIED HYPERLIPIDEMIA TYPE: ICD-10-CM

## 2022-02-02 DIAGNOSIS — I71.40 ABDOMINAL AORTIC ANEURYSM (AAA) 3.0 CM TO 5.0 CM IN DIAMETER IN FEMALE: ICD-10-CM

## 2022-02-02 DIAGNOSIS — J30.2 SEASONAL ALLERGIES: ICD-10-CM

## 2022-02-02 DIAGNOSIS — Z00.00 HEALTHCARE MAINTENANCE: ICD-10-CM

## 2022-02-02 PROCEDURE — 99212 OFFICE O/P EST SF 10 MIN: CPT | Performed by: INTERNAL MEDICINE

## 2022-02-02 PROCEDURE — 3017F COLORECTAL CA SCREEN DOC REV: CPT | Performed by: INTERNAL MEDICINE

## 2022-02-02 PROCEDURE — G8482 FLU IMMUNIZE ORDER/ADMIN: HCPCS | Performed by: INTERNAL MEDICINE

## 2022-02-02 PROCEDURE — G0438 PPPS, INITIAL VISIT: HCPCS | Performed by: INTERNAL MEDICINE

## 2022-02-02 RX ORDER — MONTELUKAST SODIUM 10 MG/1
TABLET ORAL
Qty: 90 TABLET | Refills: 0 | Status: SHIPPED
Start: 2022-02-02 | End: 2022-05-11 | Stop reason: SDUPTHER

## 2022-02-02 RX ORDER — MULTIVITAMIN/IRON/FOLIC ACID 18MG-0.4MG
250 TABLET ORAL DAILY
Qty: 90 TABLET | Refills: 0 | Status: SHIPPED
Start: 2022-02-02 | End: 2022-05-11 | Stop reason: SDUPTHER

## 2022-02-02 RX ORDER — METOPROLOL SUCCINATE 25 MG/1
25 TABLET, EXTENDED RELEASE ORAL DAILY
Qty: 90 TABLET | Refills: 0 | Status: SHIPPED
Start: 2022-02-02 | End: 2022-04-28

## 2022-02-02 RX ORDER — NICOTINE 21 MG/24HR
1 PATCH, TRANSDERMAL 24 HOURS TRANSDERMAL DAILY
Qty: 42 PATCH | Refills: 0 | Status: SHIPPED
Start: 2022-02-02 | End: 2022-03-29

## 2022-02-02 RX ORDER — FLUTICASONE PROPIONATE 50 MCG
2 SPRAY, SUSPENSION (ML) NASAL DAILY
Qty: 1 EACH | Refills: 1 | Status: SHIPPED
Start: 2022-02-02 | End: 2022-05-11 | Stop reason: SDUPTHER

## 2022-02-02 RX ORDER — OMEPRAZOLE 40 MG/1
CAPSULE, DELAYED RELEASE ORAL
Qty: 90 CAPSULE | Refills: 0 | Status: SHIPPED
Start: 2022-02-02 | End: 2022-05-11 | Stop reason: SDUPTHER

## 2022-02-02 RX ORDER — AZELASTINE HYDROCHLORIDE 137 UG/1
2 SPRAY, METERED NASAL DAILY
Qty: 30 ML | Refills: 1 | Status: SHIPPED
Start: 2022-02-02 | End: 2022-05-11 | Stop reason: SDUPTHER

## 2022-02-02 RX ORDER — SERTRALINE HYDROCHLORIDE 100 MG/1
TABLET, FILM COATED ORAL
Qty: 135 TABLET | Refills: 0 | Status: SHIPPED
Start: 2022-02-02 | End: 2022-05-11 | Stop reason: SDUPTHER

## 2022-02-02 RX ORDER — NICOTINE 21 MG/24HR
1 PATCH, TRANSDERMAL 24 HOURS TRANSDERMAL DAILY
Qty: 14 PATCH | Refills: 0 | Status: SHIPPED
Start: 2022-02-02 | End: 2022-03-29

## 2022-02-02 RX ORDER — ATORVASTATIN CALCIUM 20 MG/1
TABLET, FILM COATED ORAL
Qty: 90 TABLET | Refills: 0 | Status: SHIPPED
Start: 2022-02-02 | End: 2022-05-11 | Stop reason: SDUPTHER

## 2022-02-02 RX ORDER — ALBUTEROL SULFATE 2.5 MG/3ML
2.5 SOLUTION RESPIRATORY (INHALATION) EVERY 6 HOURS PRN
Qty: 120 EACH | Refills: 1 | Status: SHIPPED
Start: 2022-02-02 | End: 2022-05-11 | Stop reason: SDUPTHER

## 2022-02-02 RX ORDER — LISINOPRIL 20 MG/1
20 TABLET ORAL DAILY
Qty: 90 TABLET | Refills: 0 | Status: SHIPPED
Start: 2022-02-02 | End: 2022-05-11 | Stop reason: SDUPTHER

## 2022-02-02 SDOH — ECONOMIC STABILITY: FOOD INSECURITY: WITHIN THE PAST 12 MONTHS, YOU WORRIED THAT YOUR FOOD WOULD RUN OUT BEFORE YOU GOT MONEY TO BUY MORE.: SOMETIMES TRUE

## 2022-02-02 SDOH — ECONOMIC STABILITY: INCOME INSECURITY: IN THE LAST 12 MONTHS, WAS THERE A TIME WHEN YOU WERE NOT ABLE TO PAY THE MORTGAGE OR RENT ON TIME?: NO

## 2022-02-02 SDOH — ECONOMIC STABILITY: HOUSING INSECURITY: IN THE LAST 12 MONTHS, HOW MANY PLACES HAVE YOU LIVED?: 2

## 2022-02-02 SDOH — ECONOMIC STABILITY: FOOD INSECURITY: WITHIN THE PAST 12 MONTHS, THE FOOD YOU BOUGHT JUST DIDN'T LAST AND YOU DIDN'T HAVE MONEY TO GET MORE.: NEVER TRUE

## 2022-02-02 ASSESSMENT — PATIENT HEALTH QUESTIONNAIRE - PHQ9
9. THOUGHTS THAT YOU WOULD BE BETTER OFF DEAD, OR OF HURTING YOURSELF: 0
5. POOR APPETITE OR OVEREATING: 2
SUM OF ALL RESPONSES TO PHQ9 QUESTIONS 1 & 2: 5
1. LITTLE INTEREST OR PLEASURE IN DOING THINGS: 3
10. IF YOU CHECKED OFF ANY PROBLEMS, HOW DIFFICULT HAVE THESE PROBLEMS MADE IT FOR YOU TO DO YOUR WORK, TAKE CARE OF THINGS AT HOME, OR GET ALONG WITH OTHER PEOPLE: 1
6. FEELING BAD ABOUT YOURSELF - OR THAT YOU ARE A FAILURE OR HAVE LET YOURSELF OR YOUR FAMILY DOWN: 1
SUM OF ALL RESPONSES TO PHQ QUESTIONS 1-9: 14
SUM OF ALL RESPONSES TO PHQ QUESTIONS 1-9: 14
3. TROUBLE FALLING OR STAYING ASLEEP: 1
SUM OF ALL RESPONSES TO PHQ QUESTIONS 1-9: 14
4. FEELING TIRED OR HAVING LITTLE ENERGY: 3
2. FEELING DOWN, DEPRESSED OR HOPELESS: 2
7. TROUBLE CONCENTRATING ON THINGS, SUCH AS READING THE NEWSPAPER OR WATCHING TELEVISION: 2
SUM OF ALL RESPONSES TO PHQ QUESTIONS 1-9: 14
8. MOVING OR SPEAKING SO SLOWLY THAT OTHER PEOPLE COULD HAVE NOTICED. OR THE OPPOSITE, BEING SO FIGETY OR RESTLESS THAT YOU HAVE BEEN MOVING AROUND A LOT MORE THAN USUAL: 0

## 2022-02-02 ASSESSMENT — LIFESTYLE VARIABLES
HOW OFTEN DO YOU HAVE SIX OR MORE DRINKS ON ONE OCCASION: 0
HOW OFTEN DURING THE LAST YEAR HAVE YOU FAILED TO DO WHAT WAS NORMALLY EXPECTED FROM YOU BECAUSE OF DRINKING: 0
HOW MANY STANDARD DRINKS CONTAINING ALCOHOL DO YOU HAVE ON A TYPICAL DAY: 1 OR 2
HAS A RELATIVE, FRIEND, DOCTOR, OR ANOTHER HEALTH PROFESSIONAL EXPRESSED CONCERN ABOUT YOUR DRINKING OR SUGGESTED YOU CUT DOWN: 0
HOW OFTEN DURING THE LAST YEAR HAVE YOU BEEN UNABLE TO REMEMBER WHAT HAPPENED THE NIGHT BEFORE BECAUSE YOU HAD BEEN DRINKING: 0
HOW OFTEN DURING THE LAST YEAR HAVE YOU HAD A FEELING OF GUILT OR REMORSE AFTER DRINKING: 0
AUDIT-C TOTAL SCORE: 1
HAVE YOU OR SOMEONE ELSE BEEN INJURED AS A RESULT OF YOUR DRINKING: 0
HOW OFTEN DURING THE LAST YEAR HAVE YOU FOUND THAT YOU WERE NOT ABLE TO STOP DRINKING ONCE YOU HAD STARTED: 0
HOW OFTEN DURING THE LAST YEAR HAVE YOU NEEDED AN ALCOHOLIC DRINK FIRST THING IN THE MORNING TO GET YOURSELF GOING AFTER A NIGHT OF HEAVY DRINKING: 0
AUDIT TOTAL SCORE: 1
HOW OFTEN DO YOU HAVE A DRINK CONTAINING ALCOHOL: MONTHLY OR LESS
HOW MANY STANDARD DRINKS CONTAINING ALCOHOL DO YOU HAVE ON A TYPICAL DAY: 0
HOW OFTEN DO YOU HAVE A DRINK CONTAINING ALCOHOL: 1

## 2022-02-02 ASSESSMENT — SOCIAL DETERMINANTS OF HEALTH (SDOH): HOW HARD IS IT FOR YOU TO PAY FOR THE VERY BASICS LIKE FOOD, HOUSING, MEDICAL CARE, AND HEATING?: SOMEWHAT HARD

## 2022-02-02 NOTE — PROGRESS NOTES
Medicare Annual Wellness Visit  Name: Devika Fulton Date: 2022   MRN: [id number] Sex: Female   Age: 57 y.o. Ethnicity: Non- / Non    : 1960 Race: White (non-)      Alex Blue is here for Medicare AWV    Screenings for behavioral, psychosocial and functional/safety risks, and cognitive dysfunction are all negative except as indicated below. These results, as well as other patient data from the [address] form, are documented in Flowsheets linked to this Encounter. Allergies   Allergen Reactions    Keflex [Cephalexin] Itching    Phenergan [Promethazine Hcl] Other (See Comments)       Prior to Visit Medications    Medication Sig Taking?  Authorizing Provider   DALIRESP 250 MCG tablet take 1 tablet by mouth daily Yes Marc Fonseca., DO   albuterol sulfate  (90 Base) MCG/ACT inhaler inhale 1 puff every 4 hours if needed for wheezing Yes Marc Fonseca., DO   rOPINIRole (REQUIP) 0.25 MG tablet Take 2 tab in morning and 2 tabs before bed Yes Snow Wyman MD   warfarin (COUMADIN) 5 MG tablet Take 1 tablet by mouth daily Yes Elayne Ayala MD   atorvastatin (LIPITOR) 20 MG tablet take 1 tablet by mouth once daily Yes Jarrell Espinoza MD   lisinopril (PRINIVIL;ZESTRIL) 20 MG tablet Take 1 tablet by mouth daily take 1 tablet by mouth once daily Yes Allen Mast MD   metoprolol succinate (TOPROL XL) 25 MG extended release tablet Take 1 tablet by mouth daily  Patient taking differently: Take 25 mg by mouth nightly  Yes Allen Mast MD   montelukast (SINGULAIR) 10 MG tablet take 1 tablet by mouth at bedtime Yes Allen Mast MD   Azelastine HCl 137 MCG/SPRAY SOLN 2 sprays by Nasal route daily  Patient taking differently: 2 sprays by Nasal route 2 times daily  Yes Allen Mast MD   fluticasone (FLONASE) 50 MCG/ACT nasal spray 2 sprays by Nasal route daily Yes Allen Mast MD   omeprazole (PRILOSEC) 40 MG delayed release capsule take 1 capsule by mouth every morning BEFORE BREAKFAST Yes Serina Major MD   sertraline (ZOLOFT) 100 MG tablet take 1 and 1/2 tablet by mouth once daily Yes Serina Major MD   aspirin EC 81 MG EC tablet Take 1 tablet by mouth daily Yes Serina Major MD   albuterol (PROVENTIL) (2.5 MG/3ML) 0.083% nebulizer solution Take 3 mLs by nebulization every 6 hours as needed for Wheezing or Shortness of Breath Yes Serina Major MD   Magnesium Oxide (MAGNESIUM-OXIDE) 250 MG TABS tablet Take 1 tablet by mouth daily Yes Serina Major MD   warfarin (COUMADIN) 6 MG tablet Take 1 tablet by mouth daily  and  Yes Caitlyn Galicia MD   Budeson-Glycopyrrol-Formoterol (BREZTRI AEROSPHERE) 160-9-4.8 MCG/ACT AERO Inhale 2 puffs into the lungs 2 times daily Yes Ag Sheikh MD   Blood Pressure Monitoring (BLOOD PRESSURE CUFF) MISC Dx:  Hypertension with labile blood pressure Yes Linda Smith MD   Calcium Polycarbophil (FIBER) 625 MG TABS Take 1 tablet by mouth daily  Patient not taking: Reported on 2022  Deonna Cr MD       Past Medical History:   Diagnosis Date    Anticoagulant long-term use     Anxiety     Ascending aortic aneurysm ([address]     Asthma     CAD (coronary artery disease)     Chronic back pain     COPD (chronic obstructive pulmonary disease) (HCC)     Depression     Depression     GERD (gastroesophageal reflux disease)     Headache     Hyperlipidemia     Hypertension     On warfarin at home     for AVR    Restless legs syndrome     S/P AVR     Syncope     Tobacco abuse     Urinary incontinence     Ventricular tachycardia, non-sustained ([address]        Past Surgical History:   Procedure Laterality Date    APPENDECTOMY      CARDIAC CATHETERIZATION  2020    Dr. Beckwith Ing  2009    Aortic valve 2009 [facility]      SECTION      COLONOSCOPY N/A 2019    COLONOSCOPY POLYPECTOMY SNARE/COLD BIOPSY performed by Lino Paz MD at [address] [facility]  09/11/2020    Linq Insertion    Dr. Claudette Phillips KEM STEROTACTIC LOC BREAST BIOPSY RIGHT Right 3/10/2021    KEM STEROTACTIC LOC BREAST BIOPSY RIGHT 3/10/2021 [facility]    TUBAL LIGATION      UPPER GASTROINTESTINAL ENDOSCOPY N/A 11/18/2019    EGD BIOPSY performed by Lino Paz MD at [facility]       Family History   Problem Relation Age of Onset    Heart Disease Mother    Gaylin Eugenia Arthritis Mother     Asthma Mother     Diabetes Mother     High Blood Pressure Mother     High Cholesterol Mother     Stroke Mother     Heart Disease Father     Arthritis Father     Asthma Father     Diabetes Father     High Blood Pressure Father     High Cholesterol Father     Breast Cancer Sister     Asthma Brother     Diabetes Maternal Aunt     Diabetes Paternal Aunt     High Blood Pressure Paternal Aunt     Arthritis Paternal Grandmother     Asthma Paternal Grandmother        CareTeam (Including outside providers/suppliers regularly involved in providing care):   Patient Care Team:  Olimpia Moya DO as PCP - General (Internal Medicine)  Jax Mares MD as Consulting Physician (Electrophysiology)    Wt Readings from Last 3 Encounters:   02/02/22 153 lb 12.8 oz (69.8 kg)   12/14/21 159 lb (72.1 kg)   10/06/21 163 lb (73.9 kg)     Vitals:    02/02/22 1101   BP: 139/60   Site: Left Upper Arm   Position: Sitting   Cuff Size: Medium Adult   Pulse: 75   Resp: 16   Temp: 99.4 °F (37.4 °C)   TempSrc: Oral   Weight: 153 lb 12.8 oz (69.8 kg)   Height: 5' 5\" (1.651 m)     Body mass index is 25.59 kg/m². Based upon direct observation of the patient, evaluation of cognition reveals recent memory intact, remote memory impaired.     General Appearance: alert and oriented to person, place and time, well developed and well- nourished, in no acute distress  Skin: warm and dry, no rash or erythema  Head: normocephalic and atraumatic  Eyes: extraocular eye movements intact, conjunctivae normal  ENT: external ear and ear canal normal bilaterally, nose without deformity  Neck: supple and non-tender without mass, no thyromegaly or thyroid nodules,  Pulmonary/Chest: clear to auscultation bilaterally- no wheezes, rales or rhonchi, normal air movement, no respiratory distress  Cardiovascular: normal rate, regular rhythm, normal S1 and S2, no murmurs, rubs, clicks, or gallops, distal pulses intact, no carotid bruits  Abdomen: soft, non-tender, non-distended, normal bowel sounds, no masses or organomegaly  Extremities: no cyanosis, clubbing or edema  Musculoskeletal: normal range of motion, no joint swelling, deformity or tenderness  Neurologic: reflexes normal and symmetric, no cranial nerve deficit, gait, coordination and speech normal    Patient's complete Health Risk Assessment and screening values have been reviewed and are found in Flowsheets. The following problems were reviewed today and where indicated follow up appointments were made and/or referrals ordered. Positive Risk Factor Screenings with Interventions:     Fall Risk:  Timed Up and Go Test > 12 seconds?  (Complete if either Fall Risk answers are Yes): no  2 or more falls in past year?: (!) yes (walking the dog)  Fall with injury in past year?: (!) yes  Fall Risk Interventions:    · Home safety tips provided  · Home exercises provided to promote strength and balance     Depression:  PHQ-2 Score: 5  PHQ-9 Total Score: 14    Severity:1-4 = minimal depression, 5-9 = mild depression, 10-14 = moderate depression, 15-19 = moderately severe depression, 20-27 = severe depression  Depression Interventions:  · Patient declines any further evaluation/treatment for this issue     Substance History:  Social History     Tobacco History     Smoking Status  Current Every Day Smoker Smoking Frequency  0.5 packs/day for 45 years (22.5 pk yrs) Smoking Tobacco Type  Cigarettes    Smokeless Tobacco Use  Never Used    Tobacco Comment  would like patches ordered          Alcohol History     Alcohol Use Status  Yes Comment  occ          Drug Use     Drug Use Status  No          Sexual Activity     Sexually Active  Not Currently Partners  Male               Alcohol Screening: Audit-C Score: 1  Total Score: 1    A score of 8 or more is associated with harmful or hazardous drinking. A score of 13 or more in women, and 15 or more in men, is likely to indicate alcohol dependence. Substance Abuse Interventions:  · Tobacco abuse:  tobacco cessation tips and resources provided      General Health and ACP:  General  In general, how would you say your health is?: Fair  In the past 7 days, have you experienced any of the following?  New or Increased Pain, New or Increased Fatigue, Loneliness, Social Isolation, Stress or Anger?: (!) Stress  Do you get the social and emotional support that you need?: Yes  Do you have a Living Will?: Yes  Advance Directives     Power of  Living Will ACP-Advance Directive ACP-Power of     Not on File Not on File Not on File Not on File      General Health Risk Interventions:  · Fatigue: regular exercise recommended- 3-5 times per week, 30-45 minutes per session    Health Habits/Nutrition:  Health Habits/Nutrition  Do you exercise for at least 20 minutes 2-3 times per week?: (!) No  Have you lost any weight without trying in the past 3 months?: (!) Yes  Do you eat only one meal per day?: (!) Yes  Have you seen the dentist within the past year?: Yes  Body mass index: (!) 25.59  Health Habits/Nutrition Interventions:  · Inadequate physical activity:  educational materials provided to promote increased physical activity  · Nutritional issues:  educational materials for healthy, well-balanced diet provided, educational materials to promote weight loss provided    Hearing/Vision:  No exam data present  Hearing/Vision  Do you or your family notice any trouble with your hearing that hasn't been managed with hearing aids?: No  Do you have difficulty driving, watching TV, or doing any of your daily activities because of your eyesight?: (!) Yes  Have you had an eye exam within the past year?: Yes  Hearing/Vision Interventions:  · Vision concerns:  patient encouraged to make appointment with his/her eye specialist    Safety:  Safety  Do you have working smoke detectors?: Yes  Have all throw rugs been removed or fastened?: (!) No  Do you have non-slip mats or surfaces in all bathtubs/showers?: (!) No  Do all of your stairways have a railing or banister?: Yes  Are your doorways, halls and stairs free of clutter?: Yes  Do you always fasten your seatbelt when you are in a car?: Yes  Safety Interventions:  · Home safety tips provided       Personalized Preventive Plan   Current Health Maintenance Status  Immunization History   Administered Date(s) Administered    COVID-19, Yenny Warner, Primary or Immunocompromised, PF, 100mcg/0.5mL 03/18/2021, 04/15/2021    Influenza Vaccine, unspecified formulation 01/05/2017, 10/03/2017    Influenza, Quadv, IM, (6 mo and older Fluzone, Flulaval, Fluarix and 3 yrs and older Afluria) 10/03/2017, 10/11/2018    Influenza, Kishor Batty, IM, PF (6 mo and older Fluzone, Flulaval, Fluarix, and 3 yrs and older Afluria) 10/15/2019, 11/04/2020, 10/06/2021    Influenza, Triv, 3 Years and older, IM (Afluria (5 yrs and older) 01/05/2017    Pneumococcal Polysaccharide (Anqponomw65) 01/05/2017    Tdap (Boostrix, Adacel) 08/16/2017    Zoster Recombinant (Shingrix) 10/15/2019, 12/15/2019        Health Maintenance   Topic Date Due    Annual Wellness Visit (AWV)  10/15/2020    COVID-19 Vaccine (3 - Booster for Moderna series) 09/15/2021    Breast cancer screen  03/10/2022    A1C test (Diabetic or Prediabetic)  05/12/2022    Lipid screen  05/12/2022    Potassium monitoring  09/28/2022    Creatinine monitoring  09/28/2022    Low dose CT lung screening  10/04/2022    Depression Monitoring  01/23/2023    Pneumococcal 0-64 years Vaccine (2 of 2 - PPSV23) 08/27/2024    Colon cancer screen colonoscopy  11/18/2024    DTaP/Tdap/Td vaccine (2 - Td or Tdap) 08/16/2027    Flu vaccine  Completed    Shingles Vaccine  Completed    Hepatitis C screen  Completed    HIV screen  Completed    Hepatitis A vaccine  Aged Out    Hepatitis B vaccine  Aged Out    Hib vaccine  Aged Out    Meningococcal (ACWY) vaccine  Aged Out     Recommendations for [employer] Due: see orders and patient instructions/AVS.    Recommended screening schedule for the next 5-10 years is provided to the patient in written form: see Patient Jasmyn Bender was seen today for medicare aw.     Diagnoses and all orders for this visit:    Hyperlipidemia, unspecified hyperlipidemia type    Essential hypertension    Chronic obstructive pulmonary disease, unspecified COPD type ([facility] [address]    Seasonal allergies    Gastroesophageal reflux disease without esophagitis    Depression, unspecified depression type    Nocturnal leg cramps

## 2022-02-02 NOTE — PROGRESS NOTES
[facility]    Attending Physician Statement  I have discussed the case, including pertinent history and exam findings with the resident physician. I have seen and examined the patient and the key elements of the encounter have been performed by me. I agree with the assessment, plan and orders as documented by the resident. I have reviewed all pertinent PMHx, PSHx, FamHx, SocialHx, medications, and allergies and updated history as appropriate. Medicare AWV, initial   Did have 2 falls recently -- tripping over dog and no longer has dog. Memory good -- she is fully functional Mini-COG 4/5    PHQ-9 elevated -- on Zoloft for 20 yrs now -- she will consider LISW referral and for now prefers to maintain Zoloft 100 mg. COPD emphysema with tobacco abuse -- interested in cessation and recommended NRT (patch + lozenge), continue current inhalers and repeat PFT scheduled    Abdominal aortic aneurysm --- infrarenal 3.1cm on CT chest October. Mammo ordered, consider DEXA near given elevated risk with chronic ICS and SSRI use    Remainder of medical problems as per resident note.     Jahaira Marie,   2/2/2022 12:12 PM

## 2022-02-02 NOTE — PROGRESS NOTES
Pt screened positive for SDOH related to financial strain, transportation and or food insecurity and declined further contact for assessment/resources.     All instructions given to patient by Monica hawkins scheduled and printed avs given to patient

## 2022-02-02 NOTE — PROGRESS NOTES
Consult from Dr. Susana Steele as pt expressed financial concerns related to social security benefits. Met with pt during IM visit 2.2.22; pt is single, presently residing at daughter's friend's house on [address]  Pt reports she had deduction of $406 from her SS benefit ; she also receives $273 SSI  Pt reports being on disability since Oct 2017; pt's original Medicare states effective date 2.1.19; pt recalls receiving letter in past of premium assistance thru [facility]; pt's medication copays appear to be LIS eligible. We scheduled appt to return to [place] office on 2.9.22 with list of documents to bring; plan is to call Medicare as well as social security office during this appt.

## 2022-02-02 NOTE — PATIENT INSTRUCTIONS
Internal medicine    Follow ups   Please keep all other follow up appointments:  o Pulmonology - Dr. Matheus Moran in healthcare    Please take all medications as indicated   Diet: regular diet    Activity: activity as tolerated   New Medications started during this hospital stay  o Nicotine patch - please wear one 21mg patch daily for 6 weeks, then wear one 14mg patch daily for 2 weeks, then one 7mg patch daily for 2 weeks, then stop. At this time, you should stop smoking as well.   o Nicotine lozenge - please take one lozenge as needed for nicotine cravings.  Additional labs, testing or imaging needed after discharge   o Mammogram - please have performed before your next appointment in 3 months.  Please contact us if you have any concerns, wish to change or make an appointment:  [facility] Internal medicine clinic    Phone: [phone]   Fax: [phone] [place] [address]   Or please call the nurse line at [phone].  Should you have further questions in regards to this visit, you can review your clinical note and after visit summary document on your [employer] account. Other questions can be directed to our nurse line at [phone].  Other than any new prescriptions given to you today, the list of home medications on this After Visit Summary are based on information provided to us from you and your healthcare providers. This information, including the list, dose, and frequency of medications is only as accurate as the information you provided. If you have any questions or concerns about your home medications, please contact your Primary Care Physician for further clarification. Patient Education        Preventing Falls: Care Instructions  Your Care Instructions     Getting around your home safely can be a challenge if you have injuries or health problems that make it easy for you to fall.  Loose rugs and furniture in walkways are among the dangers for many older people who have problems walking or who have poor eyesight. People who have conditions such as arthritis, osteoporosis, or dementia also have to be careful not to fall. You can make your home safer with a few simple measures. Follow-up care is a key part of your treatment and safety. Be sure to make and go to all appointments, and call your doctor if you are having problems. It's also a good idea to know your test results and keep a list of the medicines you take. How can you care for yourself at home? Taking care of yourself  · You may get dizzy if you do not drink enough water. To prevent dehydration, drink plenty of fluids. Choose water and other clear liquids. If you have kidney, heart, or liver disease and have to limit fluids, talk with your doctor before you increase the amount of fluids you drink. · Exercise regularly to improve your strength, muscle tone, and balance. Walk if you can. Swimming may be a good choice if you cannot walk easily. · Have your vision and hearing checked each year or any time you notice a change. If you have trouble seeing and hearing, you might not be able to avoid objects and could lose your balance. · Know the side effects of the medicines you take. Ask your doctor or pharmacist whether the medicines you take can affect your balance. Sleeping pills or sedatives can affect your balance. · Limit the amount of alcohol you drink. Alcohol can impair your balance and other senses. · Ask your doctor whether calluses or corns on your feet need to be removed. If you wear loose-fitting shoes because of calluses or corns, you can lose your balance and fall. · Talk to your doctor if you have numbness in your feet. Preventing falls at home  · Remove raised doorway thresholds, throw rugs, and clutter. Repair loose carpet or raised areas in the floor. · Move furniture and electrical cords to keep them out of walking paths.   · Use nonskid floor wax, and wipe up spills right away, especially on ceramic tile floors. · If you use a walker or cane, put rubber tips on it. If you use crutches, clean the bottoms of them regularly with an abrasive pad, such as steel wool. · Keep your house well lit, especially Mindy Reas, and outside walkways. Use night-lights in areas such as hallways and bathrooms. Add extra light switches or use remote switches (such as switches that go on or off when you clap your hands) to make it easier to turn lights on if you have to get up during the night. · Install sturdy handrails on stairways. · Move items in your cabinets so that the things you use a lot are on the lower shelves (about waist level). · Keep a cordless phone and a flashlight with new batteries by your bed. If possible, put a phone in each of the main rooms of your house, or carry a cell phone in case you fall and cannot reach a phone. Or, you can wear a device around your neck or wrist. You push a button that sends a signal for help. · Wear low-heeled shoes that fit well and give your feet good support. Use footwear with nonskid soles. Check the heels and soles of your shoes for wear. Repair or replace worn heels or soles. · Do not wear socks without shoes on wood floors. · Walk on the grass when the sidewalks are slippery. If you live in an area that gets snow and ice in the winter, sprinkle salt on slippery steps and sidewalks. Preventing falls in the bath  · Install grab bars and nonskid mats inside and outside your shower or tub and near the toilet and sinks. · Use shower chairs and bath benches. · Use a hand-held shower head that will allow you to sit while showering. · Get into a tub or shower by putting the weaker leg in first. Get out of a tub or shower with your strong side first.  · Repair loose toilet seats and consider installing a raised toilet seat to make getting on and off the toilet easier. · Keep your bathroom door unlocked while you are in the shower.   Where can you learn more? Go to https://chpepiceweb.[employer] org and sign in to your [employer] account. Enter 0476 [id number] in the [facility] box to learn more about \"Preventing Falls: Care Instructions. \"     If you do not have an account, please click on the \"Sign Up Now\" link. Current as of: September 8, 2021               Content Version: 13.1  © 2006-2021 [employer]. Care instructions adapted under license by [facility] ([facility]). If you have questions about a medical condition or this instruction, always ask your healthcare professional. [place] [address] any warranty or liability for your use of this information. Patient Education        Learning About Getting In and Out of a Bathtub Safely  Introduction  Many falls happen during bathing. All that water makes the bathroom a slippery place. · You may no longer be able to step over the tub wall comfortably and safely. · You might lean on things that aren't meant to support your weight, like a towel bar or the shower curtain. There are several types of aids that will help keep you safe. You can buy them at [employer] or home improvement stores or online. What tools can you use to get in and out of the tub? Tub rail and grab bar    There are many types of bars and rails you can install on the walls next to your tub or on the edge of the tub. They will help keep you safe while you're getting in or out of the tub. It's important to have them permanently installed, rather than attached with suction. Transfer bench    There are several kinds of benches or seats to use in the bathtub. One type sits in the tub and extends out over the side. You sit on the bench. Lift one leg at a time into the tub, sliding your body over as you do so. Another common tub bench sits inside the tub. To use this type you need to be able to safely step into the tub before sitting down.   Nonskid mats    Water makes both the floor of the tub and the bathroom floor slippery. You can buy adhesive strips that stick to the floor of the tub. Or you can use a nonskid tub mat. Outside the tub, make sure you use a rug with a nonskid bottom. Don't use a plain towel. Hand-held shower faucet    With a hand-held faucet, you can get clean without having to lower yourself into the tub. Hang a shower curtain to keep water from spilling out onto the floor. Follow-up care is a key part of your treatment and safety. Be sure to make and go to all appointments, and call your doctor if you are having problems. It's also a good idea to know your test results and keep a list of the medicines you take. Where can you learn more? Go to https://Essence Group HoldingspeAnterra Energyeb.[employer]. org and sign in to your [employer] account. Enter U268 in the [employer] box to learn more about \"Learning About Getting In and Out of a Bathtub Safely. \"     If you do not have an account, please click on the \"Sign Up Now\" link. Current as of: July 1, 2021               Content Version: 13.1  © 4473-8000 Healthwise, [employer]. Care instructions adapted under license by [facility] ([facility]). If you have questions about a medical condition or this instruction, always ask your healthcare professional. [place] [address] any warranty or liability for your use of this information. Patient Education        Learning About Positive Thinking  What is positive thinking? Positive thinking, or healthy thinking, is a way to help you stay well or cope with a health problem by changing how you think. It's based on research that shows that you can change how you think. And how you think affects how you feel. Cognitive-behavioral therapy, also called CBT, is a therapy that is often used to help people think in a healthy way. It focuses on thought (cognitive) and action (behavioral). How can positive thinking help you?   If you think in a positive way, you may be more able to care for yourself and handle life's challenges. You will feel better. And you may be more able to avoid or cope with stress, anxiety, and depression. CBT may be able to help you sleep better and lose weight. How can you get started with positive thinking? CBT involves techniques that you can practice every day so that healthy thinking comes naturally. Here are the steps for one technique. 1. Stop. When you notice a negative thought, stop it in its tracks and write it down. 2. Ask. Look at that thought and ask yourself whether it is helpful or unhelpful right now. 3. Choose. Choose a new, helpful thought to replace a negative one. Here's an example of how this might work:  · In a job review, your boss praised several things about your work. But you're feeling down because she had one small criticism. You might even think, \"I'm no good at my job\" or \"She doesn't like me. I must be bad. \" These are negative thoughts. You want to stop them. · Ask yourself questions about the situation and your negative thoughts. You might ask, \"What did my boss say exactly? \" \"Were there positive comments? \" \"Why do I focus only on one criticism? \" Your answers can help you find more accurate and helpful statements. · Now choose a helpful thought to replace the negative thoughts. For example, you might think, \"I've done a lot of good work this year, and my boss noticed it. She thought there was one area I can improve. So I'll think of some things I can do to get stronger in that area. \"  With time and practice, you can learn to see that the harsh things you say to yourself may keep you from enjoying your life and work. You can replace them with more helpful thoughts. Where can you learn more? Go to https://Geelbekurt.KidBook. org and sign in to your [employer] account. Enter N082 in the [employer]ve box to learn more about \"Learning About Positive Thinking. \"     If you do not have an account, please click on the \"Sign Up Now\" link.  Current as of: June 16, 2021               Content Version: 13.1  © 2006-2021 Healthwise, [employer]. Care instructions adapted under license by [facility] ([facility]). If you have questions about a medical condition or this instruction, always ask your healthcare professional. [address] any warranty or liability for your use of this information. Patient Education        Eating Healthy Foods: Care Instructions  Your Care Instructions     Eating healthy foods can help lower your risk for disease. Healthy food gives you energy and keeps your heart strong, your brain active, your muscles working, and your bones strong. A healthy diet includes a variety of foods from the basic food groups: grains, vegetables, fruits, milk and milk products, and meat and beans. Some people may eat more of their favorite foods from only one food group and, as a result, miss getting the nutrients they need. So, it is important to pay attention not only to what you eat but also to what you are missing from your diet. You can eat a healthy, balanced diet by making a few small changes. Follow-up care is a key part of your treatment and safety. Be sure to make and go to all appointments, and call your doctor if you are having problems. It's also a good idea to know your test results and keep a list of the medicines you take. How can you care for yourself at home? Look at what you eat  · Keep a food diary for a week or two and record everything you eat or drink. Track the number of servings you eat from each food group. · For a balanced diet every day, eat a variety of:  ? 6 or more ounce-equivalents of grains, such as cereals, breads, crackers, rice, or pasta, every day. An ounce-equivalent is 1 slice of bread, 1 cup of ready-to-eat cereal, or ½ cup of cooked rice, cooked pasta, or cooked cereal.  ? 2½ cups of vegetables, especially:  § Dark-green vegetables such as broccoli and spinach.   Mary Cueva vegetables such as carrots and sweet potatoes. § Dry beans (such as moreno and kidney beans) and peas (such as lentils). ? 2 cups of fresh, frozen, or canned fruit. A small apple or 1 banana or orange equals 1 cup. ? 3 cups of nonfat or low-fat milk, yogurt, or other milk products. ? 5½ ounces of meat and beans, such as chicken, fish, lean meat, beans, nuts, and seeds. One egg, 1 tablespoon of peanut butter, ½ ounce nuts or seeds, or ¼ cup of cooked beans equals 1 ounce of meat. · Learn how to read food labels for serving sizes and ingredients. Fast-food and convenience-food meals often contain few or no fruits or vegetables. Make sure you eat some fruits and vegetables to make the meal more nutritious. · Look at your food diary. For each food group, add up what you have eaten and then divide the total by the number of days. This will give you an idea of how much you are eating from each food group. See if you can find some ways to change your diet to make it more healthy. Start small  · Do not try to make dramatic changes to your diet all at once. You might feel that you are missing out on your favorite foods and then be more likely to fail. · Start slowly, and gradually change your habits. Try some of the following:  ? Use whole wheat bread instead of white bread. ? Use nonfat or low-fat milk instead of whole milk. ? Eat brown rice instead of white rice, and eat whole wheat pasta instead of white-flour pasta. ? Try low-fat cheeses and low-fat yogurt. ? Add more fruits and vegetables to meals and have them for snacks. ? Add lettuce, tomato, cucumber, and onion to sandwiches. ? Add fruit to yogurt and cereal.  Enjoy food  · You can still eat your favorite foods. You just may need to eat less of them. If your favorite foods are high in fat, salt, and sugar, limit how often you eat them, but do not cut them out entirely. · Eat a wide variety of foods.   Make healthy choices when eating out  · The type of restaurant you choose can help you make healthy choices. Even fast-food chains are now offering more low-fat or healthier choices on the menu. · Choose smaller portions, or take half of your meal home. · When eating out, try:  ? A veggie pizza with a whole wheat crust or grilled chicken (instead of sausage or pepperoni). ? Pasta with roasted vegetables, grilled chicken, or marinara sauce instead of cream sauce. ? A vegetable wrap or grilled chicken wrap. ? Broiled or poached food instead of fried or breaded items. Make healthy choices easy  · Buy packaged, prewashed, ready-to-eat fresh vegetables and fruits, such as baby carrots, salad mixes, and chopped or shredded broccoli and cauliflower. · Buy packaged, presliced fruits, such as melon or pineapple. · Choose 100% fruit or vegetable juice instead of soda. Limit juice intake to 4 to 6 oz (½ to ¾ cup) a day. · Blend low-fat yogurt, fruit juice, and canned or frozen fruit to make a smoothie for breakfast or a snack. Where can you learn more? Go to https://Parsley EnergypepicDiaphonics.Genius. org and sign in to your [employer] account. Enter B021 in the Upstart box to learn more about \"Eating Healthy Foods: Care Instructions. \"     If you do not have an account, please click on the \"Sign Up Now\" link. Current as of: September 8, 2021               Content Version: 13.1  © 2006-2021 Healthwise, Incorporated. Care instructions adapted under license by [facility] ([facility]). If you have questions about a medical condition or this instruction, always ask your healthcare professional. [place] [address] any warranty or liability for your use of this information. Patient Education        Learning About Being Physically Active  What is physical activity? Being physically active means doing any kind of activity that gets your body moving.   The types of physical activity that can help you get fit and stay healthy include:  · Aerobic or \"cardio\" activities. These make your heart beat faster and make you breathe harder, such as brisk walking, riding a bike, or running. They strengthen your heart and lungs and build up your endurance. · Strength training activities. These make your muscles work against, or \"resist,\" something. Examples include lifting weights or doing push-ups. These activities help tone and strengthen your muscles and bones. · Stretches. These let you move your joints and muscles through their full range of motion. Stretching helps you be more flexible. What are the benefits of being active? Being active is one of the best things you can do for your health. It helps you to:  · Feel stronger and have more energy to do all the things you like to do. · Focus better at school or work. · Feel, think, and sleep better. · Reach and stay at a healthy weight. · Lose fat and build lean muscle. · Lower your risk for serious health problems, including diabetes, heart attack, high blood pressure, and some cancers. · Keep your heart, lungs, bones, muscles, and joints strong and healthy. How can you make being active part of your life? Start slowly. Make it your long-term goal to get at least 30 minutes of exercise on most days of the week. Walking is a good choice. You also may want to do other activities, such as running, swimming, cycling, or playing tennis or team sports. Pick activities that you like--ones that make your heart beat faster, your muscles stronger, and your muscles and joints more flexible. If you find more than one thing you like doing, do them all. You don't have to do the same thing every day. Get your heart pumping every day. Any activity that makes your heart beat faster and keeps it at that rate for a while counts. Here are some great ways to get your heart beating faster:  · Go for a brisk walk, run, or bike ride. · Go for a hike or swim. · Go in-line skating.   · Play a game of touch football, basketball, or soccer. · Ride a bike. · Play tennis or racquetball. · Climb stairs. Even some household chores can be aerobic--just do them at a faster pace. Vacuuming, raking or mowing the lawn, sweeping the garage, and washing and waxing the car all can help get your heart rate up. Strengthen your muscles during the week. You don't have to lift heavy weights or grow big, bulky muscles to get stronger. Doing a few simple activities that make your muscles work against, or \"resist,\" something can help you get stronger. For example, you can:  · Do push-ups or sit-ups, which use your own body weight as resistance. · Lift weights or dumbbells or use stretch bands at home or in a gym or community center. Stretch your muscles often. Stretching will help you as you become more active. It can help you stay flexible, loosen tight muscles, and avoid injury. It can also help improve your balance and posture and can be a great way to relax. Be sure to stretch the muscles you'll be using when you work out. It's best to warm your muscles slightly before you stretch them. Walk or do some other light aerobic activity for a few minutes, and then start stretching. When you stretch your muscles:  · Do it slowly. Stretching is not about going fast or making sudden movements. · Don't push or bounce during a stretch. · Hold each stretch for at least 15 to 30 seconds, if you can. You should feel a stretch in the muscle, but not pain. · Breathe out as you do the stretch. Then breathe in as you hold the stretch. Don't hold your breath. If you're worried about how more activity might affect your health, have a checkup before you start. Follow any special advice your doctor gives you for getting a smart start. Where can you learn more? Go to https://tom.Legal Egg. org and sign in to your [employer] account. Enter S020 in the SC[employer]EXIS box to learn more about \"Learning About Being Physically Active. \"     If you do not have an account, please click on the \"Sign Up Now\" link. Current as of: May 12, 2021               Content Version: 13.1  © 2006-2021 Healthwise, [employer]. Care instructions adapted under license by [facility] ([facility]). If you have questions about a medical condition or this instruction, always ask your healthcare professional. [address] any warranty or liability for your use of this information. Patient Education        A Healthy Lifestyle: Care Instructions  Your Care Instructions     A healthy lifestyle can help you feel good, stay at a healthy weight, and have plenty of energy for both work and play. A healthy lifestyle is something you can share with your whole family. A healthy lifestyle also can lower your risk for serious health problems, such as high blood pressure, heart disease, and diabetes. You can follow a few steps listed below to improve your health and the health of your family. Follow-up care is a key part of your treatment and safety. Be sure to make and go to all appointments, and call your doctor if you are having problems. It's also a good idea to know your test results and keep a list of the medicines you take. How can you care for yourself at home? · Do not eat too much sugar, fat, or fast foods. You can still have dessert and treats now and then. The goal is moderation. · Start small to improve your eating habits. Pay attention to portion sizes, drink less juice and soda pop, and eat more fruits and vegetables. ? Eat a healthy amount of food. A 3-ounce serving of meat, for example, is about the size of a deck of cards. Fill the rest of your plate with vegetables and whole grains. ? Limit the amount of soda and sports drinks you have every day. Drink more water when you are thirsty. ? Eat plenty of fruits and vegetables every day. Have an apple or some carrot sticks as an afternoon snack instead of a candy bar.  Try to have fruits and/or vegetables at every meal.  · Make exercise part of your daily routine. You may want to start with simple activities, such as walking, bicycling, or slow swimming. Try to be active 30 to 60 minutes every day. You do not need to do all 30 to 60 minutes all at once. For example, you can exercise 3 times a day for 10 or 20 minutes. Moderate exercise is safe for most people, but it is always a good idea to talk to your doctor before starting an exercise program.  · Keep moving. Pacheco Ruchiks the lawn, work in the garden, or [employer]. Take the stairs instead of the elevator at work. · If you smoke, quit. People who smoke have an increased risk for heart attack, stroke, cancer, and other lung illnesses. Quitting is hard, but there are ways to boost your chance of quitting tobacco for good. ? Use nicotine gum, patches, or lozenges. ? Ask your doctor about stop-smoking programs and medicines. ? Keep trying. In addition to reducing your risk of diseases in the future, you will notice some benefits soon after you stop using tobacco. If you have shortness of breath or asthma symptoms, they will likely get better within a few weeks after you quit. · Limit how much alcohol you drink. Moderate amounts of alcohol (up to 2 drinks a day for men, 1 drink a day for women) are okay. But drinking too much can lead to liver problems, high blood pressure, and other health problems. Family health  If you have a family, there are many things you can do together to improve your health. · Eat meals together as a family as often as possible. · Eat healthy foods. This includes fruits, vegetables, lean meats and dairy, and whole grains. · Include your family in your fitness plan. Most people think of activities such as jogging or tennis as the way to fitness, but there are many ways you and your family can be more active. Anything that makes you breathe hard and gets your heart pumping is exercise.  Here are some tips:  ? Walk to do errands or to take your child to school or the bus.  ? Go for a family bike ride after dinner instead of watching TV. Where can you learn more? Go to https://chpepiceweb.[employer]. org and sign in to your [employer] account. Enter N286 in the Extra Life box to learn more about \"A Healthy Lifestyle: Care Instructions. \"     If you do not have an account, please click on the \"Sign Up Now\" link. Current as of: June 16, 2021               Content Version: 13.1  © 2274-7466 Healthwise, [employer]. Care instructions adapted under license by [facility] If you have questions about a medical condition or this instruction, always ask your healthcare professional. [address] any warranty or liability for your use of this information. Patient Education        Recovering From Depression: Care Instructions  Your Care Instructions     Taking good care of yourself is important as you recover from depression. In time, your symptoms will fade as your treatment takes hold. Do not give up. Instead, focus your energy on getting better. Your mood will improve. It just takes some time. Focus on things that can help you feel better, such as being with friends and family, eating well, and getting enough rest. But take things slowly. Do not do too much too soon. You will begin to feel better gradually. Follow-up care is a key part of your treatment and safety. Be sure to make and go to all appointments, and call your doctor if you are having problems. It's also a good idea to know your test results and keep a list of the medicines you take. How can you care for yourself at home? Be realistic  · If you have a large task to do, break it up into smaller steps you can handle, and just do what you can. · You may want to put off important decisions until your depression has lifted.  If you have plans that will have a major impact on your life, such as marriage, divorce, or a job change, try to wait a bit. Talk it over with friends and loved ones who can help you look at the overall picture first.  · Reaching out to people for help is important. Do not isolate yourself. Let your family and friends help you. Find someone you can trust and confide in, and talk to that person. · Be patient, and be kind to yourself. Remember that depression is not your fault and is not something you can overcome with willpower alone. Treatment is important for depression, just like for any other illness. Feeling better takes time, and your mood will improve little by little. Stay active  · Stay busy and get outside. Take a walk, or try some other light exercise. · Talk with your doctor about an exercise program. Exercise can help with mild depression. · Go to a movie or concert. Take part in a Nondenominational activity or other social gathering. Go to a [employer] game. · Ask a friend to have dinner with you. Take care of yourself  · Eat a balanced diet with plenty of fresh fruits and vegetables, whole grains, and lean protein. If you have lost your appetite, eat small snacks rather than large meals. · Avoid using illegal drugs or marijuana and drinking alcohol. Do not take medicines that have not been prescribed for you. They may interfere with medicines you may be taking for depression, or they may make your depression worse. · Take your medicines exactly as they are prescribed. You may start to feel better within 1 to 3 weeks of taking antidepressant medicine. But it can take as many as 6 to 8 weeks to see more improvement. If you have questions or concerns about your medicines, or if you do not notice any improvement by 3 weeks, talk to your doctor. · Continue to take your medicine after your symptoms improve. Taking your medicine for at least 6 months after you feel better can help keep you from getting depressed again.  If this isn't the first time you have been depressed, your doctor may recommend you to take medicine even longer. · If you have any side effects from your medicine, tell your doctor. Many side effects are mild and will go away on their own after you have been taking the medicine for a few weeks. Some may last longer. Talk to your doctor if side effects are bothering you too much. You might be able to try a different medicine. · Continue counseling. It may help prevent depression from returning, especially if you've had multiple episodes of depression. Talk with your counselor if you are having a hard time attending your sessions or you think the sessions aren't working. Don't just stop going. · Get enough sleep. Talk to your doctor if you are having problems sleeping. · Avoid sleeping pills unless they are prescribed by the doctor treating your depression. Sleeping pills may make you groggy during the day, and they may interact with other medicine you are taking. · If you have any other illnesses, such as diabetes, heart disease, or high blood pressure, make sure to continue with your treatment. Tell your doctor about all of the medicines you take, including those with or without a prescription. · If you or someone you know talks about suicide, self-harm, or feeling hopeless, get help right away. Call the [address] [place] [address] at 6-230-952-DKUM (4-880.361.4618) or text HOME to 585410 to access the Crisis Text Line. Consider saving these numbers in your phone. When should you call for help? Call 279 anytime you think you may need emergency care. For example, call if:    · You feel like hurting yourself or someone else.     · Someone you know has depression and is about to attempt or is attempting suicide. Call your doctor now or seek immediate medical care if:    · You hear voices.     · Someone you know has depression and:  ? Starts to give away his or her possessions. ? Uses illegal drugs or drinks alcohol heavily.   ? Talks or writes about death, including writing suicide notes or talking get at least 150 minutes of exercise per week or 10,000 steps per day on a pedometer . · Order or download the FREE \"Exercise & Physical Activity: Your Everyday Guide\" from [employer]. Call 3-500.703.5028 or search [employer] online. · You need 8070-9654 mg of calcium and 2036-8432 IU of vitamin D per day. It is possible to meet your calcium requirement with diet alone, but a vitamin D supplement is usually necessary to meet this goal.  · When exposed to the sun, use a sunscreen that protects against both UVA and UVB radiation with an SPF of 30 or greater. Reapply every 2 to 3 hours or after sweating, drying off with a towel, or swimming. · Always wear a seat belt when traveling in a car. Always wear a helmet when riding a bicycle or motorcycle.

## 2022-02-04 PROBLEM — I71.40 ABDOMINAL AORTIC ANEURYSM (AAA) 3.0 CM TO 5.0 CM IN DIAMETER IN FEMALE: Status: ACTIVE | Noted: 2022-02-04

## 2022-02-08 ENCOUNTER — OFFICE VISIT (OUTPATIENT)
Dept: PALLATIVE CARE | Age: 62
End: 2022-02-08
Payer: MEDICARE

## 2022-02-08 VITALS
WEIGHT: 155 LBS | DIASTOLIC BLOOD PRESSURE: 71 MMHG | HEART RATE: 73 BPM | OXYGEN SATURATION: 93 % | BODY MASS INDEX: 25.79 KG/M2 | SYSTOLIC BLOOD PRESSURE: 157 MMHG

## 2022-02-08 DIAGNOSIS — J44.9 CHRONIC OBSTRUCTIVE PULMONARY DISEASE, UNSPECIFIED COPD TYPE (HCC): Primary | ICD-10-CM

## 2022-02-08 DIAGNOSIS — Z51.5 PALLIATIVE CARE BY SPECIALIST: ICD-10-CM

## 2022-02-08 DIAGNOSIS — F32.A DEPRESSION, UNSPECIFIED DEPRESSION TYPE: ICD-10-CM

## 2022-02-08 DIAGNOSIS — R06.09 DYSPNEA ON EXERTION: ICD-10-CM

## 2022-02-08 PROCEDURE — G8482 FLU IMMUNIZE ORDER/ADMIN: HCPCS | Performed by: NURSE PRACTITIONER

## 2022-02-08 PROCEDURE — 4004F PT TOBACCO SCREEN RCVD TLK: CPT | Performed by: NURSE PRACTITIONER

## 2022-02-08 PROCEDURE — G8419 CALC BMI OUT NRM PARAM NOF/U: HCPCS | Performed by: NURSE PRACTITIONER

## 2022-02-08 PROCEDURE — G8428 CUR MEDS NOT DOCUMENT: HCPCS | Performed by: NURSE PRACTITIONER

## 2022-02-08 PROCEDURE — 3023F SPIROM DOC REV: CPT | Performed by: NURSE PRACTITIONER

## 2022-02-08 PROCEDURE — 3017F COLORECTAL CA SCREEN DOC REV: CPT | Performed by: NURSE PRACTITIONER

## 2022-02-08 PROCEDURE — 99205 OFFICE O/P NEW HI 60 MIN: CPT | Performed by: NURSE PRACTITIONER

## 2022-02-08 NOTE — PROGRESS NOTES
Palliative Care Department  Provider: ANNABELLE Ferreira CNP    Referring Provider:  Dr. Katerina Ritter    Location of Service:   [address] [place] [address]    Reason for Consult:  [x]  Code status Discussion  []  Assist with goals of care  [x]  Psychosocial support  [x]  Symptom Management  []  Advanced Care Planning    Chief Complaint: Aidan Chapman is a 57 y.o. female with chief complaint of SOB, Depression    Assessment/Plan      COPD GOLD Stage IV/Decompensated Heart Failure:   -  Known to Dr. Obed Forbes   -  Singular, Azelastine, Flonase, Proventil, Daliresp, Albuterol HFA    Shortness of Breath with exertion:   -  Severe but not persistent   -  Improves fairly quickly with rest   -  Encouraged continued activity as tolerated   -  Consider low dose oral morphine if worsens    Depression:   -  Zoloft 150 mg Daily   -  Worse at this point due to recent family loss and financial stresses     Follow Up:  3 months. They were encouraged to call with any questions, concerns, needs, or changes in symptoms. Subjective:     HPI:  Aidan Chapman is a 57 y.o. female with significant medical history of COPD, Heart failure, hx of Aortic valve replacement, who was referred to [address] [place] [address]. Subjective/Events/Discussions:  Mrs. Luis Shultz presents today unaccompanied. Introduced myself the palliative medicine service. Discussed the role in her care, including support regarding her goals of care, she is understanding her disease disease progression, as well as symptomatic management. With a long discussion today about her medical past, as well as her current concerns and issues. Her main complaint is that of shortness of breath with exertion, though this improves quickly with rest and use of her inhaler.   She additionally complains of depression, well managed with Zoloft for several years now, though she does admit she feels a bit more depressed recently due to recent family loss, new financial stressors, as well as her recently been testing positive for Covid, though she is recovering well. We discussed options, and there are no symptomatic needs which need to be changed or addressed immediately at this time. We did discuss strategies for managing her shortness of breath nonpharmacologically, as well as activities which can help improve her activity tolerance. Otherwise we will have her return approximately 3 months to reassess her needs.     Past Medical History:   Diagnosis Date    Anticoagulant long-term use     Anxiety     Ascending aortic aneurysm (HCC)     Asthma     CAD (coronary artery disease)     Chronic back pain     COPD (chronic obstructive pulmonary disease) (HCC)     Depression     Depression     GERD (gastroesophageal reflux disease)     Headache     Hyperlipidemia     Hypertension     On warfarin at home     for AVR    Restless legs syndrome     S/P AVR     Syncope     Tobacco abuse     Urinary incontinence     Ventricular tachycardia, non-sustained (HCC)        Past Surgical History:   Procedure Laterality Date    APPENDECTOMY      CARDIAC CATHETERIZATION  2020    Dr. Carri Edmonds  2009    Aortic valve 2009 [facility]      SECTION      COLONOSCOPY N/A 2019    COLONOSCOPY POLYPECTOMY SNARE/COLD BIOPSY performed by Chary Morgan MD at [address] [place] [address]  2020    Linq Insertion    Dr. Mya Lopez KEM STEROTACTIC LOC BREAST BIOPSY RIGHT Right 3/10/2021    KEM STEROTACTIC LOC BREAST BIOPSY RIGHT 3/10/2021 [facility]    TUBAL LIGATION      UPPER GASTROINTESTINAL ENDOSCOPY N/A 2019    EGD BIOPSY performed by Chary Morgan MD at [facility] ENDOSCOPY       Family History   Problem Relation Age of Onset    Heart Disease Mother    Keara Reddy Arthritis Mother     Asthma Mother     Diabetes Mother     High Blood Pressure Mother     High Cholesterol Mother     Stroke Mother     Heart Disease Father     Arthritis Father     Asthma Father     Diabetes Father     High Blood Pressure Father     High Cholesterol Father     Breast Cancer Sister     Asthma Brother     Diabetes Maternal Aunt     Diabetes Paternal Aunt     High Blood Pressure Paternal Aunt     Arthritis Paternal Grandmother     Asthma Paternal Grandmother        ROS: UNLESS STATED ABOVE PATIENT DENIES:  CONSTITUTIONAL:  fever, chill, rigors, nausea, vomiting, fatigue. HEENT: blurry vision, double vision, hearing problem, tinnitus, hoarseness, dysphagia, odynophagia  RESPIRATORY: cough, shortness of breath, sputum expectoration. CARDIOVASCULAR:  Chest pain/pressure, palpitation, syncope, irregular beats  GASTROINTESTINAL:  abdominal or rectal pain, diarrhea, constipation, . GENITOURINARY:  Burning, frequency, urgency, incontinence, discharge  INTEGUMENTARY: rash, wound, pruritis  HEMATOLOGIC/LYMPHATIC:  Swelling, sores, gum bleeding, easy bruising, pica.   MUSCULOSKELETAL:  pain, edema, joint swelling or redness  NEUROLOGICAL:  light headed, dizziness, loss of consciousness, weakness, change in memory, seizures, tremors    Objective:     Physical Exam  BP (!) 157/71   Pulse 73   Wt 155 lb (70.3 kg)   LMP 11/08/2008 (LMP Unknown)   SpO2 93% Comment: RA  BMI 25.79 kg/m²     Gen:  Alert, appears stated age, well nourished, in no acute distress  HEENT:  Normocephalic, conjunctiva pink, no drainage, mucosa moist  Neck:  Supple  Lungs:  CTA bilaterally, no audible rhonchi or wheezes noted  Heart[de-identified]  RRR, no murmur, rub, or gallop noted during exam  Abd:  Soft, non tender, non distended, BS+  M/S/Ext:  Moving all extremities, no edema, pulses present  Skin:  Warm and dry  Neuro:  PERRL, Alert, oriented x 3; following commands    [place] Symptom Assessment Score   [place] Score 2/8/2022   Pain Score 1   Tiredness Score 7   Nausea Score 3   Depression Score 8   Anxiety Score 1 Drowsiness Score Not drowsy   Appetite Score 4   Wellbeing Score 5   Dyspnea Score 8   Other Problem Score 3   Total Assessment Score(calculated) 40     Assessed by: patient and provider. Current Medications:  Medications reviewed: yes      ANNABELLE Pedroza - PER  Palliative Medicine    Time/Communication  Greater than 50% of time spent, total 60 minutes in face-to-face counseling and coordination of care regarding goals of care, symptom management, diagnosis and prognosis and see above. Discussed the plan of care with the other IDT members of the Palliative Care Team, and with consultants, Primary Attending, patient, family and facility staff. Thank you for allowing Palliative Medicine to participate in the care of Satish Ludwig. Note: This report was completed using [employer] voiced recognition software. Every effort has been made to ensure accuracy; however, inadvertent computerized transcription errors may be present.

## 2022-02-09 ENCOUNTER — SOCIAL WORK (OUTPATIENT)
Dept: INTERNAL MEDICINE | Age: 62
End: 2022-02-09

## 2022-02-09 NOTE — PROGRESS NOTES
Pt came for appt with LSW today to inquire of reason for loss of $406 from her social security check 2.1.22 ; Spent 2 1/2 hours contacting Medicare Francine Darting), [address] [place] [address]; [facility]. Assisted pt with appeal paperwork to social security and she was going to bring to social security office and if could not get in, will mail. Found that check reduced for non payment of Medicare premium from March, April and May of 2019 despite verification from [facility] that pt's Medicare premium assistance was effective 2.1.22; there is a formal request made to [address] [place] [address] Road to review and hopeful for reimbursement.   Pt to call LSW when she receives phone call re: investigation and/or letter of outcome

## 2022-02-09 NOTE — PROGRESS NOTES
[facility] Cardiology consult  Dr. Maximiliano Ross      Reason for Consult: CHF  Referring Physician: Dr. Enrike Noe:   Chief Complaint   Patient presents with    Congestive Heart Failure     7 month ov. INR today is 2.3. Last labs 21. Is c/o intermittent chest pain that travels down her left arm & SOBOE. No other  cardiac complaints    Cardiac Valve Problem    COPD    Chest Pain       HISTORY OF PRESENT ILLNESS:   57year old female with history of CAD, s/p aortic valve replacement, dizziness s/p loop recorder implant, COPD, HTN and hyperlipidemia is here for follow-up appointment  Chest pain left-sided, sharp in nature, last for several seconds, radiation to the left axilla and left arm, nonexertional, occasional palpitations, shortness of breath is at baseline, denies any pedal edema, no PND, no orthopnea, no syncope, no presyncopal episodes.   Functional capacity is at baseline      Past Medical History:   Diagnosis Date    Anticoagulant long-term use     Anxiety     Ascending aortic aneurysm (HCC)     Asthma     CAD (coronary artery disease)     Chronic back pain     COPD (chronic obstructive pulmonary disease) (HCC)     Depression     Depression     GERD (gastroesophageal reflux disease)     Headache     Hyperlipidemia     Hypertension     On warfarin at home     for AVR    Restless legs syndrome     S/P AVR     Syncope     Tobacco abuse     Urinary incontinence     Ventricular tachycardia, non-sustained (HCC)          Past Surgical History:   Procedure Laterality Date    APPENDECTOMY      CARDIAC CATHETERIZATION  2020    Dr. Isra Serra  2009    Aortic valve 2009 [facility]      SECTION      COLONOSCOPY N/A 2019    COLONOSCOPY POLYPECTOMY SNARE/COLD BIOPSY performed by Colt Carson MD at [address] [facility]  2020    Linq Insertion     [employer]    KEM STEROTACTIC LOC BREAST BIOPSY RIGHT Right 3/10/2021    KEM STEROTACTIC LOC BREAST BIOPSY RIGHT 3/10/2021 [facility]    TUBAL LIGATION      UPPER GASTROINTESTINAL ENDOSCOPY N/A 11/18/2019    EGD BIOPSY performed by Zenobia Francis MD at [address] [facility] ENDOSCOPY         Current Outpatient Medications   Medication Sig Dispense Refill    Inulin (FIBER CHOICE PO) Take 2 capsules by mouth 2 times daily      atorvastatin (LIPITOR) 20 MG tablet take 1 tablet by mouth once daily 90 tablet 0    lisinopril (PRINIVIL;ZESTRIL) 20 MG tablet Take 1 tablet by mouth daily take 1 tablet by mouth once daily 90 tablet 0    metoprolol succinate (TOPROL XL) 25 MG extended release tablet Take 1 tablet by mouth daily 90 tablet 0    montelukast (SINGULAIR) 10 MG tablet take 1 tablet by mouth at bedtime 90 tablet 0    Azelastine HCl 137 MCG/SPRAY SOLN 2 sprays by Nasal route daily 30 mL 1    fluticasone (FLONASE) 50 MCG/ACT nasal spray 2 sprays by Nasal route daily 1 each 1    omeprazole (PRILOSEC) 40 MG delayed release capsule take 1 capsule by mouth every morning BEFORE BREAKFAST 90 capsule 0    sertraline (ZOLOFT) 100 MG tablet take 1 and 1/2 tablet by mouth once daily 135 tablet 0    albuterol (PROVENTIL) (2.5 MG/3ML) 0.083% nebulizer solution Take 3 mLs by nebulization every 6 hours as needed for Wheezing or Shortness of Breath 120 each 1    Magnesium Oxide (MAGNESIUM-OXIDE) 250 MG TABS tablet Take 1 tablet by mouth daily 90 tablet 0    DALIRESP 250 MCG tablet take 1 tablet by mouth daily 30 tablet 1    albuterol sulfate  (90 Base) MCG/ACT inhaler inhale 1 puff every 4 hours if needed for wheezing 8.5 g 2    rOPINIRole (REQUIP) 0.25 MG tablet Take 2 tab in morning and 2 tabs before bed 270 tablet 1    warfarin (COUMADIN) 5 MG tablet Take 1 tablet by mouth daily 90 tablet 3    aspirin EC 81 MG EC tablet Take 1 tablet by mouth daily 90 tablet 1    Budeson-Glycopyrrol-Formoterol (BREZTRI AEROSPHERE) 160-9-4.8 MCG/ACT AERO Inhale 2 puffs into the lungs 2 times daily 1 Inhaler 6    nicotine (NICODERM CQ) 21 MG/24HR Place 1 patch onto the skin daily (Patient not taking: Reported on 2/10/2022) 42 patch 0    nicotine (NICODERM CQ) 14 MG/24HR Place 1 patch onto the skin daily for 14 days (Patient not taking: Reported on 2/10/2022) 14 patch 0    nicotine (NICODERM CQ) 7 MG/24HR Place 1 patch onto the skin daily for 14 days (Patient not taking: Reported on 2/10/2022) 14 patch 0    nicotine polacrilex ([employer] NICOTINE POLACRILEX) 4 MG gum Take 1 each by mouth as needed for Smoking cessation (Patient not taking: Reported on 2/10/2022) 110 each 3    warfarin (COUMADIN) 6 MG tablet Take 1 tablet by mouth daily Mondays and Wednesdays (Patient not taking: Reported on 2/10/2022) 90 tablet 0    Blood Pressure Monitoring (BLOOD PRESSURE CUFF) MISC Dx:  Hypertension with labile blood pressure 1 each 0     No current facility-administered medications for this visit.          Allergies as of 02/10/2022 - Fully Reviewed 02/10/2022   Allergen Reaction Noted    Keflex [cephalexin] Itching 12/29/2016    Phenergan [promethazine hcl] Other (See Comments) 12/29/2016       Social History     Socioeconomic History    Marital status: Single     Spouse name: Not on file    Number of children: 3    Years of education: 12    Highest education level: Not on file   Occupational History    Occupation: Disabled   Tobacco Use    Smoking status: Current Every Day Smoker     Packs/day: 2.00     Years: 50.00     Pack years: 100.00     Types: Cigarettes    Smokeless tobacco: Never Used    Tobacco comment: currently 0.5 ppd, 2/10/22   Vaping Use    Vaping Use: Former    Quit date: 1/1/2015    Substances: Nicotine   Substance and Sexual Activity    Alcohol use: Yes     Comment: rare wine    Drug use: Never    Sexual activity: Not Currently     Partners: Male   Other Topics Concern    Not on file   Social History Narrative    Drinks 2 cups of coffee daily & occ Coke     Social Determinants of Health     Financial Resource Strain: Medium Risk    Difficulty of Paying Living Expenses: Somewhat hard   Food Insecurity: Food Insecurity Present    Worried About Running Out of Food in the Last Year: Sometimes true    Kae of Food in the Last Year: Never true   Transportation Needs: No Transportation Needs    Lack of Transportation (Medical): No    Lack of Transportation (Non-Medical): No   Physical Activity:     Days of Exercise per Week: Not on file    Minutes of Exercise per Session: Not on file   Stress:     Feeling of Stress : Not on file   Social Connections:     Frequency of Communication with Friends and Family: Not on file    Frequency of Social Gatherings with Friends and Family: Not on file    Attends Jewish Services: Not on file    Active Member of Clubs or Organizations: Not on file    Attends Club or Organization Meetings: Not on file    Marital Status: Not on file   Intimate Partner Violence:     Fear of Current or Ex-Partner: Not on file    Emotionally Abused: Not on file    Physically Abused: Not on file    Sexually Abused: Not on file   Housing Stability: [address] Unable to Pay for Housing in the Last Year: No    Number of [place] in the Last Year: 2    Unstable Housing in the Last Year: No       Family History   Problem Relation Age of Onset    Heart Disease Mother     Arthritis Mother     Asthma Mother     Diabetes Mother     High Blood Pressure Mother     High Cholesterol Mother     Stroke Mother     Heart Disease Father     Arthritis Father     Asthma Father     Diabetes Father     High Blood Pressure Father     High Cholesterol Father     Breast Cancer Sister     Other Brother         ELMO on CPap;    Has ICD    Asthma Brother     COPD Brother     Arthritis Paternal Grandmother     Asthma Paternal Grandmother     Diabetes Maternal Aunt     Diabetes Paternal Aunt     High Blood Pressure Paternal Aunt        REVIEW OF SYSTEMS:   CONSTITUTIONAL:  negative for  fevers, chills, sweats, + fatigue  HEENT:  negative for  tinnitus, earaches, nasal congestion and epistaxis  RESPIRATORY: Occasional dry cough, cough with sputum,wheezing, negative for hemoptysis  GASTROINTESTINAL:  negative for nausea, vomiting, diarrhea, constipation, pruritus and jaundice  HEMATOLOGIC/LYMPHATIC:  negative for easy bruising, bleeding, lymphadenopathy and petechiae  ENDOCRINE:  negative for heat intolerance, cold intolerance, tremor, hair loss and diabetic symptoms including neither polyuria nor polydipsia nor blurred vision  MUSCULOSKELETAL:  negative for  myalgias, arthralgias, joint swelling, stiff joints and decreased range of motion  NEUROLOGICAL:  negative for memory problems, speech problems, visual disturbance, dysphagia, weakness and numbness      PHYSICAL EXAM:   CONSTITUTIONAL:  awake, alert, cooperative, no apparent distress, and appears older than stated age  HEAD:  normocepalic, without obvious abnormality, atraumatic, pink, moist mucous membranes. NECK:  Supple, symmetrical, trachea midline, no adenopathy, thyroid symmetric, not enlarged and no tenderness, skin normal  LUNGS:  No increased work of breathing, decreased air exchange, fine scattered wheezing  CARDIOVASCULAR:  Normal apical impulse, regular rate and rhythm, mechanical heart sounds, 3/6 diastolic murmur at the right upper sternal border, 2 out of 6 systolic murmur at the right upper sternal border, no JVD, no carotid bruit, trace pedal edema, good carotid upstroke bilaterally. ABDOMEN:  Soft, nontender, no masses, no hepatomegaly or splenomegaly, BS+  CHEST: nontender to palpation, expands symmetrically  MUSCULOSKELETAL:  No clubbing no cyanosis. there is no redness, warmth, or swelling of the joints  full range of motion noted  NEUROLOGIC:  Alert, awake,oriented x3  SKIN:  no bruising or bleeding, normal skin color, texture, turgor and no redness, warmth, or swelling    /68 (Site: Right Upper Arm, Position: Sitting, Cuff Size: Medium Adult)   Pulse 68   Resp 16   Ht 5' 5\" (1.651 m)   Wt 153 lb 6.4 oz (69.6 kg)   LMP 11/08/2008 (LMP Unknown)   SpO2 94%   BMI 25.53 kg/m²     DATA:   I personally reviewed the visit EKG with the following interpretation: Sinus rhythm, nonspecific ST changes, normal axis    EKG 7/6/21 Sinus rhythm, nonspecific T wave changes, no significant changes when compared to previous      ECHO: 9/9/20 Summary   Left ventricle is normal in size . Borderline concentric left ventricular hypertrophy. Septal motion consistent with post open heart state . Ejection fraction is visually estimated at 55-60%. Normal diastolic function. Normal right ventricular size and function. Normal sized left atrium. There is a mechanical aortic prosthetic valve which is well seated with a   mean gradient of 8.14 mmHg and trace aortic regurgitation   Mildly dilated aortic root. Stress Test: 9/10/20       FINDINGS:    Perfusion images demonstrate no reversible perfusion defect. There is    a fixed perfusion abnormality at the anterior wall. Wall motion is globally very mildly hypokinetic. The right ventricle    is hypertrophied. The end diastolic volume is 86 ml. The end systolic volume is 44 ml. The estimated ejection fraction is 49 %.           Impression    1. No reversible perfusion defect. Fixed anterior wall perfusion    abnormality. 2. Ejection fraction is 49 %. 3. Global very mild hypokinesis. RVH.             Angiography 9/10/2020,CONCLUSIONS:  1. Coronary artery. A.  Left main. Aneurysmal formation in the mid left main without any  significant angiographic luminal narrowings. B.  LAD. No significant angiographic disease. C.  LCX. No significant angiographic disease. D.  RCA. Dominant vessel with around 40-50% mid vessel narrowing.   2.  Bileaflet mechanical valve with adequate leaflet mobility noted on  fluoroscopy. Cardiology Labs: BMP:    Lab Results   Component Value Date     09/28/2021    K 3.8 09/28/2021    K 3.7 01/10/2021     09/28/2021    CO2 28 09/28/2021    BUN 11 09/28/2021    CREATININE 0.6 09/28/2021     CMP:    Lab Results   Component Value Date     09/28/2021    K 3.8 09/28/2021    K 3.7 01/10/2021     09/28/2021    CO2 28 09/28/2021    BUN 11 09/28/2021    CREATININE 0.6 09/28/2021    PROT 6.6 01/10/2021     CBC:    Lab Results   Component Value Date    WBC 13.9 01/10/2021    RBC 3.82 01/10/2021    HGB 12.1 01/10/2021    HCT 37.3 01/10/2021    MCV 97.6 01/10/2021    RDW 13.3 01/10/2021     01/10/2021     PT/INR:  No results found for: PTINR  PT/INR Warfarin:  No components found for: PTPATWAR, PTINRWAR  PTT:    Lab Results   Component Value Date    APTT 97.5 09/12/2020     PTT Heparin:  No components found for: APTTHEP  Magnesium:    Lab Results   Component Value Date    MG 1.6 09/28/2021     TSH:    Lab Results   Component Value Date    TSH 2.630 09/10/2020     TROPONIN:  No components found for: TROP  BNP:  No results found for: BNP  FASTING LIPID PANEL:    Lab Results   Component Value Date    CHOL 178 05/12/2021    HDL 54 05/12/2021    TRIG 157 05/12/2021     No orders to display     I have personally reviewed the laboratory, cardiac diagnostic and radiographic testing as outlined above:      IMPRESSION:  1. Chest pain: Noncardiac, patient was reassured  2. Chronic diastolic congestive heart failure: Compensated, continue current treatment  3. S/p aortic valve replacement: Using mechanical aortic valve, echocardiogram done in February 2020 revealed normally functioning mechanical aortic valve. 4.  Shortness of breath: Secondary to COPD and asthma  5. COPD  6. Tobacco abuse: Patient was counseled regarding smoking cessation    RECOMMENDATIONS:   1. continue current treatment  2.  CHF: Daily weight, take an extra Lasix for weight gain of more than 2-3 pounds in 24 hours, compliance with diuretics, low-salt diet were all advised. 3. Compliance with the Coumadin dose, PT and INR check appointments was strongly advised  4. Increase risk of bleeding due to being on anti-coagulation, symptoms and signs of bleeding discussed with patient, patient was advised to seek medical attention at the earliest symptoms or signs of bleeding. 5. Infective endocarditis prophylaxis prior to invasive procedure especially dental procedures discussed with her  6. Follow-up with Dr. Soniya Gonzalez as scheduled  7. Follow-up with Dr. Rosa M Mcdonnell as scheduled  8. Follow-up with Dr. Ronn Lundberg in 6 months, sooner if symptomatic for any reason    I have reviewed my findings and recommendations with patient    Electronically signed by Sherryle Mountain, MD on 2/10/2022 at 10:36 AM  NOTE: This report was transcribed using voice recognition software.  Every effort was made to ensure accuracy; however, inadvertent computerized transcription errors may be present

## 2022-02-10 ENCOUNTER — ANTI-COAG VISIT (OUTPATIENT)
Dept: CARDIOLOGY CLINIC | Age: 62
End: 2022-02-10
Payer: MEDICARE

## 2022-02-10 ENCOUNTER — OFFICE VISIT (OUTPATIENT)
Dept: CARDIOLOGY CLINIC | Age: 62
End: 2022-02-10
Payer: MEDICARE

## 2022-02-10 VITALS
HEART RATE: 68 BPM | WEIGHT: 153.4 LBS | OXYGEN SATURATION: 94 % | DIASTOLIC BLOOD PRESSURE: 68 MMHG | SYSTOLIC BLOOD PRESSURE: 130 MMHG | BODY MASS INDEX: 25.56 KG/M2 | HEIGHT: 65 IN | RESPIRATION RATE: 16 BRPM

## 2022-02-10 DIAGNOSIS — R07.9 CHEST PAIN, UNSPECIFIED TYPE: Primary | ICD-10-CM

## 2022-02-10 DIAGNOSIS — Z95.2 H/O MECHANICAL AORTIC VALVE REPLACEMENT: ICD-10-CM

## 2022-02-10 LAB
INTERNATIONAL NORMALIZATION RATIO, POC: 2.3
PROTHROMBIN TIME, POC: NORMAL

## 2022-02-10 PROCEDURE — 3017F COLORECTAL CA SCREEN DOC REV: CPT | Performed by: INTERNAL MEDICINE

## 2022-02-10 PROCEDURE — 4004F PT TOBACCO SCREEN RCVD TLK: CPT | Performed by: INTERNAL MEDICINE

## 2022-02-10 PROCEDURE — 85610 PROTHROMBIN TIME: CPT | Performed by: INTERNAL MEDICINE

## 2022-02-10 PROCEDURE — G8427 DOCREV CUR MEDS BY ELIG CLIN: HCPCS | Performed by: INTERNAL MEDICINE

## 2022-02-10 PROCEDURE — 93000 ELECTROCARDIOGRAM COMPLETE: CPT | Performed by: INTERNAL MEDICINE

## 2022-02-10 PROCEDURE — G8482 FLU IMMUNIZE ORDER/ADMIN: HCPCS | Performed by: INTERNAL MEDICINE

## 2022-02-10 PROCEDURE — G8419 CALC BMI OUT NRM PARAM NOF/U: HCPCS | Performed by: INTERNAL MEDICINE

## 2022-02-10 PROCEDURE — 99214 OFFICE O/P EST MOD 30 MIN: CPT | Performed by: INTERNAL MEDICINE

## 2022-02-14 ENCOUNTER — HOSPITAL ENCOUNTER (OUTPATIENT)
Dept: CT IMAGING | Age: 62
Discharge: HOME | End: 2022-02-16
Payer: MEDICARE

## 2022-02-14 DIAGNOSIS — R91.1 LUNG NODULE: ICD-10-CM

## 2022-02-14 PROCEDURE — 71250 CT THORAX DX C-: CPT

## 2022-02-21 ENCOUNTER — OFFICE VISIT (OUTPATIENT)
Dept: PULMONOLOGY | Age: 62
End: 2022-02-21
Payer: MEDICARE

## 2022-02-21 VITALS
HEIGHT: 65 IN | HEART RATE: 80 BPM | BODY MASS INDEX: 25.29 KG/M2 | SYSTOLIC BLOOD PRESSURE: 131 MMHG | TEMPERATURE: 98 F | OXYGEN SATURATION: 94 % | DIASTOLIC BLOOD PRESSURE: 60 MMHG | WEIGHT: 151.8 LBS

## 2022-02-21 DIAGNOSIS — J44.9 CHRONIC OBSTRUCTIVE PULMONARY DISEASE, UNSPECIFIED COPD TYPE (HCC): ICD-10-CM

## 2022-02-21 PROCEDURE — 99213 OFFICE O/P EST LOW 20 MIN: CPT | Performed by: INTERNAL MEDICINE

## 2022-02-21 PROCEDURE — G8419 CALC BMI OUT NRM PARAM NOF/U: HCPCS | Performed by: INTERNAL MEDICINE

## 2022-02-21 PROCEDURE — G8482 FLU IMMUNIZE ORDER/ADMIN: HCPCS | Performed by: INTERNAL MEDICINE

## 2022-02-21 PROCEDURE — 3023F SPIROM DOC REV: CPT | Performed by: INTERNAL MEDICINE

## 2022-02-21 PROCEDURE — 3017F COLORECTAL CA SCREEN DOC REV: CPT | Performed by: INTERNAL MEDICINE

## 2022-02-21 PROCEDURE — 4004F PT TOBACCO SCREEN RCVD TLK: CPT | Performed by: INTERNAL MEDICINE

## 2022-02-21 PROCEDURE — 99214 OFFICE O/P EST MOD 30 MIN: CPT | Performed by: INTERNAL MEDICINE

## 2022-02-21 PROCEDURE — G8428 CUR MEDS NOT DOCUMENT: HCPCS | Performed by: INTERNAL MEDICINE

## 2022-02-21 NOTE — PROGRESS NOTES
Pt presents to clinic for a 7 month follow-up. No changes made to treatment plan. Pt to follow up with another pulmonologist. Pt reported she will check with her insurance and contact the clinic if she needs further recommendations.

## 2022-02-21 NOTE — PROGRESS NOTES
Department of Internal Medicine  Division of Pulmonary, Critical Care & Sleep Medicine  Pulmonary [address] [facility]                                             Pulmonary Clinic Consult     I had the pleasure of seeing  Hernando Brown in the [facility] regarding their COPD, lung CT screening for lung cancer      Chief complaint on file.   SOB ,Cough     HISTORY OF PRESENT ILLNESS:    Hernnado Brown is a 57y.o. year old female  follow with the internal medicine clinic came in for follow-up of CT scan that she has done in the workup for screening for lung cancer also she has a history of advanced COPD    The patient tells me that she has shortness of breath has been a chronic she cannot walk more than one block, and she stated that she gets short-winded sometimes less than one block    Also she stated that she has cough that has been chronic usually with clear sputum sometimes it gets yellow    The patient has to follow pulmonologist Sharyle Pol and the albuterol rescue inhaler)  He should use the albuterol inhaler 3 times in average in a day and then she use her grandson nebulizer once to twice weekly    The patient does not feel energetic but she contributed that her shortness of breath    Unfortunately the patient is a smoker since the age of 24 she used to smoke 1 pack daily for the last 40 years and then she cut back to half pack daily    She denies any hemoptysis or weight loss, she denies any history of lung cancer in the family    She has open heart surgery with 2 valve replacement done in 2009 and she is on long-term anticoagulation      Today visit  unfortunately She still smoke  She still with SOB and some cough   No further hemoptysis   Use more rescue inhalers   Smoke 1/2 pack  No fever or chills  No chest   Clear sputum     ALLERGIES:    Allergies   Allergen Reactions    Keflex [Cephalexin] Itching    Phenergan [Promethazine Hcl] Other (See Comments)       PAST MEDICAL HISTORY:       Diagnosis Date    Anticoagulant long-term use     Anxiety     Ascending aortic aneurysm (HCC)     Asthma     CAD (coronary artery disease)     Chronic back pain     COPD (chronic obstructive pulmonary disease) (HCC)     Depression     Depression     GERD (gastroesophageal reflux disease)     Headache     Hyperlipidemia     Hypertension     On warfarin at home     for AVR    Restless legs syndrome     S/P AVR     Syncope     Tobacco abuse     Urinary incontinence     Ventricular tachycardia, non-sustained (HCC)        MEDICATIONS:   Current Outpatient Medications   Medication Sig Dispense Refill    Inulin (FIBER CHOICE PO) Take 2 capsules by mouth 2 times daily      atorvastatin (LIPITOR) 20 MG tablet take 1 tablet by mouth once daily 90 tablet 0    lisinopril (PRINIVIL;ZESTRIL) 20 MG tablet Take 1 tablet by mouth daily take 1 tablet by mouth once daily 90 tablet 0    metoprolol succinate (TOPROL XL) 25 MG extended release tablet Take 1 tablet by mouth daily 90 tablet 0    montelukast (SINGULAIR) 10 MG tablet take 1 tablet by mouth at bedtime 90 tablet 0    Azelastine HCl 137 MCG/SPRAY SOLN 2 sprays by Nasal route daily 30 mL 1    fluticasone (FLONASE) 50 MCG/ACT nasal spray 2 sprays by Nasal route daily 1 each 1    omeprazole (PRILOSEC) 40 MG delayed release capsule take 1 capsule by mouth every morning BEFORE BREAKFAST 90 capsule 0    sertraline (ZOLOFT) 100 MG tablet take 1 and 1/2 tablet by mouth once daily 135 tablet 0    albuterol (PROVENTIL) (2.5 MG/3ML) 0.083% nebulizer solution Take 3 mLs by nebulization every 6 hours as needed for Wheezing or Shortness of Breath 120 each 1    Magnesium Oxide (MAGNESIUM-OXIDE) 250 MG TABS tablet Take 1 tablet by mouth daily 90 tablet 0    nicotine (NICODERM CQ) 21 MG/24HR Place 1 patch onto the skin daily (Patient not taking: Reported on 2/10/2022) 42 patch 0    nicotine (NICODERM CQ) 14 MG/24HR Place 1 patch onto the skin daily for 14 days (Patient not taking: Reported on 2/10/2022) 14 patch 0    nicotine (NICODERM CQ) 7 MG/24HR Place 1 patch onto the skin daily for 14 days (Patient not taking: Reported on 2/10/2022) 14 patch 0    nicotine polacrilex ([employer] NICOTINE POLACRILEX) 4 MG gum Take 1 each by mouth as needed for Smoking cessation (Patient not taking: Reported on 2/10/2022) 110 each 3    DALIRESP 250 MCG tablet take 1 tablet by mouth daily 30 tablet 1    albuterol sulfate  (90 Base) MCG/ACT inhaler inhale 1 puff every 4 hours if needed for wheezing 8.5 g 2    rOPINIRole (REQUIP) 0.25 MG tablet Take 2 tab in morning and 2 tabs before bed 270 tablet 1    warfarin (COUMADIN) 5 MG tablet Take 1 tablet by mouth daily 90 tablet 3    aspirin EC 81 MG EC tablet Take 1 tablet by mouth daily 90 tablet 1    warfarin (COUMADIN) 6 MG tablet Take 1 tablet by mouth daily  and  (Patient not taking: Reported on 2/10/2022) 90 tablet 0    Budeson-Glycopyrrol-Formoterol (BREZTRI AEROSPHERE) 160-9-4.8 MCG/ACT AERO Inhale 2 puffs into the lungs 2 times daily 1 Inhaler 6    Blood Pressure Monitoring (BLOOD PRESSURE CUFF) MISC Dx:  Hypertension with labile blood pressure 1 each 0     No current facility-administered medications for this visit.        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 Jessica Caldwell  2009    Aortic valve 2009 [facility]      SECTION      COLONOSCOPY N/A 2019    COLONOSCOPY POLYPECTOMY SNARE/COLD BIOPSY performed by Eileen Shanks MD at [address] [facility]  09/11/2020    Linq Insertion    Dr. Dyan Yates KEM STEROTACTIC LOC BREAST BIOPSY RIGHT Right 3/10/2021    KEM STEROTACTIC LOC BREAST BIOPSY RIGHT 3/10/2021 [facility]    TUBAL LIGATION      UPPER GASTROINTESTINAL ENDOSCOPY N/A 11/18/2019    EGD BIOPSY performed by Eileen Shanks MD at [address]:   Lung cancer:None   DVT or PE :None     REVIEW OF SYSTEMS:  Constitutional: General health is good . There has been no weight changes. No fevers, fatigue or weakness. Head: Patient denies any history of trauma, convulsive disorder or syncope. Skin:  Patient denies history of changes in pigmentation, eruptions or pruritus. No easy bruising or bleeding. EENT: no nasal congestion   Cardiovascular ,No chest pain ,No edema ,  Respiration:SOB:None   ,OTT :None   Gastrointestinal:No GI bleed ,no melena  ,no hematemesis    Musculoskeletal: no joint pain ,no swelling  Neurological:no , syncope. Denies twitching, convulsions, loss of consciousness or memory. Endocrine:  . No history of goiter, exophthalmos or dryness of skin. The patient has no history of diabetes. Hematopoietic:  No history of bleeding disorders or easy bruising. Rheumatic:  No connective tissue disease history or polyarthritis/inflammatory joint disease. PHYSICAL EXAMINATION:  Vitals:    02/21/22 0856   BP: 131/60   Pulse: 80   Temp: 98 °F (36.7 °C)   SpO2: 94%     Constitutional: This is a well developed, well nourished 57y.o. year old female who is alert, oriented, coopative and in no apparent distress. Head was normocephalic and atraumatic. EENT: Mallampati class :  Extraocular muscles intact. External canals are patent and no discharge was appreciated. Septum was midline,   mucosa was without erythema, exudates or cobblestoning. No thrush was noted. Neck: Supple without thyromegaly.  No elevated JVP. Trachea was midline. No carotid bruits were auscultated. Respiratory:decrease breath sound ,Rhonchi bilateral     Cardiovascular: Regular without murmur, clicks, gallops or rubs. There is no left or right ventricular heave. Pulses:  Carotid, radial and femoral pulses were equally bilaterally. Abdomen: Slightly rounded and soft without organomegaly. No rebound, rigidity or guarding was appreciated. Lymphatic: No lymphadenopathy. Musculoskeletal: normal range of motion     Extremities: no edema ,or cyanosis   Skin:  Warm and dry. Good color, turgor and pigmentation. No lesions or scars. Neurological/Psychiatric: The patient's general behavior, level of consciousness, thought content and emotional status is normal.  Cranial nerves II-XII are intact. DATA:     FVC 2.7 which is 69 of predicted    FEV1:0.75 which is 28 of predicted  FEV1/FVC 53% which is 41 of predicted  There Was significant bronchodilator response  MVV 37 which is not 39 of predicted    ERV 0.19 which is 90% of predicted TLC is 7.4 which is 137 of predicted  RV/TLC 64 which is 168 predicted indicating of severe air trapping    DLCO is 47 and I will therefore limited correct 51 indicating of moderate reduction in DLCO    IMPRESSION:    Possible pneumonia ,Left lower lobe   1-Very COPD with possible asthmatic bronchitis   2-Possible Asthma component. especially eosinophilic type   3-Tobacco abuse  4-severe emphysema  5 Lung nodules           6-PHTN ,mild,diastolic dysfunction     PLAN:        -Very pleasant 58-year-old female who came into [facility] for follow-up after CAT scan and COPD  CT shows stable finding   On Breztri,better than than the Spiriva dulera   On albuterol   On darlisep  Following with cardiology for thoracic aneurysms and want to talk to him about CTS evaluation   On Darlisep as she states  Normal  alpha-1 antitrypsin  Normal IgE level  eosinophilic count is 564,TPTJO she still is doing well ,with her COPD I doubt much benefit ,if no better may consider biological,but we need her stop smoking   Not interested with Pulmonary rehab  [place] to be followed by PCP  Start Daliresp 250 mg bid     Also will consider Triology if breathing continue to get worse    Explain to her that if she continue to smoke ,nothing will help her and she is putting her life at risk of resp failure     Thank you for allowing me to participate in [facility].  I will keep following with you ,should you have any concerns ,please contact me at [phone]     Sincerely,        Fang Nieves MD  Pulmonary & Critical Care Medicine

## 2022-02-25 ENCOUNTER — TELEPHONE (OUTPATIENT)
Dept: INTERNAL MEDICINE | Age: 62
End: 2022-02-25

## 2022-02-25 NOTE — TELEPHONE ENCOUNTER
Received call from pt stating she received credit to her account for money that was taken out from social security and expressed appreciation for the assistance and ease of worry which has increased her mood.

## 2022-03-07 DIAGNOSIS — J44.9 CHRONIC OBSTRUCTIVE PULMONARY DISEASE, UNSPECIFIED COPD TYPE (HCC): ICD-10-CM

## 2022-03-07 RX ORDER — BUDESONIDE, GLYCOPYRROLATE, AND FORMOTEROL FUMARATE 160; 9; 4.8 UG/1; UG/1; UG/1
AEROSOL, METERED RESPIRATORY (INHALATION)
Qty: 1 EACH | Refills: 12 | Status: SHIPPED | OUTPATIENT
Start: 2022-03-07

## 2022-03-08 ENCOUNTER — ANTI-COAG VISIT (OUTPATIENT)
Dept: CARDIOLOGY CLINIC | Age: 62
End: 2022-03-08
Payer: MEDICARE

## 2022-03-08 DIAGNOSIS — Z95.2 H/O MECHANICAL AORTIC VALVE REPLACEMENT: ICD-10-CM

## 2022-03-08 PROBLEM — I71.21 ASCENDING AORTIC ANEURYSM: Status: RESOLVED | Noted: 2017-01-05 | Resolved: 2022-03-08

## 2022-03-08 PROBLEM — R55 SYNCOPE, CARDIOGENIC: Status: RESOLVED | Noted: 2020-09-10 | Resolved: 2022-03-08

## 2022-03-08 PROBLEM — K59.00 CONSTIPATION: Status: RESOLVED | Noted: 2021-05-19 | Resolved: 2022-03-08

## 2022-03-08 LAB
INTERNATIONAL NORMALIZATION RATIO, POC: 2
PROTHROMBIN TIME, POC: NORMAL

## 2022-03-08 PROCEDURE — 85610 PROTHROMBIN TIME: CPT | Performed by: INTERNAL MEDICINE

## 2022-03-08 PROCEDURE — 93793 ANTICOAG MGMT PT WARFARIN: CPT | Performed by: INTERNAL MEDICINE

## 2022-03-08 NOTE — PROGRESS NOTES
INR today is lower limits of therapeutic at 2.0. Per Dr Mihaela Quinn, take 5mg daily, with 7.5mg on Mon & Fri. Recheck INR in 1 month.

## 2022-03-15 ENCOUNTER — HOSPITAL ENCOUNTER (EMERGENCY)
Age: 62
Discharge: NURSING, REHAB OR LONG-TERM CARE | End: 2022-03-15
Attending: EMERGENCY MEDICINE
Payer: MEDICARE

## 2022-03-15 ENCOUNTER — APPOINTMENT (OUTPATIENT)
Dept: GENERAL RADIOLOGY | Age: 62
End: 2022-03-15
Payer: MEDICARE

## 2022-03-15 ENCOUNTER — TELEPHONE (OUTPATIENT)
Dept: PULMONOLOGY | Age: 62
End: 2022-03-15

## 2022-03-15 ENCOUNTER — APPOINTMENT (OUTPATIENT)
Dept: CT IMAGING | Age: 62
End: 2022-03-15
Payer: MEDICARE

## 2022-03-15 VITALS
SYSTOLIC BLOOD PRESSURE: 153 MMHG | TEMPERATURE: 98.1 F | BODY MASS INDEX: 24.96 KG/M2 | HEART RATE: 79 BPM | WEIGHT: 150 LBS | DIASTOLIC BLOOD PRESSURE: 74 MMHG | RESPIRATION RATE: 16 BRPM | OXYGEN SATURATION: 94 %

## 2022-03-15 DIAGNOSIS — R05.9 COUGH: Primary | ICD-10-CM

## 2022-03-15 LAB
ALBUMIN SERPL-MCNC: 4.1 G/DL (ref 3.5–5.2)
ALP BLD-CCNC: 77 U/L (ref 35–104)
ALT SERPL-CCNC: 16 U/L (ref 0–32)
ANION GAP SERPL CALCULATED.3IONS-SCNC: 11 MMOL/L (ref 7–16)
APTT: 35.2 SEC (ref 24.5–35.1)
AST SERPL-CCNC: 17 U/L (ref 0–31)
BASOPHILS ABSOLUTE: 0.06 E9/L (ref 0–0.2)
BASOPHILS RELATIVE PERCENT: 0.8 % (ref 0–2)
BILIRUB SERPL-MCNC: 0.5 MG/DL (ref 0–1.2)
BUN BLDV-MCNC: 9 MG/DL (ref 6–23)
CALCIUM SERPL-MCNC: 9.2 MG/DL (ref 8.6–10.2)
CHLORIDE BLD-SCNC: 103 MMOL/L (ref 98–107)
CO2: 25 MMOL/L (ref 22–29)
CREAT SERPL-MCNC: 0.6 MG/DL (ref 0.5–1)
EKG ATRIAL RATE: 69 BPM
EKG P AXIS: 77 DEGREES
EKG P-R INTERVAL: 142 MS
EKG Q-T INTERVAL: 404 MS
EKG QRS DURATION: 100 MS
EKG QTC CALCULATION (BAZETT): 432 MS
EKG R AXIS: 10 DEGREES
EKG T AXIS: 77 DEGREES
EKG VENTRICULAR RATE: 69 BPM
EOSINOPHILS ABSOLUTE: 0.04 E9/L (ref 0.05–0.5)
EOSINOPHILS RELATIVE PERCENT: 0.5 % (ref 0–6)
GFR AFRICAN AMERICAN: >60
GFR NON-AFRICAN AMERICAN: >60 ML/MIN/1.73
GLUCOSE BLD-MCNC: 112 MG/DL (ref 74–99)
HCT VFR BLD CALC: 34 % (ref 34–48)
HEMOGLOBIN: 11.2 G/DL (ref 11.5–15.5)
IMMATURE GRANULOCYTES #: 0.04 E9/L
IMMATURE GRANULOCYTES %: 0.5 % (ref 0–5)
INR BLD: 2.4
LACTIC ACID: 0.6 MMOL/L (ref 0.5–2.2)
LACTIC ACID: 0.8 MMOL/L (ref 0.5–2.2)
LIPASE: 31 U/L (ref 13–60)
LYMPHOCYTES ABSOLUTE: 1.96 E9/L (ref 1.5–4)
LYMPHOCYTES RELATIVE PERCENT: 24.6 % (ref 20–42)
MCH RBC QN AUTO: 30.8 PG (ref 26–35)
MCHC RBC AUTO-ENTMCNC: 32.9 % (ref 32–34.5)
MCV RBC AUTO: 93.4 FL (ref 80–99.9)
MONOCYTES ABSOLUTE: 0.71 E9/L (ref 0.1–0.95)
MONOCYTES RELATIVE PERCENT: 8.9 % (ref 2–12)
NEUTROPHILS ABSOLUTE: 5.15 E9/L (ref 1.8–7.3)
NEUTROPHILS RELATIVE PERCENT: 64.7 % (ref 43–80)
PDW BLD-RTO: 14.3 FL (ref 11.5–15)
PLATELET # BLD: 213 E9/L (ref 130–450)
PMV BLD AUTO: 10.1 FL (ref 7–12)
POTASSIUM SERPL-SCNC: 4.1 MMOL/L (ref 3.5–5)
PROTHROMBIN TIME: 25.8 SEC (ref 9.3–12.4)
RBC # BLD: 3.64 E12/L (ref 3.5–5.5)
SODIUM BLD-SCNC: 139 MMOL/L (ref 132–146)
TOTAL PROTEIN: 6.8 G/DL (ref 6.4–8.3)
TROPONIN, HIGH SENSITIVITY: 8 NG/L (ref 0–9)
WBC # BLD: 8 E9/L (ref 4.5–11.5)

## 2022-03-15 PROCEDURE — 85730 THROMBOPLASTIN TIME PARTIAL: CPT

## 2022-03-15 PROCEDURE — 99285 EMERGENCY DEPT VISIT HI MDM: CPT

## 2022-03-15 PROCEDURE — 84484 ASSAY OF TROPONIN QUANT: CPT

## 2022-03-15 PROCEDURE — 83605 ASSAY OF LACTIC ACID: CPT

## 2022-03-15 PROCEDURE — 2580000003 HC RX 258: Performed by: EMERGENCY MEDICINE

## 2022-03-15 PROCEDURE — 6370000000 HC RX 637 (ALT 250 FOR IP): Performed by: EMERGENCY MEDICINE

## 2022-03-15 PROCEDURE — 71275 CT ANGIOGRAPHY CHEST: CPT

## 2022-03-15 PROCEDURE — 71046 X-RAY EXAM CHEST 2 VIEWS: CPT

## 2022-03-15 PROCEDURE — 93005 ELECTROCARDIOGRAM TRACING: CPT | Performed by: PHYSICIAN ASSISTANT

## 2022-03-15 PROCEDURE — 80053 COMPREHEN METABOLIC PANEL: CPT

## 2022-03-15 PROCEDURE — 83690 ASSAY OF LIPASE: CPT

## 2022-03-15 PROCEDURE — 93010 ELECTROCARDIOGRAM REPORT: CPT | Performed by: INTERNAL MEDICINE

## 2022-03-15 PROCEDURE — 85610 PROTHROMBIN TIME: CPT

## 2022-03-15 PROCEDURE — 6360000004 HC RX CONTRAST MEDICATION: Performed by: RADIOLOGY

## 2022-03-15 PROCEDURE — 85025 COMPLETE CBC W/AUTO DIFF WBC: CPT

## 2022-03-15 PROCEDURE — 74174 CTA ABD&PLVS W/CONTRAST: CPT

## 2022-03-15 RX ORDER — SODIUM CHLORIDE 9 MG/ML
INJECTION, SOLUTION INTRAVENOUS CONTINUOUS
Status: DISCONTINUED | OUTPATIENT
Start: 2022-03-15 | End: 2022-03-15 | Stop reason: HOSPADM

## 2022-03-15 RX ORDER — DOXYCYCLINE HYCLATE 100 MG/1
100 CAPSULE ORAL ONCE
Status: COMPLETED | OUTPATIENT
Start: 2022-03-15 | End: 2022-03-15

## 2022-03-15 RX ORDER — SODIUM CHLORIDE 0.9 % (FLUSH) 0.9 %
10 SYRINGE (ML) INJECTION
Status: DISCONTINUED | OUTPATIENT
Start: 2022-03-15 | End: 2022-03-15 | Stop reason: HOSPADM

## 2022-03-15 RX ORDER — DOXYCYCLINE HYCLATE 100 MG
100 TABLET ORAL 2 TIMES DAILY
Qty: 20 TABLET | Refills: 0 | Status: SHIPPED | OUTPATIENT
Start: 2022-03-15 | End: 2022-03-25

## 2022-03-15 RX ADMIN — SODIUM CHLORIDE: 9 INJECTION, SOLUTION INTRAVENOUS at 12:25

## 2022-03-15 RX ADMIN — DOXYCYCLINE 100 MG: 100 CAPSULE ORAL at 16:15

## 2022-03-15 RX ADMIN — IOPAMIDOL 90 ML: 755 INJECTION, SOLUTION INTRAVENOUS at 15:01

## 2022-03-15 ASSESSMENT — PAIN DESCRIPTION - DESCRIPTORS: DESCRIPTORS: HEAVINESS

## 2022-03-15 ASSESSMENT — PAIN SCALES - GENERAL: PAINLEVEL_OUTOF10: 2

## 2022-03-15 ASSESSMENT — PAIN DESCRIPTION - LOCATION: LOCATION: ABDOMEN

## 2022-03-15 NOTE — ED PROVIDER NOTES
Department of Emergency Medicine   ED  Provider Note  Admit Date/RoomTime: 3/15/2022 11:02 AM  ED Room: 23/23          History of Present Illness:  3/15/22, Time: 12:35 PM EDT  Chief Complaint   Patient presents with    Hemoptysis     has COPD and coughs all the time, today coughed up bloody mucous. takes coumadin    Abdominal Pain     heaviness in Upper middle of abdomen                 Jessica Ordonez is a 57 y.o. female presenting to the ED for hemoptysis. Patient states she coughed up blood this morning, it was mixed with sputum. She has not had this before. Came on suddenly, no associated pain. She does have history of COPD, she is on Coumadin, she is compliant with it. She said she spoke to her pulmonologist instructed present here as she had a secondary in the past secondary to pneumonia. She also planes of a heaviness of her upper abdomen. She says she has a history of a AAA, no surgical intervention has been on it, they have been observing it. Abdominal pain been occurring since this morning as well. She denies any fever, chills, nausea, vomiting, change in bowel or bladder, communicative, paresthesias, lethargy, or any other symptoms or complaints.       Review of Systems:   Pertinent positives and negatives are stated within HPI, all other systems reviewed and are negative.        --------------------------------------------- PAST HISTORY ---------------------------------------------  Past Medical History:  has a past medical history of Anticoagulant long-term use, Anxiety, Ascending aortic aneurysm ([facility] [address] Asthma, CAD (coronary artery disease), Chronic back pain, Constipation, COPD (chronic obstructive pulmonary disease) ([facility] [address] Depression, Depression, GERD (gastroesophageal reflux disease), H/O mechanical aortic valve replacement, Headache, Hyperlipidemia, Hypertension, On warfarin at home, Restless legs syndrome, S/P AVR, Status post placement of implantable loop recorder, Syncope, Syncope, cardiogenic, Tobacco abuse, Urinary incontinence, Ventricular tachycardia, non-sustained ([facility] [address] and Warfarin-induced coagulopathy ([facility] [address] Past Surgical History:  has a past surgical history that includes Appendectomy; Hysterectomy;  section; Tubal ligation; Cardiac valve replacement (); Upper gastrointestinal endoscopy (N/A, 2019); Colonoscopy (N/A, 2019); Cardiac catheterization (2020); Insertable Cardiac Monitor (2020); and [facility] BREAST BX W LOC DEVICE 1ST LESION RIGHT (Right, 3/10/2021). Social History:  reports that she has been smoking cigarettes. She has a 50.00 pack-year smoking history. She has never used smokeless tobacco. She reports current alcohol use. She reports that she does not use drugs. Family History: family history includes Arthritis in her father, mother, and paternal grandmother; Asthma in her brother, father, mother, and paternal grandmother; Breast Cancer in her sister; COPD in her brother; Diabetes in her father, maternal aunt, mother, and paternal aunt; Heart Disease in her father and mother; High Blood Pressure in her father, mother, and paternal aunt; High Cholesterol in her father and mother; Other in her brother; Stroke in her mother. . Unless otherwise noted, family history is non contributory    The patients home medications have been reviewed. Allergies: Keflex [cephalexin] and Phenergan [promethazine hcl]        ---------------------------------------------------PHYSICAL EXAM--------------------------------------    Constitutional/General: Alert and oriented x3  Head: Normocephalic and atraumatic  Eyes: PERRL, EOMI, sclera non icteric  Mouth: Oropharynx clear, handling secretions, no trismus, no asymmetry of the posterior oropharynx or uvular edema  Neck: Supple, full ROM, no stridor, no meningeal signs  Respiratory: Lungs clear to auscultation bilaterally, no wheezes, rales, or rhonchi.  Not in respiratory distress  Cardiovascular: - 23 mg/dL    CREATININE 0.6 0.5 - 1.0 mg/dL    GFR Non-African American >60 >=60 mL/min/1.73    GFR African American >60     Calcium 9.2 8.6 - 10.2 mg/dL    Total Protein 6.8 6.4 - 8.3 g/dL    Albumin 4.1 3.5 - 5.2 g/dL    Total Bilirubin 0.5 0.0 - 1.2 mg/dL    Alkaline Phosphatase 77 35 - 104 U/L    ALT 16 0 - 32 U/L    AST 17 0 - 31 U/L   Troponin   Result Value Ref Range    Troponin, High Sensitivity 8 0 - 9 ng/L   Protime-INR   Result Value Ref Range    Protime 25.8 (H) 9.3 - 12.4 sec    INR 2.4    APTT   Result Value Ref Range    aPTT 35.2 (H) 24.5 - 35.1 sec   Lipase   Result Value Ref Range    Lipase 31 13 - 60 U/L   Lactic Acid   Result Value Ref Range    Lactic Acid 0.8 0.5 - 2.2 mmol/L   Lactic Acid   Result Value Ref Range    Lactic Acid 0.6 0.5 - 2.2 mmol/L   EKG 12 Lead   Result Value Ref Range    Ventricular Rate 69 BPM    Atrial Rate 69 BPM    P-R Interval 142 ms    QRS Duration 100 ms    Q-T Interval 404 ms    QTc Calculation (Bazett) 432 ms    P Axis 77 degrees    R Axis 10 degrees    T Axis 77 degrees   ,       RADIOLOGY:  Interpreted by Radiologist unless otherwise specified  CTA PULMONARY W CONTRAST   Final Result   1. There is no evidence of a pulmonary embolus      2. Stable aneurysmal dilatation of the ascending thoracic aorta measuring   4.3 x 4.4 cm. 3.  Emphysematous changes      4. Stable 4.5 mm nodule within the left lower lobe. RECOMMENDATIONS:   Follow-up yearly CT scan is recommended for surveillance of the ascending   thoracic aortic aneurysm. CTA ABDOMEN PELVIS W CONTRAST   Final Result   3.4 x 3.4 cm infrarenal abdominal aortic aneurysm. No periaortic fat   induration to suggest impending rupture. No evidence of an abdominal aortic   dissection or intramural hematoma. See below recommendations for AAA   follow-up. Underdistention and/or circumferential wall thickening involving the proximal   and mid body of the stomach.   These findings are nonspecific and may be on   the basis of underdistention or gastritis. Given the diffuse nature,   neoplasm is felt to be less likely. Correlation with upper endoscopy could   be considered if clinically warranted. Nonvascular findings including an indeterminate but stable 5-6 mm left lower   lobe pulmonary nodule. See below recommendations for imaging follow-up. Additional, incidental findings as above. RECOMMENDATIONS:   For management of fusiform AAA:      3.0-3.4 cm AAA, recommend follow-up every 3 years. Note: Recommend Vascular consultation if a fusiform AAA enlarges by >0.5 cm   in 6 months or >1 cm in 1 year or for a saccular AAA of any size. References: Alex Sermons Radiol 2013; 24(56):087-659; J Vasc Surg. 2018; 67:2-77      Fleischner Society guidelines for follow-up and management of incidentally   detected pulmonary nodules:      Single Solid Nodule:      Nodule size less than 6 mm   In a low-risk patient, no routine follow-up. In a high-risk patient, optional CT at 12 months. Nodule size equals 6-8 mm   In a low-risk patient, CT at 6-12 months, then consider CT at 18-24 months. In a high-risk patient, CT at 6-12 months, then CT at 18-24 months. Nodule size greater than 8 mm         In a low-risk patient, consider CT at 3 months, PET/CT, or tissue sampling. In a high-risk patient, consider CT at 3 months, PET/CT, or tissue sampling. Multiple Solid Nodules:      Nodule size less than 6 mm   In a low-risk patient, no routine follow-up. In a high-risk patient, optional CT at 12 months. Nodule size equals 6-8 mm   In a low-risk patient, CT at 3-6 months, then consider CT at 18-24 months. In a high-risk patient, CT at 3-6 months, then CT at 18-24 months. Nodule size greater than 8 mm   In a low-risk patient, CT at 3-6 months, then consider CT at 18-24 months. In a high-risk patient, CT at 3-6 months, then CT at 18-24 months.       - Low risk patients include individuals with minimal or absent history of   smoking and other known risk factors. - High risk patients include individuals with a history or smoking or known   risk factors. Radiology 2017 http://pubs. rsna.org/doi/full/10.1148/radiol. 5030397300      Unavailable         XR CHEST (2 VW)   Final Result   COPD. There is no evidence of pneumonia or failure. EKG Interpretation  Interpreted by emergency department physician, Dr. Mario Gallegos     Sinus, rate 69, no STEMI, no ischemic change         ------------------------- NURSING NOTES AND VITALS REVIEWED ---------------------------   The nursing notes within the ED encounter and vital signs as below have been reviewed by myself  BP (!) 153/74   Pulse 79   Temp 98.1 °F (36.7 °C)   Resp 16   Wt 150 lb (68 kg)   LMP 11/08/2008 (LMP Unknown)   SpO2 94%   BMI 24.96 kg/m²     Oxygen Saturation Interpretation: Normal    The patients available past medical records and past encounters were reviewed. ------------------------------ ED COURSE/MEDICAL DECISION MAKING----------------------  Medications   iopamidol (ISOVUE-370) 76 % injection 90 mL (90 mLs IntraVENous Given 3/15/22 1501)   doxycycline hyclate (VIBRAMYCIN) capsule 100 mg (100 mg Oral Given 3/15/22 1615)           The cardiac monitor revealed sinus with a heart rate in the 80s as interpreted by me. The cardiac monitor was ordered secondary to the patient's cough and to monitor the patient for dysrhythmia. CPT [id number]         Medical Decision Making:    Patient received IV fluids. Labs and imaging reviewed. Reevaluation, patient's resting comfortably. No symptoms or complaints. Repeat abdominal examination does not indicate a surgical M. Troponin reassuring. She would like to go home. Given her history and presentation, discharge is reasonable.   Patient be started on doxycycline, she is to follow-up with her pulmonologist along with her PCP, she voices understanding, she is educated on signs and symptoms that require emergent evaluation. Counseling: The emergency provider has spoken with the patient and discussed todays results, in addition to providing specific details for the plan of care and counseling regarding the diagnosis and prognosis. Questions are answered at this time and they are agreeable with the plan.       --------------------------------- IMPRESSION AND DISPOSITION ---------------------------------    IMPRESSION  1. Cough        DISPOSITION  Disposition: Discharge to home  Patient condition is stable        NOTE: This report was transcribed using voice recognition software.  Every effort was made to ensure accuracy; however, inadvertent computerized transcription errors may be present       Yaa Dhillon MD  03/16/22 1941

## 2022-03-15 NOTE — TELEPHONE ENCOUNTER
Pt calling into office for advice; reports hemoptysis this am. Pt reports some shortness of breath last evening and need to use nebulizer a few hours prior to usual dose of Breztri and is wondering if that could have caused her to have this episode this am where she coughed up blood. Not \"streaks of blood\" actual bloody mucous. Advised pt that the nebulizer should not have caused any bleeding and that she should be evaluated in the emergency room. Pt advised that since she is currently taking coumadin this should be something that is evaluated. Pt agrees and will proceed to emergency for evaluation with family.

## 2022-03-26 DIAGNOSIS — J44.9 CHRONIC OBSTRUCTIVE PULMONARY DISEASE, UNSPECIFIED COPD TYPE (HCC): ICD-10-CM

## 2022-03-28 RX ORDER — ALBUTEROL SULFATE 90 UG/1
AEROSOL, METERED RESPIRATORY (INHALATION)
Qty: 8.5 G | Refills: 2 | Status: SHIPPED
Start: 2022-03-28 | End: 2022-05-11 | Stop reason: SDUPTHER

## 2022-03-29 ENCOUNTER — APPOINTMENT (OUTPATIENT)
Dept: CT IMAGING | Age: 62
DRG: 301 | End: 2022-03-29
Payer: MEDICARE

## 2022-03-29 ENCOUNTER — HOSPITAL ENCOUNTER (INPATIENT)
Age: 62
LOS: 4 days | Discharge: HOME | DRG: 301 | End: 2022-04-02
Attending: STUDENT IN AN ORGANIZED HEALTH CARE EDUCATION/TRAINING PROGRAM | Admitting: INTERNAL MEDICINE
Payer: MEDICARE

## 2022-03-29 ENCOUNTER — APPOINTMENT (OUTPATIENT)
Dept: GENERAL RADIOLOGY | Age: 62
DRG: 301 | End: 2022-03-29
Payer: MEDICARE

## 2022-03-29 ENCOUNTER — NURSE TRIAGE (OUTPATIENT)
Dept: OTHER | Facility: CLINIC | Age: 62
End: 2022-03-29

## 2022-03-29 DIAGNOSIS — R06.00 DYSPNEA, UNSPECIFIED TYPE: ICD-10-CM

## 2022-03-29 DIAGNOSIS — J44.9 CHRONIC OBSTRUCTIVE PULMONARY DISEASE, UNSPECIFIED COPD TYPE (HCC): ICD-10-CM

## 2022-03-29 DIAGNOSIS — J44.1 COPD EXACERBATION (HCC): ICD-10-CM

## 2022-03-29 DIAGNOSIS — R07.9 CHEST PAIN, UNSPECIFIED TYPE: Primary | ICD-10-CM

## 2022-03-29 DIAGNOSIS — Z95.2 H/O MECHANICAL AORTIC VALVE REPLACEMENT: ICD-10-CM

## 2022-03-29 DIAGNOSIS — I21.4 NSTEMI (NON-ST ELEVATED MYOCARDIAL INFARCTION) (HCC): ICD-10-CM

## 2022-03-29 PROBLEM — I71.00 AORTIC ANEURYSM WITH DISSECTION (HCC): Status: ACTIVE | Noted: 2022-03-29

## 2022-03-29 PROBLEM — I71.9 AORTIC ANEURYSM WITH DISSECTION (HCC): Status: ACTIVE | Noted: 2022-03-29

## 2022-03-29 LAB
ALBUMIN SERPL-MCNC: 4.3 G/DL (ref 3.5–5.2)
ALP BLD-CCNC: 90 U/L (ref 35–104)
ALT SERPL-CCNC: 18 U/L (ref 0–32)
ANION GAP SERPL CALCULATED.3IONS-SCNC: 11 MMOL/L (ref 7–16)
APTT: 54.8 SEC (ref 24.5–35.1)
AST SERPL-CCNC: 27 U/L (ref 0–31)
BACTERIA: ABNORMAL /HPF
BASOPHILS ABSOLUTE: 0.06 E9/L (ref 0–0.2)
BASOPHILS RELATIVE PERCENT: 0.5 % (ref 0–2)
BILIRUB SERPL-MCNC: 0.4 MG/DL (ref 0–1.2)
BILIRUBIN URINE: NEGATIVE
BLOOD, URINE: ABNORMAL
BUN BLDV-MCNC: 14 MG/DL (ref 6–23)
CALCIUM SERPL-MCNC: 9.5 MG/DL (ref 8.6–10.2)
CHLORIDE BLD-SCNC: 101 MMOL/L (ref 98–107)
CLARITY: CLEAR
CO2: 28 MMOL/L (ref 22–29)
COLOR: YELLOW
CREAT SERPL-MCNC: 0.7 MG/DL (ref 0.5–1)
EKG ATRIAL RATE: 82 BPM
EKG P AXIS: 87 DEGREES
EKG P-R INTERVAL: 144 MS
EKG Q-T INTERVAL: 412 MS
EKG QRS DURATION: 100 MS
EKG QTC CALCULATION (BAZETT): 481 MS
EKG R AXIS: 66 DEGREES
EKG T AXIS: 76 DEGREES
EKG VENTRICULAR RATE: 82 BPM
EOSINOPHILS ABSOLUTE: 0.12 E9/L (ref 0.05–0.5)
EOSINOPHILS RELATIVE PERCENT: 1.1 % (ref 0–6)
GFR AFRICAN AMERICAN: >60
GFR NON-AFRICAN AMERICAN: >60 ML/MIN/1.73
GLUCOSE BLD-MCNC: 100 MG/DL (ref 74–99)
GLUCOSE URINE: NEGATIVE MG/DL
HCT VFR BLD CALC: 38.7 % (ref 34–48)
HEMOGLOBIN: 12.3 G/DL (ref 11.5–15.5)
IMMATURE GRANULOCYTES #: 0.07 E9/L
IMMATURE GRANULOCYTES %: 0.6 % (ref 0–5)
INR BLD: 7.4
KETONES, URINE: NEGATIVE MG/DL
LACTIC ACID: 0.7 MMOL/L (ref 0.5–2.2)
LEUKOCYTE ESTERASE, URINE: NEGATIVE
LIPASE: 32 U/L (ref 13–60)
LYMPHOCYTES ABSOLUTE: 3.08 E9/L (ref 1.5–4)
LYMPHOCYTES RELATIVE PERCENT: 27.2 % (ref 20–42)
MAGNESIUM: 1.8 MG/DL (ref 1.6–2.6)
MCH RBC QN AUTO: 30.8 PG (ref 26–35)
MCHC RBC AUTO-ENTMCNC: 31.8 % (ref 32–34.5)
MCV RBC AUTO: 97 FL (ref 80–99.9)
MONOCYTES ABSOLUTE: 0.87 E9/L (ref 0.1–0.95)
MONOCYTES RELATIVE PERCENT: 7.7 % (ref 2–12)
NEUTROPHILS ABSOLUTE: 7.13 E9/L (ref 1.8–7.3)
NEUTROPHILS RELATIVE PERCENT: 62.9 % (ref 43–80)
NITRITE, URINE: NEGATIVE
PDW BLD-RTO: 14.2 FL (ref 11.5–15)
PH UA: 6 (ref 5–9)
PLATELET # BLD: 248 E9/L (ref 130–450)
PMV BLD AUTO: 11.3 FL (ref 7–12)
POTASSIUM REFLEX MAGNESIUM: 3.3 MMOL/L (ref 3.5–5)
PRO-BNP: 930 PG/ML (ref 0–125)
PROTEIN UA: NEGATIVE MG/DL
PROTHROMBIN TIME: 80.9 SEC (ref 9.3–12.4)
RBC # BLD: 3.99 E12/L (ref 3.5–5.5)
RBC UA: ABNORMAL /HPF (ref 0–2)
REASON FOR REJECTION: NORMAL
REJECTED TEST: NORMAL
SODIUM BLD-SCNC: 140 MMOL/L (ref 132–146)
SPECIFIC GRAVITY UA: >=1.03 (ref 1–1.03)
TOTAL PROTEIN: 7.1 G/DL (ref 6.4–8.3)
TROPONIN, HIGH SENSITIVITY: 120 NG/L (ref 0–9)
TROPONIN, HIGH SENSITIVITY: 96 NG/L (ref 0–9)
UROBILINOGEN, URINE: 0.2 E.U./DL
WBC # BLD: 11.3 E9/L (ref 4.5–11.5)
WBC UA: ABNORMAL /HPF (ref 0–5)

## 2022-03-29 PROCEDURE — 73030 X-RAY EXAM OF SHOULDER: CPT

## 2022-03-29 PROCEDURE — 96365 THER/PROPH/DIAG IV INF INIT: CPT

## 2022-03-29 PROCEDURE — 6370000000 HC RX 637 (ALT 250 FOR IP): Performed by: STUDENT IN AN ORGANIZED HEALTH CARE EDUCATION/TRAINING PROGRAM

## 2022-03-29 PROCEDURE — 71046 X-RAY EXAM CHEST 2 VIEWS: CPT

## 2022-03-29 PROCEDURE — 6360000002 HC RX W HCPCS: Performed by: STUDENT IN AN ORGANIZED HEALTH CARE EDUCATION/TRAINING PROGRAM

## 2022-03-29 PROCEDURE — 93010 ELECTROCARDIOGRAM REPORT: CPT | Performed by: INTERNAL MEDICINE

## 2022-03-29 PROCEDURE — 99284 EMERGENCY DEPT VISIT MOD MDM: CPT

## 2022-03-29 PROCEDURE — 93005 ELECTROCARDIOGRAM TRACING: CPT | Performed by: PHYSICIAN ASSISTANT

## 2022-03-29 PROCEDURE — 81001 URINALYSIS AUTO W/SCOPE: CPT

## 2022-03-29 PROCEDURE — 2580000003 HC RX 258: Performed by: RADIOLOGY

## 2022-03-29 PROCEDURE — 94640 AIRWAY INHALATION TREATMENT: CPT

## 2022-03-29 PROCEDURE — 2700000000 HC OXYGEN THERAPY PER DAY

## 2022-03-29 PROCEDURE — 96366 THER/PROPH/DIAG IV INF ADDON: CPT

## 2022-03-29 PROCEDURE — 85025 COMPLETE CBC W/AUTO DIFF WBC: CPT

## 2022-03-29 PROCEDURE — 74174 CTA ABD&PLVS W/CONTRAST: CPT

## 2022-03-29 PROCEDURE — 83735 ASSAY OF MAGNESIUM: CPT

## 2022-03-29 PROCEDURE — 84484 ASSAY OF TROPONIN QUANT: CPT

## 2022-03-29 PROCEDURE — 99222 1ST HOSP IP/OBS MODERATE 55: CPT | Performed by: SURGERY

## 2022-03-29 PROCEDURE — 2140000000 HC CCU INTERMEDIATE R&B

## 2022-03-29 PROCEDURE — 71275 CT ANGIOGRAPHY CHEST: CPT

## 2022-03-29 PROCEDURE — 2580000003 HC RX 258: Performed by: FAMILY MEDICINE

## 2022-03-29 PROCEDURE — 94664 DEMO&/EVAL PT USE INHALER: CPT

## 2022-03-29 PROCEDURE — 83605 ASSAY OF LACTIC ACID: CPT

## 2022-03-29 PROCEDURE — 6360000004 HC RX CONTRAST MEDICATION: Performed by: RADIOLOGY

## 2022-03-29 PROCEDURE — 85730 THROMBOPLASTIN TIME PARTIAL: CPT

## 2022-03-29 PROCEDURE — 83690 ASSAY OF LIPASE: CPT

## 2022-03-29 PROCEDURE — 83880 ASSAY OF NATRIURETIC PEPTIDE: CPT

## 2022-03-29 PROCEDURE — 80053 COMPREHEN METABOLIC PANEL: CPT

## 2022-03-29 PROCEDURE — 85610 PROTHROMBIN TIME: CPT

## 2022-03-29 RX ORDER — SODIUM CHLORIDE 0.9 % (FLUSH) 0.9 %
10 SYRINGE (ML) INJECTION PRN
Status: DISCONTINUED | OUTPATIENT
Start: 2022-03-29 | End: 2022-03-29

## 2022-03-29 RX ORDER — IPRATROPIUM BROMIDE AND ALBUTEROL SULFATE 2.5; .5 MG/3ML; MG/3ML
1 SOLUTION RESPIRATORY (INHALATION) ONCE
Status: COMPLETED | OUTPATIENT
Start: 2022-03-29 | End: 2022-03-29

## 2022-03-29 RX ORDER — POLYETHYLENE GLYCOL 3350 17 G/17G
17 POWDER, FOR SOLUTION ORAL DAILY PRN
Status: DISCONTINUED | OUTPATIENT
Start: 2022-03-29 | End: 2022-04-02 | Stop reason: HOSPADM

## 2022-03-29 RX ORDER — SODIUM CHLORIDE 0.9 % (FLUSH) 0.9 %
10 SYRINGE (ML) INJECTION PRN
Status: DISCONTINUED | OUTPATIENT
Start: 2022-03-29 | End: 2022-04-02 | Stop reason: HOSPADM

## 2022-03-29 RX ORDER — SODIUM CHLORIDE 9 MG/ML
INJECTION, SOLUTION INTRAVENOUS PRN
Status: DISCONTINUED | OUTPATIENT
Start: 2022-03-29 | End: 2022-04-02 | Stop reason: HOSPADM

## 2022-03-29 RX ORDER — SODIUM CHLORIDE 0.9 % (FLUSH) 0.9 %
10 SYRINGE (ML) INJECTION EVERY 12 HOURS SCHEDULED
Status: DISCONTINUED | OUTPATIENT
Start: 2022-03-29 | End: 2022-04-02 | Stop reason: HOSPADM

## 2022-03-29 RX ORDER — POTASSIUM CHLORIDE 7.45 MG/ML
10 INJECTION INTRAVENOUS
Status: DISPENSED | OUTPATIENT
Start: 2022-03-29 | End: 2022-03-29

## 2022-03-29 RX ORDER — POTASSIUM CHLORIDE 7.45 MG/ML
10 INJECTION INTRAVENOUS
Status: COMPLETED | OUTPATIENT
Start: 2022-03-29 | End: 2022-03-29

## 2022-03-29 RX ORDER — ROFLUMILAST 250 UG/1
TABLET ORAL
Qty: 28 TABLET | Refills: 3 | Status: SHIPPED
Start: 2022-03-29 | End: 2022-05-10 | Stop reason: SDUPTHER

## 2022-03-29 RX ORDER — ACETAMINOPHEN 325 MG/1
650 TABLET ORAL EVERY 6 HOURS PRN
Status: DISCONTINUED | OUTPATIENT
Start: 2022-03-29 | End: 2022-04-02 | Stop reason: HOSPADM

## 2022-03-29 RX ORDER — SODIUM CHLORIDE 9 MG/ML
INJECTION, SOLUTION INTRAVENOUS CONTINUOUS
Status: DISCONTINUED | OUTPATIENT
Start: 2022-03-29 | End: 2022-03-31

## 2022-03-29 RX ORDER — ACETAMINOPHEN 650 MG/1
650 SUPPOSITORY RECTAL EVERY 6 HOURS PRN
Status: DISCONTINUED | OUTPATIENT
Start: 2022-03-29 | End: 2022-04-02 | Stop reason: HOSPADM

## 2022-03-29 RX ADMIN — POTASSIUM CHLORIDE 10 MEQ: 7.46 INJECTION, SOLUTION INTRAVENOUS at 18:18

## 2022-03-29 RX ADMIN — POTASSIUM CHLORIDE 10 MEQ: 7.46 INJECTION, SOLUTION INTRAVENOUS at 21:48

## 2022-03-29 RX ADMIN — Medication 10 ML: at 19:52

## 2022-03-29 RX ADMIN — POTASSIUM CHLORIDE 10 MEQ: 7.46 INJECTION, SOLUTION INTRAVENOUS at 20:34

## 2022-03-29 RX ADMIN — SODIUM CHLORIDE, PRESERVATIVE FREE 10 ML: 5 INJECTION INTRAVENOUS at 23:55

## 2022-03-29 RX ADMIN — IPRATROPIUM BROMIDE AND ALBUTEROL SULFATE 1 AMPULE: .5; 2.5 SOLUTION RESPIRATORY (INHALATION) at 22:31

## 2022-03-29 RX ADMIN — IOPAMIDOL 90 ML: 755 INJECTION, SOLUTION INTRAVENOUS at 19:52

## 2022-03-29 ASSESSMENT — ENCOUNTER SYMPTOMS
ABDOMINAL DISTENTION: 0
SHORTNESS OF BREATH: 0
PHOTOPHOBIA: 0
CHEST TIGHTNESS: 0
NAUSEA: 1
ABDOMINAL PAIN: 1
VOMITING: 0
COUGH: 0
DIARRHEA: 0

## 2022-03-29 NOTE — ED PROVIDER NOTES
Romana Eugene is a 57year old female who presented to ED with concern for 2 days of shortness of breath, nausea, fatigue, and abdominal pain. Patient has hx of COPD, AAA. Patient is on coumadin. Patient has had diffuse fatigue weakness and weight loss recently over the past month. Patient has had epigastric abdominal pain with shortness of breath and abdominal pain radiating to the back that has been present for about 2 days and worsening. Patient has fever, chills. Patient is having nausea but has not had any emesis. Patient has had loss of appetite. Patient does have a history of abdominal aortic aneurysm is not followed up. Patient symptoms are moderate severity and constant. The history is provided by the patient and medical records. Review of Systems   Constitutional: Positive for appetite change and fatigue. Negative for chills, diaphoresis and fever. Eyes: Negative for photophobia and visual disturbance. Respiratory: Negative for cough, chest tightness and shortness of breath. Cardiovascular: Negative for chest pain, palpitations and leg swelling. Gastrointestinal: Positive for abdominal pain and nausea. Negative for abdominal distention, diarrhea and vomiting. Genitourinary: Negative for dysuria. Musculoskeletal: Negative for neck pain and neck stiffness. Skin: Negative for pallor and rash. Neurological: Negative for headaches. Psychiatric/Behavioral: Negative for confusion. Physical Exam  Vitals and nursing note reviewed. Constitutional:       General: She is not in acute distress. Appearance: She is ill-appearing. HENT:      Head: Normocephalic and atraumatic. Eyes:      General: No scleral icterus. Conjunctiva/sclera: Conjunctivae normal.      Pupils: Pupils are equal, round, and reactive to light. Cardiovascular:      Rate and Rhythm: Normal rate and regular rhythm.    Pulmonary:      Effort: Pulmonary effort is normal.      Breath sounds: Normal breath sounds. Abdominal:      General: Bowel sounds are normal. There is no distension. Palpations: Abdomen is soft. Tenderness: There is abdominal tenderness. There is no guarding or rebound. Musculoskeletal:      Cervical back: Normal range of motion and neck supple. No rigidity or tenderness. No muscular tenderness. Right lower leg: No edema. Left lower leg: No edema. Comments: ecchymosis over right shoulder   Skin:     General: Skin is warm and dry. Capillary Refill: Capillary refill takes less than 2 seconds. Coloration: Skin is not pale. Findings: No erythema or rash. Neurological:      Mental Status: She is alert and oriented to person, place, and time. Psychiatric:         Mood and Affect: Mood normal.          Procedures     MDM  Number of Diagnoses or Management Options  Chest pain, unspecified type  COPD exacerbation ([address]  Dyspnea, unspecified type  NSTEMI (non-ST elevated myocardial infarction) [facility])  Diagnosis management comments: Sulma Vega is a 57year old female who presented to ED with concern for shortness of breath, nausea, patient had improvement of chest pain and shortness of breath with breathing treatment  CTA chest and abdom ordered due to patient's history of AAA and symptoms. Patient did not have symptoms of chest pain at time of re-evaluation wheezing returned after ambulation and patient was given duoneb  Vascular was consulted and discussed case, resident consulted  Patient stable while in ED not currently having chest pain  Patient is on coumadin with elevated INR 7 no active bleeding  Nereida Caller was not given due to elevated INR and CTA findings pending vascular   Troponin trending down, no infectious symptoms  Patient also reported weight loss over one month and nausea  Patient will be admitted for possible NSTEMI, copd exacerbation, CTA findings, patient stable        ED Course as of 03/29/22 1631   Tue Mar 29, 2022   1619 EKG:   This EKG is signed and interpreted by me. Rate: 82  Rhythm: Sinus  Interpretation: non-specific EKG, no ST elevation   Comparison: stable as compared to patient's most recent EKG   [SS]      ED Course User Index  [SS] Terri Bobby MD        ED Course as of 22 0254   Tue Mar 29, 2022   1619 EKG: This EKG is signed and interpreted by me. Rate: 82  Rhythm: Sinus  Interpretation: non-specific EKG, no ST elevation   Comparison: stable as compared to patient's most recent EKG   [SS]      ED Course User Index  [SS] Terri Bobby MD       --------------------------------------------- PAST HISTORY ---------------------------------------------  Past Medical History:  has a past medical history of Anticoagulant long-term use, Anxiety, Ascending aortic aneurysm ([address] Asthma, CAD (coronary artery disease), Chronic back pain, Constipation, COPD (chronic obstructive pulmonary disease) ([address] Depression, Depression, GERD (gastroesophageal reflux disease), H/O mechanical aortic valve replacement, Headache, Hyperlipidemia, Hypertension, On warfarin at home, Restless legs syndrome, S/P AVR, Status post placement of implantable loop recorder, Syncope, Syncope, cardiogenic, Tobacco abuse, Urinary incontinence, Ventricular tachycardia, non-sustained ([address] and Warfarin-induced coagulopathy ([address] Past Surgical History:  has a past surgical history that includes Appendectomy; Hysterectomy;  section; Tubal ligation; Cardiac valve replacement (); Upper gastrointestinal endoscopy (N/A, 2019); Colonoscopy (N/A, 2019); Cardiac catheterization (2020); Insertable Cardiac Monitor (2020); and [facility] BREAST BX W LOC DEVICE 1ST LESION RIGHT (Right, 3/10/2021). Social History:  reports that she has been smoking cigarettes. She has a 50.00 pack-year smoking history. She has never used smokeless tobacco. She reports current alcohol use. She reports that she does not use drugs.     Family History: family history includes Arthritis in her father, mother, and paternal grandmother; Asthma in her brother, father, mother, and paternal grandmother; Breast Cancer in her sister; COPD in her brother; Diabetes in her father, maternal aunt, mother, and paternal aunt; Heart Disease in her father and mother; High Blood Pressure in her father, mother, and paternal aunt; High Cholesterol in her father and mother; Other in her brother; Stroke in her mother. The patients home medications have been reviewed.     Allergies: Keflex [cephalexin] and Phenergan [promethazine hcl]    -------------------------------------------------- RESULTS -------------------------------------------------    LABS:  Results for orders placed or performed during the hospital encounter of 03/29/22   CBC with Auto Differential   Result Value Ref Range    WBC 11.3 4.5 - 11.5 E9/L    RBC 3.99 3.50 - 5.50 E12/L    Hemoglobin 12.3 11.5 - 15.5 g/dL    Hematocrit 38.7 34.0 - 48.0 %    MCV 97.0 80.0 - 99.9 fL    MCH 30.8 26.0 - 35.0 pg    MCHC 31.8 (L) 32.0 - 34.5 %    RDW 14.2 11.5 - 15.0 fL    Platelets 541 439 - 350 E9/L    MPV 11.3 7.0 - 12.0 fL    Neutrophils % 62.9 43.0 - 80.0 %    Immature Granulocytes % 0.6 0.0 - 5.0 %    Lymphocytes % 27.2 20.0 - 42.0 %    Monocytes % 7.7 2.0 - 12.0 %    Eosinophils % 1.1 0.0 - 6.0 %    Basophils % 0.5 0.0 - 2.0 %    Neutrophils Absolute 7.13 1.80 - 7.30 E9/L    Immature Granulocytes # 0.07 E9/L    Lymphocytes Absolute 3.08 1.50 - 4.00 E9/L    Monocytes Absolute 0.87 0.10 - 0.95 E9/L    Eosinophils Absolute 0.12 0.05 - 0.50 E9/L    Basophils Absolute 0.06 0.00 - 0.20 E9/L   Comprehensive Metabolic Panel w/ Reflex to MG   Result Value Ref Range    Sodium 140 132 - 146 mmol/L    Potassium reflex Magnesium 3.3 (L) 3.5 - 5.0 mmol/L    Chloride 101 98 - 107 mmol/L    CO2 28 22 - 29 mmol/L    Anion Gap 11 7 - 16 mmol/L    Glucose 100 (H) 74 - 99 mg/dL    BUN 14 6 - 23 mg/dL    CREATININE 0.7 0.5 - 1.0 mg/dL GFR Non-African American >60 >=60 mL/min/1.73    GFR African American >60     Calcium 9.5 8.6 - 10.2 mg/dL    Total Protein 7.1 6.4 - 8.3 g/dL    Albumin 4.3 3.5 - 5.2 g/dL    Total Bilirubin 0.4 0.0 - 1.2 mg/dL    Alkaline Phosphatase 90 35 - 104 U/L    ALT 18 0 - 32 U/L    AST 27 0 - 31 U/L   Troponin   Result Value Ref Range    Troponin, High Sensitivity 120 (H) 0 - 9 ng/L   Lipase   Result Value Ref Range    Lipase 32 13 - 60 U/L   Lactic Acid   Result Value Ref Range    Lactic Acid 0.7 0.5 - 2.2 mmol/L   Brain Natriuretic Peptide   Result Value Ref Range    Pro- (H) 0 - 125 pg/mL   Urinalysis   Result Value Ref Range    Color, UA Yellow Straw/Yellow    Clarity, UA Clear Clear    Glucose, Ur Negative Negative mg/dL    Bilirubin Urine Negative Negative    Ketones, Urine Negative Negative mg/dL    Specific Gravity, UA >=1.030 1.005 - 1.030    Blood, Urine MODERATE (A) Negative    pH, UA 6.0 5.0 - 9.0    Protein, UA Negative Negative mg/dL    Urobilinogen, Urine 0.2 <2.0 E.U./dL    Nitrite, Urine Negative Negative    Leukocyte Esterase, Urine Negative Negative   Protime-INR   Result Value Ref Range    Protime 80.9 (H) 9.3 - 12.4 sec    INR 7.4 (HH)    APTT   Result Value Ref Range    aPTT 54.8 (H) 24.5 - 35.1 sec   Microscopic Urinalysis   Result Value Ref Range    WBC, UA NONE 0 - 5 /HPF    RBC, UA 5-10 (A) 0 - 2 /HPF    Bacteria, UA NONE SEEN None Seen /HPF   Magnesium   Result Value Ref Range    Magnesium 1.8 1.6 - 2.6 mg/dL   SPECIMEN REJECTION   Result Value Ref Range    Rejected Test trp5     Reason for Rejection see below    Troponin   Result Value Ref Range    Troponin, High Sensitivity 96 (H) 0 - 9 ng/L   EKG 12 Lead   Result Value Ref Range    Ventricular Rate 82 BPM    Atrial Rate 82 BPM    P-R Interval 144 ms    QRS Duration 100 ms    Q-T Interval 412 ms    QTc Calculation (Bazett) 481 ms    P Axis 87 degrees    R Axis 66 degrees    T Axis 76 degrees       RADIOLOGY:  CTA ABDOMEN PELVIS W CONTRAST   Final Result      Ascending thoracic aortic aneurysm measuring 4.3 x 4.5 cm and focal aneurysm   of the distal descending thoracic aorta measuring 3.1 x 3.1 cm with areas of   ulcerative plaque or focal dissection. There is no large central pulmonary   embolism. Abdominal aortic aneurysm measuring 3.6 x 3.1 cm with ulcerative plaque/focal   dissection. There is no large areas of dissection. Annual surveillance is   recommended. Mild stenosis of the left renal artery and 60% stenosis of the right renal   artery. Emphysema with the 4 mm nonspecific pulmonary nodule in the left base. Surveillance according to Fleischner society guidelines is recommended. RECOMMENDATIONS:   Unavailable         CTA CHEST W CONTRAST   Final Result      Ascending thoracic aortic aneurysm measuring 4.3 x 4.5 cm and focal aneurysm   of the distal descending thoracic aorta measuring 3.1 x 3.1 cm with areas of   ulcerative plaque or focal dissection. There is no large central pulmonary   embolism. Abdominal aortic aneurysm measuring 3.6 x 3.1 cm with ulcerative plaque/focal   dissection. There is no large areas of dissection. Annual surveillance is   recommended. Mild stenosis of the left renal artery and 60% stenosis of the right renal   artery. Emphysema with the 4 mm nonspecific pulmonary nodule in the left base. Surveillance according to Fleischner society guidelines is recommended. RECOMMENDATIONS:   Unavailable         XR SHOULDER RIGHT (MIN 2 VIEWS)   Final Result   1. Mild degenerative spurring along the right AC joint. 2. Otherwise, unremarkable radiographs of the right shoulder.          XR CHEST (2 VW)   Final Result   No acute process                   ------------------------- NURSING NOTES AND VITALS REVIEWED ---------------------------  Date / Time Roomed:  3/29/2022  3:57 PM  ED Bed Assignment:  [id number]    The nursing notes within the ED encounter and vital signs as below have been reviewed. Patient Vitals for the past 24 hrs:   BP Temp Temp src Pulse Resp SpO2 Weight   03/29/22 2231 (!) 168/77 98 °F (36.7 °C) -- 87 22 97 % --   03/29/22 2200 (!) 160/93 -- -- 80 21 94 % --   03/29/22 2102 (!) 155/71 -- -- 80 20 94 % --   03/29/22 1415 (!) 172/99 -- -- -- -- -- 150 lb (68 kg)   03/29/22 1405 -- 98.2 °F (36.8 °C) Oral 85 16 94 % --       Oxygen Saturation Interpretation: Normal    ------------------------------------------ PROGRESS NOTES ------------------------------------------  Re-evaluation(s):  Time: 8pm  Patients symptoms are improving  Repeat physical examination is improved    Counseling:  I have spoken with the patient and discussed todays results, in addition to providing specific details for the plan of care and counseling regarding the diagnosis and prognosis. Their questions are answered at this time and they are agreeable with the plan of admission.    --------------------------------- ADDITIONAL PROVIDER NOTES ---------------------------------  Consultations:  Spoke with Dr. Lobito Brown. Discussed case. They will admit the patient. This patient's ED course included: a personal history and physicial examination, re-evaluation prior to disposition, multiple bedside re-evaluations, IV medications, cardiac monitoring and continuous pulse oximetry    This patient has remained hemodynamically stable during their ED course. Diagnosis:  1. Chest pain, unspecified type    2. Dyspnea, unspecified type    3. NSTEMI (non-ST elevated myocardial infarction) ([facility]    4. COPD exacerbation ([address]        Disposition:  Patient's disposition: Admit to telemetry  Patient's condition is stable.          Nena Rogers MD  03/30/22 5001

## 2022-03-29 NOTE — ED NOTES
Department of Emergency Medicine    FIRST PROVIDER TRIAGE NOTE             Independent MLP           3/29/22  2:14 PM EDT    Date of Encounter: 3/29/22   MRN: [id number]    Vitals:    03/29/22 1405   Pulse: 85   Resp: 16   Temp: 98.2 °F (36.8 °C)   TempSrc: Oral   SpO2: 94%      HPI: Debby Miller is a 57 y.o. female who presents to the ED for Nausea (for a couple weeks. Able to hold liquids down. ) and Shortness of Breath (hx of COPD. States that the last couple days have been bad. )  Reports worsening COPD symptoms     ROS: Negative for abd pain, fever, headache or dizziness. Physical Exam:   Gen Appearance/Constitutional: alert  CV: regular rate  Pulm: abnormal breath sounds auscultated     Initial Plan of Care: All treatment areas with department are currently occupied. Plan to order/Initiate the following while awaiting opening in ED: labs, EKG and imaging studies.     Initial Plan of Care: Initiate Treatment-Testing, Proceed toTreatment Area When Bed Available for ED Attending/MLP to Continue Care    Electronically signed by Melanie Clarke PA-C   DD: 3/29/22       Melanie Clarke PA-C  03/29/22 0013

## 2022-03-29 NOTE — TELEPHONE ENCOUNTER
Reason for Disposition   Age > 60 years    Protocols used: ABDOMINAL PAIN - UPPER-ADULT-OH    Received call from Emma Hilton at [facility] with Red Flag Complaint. Subjective: Caller states \"My stomach hurts after eating\"     Current Symptoms: 5/10 upper abdominal pain after eating, diarrhea for 2 weeks. Onset: 2 weeks    Associated Symptoms: Abd pain and diarrhea    Pain Severity: 5/10    Temperature: Denies fever    What has been tried: Nothing    LMP: NA Pregnant: NA    Recommended disposition: See today    Care advice provided, patient verbalizes understanding; denies any other questions or concerns; instructed to call back for any new or worsening symptoms. Transferred to [facility]    Attention Provider: Thank you for allowing me to participate in the care of your patient. The patient was connected to triage in response to information provided to the ECC/PSC. Please do not respond through this encounter as the response is not directed to a shared pool.

## 2022-03-30 LAB
ADENOVIRUS BY PCR: NOT DETECTED
ALBUMIN SERPL-MCNC: 3.9 G/DL (ref 3.5–5.2)
ALP BLD-CCNC: 77 U/L (ref 35–104)
ALT SERPL-CCNC: 15 U/L (ref 0–32)
ANION GAP SERPL CALCULATED.3IONS-SCNC: 9 MMOL/L (ref 7–16)
AST SERPL-CCNC: 21 U/L (ref 0–31)
BACTERIA: ABNORMAL /HPF
BASOPHILS ABSOLUTE: 0.04 E9/L (ref 0–0.2)
BASOPHILS RELATIVE PERCENT: 0.4 % (ref 0–2)
BILIRUB SERPL-MCNC: 0.4 MG/DL (ref 0–1.2)
BILIRUBIN URINE: NEGATIVE
BLOOD, URINE: ABNORMAL
BORDETELLA PARAPERTUSSIS BY PCR: NOT DETECTED
BORDETELLA PERTUSSIS BY PCR: NOT DETECTED
BUN BLDV-MCNC: 9 MG/DL (ref 6–23)
CALCIUM SERPL-MCNC: 9 MG/DL (ref 8.6–10.2)
CHLAMYDOPHILIA PNEUMONIAE BY PCR: NOT DETECTED
CHLORIDE BLD-SCNC: 104 MMOL/L (ref 98–107)
CHOLESTEROL, FASTING: 127 MG/DL (ref 0–199)
CLARITY: CLEAR
CO2: 28 MMOL/L (ref 22–29)
COLOR: YELLOW
CORONAVIRUS 229E BY PCR: NOT DETECTED
CORONAVIRUS HKU1 BY PCR: NOT DETECTED
CORONAVIRUS NL63 BY PCR: NOT DETECTED
CORONAVIRUS OC43 BY PCR: NOT DETECTED
CREAT SERPL-MCNC: 0.6 MG/DL (ref 0.5–1)
EOSINOPHILS ABSOLUTE: 0.06 E9/L (ref 0.05–0.5)
EOSINOPHILS RELATIVE PERCENT: 0.7 % (ref 0–6)
GFR AFRICAN AMERICAN: >60
GFR NON-AFRICAN AMERICAN: >60 ML/MIN/1.73
GLUCOSE BLD-MCNC: 112 MG/DL (ref 74–99)
GLUCOSE URINE: NEGATIVE MG/DL
HCT VFR BLD CALC: 34.2 % (ref 34–48)
HDLC SERPL-MCNC: 47 MG/DL
HEMOGLOBIN: 11.3 G/DL (ref 11.5–15.5)
HUMAN METAPNEUMOVIRUS BY PCR: NOT DETECTED
HUMAN RHINOVIRUS/ENTEROVIRUS BY PCR: NOT DETECTED
IMMATURE GRANULOCYTES #: 0.07 E9/L
IMMATURE GRANULOCYTES %: 0.8 % (ref 0–5)
INFLUENZA A BY PCR: NOT DETECTED
INFLUENZA B BY PCR: NOT DETECTED
INR BLD: 5.1
KETONES, URINE: NEGATIVE MG/DL
LDL CHOLESTEROL CALCULATED: 58 MG/DL (ref 0–99)
LEUKOCYTE ESTERASE, URINE: NEGATIVE
LYMPHOCYTES ABSOLUTE: 2.46 E9/L (ref 1.5–4)
LYMPHOCYTES RELATIVE PERCENT: 27.1 % (ref 20–42)
MAGNESIUM: 1.6 MG/DL (ref 1.6–2.6)
MCH RBC QN AUTO: 31.1 PG (ref 26–35)
MCHC RBC AUTO-ENTMCNC: 33 % (ref 32–34.5)
MCV RBC AUTO: 94.2 FL (ref 80–99.9)
MONOCYTES ABSOLUTE: 0.79 E9/L (ref 0.1–0.95)
MONOCYTES RELATIVE PERCENT: 8.7 % (ref 2–12)
MYCOPLASMA PNEUMONIAE BY PCR: NOT DETECTED
NEUTROPHILS ABSOLUTE: 5.67 E9/L (ref 1.8–7.3)
NEUTROPHILS RELATIVE PERCENT: 62.3 % (ref 43–80)
NITRITE, URINE: NEGATIVE
PARAINFLUENZA VIRUS 1 BY PCR: NOT DETECTED
PARAINFLUENZA VIRUS 2 BY PCR: NOT DETECTED
PARAINFLUENZA VIRUS 3 BY PCR: NOT DETECTED
PARAINFLUENZA VIRUS 4 BY PCR: NOT DETECTED
PDW BLD-RTO: 14.5 FL (ref 11.5–15)
PH UA: 6 (ref 5–9)
PHOSPHORUS: 4 MG/DL (ref 2.5–4.5)
PLATELET # BLD: 223 E9/L (ref 130–450)
PMV BLD AUTO: 10.5 FL (ref 7–12)
POTASSIUM REFLEX MAGNESIUM: 3.7 MMOL/L (ref 3.5–5)
PROCALCITONIN: 0.05 NG/ML (ref 0–0.08)
PROTEIN UA: NEGATIVE MG/DL
PROTHROMBIN TIME: 56.6 SEC (ref 9.3–12.4)
RBC # BLD: 3.63 E12/L (ref 3.5–5.5)
RBC UA: ABNORMAL /HPF (ref 0–2)
RESPIRATORY SYNCYTIAL VIRUS BY PCR: NOT DETECTED
SARS-COV-2, PCR: NOT DETECTED
SODIUM BLD-SCNC: 141 MMOL/L (ref 132–146)
SPECIFIC GRAVITY UA: 1.02 (ref 1–1.03)
TOTAL PROTEIN: 6.5 G/DL (ref 6.4–8.3)
TRIGLYCERIDE, FASTING: 110 MG/DL (ref 0–149)
TROPONIN, HIGH SENSITIVITY: 88 NG/L (ref 0–9)
UROBILINOGEN, URINE: 0.2 E.U./DL
VLDLC SERPL CALC-MCNC: 22 MG/DL
WBC # BLD: 9.1 E9/L (ref 4.5–11.5)
WBC UA: ABNORMAL /HPF (ref 0–5)

## 2022-03-30 PROCEDURE — 80061 LIPID PANEL: CPT

## 2022-03-30 PROCEDURE — 85610 PROTHROMBIN TIME: CPT

## 2022-03-30 PROCEDURE — 80053 COMPREHEN METABOLIC PANEL: CPT

## 2022-03-30 PROCEDURE — 94640 AIRWAY INHALATION TREATMENT: CPT

## 2022-03-30 PROCEDURE — 6360000002 HC RX W HCPCS: Performed by: INTERNAL MEDICINE

## 2022-03-30 PROCEDURE — 84100 ASSAY OF PHOSPHORUS: CPT

## 2022-03-30 PROCEDURE — 0202U NFCT DS 22 TRGT SARS-COV-2: CPT

## 2022-03-30 PROCEDURE — 99232 SBSQ HOSP IP/OBS MODERATE 35: CPT | Performed by: PHYSICIAN ASSISTANT

## 2022-03-30 PROCEDURE — 81001 URINALYSIS AUTO W/SCOPE: CPT

## 2022-03-30 PROCEDURE — 84484 ASSAY OF TROPONIN QUANT: CPT

## 2022-03-30 PROCEDURE — 2140000000 HC CCU INTERMEDIATE R&B

## 2022-03-30 PROCEDURE — 94669 MECHANICAL CHEST WALL OSCILL: CPT

## 2022-03-30 PROCEDURE — 6360000002 HC RX W HCPCS: Performed by: STUDENT IN AN ORGANIZED HEALTH CARE EDUCATION/TRAINING PROGRAM

## 2022-03-30 PROCEDURE — 6370000000 HC RX 637 (ALT 250 FOR IP): Performed by: INTERNAL MEDICINE

## 2022-03-30 PROCEDURE — 6370000000 HC RX 637 (ALT 250 FOR IP): Performed by: FAMILY MEDICINE

## 2022-03-30 PROCEDURE — 2700000000 HC OXYGEN THERAPY PER DAY

## 2022-03-30 PROCEDURE — 87088 URINE BACTERIA CULTURE: CPT

## 2022-03-30 PROCEDURE — 6370000000 HC RX 637 (ALT 250 FOR IP): Performed by: STUDENT IN AN ORGANIZED HEALTH CARE EDUCATION/TRAINING PROGRAM

## 2022-03-30 PROCEDURE — 83735 ASSAY OF MAGNESIUM: CPT

## 2022-03-30 PROCEDURE — 6370000000 HC RX 637 (ALT 250 FOR IP): Performed by: NURSE PRACTITIONER

## 2022-03-30 PROCEDURE — 36415 COLL VENOUS BLD VENIPUNCTURE: CPT

## 2022-03-30 PROCEDURE — 2580000003 HC RX 258: Performed by: FAMILY MEDICINE

## 2022-03-30 PROCEDURE — APPSS180 APP SPLIT SHARED TIME > 60 MINUTES: Performed by: NURSE PRACTITIONER

## 2022-03-30 PROCEDURE — 85025 COMPLETE CBC W/AUTO DIFF WBC: CPT

## 2022-03-30 PROCEDURE — 84145 PROCALCITONIN (PCT): CPT

## 2022-03-30 PROCEDURE — 99222 1ST HOSP IP/OBS MODERATE 55: CPT | Performed by: INTERNAL MEDICINE

## 2022-03-30 RX ORDER — MONTELUKAST SODIUM 10 MG/1
10 TABLET ORAL NIGHTLY
Status: DISCONTINUED | OUTPATIENT
Start: 2022-03-30 | End: 2022-04-02 | Stop reason: HOSPADM

## 2022-03-30 RX ORDER — ARFORMOTEROL TARTRATE 15 UG/2ML
15 SOLUTION RESPIRATORY (INHALATION) 2 TIMES DAILY
Status: DISCONTINUED | OUTPATIENT
Start: 2022-03-30 | End: 2022-04-02 | Stop reason: HOSPADM

## 2022-03-30 RX ORDER — PREDNISONE 20 MG/1
20 TABLET ORAL 2 TIMES DAILY
Status: COMPLETED | OUTPATIENT
Start: 2022-03-30 | End: 2022-03-31

## 2022-03-30 RX ORDER — DEXTROSE MONOHYDRATE 50 MG/ML
100 INJECTION, SOLUTION INTRAVENOUS PRN
Status: DISCONTINUED | OUTPATIENT
Start: 2022-03-30 | End: 2022-04-02 | Stop reason: HOSPADM

## 2022-03-30 RX ORDER — DEXTROSE MONOHYDRATE 25 G/50ML
12.5 INJECTION, SOLUTION INTRAVENOUS PRN
Status: DISCONTINUED | OUTPATIENT
Start: 2022-03-30 | End: 2022-04-02 | Stop reason: HOSPADM

## 2022-03-30 RX ORDER — LEVOFLOXACIN 500 MG/1
500 TABLET, FILM COATED ORAL DAILY
Status: DISCONTINUED | OUTPATIENT
Start: 2022-03-30 | End: 2022-03-31

## 2022-03-30 RX ORDER — ATORVASTATIN CALCIUM 20 MG/1
20 TABLET, FILM COATED ORAL NIGHTLY
Status: DISCONTINUED | OUTPATIENT
Start: 2022-03-30 | End: 2022-04-02 | Stop reason: HOSPADM

## 2022-03-30 RX ORDER — PANTOPRAZOLE SODIUM 40 MG/1
40 TABLET, DELAYED RELEASE ORAL
Status: DISCONTINUED | OUTPATIENT
Start: 2022-03-31 | End: 2022-04-02 | Stop reason: HOSPADM

## 2022-03-30 RX ORDER — METOPROLOL SUCCINATE 25 MG/1
25 TABLET, EXTENDED RELEASE ORAL DAILY
Status: DISCONTINUED | OUTPATIENT
Start: 2022-03-30 | End: 2022-04-02 | Stop reason: HOSPADM

## 2022-03-30 RX ORDER — NICOTINE POLACRILEX 4 MG
15 LOZENGE BUCCAL PRN
Status: DISCONTINUED | OUTPATIENT
Start: 2022-03-30 | End: 2022-04-02 | Stop reason: HOSPADM

## 2022-03-30 RX ORDER — LISINOPRIL 20 MG/1
20 TABLET ORAL DAILY
Status: DISCONTINUED | OUTPATIENT
Start: 2022-03-30 | End: 2022-04-02 | Stop reason: HOSPADM

## 2022-03-30 RX ORDER — ACETYLCYSTEINE 200 MG/ML
600 SOLUTION ORAL; RESPIRATORY (INHALATION) 2 TIMES DAILY
Status: DISCONTINUED | OUTPATIENT
Start: 2022-03-30 | End: 2022-04-02 | Stop reason: HOSPADM

## 2022-03-30 RX ORDER — BUDESONIDE 0.25 MG/2ML
250 INHALANT ORAL 2 TIMES DAILY
Status: DISCONTINUED | OUTPATIENT
Start: 2022-03-30 | End: 2022-03-31

## 2022-03-30 RX ORDER — POTASSIUM CHLORIDE 7.45 MG/ML
10 INJECTION INTRAVENOUS
Status: COMPLETED | OUTPATIENT
Start: 2022-03-30 | End: 2022-03-30

## 2022-03-30 RX ORDER — MAGNESIUM SULFATE IN WATER 40 MG/ML
2000 INJECTION, SOLUTION INTRAVENOUS ONCE
Status: COMPLETED | OUTPATIENT
Start: 2022-03-30 | End: 2022-03-30

## 2022-03-30 RX ORDER — ROPINIROLE 0.25 MG/1
0.5 TABLET, FILM COATED ORAL EVERY 12 HOURS
Status: DISCONTINUED | OUTPATIENT
Start: 2022-03-30 | End: 2022-04-02 | Stop reason: HOSPADM

## 2022-03-30 RX ORDER — ASPIRIN 81 MG/1
81 TABLET ORAL DAILY
Status: DISCONTINUED | OUTPATIENT
Start: 2022-03-30 | End: 2022-04-02 | Stop reason: HOSPADM

## 2022-03-30 RX ORDER — IPRATROPIUM BROMIDE AND ALBUTEROL SULFATE 2.5; .5 MG/3ML; MG/3ML
1 SOLUTION RESPIRATORY (INHALATION)
Status: DISCONTINUED | OUTPATIENT
Start: 2022-03-30 | End: 2022-04-02 | Stop reason: HOSPADM

## 2022-03-30 RX ORDER — ACETYLCYSTEINE 200 MG/ML
600 SOLUTION ORAL; RESPIRATORY (INHALATION) 2 TIMES DAILY
Status: DISCONTINUED | OUTPATIENT
Start: 2022-03-30 | End: 2022-03-30 | Stop reason: SDUPTHER

## 2022-03-30 RX ADMIN — MAGNESIUM SULFATE HEPTAHYDRATE 2000 MG: 40 INJECTION, SOLUTION INTRAVENOUS at 15:57

## 2022-03-30 RX ADMIN — METOPROLOL SUCCINATE 25 MG: 25 TABLET, EXTENDED RELEASE ORAL at 08:43

## 2022-03-30 RX ADMIN — PREDNISONE 20 MG: 20 TABLET ORAL at 11:37

## 2022-03-30 RX ADMIN — ACETYLCYSTEINE 600 MG: 200 SOLUTION ORAL; RESPIRATORY (INHALATION) at 21:49

## 2022-03-30 RX ADMIN — BUDESONIDE 250 MCG: 0.25 SUSPENSION RESPIRATORY (INHALATION) at 13:18

## 2022-03-30 RX ADMIN — POTASSIUM CHLORIDE 10 MEQ: 7.46 INJECTION, SOLUTION INTRAVENOUS at 12:43

## 2022-03-30 RX ADMIN — IPRATROPIUM BROMIDE AND ALBUTEROL SULFATE 1 AMPULE: .5; 2.5 SOLUTION RESPIRATORY (INHALATION) at 17:03

## 2022-03-30 RX ADMIN — MONTELUKAST 10 MG: 10 TABLET, FILM COATED ORAL at 20:31

## 2022-03-30 RX ADMIN — ARFORMOTEROL TARTRATE 15 MCG: 15 SOLUTION RESPIRATORY (INHALATION) at 13:17

## 2022-03-30 RX ADMIN — IPRATROPIUM BROMIDE AND ALBUTEROL SULFATE 1 AMPULE: .5; 2.5 SOLUTION RESPIRATORY (INHALATION) at 21:49

## 2022-03-30 RX ADMIN — ACETAMINOPHEN 650 MG: 325 TABLET ORAL at 08:43

## 2022-03-30 RX ADMIN — POTASSIUM CHLORIDE 10 MEQ: 7.46 INJECTION, SOLUTION INTRAVENOUS at 11:30

## 2022-03-30 RX ADMIN — LEVOFLOXACIN 500 MG: 500 TABLET, FILM COATED ORAL at 15:50

## 2022-03-30 RX ADMIN — ACETAMINOPHEN 650 MG: 325 TABLET ORAL at 15:50

## 2022-03-30 RX ADMIN — ROPINIROLE HYDROCHLORIDE 0.5 MG: 0.25 TABLET, FILM COATED ORAL at 22:08

## 2022-03-30 RX ADMIN — ROFLUMILAST 250 MCG: 500 TABLET ORAL at 11:35

## 2022-03-30 RX ADMIN — IPRATROPIUM BROMIDE AND ALBUTEROL SULFATE 1 AMPULE: .5; 2.5 SOLUTION RESPIRATORY (INHALATION) at 13:16

## 2022-03-30 RX ADMIN — SODIUM CHLORIDE: 9 INJECTION, SOLUTION INTRAVENOUS at 18:09

## 2022-03-30 RX ADMIN — LISINOPRIL 20 MG: 20 TABLET ORAL at 10:00

## 2022-03-30 RX ADMIN — SERTRALINE HYDROCHLORIDE 150 MG: 100 TABLET ORAL at 10:00

## 2022-03-30 RX ADMIN — PREDNISONE 20 MG: 20 TABLET ORAL at 20:31

## 2022-03-30 RX ADMIN — ATORVASTATIN CALCIUM 20 MG: 20 TABLET, FILM COATED ORAL at 20:31

## 2022-03-30 RX ADMIN — ASPIRIN 81 MG: 81 TABLET, COATED ORAL at 08:43

## 2022-03-30 RX ADMIN — ROPINIROLE HYDROCHLORIDE 0.5 MG: 0.25 TABLET, FILM COATED ORAL at 11:35

## 2022-03-30 RX ADMIN — ARFORMOTEROL TARTRATE 15 MCG: 15 SOLUTION RESPIRATORY (INHALATION) at 21:49

## 2022-03-30 RX ADMIN — POTASSIUM CHLORIDE 10 MEQ: 7.46 INJECTION, SOLUTION INTRAVENOUS at 10:24

## 2022-03-30 RX ADMIN — BUDESONIDE 250 MCG: 0.25 SUSPENSION RESPIRATORY (INHALATION) at 21:49

## 2022-03-30 RX ADMIN — SODIUM CHLORIDE: 9 INJECTION, SOLUTION INTRAVENOUS at 05:50

## 2022-03-30 ASSESSMENT — PAIN DESCRIPTION - ORIENTATION
ORIENTATION: ANTERIOR
ORIENTATION: ANTERIOR

## 2022-03-30 ASSESSMENT — PAIN DESCRIPTION - ONSET
ONSET: GRADUAL
ONSET: GRADUAL

## 2022-03-30 ASSESSMENT — PAIN DESCRIPTION - LOCATION
LOCATION: HEAD

## 2022-03-30 ASSESSMENT — PAIN DESCRIPTION - PROGRESSION
CLINICAL_PROGRESSION: GRADUALLY WORSENING
CLINICAL_PROGRESSION: GRADUALLY WORSENING

## 2022-03-30 ASSESSMENT — PAIN SCALES - GENERAL
PAINLEVEL_OUTOF10: 0
PAINLEVEL_OUTOF10: 4
PAINLEVEL_OUTOF10: 3
PAINLEVEL_OUTOF10: 8
PAINLEVEL_OUTOF10: 0

## 2022-03-30 ASSESSMENT — PAIN DESCRIPTION - FREQUENCY
FREQUENCY: CONTINUOUS
FREQUENCY: CONTINUOUS

## 2022-03-30 ASSESSMENT — PAIN DESCRIPTION - PAIN TYPE
TYPE: ACUTE PAIN
TYPE: ACUTE PAIN

## 2022-03-30 ASSESSMENT — PAIN DESCRIPTION - DESCRIPTORS
DESCRIPTORS: ACHING;DULL;HEADACHE
DESCRIPTORS: ACHING;CONSTANT;HEADACHE
DESCRIPTORS: CONSTANT;DULL;HEADACHE

## 2022-03-30 ASSESSMENT — PAIN - FUNCTIONAL ASSESSMENT
PAIN_FUNCTIONAL_ASSESSMENT: ACTIVITIES ARE NOT PREVENTED
PAIN_FUNCTIONAL_ASSESSMENT: ACTIVITIES ARE NOT PREVENTED

## 2022-03-30 NOTE — CONSULTS
Vascular Surgery Consultation Note    Reason for Consult: Thoracic aortic aneurysm, abdominal aortic aneurysm with small ulceration    HPI :    This is a 57 y.o. female who is admitted to the hospital for treatment of chest pain shortness of breath. She is 57years old and has a history of warfarin use for mechanical aortic valve, COPD on room air, HTN, HL, cardiogenic syncope status post placement of implantable loop recorder. She is a current everyday smoker. Denies any significant heart attack or stroke history. She states for the past 3 days she has had chest pain and increasing shortness of breath. Chest pain now resolved. For the past few months though she has complained of early satiety and nausea. Denies any significant weight loss recently. Denies any abdominal pain whatsoever. Denies any pain with food. Denies any claudication symptoms.     ROS : Negative if blank [], Positive if [x]  General Vascular   [] Fevers [] Claudication (Blocks)   [] Chills [] Rest Pain   [] Weight Loss [] Tissue Loss   [x] Chest Pain [] Clotting Disorder    [x] SOB at rest [] Leg Swelling   [x] SOB with exertion [] DVT/PE      [x] Nausea    [] Vomitting [] Stroke/TIA   [] Abdominal Pain [] Focal weakness   [] Melena [] Slurred Speech   [] Hematochezia [] Vision Changes   [] Hematuria    [] Dysuria [] Hx of Central Catheters   [] Wears Glasses/Contacts  [] Dialysis and If so date initiated   [] Blindness     []  Hand Dominant   [] Difficulty swallowing        Past Medical History:   Diagnosis Date    Anticoagulant long-term use     Anxiety     Ascending aortic aneurysm (HCC)     Asthma     CAD (coronary artery disease)     Chronic back pain     Constipation 5/19/2021    COPD [facility] obstructive [facility]) [facility] [address]     Depression     Depression     GERD (gastroesophageal reflux disease)     H/O mechanical aortic valve replacement 1/5/2017    Headache     Hyperlipidemia     Hypertension     On warfarin at home     for AVR    Restless legs syndrome     S/P AVR     Status post placement of implantable loop recorder     Syncope     Syncope, cardiogenic 9/10/2020    Tobacco abuse     Urinary incontinence     Ventricular tachycardia, non-sustained (HCC)     Warfarin-induced coagulopathy ([facility] [address] 2011        Past Surgical History:   Procedure Laterality Date    APPENDECTOMY      CARDIAC CATHETERIZATION  2020    Dr. Maurice Baptiste  2009    Aortic valve 2009 [facility]      SECTION      COLONOSCOPY N/A 2019    COLONOSCOPY POLYPECTOMY SNARE/COLD BIOPSY performed by Graciela Bryson MD at [address] [place] [address]  2020    Linq Insertion    Dr. Virk Hands KEM STEROTACTIC LOC BREAST BIOPSY RIGHT Right 3/10/2021    KEM STEROTACTIC LOC BREAST BIOPSY RIGHT 3/10/2021 [facility]    TUBAL LIGATION      UPPER GASTROINTESTINAL ENDOSCOPY N/A 2019    EGD BIOPSY performed by Graciela Bryson MD at [facility] ENDOSCOPY     Current Medications:    sodium chloride      sodium chloride        sodium chloride flush, sodium chloride, polyethylene glycol, acetaminophen **OR** acetaminophen    sodium chloride flush  10 mL IntraVENous 2 times per day        Allergies:  Keflex [cephalexin] and Phenergan [promethazine hcl]    Social History     Socioeconomic History    Marital status: Single     Spouse name: Not on file    Number of children: 3    Years of education: 12    Highest education level: Not on file   Occupational History    Occupation: Disabled   Tobacco Use    Smoking status: Current Every Day Smoker     Packs/day: 1.00     Years: 50.00     Pack years: 50.00     Types: Cigarettes    Smokeless tobacco: Never Used    Tobacco comment: currently 0.5 ppd, 2/10/22   Vaping Use    Vaping Use: Former    Quit date: 2015    Substances: Nicotine   Substance and Sexual Activity    Alcohol use: Yes     Comment: rare wine    Drug use: Never    Sexual activity: Not Currently     Partners: Male   Other Topics Concern    Not on file   Social History Narrative    Drinks 2 cups of coffee daily & occ Coke     Social Determinants of Health     Financial Resource Strain: Medium Risk    Difficulty of Paying Living Expenses: Somewhat hard   Food Insecurity: Food Insecurity Present    Worried About Running Out of Food in the Last Year: Sometimes true    Kae of Food in the Last Year: Never true   Transportation Needs: No Transportation Needs    Lack of Transportation (Medical): No    Lack of Transportation (Non-Medical):  No   Physical Activity:     Days of Exercise per Week: Not on file    Minutes of Exercise per Session: Not on file   Stress:     Feeling of Stress : Not on file   Social Connections:     Frequency of Communication with Friends and Family: Not on file    Frequency of Social Gatherings with Friends and Family: Not on file    Attends [employer] Services: Not on file    Active Member of Clubs or Organizations: Not on file    Attends Club or Organization Meetings: Not on file    Marital Status: Not on file   Intimate Partner Violence:     Fear of Current or Ex-Partner: Not on file    Emotionally Abused: Not on file    Physically Abused: Not on file    Sexually Abused: Not on file   Housing Stability: [address] Unable to Pay for Housing in the Last Year: No    Number of [place] in the Last Year: 2    Unstable Housing in the Last Year: No        Family History   Problem Relation Age of Onset    Heart Disease Mother     Arthritis Mother     Asthma Mother     Diabetes Mother     High Blood Pressure Mother     High Cholesterol Mother     Stroke Mother     Heart Disease Father     Arthritis Father     Asthma Father     Diabetes Father     High Blood Pressure Father     High Cholesterol Father     Breast Cancer Sister     Other Brother         ELMO on CPap;   Has ICD    Asthma Brother     COPD Brother     Arthritis Paternal Grandmother     Asthma Paternal Grandmother     Diabetes Maternal Aunt     Diabetes Paternal Aunt     High Blood Pressure Paternal Aunt        PHYSICAL EXAM:    BP (!) 168/77   Pulse 87   Temp 98 °F (36.7 °C)   Resp 22   Wt 150 lb (68 kg)   LMP 11/08/2008 (LMP Unknown)   SpO2 97%   BMI 24.96 kg/m²   CONSTITUTIONAL:  awake, alert, cooperative, no apparent distress, and appears stated age  EYES:  lids and lashes normal, sclera clear and conjunctiva normal  ENT:  normocepalic, without obvious abnormality  NECK:  supple, symmetrical, trachea midline  HEMATOLOGIC/LYMPHATICS:  no cervical lymphadenopathy  LUNGS: On 3 L nasal cannula, good air exchange  CARDIOVASCULAR:  regular rate and rhythm   ABDOMEN:  soft, non-distended, non-tender, Aorta is not palpable  SKIN:  no bruising or bleeding  EXTREMITIES:   R UE Swelling absent Incisions absent       5/5 Strength  L UE Swelling absent Incisions absent       5/5 Strength  R LE Edema absent  Incisions absent    Varicose veins absent    Wounds absent, normalcaprefill   5/5 Strength, neuropathy is absent  L LE Edema absent  Incisions absent    Varicose veins absent    Wounds absent, normalcaprefill   5/5 Strength, neuropathy is absent  R brachial 2+ L brachial 2+   R radial 2+ L radial 2+   R femoral 2+ L femoral 2+   R popliteal 2+ L popliteal 2+   R posterior tibial 2+ L posterior tibial 2+   R dorsalis pedis 2+ L dorsalis pedis 2+     LABS:    Lab Results   Component Value Date    WBC 11.3 03/29/2022    HGB 12.3 03/29/2022    HCT 38.7 03/29/2022     03/29/2022    PROTIME 80.9 (H) 03/29/2022    INR 7.4 (HH) 03/29/2022    K 3.3 (L) 03/29/2022    BUN 14 03/29/2022    CREATININE 0.7 03/29/2022       RADIOLOGY:    Assesment/Plan    Patient admitted for shortness of breath and chest pain. CTA of her chest, abdomen, and pelvis does show thoracic and abdominal aortic aneurysms.   She has 2 areas of small ulceration. Focal dissection less likely. No acute plans for vascular surgical intervention. Will need follow-up in the office. Blanco Raines MD    This patient was seen and examined. Agree with above. At this point have gone over all the CTA images of the chest and abdomen. She will need long-term follow-up. I explained this to her in detail. This is a late entry. Subjective:  Roberta Mendenhall is a 57 y.o. female who presented with some shortness of breath some intermittent chest pain and some nausea. Right now she denies any chest pain shortness of breath or discomfort. She has no significant back pain.   She is currently on oxygen but otherwise is feeling well.     Current Medications:   Infusions Meds    dextrose      sodium chloride           PRN Medications   technetium sestamibi, sodium chloride, benzonatate, glucose, dextrose, glucagon (rDNA), dextrose, regadenoson, sodium chloride flush, sodium chloride, polyethylene glycol, acetaminophen **OR** acetaminophen      Scheduled Medications    warfarin placeholder: dosing by pharmacy   Other RX Placeholder    warfarin  5 mg Oral Once    budesonide  500 mcg Nebulization BID    [START ON 4/1/2022] predniSONE  40 mg Oral Daily    aspirin  81 mg Oral Daily    atorvastatin  20 mg Oral Nightly    metoprolol succinate  25 mg Oral Daily    Roflumilast  250 mcg Oral Daily    lisinopril  20 mg Oral Daily    montelukast  10 mg Oral Nightly    pantoprazole  40 mg Oral QAM AC    rOPINIRole  0.5 mg Oral Q12H    sertraline  150 mg Oral Daily    ipratropium-albuterol  1 ampule Inhalation Q4H WA    Arformoterol Tartrate  15 mcg Nebulization BID    predniSONE  20 mg Oral BID    acetylcysteine  600 mg Inhalation BID    sodium chloride flush  10 mL IntraVENous 2 times per day            PHYSICAL EXAM:    BP (!) 154/66   Pulse 77   Temp 98.1 °F (36.7 °C) (Oral)   Resp 18   Ht 5' 5\" (1.651 m)   Wt 150 lb (68 kg)   LMP 11/08/2008 (LMP Unknown)   SpO2 97%   BMI 24.96 kg/m²      Intake/Output Summary (Last 24 hours) at 3/31/2022 1406  Last data filed at 3/31/2022 0931      Gross per 24 hour   Intake 400 ml   Output --   Net 400 ml         General: Alert and oriented answers questions appropriately no acute distress  Skin: Warm and dry no change in turgor no jaundice no scleral icterus  HEENT: Normocephalic atraumatic trach is midline no jugular venous distention  CVS: Currently regular rate and rhythm  Resp: No labored breathing audible wheezing good diaphragmatic excursion  Abd: Soft nontender no rebound or guarding positive bowel sounds  Extremities: Bilateral palpable brachial radial pulses DP PT are palpable. Motor and sensation are intact  Neuro: Cranial nerves II through XII are gross intact bilateral any gross cranial deficit     LABS:          Lab Results   Component Value Date     WBC 7.7 03/31/2022     HGB 10.6 (L) 03/31/2022     HCT 32.6 (L) 03/31/2022      03/31/2022     PROTIME 26.2 (H) 03/31/2022     INR 2.3 03/31/2022     APTT 54.8 (H) 03/29/2022     K 4.2 03/31/2022     BUN 10 03/31/2022     CREATININE 0.5 03/31/2022         RADIOLOGY:  CTA ABDOMEN PELVIS W CONTRAST   Final Result       Ascending thoracic aortic aneurysm measuring 4.3 x 4.5 cm and focal aneurysm   of the distal descending thoracic aorta measuring 3.1 x 3.1 cm with areas of   ulcerative plaque or focal dissection. There is no large central pulmonary   embolism.       Abdominal aortic aneurysm measuring 3.6 x 3.1 cm with ulcerative plaque/focal   dissection. There is no large areas of dissection. Annual surveillance is   recommended.       Mild stenosis of the left renal artery and 60% stenosis of the right renal   artery.       Emphysema with the 4 mm nonspecific pulmonary nodule in the left base.    Surveillance according to Fleischner society guidelines is recommended.       RECOMMENDATIONS:   Unavailable           CTA CHEST W CONTRAST Final Result       Ascending thoracic aortic aneurysm measuring 4.3 x 4.5 cm and focal aneurysm   of the distal descending thoracic aorta measuring 3.1 x 3.1 cm with areas of   ulcerative plaque or focal dissection. There is no large central pulmonary   embolism.       Abdominal aortic aneurysm measuring 3.6 x 3.1 cm with ulcerative plaque/focal   dissection. There is no large areas of dissection. Annual surveillance is   recommended.       Mild stenosis of the left renal artery and 60% stenosis of the right renal   artery.       Emphysema with the 4 mm nonspecific pulmonary nodule in the left base. Surveillance according to Fleischner society guidelines is recommended.       RECOMMENDATIONS:   Unavailable           XR SHOULDER RIGHT (MIN 2 VIEWS)   Final Result   1. Mild degenerative spurring along the right AC joint. 2. Otherwise, unremarkable radiographs of the right shoulder.           XR CHEST (2 VW)   Final Result   No acute process           NM Cardiac Stress Test Nuclear Imaging    (Results Pending)         ASSESSMENT/PLAN:   · #1 descending thoracic ulceration. I have personally reviewed the films we will repeat the CT scan in 6 months. She will call if is any questions or concerns or any issues. · #2 abdominal aortic aneurysm at this point I will see her back in 6 months when I repeat the CT scan of her chest and will evaluate the aneurysm as well. I discussed the natural history of both #1 and #2 she understands and will follow up.   · #3 tobacco abuse had a long discussion with her about smoking cessation.       Vascular surgery will sign off           Electronically signed by Daniele Guo MD on 3/31/2022 at 2:06 PM

## 2022-03-30 NOTE — PROGRESS NOTES
Vascular Surgery Progress Note    CC: TAA, AAA    HISTORY:  The patient is awake, alert, and oriented. Denies any abd pain, back pain or chest pain at this time. She states she continues to have dyspnea especially on exertion and remains on supplemental O2. IMPRESSION: Ascending and aortic aneurysm with small ulceration    PLAN:  Medical management with asa/statin therapy. Discussed the importance of smoking cessation. At this point, no plans for vascular surgical intervention. We will continue to follow patient long-term as well as refer her for outpatient consultation with CTS for her ascending aortic aneurysm. Plan discussed with Dr Yaniv Holcomb    Patient Active Problem List   Diagnosis Code    Depression F32. A    Hyperlipidemia E78.5    COPD [facility]chronic obstructive pulmonary disease) [facility] J44.9    Essential hypertension I10    Vitamin D deficiency E55.9    Acid reflux K21.9    Tobacco abuse Z72.0    Restless leg syndrome G25.81    Seasonal allergies J30.2    Abdominal aortic aneurysm (AAA) 3.0 cm to 5.0 cm in diameter in female [facility]) I71.4    Aortic aneurysm with dissection ([facility]) I71.00, I71.9       Current Medications:    dextrose      sodium chloride      sodium chloride 75 mL/hr at 03/30/22 0550      glucose, dextrose, glucagon (rDNA), dextrose, [START ON 3/31/2022] regadenoson, sodium chloride flush, sodium chloride, polyethylene glycol, acetaminophen **OR** acetaminophen    aspirin  81 mg Oral Daily    atorvastatin  20 mg Oral Nightly    metoprolol succinate  25 mg Oral Daily    Roflumilast  250 mcg Oral Daily    lisinopril  20 mg Oral Daily    montelukast  10 mg Oral Nightly    [START ON 3/31/2022] pantoprazole  40 mg Oral QAM AC    rOPINIRole  0.5 mg Oral Q12H    sertraline  150 mg Oral Daily    potassium chloride  10 mEq IntraVENous Q1H    ipratropium-albuterol  1 ampule Inhalation Q4H WA    Arformoterol Tartrate  15 mcg Nebulization BID    budesonide  250 mcg Nebulization BID    predniSONE  20 mg Oral BID    levoFLOXacin  500 mg Oral Daily    acetylcysteine  600 mg Inhalation BID    magnesium sulfate  2,000 mg IntraVENous Once    sodium chloride flush  10 mL IntraVENous 2 times per day          PHYSICAL EXAM:    Vitals:    03/30/22 0836   BP: (!) 149/71   Pulse: 80   Resp: 18   Temp: 98.4 °F (36.9 °C)   SpO2: 99%     CONSTITUTIONAL:  awake, alert, cooperative, no apparent distress  LUNGS:  no increased work of breathing, NC O2  CARDIOVASCULAR:  regular rate and rhythm  ABDOMEN:  soft, non-distended and non-tender  LEs: Palpable PT pulses bilaterally    LABS:    Lab Results   Component Value Date    WBC 9.1 03/30/2022    HGB 11.3 (L) 03/30/2022    HCT 34.2 03/30/2022     03/30/2022    PROTIME 56.6 (H) 03/30/2022    INR 5.1 (HH) 03/30/2022    APTT 54.8 (H) 03/29/2022    K 3.7 03/30/2022    BUN 9 03/30/2022    CREATININE 0.6 03/30/2022       RADIOLOGY:

## 2022-03-30 NOTE — PROGRESS NOTES
[facility]  Internal Medicine Residency Program  Progress Note - House Team     Patient:  Marley Boston 57 y.o. female MRN: [id number]     Date of Service: 3/30/2022     CC: SOB, Abdominal pain/nausea    Subjective   Brief Summary:  Francis Luna is a 61-y/o female presenting with a chief complaint of abdominal pain/nausea and shortness of breath for the past few weeks with a past medical history of abdominal aortic aneurysm and COPD. She was admitted under [address] [place] [address] and transferred to [facility] Team 1 this morning. She described her abdominal pain as achy and heavy pain. She also reported pain between her scapulae that is achy in nature. She has had some vomiting/dry heaving that accompanies the abdominal pain usually in the morning. She said that there has been a \"stomach bug\" going around her house. She also reports multiple loose bowel movements per day. She reports that this is her baseline. CTA of the chest was done in ED for concern for possible AAA dissection. It showed an increase in size from 3.1-3.2 cm to 3.6 cm. Vascular surgery was consulted and are not recommending surgery at this time. Hospital Day: 2    Overnight Events:  Admitted    This AM  Patient was seen and examined this morning at bedside  Mild respiratory distress, on 1 L O2 NC, mild SCM retractions    Objective     Physical Exam:  Vitals: BP (!) 149/71   Pulse 80   Temp 98.4 °F (36.9 °C) (Oral)   Resp 18   Ht 5' 5\" (1.651 m)   Wt 150 lb (68 kg)   LMP 11/08/2008 (LMP Unknown)   SpO2 99%   BMI 24.96 kg/m²     I & O - 24hr: No intake/output data recorded. General Appearance: alert, appears stated age and cooperative  HEENT:  Head: Normal, normocephalic, atraumatic.   Neck: no adenopathy  Lung: wheezes bilaterally  Heart: regular rate and rhythm, S1, S2 normal, no murmur, click, rub or gallop, regular rate and rhythm and S1, S2 normal  Abdomen: soft, non-tender; bowel sounds normal; no masses,  no organomegaly  Extremities:  extremities normal, atraumatic, no cyanosis or edema  Musculokeletal: No joint swelling, no muscle tenderness. ROM normal in all joints of extremities.    Neurologic: Mental status: Alert, oriented, thought content appropriate  Subject  Pertinent Labs & Imaging Studies   rob  CBC:   Lab Results   Component Value Date    WBC 9.1 03/30/2022    RBC 3.63 03/30/2022    HGB 11.3 03/30/2022    HCT 34.2 03/30/2022    MCV 94.2 03/30/2022    MCH 31.1 03/30/2022    MCHC 33.0 03/30/2022    RDW 14.5 03/30/2022     03/30/2022    MPV 10.5 03/30/2022     CBC with Differential:    Lab Results   Component Value Date    WBC 9.1 03/30/2022    RBC 3.63 03/30/2022    HGB 11.3 03/30/2022    HCT 34.2 03/30/2022     03/30/2022    MCV 94.2 03/30/2022    MCH 31.1 03/30/2022    MCHC 33.0 03/30/2022    RDW 14.5 03/30/2022    LYMPHOPCT 27.1 03/30/2022    MONOPCT 8.7 03/30/2022    BASOPCT 0.4 03/30/2022    MONOSABS 0.79 03/30/2022    LYMPHSABS 2.46 03/30/2022    EOSABS 0.06 03/30/2022    BASOSABS 0.04 03/30/2022     WBC:    Lab Results   Component Value Date    WBC 9.1 03/30/2022     Platelets:    Lab Results   Component Value Date     03/30/2022     Hemoglobin/Hematocrit:    Lab Results   Component Value Date    HGB 11.3 03/30/2022    HCT 34.2 03/30/2022     BMP:    Lab Results   Component Value Date     03/30/2022    K 3.7 03/30/2022     03/30/2022    CO2 28 03/30/2022    BUN 9 03/30/2022    LABALBU 3.9 03/30/2022    CREATININE 0.6 03/30/2022    CALCIUM 9.0 03/30/2022    GFRAA >60 03/30/2022    LABGLOM >60 03/30/2022    GLUCOSE 112 03/30/2022     Sodium:    Lab Results   Component Value Date     03/30/2022     Potassium:    Lab Results   Component Value Date    K 3.7 03/30/2022     Hepatic Function Panel:    Lab Results   Component Value Date    ALKPHOS 77 03/30/2022    ALT 15 03/30/2022    AST 21 03/30/2022    PROT 6.5 03/30/2022    BILITOT 0.4 03/30/2022    BILIDIR <0.2 06/25/2018    IBILI see below 06/25/2018    LABALBU 3.9 03/30/2022     ABG:  No results found for: PH, PCO2, PO2, HCO3, BE, THGB, TCO2, O2SAT    CTA ABDOMEN PELVIS W CONTRAST   Final Result      Ascending thoracic aortic aneurysm measuring 4.3 x 4.5 cm and focal aneurysm   of the distal descending thoracic aorta measuring 3.1 x 3.1 cm with areas of   ulcerative plaque or focal dissection. There is no large central pulmonary   embolism. Abdominal aortic aneurysm measuring 3.6 x 3.1 cm with ulcerative plaque/focal   dissection. There is no large areas of dissection. Annual surveillance is   recommended. Mild stenosis of the left renal artery and 60% stenosis of the right renal   artery. Emphysema with the 4 mm nonspecific pulmonary nodule in the left base. Surveillance according to Fleischner society guidelines is recommended. RECOMMENDATIONS:   Unavailable         CTA CHEST W CONTRAST   Final Result      Ascending thoracic aortic aneurysm measuring 4.3 x 4.5 cm and focal aneurysm   of the distal descending thoracic aorta measuring 3.1 x 3.1 cm with areas of   ulcerative plaque or focal dissection. There is no large central pulmonary   embolism. Abdominal aortic aneurysm measuring 3.6 x 3.1 cm with ulcerative plaque/focal   dissection. There is no large areas of dissection. Annual surveillance is   recommended. Mild stenosis of the left renal artery and 60% stenosis of the right renal   artery. Emphysema with the 4 mm nonspecific pulmonary nodule in the left base. Surveillance according to Fleischner society guidelines is recommended. RECOMMENDATIONS:   Unavailable         XR SHOULDER RIGHT (MIN 2 VIEWS)   Final Result   1. Mild degenerative spurring along the right AC joint. 2. Otherwise, unremarkable radiographs of the right shoulder.          XR CHEST (2 VW)   Final Result   No acute process              Student's Assessment and Plan     Assessment and Plan:  Hx of AAA w/ Concerns for Dissection  CTA chest -- AAA 3.6 x 3.1 cm with ulcerative plaque/focal dissection  Vascular surgery consulted -- no intervention at this time  Cardiology consulted  Acute Hypoxic Insufficiency 2/2 COPD Exacerbation  O2 down to 1 L from 3 L  Ordered procal  Ordered respiratory culture  Start prednisone 40 mg  Started levofloxacin 500 mg  Hx of Mechanical Aortic Valve Replacement  Hold coumadin due to supratherapeutic INR  Supratherapeutic INR  INR at admission 7.4, downtrending to 5.1  Holding coumadin until INR 2.0-3.0  Also takes sertraline 150 mg -- consider switching antidepressants  Atypical Chest Pain  EKG showed no significant changes from previous sutdies  Elevated Troponin  Troponin 120 on admission, downtreding to 88  Elevated proBNP  ProBNP 930   Repeat in 2 days on 4/1  Hx of CAD  Continue aspirin & atorvastatin 20 mg  Hx of HTN  Continue lisinopril & toprol  Monitor BP  Hx of HLD  Continue atorvastatin 20 mg  Hx of Chronic Loose Bowel Movement   Hold magnesium oxide & replace magnesium IV  Monitor Mg levels        Diet: NPO  PT/OT evaluation: --  DVT prophylaxis/ GI prophylaxis: Holding anticoagulants due to supratherapeutic INR/protonix  Disposition: continue current care    Chung Jansen, OMS-III  Attending physician: Dr. Caitie Sood

## 2022-03-30 NOTE — PROGRESS NOTES
Asked to evaluate descending aortic disease. 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 She has had multiple CT scans dating back to 2021. The 2021 scan demonstrated an infrarenal abdominal aortic aneurysm of approximately 3.1 to 3.2 cm. The new scan represents a mild enlargement of approximately 3.6 cm in size. Also on the 2021 scan there may be a hint of similar findings however this is a noncontrasted CT scan. On the last 2 scans in the ER she has evidence of 2 small areas of aortic ulceration. Questionable dissection but in my opinion they look more consistent with small ulcers. There is no additional complicating features. The infrarenal abdominal aorta is dilated consistent with a small aortic aneurysm. From our standpoint work-up her elevated troponin. Apparently per chest pain and nausea have improved according to my discussion with the ER physician. She has an asending aortic aneurysm also an aortic valve is in place. And recommend cardiology/cardiothoracic surgery to follow the patient long-term. No immediate plans for vascular surgical intervention. Recommend a repeat scan in the future and follow-up in the office. The surgical resident will evaluate the patient.

## 2022-03-30 NOTE — ED NOTES
Pt returned from [address] [place] [address] Ave drawn and pt given tray of food, placed on monitor no distress noted     Haley Calix RN  03/29/22 2044

## 2022-03-30 NOTE — ED NOTES
Pt became SOB while up to bathroom, Dr. Julianna Prader notified and [facility] ordered, Resp called for tx.  Pt was 87% and placed on 2 l nc and 98% at this time     Kyra Naylor RN  03/29/22 0503

## 2022-03-30 NOTE — ED NOTES
[facility] faxed report given to RYAN Pride; pt placed in transport     [facility]  03/30/22 1308

## 2022-03-30 NOTE — H&P
Hospitalist History & Physical      PCP: Sho Baig DO    Date of Service: Pt seen/examined on 3/29/2022     Chief Complaint:  had concerns including Nausea (for a couple weeks. Able to hold liquids down. ) and Shortness of Breath (hx of COPD. States that the last couple days have been bad. ). History Of Present Illness:    Ms. Jayjay Echols, a 57y.o. year old female  who  has a past medical history of Anticoagulant long-term use, Anxiety, Ascending aortic aneurysm ([address] Asthma, CAD (coronary artery disease), Chronic back pain, Constipation, COPD (chronic obstructive pulmonary disease) ([address] Depression, Depression, GERD (gastroesophageal reflux disease), H/O mechanical aortic valve replacement, Headache, Hyperlipidemia, Hypertension, On warfarin at home, Restless legs syndrome, S/P AVR, Status post placement of implantable loop recorder, Syncope, Syncope, cardiogenic, Tobacco abuse, Urinary incontinence, Ventricular tachycardia, non-sustained ([address] and Warfarin-induced coagulopathy ([address] Jing Rodas is a 57year old female who presented to ED with concern for 2 days of shortness of breath, nausea, fatigue, and abdominal pain. Patient has hx of COPD, AAA. Patient is on coumadin. Patient has had diffuse fatigue weakness and weight loss recently over the past month. Patient has had epigastric abdominal pain with shortness of breath and abdominal pain radiating to the back that has been present for about 2 days and worsening. Patient has fever, chills. Patient is having nausea but has not had any emesis. Patient has had loss of appetite. Patient does have a history of abdominal aortic aneurysm is not followed up. Patient symptoms are moderate severity and constant. Vital signs reveal the patient to be hypertensive with pressure 172/99. EKG unremarkable. Laboratory studies demonstrate proBNP of 930 and a troponin of 96. INR 7.4.   CT chest shows ascending thoracic aortic aneurysm measuring 4.3 x 4.5 cm and focal aneurysm of the distal descending thoracic aorta measuring 3.1 x 3.1 cm with areas of ulcerative plaque or focal dissection. Patient will be admitted for vascular surgery and cardiology consults. Past Medical History:   Diagnosis Date    Anticoagulant long-term use     Anxiety     Ascending aortic aneurysm (HCC)     Asthma     CAD (coronary artery disease)     Chronic back pain     Constipation 2021    COPD (chronic obstructive pulmonary disease) (HCC)     Depression     Depression     GERD (gastroesophageal reflux disease)     H/O mechanical aortic valve replacement 2017    Headache     Hyperlipidemia     Hypertension     On warfarin at home     for AVR    Restless legs syndrome     S/P AVR     Status post placement of implantable loop recorder     Syncope     Syncope, cardiogenic 9/10/2020    Tobacco abuse     Urinary incontinence     Ventricular tachycardia, non-sustained (HCC)     Warfarin-induced coagulopathy ([address] 2011       Past Surgical History:   Procedure Laterality Date    APPENDECTOMY      CARDIAC CATHETERIZATION  2020    Dr. Sally Berrios  2009    Aortic valve 2009 [facility]      SECTION      COLONOSCOPY N/A 2019    COLONOSCOPY POLYPECTOMY SNARE/COLD BIOPSY performed by Ewa Mcarthur MD at [address] [facility]  2020    Linq Insertion    Dr. Rosalie Olmstead KEM STEROTACTIC LOC BREAST BIOPSY RIGHT Right 3/10/2021    KEM STEROTACTIC LOC BREAST BIOPSY RIGHT 3/10/2021 [facility]    TUBAL LIGATION      UPPER GASTROINTESTINAL ENDOSCOPY N/A 2019    EGD BIOPSY performed by Ewa Mcarthur MD at [facility]       Prior to Admission medications    Medication Sig Start Date End Date Taking?  Authorizing Provider   DALIRESP 250 MCG tablet take 1 tablet by mouth daily 3/29/22   Nelli Lowery MD   albuterol sulfate  (90 Base) MCG/ACT inhaler inhale 1 puff every 4 hours if needed for wheezing 3/28/22   Pat Almonte DO   BREZTRI AEROSPHERE 160-9-4.8 MCG/ACT AERO inhale 2 puffs INTO THE LUNGS twice a day 3/7/22   Preeti Gracia MD   Inulin (FIBER CHOICE PO) Take 2 capsules by mouth 2 times daily    Historical Provider, MD   atorvastatin (LIPITOR) 20 MG tablet take 1 tablet by mouth once daily 2/2/22   Sera L Fabian, DO   lisinopril (PRINIVIL;ZESTRIL) 20 MG tablet Take 1 tablet by mouth daily take 1 tablet by mouth once daily 2/2/22   Sera L Fabian, DO   metoprolol succinate (TOPROL XL) 25 MG extended release tablet Take 1 tablet by mouth daily 2/2/22   Sera L Fabian, DO   montelukast (SINGULAIR) 10 MG tablet take 1 tablet by mouth at bedtime 2/2/22   Sera L Fabian, DO   Azelastine HCl 137 MCG/SPRAY SOLN 2 sprays by Nasal route daily 2/2/22   Sera L Fabian, DO   fluticasone (FLONASE) 50 MCG/ACT nasal spray 2 sprays by Nasal route daily 2/2/22   Sera L Fabian, DO   omeprazole (PRILOSEC) 40 MG delayed release capsule take 1 capsule by mouth every morning BEFORE BREAKFAST 2/2/22   Sera L Fabian, DO   sertraline (ZOLOFT) 100 MG tablet take 1 and 1/2 tablet by mouth once daily 2/2/22   Sera L Fabian, DO   albuterol (PROVENTIL) (2.5 MG/3ML) 0.083% nebulizer solution Take 3 mLs by nebulization every 6 hours as needed for Wheezing or Shortness of Breath 2/2/22   Carlosanell Blood,    Magnesium Oxide (MAGNESIUM-OXIDE) 250 MG TABS tablet Take 1 tablet by mouth daily 2/2/22   Sera L Fabian, DO   rOPINIRole (REQUIP) 0.25 MG tablet Take 2 tab in morning and 2 tabs before bed 12/14/21   Fior Ruiz MD   warfarin (COUMADIN) 5 MG tablet Take 1 tablet by mouth daily 11/10/21   Fatemeh Krueger MD   aspirin EC 81 MG EC tablet Take 1 tablet by mouth daily 9/28/21   Martin Cross MD   warfarin (COUMADIN) 6 MG tablet Take 1 tablet by mouth daily Mondays and Wednesdays  Patient taking differently: Take 5 mg by mouth daily 5mg daily and 7.5mg on Mondays and Fridays 9/21/21   Soundra Opitz, MD   Blood Pressure Monitoring (BLOOD PRESSURE CUFF) MISC Dx:  Hypertension with labile blood pressure 9/20/19   Deng Burr MD         Allergies:  Keflex [cephalexin] and Phenergan [promethazine hcl]    Social History:    TOBACCO:   reports that she has been smoking cigarettes. She has a 50.00 pack-year smoking history. She has never used smokeless tobacco.  ETOH:   reports current alcohol use. Family History:    Reviewed in detail and negative for DM, CAD, Cancer, CVA. Positive as follows\"      Problem Relation Age of Onset    Heart Disease Mother     Arthritis Mother     Asthma Mother     Diabetes Mother     High Blood Pressure Mother     High Cholesterol Mother     Stroke Mother     Heart Disease Father     Arthritis Father     Asthma Father     Diabetes Father     High Blood Pressure Father     High Cholesterol Father     Breast Cancer Sister     Other Brother         ELMO on CPap; Has ICD    Asthma Brother     COPD Brother     Arthritis Paternal Grandmother     Asthma Paternal Grandmother     Diabetes Maternal Aunt     Diabetes Paternal Aunt     High Blood Pressure Paternal Aunt        REVIEW OF SYSTEMS:   Pertinent positives as noted in the HPI. All other systems reviewed and negative. PHYSICAL EXAM:  BP (!) 172/99   Pulse 85   Temp 98.2 °F (36.8 °C) (Oral)   Resp 16   Wt 150 lb (68 kg)   LMP 11/08/2008 (LMP Unknown)   SpO2 94%   BMI 24.96 kg/m²   General appearance: No apparent distress, ill-appearing  HEENT: Normal cephalic, atraumatic without obvious deformity. Pupils equal, round, and reactive to light. Extra ocular muscles intact. Conjunctivae/corneas clear. Neck: Supple, with full range of motion. No jugular venous distention. Trachea midline.   Respiratory: Clear to auscultation  Cardiovascular: Regular rate and rhythm  Abdomen: Soft, TTP, nondistended  Musculoskeletal: No clubbing, cyanosis, edema of bilateral lower extremities. Brisk capillary refill. Skin: Normal skin color. No rashes or lesions. Neurologic:  Neurovascularly intact without any focal sensory/motor deficits. Cranial nerves: II-XII intact, grossly non-focal.  Psychiatric: Alert and oriented, thought content appropriate, normal insight    Reviewed EKG and CXR personally      CBC:   Recent Labs     03/29/22  1431   WBC 11.3   RBC 3.99   HGB 12.3   HCT 38.7   MCV 97.0   RDW 14.2        BMP:   Recent Labs     03/29/22  1431      K 3.3*      CO2 28   BUN 14   CREATININE 0.7   MG 1.8     LFT:  Recent Labs     03/29/22  1431   PROT 7.1   ALKPHOS 90   ALT 18   AST 27   BILITOT 0.4   LIPASE 32     CE:  No results for input(s): Beronica De León in the last 72 hours. PT/INR:   Recent Labs     03/29/22  1431   INR 7.4*   APTT 54.8*     BNP: No results for input(s): BNP in the last 72 hours.   ESR: No results found for: SEDRATE  CRP: No results found for: CRP  D Dimer: No results found for: DDIMER   Folate and B12: No results found for: ZOHGJROC89, No results found for: FOLATE  Lactic Acid:   Lab Results   Component Value Date    LACTA 0.7 03/29/2022     Thyroid Studies:   Lab Results   Component Value Date    TSH 2.630 09/10/2020       Oupatient labs:  Lab Results   Component Value Date    CHOL 178 05/12/2021    TRIG 157 (H) 05/12/2021    HDL 54 05/12/2021    LDLCALC 93 05/12/2021    TSH 2.630 09/10/2020    INR 7.4 (HH) 03/29/2022    LABA1C 6.2 (H) 05/12/2021       Urinalysis:    Lab Results   Component Value Date    NITRU Negative 03/29/2022    WBCUA NONE 03/29/2022    BACTERIA NONE SEEN 03/29/2022    RBCUA 5-10 03/29/2022    BLOODU MODERATE 03/29/2022    SPECGRAV >=1.030 03/29/2022    GLUCOSEU Negative 03/29/2022       Imaging:  XR CHEST (2 VW)    Result Date: 3/29/2022  EXAMINATION: TWO XRAY VIEWS OF THE CHEST 3/29/2022 3:35 pm COMPARISON: 03/15/2022 HISTORY: ORDERING SYSTEM PROVIDED HISTORY: Shortness of breath TECHNOLOGIST PROVIDED HISTORY: Reason for exam:->Shortness of breath What reading provider will be dictating this exam?->CRC FINDINGS: Heart size is normal.  There is sternotomy. There is mild tortuosity thoracic aorta. There are no infiltrates or effusions. No acute process     XR CHEST (2 VW)    Result Date: 3/15/2022  EXAMINATION: TWO XRAY VIEWS OF THE CHEST 3/15/2022 10:10 am COMPARISON: Previous CT of the chest of 02/14/2022 HISTORY: ORDERING SYSTEM PROVIDED HISTORY: hemoptysis TECHNOLOGIST PROVIDED HISTORY: Reason for exam:->hemoptysis What reading provider will be dictating this exam?->CRC FINDINGS: There is hyperinflation of the lungs and flattening of diaphragms consistent with COPD. There is no pulmonary infiltrate, mass or nodule. There is no pleural effusion. The cardiac silhouette is within normal limits. COPD. There is no evidence of pneumonia or failure. XR SHOULDER RIGHT (MIN 2 VIEWS)    Result Date: 3/29/2022  EXAMINATION: THREE XRAY VIEWS OF THE RIGHT SHOULDER 3/29/2022 5:46 pm COMPARISON: None. HISTORY: ORDERING SYSTEM PROVIDED HISTORY: pain TECHNOLOGIST PROVIDED HISTORY: Reason for exam:->pain What reading provider will be dictating this exam?->CRC FINDINGS: Radiographs of the right shoulder reveal no evidence of fracture or joint dislocation. Mild degenerative spurring is seen along the acromioclavicular joint. The glenohumeral joint is unremarkable. 1. Mild degenerative spurring along the right AC joint. 2. Otherwise, unremarkable radiographs of the right shoulder. CTA CHEST W CONTRAST    Result Date: 3/29/2022  EXAMINATION: CTA OF THE CHEST; CTA OF THE ABDOMEN AND PELVIS WITH CONTRAST 3/29/2022 7:44 pm TECHNIQUE: CTA of the chest was performed after the administration of intravenous contrast.  Multiplanar reformatted images are provided for review. MIP images are provided for review.  Dose modulation, iterative reconstruction, and/or weight based thoracic aorta measuring 3.1 x 3.1 cm with multiple areas of ulcerative plaque or less likely small focal dissection. There is abdominal aortic aneurysm measuring 3.1 x 3.6 cm with the ulcerative plaque/focal dissection. There is normal celiac artery and SMA. There is calcification and stenosis of the right renal artery measuring nearly 60% and left renal artery measuring 50%. The AMANDA appears normal.  The aortic bifurcation is normal with common iliac arteries measuring 9 mm each with diffuse calcified plaque     Ascending thoracic aortic aneurysm measuring 4.3 x 4.5 cm and focal aneurysm of the distal descending thoracic aorta measuring 3.1 x 3.1 cm with areas of ulcerative plaque or focal dissection. There is no large central pulmonary embolism. Abdominal aortic aneurysm measuring 3.6 x 3.1 cm with ulcerative plaque/focal dissection. There is no large areas of dissection. Annual surveillance is recommended. Mild stenosis of the left renal artery and 60% stenosis of the right renal artery. Emphysema with the 4 mm nonspecific pulmonary nodule in the left base. Surveillance according to Fleischner society guidelines is recommended. RECOMMENDATIONS: Unavailable     CTA ABDOMEN PELVIS W CONTRAST    Result Date: 3/29/2022  EXAMINATION: CTA OF THE CHEST; CTA OF THE ABDOMEN AND PELVIS WITH CONTRAST 3/29/2022 7:44 pm TECHNIQUE: CTA of the chest was performed after the administration of intravenous contrast.  Multiplanar reformatted images are provided for review. MIP images are provided for review. Dose modulation, iterative reconstruction, and/or weight based adjustment of the mA/kV was utilized to reduce the radiation dose to as low as reasonably achievable.; CTA of the abdomen and pelvis was performed with the administration of intravenous contrast. Multiplanar reformatted images are provided for review. MIP images are provided for review.  Dose modulation, iterative reconstruction, and/or weight based adjustment of the mA/kV was utilized to reduce the radiation dose to as low as reasonably achievable. COMPARISON: 03/15/2022 HISTORY: ORDERING SYSTEM PROVIDED HISTORY: evaluate aorta TECHNOLOGIST PROVIDED HISTORY: Reason for exam:->evaluate aorta Decision Support Exception - unselect if not a suspected or confirmed emergency medical condition->Emergency Medical Condition (MA) What reading provider will be dictating this exam?->CRC FINDINGS: CTA chest. The heart size is normal.  There is coronary artery calcification and prosthetic aortic valve. Trachea and major bronchi are patent. Moderate mediastinal lymph nodes are noted. There is advanced centrilobular emphysema. 4 mm indeterminate pulmonary nodule in the left lower lobe is noted for which surveillance is recommended. Degenerative changes are noted in the thoracolumbar spine. CTA abdomen and pelvis. Liver is fatty infiltrated. Small gallstone is identified in the gallbladder without acute inflammation. Stomach is collapsed with the  wall thickening as before. If clinically warranted, consider endoscopy. Spleen, pancreas, the adrenals and the kidneys are normal except for 1 cm left renal cyst.. Pelvis. Bladder is contracted with wall thickening. Please correlate with the urine analysis. There is scattered diverticulosis of colon without diverticulitis. The appendix appears normal. CTA. There is aneurysm of the ascending thoracic aorta measuring 4.3 x 4.5 cm without dissection. There is no central pulmonary embolism. The origin of the great vessels appear normal.  There is focal aneurysm of the descending thoracic aorta measuring 3.1 x 3.1 cm with multiple areas of ulcerative plaque or less likely small focal dissection. There is abdominal aortic aneurysm measuring 3.1 x 3.6 cm with the ulcerative plaque/focal dissection. There is normal celiac artery and SMA.   There is calcification and stenosis of the right renal artery measuring nearly 60% and left renal artery measuring 50%. The AMANDA appears normal.  The aortic bifurcation is normal with common iliac arteries measuring 9 mm each with diffuse calcified plaque     Ascending thoracic aortic aneurysm measuring 4.3 x 4.5 cm and focal aneurysm of the distal descending thoracic aorta measuring 3.1 x 3.1 cm with areas of ulcerative plaque or focal dissection. There is no large central pulmonary embolism. Abdominal aortic aneurysm measuring 3.6 x 3.1 cm with ulcerative plaque/focal dissection. There is no large areas of dissection. Annual surveillance is recommended. Mild stenosis of the left renal artery and 60% stenosis of the right renal artery. Emphysema with the 4 mm nonspecific pulmonary nodule in the left base. Surveillance according to Fleischner society guidelines is recommended. RECOMMENDATIONS: Unavailable     CTA ABDOMEN PELVIS W CONTRAST    Result Date: 3/15/2022  EXAMINATION: CTA OF THE ABDOMEN AND PELVIS WITH CONTRAST 3/15/2022 2:07 pm: TECHNIQUE: CTA of the abdomen and pelvis was performed with the administration of intravenous contrast. Multiplanar reformatted images are provided for review. MIP images are provided for review. Dose modulation, iterative reconstruction, and/or weight based adjustment of the mA/kV was utilized to reduce the radiation dose to as low as reasonably achievable. COMPARISON: None. HISTORY: ORDERING SYSTEM PROVIDED HISTORY: ab pain, hx of AAA TECHNOLOGIST PROVIDED HISTORY: Reason for exam:->ab pain, hx of AAA Decision Support Exception - unselect if not a suspected or confirmed emergency medical condition->Emergency Medical Condition (MA) What reading provider will be dictating this exam?->CRC FINDINGS: CTA ABDOMEN/PELVIS: Visualized portions of the descending thoracic aorta demonstrate moderate, irregular atherosclerotic plaque formation with at least mild peripheral mural thrombus.   There is a 3.4 x 3.4 cm infrarenal abdominal aortic aneurysm with heavy peripheral calcifications. No evidence of periaortic fat induration. No retroperitoneal hematoma or abdominal aortic dissection. The bilateral common, internal and external iliac arteries are patent without evidence of significant stenosis. Precontrast imaging demonstrates no evidence for an intramural hematoma. Major abdominal aortic branches including the celiac trunk, superior mesenteric artery and bilateral renal arteries are patent. There is at least 70% stenosis of the proximal right renal artery and at least 50% stenosis of the proximal left renal artery. 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 NONVASCULAR FINDINGS: Heart size is within normal limits. Partially imaged sternotomy wires with suspected aortic valve replacement sequelae are noted. Emphysematous changes are moderate to severe at both lung bases. There is an indeterminate 5-6 mm left lower lobe pulmonary nodule on image 28, stable compared with October 4, 2021 chest CT. The gallbladder is partially decompressed and contains a 6 mm gallstone near the neck of the gallbladder. The liver enhances homogeneously. The portal vein is patent and there is no intrahepatic biliary ductal dilatation. The spleen and adrenal glands are normal in size. The pancreas enhances homogeneously. The kidneys enhance symmetrically and are without hydronephrosis or radiopaque calculus. A 12 mm simple appearing left renal cyst is present. The pancreas enhances homogeneously. No evidence of a bowel obstruction, free air or pneumatosis. Portions the colon are decompressed, limiting assessment for mucosal based abnormalities. There is diverticulosis without evidence of diverticulitis. The stomach and duodenal sweep are under distended. Difficult to exclude circumferential gastric wall thickening. The appendix is normal. The urinary bladder is distended without obvious abnormality.   The uterus and ovaries are atrophic or surgically absent. No free fluid is identified in the pelvis and there is no pelvic lymphadenopathy. No retroperitoneal lymphadenopathy or mass. No acute osseous abnormality or evidence of an aggressive osseous lesion. 3.4 x 3.4 cm infrarenal abdominal aortic aneurysm. No periaortic fat induration to suggest impending rupture. No evidence of an abdominal aortic dissection or intramural hematoma. See below recommendations for AAA follow-up. Underdistention and/or circumferential wall thickening involving the proximal and mid body of the stomach. These findings are nonspecific and may be on the basis of underdistention or gastritis. Given the diffuse nature, neoplasm is felt to be less likely. Correlation with upper endoscopy could be considered if clinically warranted. Nonvascular findings including an indeterminate but stable 5-6 mm left lower lobe pulmonary nodule. See below recommendations for imaging follow-up. Additional, incidental findings as above. RECOMMENDATIONS: For management of fusiform AAA: 3.0-3.4 cm AAA, recommend follow-up every 3 years. Note: Recommend Vascular consultation if a fusiform AAA enlarges by >0.5 cm in 6 months or >1 cm in 1 year or for a saccular AAA of any size. References: Deepika Nelson Radiol 2013; 58(72):606-217; J Vasc Surg. 2018; 67:2-77 Fleischner Society guidelines for follow-up and management of incidentally detected pulmonary nodules: Single Solid Nodule: Nodule size less than 6 mm In a low-risk patient, no routine follow-up. In a high-risk patient, optional CT at 12 months. Nodule size equals 6-8 mm In a low-risk patient, CT at 6-12 months, then consider CT at 18-24 months. In a high-risk patient, CT at 6-12 months, then CT at 18-24 months. Nodule size greater than 8 mm In a low-risk patient, consider CT at 3 months, PET/CT, or tissue sampling. In a high-risk patient, consider CT at 3 months, PET/CT, or tissue sampling.  Multiple Solid Nodules: Nodule size less than 6 mm In a low-risk patient, no routine follow-up. In a high-risk patient, optional CT at 12 months. Nodule size equals 6-8 mm In a low-risk patient, CT at 3-6 months, then consider CT at 18-24 months. In a high-risk patient, CT at 3-6 months, then CT at 18-24 months. Nodule size greater than 8 mm In a low-risk patient, CT at 3-6 months, then consider CT at 18-24 months. In a high-risk patient, CT at 3-6 months, then CT at 18-24 months. - Low risk patients include individuals with minimal or absent history of smoking and other known risk factors. - High risk patients include individuals with a history or smoking or known risk factors. Radiology 2017 http://pubs. rsna.org/doi/full/10.1148/radiol. 1500848731 Unavailable     CTA PULMONARY W CONTRAST    Result Date: 3/15/2022  EXAMINATION: CTA OF THE CHEST 3/15/2022 2:07 pm TECHNIQUE: CTA of the chest was performed after the administration of intravenous contrast.  Multiplanar reformatted images are provided for review. MIP images are provided for review. Dose modulation, iterative reconstruction, and/or weight based adjustment of the mA/kV was utilized to reduce the radiation dose to as low as reasonably achievable. COMPARISON: None. HISTORY: ORDERING SYSTEM PROVIDED HISTORY: pe TECHNOLOGIST PROVIDED HISTORY: Reason for exam:->pe Decision Support Exception - unselect if not a suspected or confirmed emergency medical condition->Emergency Medical Condition (MA) FINDINGS: Pulmonary Arteries: Pulmonary arteries are adequately opacified for evaluation. No evidence of intraluminal filling defect to suggest pulmonary embolism. Main pulmonary artery is normal in caliber. Mediastinum: There is stable aneurysmal dilatation of the ascending thoracic aorta which measures 4.4 x 4.3 cm. There is a prosthetic aortic valve. The heart is not enlarged. There is no pericardial effusion. . Lungs/pleura: There are emphysematous changes.   There is no appreciable pulmonary infiltrate or pulmonary mass. There is a stable 4.5 mm subpleural nodule seen within the left lower lobe on axial image number 99. Upper Abdomen: Limited images of the upper abdomen are unremarkable. Soft Tissues/Bones:  Age related degenerative changes of the visualized osseous structures without focal destructive lesion. 1.  There is no evidence of a pulmonary embolus 2. Stable aneurysmal dilatation of the ascending thoracic aorta measuring 4.3 x 4.4 cm. 3.  Emphysematous changes 4. Stable 4.5 mm nodule within the left lower lobe. RECOMMENDATIONS: Follow-up yearly CT scan is recommended for surveillance of the ascending aneurysm with possible focal dissection aneurysm. ASSESSMENT:  -Chest pain-  -Shortness of breath  -Elevated troponin  -Supratherapeutic INR  -Thoracic aortic aneurysm with possible focal dissection  -Hypertension  -Hyperlipidemia  -COPD      PLAN:  -Admit to medicine  -Consult vascular surgery  -Consult cardiology  -Cycle serial troponins  -Monitor INR  -Hold anticoagulants        Diet: No diet orders on file  Code Status: Prior  Surrogate decision maker confirmed with patient:   Extended Emergency Contact Information  Primary Emergency Contact: [address] Phone: [phone]  Relation: Child    DVT Prophylaxis: []Lovenox []Heparin []PCD [] [address] []Encouraged ambulation  Disposition: []Med/Surg [] Intermediate [] ICU/CCU  Admit status: [] Observation [] Inpatient     +++++++++++++++++++++++++++++++++++++++++++++++++  Kane Teeumbalisha, DO  +++++++++++++++++++++++++++++++++++++++++++++++++  NOTE: This report was transcribed using voice recognition software. Every effort was made to ensure accuracy; however, inadvertent computerized transcription errors may be present.

## 2022-03-30 NOTE — PROGRESS NOTES
[facility]  Internal Medicine Residency Program  Progress Note - House Team     Patient:  Maura Shine 57 y.o. female MRN: [id number]     Date of Service: 3/30/2022     CC: SOB, abdominal pain  Overnight events: Transition of care from [address] [place] [address]     Overnight patient was admitted under [facility]. Patient care was transferred to [facility] Team 1 this morning. Patient came in for SOB and abdominal pain of a couple weeks duration. There was associated nausea, morning vomiting (non bloody), nonmucoid nonbloody diarrhea (8 loose BM/day) and fever (Tmax 99). Patient had decreased appetite due to nausea. There was no change in her bowel movement; patient normally has loose stools 3 times per day. Patient experienced radiation of abdominal pain to her back between both scapula 2 days prior to presentation to ED. Patient slept in a semirecumbent position overnight and pain was gone the following morning and has not recurred. Of note, patient claimed other members of her household have also had diarrhea and fever in the past couple of weeks. Patient received 2 doses of COVID vaccine but not the booster. In the ED, there was concern for possible abdominal aortic aneurysm dissection on CTA chest. Patient was evaluated by vascular surgery and review of imaging suggests findings were more consistent with small aortic ulcers. The infrarenal abdominal aortic aneurysm increased in size from 3.1-3.2 cm to 3.6 cm. There is no immediate plans for surgical intervention. Patient was seen and examined this morning at bedside in mild respiratory distress. Patient had mild retraction of SCM with respirations while on 1 L O2 per NC. SpO2 was 99%. Patient was otherwise alert and oriented x 3. She still has abdominal discomfort but denied chest pain.        Objective     Physical Exam:  Vitals: BP (!) 149/71   Pulse 80   Temp 98.4 °F (36.9 °C) (Oral)   Resp 18   Ht 5' 5\" (1.651 m)   Wt 150 lb (68 kg)   LMP 11/08/2008 (LMP Unknown)   SpO2 99%   BMI 24.96 kg/m²     I & O - 24hr: No intake/output data recorded. General Appearance: alert, cooperative and mild distress  HEENT:  Head: Normal, normocephalic, atraumatic. Neck: no JVD and supple, symmetrical, trachea midline  Lung: Decreased breath sounds bilaterally, +expiratory wheezing left lung greater than right, no rhonchi, +use of SCM with respirations  Heart: regular rate and rhythm, S1, S2 normal, no murmur, click, rub or gallop  Abdomen: soft, non-tender; bowel sounds normal; no masses,  no organomegaly  Extremities:  extremities normal, atraumatic, no cyanosis or edema, pulses +2  Musculokeletal: No joint swelling, no muscle tenderness. ROM normal in all joints of extremities.    Neurologic: Mental status: Alert, oriented x3, thought content appropriate, CN 2-12 grossly intact  Subject  Pertinent Labs & Imaging Studies   rob  CBC with Differential:    Lab Results   Component Value Date    WBC 9.1 03/30/2022    RBC 3.63 03/30/2022    HGB 11.3 03/30/2022    HCT 34.2 03/30/2022     03/30/2022    MCV 94.2 03/30/2022    MCH 31.1 03/30/2022    MCHC 33.0 03/30/2022    RDW 14.5 03/30/2022    LYMPHOPCT 27.1 03/30/2022    MONOPCT 8.7 03/30/2022    BASOPCT 0.4 03/30/2022    MONOSABS 0.79 03/30/2022    LYMPHSABS 2.46 03/30/2022    EOSABS 0.06 03/30/2022    BASOSABS 0.04 03/30/2022     CMP:    Lab Results   Component Value Date     03/30/2022    K 3.7 03/30/2022     03/30/2022    CO2 28 03/30/2022    BUN 9 03/30/2022    CREATININE 0.6 03/30/2022    GFRAA >60 03/30/2022    LABGLOM >60 03/30/2022    GLUCOSE 112 03/30/2022    PROT 6.5 03/30/2022    LABALBU 3.9 03/30/2022    CALCIUM 9.0 03/30/2022    BILITOT 0.4 03/30/2022    ALKPHOS 77 03/30/2022    AST 21 03/30/2022    ALT 15 03/30/2022     Magnesium:    Lab Results   Component Value Date    MG 1.8 03/29/2022     Phosphorus:    Lab Results   Component Value Date    PHOS 3.3 09/28/2021 PT/INR:    Lab Results   Component Value Date    PROTIME 56.6 03/30/2022    PROTIME 41.7 06/30/2021    INR 5.1 03/30/2022     Last 3 Troponin:    Lab Results   Component Value Date    TROPONINI <0.01 01/10/2021    TROPONINI <0.01 09/09/2020    TROPONINI <0.01 09/08/2020     U/A:    Lab Results   Component Value Date    NITRITE neg 06/25/2018    COLORU Yellow 03/29/2022    PROTEINU Negative 03/29/2022    PHUR 6.0 03/29/2022    WBCUA NONE 03/29/2022    RBCUA 5-10 03/29/2022    BACTERIA NONE SEEN 03/29/2022    CLARITYU Clear 03/29/2022    SPECGRAV >=1.030 03/29/2022    LEUKOCYTESUR Negative 03/29/2022    UROBILINOGEN 0.2 03/29/2022    BILIRUBINUR Negative 03/29/2022    BILIRUBINUR neg 06/25/2018    BLOODU MODERATE 03/29/2022    GLUCOSEU Negative 03/29/2022       CTA ABDOMEN PELVIS W CONTRAST   Final Result      Ascending thoracic aortic aneurysm measuring 4.3 x 4.5 cm and focal aneurysm   of the distal descending thoracic aorta measuring 3.1 x 3.1 cm with areas of   ulcerative plaque or focal dissection. There is no large central pulmonary   embolism. Abdominal aortic aneurysm measuring 3.6 x 3.1 cm with ulcerative plaque/focal   dissection. There is no large areas of dissection. Annual surveillance is   recommended. Mild stenosis of the left renal artery and 60% stenosis of the right renal   artery. Emphysema with the 4 mm nonspecific pulmonary nodule in the left base. Surveillance according to Fleischner society guidelines is recommended. RECOMMENDATIONS:   Unavailable         CTA CHEST W CONTRAST   Final Result      Ascending thoracic aortic aneurysm measuring 4.3 x 4.5 cm and focal aneurysm   of the distal descending thoracic aorta measuring 3.1 x 3.1 cm with areas of   ulcerative plaque or focal dissection. There is no large central pulmonary   embolism. Abdominal aortic aneurysm measuring 3.6 x 3.1 cm with ulcerative plaque/focal   dissection.   There is no large areas of dissection. Annual surveillance is   recommended. Mild stenosis of the left renal artery and 60% stenosis of the right renal   artery. Emphysema with the 4 mm nonspecific pulmonary nodule in the left base. Surveillance according to Fleischner society guidelines is recommended. RECOMMENDATIONS:   Unavailable         XR SHOULDER RIGHT (MIN 2 VIEWS)   Final Result   1. Mild degenerative spurring along the right AC joint. 2. Otherwise, unremarkable radiographs of the right shoulder. XR CHEST (2 VW)   Final Result   No acute process              Resident's Assessment and Plan     Assessment and Plan:    Hx of AAA with concerns for dissection   CTA chest showed abdominal aortic aneurysm measuring 3.6 x 3.1 cm with ulcerative plaque/focal dissection  Pt was evaluated by vascular surgery and imaging was reviewed. The infrarenal abdominal aortic aneurysm increased in size from 3.1-3.2 cm to 3.6 cm. Findings were more suggestive of small aortic ulcers. No immediate plans for surgical intervention.    Recs for outpatient follow up with cardiology/CTS   Annual surveillance  Cardiology consulted, awaiting recs    Acute hypoxic insufficiency 2/2 COPD exacerbation, rule out infectious etiology  Not on oxygen at home, current smoker 1 ppd  Received 2 doses of COVID vaccine, no booster  Other household members also experienced diarrhea and fevers  Afebrile, no leukocytosis  Weaned down to 1 L O2 NC from 3 L  Ordered procal  Ordered respiratory culture, gram stain  Ordered mucomyst and flutter valve for mucus   Follow up respiratory panel  Started prednisone 40 mg oral daily  Started levofloxacin 500 mg daily, likely 5-7 day course  Monitor respiratory status and CBC    Hx of mechanical aortic valve replacement  On Coumadin 5 mg daily except for Mondays and Thursdays (Coumadin 7.5 mg)  Coumadin on hold 2/2 supratherapeutic INR    Supratherapeutic INR  Pt on coumadin for mechanical AVR  INR on admission was

## 2022-03-30 NOTE — CONSULTS
Inpatient Cardiology Consultation      Reason for Consult:  Elevated Troponin and Chest Pain    Consulting Physician: Dr. Alicia Medina     Requesting Physician:  Dr. Yamini Myrick    Date of Consultation: 3/30/2022    HISTORY OF PRESENT ILLNESS:   Ms. Cele Gordon is a 57year old  female who follows with Dr. Alicia Medina. She was most recently seen in the office with Dr. Alicia Medina on 02/10/2022. His medical history includes mechanical AVR in 2009, chronic anticoagulation with Coumadin, nonobstructive CAD to the RCA (40-50% mid vessel) precardiac catheterization in 09/2020, NSVT, s/p Linq recorder, HTN, HLD, COPD with continued tobacco abuse, remote ETOH, GERD, tension headaches, anxiety and depression, and RLS. Ms. Cele Gordon presented to [facility] ED on 03/30/2022 with complaints of chest pain and vomiting. She states that prior to presentation over the course the past 2 weeks \" they have been passing an illness through the house\", for which she further describes as \" nausea and vomiting, with fatigue and a low-grade fever, my temperature was as high as 99.7\". She states that she received both vaccinations for Covid and also tested positive for COVID-19 in 01/2022. She states that the symptoms she recently has were not present after her COVID-19 resolved. She states that she has had significant nausea over the past several weeks and states that when she vomits this does not relieve her nausea. She states that 2 days prior to presentation as she was driving to  her grandchildren from school she developed midsternal chest heaviness with radiation between her shoulder blades that lasted several hours in duration with accompanying dyspnea. She states that after she picked her grandchildren up she went home and laid down and that her chest discomfort dissipated within a few hours. She states over the course of the past 2 weeks that she has been having worsening OTT with orthopnea and PND.   She states that 2 days this week \" I could not do anything because my breathing was so bad\". She states that she has been taking increased doses of her inhaler and nebulizer with mild alleviation of her dyspnea. She states that recently she has lost 20 pounds (since 01/2022) as she has \" a full feeling after I take a couple bites, like I have to force myself to eat\". She states that she is also had increased weakness and has fallen twice over the past 2 weeks, both occurrences as she \" slipped\". She denies LOC or hitting her head with either of those occurrences. She states compliance with her medications. Upon arrival to the ED her VS were oral temperature 98.2-85-/99-94% on RA. EKG SR with nonspecific ST changes (as interpreted by Dr. Verlan Schaumann). Troponin I 120, 96, 88. WBC 11.3. H&H 12.3/38. 7. .  K3.3. BUN/SCr 14/0.7. INR 7.4. ProBNP 930. CTA of the chest and abdomen with 4.3 x 4.5 cm distal descending thoracic aortic aneurysm and abdominal aortic aneurysm 3.1 x 3.1 cm with ulcerative plaque or focal dissection, mid stenosis of the left renal artery with 60% stenosis of the right renal artery, emphysema with a 4 mm pulmonary nodule at the left base. She received Duoneb. She was admitted to a telemetry monitored unit. Vascular Surgery was consulted. Cardiology was consulted by Dr. Arielle Ray for management of chest pain and Troponin elevation. Currently, Ms. Susana Roman is sitting up in bed. Denies recurrence of chest pain. Complains of no change with dyspnea. Audible wheezing. Complains of dysuria. VS stable. Telemetry monitoring SR. Please note: past medical records were reviewed per electronic medical record (EMR) - see detailed reports under Past Medical/ Surgical History. Past Medical and Surgical History:    1. S/p Mechanical AVR in 2009 [address] [place] [address], Dr. Tanya Haro)  2. Chronic anticoagulation with Coumadin  3. TTE 09/10/2020 (Dr. Gilbert Montejo): Left ventricle is normal in size.  Borderline concentric left ventricular hypertrophy. Septal motion consistent with post open heart state. Ejection fraction is visually estimated at 55-60%. Normal diastolic function. Normal right ventricular size and function. Normal sized left atrium. There is a mechanical aortic prosthetic valve which is well seated with a mean gradient of 8.14 mmHg and trace aortic regurgitation. Mildly dilated aortic root. 4. Lexiscan MPS 09/10/2020: No reversible perfusion defect. Fixed anterior wall perfusion abnormality. Ejection fraction is 49 %. Global very mild hypokinesis. RVH. 5. Cardiac Catheterization 2020 (Dr. Missy Arias): Left main. Aneurysmal formation in the mid left main without any significant angiographic luminal narrowings. LAD. No significant angiographic disease. LCX. No significant angiographic disease. RCA. Dominant vessel with around 40-50% mid vessel narrowing. Bileaflet mechanical valve with adequate leaflet mobility noted on fluoroscopy. 6. EP Study for NSVT 2020 (Dr. Philipp Sapp): Normal AV node and His-Purkinje system function. No inducible arrhythmias  7. HTN  8. HLD  9. COPD  10. Tobacco abuse   11. GERD  12. Remote ETOH (drank heavily until she quit altogether in )  13. Tension Headaches   14. S/p Linq Recorder (Dr. Philipp Sapp)  15. Anxiety   16. Depression   17. Restless Leg Syndrome   18. S/p Appendectomy,  section, hysterectomy, tubal ligation, right breast biopsy        Medications Prior to admit:  Prior to Admission medications    Medication Sig Start Date End Date Taking?  Authorizing Provider   DALIRESP 250 MCG tablet take 1 tablet by mouth daily 3/29/22   Cira Dunham MD   albuterol sulfate  (90 Base) MCG/ACT inhaler inhale 1 puff every 4 hours if needed for wheezing 3/28/22   Clark Bowers, DO   BREZTRI AEROSPHERE 160-9-4.8 MCG/ACT AERO inhale 2 puffs INTO THE LUNGS twice a day 3/7/22   Preeti Khan MD   Inulin (FIBER CHOICE PO) Take 2 capsules by mouth 2 times daily    Historical Provider, MD atorvastatin (LIPITOR) 20 MG tablet take 1 tablet by mouth once daily 2/2/22   Sera L Fabian, DO   lisinopril (PRINIVIL;ZESTRIL) 20 MG tablet Take 1 tablet by mouth daily take 1 tablet by mouth once daily 2/2/22   Sera L Fabian, DO   metoprolol succinate (TOPROL XL) 25 MG extended release tablet Take 1 tablet by mouth daily 2/2/22 [facility], DO   montelukast (SINGULAIR) 10 MG tablet take 1 tablet by mouth at bedtime 2/2/22   [facility] Hospital[facility], DO   Azelastine HCl 137 MCG/SPRAY SOLN 2 sprays by Nasal route daily 2/2/22   Sera L Fabian, DO   fluticasone (FLONASE) 50 MCG/ACT nasal spray 2 sprays by Nasal route daily 2/2/22   Sera L Fabian, DO   omeprazole (PRILOSEC) 40 MG delayed release capsule take 1 capsule by mouth every morning BEFORE BREAKFAST 2/2/22 Alyssa L Fabian, DO   sertraline (ZOLOFT) 100 MG tablet take 1 and 1/2 tablet by mouth once daily 2/2/22   Sera L Fabian, DO   albuterol (PROVENTIL) (2.5 MG/3ML) 0.083% nebulizer solution Take 3 mLs by nebulization every 6 hours as needed for Wheezing or Shortness of Breath 2/2/22   Epi Ignacio,    Magnesium Oxide (MAGNESIUM-OXIDE) 250 MG TABS tablet Take 1 tablet by mouth daily 2/2/22   Sera L Fabian, DO   rOPINIRole (REQUIP) 0.25 MG tablet Take 2 tab in morning and 2 tabs before bed 12/14/21   bAena Powers MD   warfarin (COUMADIN) 5 MG tablet Take 1 tablet by mouth daily 11/10/21   Nga Garg MD   aspirin EC 81 MG EC tablet Take 1 tablet by mouth daily 9/28/21   Merline Raddle, MD   warfarin (COUMADIN) 6 MG tablet Take 1 tablet by mouth daily Mondays and Wednesdays  Patient taking differently: Take 5 mg by mouth daily 5mg daily and 7.5mg on Mondays and Fridays 9/21/21   Nga Garg MD   Blood Pressure Monitoring (BLOOD PRESSURE CUFF) MISC Dx:  Hypertension with labile blood pressure 9/20/19   Deng Burr MD       Current Medications:    Current Facility-Administered Medications: sodium chloride flush 0.9 % injection 10 mL, 10 mL, IntraVENous, 2 times per day  sodium chloride flush 0.9 % injection 10 mL, 10 mL, IntraVENous, PRN  0.9 % sodium chloride infusion, , IntraVENous, PRN  polyethylene glycol (GLYCOLAX) packet 17 g, 17 g, Oral, Daily PRN  acetaminophen (TYLENOL) tablet 650 mg, 650 mg, Oral, Q6H PRN **OR** acetaminophen (TYLENOL) suppository 650 mg, 650 mg, Rectal, Q6H PRN  0.9 % sodium chloride infusion, , IntraVENous, Continuous    Allergies:  Keflex [cephalexin] and Phenergan [promethazine hcl]    Social History:    States that she smokes 0.5-1 pack of cigarettes per day for the past 50 years. States that she drank alcohol heavily (wine and rum) until she quit completely in 2012. Denies illicit drug use. Caffeine intake includes 2 cups of coffee daily. Family History:   Denies family Hx of premature CAD. REVIEW OF SYSTEMS:     · Constitutional: Denies chills, night sweats. Complains of low-grade fever, and fatigue-see HPI  · HEENT: Denies headaches, nose bleeds, and blurred vision,oral pain, abscess or lesion. · Musculoskeletal: Denies falls, pain to BLE with ambulation and edema to BLE. · Neurological: Denies dizziness and lightheadedness, numbness and tingling  · Cardiovascular: Complains of chest pain. Denies palpitations, and feelings of heart racing. · Respiratory: Complains of dyspnea at rest and complains of orthopnea and PND-see HPI  · Gastrointestinal: Denies heartburn diarrhea and constipation, black/bloody, and tarry stools. Complains of nausea and vomiting-see HPI  · Genitourinary: Denies dysuria and hematuria  · Hematologic: Denies excessive bruising or bleeding  · Lymphatic: Denies lumps and bumps to neck, axilla, breast, and groin  · Endocrine: Denies excessive thirst. Denies intolerance to hot and cold  · GYN: Postmenopausal state; Denies vaginal bleeding. · Psychiatric: Denies anxiety and depression.     PHYSICAL EXAM:   BP (!) 163/86   Pulse 72   Temp 98 °F (36.7 °C) Resp 23   Wt 150 lb (68 kg)   LMP 11/08/2008 (LMP Unknown)   SpO2 97%   BMI 24.96 kg/m²   CONST:  Well developed, well nourished middle-aged  female who appears stated age. Awake, alert, cooperative, no apparent distress  HEENT:   Head- Normocephalic, atraumatic   Eyes- Conjunctivae pink, anicteric  Throat- Oral mucosa pink and moist  Neck-  No stridor, trachea midline, no jugular venous distention. No adenopathy   CHEST: Chest symmetrical and non-tender to palpation. No accessory muscle use or intercostal retractions  RESPIRATORY: Lung sounds -scattered wheezing throughout fields   CARDIOVASCULAR:     No carotid bruit  Heart Inspection- shows no noted pulsations  Heart Palpation- no heaves or thrills; PMI is non-displaced   Heart Ausculation- Regular rate and rhythm, no murmur. No s3, s4 or rub   PV: No lower extremity edema. No varicosities. Pedal pulses palpable, no clubbing or cyanosis   ABDOMEN: Soft, non-tender to light palpation. Bowel sounds present. No palpable masses no organomegaly; no abdominal bruit  MS: Good muscle strength and tone. No atrophy or abnormal movements. : Deferred  SKIN: Warm and dry no statis dermatitis or ulcers   NEURO / PSYCH: Oriented to person, place and time. Speech clear and appropriate. Follows all commands.  Pleasant affect     DATA:    ECG: As above  Tele strips: SR  Diagnostic:      Intake/Output Summary (Last 24 hours) at 3/30/2022 0747  Last data filed at 3/29/2022 1924  Gross per 24 hour   Intake 92.26 ml   Output --   Net 92.26 ml       Labs:   CBC:   Recent Labs     03/29/22  1431 03/30/22  0542   WBC 11.3 9.1   HGB 12.3 11.3*   HCT 38.7 34.2    223     BMP:   Recent Labs     03/29/22  1431 03/30/22  0542    141   K 3.3* 3.7   CO2 28 28   BUN 14 9   CREATININE 0.7 0.6   LABGLOM >60 >60   CALCIUM 9.5 9.0     Mag:   Recent Labs     03/29/22  1431   MG 1.8   HgA1c:   Lab Results   Component Value Date    LABA1C 6.2 (H) 05/12/2021   PT/INR: Recent Labs     03/29/22  1431 03/30/22  0542   PROTIME 80.9* 56.6*   INR 7.4* 5.1*     APTT:  Recent Labs     03/29/22  1431   APTT 54.8*   FASTING LIPID PANEL:  Lab Results   Component Value Date    CHOL 178 05/12/2021    HDL 54 05/12/2021    LDLCALC 93 05/12/2021    TRIG 157 05/12/2021     LIVER PROFILE:  Recent Labs     03/29/22  1431 03/30/22  0542   AST 27 21   ALT 18 15   LABALBU 4.3 3.9     Results for Koko Fuentes (MRN [id number]) as of 3/30/2022 07:57   Ref. Range 3/15/2022 09:42 3/29/2022 14:31 3/29/2022 20:37 3/30/2022 05:42   Troponin, High Sensitivity Latest Ref Range: 0 - 9 ng/L 8 120 (H) 96 (H) 88 (H)       03/29/2022 CXR:  No acute process    03/29/2022 Right Shoulder XRay:  1. Mild degenerative spurring along the right AC joint. 2. Otherwise, unremarkable radiographs of the right shoulder. 03/29/2022 CTA Chest:   Ascending thoracic aortic aneurysm measuring 4.3 x 4.5 cm and focal aneurysm  of the distal descending thoracic aorta measuring 3.1 x 3.1 cm with areas of  ulcerative plaque or focal dissection. Bonnie Seller is no large central pulmonary  embolism. Abdominal aortic aneurysm measuring 3.6 x 3.1 cm with ulcerative plaque/focal  dissection. Bonnie Seller is no large areas of dissection.  Annual surveillance is  recommended. Mild stenosis of the left renal artery and 60% stenosis of the right renal  artery. Emphysema with the 4 mm nonspecific pulmonary nodule in the left base. Surveillance according to Fleischner society guidelines is recommended. 03/29/2022 CTA of Abdomen and Pelvis:  Liver is fatty infiltrated.  Small gallstone is identified in the gallbladder without acute inflammation.  Stomach is collapsed with the  wall thickening as before.  If clinically warranted, consider endoscopy.  Spleen, pancreas, the adrenals and the kidneys are normal except for 1 cm left renal cyst.. Pelvis. Bladder is contracted with wall thickening.  Please correlate with the urine analysis.  There is scattered diverticulosis of colon without diverticulitis. The appendix appears normal.  CTA.  There is aneurysm of the ascending thoracic aorta measuring 4.3 x 4.5  cm without dissection.  There is no central pulmonary embolism.  The origin  of the great vessels appear normal.  There is focal aneurysm of the  descending thoracic aorta measuring 3.1 x 3.1 cm with multiple areas of  ulcerative plaque or less likely small focal dissection. There is abdominal aortic aneurysm measuring 3.1 x 3.6 cm with the ulcerative  plaque/focal dissection. Noreene Shouts is normal celiac artery and SMA.  There is  calcification and stenosis of the right renal artery measuring nearly 60% and  left renal artery measuring 50%.  The AMANDA appears normal.  The aortic  bifurcation is normal with common iliac arteries measuring 9 mm each with  diffuse calcified plaque      IMPRESSION:  1. Chest Pain 2 days ago with continued OTT. Troponin 120, 96, 88  2. Known Hx nonobstructive CAD (RCA with 40-50% mid vessel stenosis) per cardiac catheterization 09/2020  3. S/p mechanical AVR in 2009 with TTE 09/2020 showing aortic valve well-seated with mean gradient of 8.14 mmHg and trace AR. 4. Chronic anticoagulation with Coumadin with INR 7.4 on admission, INR 5.1 today  5. Acute exacerbation of COPD with continued tobacco abuse, rule out infectious etiology  6. Distal descending thoracic aortic aneurysm and abdominal aortic aneurysm on CTA of the chest and abdomen this admission; Vascular Surgery on case without surgical plans   7. HTN: Controlled  8. HLD, on statin  9. S/p Linq recorder implantation  10. GERD  11. Anxiety and depression  12. Mild anemia  13. Dysuria        PLAN:  1. Respiratory panel and UA pending  2. Resume ASA, Lipitor, Toprol  3. Monitor INR. 4. Lexiscan MPS tomorrow if respiratory status allows.    5. Above as discussed with Dr. Leslie Dupree    Electronically signed by Cordelia Elise, ANNABELLE - CNP on 3/30/2022 at 7:47 1421 [facility] Consult       Benoit Ocampo    I have personally participated in a face-to-face and personally obtained history and performed physical exam on the date of service. I reviewed chart, vitals, labs and radiologic studies. I also participated in medical decision making with ANNABELLE Rutherford CNP on the date of service All of the assessments and recommendations are from me and I agree with all of the pertinent clinical information, assessment and treatment plan. I have reviewed and edited the note above based on my findings during my history, exam, and decision making. Please see my additional contributions to the history, physical exam, assessment, and recommendations below. HISTORY OF PRESENT ILLNESS:     Reviewed, as above      Past medical history:  Reviewed, as above. Past surgical history:  Reviewed, as above. Current medications:  Reviewed, as above    Allergies:  Reviewed, as above    Social history:  Reviewed, as above    Family medical history:  Reviewed, as above. REVIEW OF SYSTEMS:   Reviewed, as above. PHYSICAL EXAM:   CONSTITUTIONAL:  awake, alert, cooperative, no apparent distress, and appears stated age  EYES:  lids and lashes normal and pupils equal, round and reactive to light, anicteric sclerae  HEAD:  normocepalic, without obvious abnormality, atraumatic, pink, moist mucous membranes. NECK:  Supple, symmetrical, trachea midline, no adenopathy, thyroid symmetric, not enlarged and no tenderness, skin normal  HEMATOLOGIC/LYMPHATICS:  no cervical lymphadenopathy and no supraclavicular lymphadenopathy  LUNGS:  increased work of breathing, decreased air exchange, bilateral wheezing CARDIOVASCULAR:  Normal apical impulse, regular rate and rhythm, mechanical valve sounds, 2/6 diastolic murmur at the right upper sternal border, no JVD, no carotid bruit, no pedal edema, good carotid upstroke bilaterally.   ABDOMEN:  Soft, nontender, no masses, no hepatomegaly or splenomegaly, BS+  CHEST: nontender to palpation, expands symmetrically  MUSCULOSKELETAL:  No clubbing no cyanosis. there is no redness, warmth, or swelling of the joints  NEUROLOGIC:  Alert, awake,oriented x3  SKIN:  no bruising or bleeding, normal skin color, texture, turgor and no redness, warmth, or swelling    BP (!) 140/74   Pulse 86   Temp 98.6 °F (37 °C)   Resp 18   Ht 5' 5\" (1.651 m)   Wt 150 lb (68 kg)   LMP 11/08/2008 (LMP Unknown)   SpO2 97%   BMI 24.96 kg/m²       I/O last 3 completed shifts: In: 0 [P.O.:640]  Out: -   I/O this shift:  In: 240 [P.O.:240]  Out: -       DATA:   I personally reviewed the admission EKG with the following interpretation: Sinus rhythm with  nonspecific T wave changes    ECHO: 9/9/2020,Summary   Left ventricle is normal in size . Borderline concentric left ventricular hypertrophy. Septal motion consistent with post open heart state . Ejection fraction is visually estimated at 55-60%. Normal diastolic function. Normal right ventricular size and function. Normal sized left atrium. There is a mechanical aortic prosthetic valve which is well seated with a   mean gradient of 8.14 mmHg and trace aortic regurgitation   Mildly dilated aortic root.     Stress Test: Reviewed  Angiography: Reviewed    Cardiology Labs:   BMP:    Lab Results   Component Value Date     03/31/2022    K 4.2 03/31/2022     03/31/2022    CO2 27 03/31/2022    BUN 10 03/31/2022     CMP:    Lab Results   Component Value Date     03/31/2022    K 4.2 03/31/2022     03/31/2022    CO2 27 03/31/2022    BUN 10 03/31/2022    PROT 6.2 03/31/2022     CBC:    Lab Results   Component Value Date    WBC 7.7 03/31/2022    RBC 3.42 03/31/2022    HGB 10.6 03/31/2022    HCT 32.6 03/31/2022    MCV 95.3 03/31/2022    RDW 14.6 03/31/2022     03/31/2022     PT/INR:  No results found for: PTINR  PT/INR Warfarin:  No components found for: PTPATWAR, PTINRWAR  PTT:    Lab Results   Component Value Date    APTT 54.8 03/29/2022     PTT Heparin:  No components found for: APTTHEP  Magnesium:    Lab Results   Component Value Date    MG 1.6 03/30/2022     TSH:    Lab Results   Component Value Date    TSH 2.630 09/10/2020     TROPONIN:  No components found for: TROP  BNP:  No results found for: BNP  FASTING LIPID PANEL:    Lab Results   Component Value Date    CHOL 178 05/12/2021    HDL 47 03/30/2022    TRIG 157 05/12/2021     CTA ABDOMEN PELVIS W CONTRAST   Final Result      Ascending thoracic aortic aneurysm measuring 4.3 x 4.5 cm and focal aneurysm   of the distal descending thoracic aorta measuring 3.1 x 3.1 cm with areas of   ulcerative plaque or focal dissection. There is no large central pulmonary   embolism. Abdominal aortic aneurysm measuring 3.6 x 3.1 cm with ulcerative plaque/focal   dissection. There is no large areas of dissection. Annual surveillance is   recommended. Mild stenosis of the left renal artery and 60% stenosis of the right renal   artery. Emphysema with the 4 mm nonspecific pulmonary nodule in the left base. Surveillance according to Fleischner society guidelines is recommended. RECOMMENDATIONS:   Unavailable         CTA CHEST W CONTRAST   Final Result      Ascending thoracic aortic aneurysm measuring 4.3 x 4.5 cm and focal aneurysm   of the distal descending thoracic aorta measuring 3.1 x 3.1 cm with areas of   ulcerative plaque or focal dissection. There is no large central pulmonary   embolism. Abdominal aortic aneurysm measuring 3.6 x 3.1 cm with ulcerative plaque/focal   dissection. There is no large areas of dissection. Annual surveillance is   recommended. Mild stenosis of the left renal artery and 60% stenosis of the right renal   artery. Emphysema with the 4 mm nonspecific pulmonary nodule in the left base. Surveillance according to Fleischner society guidelines is recommended.       RECOMMENDATIONS:   Unavailable         XR SHOULDER RIGHT (MIN 2 VIEWS)   Final Result   1. Mild degenerative spurring along the right AC joint. 2. Otherwise, unremarkable radiographs of the right shoulder. XR CHEST (2 VW)   Final Result   No acute process         NM Cardiac Stress Test Nuclear Imaging    (Results Pending)       I have personally reviewed the laboratory, cardiac diagnostic and radiographic testing as outlined above:    IMPRESSION:  1. Chest pain: Atypical, elevated troponin, nonspecific T wave changes, suspect secondary to hypoxia and comorbid conditions, however, patient has baseline CAD, might benefit from ischemic evaluation prior to discharge  2. Elevated troponin: Secondary to comorbid conditions  3. CAD: Nonobstructive on cardiac catheterization done in 2020  4. S/p mechanical aortic valve replacement in 2009  5. Supratherapeutic INR  6. Chronic anticoagulation  7. Hypertension: Controlled  8. Acute hypoxic respiratory failure: Secondary to COPD  9. S/p Linq recorder implant  10. Descending thoracic ulceration, abdominal aortic aneurysm, as per vascular surgery  11. Tobacco abuse  RECOMMENDATIONS:   1. Continue current treatment  2. Resume home meds  3. Need to resume Coumadin when INR is less than 3  4. Lexiscan stress test in a.m. I have reviewed my findings and recommendations with patient    Thank you for the consult  Electronically signed by Robert Delatorre MD on 3/31/2022 at 9:30 PM  NOTE: This report was transcribed using voice recognition software.  Every effort was made to ensure accuracy; however, inadvertent computerized transcription errors may be present

## 2022-03-30 NOTE — ED NOTES
Assumed care of pt at this time; received report from [facility]  03/29/22 9324

## 2022-03-31 ENCOUNTER — TELEPHONE (OUTPATIENT)
Dept: CARDIOTHORACIC SURGERY | Age: 62
End: 2022-03-31

## 2022-03-31 ENCOUNTER — APPOINTMENT (OUTPATIENT)
Dept: NUCLEAR MEDICINE | Age: 62
DRG: 301 | End: 2022-03-31
Payer: MEDICARE

## 2022-03-31 ENCOUNTER — APPOINTMENT (OUTPATIENT)
Dept: NON INVASIVE DIAGNOSTICS | Age: 62
DRG: 301 | End: 2022-03-31
Payer: MEDICARE

## 2022-03-31 DIAGNOSIS — I71.21 THORACIC ASCENDING AORTIC ANEURYSM: Primary | ICD-10-CM

## 2022-03-31 LAB
ALBUMIN SERPL-MCNC: 3.7 G/DL (ref 3.5–5.2)
ALP BLD-CCNC: 69 U/L (ref 35–104)
ALT SERPL-CCNC: 16 U/L (ref 0–32)
ANION GAP SERPL CALCULATED.3IONS-SCNC: 10 MMOL/L (ref 7–16)
AST SERPL-CCNC: 18 U/L (ref 0–31)
BASOPHILS ABSOLUTE: 0.01 E9/L (ref 0–0.2)
BASOPHILS RELATIVE PERCENT: 0.1 % (ref 0–2)
BILIRUB SERPL-MCNC: 0.5 MG/DL (ref 0–1.2)
BUN BLDV-MCNC: 10 MG/DL (ref 6–23)
CALCIUM SERPL-MCNC: 8.5 MG/DL (ref 8.6–10.2)
CHLORIDE BLD-SCNC: 103 MMOL/L (ref 98–107)
CO2: 27 MMOL/L (ref 22–29)
CREAT SERPL-MCNC: 0.5 MG/DL (ref 0.5–1)
EOSINOPHILS ABSOLUTE: 0 E9/L (ref 0.05–0.5)
EOSINOPHILS RELATIVE PERCENT: 0 % (ref 0–6)
GFR AFRICAN AMERICAN: >60
GFR NON-AFRICAN AMERICAN: >60 ML/MIN/1.73
GLUCOSE BLD-MCNC: 156 MG/DL (ref 74–99)
HCT VFR BLD CALC: 32.6 % (ref 34–48)
HEMOGLOBIN: 10.6 G/DL (ref 11.5–15.5)
IMMATURE GRANULOCYTES #: 0.08 E9/L
IMMATURE GRANULOCYTES %: 1 % (ref 0–5)
INR BLD: 2.3
LYMPHOCYTES ABSOLUTE: 0.93 E9/L (ref 1.5–4)
LYMPHOCYTES RELATIVE PERCENT: 12 % (ref 20–42)
MCH RBC QN AUTO: 31 PG (ref 26–35)
MCHC RBC AUTO-ENTMCNC: 32.5 % (ref 32–34.5)
MCV RBC AUTO: 95.3 FL (ref 80–99.9)
MONOCYTES ABSOLUTE: 0.5 E9/L (ref 0.1–0.95)
MONOCYTES RELATIVE PERCENT: 6.5 % (ref 2–12)
NEUTROPHILS ABSOLUTE: 6.21 E9/L (ref 1.8–7.3)
NEUTROPHILS RELATIVE PERCENT: 80.4 % (ref 43–80)
PDW BLD-RTO: 14.6 FL (ref 11.5–15)
PHOSPHORUS: 3 MG/DL (ref 2.5–4.5)
PLATELET # BLD: 226 E9/L (ref 130–450)
PMV BLD AUTO: 11.2 FL (ref 7–12)
POTASSIUM REFLEX MAGNESIUM: 4.2 MMOL/L (ref 3.5–5)
PROTHROMBIN TIME: 26.2 SEC (ref 9.3–12.4)
RBC # BLD: 3.42 E12/L (ref 3.5–5.5)
SODIUM BLD-SCNC: 140 MMOL/L (ref 132–146)
TOTAL PROTEIN: 6.2 G/DL (ref 6.4–8.3)
WBC # BLD: 7.7 E9/L (ref 4.5–11.5)

## 2022-03-31 PROCEDURE — 6370000000 HC RX 637 (ALT 250 FOR IP): Performed by: INTERNAL MEDICINE

## 2022-03-31 PROCEDURE — 2140000000 HC CCU INTERMEDIATE R&B

## 2022-03-31 PROCEDURE — 6360000002 HC RX W HCPCS: Performed by: INTERNAL MEDICINE

## 2022-03-31 PROCEDURE — 84100 ASSAY OF PHOSPHORUS: CPT

## 2022-03-31 PROCEDURE — 6370000000 HC RX 637 (ALT 250 FOR IP): Performed by: NURSE PRACTITIONER

## 2022-03-31 PROCEDURE — 99231 SBSQ HOSP IP/OBS SF/LOW 25: CPT | Performed by: INTERNAL MEDICINE

## 2022-03-31 PROCEDURE — 2580000003 HC RX 258: Performed by: FAMILY MEDICINE

## 2022-03-31 PROCEDURE — 85025 COMPLETE CBC W/AUTO DIFF WBC: CPT

## 2022-03-31 PROCEDURE — 94660 CPAP INITIATION&MGMT: CPT

## 2022-03-31 PROCEDURE — 6370000000 HC RX 637 (ALT 250 FOR IP): Performed by: STUDENT IN AN ORGANIZED HEALTH CARE EDUCATION/TRAINING PROGRAM

## 2022-03-31 PROCEDURE — 36415 COLL VENOUS BLD VENIPUNCTURE: CPT

## 2022-03-31 PROCEDURE — 85610 PROTHROMBIN TIME: CPT

## 2022-03-31 PROCEDURE — 94640 AIRWAY INHALATION TREATMENT: CPT

## 2022-03-31 PROCEDURE — 2700000000 HC OXYGEN THERAPY PER DAY

## 2022-03-31 PROCEDURE — A9500 TC99M SESTAMIBI: HCPCS | Performed by: STUDENT IN AN ORGANIZED HEALTH CARE EDUCATION/TRAINING PROGRAM

## 2022-03-31 PROCEDURE — 3430000000 HC RX DIAGNOSTIC RADIOPHARMACEUTICAL: Performed by: STUDENT IN AN ORGANIZED HEALTH CARE EDUCATION/TRAINING PROGRAM

## 2022-03-31 PROCEDURE — 99232 SBSQ HOSP IP/OBS MODERATE 35: CPT | Performed by: INTERNAL MEDICINE

## 2022-03-31 PROCEDURE — 80053 COMPREHEN METABOLIC PANEL: CPT

## 2022-03-31 RX ORDER — PREDNISONE 20 MG/1
40 TABLET ORAL DAILY
Status: DISCONTINUED | OUTPATIENT
Start: 2022-04-01 | End: 2022-04-02 | Stop reason: HOSPADM

## 2022-03-31 RX ORDER — BENZONATATE 100 MG/1
100 CAPSULE ORAL 3 TIMES DAILY PRN
Status: DISCONTINUED | OUTPATIENT
Start: 2022-03-31 | End: 2022-04-02 | Stop reason: HOSPADM

## 2022-03-31 RX ORDER — BUDESONIDE 0.25 MG/2ML
500 INHALANT ORAL 2 TIMES DAILY
Status: DISCONTINUED | OUTPATIENT
Start: 2022-03-31 | End: 2022-04-02 | Stop reason: HOSPADM

## 2022-03-31 RX ORDER — WARFARIN SODIUM 5 MG/1
5 TABLET ORAL
Status: COMPLETED | OUTPATIENT
Start: 2022-03-31 | End: 2022-03-31

## 2022-03-31 RX ADMIN — SODIUM CHLORIDE, PRESERVATIVE FREE 10 ML: 5 INJECTION INTRAVENOUS at 12:25

## 2022-03-31 RX ADMIN — IPRATROPIUM BROMIDE AND ALBUTEROL SULFATE 1 AMPULE: .5; 2.5 SOLUTION RESPIRATORY (INHALATION) at 19:20

## 2022-03-31 RX ADMIN — SALINE NASAL SPRAY 2 SPRAY: 1.5 SOLUTION NASAL at 22:00

## 2022-03-31 RX ADMIN — ARFORMOTEROL TARTRATE 15 MCG: 15 SOLUTION RESPIRATORY (INHALATION) at 19:20

## 2022-03-31 RX ADMIN — IPRATROPIUM BROMIDE AND ALBUTEROL SULFATE 1 AMPULE: .5; 2.5 SOLUTION RESPIRATORY (INHALATION) at 15:47

## 2022-03-31 RX ADMIN — IPRATROPIUM BROMIDE AND ALBUTEROL SULFATE 1 AMPULE: .5; 2.5 SOLUTION RESPIRATORY (INHALATION) at 12:45

## 2022-03-31 RX ADMIN — ASPIRIN 81 MG: 81 TABLET, COATED ORAL at 12:20

## 2022-03-31 RX ADMIN — SODIUM CHLORIDE: 9 INJECTION, SOLUTION INTRAVENOUS at 06:36

## 2022-03-31 RX ADMIN — PANTOPRAZOLE SODIUM 40 MG: 40 TABLET, DELAYED RELEASE ORAL at 05:50

## 2022-03-31 RX ADMIN — ROPINIROLE HYDROCHLORIDE 0.5 MG: 0.25 TABLET, FILM COATED ORAL at 12:21

## 2022-03-31 RX ADMIN — PREDNISONE 20 MG: 20 TABLET ORAL at 21:54

## 2022-03-31 RX ADMIN — LISINOPRIL 20 MG: 20 TABLET ORAL at 12:21

## 2022-03-31 RX ADMIN — ROPINIROLE HYDROCHLORIDE 0.5 MG: 0.25 TABLET, FILM COATED ORAL at 21:53

## 2022-03-31 RX ADMIN — PREDNISONE 20 MG: 20 TABLET ORAL at 12:20

## 2022-03-31 RX ADMIN — BUDESONIDE 250 MCG: 0.25 SUSPENSION RESPIRATORY (INHALATION) at 12:48

## 2022-03-31 RX ADMIN — Medication 12 MILLICURIE: at 08:30

## 2022-03-31 RX ADMIN — WARFARIN SODIUM 5 MG: 5 TABLET ORAL at 14:21

## 2022-03-31 RX ADMIN — ATORVASTATIN CALCIUM 20 MG: 20 TABLET, FILM COATED ORAL at 21:54

## 2022-03-31 RX ADMIN — SERTRALINE HYDROCHLORIDE 150 MG: 100 TABLET ORAL at 12:20

## 2022-03-31 RX ADMIN — BUDESONIDE 500 MCG: 0.25 SUSPENSION RESPIRATORY (INHALATION) at 19:20

## 2022-03-31 RX ADMIN — ARFORMOTEROL TARTRATE 15 MCG: 15 SOLUTION RESPIRATORY (INHALATION) at 12:47

## 2022-03-31 RX ADMIN — ACETYLCYSTEINE 600 MG: 200 SOLUTION ORAL; RESPIRATORY (INHALATION) at 19:20

## 2022-03-31 RX ADMIN — LEVOFLOXACIN 500 MG: 500 TABLET, FILM COATED ORAL at 12:20

## 2022-03-31 RX ADMIN — MONTELUKAST 10 MG: 10 TABLET, FILM COATED ORAL at 21:53

## 2022-03-31 RX ADMIN — BENZONATATE 100 MG: 100 CAPSULE ORAL at 22:00

## 2022-03-31 RX ADMIN — ROFLUMILAST 250 MCG: 500 TABLET ORAL at 12:20

## 2022-03-31 RX ADMIN — METOPROLOL SUCCINATE 25 MG: 25 TABLET, EXTENDED RELEASE ORAL at 12:21

## 2022-03-31 ASSESSMENT — PAIN SCALES - GENERAL: PAINLEVEL_OUTOF10: 0

## 2022-03-31 NOTE — PROGRESS NOTES
The patient presented to stress lab for gated vasodilator myocardial perfusion imaging study and tolerated resting images. She was significantly bronchospastic at the time of presentation with persistent evidence of bronchospasm noted following her resting images. On this basis, regadenoson administration is contraindicated with this discussed with her primary cardiology service. Additional stabilization of her chronic obstructive lung disease will be necessary prior to consideration or an alternative means of ischemic assessment may be advisable.

## 2022-03-31 NOTE — PROGRESS NOTES
Notified charge RYAN Irwin that the stress test was not completed due to patient's lung status.   Parker Prasad RN

## 2022-03-31 NOTE — PROGRESS NOTES
[facility] Res[facility] Program  Progress Note - House Team     Patient:  Jarett Montes 57 y.o. female MRN: [id number]     Date of Service: 3/31/2022     CC: Shortness of breath abdominal pain  Overnight events: None    Subjective     Patient was seen and examined this morning at bedside in no acute distress. She is currently eating her lunch at time of examination. Patient went for Lattie Keene this morning however it was canceled due to her respiratory status. Plan is to try her Lattie Keene again tomorrow. Patient states she is not having any chest pain at this time. She has no other complaints at this time. Objective     Physical Exam:  TEMPERATURE:  Current - Temp: 98.1 °F (36.7 °C); Max - Temp  Av.3 °F (36.8 °C)  Min: 98.1 °F (36.7 °C)  Max: 98.4 °F (36.9 °C)  RESPIRATIONS RANGE: Resp  Av.5  Min: 16  Max: 18  PULSE RANGE: Pulse  Av  Min: 71  Max: 88  BLOOD PRESSURE RANGE:  Systolic (92NQM), MOT:732 , Min:123 , SAS:700   ; Diastolic (05XCR), QHW:78, Min:64, Max:89    PULSE OXIMETRY RANGE: SpO2  Av.7 %  Min: 95 %  Max: 98 %    I & O - 24hr:    Intake/Output Summary (Last 24 hours) at 3/31/2022 1350  Last data filed at 3/31/2022 0931  Gross per 24 hour   Intake 400 ml   Output --   Net 400 ml     I/O last 3 completed shifts: In: 492.3 [P.O.:400; IV Piggyback:92.3]  Out: -  No intake/output data recorded. Weight change:     Physical Exam  Constitutional:       General: She is not in acute distress. Appearance: She is well-developed. She is not diaphoretic. HENT:      Head: Normocephalic and atraumatic. Eyes:      General: No scleral icterus. Pupils: Pupils are equal, round, and reactive to light. Neck:      Thyroid: No thyromegaly. Vascular: No JVD. Trachea: No tracheal deviation. Cardiovascular:      Rate and Rhythm: Normal rate and regular rhythm. Heart sounds: Normal heart sounds. No murmur heard. No friction rub.  No gallop. Pulmonary:      Effort: Pulmonary effort is normal. No respiratory distress. Breath sounds: Wheezing (With decreased breath sounds bilaterally) present. No rales. Comments: On 1 L nasal cannula  Abdominal:      General: Bowel sounds are normal. There is no distension. Palpations: Abdomen is soft. There is no mass. Tenderness: There is no abdominal tenderness. There is no guarding or rebound. Musculoskeletal:         General: Normal range of motion. Skin:     General: Skin is warm and dry. Capillary Refill: Capillary refill takes less than 2 seconds. Coloration: Skin is not pale. Findings: No rash. Neurological:      Mental Status: She is alert and oriented to person, place, and time. Cranial Nerves: No cranial nerve deficit. Psychiatric:         Behavior: Behavior normal.         Thought Content:  Thought content normal.         Judgment: Judgment normal.       Subject  Pertinent Labs & Imaging Studies   rob  CBC:   Recent Labs     03/29/22  1431 03/30/22  0542 03/31/22  0745   WBC 11.3 9.1 7.7   HGB 12.3 11.3* 10.6*   HCT 38.7 34.2 32.6*   MCV 97.0 94.2 95.3    223 226       BMP:    Recent Labs     03/29/22  1431 03/30/22  0542 03/31/22  0745    141 140   K 3.3* 3.7 4.2    104 103   CO2 28 28 27   BUN 14 9 10   CREATININE 0.7 0.6 0.5   GLUCOSE 100* 112* 156*       LIVER PROFILE:   Recent Labs     03/29/22  1431 03/30/22  0542 03/31/22  0745   AST 27 21 18   ALT 18 15 16   LIPASE 32  --   --    BILITOT 0.4 0.4 0.5   ALKPHOS 90 77 69       PT/INR:   Recent Labs     03/29/22  1431 03/30/22  0542 03/31/22  0745   PROTIME 80.9* 56.6* 26.2*   INR 7.4* 5.1* 2.3       APTT:   Recent Labs     03/29/22 1431   APTT 54.8*       Fasting Lipid Panel:    Lab Results   Component Value Date    CHOL 178 05/12/2021    TRIG 157 05/12/2021    HDL 47 03/30/2022       Notable Cultures:      Blood cultures No results found for: BC  Respiratory cultures No results found for: Yenny Villar No results found for: LABGRAM  Urine   Urine Culture, Routine   Date Value Ref Range Status   03/30/2022 <10,000 CFU/mL  Gram positive organism    Preliminary     Legionella No results found for: LABLEGI  C Diff PCR No results found for: CDIFPCR  Wound culture/abscess: No results for input(s): WNDABS in the last 72 hours. Tip culture:No results for input(s): CXCATHTIP in the last 72 hours. XR CHEST (2 VW)    Result Date: 3/29/2022  EXAMINATION: TWO XRAY VIEWS OF THE CHEST 3/29/2022 3:35 pm COMPARISON: 03/15/2022 HISTORY: ORDERING SYSTEM PROVIDED HISTORY: Shortness of breath TECHNOLOGIST PROVIDED HISTORY: Reason for exam:->Shortness of breath What reading provider will be dictating this exam?->CRC FINDINGS: Heart size is normal.  There is sternotomy. There is mild tortuosity thoracic aorta. There are no infiltrates or effusions. No acute process     XR CHEST (2 VW)    Result Date: 3/15/2022  EXAMINATION: TWO XRAY VIEWS OF THE CHEST 3/15/2022 10:10 am COMPARISON: Previous CT of the chest of 02/14/2022 HISTORY: ORDERING SYSTEM PROVIDED HISTORY: hemoptysis TECHNOLOGIST PROVIDED HISTORY: Reason for exam:->hemoptysis What reading provider will be dictating this exam?->CRC FINDINGS: There is hyperinflation of the lungs and flattening of diaphragms consistent with COPD. There is no pulmonary infiltrate, mass or nodule. There is no pleural effusion. The cardiac silhouette is within normal limits. COPD. There is no evidence of pneumonia or failure. XR SHOULDER RIGHT (MIN 2 VIEWS)    Result Date: 3/29/2022  EXAMINATION: THREE XRAY VIEWS OF THE RIGHT SHOULDER 3/29/2022 5:46 pm COMPARISON: None. HISTORY: ORDERING SYSTEM PROVIDED HISTORY: pain TECHNOLOGIST PROVIDED HISTORY: Reason for exam:->pain What reading provider will be dictating this exam?->CRC FINDINGS: Radiographs of the right shoulder reveal no evidence of fracture or joint dislocation.   Mild degenerative spurring is seen along the acromioclavicular joint. The glenohumeral joint is unremarkable. 1. Mild degenerative spurring along the right AC joint. 2. Otherwise, unremarkable radiographs of the right shoulder. CTA CHEST W CONTRAST    Result Date: 3/29/2022  EXAMINATION: CTA OF THE CHEST; CTA OF THE ABDOMEN AND PELVIS WITH CONTRAST 3/29/2022 7:44 pm TECHNIQUE: CTA of the chest was performed after the administration of intravenous contrast.  Multiplanar reformatted images are provided for review. MIP images are provided for review. Dose modulation, iterative reconstruction, and/or weight based adjustment of the mA/kV was utilized to reduce the radiation dose to as low as reasonably achievable.; CTA of the abdomen and pelvis was performed with the administration of intravenous contrast. Multiplanar reformatted images are provided for review. MIP images are provided for review. Dose modulation, iterative reconstruction, and/or weight based adjustment of the mA/kV was utilized to reduce the radiation dose to as low as reasonably achievable. COMPARISON: 03/15/2022 HISTORY: ORDERING SYSTEM PROVIDED HISTORY: evaluate aorta TECHNOLOGIST PROVIDED HISTORY: Reason for exam:->evaluate aorta Decision Support Exception - unselect if not a suspected or confirmed emergency medical condition->Emergency Medical Condition (MA) What reading provider will be dictating this exam?->CRC FINDINGS: CTA chest. The heart size is normal.  There is coronary artery calcification and prosthetic aortic valve. Trachea and major bronchi are patent. Moderate mediastinal lymph nodes are noted. There is advanced centrilobular emphysema. 4 mm indeterminate pulmonary nodule in the left lower lobe is noted for which surveillance is recommended. Degenerative changes are noted in the thoracolumbar spine. CTA abdomen and pelvis. Liver is fatty infiltrated. Small gallstone is identified in the gallbladder without acute inflammation.   Stomach is collapsed with the  wall thickening as before. If clinically warranted, consider endoscopy. Spleen, pancreas, the adrenals and the kidneys are normal except for 1 cm left renal cyst.. Pelvis. Bladder is contracted with wall thickening. Please correlate with the urine analysis. There is scattered diverticulosis of colon without diverticulitis. The appendix appears normal. CTA. There is aneurysm of the ascending thoracic aorta measuring 4.3 x 4.5 cm without dissection. There is no central pulmonary embolism. The origin of the great vessels appear normal.  There is focal aneurysm of the descending thoracic aorta measuring 3.1 x 3.1 cm with multiple areas of ulcerative plaque or less likely small focal dissection. There is abdominal aortic aneurysm measuring 3.1 x 3.6 cm with the ulcerative plaque/focal dissection. There is normal celiac artery and SMA. There is calcification and stenosis of the right renal artery measuring nearly 60% and left renal artery measuring 50%. The AMANDA appears normal.  The aortic bifurcation is normal with common iliac arteries measuring 9 mm each with diffuse calcified plaque     Ascending thoracic aortic aneurysm measuring 4.3 x 4.5 cm and focal aneurysm of the distal descending thoracic aorta measuring 3.1 x 3.1 cm with areas of ulcerative plaque or focal dissection. There is no large central pulmonary embolism. Abdominal aortic aneurysm measuring 3.6 x 3.1 cm with ulcerative plaque/focal dissection. There is no large areas of dissection. Annual surveillance is recommended. Mild stenosis of the left renal artery and 60% stenosis of the right renal artery. Emphysema with the 4 mm nonspecific pulmonary nodule in the left base. Surveillance according to Fleischner society guidelines is recommended.  RECOMMENDATIONS: Unavailable     CTA ABDOMEN PELVIS W CONTRAST    Result Date: 3/29/2022  EXAMINATION: CTA OF THE CHEST; CTA OF THE ABDOMEN AND PELVIS WITH CONTRAST 3/29/2022 7:44 pm TECHNIQUE: CTA of the chest was performed after the administration of intravenous contrast.  Multiplanar reformatted images are provided for review. MIP images are provided for review. Dose modulation, iterative reconstruction, and/or weight based adjustment of the mA/kV was utilized to reduce the radiation dose to as low as reasonably achievable.; CTA of the abdomen and pelvis was performed with the administration of intravenous contrast. Multiplanar reformatted images are provided for review. MIP images are provided for review. Dose modulation, iterative reconstruction, and/or weight based adjustment of the mA/kV was utilized to reduce the radiation dose to as low as reasonably achievable. COMPARISON: 03/15/2022 HISTORY: ORDERING SYSTEM PROVIDED HISTORY: evaluate aorta TECHNOLOGIST PROVIDED HISTORY: Reason for exam:->evaluate aorta Decision Support Exception - unselect if not a suspected or confirmed emergency medical condition->Emergency Medical Condition (MA) What reading provider will be dictating this exam?->CRC FINDINGS: CTA chest. The heart size is normal.  There is coronary artery calcification and prosthetic aortic valve. Trachea and major bronchi are patent. Moderate mediastinal lymph nodes are noted. There is advanced centrilobular emphysema. 4 mm indeterminate pulmonary nodule in the left lower lobe is noted for which surveillance is recommended. Degenerative changes are noted in the thoracolumbar spine. CTA abdomen and pelvis. Liver is fatty infiltrated. Small gallstone is identified in the gallbladder without acute inflammation. Stomach is collapsed with the  wall thickening as before. If clinically warranted, consider endoscopy. Spleen, pancreas, the adrenals and the kidneys are normal except for 1 cm left renal cyst.. Pelvis. Bladder is contracted with wall thickening. Please correlate with the urine analysis. There is scattered diverticulosis of colon without diverticulitis.  The appendix appears normal. CTA. There is aneurysm of the ascending thoracic aorta measuring 4.3 x 4.5 cm without dissection. There is no central pulmonary embolism. The origin of the great vessels appear normal.  There is focal aneurysm of the descending thoracic aorta measuring 3.1 x 3.1 cm with multiple areas of ulcerative plaque or less likely small focal dissection. There is abdominal aortic aneurysm measuring 3.1 x 3.6 cm with the ulcerative plaque/focal dissection. There is normal celiac artery and SMA. There is calcification and stenosis of the right renal artery measuring nearly 60% and left renal artery measuring 50%. The AMANDA appears normal.  The aortic bifurcation is normal with common iliac arteries measuring 9 mm each with diffuse calcified plaque     Ascending thoracic aortic aneurysm measuring 4.3 x 4.5 cm and focal aneurysm of the distal descending thoracic aorta measuring 3.1 x 3.1 cm with areas of ulcerative plaque or focal dissection. There is no large central pulmonary embolism. Abdominal aortic aneurysm measuring 3.6 x 3.1 cm with ulcerative plaque/focal dissection. There is no large areas of dissection. Annual surveillance is recommended. Mild stenosis of the left renal artery and 60% stenosis of the right renal artery. Emphysema with the 4 mm nonspecific pulmonary nodule in the left base. Surveillance according to Fleischner society guidelines is recommended. RECOMMENDATIONS: Unavailable     CTA ABDOMEN PELVIS W CONTRAST    Result Date: 3/15/2022  EXAMINATION: CTA OF THE ABDOMEN AND PELVIS WITH CONTRAST 3/15/2022 2:07 pm: TECHNIQUE: CTA of the abdomen and pelvis was performed with the administration of intravenous contrast. Multiplanar reformatted images are provided for review. MIP images are provided for review. Dose modulation, iterative reconstruction, and/or weight based adjustment of the mA/kV was utilized to reduce the radiation dose to as low as reasonably achievable. COMPARISON: None.  HISTORY: ORDERING SYSTEM PROVIDED HISTORY: ab pain, hx of AAA TECHNOLOGIST PROVIDED HISTORY: Reason for exam:->ab pain, hx of AAA Decision Support Exception - unselect if not a suspected or confirmed emergency medical condition->Emergency Medical Condition (MA) What reading provider will be dictating this exam?->CRC FINDINGS: CTA ABDOMEN/PELVIS: Visualized portions of the descending thoracic aorta demonstrate moderate, irregular atherosclerotic plaque formation with at least mild peripheral mural thrombus. There is a 3.4 x 3.4 cm infrarenal abdominal aortic aneurysm with heavy peripheral calcifications. No evidence of periaortic fat induration. No retroperitoneal hematoma or abdominal aortic dissection. The bilateral common, internal and external iliac arteries are patent without evidence of significant stenosis. Precontrast imaging demonstrates no evidence for an intramural hematoma. Major abdominal aortic branches including the celiac trunk, superior mesenteric artery and bilateral renal arteries are patent. There is at least 70% stenosis of the proximal right renal artery and at least 50% stenosis of the proximal left renal artery. 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 NONVASCULAR FINDINGS: Heart size is within normal limits. Partially imaged sternotomy wires with suspected aortic valve replacement sequelae are noted. Emphysematous changes are moderate to severe at both lung bases. There is an indeterminate 5-6 mm left lower lobe pulmonary nodule on image 28, stable compared with October 4, 2021 chest CT. The gallbladder is partially decompressed and contains a 6 mm gallstone near the neck of the gallbladder. The liver enhances homogeneously. The portal vein is patent and there is no intrahepatic biliary ductal dilatation. The spleen and adrenal glands are normal in size. The pancreas enhances homogeneously.   The kidneys enhance symmetrically and are without hydronephrosis or radiopaque calculus. A 12 mm simple appearing left renal cyst is present. The pancreas enhances homogeneously. No evidence of a bowel obstruction, free air or pneumatosis. Portions the colon are decompressed, limiting assessment for mucosal based abnormalities. There is diverticulosis without evidence of diverticulitis. The stomach and duodenal sweep are under distended. Difficult to exclude circumferential gastric wall thickening. The appendix is normal. The urinary bladder is distended without obvious abnormality. The uterus and ovaries are atrophic or surgically absent. No free fluid is identified in the pelvis and there is no pelvic lymphadenopathy. No retroperitoneal lymphadenopathy or mass. No acute osseous abnormality or evidence of an aggressive osseous lesion. 3.4 x 3.4 cm infrarenal abdominal aortic aneurysm. No periaortic fat induration to suggest impending rupture. No evidence of an abdominal aortic dissection or intramural hematoma. See below recommendations for AAA follow-up. Underdistention and/or circumferential wall thickening involving the proximal and mid body of the stomach. These findings are nonspecific and may be on the basis of underdistention or gastritis. Given the diffuse nature, neoplasm is felt to be less likely. Correlation with upper endoscopy could be considered if clinically warranted. Nonvascular findings including an indeterminate but stable 5-6 mm left lower lobe pulmonary nodule. See below recommendations for imaging follow-up. Additional, incidental findings as above. RECOMMENDATIONS: For management of fusiform AAA: 3.0-3.4 cm AAA, recommend follow-up every 3 years. Note: Recommend Vascular consultation if a fusiform AAA enlarges by >0.5 cm in 6 months or >1 cm in 1 year or for a saccular AAA of any size. References: Jolene Hones Radiol 2013; 94(44):098-892; J Vasc Surg.  2018; 67:2-77 Fleischner Society guidelines for follow-up and management of incidentally detected pulmonary nodules: Single Solid Nodule: Nodule size less than 6 mm In a low-risk patient, no routine follow-up. In a high-risk patient, optional CT at 12 months. Nodule size equals 6-8 mm In a low-risk patient, CT at 6-12 months, then consider CT at 18-24 months. In a high-risk patient, CT at 6-12 months, then CT at 18-24 months. Nodule size greater than 8 mm In a low-risk patient, consider CT at 3 months, PET/CT, or tissue sampling. In a high-risk patient, consider CT at 3 months, PET/CT, or tissue sampling. Multiple Solid Nodules: Nodule size less than 6 mm In a low-risk patient, no routine follow-up. In a high-risk patient, optional CT at 12 months. Nodule size equals 6-8 mm In a low-risk patient, CT at 3-6 months, then consider CT at 18-24 months. In a high-risk patient, CT at 3-6 months, then CT at 18-24 months. Nodule size greater than 8 mm In a low-risk patient, CT at 3-6 months, then consider CT at 18-24 months. In a high-risk patient, CT at 3-6 months, then CT at 18-24 months. - Low risk patients include individuals with minimal or absent history of smoking and other known risk factors. - High risk patients include individuals with a history or smoking or known risk factors. Radiology 2017 http://pubs. rsna.org/doi/full/10.1148/radiol. 0569648929 Unavailable     CTA PULMONARY W CONTRAST    Result Date: 3/15/2022  EXAMINATION: CTA OF THE CHEST 3/15/2022 2:07 pm TECHNIQUE: CTA of the chest was performed after the administration of intravenous contrast.  Multiplanar reformatted images are provided for review. MIP images are provided for review. Dose modulation, iterative reconstruction, and/or weight based adjustment of the mA/kV was utilized to reduce the radiation dose to as low as reasonably achievable. COMPARISON: None.  HISTORY: ORDERING SYSTEM PROVIDED HISTORY: pe TECHNOLOGIST PROVIDED HISTORY: Reason for exam:->pe Decision Support Exception - unselect if not a suspected or confirmed emergency medical condition->Emergency Medical Condition (MA) FINDINGS: Pulmonary Arteries: Pulmonary arteries are adequately opacified for evaluation. No evidence of intraluminal filling defect to suggest pulmonary embolism. Main pulmonary artery is normal in caliber. Mediastinum: There is stable aneurysmal dilatation of the ascending thoracic aorta which measures 4.4 x 4.3 cm. There is a prosthetic aortic valve. The heart is not enlarged. There is no pericardial effusion. . Lungs/pleura: There are emphysematous changes. There is no appreciable pulmonary infiltrate or pulmonary mass. There is a stable 4.5 mm subpleural nodule seen within the left lower lobe on axial image number 99. Upper Abdomen: Limited images of the upper abdomen are unremarkable. Soft Tissues/Bones:  Age related degenerative changes of the visualized osseous structures without focal destructive lesion. 1.  There is no evidence of a pulmonary embolus 2. Stable aneurysmal dilatation of the ascending thoracic aorta measuring 4.3 x 4.4 cm. 3.  Emphysematous changes 4. Stable 4.5 mm nodule within the left lower lobe. RECOMMENDATIONS: Follow-up yearly CT scan is recommended for surveillance of the ascending thoracic aortic aneurysm.        Resident's Assessment and Plan     Assessment and Plan:    History of AAA with ulceration  CTA chest showed abdominal aortic aneurysm measuring 3.6 x 3.1 cm with ulcerative plaque  Vascular surgery has no intervention recommends aspirin beta-blocker  Recommends outpatient follow-up for cardiology and CTS  Annual surveillance  Cards consulted-plan Annie for tomorrow after today's was canceled  Acute hypoxic respiratory insufficiency secondary COPD exacerbation rule out infectious  Oxygen at home current smoker  Afebrile no leukocytosis  On review nasal cannula  Follow respiratory culture Gram stain  Pro-Chris negative  On Mucomyst and flutter valve for mucus  On 40 mg oral prednisone daily  On levofloxacin day 2  Continue to monitor respiratory status  History of mechanical aortic valve replacement  On Coumadin 5 mg daily except Mondays and Thursdays where she receives 7.5 mg  Pharmacy to dose Coumadin now that it is therapeutic  Supratherapeutic INR-resolved  Patient's INR was 7.4 on initial evaluation today it is 2.3  Pharmacy to dose Coumadin  Atypical chest pain-resolved  EKG showed no ST changes  Trops down trended on admission  Elevated troponin possibly secondary to COPD exacerbation  Troponin admission 120 downtrending to 88  EKG showed ST changes  No current chest pain  Elevated proBNP  proBNP 930 on admission  Last echo September 2020  Repeat proBNP tomorrow  History of CAD  Continue aspirin and statin  History of hypertension  Continue lisinopril and Toprol XL  Monitor BP  History of HLD  Continue statin  History of chronic loose bowel movements 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 Martha Koroma

## 2022-03-31 NOTE — PROGRESS NOTES
Pharmacy Consultation Note  (Warfarin Dosing and Monitoring)  Initial consult date: 3-  Consulting Provider: Dr. Tariq Hinton is a 57 y.o. female for whom pharmacy has been asked to manage warfarin therapy. Weight:   Wt Readings from Last 1 Encounters:   03/29/22 150 lb (68 kg)     TSH:    Lab Results   Component Value Date    TSH 2.630 09/10/2020     Hepatic Function Panel:                            Lab Results   Component Value Date    ALKPHOS 69 03/31/2022    ALT 16 03/31/2022    AST 18 03/31/2022    PROT 6.2 03/31/2022    BILITOT 0.5 03/31/2022    BILIDIR <0.2 06/25/2018    IBILI see below 06/25/2018    LABALBU 3.7 03/31/2022       Current warfarin drug-drug interactions include: quinolones (incr INR)    Recent Labs     03/29/22  1431 03/30/22  0542 03/31/22  0745   HGB 12.3 11.3* 10.6*    223 226     Date Warfarin Dose INR Heparin or LMWH Comment   3/29 No Dose 7.4 X    3/30 No Dose 5.1 X    3/31 <5 mg> 2.3 X Pharmacy consulted to dose                    Assessment:  · Patient is a 57 y.o. female on warfarin for Mechanical Heart Valve (atrial). Patient's home warfarin dosing regimen is 5 mg STWRS and 7.5 mg on MF only. · Admitted 3/29 for SOB and CP. Found to have aortic and abdominal aneurysms (incrsng size). · On warfarin PTA for Hx of mechanical AVR. Warfarin dose PTA was recently increased on 3/8/2022 from 5 mg daily to 5 mg STWRS and 7.5 mg on MF only (INR was 2 on 3/8 and 2.3 on 2/10/2022 on 5 mg daily). INR supratherapeutic (7.4 --> 5.1) on admission. · Levofloxacin started on admission. Significant drug-drug interaction between warfarin and levofloxacin (increased INR). · Goal INR 2.5 - 3.5  · 3/31: INR =2.3; Pharmacy consulted to dose warfarin. Plan:  · Give warfarin 5 mg x1 dose today. · Daily PT/INR until the INR is stable within the therapeutic range. · Pharmacist will follow and monitor/adjust dosing as necessary.     Lew Marie [facility] 3/31/2022 11:35 AM

## 2022-03-31 NOTE — CARE COORDINATION
Care Coordination: met with pt at bedside to discuss transition of care upon discharge. Lives with daugher, friend and grandsons. One story home with basement but she cannot do steps. Uses a Rolator . Uses [facility], follows with internal med clinic. Has a visiting nurse once a month through insurance. Smoker, and currently wearing 2 ltrs at 97%. If home 02 needed, [facility] will not service with smoking and she chose [facility]. She states she never smokes in the home. Hx of cardiac rehab, no hx of peace and if hhc is needed, no preference.  Daughter will  at discharge    Manda Quijano

## 2022-03-31 NOTE — TELEPHONE ENCOUNTER
Received referral [facility] for thoracic [facility]. Pt is current pt of Dr Roxanne Rodriguez due to RTO in April for yearly eval. Will contact pt once discharged .

## 2022-03-31 NOTE — PROGRESS NOTES
[address] [facility]  Internal Medicine Residency / [address]    Attending Physician Statement  I have discussed the case, including pertinent history and exam findings with the resident and the team.  I have seen and examined the patient and the key elements of the encounter have been performed by me. I agree with the assessment, plan and orders as documented by the resident. Hx of AV replacement on warfarin  Thoracic and AAA stable per Vascular  Cardiology attempting to R/O Ischemic heart disease but needs stabilization/improvement of COPD(Wheezing)  VS stable   Plan; Continue to evaluate    Remainder of medical problems as per resident note.       Leandro Taylor MD [facility]  Internal Medicine Residency Faculty

## 2022-03-31 NOTE — PROGRESS NOTES
Vascular Surgery Progress Note    Pt is being seen in f/u today regarding aortic ulceration    Subjective:  Anish Dangelo is a 57 y.o. female who presented with some shortness of breath some intermittent chest pain and some nausea. Right now she denies any chest pain shortness of breath or discomfort. She has no significant back pain. She is currently on oxygen but otherwise is feeling well.     Current Medications:    dextrose      sodium chloride        technetium sestamibi, sodium chloride, benzonatate, glucose, dextrose, glucagon (rDNA), dextrose, regadenoson, sodium chloride flush, sodium chloride, polyethylene glycol, acetaminophen **OR** acetaminophen    warfarin placeholder: dosing by pharmacy   Other RX Placeholder    warfarin  5 mg Oral Once    budesonide  500 mcg Nebulization BID    [START ON 4/1/2022] predniSONE  40 mg Oral Daily    aspirin  81 mg Oral Daily    atorvastatin  20 mg Oral Nightly    metoprolol succinate  25 mg Oral Daily    Roflumilast  250 mcg Oral Daily    lisinopril  20 mg Oral Daily    montelukast  10 mg Oral Nightly    pantoprazole  40 mg Oral QAM AC    rOPINIRole  0.5 mg Oral Q12H    sertraline  150 mg Oral Daily    ipratropium-albuterol  1 ampule Inhalation Q4H WA    Arformoterol Tartrate  15 mcg Nebulization BID    predniSONE  20 mg Oral BID    acetylcysteine  600 mg Inhalation BID    sodium chloride flush  10 mL IntraVENous 2 times per day        PHYSICAL EXAM:    BP (!) 154/66   Pulse 77   Temp 98.1 °F (36.7 °C) (Oral)   Resp 18   Ht 5' 5\" (1.651 m)   Wt 150 lb (68 kg)   LMP 11/08/2008 (LMP Unknown)   SpO2 97%   BMI 24.96 kg/m²     Intake/Output Summary (Last 24 hours) at 3/31/2022 1406  Last data filed at 3/31/2022 0931  Gross per 24 hour   Intake 400 ml   Output --   Net 400 ml          General: Alert and oriented answers questions appropriately no acute distress  Skin: Warm and dry no change in turgor no jaundice no scleral icterus  HEENT: Normocephalic atraumatic trach is midline no jugular venous distention  CVS: Currently regular rate and rhythm  Resp: No labored breathing audible wheezing good diaphragmatic excursion  Abd: Soft nontender no rebound or guarding positive bowel sounds  Extremities: Bilateral palpable brachial radial pulses DP PT are palpable. Motor and sensation are intact  Neuro: Cranial nerves II through XII are gross intact bilateral any gross cranial deficit    LABS:    Lab Results   Component Value Date    WBC 7.7 03/31/2022    HGB 10.6 (L) 03/31/2022    HCT 32.6 (L) 03/31/2022     03/31/2022    PROTIME 26.2 (H) 03/31/2022    INR 2.3 03/31/2022    APTT 54.8 (H) 03/29/2022    K 4.2 03/31/2022    BUN 10 03/31/2022    CREATININE 0.5 03/31/2022       RADIOLOGY:  CTA ABDOMEN PELVIS W CONTRAST   Final Result      Ascending thoracic aortic aneurysm measuring 4.3 x 4.5 cm and focal aneurysm   of the distal descending thoracic aorta measuring 3.1 x 3.1 cm with areas of   ulcerative plaque or focal dissection. There is no large central pulmonary   embolism. Abdominal aortic aneurysm measuring 3.6 x 3.1 cm with ulcerative plaque/focal   dissection. There is no large areas of dissection. Annual surveillance is   recommended. Mild stenosis of the left renal artery and 60% stenosis of the right renal   artery. Emphysema with the 4 mm nonspecific pulmonary nodule in the left base. Surveillance according to Fleischner society guidelines is recommended. RECOMMENDATIONS:   Unavailable         CTA CHEST W CONTRAST   Final Result      Ascending thoracic aortic aneurysm measuring 4.3 x 4.5 cm and focal aneurysm   of the distal descending thoracic aorta measuring 3.1 x 3.1 cm with areas of   ulcerative plaque or focal dissection. There is no large central pulmonary   embolism. Abdominal aortic aneurysm measuring 3.6 x 3.1 cm with ulcerative plaque/focal   dissection. There is no large areas of dissection. Annual surveillance is   recommended. Mild stenosis of the left renal artery and 60% stenosis of the right renal   artery. Emphysema with the 4 mm nonspecific pulmonary nodule in the left base. Surveillance according to Fleischner society guidelines is recommended. RECOMMENDATIONS:   Unavailable         XR SHOULDER RIGHT (MIN 2 VIEWS)   Final Result   1. Mild degenerative spurring along the right AC joint. 2. Otherwise, unremarkable radiographs of the right shoulder. XR CHEST (2 VW)   Final Result   No acute process         NM Cardiac Stress Test Nuclear Imaging    (Results Pending)       ASSESSMENT/PLAN:   · #1 descending thoracic ulceration. I have personally reviewed the films we will repeat the CT scan in 6 months. She will call if is any questions or concerns or any issues. · #2 abdominal aortic aneurysm at this point I will see her back in 6 months when I repeat the CT scan of her chest and will evaluate the aneurysm as well. I discussed the natural history of both #1 and #2 she understands and will follow up. · #3 tobacco abuse had a long discussion with her about smoking cessation.       Vascular surgery will sign off        Electronically signed by Tanisha Syed MD on 3/31/2022 at 2:06 PM

## 2022-04-01 LAB
ALBUMIN SERPL-MCNC: 4 G/DL (ref 3.5–5.2)
ALP BLD-CCNC: 68 U/L (ref 35–104)
ALT SERPL-CCNC: 16 U/L (ref 0–32)
ANION GAP SERPL CALCULATED.3IONS-SCNC: 12 MMOL/L (ref 7–16)
AST SERPL-CCNC: 17 U/L (ref 0–31)
BASOPHILS ABSOLUTE: 0.02 E9/L (ref 0–0.2)
BASOPHILS RELATIVE PERCENT: 0.2 % (ref 0–2)
BILIRUB SERPL-MCNC: 0.3 MG/DL (ref 0–1.2)
BUN BLDV-MCNC: 13 MG/DL (ref 6–23)
CALCIUM SERPL-MCNC: 9.2 MG/DL (ref 8.6–10.2)
CHLORIDE BLD-SCNC: 102 MMOL/L (ref 98–107)
CO2: 28 MMOL/L (ref 22–29)
CREAT SERPL-MCNC: 0.6 MG/DL (ref 0.5–1)
EOSINOPHILS ABSOLUTE: 0 E9/L (ref 0.05–0.5)
EOSINOPHILS RELATIVE PERCENT: 0 % (ref 0–6)
GFR AFRICAN AMERICAN: >60
GFR NON-AFRICAN AMERICAN: >60 ML/MIN/1.73
GLUCOSE BLD-MCNC: 168 MG/DL (ref 74–99)
HCT VFR BLD CALC: 34.5 % (ref 34–48)
HEMOGLOBIN: 11.1 G/DL (ref 11.5–15.5)
IMMATURE GRANULOCYTES #: 0.16 E9/L
IMMATURE GRANULOCYTES %: 1.8 % (ref 0–5)
INR BLD: 1.9
LYMPHOCYTES ABSOLUTE: 0.72 E9/L (ref 1.5–4)
LYMPHOCYTES RELATIVE PERCENT: 8.2 % (ref 20–42)
MCH RBC QN AUTO: 31.1 PG (ref 26–35)
MCHC RBC AUTO-ENTMCNC: 32.2 % (ref 32–34.5)
MCV RBC AUTO: 96.6 FL (ref 80–99.9)
MONOCYTES ABSOLUTE: 0.5 E9/L (ref 0.1–0.95)
MONOCYTES RELATIVE PERCENT: 5.7 % (ref 2–12)
NEUTROPHILS ABSOLUTE: 7.43 E9/L (ref 1.8–7.3)
NEUTROPHILS RELATIVE PERCENT: 84.1 % (ref 43–80)
PDW BLD-RTO: 14.8 FL (ref 11.5–15)
PHOSPHORUS: 3.5 MG/DL (ref 2.5–4.5)
PLATELET # BLD: 228 E9/L (ref 130–450)
PMV BLD AUTO: 11.3 FL (ref 7–12)
POTASSIUM REFLEX MAGNESIUM: 4.4 MMOL/L (ref 3.5–5)
PROTHROMBIN TIME: 20.7 SEC (ref 9.3–12.4)
RBC # BLD: 3.57 E12/L (ref 3.5–5.5)
SODIUM BLD-SCNC: 142 MMOL/L (ref 132–146)
TOTAL PROTEIN: 6.7 G/DL (ref 6.4–8.3)
URINE CULTURE, ROUTINE: NORMAL
WBC # BLD: 8.8 E9/L (ref 4.5–11.5)

## 2022-04-01 PROCEDURE — 99231 SBSQ HOSP IP/OBS SF/LOW 25: CPT | Performed by: INTERNAL MEDICINE

## 2022-04-01 PROCEDURE — 6360000002 HC RX W HCPCS: Performed by: INTERNAL MEDICINE

## 2022-04-01 PROCEDURE — 6370000000 HC RX 637 (ALT 250 FOR IP): Performed by: STUDENT IN AN ORGANIZED HEALTH CARE EDUCATION/TRAINING PROGRAM

## 2022-04-01 PROCEDURE — 94640 AIRWAY INHALATION TREATMENT: CPT

## 2022-04-01 PROCEDURE — 6370000000 HC RX 637 (ALT 250 FOR IP)

## 2022-04-01 PROCEDURE — 2700000000 HC OXYGEN THERAPY PER DAY

## 2022-04-01 PROCEDURE — 6370000000 HC RX 637 (ALT 250 FOR IP): Performed by: INTERNAL MEDICINE

## 2022-04-01 PROCEDURE — 2580000003 HC RX 258: Performed by: FAMILY MEDICINE

## 2022-04-01 PROCEDURE — 80053 COMPREHEN METABOLIC PANEL: CPT

## 2022-04-01 PROCEDURE — 6370000000 HC RX 637 (ALT 250 FOR IP): Performed by: NURSE PRACTITIONER

## 2022-04-01 PROCEDURE — 2140000000 HC CCU INTERMEDIATE R&B

## 2022-04-01 PROCEDURE — 84100 ASSAY OF PHOSPHORUS: CPT

## 2022-04-01 PROCEDURE — 36415 COLL VENOUS BLD VENIPUNCTURE: CPT

## 2022-04-01 PROCEDURE — 85025 COMPLETE CBC W/AUTO DIFF WBC: CPT

## 2022-04-01 PROCEDURE — 85610 PROTHROMBIN TIME: CPT

## 2022-04-01 PROCEDURE — 99223 1ST HOSP IP/OBS HIGH 75: CPT | Performed by: INTERNAL MEDICINE

## 2022-04-01 RX ORDER — AZITHROMYCIN 250 MG/1
500 TABLET, FILM COATED ORAL DAILY
Status: DISCONTINUED | OUTPATIENT
Start: 2022-04-01 | End: 2022-04-02 | Stop reason: HOSPADM

## 2022-04-01 RX ORDER — WARFARIN SODIUM 5 MG/1
5 TABLET ORAL
Status: COMPLETED | OUTPATIENT
Start: 2022-04-01 | End: 2022-04-01

## 2022-04-01 RX ADMIN — IPRATROPIUM BROMIDE AND ALBUTEROL SULFATE 1 AMPULE: .5; 2.5 SOLUTION RESPIRATORY (INHALATION) at 19:31

## 2022-04-01 RX ADMIN — WARFARIN SODIUM 5 MG: 5 TABLET ORAL at 18:06

## 2022-04-01 RX ADMIN — ROFLUMILAST 250 MCG: 500 TABLET ORAL at 08:35

## 2022-04-01 RX ADMIN — ARFORMOTEROL TARTRATE 15 MCG: 15 SOLUTION RESPIRATORY (INHALATION) at 05:21

## 2022-04-01 RX ADMIN — PREDNISONE 40 MG: 20 TABLET ORAL at 08:12

## 2022-04-01 RX ADMIN — AZITHROMYCIN MONOHYDRATE 500 MG: 250 TABLET ORAL at 15:02

## 2022-04-01 RX ADMIN — BUDESONIDE 500 MCG: 0.25 SUSPENSION RESPIRATORY (INHALATION) at 19:31

## 2022-04-01 RX ADMIN — ROPINIROLE HYDROCHLORIDE 0.5 MG: 0.25 TABLET, FILM COATED ORAL at 08:11

## 2022-04-01 RX ADMIN — METOPROLOL SUCCINATE 25 MG: 25 TABLET, EXTENDED RELEASE ORAL at 21:38

## 2022-04-01 RX ADMIN — PANTOPRAZOLE SODIUM 40 MG: 40 TABLET, DELAYED RELEASE ORAL at 05:06

## 2022-04-01 RX ADMIN — ROPINIROLE HYDROCHLORIDE 0.5 MG: 0.25 TABLET, FILM COATED ORAL at 21:38

## 2022-04-01 RX ADMIN — SODIUM CHLORIDE, PRESERVATIVE FREE 10 ML: 5 INJECTION INTRAVENOUS at 08:35

## 2022-04-01 RX ADMIN — IPRATROPIUM BROMIDE AND ALBUTEROL SULFATE 1 AMPULE: .5; 2.5 SOLUTION RESPIRATORY (INHALATION) at 15:44

## 2022-04-01 RX ADMIN — IPRATROPIUM BROMIDE AND ALBUTEROL SULFATE 1 AMPULE: .5; 2.5 SOLUTION RESPIRATORY (INHALATION) at 08:24

## 2022-04-01 RX ADMIN — LISINOPRIL 20 MG: 20 TABLET ORAL at 08:12

## 2022-04-01 RX ADMIN — SERTRALINE HYDROCHLORIDE 150 MG: 100 TABLET ORAL at 08:12

## 2022-04-01 RX ADMIN — ASPIRIN 81 MG: 81 TABLET, COATED ORAL at 08:11

## 2022-04-01 RX ADMIN — ACETYLCYSTEINE 600 MG: 200 SOLUTION ORAL; RESPIRATORY (INHALATION) at 08:24

## 2022-04-01 RX ADMIN — SODIUM CHLORIDE, PRESERVATIVE FREE 10 ML: 5 INJECTION INTRAVENOUS at 21:38

## 2022-04-01 RX ADMIN — IPRATROPIUM BROMIDE AND ALBUTEROL SULFATE 1 AMPULE: .5; 2.5 SOLUTION RESPIRATORY (INHALATION) at 12:52

## 2022-04-01 RX ADMIN — ATORVASTATIN CALCIUM 20 MG: 20 TABLET, FILM COATED ORAL at 21:38

## 2022-04-01 RX ADMIN — BUDESONIDE 500 MCG: 0.25 SUSPENSION RESPIRATORY (INHALATION) at 05:20

## 2022-04-01 RX ADMIN — ARFORMOTEROL TARTRATE 15 MCG: 15 SOLUTION RESPIRATORY (INHALATION) at 19:31

## 2022-04-01 RX ADMIN — MONTELUKAST 10 MG: 10 TABLET, FILM COATED ORAL at 21:38

## 2022-04-01 ASSESSMENT — PAIN SCALES - GENERAL
PAINLEVEL_OUTOF10: 0
PAINLEVEL_OUTOF10: 0

## 2022-04-01 ASSESSMENT — PAIN DESCRIPTION - PROGRESSION: CLINICAL_PROGRESSION: GRADUALLY WORSENING

## 2022-04-01 NOTE — PROGRESS NOTES
Pharmacy Consultation Note  (Warfarin Dosing and Monitoring)  Initial consult date: 3-  Consulting Provider: Dr. Zavala Senthil is a 57 y.o. female for whom pharmacy has been asked to manage warfarin therapy. Weight:   Wt Readings from Last 1 Encounters:   03/29/22 150 lb (68 kg)     TSH:    Lab Results   Component Value Date    TSH 2.630 09/10/2020     Hepatic Function Panel:                            Lab Results   Component Value Date    ALKPHOS 68 04/01/2022    ALT 16 04/01/2022    AST 17 04/01/2022    PROT 6.7 04/01/2022    BILITOT 0.3 04/01/2022    BILIDIR <0.2 06/25/2018    IBILI see below 06/25/2018    LABALBU 4.0 04/01/2022       Current warfarin drug-drug interactions include: quinolones (incr INR) and steroids (variable/dose dependent)     Levofloxacin 500 mg Started 3/30, Stopped 3/31. Prednisone 40 mg daily; Started 3/30    Recent Labs     03/30/22  0542 03/31/22  0745 04/01/22  0412   HGB 11.3* 10.6* 11.1*    226 228     Date Warfarin Dose INR Heparin or LMWH Comment   3/29 No Dose 7.4 X    3/30 No Dose 5.1 X    3/31 5 mg 2.3 X Pharmacy consulted to dose    4/1 < 5 mg > 1.9 X             Assessment:  · Patient is a 57 y.o. female on warfarin for Mechanical Heart Valve (atrial). Patient's home warfarin dosing regimen is 5 mg STWRS and 7.5 mg on MF only. · Admitted 3/29 for SOB and CP. Found to have aortic and abdominal aneurysms (incrsng size). · On warfarin PTA for Hx of mechanical AVR. Warfarin dose PTA was recently increased on 3/8/2022 from 5 mg daily to 5 mg STWRS and 7.5 mg on MF only (INR was 2 on 3/8 and 2.3 on 2/10/2022 on 5 mg daily). INR supratherapeutic (7.4 --> 5.1) on admission. · Levofloxacin started on admission, stopped 3/31 after two doses administered. Significant drug-drug interaction between warfarin and levofloxacin (increased INR). · Goal INR 2.5 - 3.5  · 3/31: INR =2.3; Pharmacy consulted to dose warfarin.   · 4/1: INR 1.9 today, levofloxacin discontinued yesterday 3/31. Plan:  · Give warfarin 5 mg x1 dose today. · Daily PT/INR until the INR is stable within the therapeutic range. · Pharmacist will follow and monitor/adjust dosing as necessary.     Inder Savage, PharmD   Pharmacy Resident   Phone: [phone]  4/1/2022 11:21 AM

## 2022-04-01 NOTE — PROGRESS NOTES
[facility]  Internal Medicine Residency Program  Progress Note - House Team     Patient:  Obey Stanford 57 y.o. female MRN: [id number]     Date of Service: 4/1/2022     CC: Shortness of breath, abdominal pain/nausea    Subjective   Brief Summary:  Eloy Black is a 61-y/o female presenting with a chief complaint of abdominal pain/nausea and shortness of breath for the past few weeks with a past medical history of abdominal aortic aneurysm and COPD. She was admitted under [address] [place] [address] and transferred to [facility] Team 1 this morning. She described her abdominal pain as achy and heavy pain. She also reported pain between her scapulae that is achy in nature. She has had some vomiting/dry heaving that accompanies the abdominal pain usually in the morning. She said that there has been a \"stomach bug\" going around her house. She also reports multiple loose bowel movements per day. She reports that this is her baseline.     CTA of the chest was done in ED for concern for possible AAA dissection. It showed an increase in size from 3.1-3.2 cm to 3.6 cm. Vascular surgery was consulted and are not recommending surgery at this time. Hospital Day: 4    Overnight Events:  No acute events    This AM  Patient was seen and examined this morning at bedside  Was unable to complete stress test yesterday due to bronchospasm, will attempt again today  Coumadin restarted yesterday 3/31, dosed by pharmacy -- 5 mg daily, Mon & Thurs 7.5 mg  Diarrhea resolved after discontinuing magnesium oral supplement  Patient concerned about worsening intention tremor    Objective     Physical Exam:  Vitals: BP (!) 146/77   Pulse 72   Temp 98 °F (36.7 °C) (Temporal)   Resp 18   Ht 5' 5\" (1.651 m)   Wt 150 lb (68 kg)   LMP 11/08/2008 (LMP Unknown)   SpO2 97%   BMI 24.96 kg/m²     I & O - 24hr: No intake/output data recorded.    General Appearance: alert, appears stated age and cooperative  HEENT:  Head: Normal, normocephalic, atraumatic. Neck: no adenopathy  Lung: wheezes bilaterally and improving  Heart: regular rate and rhythm and S1, S2 normal  Abdomen: soft, non-tender; bowel sounds normal; no masses,  no organomegaly  Extremities:  extremities normal, atraumatic, no cyanosis or edema  Musculokeletal: No joint swelling, no muscle tenderness. ROM normal in all joints of extremities.    Neurologic: Mental status: Alert, oriented, thought content appropriate  Subject  Pertinent Labs & Imaging Studies   rob  CBC:   Lab Results   Component Value Date    WBC 8.8 04/01/2022    RBC 3.57 04/01/2022    HGB 11.1 04/01/2022    HCT 34.5 04/01/2022    MCV 96.6 04/01/2022    MCH 31.1 04/01/2022    MCHC 32.2 04/01/2022    RDW 14.8 04/01/2022     04/01/2022    MPV 11.3 04/01/2022     CBC with Differential:    Lab Results   Component Value Date    WBC 8.8 04/01/2022    RBC 3.57 04/01/2022    HGB 11.1 04/01/2022    HCT 34.5 04/01/2022     04/01/2022    MCV 96.6 04/01/2022    MCH 31.1 04/01/2022    MCHC 32.2 04/01/2022    RDW 14.8 04/01/2022    LYMPHOPCT 8.2 04/01/2022    MONOPCT 5.7 04/01/2022    BASOPCT 0.2 04/01/2022    MONOSABS 0.50 04/01/2022    LYMPHSABS 0.72 04/01/2022    EOSABS 0.00 04/01/2022    BASOSABS 0.02 04/01/2022     WBC:    Lab Results   Component Value Date    WBC 8.8 04/01/2022     Platelets:    Lab Results   Component Value Date     04/01/2022     Hemoglobin/Hematocrit:    Lab Results   Component Value Date    HGB 11.1 04/01/2022    HCT 34.5 04/01/2022     CMP:    Lab Results   Component Value Date     04/01/2022    K 4.4 04/01/2022     04/01/2022    CO2 28 04/01/2022    BUN 13 04/01/2022    CREATININE 0.6 04/01/2022    GFRAA >60 04/01/2022    LABGLOM >60 04/01/2022    GLUCOSE 168 04/01/2022    PROT 6.7 04/01/2022    LABALBU 4.0 04/01/2022    CALCIUM 9.2 04/01/2022    BILITOT 0.3 04/01/2022    ALKPHOS 68 04/01/2022    AST 17 04/01/2022    ALT 16 04/01/2022     BMP:    Lab Results   Component Value Date     04/01/2022    K 4.4 04/01/2022     04/01/2022    CO2 28 04/01/2022    BUN 13 04/01/2022    LABALBU 4.0 04/01/2022    CREATININE 0.6 04/01/2022    CALCIUM 9.2 04/01/2022    GFRAA >60 04/01/2022    LABGLOM >60 04/01/2022    GLUCOSE 168 04/01/2022     Sodium:    Lab Results   Component Value Date     04/01/2022     Potassium:    Lab Results   Component Value Date    K 4.4 04/01/2022     BUN/Creatinine:    Lab Results   Component Value Date    BUN 13 04/01/2022    CREATININE 0.6 04/01/2022     Hepatic Function Panel:    Lab Results   Component Value Date    ALKPHOS 68 04/01/2022    ALT 16 04/01/2022    AST 17 04/01/2022    PROT 6.7 04/01/2022    BILITOT 0.3 04/01/2022    BILIDIR <0.2 06/25/2018    IBILI see below 06/25/2018    LABALBU 4.0 04/01/2022     Albumin:    Lab Results   Component Value Date    LABALBU 4.0 04/01/2022     Calcium:    Lab Results   Component Value Date    CALCIUM 9.2 04/01/2022     Magnesium:    Lab Results   Component Value Date    MG 1.6 03/30/2022     Phosphorus:    Lab Results   Component Value Date    PHOS 3.5 04/01/2022     PT/INR:    Lab Results   Component Value Date    PROTIME 20.7 04/01/2022    PROTIME 41.7 06/30/2021    INR 1.9 04/01/2022     PTT:    Lab Results   Component Value Date    APTT 54.8 03/29/2022     U/A:    Lab Results   Component Value Date    NITRITE neg 06/25/2018    COLORU Yellow 03/30/2022    PROTEINU Negative 03/30/2022    PHUR 6.0 03/30/2022    WBCUA NONE 03/30/2022    RBCUA 5-10 03/30/2022    BACTERIA NONE SEEN 03/30/2022    CLARITYU Clear 03/30/2022    SPECGRAV 1.020 03/30/2022    LEUKOCYTESUR Negative 03/30/2022    UROBILINOGEN 0.2 03/30/2022    BILIRUBINUR Negative 03/30/2022    BILIRUBINUR neg 06/25/2018    BLOODU MODERATE 03/30/2022    GLUCOSEU Negative 03/30/2022       CTA ABDOMEN PELVIS W CONTRAST   Final Result      Ascending thoracic aortic aneurysm measuring 4.3 x 4.5 cm and focal aneurysm   of the distal descending thoracic aorta measuring 3.1 x 3.1 cm with areas of   ulcerative plaque or focal dissection. There is no large central pulmonary   embolism. Abdominal aortic aneurysm measuring 3.6 x 3.1 cm with ulcerative plaque/focal   dissection. There is no large areas of dissection. Annual surveillance is   recommended. Mild stenosis of the left renal artery and 60% stenosis of the right renal   artery. Emphysema with the 4 mm nonspecific pulmonary nodule in the left base. Surveillance according to Fleischner society guidelines is recommended. RECOMMENDATIONS:   Unavailable         CTA CHEST W CONTRAST   Final Result      Ascending thoracic aortic aneurysm measuring 4.3 x 4.5 cm and focal aneurysm   of the distal descending thoracic aorta measuring 3.1 x 3.1 cm with areas of   ulcerative plaque or focal dissection. There is no large central pulmonary   embolism. Abdominal aortic aneurysm measuring 3.6 x 3.1 cm with ulcerative plaque/focal   dissection. There is no large areas of dissection. Annual surveillance is   recommended. Mild stenosis of the left renal artery and 60% stenosis of the right renal   artery. Emphysema with the 4 mm nonspecific pulmonary nodule in the left base. Surveillance according to Fleischner society guidelines is recommended. RECOMMENDATIONS:   Unavailable         XR SHOULDER RIGHT (MIN 2 VIEWS)   Final Result   1. Mild degenerative spurring along the right AC joint. 2. Otherwise, unremarkable radiographs of the right shoulder.          XR CHEST (2 VW)   Final Result   No acute process              Student's Assessment and Plan     Assessment and Plan:  Hx of AAA with Ulceration  CTA chest showed abdominal aortic aneurysm 3.6 x 3.1 cm with ulcerative plaque  Vascular surgery not recommending surgical intervention at this time, continue aspirin & beta-blocker  Recommending outpatient follow-up with cardiology and cardiothoracic surgery  Annual surveillance recommended  Cardiology -- planned for stress test today, but canceled due to bronchospasm  Acute Hypoxic Respiratory Insufficiency 2/2 COPD Exacerbation  Not currently on O2 at home  Current smoker  Continue 40 mg prednisone, budesonide 500 mcg BID, montelukast 10 mg, ipratropium-albuterol, arformoterol tartrate 15 mcg BID  Consult pulmonology   Hx of Mechanical Aortic Valve Replacement  Continue Coumadin 5 mg daily, Mon & Thurs 7.5 mg (dosed by pharmacy)  Supratherapeutic INR (Resolved)  4/1: INR 1.9 (from 2.3)  Atypical Chest Pain (Resolved)  No ST changes on EKG  Troponins downtrended on admission  Elevated Troponin  Admission 120, downtrended to 88  No ST changes on EKG  No chest pain  Elevated proBNP  930 on admission  Repeat 3/ :  Hx of CAD  Continue aspirin 81 mg, atorvastatin 20 mg  Hx HTN  Continue lisinopril 20 mg, Toprol XL 25 mg   Hx HLD  Continue atorvastatin 20 mg  Hx of Chronic Loose BMs  Hold oral Mg, replace PRN  Monitor BMP  Hematuria  Recommend cystoscopy   Follow with urology outpatient    Diet: Regular  PT/OT evaluation: None  DVT prophylaxis/ GI prophylaxis: Coumadin/Protonix  Disposition: continue current care     Dorina Garnica, OMS-III  Attending physician: Dr. Cyndi Michaud

## 2022-04-01 NOTE — CONSULTS
Department of Internal Medicine  Division of Pulmonary, Critical Care & Sleep Medicine  Pulmonary [address] [facility]                                             Pulmonary Consult         Chief complaint o  SOB ,Cough     HISTORY OF PRESENT ILLNESS:    Marley Boston is a 57y.o. year old female present with epigastric pain and also chest Pain ,had   Seen by cardiology and to have stress test   She is known MAVR and on couamdin   Had AAA and seen by vascular    The patient tells me that she has shortness of breath has improved since admission ,   Also she stated that she has cough that has been chronic usually with clear sputum sometimes it gets yellow  On  (Dulera and the albuterol rescue inhaler)    The patient does not feel energetic but she contributed that her shortness of breath    Unfortunately the patient is a smoker since the age of 24 she used to smoke 1 pack daily for the last 40 years and then she cut back to half pack daily    She denies any hemoptysis or weight loss, she denies any history of lung cancer in the family    She has open heart surgery with 2 valve replacement done in 2009 and she is on long-term anticoagulation      ALLERGIES:    Allergies   Allergen Reactions    Keflex [Cephalexin] Itching    Phenergan [Promethazine Hcl] Other (See Comments)       PAST MEDICAL HISTORY:       Diagnosis Date    Anticoagulant long-term use     Anxiety     Ascending aortic aneurysm (HCC)     Asthma     CAD (coronary artery disease)     Chronic back pain     Constipation 5/19/2021    COPD [facility]ronic obstructive pulmonary disease) [facility] [address]     Depression     Depression     GERD (gastroesophageal reflux disease)     H/O mechanical aortic valve replacement 1/5/2017    Headache     Hyperlipidemia     Hypertension     On warfarin at home     for AVR    Restless legs syndrome     S/P AVR     Status post placement of implantable loop recorder     Syncope     Syncope, cardiogenic 9/10/2020    Tobacco abuse     Urinary incontinence     Ventricular tachycardia, non-sustained (HCC)     Warfarin-induced coagulopathy ([facility] [address] 12/11/2011       MEDICATIONS:   Current Facility-Administered Medications   Medication Dose Route Frequency Provider Last Rate Last Admin    warfarin (COUMADIN) tablet 5 mg  5 mg Oral Once Shun Bustillos technetium sestamibi (CARDIOLITE) injection 35 millicurie  35 millicurie IntraVENous ONCE PRN Eleanor Maya MD        warfarin placeholder: dosing by pharmacy   Other RX Placeholder Hawa Nguyen MD        sodium chloride (OCEAN, BABY AYR) 0.65 % nasal spray 2 spray  2 spray Each Nostril PRN Corky Okemah, DO   2 spray at 03/31/22 2200    benzonatate (TESSALON) capsule 100 mg  100 mg Oral TID PRN Corky Okemah, DO   100 mg at 03/31/22 2200    budesonide (PULMICORT) nebulizer suspension 500 mcg  500 mcg Nebulization BID Hawa Nguyen MD   500 mcg at 04/01/22 0520    predniSONE (DELTASONE) tablet 40 mg  40 mg Oral Daily Hawa Nguyen MD   40 mg at 04/01/22 0918    aspirin EC tablet 81 mg  81 mg Oral Daily Natalia Richter, APRN - CNP   81 mg at 04/01/22 0811    atorvastatin (LIPITOR) tablet 20 mg  20 mg Oral Nightly ANNABELLE Landin - CNP   20 mg at 03/31/22 2154    metoprolol succinate (TOPROL XL) extended release tablet 25 mg  25 mg Oral Daily Natalia Richter APRN - CNP   25 mg at 03/31/22 1221    Roflumilast (DALIRESP) tablet 250 mcg  250 mcg Oral Daily Hawa Nguyen MD   250 mcg at 04/01/22 0835    lisinopril (PRINIVIL;ZESTRIL) tablet 20 mg  20 mg Oral Daily Hawa Nguyen MD   20 mg at 04/01/22 2978    montelukast (SINGULAIR) tablet 10 mg  10 mg Oral Nightly Hawa Nguyen MD   10 mg at 03/31/22 2153    pantoprazole (PROTONIX) tablet 40 mg  40 mg Oral QAM AC Hawa Nguyen MD   40 mg at 04/01/22 0506    rOPINIRole (REQUIP) tablet 0.5 mg  0.5 mg Oral Q12H Alison Coronado MD   0.5 mg at 04/01/22 8594    sertraline (ZOLOFT) tablet 150 mg  150 mg Oral Daily Alison Coronado MD   150 mg at 04/01/22 6608    ipratropium-albuterol (DUONEB) nebulizer solution 1 ampule  1 ampule Inhalation Q4H WA Alison Coronado MD   1 ampule at 04/01/22 8386    Arformoterol Tartrate (BROVANA) nebulizer solution 15 mcg  15 mcg Nebulization BID Alison Coronado MD   15 mcg at 04/01/22 0521    glucose (GLUTOSE) 40 % oral gel 15 g  15 g Oral PRN Mathew James MD        dextrose 50 % IV solution  12.5 g IntraVENous PRN Mathew James MD        glucagon (rDNA) injection 1 mg  1 mg IntraMUSCular PRN Mathew James MD        dextrose 5 % solution  100 mL/hr IntraVENous PRN Mathew James MD        acetylcysteine (MUCOMYST) 20 % solution 600 mg  600 mg Inhalation BID Mathew James MD   600 mg at 04/01/22 0824    regadenoson (LEXISCAN) injection 0.4 mg  0.4 mg IntraVENous ONCE PRN ANNABELLE Wolfe - CNP        sodium chloride flush 0.9 % injection 10 mL  10 mL IntraVENous 2 times per day Doyce Edy, DO   10 mL at 04/01/22 0835    sodium chloride flush 0.9 % injection 10 mL  10 mL IntraVENous PRN Doyce Edy, DO        0.9 % sodium chloride infusion   IntraVENous PRN Doyce Edy, DO        polyethylene glycol (GLYCOLAX) packet 17 g  17 g Oral Daily PRN Doyce Edy, DO        acetaminophen (TYLENOL) tablet 650 mg  650 mg Oral Q6H PRN Doyce Edy, DO   650 mg at 03/30/22 1550    Or    acetaminophen (TYLENOL) suppository 650 mg  650 mg Rectal Q6H PRN Doyce Edy, DO           SOCIAL AND OCCUPATIONAL HEALTH:  Social History     Tobacco Use   Smoking Status Current Every Day Smoker    Packs/day: 1.00    Years: 50.00    Pack years: 50.00    Types: Cigarettes   Smokeless Tobacco Never Used   Tobacco Comment    currently 0.5 ppd, 2/10/22     TB :None  Pets None  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 Maurice Baptiste  2009    Aortic valve 2009 [facility]      SECTION      COLONOSCOPY N/A 2019    COLONOSCOPY POLYPECTOMY SNARE/COLD BIOPSY performed by Graciela Bryson MD at [address] [facility]  2020    Linq Insertion    Dr. Virk Hands KEM STEROTACTIC LOC BREAST BIOPSY RIGHT Right 3/10/2021    KEM STEROTACTIC LOC BREAST BIOPSY RIGHT 3/10/2021 [facility]    TUBAL LIGATION      UPPER GASTROINTESTINAL ENDOSCOPY N/A 2019    EGD BIOPSY performed by Graciela Bryson MD at [address]:   Lung cancer:None   DVT or PE :None     REVIEW OF SYSTEMS:  Constitutional: General health is good . There has been no weight changes. No fevers, fatigue or weakness. Head: Patient denies any history of trauma, convulsive disorder or syncope. Skin:  Patient denies history of changes in pigmentation, eruptions or pruritus. No easy bruising or bleeding. EENT: no nasal congestion   Cardiovascular ,No chest pain ,No edema ,  Respiration:SOB:None   ,OTT :None   Gastrointestinal:No GI bleed ,no melena  ,no hematemesis    Musculoskeletal: no joint pain ,no swelling  Neurological:no , syncope. Denies twitching, convulsions, loss of consciousness or memory. Endocrine:  . No history of goiter, exophthalmos or dryness of skin. The patient has no history of diabetes. Hematopoietic:  No history of bleeding disorders or easy bruising. Rheumatic:  No connective tissue disease history or polyarthritis/inflammatory joint disease.       PHYSICAL EXAMINATION:  Vitals:    22 1033   BP: (!) 146/77   Pulse: 72   Resp:    Temp:    SpO2:      Constitutional: This is a well developed, well nourished 57y.o. year old female who is alert, oriented, coopative and in no apparent distress. Head was normocephalic and atraumatic. EENT: Mallampati class :  Extraocular muscles intact. External canals are patent and no discharge was appreciated. Septum was midline,   mucosa was without erythema, exudates or cobblestoning. No thrush was noted. Neck: Supple without thyromegaly. No elevated JVP. Trachea was midline. No carotid bruits were auscultated. Respiratory:decrease breath sound ,Rhonchi bilateral     Cardiovascular: Regular without murmur, clicks, gallops or rubs. There is no left or right ventricular heave. Pulses:  Carotid, radial and femoral pulses were equally bilaterally. Abdomen: Slightly rounded and soft without organomegaly. No rebound, rigidity or guarding was appreciated. Lymphatic: No lymphadenopathy. Musculoskeletal: normal range of motion     Extremities: no edema ,or cyanosis   Skin:  Warm and dry. Good color, turgor and pigmentation. No lesions or scars. Neurological/Psychiatric: The patient's general behavior, level of consciousness, thought content and emotional status is normal.  Cranial nerves II-XII are intact. DATA:     FVC 2.7 which is 69 of predicted    FEV1:0.75 which is 28 of predicted  FEV1/FVC 53% which is 41 of predicted  There Was significant bronchodilator response  MVV 37 which is not 39 of predicted    ERV 0.19 which is 90% of predicted TLC is 7.4 which is 137 of predicted  RV/TLC 64 which is 168 predicted indicating of severe air trapping    DLCO is 47 and I will therefore limited correct 51 indicating of moderate reduction in DLCO    IMPRESSION:    Acute Hypoxic resp failure   1-Very COPD with possible asthmatic bronchitis   2-Possible Asthma component. especially eosinophilic type   3-Tobacco abuse  4-severe emphysema  5 Lung nodules           6-PHTN ,mild,diastolic dysfunction     PLAN:        Acute hypoxic insufficiency 2/2 mild COPD exacerbation likely 2/2 viral gastroenteritis  Afebrile, no leukocytosis, procal 0.05  respiratory panel negative, Urine cx negative  Currently on 2 L O2 via NC  Pending respiratory culture, gram stain  Continue mucomyst and flutter valve for mucus   Continue prednisone 40 mg oral daily,wean over 1 week   Started on azithromycin for 3 days for mild COPD exacerbation  Keep Darlisep on discharge   Check ABG in am to see if need NIMV   Explain to her that if she continue to smoke ,nothing will help her and she is putting her life at risk of resp failure    AAA as vacular   Lung nodule stable  Change inhalers on discharge Trelegy 100  Duoneb neb   Thank you for allowing me to participate in [facility].  I will keep following with you ,should you have any concerns ,please contact me at [phone]     Sincerely,        Gabby Galarza MD  Pulmonary & Critical Care Medicine

## 2022-04-01 NOTE — PROGRESS NOTES
Inpatient Cardiology Progress note     PATIENT IS BEING FOLLOWED FOR:    Debby Miller located in  room 6402/6402-HARLEY is a 57 y.o. female      SUBJECTIVE: According to patient she is less short of breath, no chest tightness tightness, no palpitations. Does not feel short of breath at rest.  Overall feels better. OBJECTIVE: No apparent distress     ROS:  Consist: Denies fevers, chills or night sweats  Heart: Denies chest pain, palpitations, lightheadedness, dizziness or syncope  Lungs: Denies SOB at rest, cough, wheezing, orthopnea or PND  GI: Denies abdominal pain, vomiting or diarrhea    PHYSICAL EXAM:   BP (!) 150/72   Pulse 72   Temp 97.5 °F (36.4 °C) (Oral)   Resp 18   Ht 5' 5\" (1.651 m)   Wt 150 lb (68 kg)   LMP 11/08/2008 (LMP Unknown)   SpO2 97%   BMI 24.96 kg/m²    B/P Range last 24 hours: Systolic (06VCT), OTU:706 , Min:136 , PEJ:269    Diastolic (27BZW), OGW:40, Min:72, Max:80    CONST: Well developed, well nourished who appears stated age. Awake, alert and cooperative. No apparent distress  HEENT:   Head- Normocephalic, atraumatic   Eyes- Conjunctivae pink, anicteric  Throat- Oral mucosa pink and moist  Neck-  No stridor, trachea midline, no jugular venous distention. No carotid bruit  CHEST: Chest symmetrical and non-tender to palpation. No accessory muscle use or intercostal retractions  RESPIRATORY:  Lung sounds -diffusely decreased  CARDIOVASCULAR:     Heart Inspection- shows no noted pulsations  Heart Ausculation- Regular rate and rhythm, no murmur. No s3, s4 or rub   PV: No lower extremity edema. No varicosities. Pedal pulses palpable, no clubbing or cyanosis   ABDOMEN: Soft, non-tender to light palpation. Bowel sounds present. MS: Good muscle strength and tone. No atrophy or abnormal movements. : Deferred  SKIN: Warm and dry no statis dermatitis or ulcers   NEURO / PSYCH: Oriented to person, place and time. Speech clear and appropriate. Follows all commands.  Pleasant affect hours.  CARDIAC ENZYMES:No results for input(s): CKTOTAL, CKMB, CKMBINDEX, TROPONINI in the last 72 hours. FASTING LIPID PANEL:  Lab Results   Component Value Date    CHOL 178 05/12/2021    HDL 47 03/30/2022    LDLCALC 58 03/30/2022    TRIG 157 05/12/2021     LIVER PROFILE:  Recent Labs     03/31/22  0745 04/01/22  0412   AST 18 17   ALT 16 16   LABALBU 3.7 4.0       Recent Labs     03/29/22 2037 03/30/22  0542   TROPHS 96* 88*     Lab Results   Component Value Date    CREATININE 0.6 04/01/2022    CREATININE 0.5 03/31/2022    CREATININE 0.6 03/30/2022    CREATININE 0.7 03/29/2022    CREATININE 0.6 03/15/2022    CREATININE 0.6 09/28/2021    CREATININE 0.7 01/10/2021    CREATININE 0.7 09/12/2020    CREATININE 0.7 09/11/2020    CREATININE 0.6 09/10/2020    CREATININE 0.7 09/09/2020    CREATININE 0.7 09/08/2020    CREATININE 0.7 01/28/2020    CREATININE 0.7 12/13/2018    CREATININE 0.6 07/23/2018    CREATININE 0.6 07/18/2018    CREATININE 0.8 06/25/2018    CREATININE 0.7 10/24/2017    CREATININE 0.7 12/29/2016     Lab Results   Component Value Date    PROBNP 930 03/29/2022    PROBNP 373 09/08/2020         Telemetry: Normal sinus rate, rate 92/minute      ASSESSMENT:       1. Chest pain: Atypical, resolved, suspect secondary to respiratory status, had recent cardiac catheterization, will continue current treatment  2. Elevated troponin: Secondary to comorbid conditions  3. CAD: Nonobstructive on cardiac catheterization done in 2020  4. S/p mechanical aortic valve replacement in 2009  5. Supratherapeutic INR  6. Chronic anticoagulation  7. Hypertension: Controlled  8. Acute hypoxic respiratory failure: Secondary to COPD, improving  9. S/p Reveal Linq Loop Recorder Implantation   10.  Descending thoracic aorta ulceration, abdominal aortic aneurysm, as  per vascular surgery  11.  Tobacco abuse       PLAN:  Cardiac monitor  Continue current therapy  Continue Coumadin  No additional cardiological testing.   Further recommendation upon clinical course. Assessment and plan discussed with Dr. Angelika Schmid MD    Assessment and Plan to follow as per Dr. Angelika Schmid MD    Electronically signed by COOPER Guevara on 4/1/2022 at [address] [place] [address] Cardiology progress note      Hipolito Hendricks have personally participated in a face-to-face and personally obtained history and performed physical exam on the date of service. I reviewed chart, vitals, labs and radiologic studies. I also participated in medical decision making with COOPER Guevara on the date of service All of the assessments and recommendations are from me and I agree with all of the pertinent clinical information, assessment and treatment plan. I have reviewed and edited the note above based on my findings during my history, exam, and decision making. Please see my additional contributions to the history, physical exam, assessment, and recommendations below. Patient is seen in follow-up for chest pain, elevated troponin    Subjective:     Ms. Elvis Ramos feels a lot better today  Sitting up in bed no apparent distress    ROS:  As above    Medication side effects:none    Scheduled Meds:    Continuous Infusions:    PRN Meds:    No intake/output data recorded. No intake/output data recorded.       Objective:      Physical Exam:   /71   Pulse 86   Temp 98.2 °F (36.8 °C) (Oral)   Resp 16   Ht 5' 5\" (1.651 m)   Wt 150 lb (68 kg)   LMP 11/08/2008 (LMP Unknown)   SpO2 95%   BMI 24.96 kg/m²   CONSTITUTIONAL:  awake, alert, cooperative, no apparent distress, and appears stated age  HEAD:  normocepalic, without obvious abnormality, atraumatic  NECK:  Supple, symmetrical, trachea midline, no adenopathy, thyroid symmetric, not enlarged and no tenderness, skin normal  LUNGS:  No increased work of breathing, No accessory muscle use or intercostal retractions, decreased air exchange, fine wheezing  CARDIOVASCULAR:  Normal apical impulse, regular rate and rhythm, normal S1 and S2, no S3 or S4, and no murmur noted, no edema, no JVD, no carotid bruit. ABDOMEN:  Soft, nontender, no masses, no hepatomegaly, no splenomegaly, BS+  MUSCULOSKELETAL:  No clubbing no cyanosis. there is no redness, warmth, or swelling of the joints  full range of motion noted  NEUROLOGIC:  Alert, awake,oriented x3  SKIN:  no bruising or bleeding, normal skin color, texture, turgor and no redness, warmth, or swelling      Cardiographics  I personally reviewed the telemetry monitor strips with the following interpretation: Sinus rhythm    Echocardiogram: 9/9/2020,Summary   Left ventricle is normal in size . Borderline concentric left ventricular hypertrophy. Septal motion consistent with post open heart state . Ejection fraction is visually estimated at 55-60%. Normal diastolic function. Normal right ventricular size and function. Normal sized left atrium. There is a mechanical aortic prosthetic valve which is well seated with a   mean gradient of 8.14 mmHg and trace aortic regurgitation   Mildly dilated aortic root. Imaging  CTA ABDOMEN PELVIS W CONTRAST   Final Result      Ascending thoracic aortic aneurysm measuring 4.3 x 4.5 cm and focal aneurysm   of the distal descending thoracic aorta measuring 3.1 x 3.1 cm with areas of   ulcerative plaque or focal dissection. There is no large central pulmonary   embolism. Abdominal aortic aneurysm measuring 3.6 x 3.1 cm with ulcerative plaque/focal   dissection. There is no large areas of dissection. Annual surveillance is   recommended. Mild stenosis of the left renal artery and 60% stenosis of the right renal   artery. Emphysema with the 4 mm nonspecific pulmonary nodule in the left base. Surveillance according to Fleischner society guidelines is recommended.       RECOMMENDATIONS:   Unavailable         CTA CHEST W CONTRAST   Final Result      Ascending thoracic aortic aneurysm measuring 4.3 x 4.5 cm and focal aneurysm   of the distal descending thoracic aorta measuring 3.1 x 3.1 cm with areas of   ulcerative plaque or focal dissection. There is no large central pulmonary   embolism. Abdominal aortic aneurysm measuring 3.6 x 3.1 cm with ulcerative plaque/focal   dissection. There is no large areas of dissection. Annual surveillance is   recommended. Mild stenosis of the left renal artery and 60% stenosis of the right renal   artery. Emphysema with the 4 mm nonspecific pulmonary nodule in the left base. Surveillance according to Fleischner society guidelines is recommended. RECOMMENDATIONS:   Unavailable         XR SHOULDER RIGHT (MIN 2 VIEWS)   Final Result   1. Mild degenerative spurring along the right AC joint. 2. Otherwise, unremarkable radiographs of the right shoulder. XR CHEST (2 VW)   Final Result   No acute process             Lab Review   Lab Results   Component Value Date     04/02/2022    K 3.6 04/02/2022    K 4.4 04/01/2022     04/02/2022    CO2 30 04/02/2022    BUN 13 04/02/2022    CREATININE 0.6 04/02/2022    GLUCOSE 88 04/02/2022    CALCIUM 8.8 04/02/2022     Lab Results   Component Value Date    WBC 12.2 04/02/2022    HGB 10.8 04/02/2022    HCT 33.5 04/02/2022    MCV 96.8 04/02/2022     04/02/2022     I have personally reviewed the laboratory, cardiac diagnostic and radiographic testing as outlined above:    Assessment:     1. Chest pain: Atypical, resolved  2. Elevated troponin: Secondary to comorbid conditions  3. CAD: Nonobstructive on cardiac catheterization done in 2020  4. S/p mechanical aortic valve replacement in 2009  5. Supratherapeutic INR  6. Chronic anticoagulation  7. Hypertension: Controlled  8. Acute hypoxic respiratory failure: Secondary to COPD, improved  9. S/p Linq recorder implant  10. Descending thoracic ulceration, abdominal aortic aneurysm, as per vascular surgery  11. Tobacco abuse      Recommendations:     1.   Continue current treatment  2. Continue Coumadin  3. Intake and output  4. Basic metabolic panel and CBC in a.m. Can be discharged from cardiac standpoint    Discussed with patient  Electronically signed by Robert Delatorre MD on 4/24/2022 at 3:55 PM  NOTE: This report was transcribed using voice recognition software.  Every effort was made to ensure accuracy; however, inadvertent computerized transcription errors may be present  Late entry note

## 2022-04-01 NOTE — PROGRESS NOTES
[facility]  Internal Medicine Residency Program  Progress Note - House Team     Patient:  Maura Shine 57 y.o. female MRN: [id number]     Date of Service: 4/1/2022     CC: SOB, abdominal pain  Overnight events: No acute events overnight    Subjective     Patient was seen and examined this morning at bedside in no distress. Patient denied sob, chest pain, lightheadedness. Patient was scheduled for lexiscan today but was cancelled again due to bronchospasm. Objective     Physical Exam:  Vitals: BP (!) 150/72   Pulse 72   Temp 97.5 °F (36.4 °C) (Oral)   Resp 18   Ht 5' 5\" (1.651 m)   Wt 150 lb (68 kg)   LMP 11/08/2008 (LMP Unknown)   SpO2 97%   BMI 24.96 kg/m²     I & O - 24hr: I/O this shift:  In: 240 [P.O.:240]  Out: -    General Appearance: alert, cooperative and no distress  HEENT:  Head: Normal, normocephalic, atraumatic. Neck: no JVD and supple, symmetrical, trachea midline  Lung: Decreased breath sounds bilaterally, + bibasilar expiratory wheezingt, no rhonchi, no use of SCM with respirations  Heart: regular rate and rhythm, S1, S2 normal, no murmur, click, rub or gallop  Abdomen: soft, non-tender; bowel sounds normal; no masses,  no organomegaly  Extremities:  extremities normal, atraumatic, no cyanosis or edema, pulses +2  Musculokeletal: No joint swelling, no muscle tenderness. ROM normal in all joints of extremities.    Neurologic: Mental status: Alert, oriented x3, thought content appropriate, CN 2-12 grossly intact  Subject  Pertinent Labs & Imaging Studies   rob  CBC with Differential:    Lab Results   Component Value Date    WBC 8.8 04/01/2022    RBC 3.57 04/01/2022    HGB 11.1 04/01/2022    HCT 34.5 04/01/2022     04/01/2022    MCV 96.6 04/01/2022    MCH 31.1 04/01/2022    MCHC 32.2 04/01/2022    RDW 14.8 04/01/2022    LYMPHOPCT 8.2 04/01/2022    MONOPCT 5.7 04/01/2022    BASOPCT 0.2 04/01/2022    MONOSABS 0.50 04/01/2022    LYMPHSABS 0.72 04/01/2022    EOSABS 0.00 04/01/2022    BASOSABS 0.02 04/01/2022     CMP:    Lab Results   Component Value Date     04/01/2022    K 4.4 04/01/2022     04/01/2022    CO2 28 04/01/2022    BUN 13 04/01/2022    CREATININE 0.6 04/01/2022    GFRAA >60 04/01/2022    LABGLOM >60 04/01/2022    GLUCOSE 168 04/01/2022    PROT 6.7 04/01/2022    LABALBU 4.0 04/01/2022    CALCIUM 9.2 04/01/2022    BILITOT 0.3 04/01/2022    ALKPHOS 68 04/01/2022    AST 17 04/01/2022    ALT 16 04/01/2022     Magnesium:    Lab Results   Component Value Date    MG 1.6 03/30/2022     Phosphorus:    Lab Results   Component Value Date    PHOS 3.5 04/01/2022     PT/INR:    Lab Results   Component Value Date    PROTIME 20.7 04/01/2022    PROTIME 41.7 06/30/2021    INR 1.9 04/01/2022     Last 3 Troponin:    Lab Results   Component Value Date    TROPONINI <0.01 01/10/2021    TROPONINI <0.01 09/09/2020    TROPONINI <0.01 09/08/2020     U/A:    Lab Results   Component Value Date    NITRITE neg 06/25/2018    COLORU Yellow 03/30/2022    PROTEINU Negative 03/30/2022    PHUR 6.0 03/30/2022    WBCUA NONE 03/30/2022    RBCUA 5-10 03/30/2022    BACTERIA NONE SEEN 03/30/2022    CLARITYU Clear 03/30/2022    SPECGRAV 1.020 03/30/2022    LEUKOCYTESUR Negative 03/30/2022    UROBILINOGEN 0.2 03/30/2022    BILIRUBINUR Negative 03/30/2022    BILIRUBINUR neg 06/25/2018    BLOODU MODERATE 03/30/2022    GLUCOSEU Negative 03/30/2022       CTA ABDOMEN PELVIS W CONTRAST   Final Result      Ascending thoracic aortic aneurysm measuring 4.3 x 4.5 cm and focal aneurysm   of the distal descending thoracic aorta measuring 3.1 x 3.1 cm with areas of   ulcerative plaque or focal dissection. There is no large central pulmonary   embolism. Abdominal aortic aneurysm measuring 3.6 x 3.1 cm with ulcerative plaque/focal   dissection. There is no large areas of dissection. Annual surveillance is   recommended.       Mild stenosis of the left renal artery and 60% stenosis of the right renal artery. Emphysema with the 4 mm nonspecific pulmonary nodule in the left base. Surveillance according to Fleischner society guidelines is recommended. RECOMMENDATIONS:   Unavailable         CTA CHEST W CONTRAST   Final Result      Ascending thoracic aortic aneurysm measuring 4.3 x 4.5 cm and focal aneurysm   of the distal descending thoracic aorta measuring 3.1 x 3.1 cm with areas of   ulcerative plaque or focal dissection. There is no large central pulmonary   embolism. Abdominal aortic aneurysm measuring 3.6 x 3.1 cm with ulcerative plaque/focal   dissection. There is no large areas of dissection. Annual surveillance is   recommended. Mild stenosis of the left renal artery and 60% stenosis of the right renal   artery. Emphysema with the 4 mm nonspecific pulmonary nodule in the left base. Surveillance according to Fleischner society guidelines is recommended. RECOMMENDATIONS:   Unavailable         XR SHOULDER RIGHT (MIN 2 VIEWS)   Final Result   1. Mild degenerative spurring along the right AC joint. 2. Otherwise, unremarkable radiographs of the right shoulder. XR CHEST (2 VW)   Final Result   No acute process              Resident's Assessment and Plan     Assessment and Plan:    Hx of AAA with concerns for dissection   CTA chest showed abdominal aortic aneurysm measuring 3.6 x 3.1 cm with ulcerative plaque/focal dissection  Pt was evaluated by vascular surgery and imaging was reviewed. The infrarenal abdominal aortic aneurysm increased in size from 3.1-3.2 cm to 3.6 cm. Findings were more suggestive of small aortic ulcers. No immediate plans for surgical intervention.    Recs for outpatient follow up with cardiology/CTS   Annual surveillance  Follow Cardiology recs    Acute hypoxic insufficiency 2/2 mild COPD exacerbation likely 2/2 viral gastroenteritis  Not on oxygen at home, current smoker 1 ppd  Received 2 doses of COVID vaccine, no booster  Other household members also experienced diarrhea and fevers  Afebrile, no leukocytosis, procal 0.05  respiratory panel negative, Urine cx negative  Currently on 2 L O2 via NC  Ordered respiratory culture, gram stain  Continue mucomyst and flutter valve for mucus   Continue prednisone 40 mg oral daily, last dose on 4/3/22  Discontinued levofloxacin 500 mg daily, received 2 days  Monitor respiratory status and CBC  Started on azithromycin for 3 days for mild COPD exacerbation    Hx of mechanical aortic valve replacement  On Coumadin 5 mg daily except for Mondays and Thursdays (Coumadin 7.5 mg)  INR 1.9  Coumadin resumed with pharmacy dosing    Supratherapeutic INR, resolved, no subtherapeutic  Pt on coumadin for mechanical AVR  INR on admission was 7.4, INR now 1.9  Coumadin resumed with pharmacy dosing       Patient also on sertraline 150 mg daily which can contribute to supratherapeutic INR, consider switching to another antidepressant    Atypical chest pain, rule out ischemia  Pt complained of chest pain that radiated to back in between both scapula 2 days prior to presentation to ED, which resolved the following morning.  Currently denies chest pain  EKG showed no ST changes, no significant changes from previous EKG  Will monitor symptoms  Lexiscan scheduled for today cancelled again  Follow cardio recs for workup    Elevated troponin possibly 2/2 COPD exacerbation  Troponin on admission 120 downtrending to 88  EKG showed no ST changes, no significant changes from previous EKG  No current complaint of chest pain  Lexiscan scheduled for today cancelled again  Follow cardio recs for workup    Elevated ProBNP  No history of heart failure, last ECHO September 2020  ProBNP 930    Hematuria  UA showed hematuria with RBC 5-10  Pt said she sometimes has red-orange urine, previously saw a urologist but didn't need treatment  Instruct patient to follow up with her urologist Dr. Dilcia Brito or refer    Hx of CAD  Continue aspirin and atorvastatin Hx of HTN  Continue lisinopril, toprol  Monitor BP    Hx of HLD  Continue atorvastatin 20 mg daily    Hx of chronic loose bowel movement possibly 2/2 magnesium oxide   Claims no hx of IBS/IBD, does take magnesium oxide at home  Will hold magnesium oxide for now, replace magnesium IV, will monitor BM      CT chest incidental findin mm nonspecific pulmonary nodule in the left base  CT abdomen incidental finding: Mild stenosis of the left renal artery and 60% stenosis of the right renal artery      PT/OT evaluation: Not indicated at this time  DVT prophylaxis/ GI prophylaxis: anticoagulation on hold/protonix  Disposition: continue current care     Paris Watson MD, PGY-1  Attending physician: Dr. Andrew Serna

## 2022-04-01 NOTE — PROGRESS NOTES
[address] [facility]  Internal Medicine Residency / [address]    Attending Physician Statement  I have discussed the case, including pertinent history and exam findings with the resident and the team.  I have seen and examined the patient and the key elements of the encounter have been performed by me. I agree with the assessment, plan and orders as documented by the resident. Hx of prosthetic AV [id number] due to Bicuspid valve  On long term warfarin therapy  Unable to do Nuclear stress due to COPD  Meds for COPD reviewed  Hx of Cardiac cath by Dr Aria Beckford  9/11/2020  negative CAD except mild CAD in RCA  Admission Troponin mild elevation  AAA  and Thoracic aneurysms chronic and  stable per vascular  Plan: Await Cardiology final opinion    Remainder of medical problems as per resident note.       Carol Maciel MD [facility] SPECIALTY [facility]  Internal Medicine Residency Faculty

## 2022-04-01 NOTE — PROGRESS NOTES
distress, and appears stated age  HEAD:  normocepalic, without obvious abnormality, atraumatic  NECK:  Supple, symmetrical, trachea midline, no adenopathy, thyroid symmetric, not enlarged and no tenderness, skin normal  LUNGS:  No increased work of breathing, No accessory muscle use or intercostal retractions, decreased air exchange, fine wheezing  CARDIOVASCULAR:  Normal apical impulse, regular rate and rhythm, normal S1 and S2, no S3 or S4, and no murmur noted, no edema, no JVD, no carotid bruit. ABDOMEN:  Soft, nontender, no masses, no hepatomegaly, no splenomegaly, BS+  MUSCULOSKELETAL:  No clubbing no cyanosis. there is no redness, warmth, or swelling of the joints  full range of motion noted  NEUROLOGIC:  Alert, awake,oriented x3  SKIN:  no bruising or bleeding, normal skin color, texture, turgor and no redness, warmth, or swelling      Cardiographics  I personally reviewed the telemetry monitor strips with the following interpretation: Sinus rhythm    Echocardiogram: 9/9/2020,Summary   Left ventricle is normal in size . Borderline concentric left ventricular hypertrophy. Septal motion consistent with post open heart state . Ejection fraction is visually estimated at 55-60%. Normal diastolic function. Normal right ventricular size and function. Normal sized left atrium. There is a mechanical aortic prosthetic valve which is well seated with a   mean gradient of 8.14 mmHg and trace aortic regurgitation   Mildly dilated aortic root. Imaging  CTA ABDOMEN PELVIS W CONTRAST   Final Result      Ascending thoracic aortic aneurysm measuring 4.3 x 4.5 cm and focal aneurysm   of the distal descending thoracic aorta measuring 3.1 x 3.1 cm with areas of   ulcerative plaque or focal dissection. There is no large central pulmonary   embolism. Abdominal aortic aneurysm measuring 3.6 x 3.1 cm with ulcerative plaque/focal   dissection. There is no large areas of dissection.   Annual surveillance is recommended. Mild stenosis of the left renal artery and 60% stenosis of the right renal   artery. Emphysema with the 4 mm nonspecific pulmonary nodule in the left base. Surveillance according to Fleischner society guidelines is recommended. RECOMMENDATIONS:   Unavailable         CTA CHEST W CONTRAST   Final Result      Ascending thoracic aortic aneurysm measuring 4.3 x 4.5 cm and focal aneurysm   of the distal descending thoracic aorta measuring 3.1 x 3.1 cm with areas of   ulcerative plaque or focal dissection. There is no large central pulmonary   embolism. Abdominal aortic aneurysm measuring 3.6 x 3.1 cm with ulcerative plaque/focal   dissection. There is no large areas of dissection. Annual surveillance is   recommended. Mild stenosis of the left renal artery and 60% stenosis of the right renal   artery. Emphysema with the 4 mm nonspecific pulmonary nodule in the left base. Surveillance according to Fleischner society guidelines is recommended. RECOMMENDATIONS:   Unavailable         XR SHOULDER RIGHT (MIN 2 VIEWS)   Final Result   1. Mild degenerative spurring along the right AC joint. 2. Otherwise, unremarkable radiographs of the right shoulder. XR CHEST (2 VW)   Final Result   No acute process         NM Cardiac Stress Test Nuclear Imaging    (Results Pending)       Lab Review   Lab Results   Component Value Date     03/31/2022    K 4.2 03/31/2022     03/31/2022    CO2 27 03/31/2022    BUN 10 03/31/2022    CREATININE 0.5 03/31/2022    GLUCOSE 156 03/31/2022    CALCIUM 8.5 03/31/2022     Lab Results   Component Value Date    WBC 7.7 03/31/2022    HGB 10.6 03/31/2022    HCT 32.6 03/31/2022    MCV 95.3 03/31/2022     03/31/2022     I have personally reviewed the laboratory, cardiac diagnostic and radiographic testing as outlined above:    Assessment:     1.  Chest pain: Atypical, resolved, suspect secondary to respiratory status, had recent cardiac catheterization, will continue current treatment  2. Elevated troponin: Secondary to comorbid conditions  3. CAD: Nonobstructive on cardiac catheterization done in 2020  4. S/p mechanical aortic valve replacement in 2009  5. Supratherapeutic INR  6. Chronic anticoagulation  7. Hypertension: Controlled  8. Acute hypoxic respiratory failure: Secondary to COPD, improving  9. S/p Linq recorder implant  10. Descending thoracic ulceration, abdominal aortic aneurysm, as per vascular surgery  11. Tobacco abuse      Recommendations:     1. Continue current treatment  2. Continue Coumadin  3. Intake and output  4. Basic metabolic panel and CBC in a.m.  5.  Further cardiac recommendations will be forthcoming pending her clinical course and diagnostic test findings    Discussed with patient  Electronically signed by Mary Guido MD on 3/31/2022 at 9:39 PM  NOTE: This report was transcribed using voice recognition software.  Every effort was made to ensure accuracy; however, inadvertent computerized transcription errors may be present

## 2022-04-02 VITALS
SYSTOLIC BLOOD PRESSURE: 134 MMHG | RESPIRATION RATE: 16 BRPM | DIASTOLIC BLOOD PRESSURE: 71 MMHG | WEIGHT: 150 LBS | HEART RATE: 86 BPM | TEMPERATURE: 98.2 F | OXYGEN SATURATION: 95 % | HEIGHT: 65 IN | BODY MASS INDEX: 24.99 KG/M2

## 2022-04-02 LAB
ANION GAP SERPL CALCULATED.3IONS-SCNC: 10 MMOL/L (ref 7–16)
BASOPHILS ABSOLUTE: 0.05 E9/L (ref 0–0.2)
BASOPHILS RELATIVE PERCENT: 0.4 % (ref 0–2)
BUN BLDV-MCNC: 13 MG/DL (ref 6–23)
CALCIUM SERPL-MCNC: 8.8 MG/DL (ref 8.6–10.2)
CHLORIDE BLD-SCNC: 102 MMOL/L (ref 98–107)
CO2: 30 MMOL/L (ref 22–29)
CREAT SERPL-MCNC: 0.6 MG/DL (ref 0.5–1)
EOSINOPHILS ABSOLUTE: 0.03 E9/L (ref 0.05–0.5)
EOSINOPHILS RELATIVE PERCENT: 0.2 % (ref 0–6)
GFR AFRICAN AMERICAN: >60
GFR NON-AFRICAN AMERICAN: >60 ML/MIN/1.73
GLUCOSE BLD-MCNC: 88 MG/DL (ref 74–99)
HCT VFR BLD CALC: 33.5 % (ref 34–48)
HEMOGLOBIN: 10.8 G/DL (ref 11.5–15.5)
IMMATURE GRANULOCYTES #: 0.16 E9/L
IMMATURE GRANULOCYTES %: 1.3 % (ref 0–5)
INR BLD: 2.2
LYMPHOCYTES ABSOLUTE: 2.82 E9/L (ref 1.5–4)
LYMPHOCYTES RELATIVE PERCENT: 23 % (ref 20–42)
MAGNESIUM: 1.8 MG/DL (ref 1.6–2.6)
MCH RBC QN AUTO: 31.2 PG (ref 26–35)
MCHC RBC AUTO-ENTMCNC: 32.2 % (ref 32–34.5)
MCV RBC AUTO: 96.8 FL (ref 80–99.9)
MONOCYTES ABSOLUTE: 1.24 E9/L (ref 0.1–0.95)
MONOCYTES RELATIVE PERCENT: 10.1 % (ref 2–12)
NEUTROPHILS ABSOLUTE: 7.94 E9/L (ref 1.8–7.3)
NEUTROPHILS RELATIVE PERCENT: 65 % (ref 43–80)
PDW BLD-RTO: 15.2 FL (ref 11.5–15)
PHOSPHORUS: 4.5 MG/DL (ref 2.5–4.5)
PLATELET # BLD: 247 E9/L (ref 130–450)
PMV BLD AUTO: 11 FL (ref 7–12)
POTASSIUM SERPL-SCNC: 3.6 MMOL/L (ref 3.5–5)
PROTHROMBIN TIME: 23.8 SEC (ref 9.3–12.4)
RBC # BLD: 3.46 E12/L (ref 3.5–5.5)
SODIUM BLD-SCNC: 142 MMOL/L (ref 132–146)
WBC # BLD: 12.2 E9/L (ref 4.5–11.5)

## 2022-04-02 PROCEDURE — 2700000000 HC OXYGEN THERAPY PER DAY

## 2022-04-02 PROCEDURE — 6370000000 HC RX 637 (ALT 250 FOR IP): Performed by: INTERNAL MEDICINE

## 2022-04-02 PROCEDURE — 36415 COLL VENOUS BLD VENIPUNCTURE: CPT

## 2022-04-02 PROCEDURE — 83735 ASSAY OF MAGNESIUM: CPT

## 2022-04-02 PROCEDURE — 85610 PROTHROMBIN TIME: CPT

## 2022-04-02 PROCEDURE — 6360000002 HC RX W HCPCS: Performed by: INTERNAL MEDICINE

## 2022-04-02 PROCEDURE — 99239 HOSP IP/OBS DSCHRG MGMT >30: CPT | Performed by: INTERNAL MEDICINE

## 2022-04-02 PROCEDURE — 2580000003 HC RX 258: Performed by: FAMILY MEDICINE

## 2022-04-02 PROCEDURE — 85025 COMPLETE CBC W/AUTO DIFF WBC: CPT

## 2022-04-02 PROCEDURE — 84100 ASSAY OF PHOSPHORUS: CPT

## 2022-04-02 PROCEDURE — 80048 BASIC METABOLIC PNL TOTAL CA: CPT

## 2022-04-02 PROCEDURE — 6370000000 HC RX 637 (ALT 250 FOR IP): Performed by: STUDENT IN AN ORGANIZED HEALTH CARE EDUCATION/TRAINING PROGRAM

## 2022-04-02 PROCEDURE — 94640 AIRWAY INHALATION TREATMENT: CPT

## 2022-04-02 PROCEDURE — 6370000000 HC RX 637 (ALT 250 FOR IP): Performed by: NURSE PRACTITIONER

## 2022-04-02 RX ORDER — POTASSIUM CHLORIDE 20 MEQ/1
40 TABLET, EXTENDED RELEASE ORAL ONCE
Status: DISCONTINUED | OUTPATIENT
Start: 2022-04-02 | End: 2022-04-02 | Stop reason: HOSPADM

## 2022-04-02 RX ORDER — WARFARIN SODIUM 5 MG/1
TABLET ORAL
Qty: 90 TABLET | Refills: 3 | Status: SHIPPED | OUTPATIENT
Start: 2022-04-02 | End: 2022-05-04 | Stop reason: SDUPTHER

## 2022-04-02 RX ORDER — WARFARIN SODIUM 6 MG/1
6 TABLET ORAL DAILY
Qty: 90 TABLET | Refills: 0 | Status: SHIPPED | OUTPATIENT
Start: 2022-04-02 | End: 2022-04-13 | Stop reason: ALTCHOICE

## 2022-04-02 RX ORDER — PREDNISONE 10 MG/1
TABLET ORAL
Qty: 13 TABLET | Refills: 0 | Status: SHIPPED | OUTPATIENT
Start: 2022-04-03 | End: 2022-04-10

## 2022-04-02 RX ORDER — WARFARIN SODIUM 5 MG/1
5 TABLET ORAL
Status: DISCONTINUED | OUTPATIENT
Start: 2022-04-02 | End: 2022-04-02 | Stop reason: HOSPADM

## 2022-04-02 RX ADMIN — ARFORMOTEROL TARTRATE 15 MCG: 15 SOLUTION RESPIRATORY (INHALATION) at 08:02

## 2022-04-02 RX ADMIN — IPRATROPIUM BROMIDE AND ALBUTEROL SULFATE 1 AMPULE: .5; 2.5 SOLUTION RESPIRATORY (INHALATION) at 15:35

## 2022-04-02 RX ADMIN — AZITHROMYCIN MONOHYDRATE 500 MG: 250 TABLET ORAL at 08:47

## 2022-04-02 RX ADMIN — PANTOPRAZOLE SODIUM 40 MG: 40 TABLET, DELAYED RELEASE ORAL at 05:12

## 2022-04-02 RX ADMIN — PREDNISONE 40 MG: 20 TABLET ORAL at 08:48

## 2022-04-02 RX ADMIN — ROFLUMILAST 250 MCG: 500 TABLET ORAL at 08:44

## 2022-04-02 RX ADMIN — BUDESONIDE 500 MCG: 0.25 SUSPENSION RESPIRATORY (INHALATION) at 08:03

## 2022-04-02 RX ADMIN — SODIUM CHLORIDE, PRESERVATIVE FREE 10 ML: 5 INJECTION INTRAVENOUS at 08:56

## 2022-04-02 RX ADMIN — LISINOPRIL 20 MG: 20 TABLET ORAL at 08:49

## 2022-04-02 RX ADMIN — ASPIRIN 81 MG: 81 TABLET, COATED ORAL at 08:44

## 2022-04-02 RX ADMIN — SERTRALINE HYDROCHLORIDE 150 MG: 100 TABLET ORAL at 08:48

## 2022-04-02 RX ADMIN — ROPINIROLE HYDROCHLORIDE 0.5 MG: 0.25 TABLET, FILM COATED ORAL at 08:56

## 2022-04-02 RX ADMIN — IPRATROPIUM BROMIDE AND ALBUTEROL SULFATE 1 AMPULE: .5; 2.5 SOLUTION RESPIRATORY (INHALATION) at 13:07

## 2022-04-02 RX ADMIN — IPRATROPIUM BROMIDE AND ALBUTEROL SULFATE 1 AMPULE: .5; 2.5 SOLUTION RESPIRATORY (INHALATION) at 08:01

## 2022-04-02 ASSESSMENT — PAIN SCALES - GENERAL
PAINLEVEL_OUTOF10: 0
PAINLEVEL_OUTOF10: 0

## 2022-04-02 NOTE — PROGRESS NOTES
[facility]  Internal Medicine Residency Program  Progress Note - House Team     Patient:  Virginia Hinton 57 y.o. female MRN: [id number]     Date of Service: 4/1/2022     CC: SOB, abdominal pain  Overnight events: No acute events overnight    Subjective     Patient was seen and examined this morning at bedside in no distress. Patient denied sob, chest pain, lightheadedness. Objective     Physical Exam:  Vitals: /74   Pulse 78   Temp 97.5 °F (36.4 °C) (Oral)   Resp 18   Ht 5' 5\" (1.651 m)   Wt 150 lb (68 kg)   LMP 11/08/2008 (LMP Unknown)   SpO2 97%   BMI 24.96 kg/m²     I & O - 24hr: I/O this shift:  In: 240 [P.O.:240]  Out: -    General Appearance: alert, cooperative and no distress  HEENT:  Head: Normal, normocephalic, atraumatic. Neck: no JVD and supple, symmetrical, trachea midline  Lung: Decreased breath sounds bilaterally, +expiratory wheezing b/l bases and mid lung, no rhonchi, no use of SCM with respirations  Heart: regular rate and rhythm, S1, S2 normal, no murmur, click, rub or gallop  Abdomen: soft, non-tender; bowel sounds normal; no masses,  no organomegaly  Extremities:  extremities normal, atraumatic, no cyanosis or edema, pulses +2  Musculokeletal: No joint swelling, no muscle tenderness. ROM normal in all joints of extremities.    Neurologic: Mental status: Alert, oriented x3, thought content appropriate, CN 2-12 grossly intact  Subject  Pertinent Labs & Imaging Studies   rob  CBC with Differential:    Lab Results   Component Value Date    WBC 8.8 04/01/2022    RBC 3.57 04/01/2022    HGB 11.1 04/01/2022    HCT 34.5 04/01/2022     04/01/2022    MCV 96.6 04/01/2022    MCH 31.1 04/01/2022    MCHC 32.2 04/01/2022    RDW 14.8 04/01/2022    LYMPHOPCT 8.2 04/01/2022    MONOPCT 5.7 04/01/2022    BASOPCT 0.2 04/01/2022    MONOSABS 0.50 04/01/2022    LYMPHSABS 0.72 04/01/2022    EOSABS 0.00 04/01/2022    BASOSABS 0.02 04/01/2022     CMP:    Lab Results   Component Value Date     04/01/2022    K 4.4 04/01/2022     04/01/2022    CO2 28 04/01/2022    BUN 13 04/01/2022    CREATININE 0.6 04/01/2022    GFRAA >60 04/01/2022    LABGLOM >60 04/01/2022    GLUCOSE 168 04/01/2022    PROT 6.7 04/01/2022    LABALBU 4.0 04/01/2022    CALCIUM 9.2 04/01/2022    BILITOT 0.3 04/01/2022    ALKPHOS 68 04/01/2022    AST 17 04/01/2022    ALT 16 04/01/2022     Magnesium:    Lab Results   Component Value Date    MG 1.6 03/30/2022     Phosphorus:    Lab Results   Component Value Date    PHOS 3.5 04/01/2022     PT/INR:    Lab Results   Component Value Date    PROTIME 20.7 04/01/2022    PROTIME 41.7 06/30/2021    INR 1.9 04/01/2022     Last 3 Troponin:    Lab Results   Component Value Date    TROPONINI <0.01 01/10/2021    TROPONINI <0.01 09/09/2020    TROPONINI <0.01 09/08/2020     U/A:    Lab Results   Component Value Date    NITRITE neg 06/25/2018    COLORU Yellow 03/30/2022    PROTEINU Negative 03/30/2022    PHUR 6.0 03/30/2022    WBCUA NONE 03/30/2022    RBCUA 5-10 03/30/2022    BACTERIA NONE SEEN 03/30/2022    CLARITYU Clear 03/30/2022    SPECGRAV 1.020 03/30/2022    LEUKOCYTESUR Negative 03/30/2022    UROBILINOGEN 0.2 03/30/2022    BILIRUBINUR Negative 03/30/2022    BILIRUBINUR neg 06/25/2018    BLOODU MODERATE 03/30/2022    GLUCOSEU Negative 03/30/2022       CTA ABDOMEN PELVIS W CONTRAST   Final Result      Ascending thoracic aortic aneurysm measuring 4.3 x 4.5 cm and focal aneurysm   of the distal descending thoracic aorta measuring 3.1 x 3.1 cm with areas of   ulcerative plaque or focal dissection. There is no large central pulmonary   embolism. Abdominal aortic aneurysm measuring 3.6 x 3.1 cm with ulcerative plaque/focal   dissection. There is no large areas of dissection. Annual surveillance is   recommended. Mild stenosis of the left renal artery and 60% stenosis of the right renal   artery. Emphysema with the 4 mm nonspecific pulmonary nodule in the left base. Surveillance according to Fleischner society guidelines is recommended. RECOMMENDATIONS:   Unavailable         CTA CHEST W CONTRAST   Final Result      Ascending thoracic aortic aneurysm measuring 4.3 x 4.5 cm and focal aneurysm   of the distal descending thoracic aorta measuring 3.1 x 3.1 cm with areas of   ulcerative plaque or focal dissection. There is no large central pulmonary   embolism. Abdominal aortic aneurysm measuring 3.6 x 3.1 cm with ulcerative plaque/focal   dissection. There is no large areas of dissection. Annual surveillance is   recommended. Mild stenosis of the left renal artery and 60% stenosis of the right renal   artery. Emphysema with the 4 mm nonspecific pulmonary nodule in the left base. Surveillance according to Fleischner society guidelines is recommended. RECOMMENDATIONS:   Unavailable         XR SHOULDER RIGHT (MIN 2 VIEWS)   Final Result   1. Mild degenerative spurring along the right AC joint. 2. Otherwise, unremarkable radiographs of the right shoulder. XR CHEST (2 VW)   Final Result   No acute process              Resident's Assessment and Plan     Assessment and Plan:    Hx of AAA with concerns for dissection   CTA chest showed abdominal aortic aneurysm measuring 3.6 x 3.1 cm with ulcerative plaque/focal dissection  Pt was evaluated by vascular surgery and imaging was reviewed. The infrarenal abdominal aortic aneurysm increased in size from 3.1-3.2 cm to 3.6 cm. Findings were more suggestive of small aortic ulcers. No immediate plans for surgical intervention.    Recs for outpatient follow up with cardiology/CTS   Annual surveillance  Follow Cardiology recs    Acute hypoxic insufficiency 2/2 mild COPD exacerbation likely 2/2 viral gastroenteritis  Not on oxygen at home, current smoker 1 ppd  Received 2 doses of COVID vaccine, no booster  Other household members also experienced diarrhea and fevers  Afebrile, no leukocytosis, procal 0.05  respiratory panel negative, Urine cx negative  Weaned off to room air  Ordered respiratory culture, gram stain  Continue mucomyst and flutter valve for mucus   Continue prednisone 40 mg oral daily, last dose on 4/3/22  Discontinued levofloxacin 500 mg daily, received 2 days  Monitor respiratory status and CBC  Started on azithromycin for 3 days for mild COPD exacerbation, currently on day 2    Hx of mechanical aortic valve replacement  On Coumadin 5 mg daily except for Mondays and Thursdays (Coumadin 7.5 mg)  INR 1.9 yesterday  Coumadin resumed with pharmacy dosing    Supratherapeutic INR, resolved, no subtherapeutic  Pt on coumadin for mechanical AVR  INR on admission was 7.4, INR now 1.9  Coumadin resumed with pharmacy dosing       Patient also on sertraline 150 mg daily which can contribute to supratherapeutic INR, consider switching to another antidepressant    Atypical chest pain, rule out ischemia  Pt complained of chest pain that radiated to back in between both scapula 2 days prior to presentation to ED, which resolved the following morning.  Currently denies chest pain  EKG showed no ST changes, no significant changes from previous EKG  Will monitor symptoms  Follow cardio recs for workup    Elevated troponin possibly 2/2 COPD exacerbation  Troponin on admission 120 downtrending to 88  EKG showed no ST changes, no significant changes from previous EKG  No current complaint of chest pain  Lexiscan scheduled for today cancelled again  Follow cardio recs for workup    Elevated ProBNP  No history of heart failure, last ECHO September 2020  ProBNP 930    Hematuria  UA showed hematuria with RBC 5-10  Pt said she sometimes has red-orange urine, previously saw a urologist but didn't need treatment  Instruct patient to follow up with her urologist Dr. Marco Leon or refer    Hx of CAD  Continue aspirin and atorvastatin     Hx of HTN  Continue lisinopril, toprol  Monitor BP    Hx of HLD  Continue atorvastatin 20 mg daily    Hx of chronic loose bowel movement possibly 2/2 magnesium oxide   Claims no hx of IBS/IBD, does take magnesium oxide at home  Will hold magnesium oxide for now, replace magnesium IV, will monitor BM      CT chest incidental findin mm nonspecific pulmonary nodule in the left base  CT abdomen incidental finding: Mild stenosis of the left renal artery and 60% stenosis of the right renal artery      PT/OT evaluation: Not indicated at this time  DVT prophylaxis/ GI prophylaxis: anticoagulation on hold/protonix  Disposition: continue current care     Tray Srinivasan MD, PGY-1  Attending physician: Dr. Caryn Deras  [facility]    Attending Physician Statement:  Nidia Ann M.D., F.A.C.P. I have discussed the case, including pertinent history and exam findings with the resident/NP. I have seen and examined the patient and the key elements of the encounter have been performed by me. I agree with the resident ROS, PMHx, PSHx, meds reviewed and assessment, plan and orders as documented by the resident/NP      Hospital charts reviewed, including other providers notes, relevant labs and imaging. Likely viral/gastroenteritis on admission  Incidentally noted ulcerated plaque - with AAA  Avoid floroquilones for now, finish amoxil  Treating bronchitis  Control BP, continue warfarin, statin  DC time >30min      >50% of time spent coordinating care with other providers and/or counseling patient/family  Remainder of medical problems as per resident note.

## 2022-04-02 NOTE — PROGRESS NOTES
Pulse ox on room air sitting 92%  Pulse ox on room air ambulating 87%  Pulse ox on 2 liters sitting recovery 93%  Pulse ox on 2 liters ambulating recovery 92%

## 2022-04-02 NOTE — DISCHARGE SUMMARY
[facility]  Discharge Summary    PCP: Daisy Ann DO    Admit Date:3/29/2022  Discharge Date: 4/2/2022    Admission Diagnosis:   1. Atypical chest pain with concerns for dissection in setting of history of AAA  2. Acute hypoxic insufficiency secondary to COPD exacerbation, rule out infectious etiology  3. Acute diarrhea likely 2/2 viral gastroenteritis  4. Hx of mechanical aortic valve replacement  5. Supratherapeutic INR  6. Hx of mechanical aortic valve replacement  7. Elevated troponin possibly 2/2 COPD exacerbation  8. Elevated ProBNP  9. Hx of CAD  10. Hx of HTN  11. Hx of HLD  12. Hx of chronic loose bowel movement possibly 2/2 magnesium oxide   13. Incidental finding of left lung base nodule  14. Incidental finding of mild left renal artery stenosis and 60% stenosis of right renal artery    Discharge Diagnosis:  1. Atypical chest pain likely secondary to AAA with ulcerative plaque  2. Acute hypoxic insufficiency secondary to mild COPD exacerbation likely 2/2 viral gastroenteritis  3. Acute diarrhea likely 2/2 viral gastroenteritis  4. Hx of mechanical aortic valve replacement  5. Supratherapeutic INR  6. Hx of mechanical aortic valve replacement  7. Elevated troponin possibly 2/2 COPD exacerbation  8. Elevated ProBNP  9. Hx of CAD  10. Hx of HTN  11. Hx of HLD  12. Hx of chronic loose bowel movement possibly 2/2 magnesium oxide   13. Incidental finding of left lung base nodule  14. Incidental finding of mild left renal artery stenosis and 60% stenosis of right renal artery      Hospital Course:   Overnight patient was admitted under [address] [place] [address]. Patient care was transferred to [employer] Team 1 this morning. Patient came in for SOB and abdominal pain of a couple weeks duration. There was associated nausea, morning vomiting (non bloody), nonmucoid nonbloody diarrhea (8 loose BM/day) and fever (Tmax 99). Patient had decreased appetite due to nausea.  There was no change in her bowel movement; patient normally has loose stools 3 times per day. Patient experienced radiation of abdominal pain to her back between both scapula 2 days prior to presentation to ED. Patient slept in a semirecumbent position overnight and pain was gone the following morning and has not recurred. Of note, patient claimed other members of her household have also had diarrhea and fever in the past couple of weeks. Patient received 2 doses of COVID vaccine but not the booster.      In the ED, there was concern for possible abdominal aortic aneurysm dissection on CTA chest. Patient was evaluated by vascular surgery and review of imaging suggests findings were more consistent with small aortic ulcerative plaque. The infrarenal abdominal aortic aneurysm increased in size from 3.1-3.2 cm to 3.6 cm. There is no immediate plans for surgical intervention.      Patient was seen and examined this morning at bedside in mild respiratory distress. Patient had mild retraction of SCM with respirations while on 1 L O2 per NC. SpO2 was 99%. Patient was otherwise alert and oriented x 3. She still has abdominal discomfort but denied chest pain. During admission, patient remained requiring oxygen supplementation. Respiratory panel was negative and urine culture was negative. WBC was maximum at 12.2 and is likely 2/2 steroids as patient remained afebrile. Cardiology was consulted with recommendations for a lexiscan as patient had elevated troponin (120 --> 96 --> 88) with history of nonobstructive CAD. [place] was cancelled twice because patient was having bronchospasms. Patient was cleared for discharge from cardiology POV with recommendations for continuing aspirin, lipitor and toprol. Pulmonology was consulted because patient was still having wheezing despite being on brovana, pulmicort, and duoneb. Recommendations were to maintain home meds of darlisep.  For the acute exacerbation of COPD, patient received 2 days of levaquin and 2 days of azithromycin, last dose of azithromycin was given on day of discharge. No antibiotics on discharge. Patient was prescribed tapering dose of prednisone. On ambulatory pulse oximeter evaluation, patient desaturated to 87%. She was able to recover to 93% with 2 L O2. Patient was discharged with home oxygen and instructed to not smoke or be near anyone smoking while on oxygen supplement. No dose changes on coumadin regimen. Of note, patient had hematuria on urinalysis. Patient denied dysuria and claimed she has had hematuria on urinalysis for years. She previously saw a urologist in her hometown but stated she did not need any medical management. Patient would like some time to think about referral to a urologist here for hematuria. Patient plans to discuss it with her PCP Dr. Prisca Meredith on her hospital follow up visit. Significant findings (history and exam, laboratory, radiological, pathology, other tests):   · General Appearance: alert, cooperative and no distress  · HEENT:  Head: Normal, normocephalic, atraumatic. · Neck: no JVD and supple, symmetrical, trachea midline  · Lung: Decreased breath sounds bilaterally, +expiratory wheezing b/l bases and mid lung, no rhonchi, no use of SCM with respirations  · Heart: regular rate and rhythm, S1, S2 normal, no murmur, click, rub or gallop  · Abdomen: soft, non-tender; bowel sounds normal; no masses,  no organomegaly  · Extremities:  extremities normal, atraumatic, no cyanosis or edema, pulses +2  · Musculokeletal: No joint swelling, no muscle tenderness. ROM normal in all joints of extremities. · Neurologic: Mental status: Alert, oriented x3, thought content appropriate, CN 2-12 grossly intact    Pending test results:   1. None    Consults:  1. Vascular surgery  2. Cardiology  3. Pulmonology  4. Pharmacy    Procedures:  1. None    Condition at discharge: Stable    Disposition: home    Outpatient Recommendations:  1. Smoking cessation  2.  Do not smoke while on home oxygen  3. Take oral steroids as prescribed    Discharge Medications:     Medication List      START taking these medications    predniSONE 10 MG tablet  Commonly known as: DELTASONE  Take 4 tablets by mouth daily for 1 day, THEN 2 tablets daily for 3 days, THEN 1 tablet daily for 3 days. Start taking on: April 3, 2022        CHANGE how you take these medications    * warfarin 6 MG tablet  Commonly known as: COUMADIN  Take as directed. If you are unsure how to take this medication, talk to your nurse or doctor. Original instructions: Take 1 tablet by mouth daily Mondays and Wednesdays  What changed:   · how much to take  · additional instructions     * warfarin 5 MG tablet  Commonly known as: COUMADIN  Take as directed. If you are unsure how to take this medication, talk to your nurse or doctor. Original instructions: Take 1 tablet by mouth daily  What changed: Another medication with the same name was changed. Make sure you understand how and when to take each. * This list has 2 medication(s) that are the same as other medications prescribed for you. Read the directions carefully, and ask your doctor or other care provider to review them with you.             CONTINUE taking these medications    * albuterol (2.5 MG/3ML) 0.083% nebulizer solution  Commonly known as: PROVENTIL  Take 3 mLs by nebulization every 6 hours as needed for Wheezing or Shortness of Breath     * albuterol sulfate  (90 Base) MCG/ACT inhaler  inhale 1 puff every 4 hours if needed for wheezing     aspirin EC 81 MG EC tablet  Take 1 tablet by mouth daily     atorvastatin 20 MG tablet  Commonly known as: LIPITOR  take 1 tablet by mouth once daily     Azelastine HCl 137 MCG/SPRAY Soln  2 sprays by Nasal route daily     Blood Pressure Cuff Misc  Dx:  Hypertension with labile blood pressure     Breztri Aerosphere 160-9-4.8 MCG/ACT Aero  Generic drug: Budeson-Glycopyrrol-Formoterol  inhale 2 puffs INTO THE LUNGS twice a day     Daliresp 250 MCG tablet  Generic drug: Roflumilast  take 1 tablet by mouth daily     FIBER CHOICE PO     fluticasone 50 MCG/ACT nasal spray  Commonly known as: FLONASE  2 sprays by Nasal route daily     lisinopril 20 MG tablet  Commonly known as: PRINIVIL;ZESTRIL  Take 1 tablet by mouth daily take 1 tablet by mouth once daily     Magnesium Oxide 250 MG Tabs tablet  Commonly known as: MAGNESIUM-OXIDE  Take 1 tablet by mouth daily     metoprolol succinate 25 MG extended release tablet  Commonly known as: Toprol XL  Take 1 tablet by mouth daily     montelukast 10 MG tablet  Commonly known as: SINGULAIR  take 1 tablet by mouth at bedtime     omeprazole 40 MG delayed release capsule  Commonly known as: PRILOSEC  take 1 capsule by mouth every morning BEFORE BREAKFAST     rOPINIRole 0.25 MG tablet  Commonly known as: REQUIP  Take 2 tab in morning and 2 tabs before bed     sertraline 100 MG tablet  Commonly known as: ZOLOFT  take 1 and 1/2 tablet by mouth once daily         * This list has 2 medication(s) that are the same as other medications prescribed for you. Read the directions carefully, and ask your doctor or other care provider to review them with you. Where to Get Your Medications      These medications were sent to [address], [address] [place] [address]  [facility]    Phone: [phone]   · predniSONE 10 MG tablet          [facility] Internal Medicine Resident Service    Activity as tolerated  Diet: regular adult  Be compliant with your medications and take them as prescribed. Special Instructions:   1. Please finish the course of oral steroids as prescribed. Smoking cessation is highly recommended. 2. Please continue taking your coumadin as previously prescribed by Dr. Cony Echavarria: take 5mg daily except on Mondays and Fridays take 7.5mg. Have your INR rechecked 1 month.  Please remember you have a follow up appointment with cardiology on Monday April 4, 2022.     3. The [address] [place] [address] Internal Medicine Clinic will call you on Monday to inform you of your hospital follow up appointment. If you do not receive a call by Tuesday April 5, 2022, please call the clinic Tel: [phone]. 4. Please schedule a follow up appointment with your Urologist for evaluation of hematuria, which is blood in your urine. 5. Please use home oxygen to prevent low oxygen level in your blood, especially while you are walking. Please do not smoke while using the home oxygen supplement or while near it as this may cause an explosion. Please avoid being near anyone who is smoking while using oxygen. Hospital Follow up: [address] [address] with House Team   Call to confirm appointment Tel: [phone] ([address] [place] [address] Internal Medicine Clinic)     Other Follow-Ups:    Future Appointments   Date Time Provider Esmer Lee   4/4/2022  9:00 AM SCHEDULE, Litzy Kinney CARDIO [facility]   4/13/2022 10:30 AM Susannah Evans,  ACC [facility]   5/3/2022  8:00 AM SCHEDULE, [facility] PALLIATIVE CARE PROVIDER [facility] PALLIAT [facility]   10/5/2022  8:45 AM Ramonita Zamorano MD [facility]       Other than any new prescriptions given to you today, the list of home going meds on this After Visit Summary are based on information provided to us from you. This information, including the list, dose, and frequency of medications is only as accurate as the information you provided. If you have any questions or concerns about your home medications, please contact your Primary Care Physician for further clarification.       Carley Sales MD  PGY-1   11:20 AM 4/2/2022

## 2022-04-02 NOTE — PROGRESS NOTES
Pharmacy Consultation Note  (Warfarin Dosing and Monitoring)  Initial consult date: 3-  Consulting Provider: Dr. Hector Cortez is a 57 y.o. female for whom pharmacy has been asked to manage warfarin therapy. Weight:   Wt Readings from Last 1 Encounters:   03/29/22 150 lb (68 kg)     TSH:    Lab Results   Component Value Date    TSH 2.630 09/10/2020     Hepatic Function Panel:                            Lab Results   Component Value Date    ALKPHOS 68 04/01/2022    ALT 16 04/01/2022    AST 17 04/01/2022    PROT 6.7 04/01/2022    BILITOT 0.3 04/01/2022    BILIDIR <0.2 06/25/2018    IBILI see below 06/25/2018    LABALBU 4.0 04/01/2022       Current warfarin drug-drug interactions include: quinolones (incr INR) and steroids (variable/dose dependent)     Levofloxacin 500 mg Started 3/30, Stopped 3/31. Prednisone 40 mg daily; Started 3/30    Recent Labs     03/31/22  0745 04/01/22  0412 04/02/22  0743   HGB 10.6* 11.1* 10.8*    228 247     Date Warfarin Dose INR Heparin or LMWH Comment   3/29 No Dose 7.4 X    3/30 No Dose 5.1 X    3/31 5 mg 2.3 X Pharmacy consulted to dose    4/1  5 mg  1.9 X    4/2 < 5 mg > 2.2 X      Assessment:  · Patient is a 57 y.o. female on warfarin for Mechanical Heart Valve (atrial). Patient's home warfarin dosing regimen is 5 mg STWRS and 7.5 mg on MF only. · Admitted 3/29 for SOB and CP. Found to have aortic and abdominal aneurysms (incrsng size). · On warfarin PTA for Hx of mechanical AVR. Warfarin dose PTA was recently increased on 3/8/2022 from 5 mg daily to 5 mg STWRS and 7.5 mg on MF only (INR was 2 on 3/8 and 2.3 on 2/10/2022 on 5 mg daily). INR supratherapeutic (7.4 --> 5.1) on admission. · Levofloxacin started on admission, stopped 3/31 after two doses administered. Significant drug-drug interaction between warfarin and levofloxacin (increased INR).   · Goal INR 2.5 - 3.5  · 3/31: INR =2.3; Pharmacy consulted to dose warfarin. · 4/1: INR 1.9 today, levofloxacin discontinued yesterday 3/31. · 4/2: INR 2.2 today    Plan:  · Give warfarin 5 mg x1 prior to discharge  · Daily PT/INR until the INR is stable within the therapeutic range. · Pharmacist will follow and monitor/adjust dosing as necessary.     Gustabo Ahn PharmD   Pharmacy Resident   Phone: [phone]  4/2/2022 2:59 PM

## 2022-04-02 NOTE — CARE COORDINATION
Social Work Discharge Planning:  Discharge order noted. SW made referral to Karla Callahan to [facility] for patient O2. Per Karla Callahan he will have O2 delivered to the room sometime today.    Electronically signed by PAVITHRA Corona on 4/2/2022 at 12:39 PM

## 2022-04-02 NOTE — PROGRESS NOTES
Department of Internal Medicine  Division of Pulmonary, Critical Care & Sleep Medicine  Pulmonary [address]        Chief complaint o  SOB ,Cough     HISTORY OF PRESENT ILLNESS:   SOB has  improved   No chest pain   NO OTT   No hemoptysis  Has O2 at home        Neha Sanchez is a 57y.o. year old femalPHYSICAL EXAMINATION:  Vitals:    04/02/22 1307   BP:    Pulse:    Resp:    Temp:    SpO2: 95%     Constitutional: This is a well developed, well nourished 57y.o. year old female who is alert, oriented, coopative and in no apparent distress. Head was normocephalic and atraumatic. EENT: Mallampati class :  Extraocular muscles intact. External canals are patent and no discharge was appreciated. Septum was midline,   mucosa was without erythema, exudates or cobblestoning. No thrush was noted. Neck: Supple without thyromegaly. No elevated JVP. Trachea was midline. No carotid bruits were auscultated. Respiratory:decrease breath sound ,Rhonchi bilateral     Cardiovascular: Regular without murmur, clicks, gallops or rubs. There is no left or right ventricular heave. Pulses:  Carotid, radial and femoral pulses were equally bilaterally. Abdomen: Slightly rounded and soft without organomegaly. No rebound, rigidity or guarding was appreciated. Lymphatic: No lymphadenopathy. Musculoskeletal: normal range of motion     Extremities: no edema ,or cyanosis   Skin:  Warm and dry. Good color, turgor and pigmentation. No lesions or scars. Neurological/Psychiatric: The patient's general behavior, level of consciousness, thought content and emotional status is normal.  Cranial nerves II-XII are intact.       DATA:     FVC 2.7 which is 69 of predicted    FEV1:0.75 which is 28 of predicted  FEV1/FVC 53% which is 41 of predicted  There Was significant bronchodilator response  MVV 37 which is not 39 of predicted    ERV 0.19 which is 90% of predicted TLC is 7.4 which is 137 of predicted  RV/TLC 64 which is 168 predicted indicating of severe air trapping    DLCO is 47 and I will therefore limited correct 51 indicating of moderate reduction in DLCO    IMPRESSION:    Acute Hypoxic resp failure   1-Very COPD with possible asthmatic bronchitis   2-Possible Asthma component. especially eosinophilic type   3-Tobacco abuse  4-severe emphysema  5 Lung nodules           6-PHTN ,mild,diastolic dysfunction     PLAN:   continue home Breztri      Wean Prednisone   respiratory panel negative, Urine cx negative  Currently on 2 L O2 via NC  Pending respiratory culture, gram stain  Continue mucomyst and flutter valve for mucus   Continue prednisone 40 mg oral daily,wean over 1 week   Started on azithromycin for 3 days for mild COPD exacerbation  Keep Darlisep on discharge   Check ABG in am to see if need NIMV   Explain to her that if she continue to smoke ,nothing will help her and she is putting her life at risk of resp failure    AAA as vacular   Lung nodule stable  Change inhalers on discharge Trelegy 100  Sedrick Nelson MD  Pulmonary & Critical Care Medicine

## 2022-04-04 ENCOUNTER — ANTI-COAG VISIT (OUTPATIENT)
Dept: CARDIOLOGY CLINIC | Age: 62
End: 2022-04-04
Payer: MEDICAID

## 2022-04-04 DIAGNOSIS — Z95.2 H/O MECHANICAL AORTIC VALVE REPLACEMENT: ICD-10-CM

## 2022-04-04 LAB
INTERNATIONAL NORMALIZATION RATIO, POC: 2.2
PROTHROMBIN TIME, POC: NORMAL

## 2022-04-04 PROCEDURE — 93793 ANTICOAG MGMT PT WARFARIN: CPT | Performed by: INTERNAL MEDICINE

## 2022-04-04 PROCEDURE — 85610 PROTHROMBIN TIME: CPT | Performed by: INTERNAL MEDICINE

## 2022-04-04 NOTE — PROGRESS NOTES
INR today is  therapeutic at 2.2. Pt to cont. 5mg daily, with 7.5mg on Mon & Fri. Recheck INR in 1 month.

## 2022-04-05 ENCOUNTER — TELEPHONE (OUTPATIENT)
Dept: CARDIOLOGY CLINIC | Age: 62
End: 2022-04-05

## 2022-04-07 NOTE — TELEPHONE ENCOUNTER
August is fine
Information left on voicemail
Patient was recently hospitalized and was instructed to call to schedule a follow up.   She feels fine - is on a recall for August, but wants to double check with you
35

## 2022-04-13 ENCOUNTER — OFFICE VISIT (OUTPATIENT)
Dept: INTERNAL MEDICINE | Age: 62
End: 2022-04-13
Payer: MEDICARE

## 2022-04-13 VITALS
RESPIRATION RATE: 16 BRPM | OXYGEN SATURATION: 91 % | TEMPERATURE: 97.4 F | HEART RATE: 79 BPM | DIASTOLIC BLOOD PRESSURE: 59 MMHG | HEIGHT: 65 IN | WEIGHT: 154.8 LBS | BODY MASS INDEX: 25.79 KG/M2 | SYSTOLIC BLOOD PRESSURE: 110 MMHG

## 2022-04-13 DIAGNOSIS — J44.9 CHRONIC OBSTRUCTIVE PULMONARY DISEASE, UNSPECIFIED COPD TYPE (HCC): ICD-10-CM

## 2022-04-13 DIAGNOSIS — F32.A DEPRESSION, UNSPECIFIED DEPRESSION TYPE: Primary | ICD-10-CM

## 2022-04-13 DIAGNOSIS — R31.9 HEMATURIA, UNSPECIFIED TYPE: ICD-10-CM

## 2022-04-13 DIAGNOSIS — I25.10 CORONARY ARTERY DISEASE INVOLVING NATIVE HEART, UNSPECIFIED VESSEL OR LESION TYPE, UNSPECIFIED WHETHER ANGINA PRESENT: ICD-10-CM

## 2022-04-13 DIAGNOSIS — Z95.2 H/O MECHANICAL AORTIC VALVE REPLACEMENT: ICD-10-CM

## 2022-04-13 PROCEDURE — 3017F COLORECTAL CA SCREEN DOC REV: CPT | Performed by: INTERNAL MEDICINE

## 2022-04-13 PROCEDURE — 1111F DSCHRG MED/CURRENT MED MERGE: CPT | Performed by: INTERNAL MEDICINE

## 2022-04-13 PROCEDURE — 4004F PT TOBACCO SCREEN RCVD TLK: CPT | Performed by: INTERNAL MEDICINE

## 2022-04-13 PROCEDURE — G8427 DOCREV CUR MEDS BY ELIG CLIN: HCPCS | Performed by: INTERNAL MEDICINE

## 2022-04-13 PROCEDURE — 99214 OFFICE O/P EST MOD 30 MIN: CPT | Performed by: INTERNAL MEDICINE

## 2022-04-13 PROCEDURE — G8419 CALC BMI OUT NRM PARAM NOF/U: HCPCS | Performed by: INTERNAL MEDICINE

## 2022-04-13 PROCEDURE — 3023F SPIROM DOC REV: CPT | Performed by: INTERNAL MEDICINE

## 2022-04-13 RX ORDER — MAGNESIUM 250 MG
TABLET ORAL
COMMUNITY
Start: 2022-04-02

## 2022-04-13 ASSESSMENT — PATIENT HEALTH QUESTIONNAIRE - PHQ9
SUM OF ALL RESPONSES TO PHQ QUESTIONS 1-9: 21
10. IF YOU CHECKED OFF ANY PROBLEMS, HOW DIFFICULT HAVE THESE PROBLEMS MADE IT FOR YOU TO DO YOUR WORK, TAKE CARE OF THINGS AT HOME, OR GET ALONG WITH OTHER PEOPLE: 2
SUM OF ALL RESPONSES TO PHQ9 QUESTIONS 1 & 2: 6
SUM OF ALL RESPONSES TO PHQ QUESTIONS 1-9: 21
1. LITTLE INTEREST OR PLEASURE IN DOING THINGS: 3
6. FEELING BAD ABOUT YOURSELF - OR THAT YOU ARE A FAILURE OR HAVE LET YOURSELF OR YOUR FAMILY DOWN: 0
3. TROUBLE FALLING OR STAYING ASLEEP: 3
9. THOUGHTS THAT YOU WOULD BE BETTER OFF DEAD, OR OF HURTING YOURSELF: 0
SUM OF ALL RESPONSES TO PHQ QUESTIONS 1-9: 21
4. FEELING TIRED OR HAVING LITTLE ENERGY: 3
7. TROUBLE CONCENTRATING ON THINGS, SUCH AS READING THE NEWSPAPER OR WATCHING TELEVISION: 3
8. MOVING OR SPEAKING SO SLOWLY THAT OTHER PEOPLE COULD HAVE NOTICED. OR THE OPPOSITE, BEING SO FIGETY OR RESTLESS THAT YOU HAVE BEEN MOVING AROUND A LOT MORE THAN USUAL: 3
SUM OF ALL RESPONSES TO PHQ QUESTIONS 1-9: 21
5. POOR APPETITE OR OVEREATING: 3
2. FEELING DOWN, DEPRESSED OR HOPELESS: 3

## 2022-04-13 ASSESSMENT — LIFESTYLE VARIABLES
HOW OFTEN DO YOU HAVE A DRINK CONTAINING ALCOHOL: NEVER
HOW MANY STANDARD DRINKS CONTAINING ALCOHOL DO YOU HAVE ON A TYPICAL DAY: 1 OR 2

## 2022-04-13 NOTE — PROGRESS NOTES
[facility]    Attending Physician Statement  I have discussed the case, including pertinent history and exam findings with the resident physician. I agree with the assessment, plan and orders as documented by the resident. I have reviewed all pertinent PMHx, PSHx, FamHx, SocialHx, medications, and allergies and updated history as appropriate. Patient presents for hospital follow up appointment. Hospital follow-up appt   Admitted and work-up extensive    Vascular, Cardiology, and Pulmonary following inpatient    Discharged home with continuation of warfarin   Last INR at goal and being managed by Cardiology   Vascular follow-up planned   Outpatient stress test likely to be undergone    Discharged on oxygen- but not using regularly    Denies any new symptoms or worsening symptoms since discharge   Needs to follow with Vascular, Cardiology, and Pulmonary   Instructed to utilize supplemental oxygen appropriately     Atypical Chest Pain    Following with Cards   Unable to complete inpatient stress testing due to bronchospasm   No new symptoms   Coordinated with patient for need to follow with Dr. Cristine Lorenzo    ASA/Statin continued     Descending Thoracic Aortic Ulceration with abdominal aortic aneurysm    Vascular following   6 month follow-up surveillance planned   Statin continued   Warfarin continued    CTS appt 4/26     Acute Hypoxic Respiratory Failure secondary to COPD exacerbation    Continue supplemental oxygen    Continue regimen including addition of daliresp   4 mm Nodule noted- need follow-up with Pulm    Hx of Mechanical Aortic Repair    Warfarin continued     Depression with significant elevation in PHQ9 of 21   Denies SI or HI    No worsening symptoms    Patient does not wish for further intervention   LISW to follow     Remainder of medical problems as per resident note.     Elizabeth Cotton MD, [address]   4/13/2022 12:13 PM
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
## 2022-04-13 NOTE — PATIENT INSTRUCTIONS
Internal medicine    Follow ups   Please keep all other follow up appointments:  o Pulmonology   o Cardiology   o Cardiothoracic Surgery   o Vascular Surgery   o Urology - you will receive a call from Dr. Arabella Andre office to schedule this appointment. Please call the Internal Medicine office if you do not hear from them in the next 7-10 days. Changes in healthcare    Please take all medications as indicated   Diet: regular diet    Activity: activity as tolerated   Additional labs, testing or imaging needed after discharge   o Lipid panel      Please contact us if you have any concerns, wish to change or make an appointment:  [facility]    Phone: [phone]   Fax: [phone]   Or please call the nurse line at [phone].  Should you have further questions in regards to this visit, you can review your clinical note and after visit summary document on your Viadeo account. Other questions can be directed to our nurse line at [phone].  Other than any new prescriptions given to you today, the list of home medications on this After Visit Summary are based on information provided to us from you and your healthcare providers. This information, including the list, dose, and frequency of medications is only as accurate as the information you provided. If you have any questions or concerns about your home medications, please contact your Primary Care Physician for further clarification. Patient Education        Blood in the Urine: Care Instructions  Your Care Instructions     Blood in the urine, or hematuria, may make the urine look red, brown, or pink. There may be blood every time you urinate or just from time to time. You cannotalways see blood in the urine, but it will show up in a urine test.  Blood in the urine may be serious. It should always be checked by a doctor.  Your doctor may recommend more tests, including an X-ray, a CT scan, or acystoscopy (which lets a doctor look inside the urethra and bladder). Blood in the urine can be a sign of another problem. Common causes are bladder infections and kidney stones. An injury to your groin or your genital area can also cause bleeding in the urinary tract. Very hard exercise--such as running a marathon--can cause blood in the urine. Blood in the urine can also be a sign of kidney disease or cancer in the bladder or kidney. Many cases of blood in the urine are caused by a harmless condition that runs in families. This is calledbenign familial hematuria. It does not need any treatment. Sometimes your urine may look red or brown even though it does not contain blood. For example, not getting enough fluids (dehydration), taking certain medicines, or having a liver problem can change the color of your urine. Eating foods such as beets, rhubarb, or blackberries or foods with red food coloringcan make your urine look red or pink. Follow-up care is a key part of your treatment and safety. Be sure to make and go to all appointments, and call your doctor if you are having problems. It's also a good idea to know your test results and keep alist of the medicines you take. When should you call for help? Call your doctor now or seek immediate medical care if:     You have symptoms of a urinary infection. For example:  ? You have pus in your urine. ? You have pain in your back just below your rib cage. This is called flank pain. ? You have a fever, chills, or body aches. ? It hurts to urinate. ? You have groin or belly pain.      You have more blood in your urine. Watch closely for changes in your health, and be sure to contact your doctor if:     You have new urination problems.      You do not get better as expected. Where can you learn more? Go to https://chkurt.AFTER-MOUSE. org and sign in to your Just Sing It account.  Enter D528 in the GreenGar box to learn more about \"Blood in the Urine: Care Instructions. \"     If you do not have an account, please click on the \"Sign Up Now\" link. Current as of: October 18, 2021               Content Version: 13.2  © 2006-2022 [employer]. Care instructions adapted under license by [facility] ([facility]). If you have questions about a medical condition or this instruction, always ask your healthcare professional. [address] any warranty or liability for your use of this information. Patient Education        Oxygen Therapy: Care Instructions  Overview     Oxygen therapy helps you get more oxygen into your lungs and bloodstream. You may use it if you have a disease that makes it hard to breathe, such as COPD, pulmonary fibrosis (scarring of the lungs), or heart failure. Oxygen therapycan make it easier for you to breathe and can reduce your heart's workload. Some people need extra oxygen all the time. Others need it from time to time throughout the day or overnight. A doctor will prescribe how much oxygen youneed and how often to use it. To breathe the oxygen, most people use a nasal cannula (say \"TIMMY-yuh-kirk\"). This is a thin tube with two prongs that fit just inside your nose. People whoneed a lot of oxygen may need to use a mask that fits over the nose and mouth. Follow-up care is a key part of your treatment and safety. Be sure to make and go to all appointments, and call your doctor if you are having problems. It's also a good idea to know your test results and keep alist of the medicines you take. How can you care for yourself at home? To help yourself   Using oxygen may dry out your nose or lips. Use water-based lubricants on your lips or nostrils. Do not use an oil-based product like petroleum jelly. They may cause skin burns.  If you use a nasal cannula, the tubing may rub under your nostrils and around your ears. To keep your skin from getting sore, tuck some gauze under the tubing.  Use a water-based lotion on rubbed areas.  Do not use alcohol or take drugs that relax you, because they will slow your breathing rate.  Keep track of how much oxygen is in the tank, and reorder before it runs out. If a holiday is coming up or you expect bad weather, order in advance or make your regular order larger.  You may need extra oxygen when you travel to high altitudes or travel by plane. Ask your doctor about this.  If you are getting oxygen directly to your windpipe through an opening in your neck, your doctor will teach you how to care for the equipment. To make sure oxygen is flowing   Check the flow by holding your mask or cannula up to your ear and listening for the \"hiss\" of airflow.  If you have a nasal cannula, dip the prongs in a glass of water. If you see bubbles, oxygen is coming through.  Check your pressure gauge or contents indicator.  If you use an oxygen concentrator, make sure it is turned on and plugged in. If you use a cylinder, make sure the valve is open.  Look for kinks, blockages, or water in the tubing. Be sure the tubing is connected to the oxygen source.  Do not change your oxygen flow rate. Your doctor sets this at the correct level. Higher flow rates usually do not help and can increase the risk of harmful carbon dioxide buildup in the blood. To be safe   Do not leave cords or tubing running across an area where you or someone else may trip on it.  Do not let oxygen containers get hot. Store them in a cool place where there is airflow. Do not leave them in a car trunk or a hot vehicle.  Keep oxygen containers upright. Make sure they do not fall over and get damaged. Try securing the tanks in a sturdy container or securing them with a rope or a chain.  Avoid touching frost that can form on liquid oxygen devices. Veda Bang can cause skin burns.  Watch for signs of oxygen leaks.  If you hear a loud hissing from your container or if it empties too fast, stay away from the container. Open windows right away and call the company that brought the oxygen system to your home.  Do not use oxygen around anything that could spark or easily cause a fire. ? Do not smoke or vape or let others smoke or vape while you are using oxygen. Put up \"no smoking\" signs in your home. ? Do not use oxygen near open flames, such as candles, fireplaces, gas stoves, or hot water heaters. Do not use it near electric razors, hair dryers, heating pads, or anything that may spark. ? Keep a working fire extinguisher in your home where it is easy to get to.  ? If a fire starts, turn off the oxygen right away and leave the house. ? If you have an oxygen concentrator, do not use it if the cord looks damaged. Do not use an extension cord to plug it in. Do not plug it into an outlet that has other appliances plugged into it. To care for the equipment   Follow the directions that come with the equipment for using and caring for it.  Wash your cannula or mask with a liquid soap and warm water daily. Replace them every 2 to 4 weeks.  If you have a cold, change the nasal prongs when your cold symptoms are done.  If you have an oxygen concentrator, unplug the unit and wipe down the cabinet with a damp cloth daily. Clean the air filter at least once a week. Where can you learn more? Go to https://[employer] org and sign in to your MegaZebra account. Enter T764 in the [facility] box to learn more about \"Oxygen Therapy: Care Instructions. \"     If you do not have an account, please click on the \"Sign Up Now\" link. Current as of: July 6, 2021               Content Version: 13.2  © 7128-9977 Healthwise, Incorporated. Care instructions adapted under license by [facility] ([facility]).  If you have questions about a medical condition or this instruction, always ask your healthcare professional. [address] any warranty or liability for your use of this information.

## 2022-04-15 ENCOUNTER — TELEPHONE (OUTPATIENT)
Dept: INTERNAL MEDICINE | Age: 62
End: 2022-04-15

## 2022-04-15 DIAGNOSIS — R68.81 EARLY SATIETY: Primary | ICD-10-CM

## 2022-04-15 NOTE — TELEPHONE ENCOUNTER
Upon further evaluation of patient's imaging studies from her recent hospitalization, it was noted that the patient's CT abdomen/pelvis showed a collapsed stomach with thickened gastric wall, concerning for gastritis. Currently, recommending EGD to further evaluate patient's reported symptoms of early satiety and abdominal pain. Will place referral today, and office to reach out to notify patient.      Will continue to follow     Val Gambino DO, PGY2   4/15/2022

## 2022-04-25 VITALS
HEIGHT: 65 IN | DIASTOLIC BLOOD PRESSURE: 60 MMHG | WEIGHT: 154 LBS | BODY MASS INDEX: 25.66 KG/M2 | SYSTOLIC BLOOD PRESSURE: 115 MMHG

## 2022-04-26 ENCOUNTER — OFFICE VISIT (OUTPATIENT)
Dept: CARDIOTHORACIC SURGERY | Age: 62
End: 2022-04-26
Payer: MEDICARE

## 2022-04-26 DIAGNOSIS — I71.40 ABDOMINAL AORTIC ANEURYSM (AAA) WITHOUT RUPTURE: Primary | ICD-10-CM

## 2022-04-26 PROCEDURE — 3017F COLORECTAL CA SCREEN DOC REV: CPT | Performed by: THORACIC SURGERY (CARDIOTHORACIC VASCULAR SURGERY)

## 2022-04-26 PROCEDURE — 99204 OFFICE O/P NEW MOD 45 MIN: CPT | Performed by: THORACIC SURGERY (CARDIOTHORACIC VASCULAR SURGERY)

## 2022-04-26 PROCEDURE — G8419 CALC BMI OUT NRM PARAM NOF/U: HCPCS | Performed by: THORACIC SURGERY (CARDIOTHORACIC VASCULAR SURGERY)

## 2022-04-26 PROCEDURE — 1111F DSCHRG MED/CURRENT MED MERGE: CPT | Performed by: THORACIC SURGERY (CARDIOTHORACIC VASCULAR SURGERY)

## 2022-04-26 PROCEDURE — 4004F PT TOBACCO SCREEN RCVD TLK: CPT | Performed by: THORACIC SURGERY (CARDIOTHORACIC VASCULAR SURGERY)

## 2022-04-26 PROCEDURE — G8427 DOCREV CUR MEDS BY ELIG CLIN: HCPCS | Performed by: THORACIC SURGERY (CARDIOTHORACIC VASCULAR SURGERY)

## 2022-04-26 NOTE — PROGRESS NOTES
CC: Thoracic ascending aneurysm      HPI:  Tennille Palmer is a 57 y.o. female whom is evaluated via telephone for surveillance of an ascending aortic aneurysm. Previous ascending aortic aneurysm measurement in 2020 was 4.3 cm. She has a likely bicuspid aortic valve with replacement. She denies any SOB, CP, back pain, or any major complaints. She continues to take antihypertensive medication, and is a nonsmoker. Review of Systems:   Constitutional: Negative for fever and chills. Respiratory: Negative for cough, chest tightness and shortness of breath. Cardiovascular: Negative for chest pain, palpitations and leg swelling. Musculoskeletal: Negative for back pain. Neurological: Negative for dizziness, syncope and light-headedness. Objective:   Physical Exam   Constitutional: oriented to person, place, and time. No distress. Remaining PE deferred due to telephone encounter. Assessment:      Stable 4.3cm ascending aortic aneurysm       Plan:      Repeat chest CT in 2 year(s)  Importance of BP control reinforced       Educated on the importance of strict blood pressure control and tobacco free life-style to prevent progression            Tennlile Palmer is a 57 y.o. female evaluated via telephone on 4/26/2022. Consent:  She and/or health care decision maker is aware that that she may receive a bill for this telephone service, depending on her insurance coverage, and has provided verbal consent to proceed: Yes      Documentation:  I communicated with the patient and/or health care decision maker about above. Details of this discussion including any medical advice provided: as above      I affirm this is a Patient Initiated Episode with a Patient who has not had a related appointment within my department in the past 7 days or scheduled within the next 24 hours.     Patient identification was verified at the start of the visit: Yes    Total Time: minutes: 5-10 minutes    Note: not billable if this call serves to triage the patient into an appointment for the relevant concern    Syed Schwab MD

## 2022-04-27 DIAGNOSIS — I10 ESSENTIAL HYPERTENSION: ICD-10-CM

## 2022-04-28 ENCOUNTER — HOSPITAL ENCOUNTER (OUTPATIENT)
Age: 62
Discharge: HOME | End: 2022-04-28
Payer: MEDICARE

## 2022-04-28 ENCOUNTER — HOSPITAL ENCOUNTER (OUTPATIENT)
Dept: PULMONOLOGY | Age: 62
Setting detail: THERAPIES SERIES
Discharge: HOME | End: 2022-04-28
Payer: MEDICARE

## 2022-04-28 VITALS — OXYGEN SATURATION: 96 %

## 2022-04-28 DIAGNOSIS — I25.10 CORONARY ARTERY DISEASE INVOLVING NATIVE HEART, UNSPECIFIED VESSEL OR LESION TYPE, UNSPECIFIED WHETHER ANGINA PRESENT: ICD-10-CM

## 2022-04-28 LAB
CHOLESTEROL, TOTAL: 164 MG/DL (ref 0–199)
HDLC SERPL-MCNC: 58 MG/DL
LDL CHOLESTEROL CALCULATED: 87 MG/DL (ref 0–99)
TRIGL SERPL-MCNC: 97 MG/DL (ref 0–149)
VLDLC SERPL CALC-MCNC: 19 MG/DL

## 2022-04-28 PROCEDURE — 36415 COLL VENOUS BLD VENIPUNCTURE: CPT

## 2022-04-28 PROCEDURE — 94626 PHY/QHP OP PULM RHB W/MNTR: CPT

## 2022-04-28 PROCEDURE — 80061 LIPID PANEL: CPT

## 2022-04-28 RX ORDER — METOPROLOL SUCCINATE 25 MG/1
TABLET, EXTENDED RELEASE ORAL
Qty: 90 TABLET | Refills: 0 | Status: SHIPPED
Start: 2022-04-28 | End: 2022-05-04 | Stop reason: SDUPTHER

## 2022-04-28 ASSESSMENT — PULMONARY FUNCTION TESTS
FVC (LITERS): 2.7
FEV1 (%PREDICTED): 28
FEV1/FVC: 41

## 2022-04-28 ASSESSMENT — EXERCISE STRESS TEST
PEAK_HR: 87
PEAK_BP: 122/64
PEAK_BP: 104/54

## 2022-05-03 ENCOUNTER — HOSPITAL ENCOUNTER (OUTPATIENT)
Dept: PULMONOLOGY | Age: 62
Setting detail: THERAPIES SERIES
Discharge: HOME | End: 2022-05-03
Payer: MEDICARE

## 2022-05-03 ENCOUNTER — OFFICE VISIT (OUTPATIENT)
Dept: INTERNAL MEDICINE | Age: 62
End: 2022-05-03
Payer: MEDICARE

## 2022-05-03 DIAGNOSIS — F33.0 MILD EPISODE OF RECURRENT MAJOR DEPRESSIVE DISORDER (HCC): Primary | ICD-10-CM

## 2022-05-03 PROCEDURE — 90791 PSYCH DIAGNOSTIC EVALUATION: CPT | Performed by: SOCIAL WORKER

## 2022-05-03 PROCEDURE — 94626 PHY/QHP OP PULM RHB W/MNTR: CPT

## 2022-05-03 NOTE — PROGRESS NOTES
Jesus Lagos MSW, LISW    Visit Date: 5/3/2022   Time of appointment:  10:00  Time spent with Patient: 45 minutes. This is patient's first appointment. Reason for Consult:  Depression     Referring Provider/PCP:    No ref. provider found  Kathy Winston DO      Pt provided informed consent for the behavioral health program. Discussed with patient model of service to include the limits of confidentiality (i.e. abuse reporting, suicide intervention, etc.) and short-term intervention focused approach. PRESENTING PROBLEM AND HISTORY  Ebony Chaves is a 57 y.o. female who presents for new evaluation and treatment of  depression. She has the following symptoms: depressed mood. Onset of symptoms was approximately several years ago. Symptoms have been unchanged since that time. She denies current suicidal and homicidal ideation. Family history significant for no psychiatric illness. Risk factors: none. Pt reports she was treated for depression starting in 2006. Pt reports no hx of SI and reports she manages her symptoms well. Pt reports her depression has been stable this past year. MENTAL STATUS EXAM  Mood was euthymic with congruent affect. Suicidal ideation was denied. Homicidal ideation was denied. Hygiene was good . Dress was neat. Behavior was Within Normal Limits with No self-report of difficulties ambulating. Attitude was Cooperative, [place], Friendly and Help-seeking. Eye-contact was good. Speech: rate - WNL, rhythm -  WNL, volume - WNL  Verbalizations were goal directed. Thought processes were intact and goal-oriented without evidence of delusions, hallucinations, obsessions, or dariuzs; with little cognitive distortions. Associations were characterized by intact cognitive processes. Pt was oriented to person, place, time, and general circumstances;  recent:  good.   Insight and judgment were estimated to be good, AEB, a good  understanding of cyclical maladaptive patterns, and the ability to use insight to inform behavior change. CURRENT MEDICATIONS    Current Outpatient Medications:     metoprolol succinate (TOPROL XL) 25 MG extended release tablet, take 1 tablet by mouth once daily, Disp: 90 tablet, Rfl: 0    RA NATURAL MAGNESIUM 250 MG TABS tablet, take 1 tablet by mouth daily, Disp: , Rfl:     warfarin (COUMADIN) 5 MG tablet, Take 1 tablet by mouth daily except for Monday and Fridays. , Disp: 90 tablet, Rfl: 3    DALIRESP 250 MCG tablet, take 1 tablet by mouth daily, Disp: 28 tablet, Rfl: 3    albuterol sulfate  (90 Base) MCG/ACT inhaler, inhale 1 puff every 4 hours if needed for wheezing, Disp: 8.5 g, Rfl: 2    BREZTRI AEROSPHERE 160-9-4.8 MCG/ACT AERO, inhale 2 puffs INTO THE LUNGS twice a day, Disp: 1 each, Rfl: 12    Inulin (FIBER CHOICE PO), Take 2 capsules by mouth 2 times daily, Disp: , Rfl:     atorvastatin (LIPITOR) 20 MG tablet, take 1 tablet by mouth once daily, Disp: 90 tablet, Rfl: 0    lisinopril (PRINIVIL;ZESTRIL) 20 MG tablet, Take 1 tablet by mouth daily take 1 tablet by mouth once daily, Disp: 90 tablet, Rfl: 0    montelukast (SINGULAIR) 10 MG tablet, take 1 tablet by mouth at bedtime, Disp: 90 tablet, Rfl: 0    Azelastine HCl 137 MCG/SPRAY SOLN, 2 sprays by Nasal route daily, Disp: 30 mL, Rfl: 1    fluticasone (FLONASE) 50 MCG/ACT nasal spray, 2 sprays by Nasal route daily, Disp: 1 each, Rfl: 1    omeprazole (PRILOSEC) 40 MG delayed release capsule, take 1 capsule by mouth every morning BEFORE BREAKFAST, Disp: 90 capsule, Rfl: 0    sertraline (ZOLOFT) 100 MG tablet, take 1 and 1/2 tablet by mouth once daily, Disp: 135 tablet, Rfl: 0    albuterol (PROVENTIL) (2.5 MG/3ML) 0.083% nebulizer solution, Take 3 mLs by nebulization every 6 hours as needed for Wheezing or Shortness of Breath, Disp: 120 each, Rfl: 1    Magnesium Oxide (MAGNESIUM-OXIDE) 250 MG TABS tablet, Take 1 tablet by mouth daily, Disp: 90 tablet, Rfl: 0    rOPINIRole (REQUIP) 0.25 MG tablet, Take 2 tab in morning and 2 tabs before bed, Disp: 270 tablet, Rfl: 1    aspirin EC 81 MG EC tablet, Take 1 tablet by mouth daily, Disp: 90 tablet, Rfl: 1    Blood Pressure Monitoring (BLOOD PRESSURE CUFF) MISC, Dx:  Hypertension with labile blood pressure, Disp: 1 each, Rfl: 0     FAMILY MEDICAL/MH HISTORY   Her family history includes Arthritis in her father, mother, and paternal grandmother; Asthma in her brother, father, mother, and paternal grandmother; Breast Cancer in her sister; COPD in her brother; Diabetes in her father, maternal aunt, mother, and paternal aunt; Heart Disease in her father and mother; High Blood Pressure in her father, mother, and paternal aunt; High Cholesterol in her father and mother; Other in her brother; Stroke in her mother. PATIENT MENTAL HEALTH HISTORY  Pt reports a hx of depression since 2006    PSYCHOSOCIAL HISTORY  Current living situation: Pt lives with her daughter and helps care for her 2 grandchildren    Work/Education: Pt has a high school diploma and worked at [employer]. Pt currently is on [employer]    Support system: Pt reports a positive support system          DRUG AND ALCOHOL CURRENT USE/HISTORY  TOBACCO:  She reports that she has been smoking. She has a 50.00 pack-year smoking history. She has never used smokeless tobacco.  ALCOHOL:  She reports current alcohol use. OTHER SUBSTANCES: She reports no history of drug use. ASSESSMENT  Grzegorz Segundo presented to the appointment today for evaluation and treatment of symptoms of depression. She is currently deemed no risk to herself or others and meets criteria for depression. She will benefit from a medication evaluation to assess if  medications could be helpful in treating depression symptoms. Pt's  symptoms are well controlled at this time.   She will also benefit from brief and solution-focused consultation to address cognitive and behavioral interventions for depression symptoms. Elaine Skinner was in agreement with recommendations. Discussed and processed hx of depression and identified goals for short term treatment. Pt was help seeking and appropriate throughout the session. PHQ Scores 4/13/2022 2/2/2022 1/23/2022 12/14/2021 9/15/2020 10/15/2019 1/28/2019   PHQ2 Score 6 5 3 6 4 4 3   PHQ9 Score 21 14 8 14 19 19 15     Interpretation of Total Score Depression Severity: 1-4 = Minimal depression, 5-9 = Mild depression, 10-14 = Moderate depression, 15-19 = Moderately severe depression, 20-27 = Severe depression    How often pt has had thoughts of death or hurting self (if PHQ positive for depression):             DIAGNOSIS  Elaine Skinner was seen today for depression. Diagnoses and all orders for this visit:    Mild episode of recurrent major depressive disorder ([facility] [address]          INTERVENTION  Provided education, Established rapport, [place]-setting to identify pt's primary goals for [facility] visit / overall health, Supportive techniques, Emphasized self-care as important for managing overall health and Provided Psychoeducation re: common depression symptoms      PLAN  Pt to write down goals for therapy  Pt to be seen in two weeks      INTERACTIVE COMPLEXITY  Is interactive complexity present?   No  Reason:  N/A  Additional Supporting Information:  N/A       Electronically signed by Chantale Molina on 5/3/22 at 10:13 AM EDT

## 2022-05-04 ENCOUNTER — TELEPHONE (OUTPATIENT)
Dept: INTERNAL MEDICINE | Age: 62
End: 2022-05-04

## 2022-05-04 DIAGNOSIS — Z95.2 H/O MECHANICAL AORTIC VALVE REPLACEMENT: ICD-10-CM

## 2022-05-04 DIAGNOSIS — I10 ESSENTIAL HYPERTENSION: ICD-10-CM

## 2022-05-04 NOTE — TELEPHONE ENCOUNTER
-Pt states she switched pharmacies and that her pharmacy tried to refill meds and it was denied.  Please advise  [facility] phone [phone] fax # [phone]

## 2022-05-05 ENCOUNTER — HOSPITAL ENCOUNTER (OUTPATIENT)
Dept: PULMONOLOGY | Age: 62
Setting detail: THERAPIES SERIES
Discharge: HOME | End: 2022-05-05
Payer: MEDICARE

## 2022-05-05 PROCEDURE — 94626 PHY/QHP OP PULM RHB W/MNTR: CPT

## 2022-05-05 NOTE — TELEPHONE ENCOUNTER
Patient has changed pharmacy to [address] [place] [address]. All meds will be switched over to this pharmacy. Meds will be refilled at her next appt on 5/11. Patient stated she had enough meds until her appt.

## 2022-05-06 ENCOUNTER — ANTI-COAG VISIT (OUTPATIENT)
Dept: CARDIOLOGY CLINIC | Age: 62
End: 2022-05-06
Payer: MEDICARE

## 2022-05-06 DIAGNOSIS — Z95.2 H/O MECHANICAL AORTIC VALVE REPLACEMENT: ICD-10-CM

## 2022-05-06 LAB
INTERNATIONAL NORMALIZATION RATIO, POC: 1.9
PROTHROMBIN TIME, POC: NORMAL

## 2022-05-06 PROCEDURE — 93793 ANTICOAG MGMT PT WARFARIN: CPT | Performed by: INTERNAL MEDICINE

## 2022-05-06 PROCEDURE — 85610 PROTHROMBIN TIME: CPT | Performed by: INTERNAL MEDICINE

## 2022-05-06 RX ORDER — WARFARIN SODIUM 5 MG/1
TABLET ORAL
Qty: 132 TABLET | Refills: 3 | Status: SHIPPED | OUTPATIENT
Start: 2022-05-06

## 2022-05-06 RX ORDER — METOPROLOL SUCCINATE 25 MG/1
TABLET, EXTENDED RELEASE ORAL
Qty: 90 TABLET | Refills: 3 | Status: SHIPPED | OUTPATIENT
Start: 2022-05-06

## 2022-05-06 NOTE — PROGRESS NOTES
INR today is low at 1.9. Take [id number] today, then resume 5mg daily, with 7.5mg on Mon & Fri. Recheck INR in 1 month.

## 2022-05-06 NOTE — PROGRESS NOTES
INR today is low at 1.9. Per Dr Francis Asp, take 10mg today, then resume 5mg daily, with 7.5mg on Mon & Fri. Recheck INR in 1 month.

## 2022-05-10 ENCOUNTER — HOSPITAL ENCOUNTER (OUTPATIENT)
Dept: PULMONOLOGY | Age: 62
Setting detail: THERAPIES SERIES
Discharge: HOME | End: 2022-05-10
Payer: MEDICARE

## 2022-05-10 PROCEDURE — 94626 PHY/QHP OP PULM RHB W/MNTR: CPT
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
## 2022-05-11 ENCOUNTER — OFFICE VISIT (OUTPATIENT)
Dept: INTERNAL MEDICINE | Age: 62
End: 2022-05-11
Payer: MEDICARE

## 2022-05-11 VITALS
DIASTOLIC BLOOD PRESSURE: 77 MMHG | HEIGHT: 65 IN | TEMPERATURE: 97.6 F | BODY MASS INDEX: 25.86 KG/M2 | WEIGHT: 155.2 LBS | SYSTOLIC BLOOD PRESSURE: 158 MMHG | HEART RATE: 67 BPM | OXYGEN SATURATION: 100 % | RESPIRATION RATE: 18 BRPM

## 2022-05-11 DIAGNOSIS — E78.5 HYPERLIPIDEMIA, UNSPECIFIED HYPERLIPIDEMIA TYPE: ICD-10-CM

## 2022-05-11 DIAGNOSIS — J44.9 CHRONIC OBSTRUCTIVE PULMONARY DISEASE, UNSPECIFIED COPD TYPE (HCC): ICD-10-CM

## 2022-05-11 DIAGNOSIS — F32.A DEPRESSION, UNSPECIFIED DEPRESSION TYPE: ICD-10-CM

## 2022-05-11 DIAGNOSIS — I10 ESSENTIAL HYPERTENSION: ICD-10-CM

## 2022-05-11 DIAGNOSIS — J30.2 SEASONAL ALLERGIES: ICD-10-CM

## 2022-05-11 DIAGNOSIS — G47.62 NOCTURNAL LEG CRAMPS: ICD-10-CM

## 2022-05-11 DIAGNOSIS — R73.03 PREDIABETES: Primary | ICD-10-CM

## 2022-05-11 DIAGNOSIS — K21.9 GASTROESOPHAGEAL REFLUX DISEASE WITHOUT ESOPHAGITIS: ICD-10-CM

## 2022-05-11 DIAGNOSIS — G25.81 RESTLESS LEG SYNDROME: ICD-10-CM

## 2022-05-11 LAB — HBA1C MFR BLD: 6 %

## 2022-05-11 PROCEDURE — 3023F SPIROM DOC REV: CPT | Performed by: INTERNAL MEDICINE

## 2022-05-11 PROCEDURE — 99213 OFFICE O/P EST LOW 20 MIN: CPT | Performed by: INTERNAL MEDICINE

## 2022-05-11 PROCEDURE — 99214 OFFICE O/P EST MOD 30 MIN: CPT | Performed by: INTERNAL MEDICINE

## 2022-05-11 PROCEDURE — G8427 DOCREV CUR MEDS BY ELIG CLIN: HCPCS | Performed by: INTERNAL MEDICINE

## 2022-05-11 PROCEDURE — 83036 HEMOGLOBIN GLYCOSYLATED A1C: CPT | Performed by: INTERNAL MEDICINE

## 2022-05-11 PROCEDURE — 4004F PT TOBACCO SCREEN RCVD TLK: CPT | Performed by: INTERNAL MEDICINE

## 2022-05-11 PROCEDURE — G8419 CALC BMI OUT NRM PARAM NOF/U: HCPCS | Performed by: INTERNAL MEDICINE

## 2022-05-11 PROCEDURE — 3017F COLORECTAL CA SCREEN DOC REV: CPT | Performed by: INTERNAL MEDICINE

## 2022-05-11 RX ORDER — LISINOPRIL 40 MG/1
40 TABLET ORAL DAILY
Qty: 90 TABLET | Refills: 1 | Status: SHIPPED
Start: 2022-05-11 | End: 2022-10-18

## 2022-05-11 RX ORDER — ALBUTEROL SULFATE 2.5 MG/3ML
2.5 SOLUTION RESPIRATORY (INHALATION) EVERY 6 HOURS PRN
Qty: 120 EACH | Refills: 1 | Status: SHIPPED
Start: 2022-05-11 | End: 2022-10-28 | Stop reason: SDUPTHER

## 2022-05-11 RX ORDER — MONTELUKAST SODIUM 10 MG/1
TABLET ORAL
Qty: 90 TABLET | Refills: 0 | Status: SHIPPED
Start: 2022-05-11 | End: 2022-08-02 | Stop reason: SDUPTHER

## 2022-05-11 RX ORDER — MULTIVITAMIN/IRON/FOLIC ACID 18MG-0.4MG
250 TABLET ORAL DAILY
Qty: 90 TABLET | Refills: 0 | Status: SHIPPED | OUTPATIENT
Start: 2022-05-11

## 2022-05-11 RX ORDER — FLUTICASONE PROPIONATE 50 MCG
2 SPRAY, SUSPENSION (ML) NASAL DAILY
Qty: 1 EACH | Refills: 1 | Status: SHIPPED | OUTPATIENT
Start: 2022-05-11

## 2022-05-11 RX ORDER — ASPIRIN 81 MG/1
81 TABLET ORAL DAILY
Qty: 90 TABLET | Refills: 1 | Status: SHIPPED | OUTPATIENT
Start: 2022-05-11

## 2022-05-11 RX ORDER — ALBUTEROL SULFATE 90 UG/1
AEROSOL, METERED RESPIRATORY (INHALATION)
Qty: 8.5 G | Refills: 2 | Status: SHIPPED
Start: 2022-05-11 | End: 2022-08-02 | Stop reason: SDUPTHER

## 2022-05-11 RX ORDER — OMEPRAZOLE 40 MG/1
CAPSULE, DELAYED RELEASE ORAL
Qty: 90 CAPSULE | Refills: 0 | Status: SHIPPED
Start: 2022-05-11 | End: 2022-06-10

## 2022-05-11 RX ORDER — AZELASTINE HYDROCHLORIDE 137 UG/1
2 SPRAY, METERED NASAL DAILY
Qty: 30 ML | Refills: 1 | Status: SHIPPED | OUTPATIENT
Start: 2022-05-11

## 2022-05-11 RX ORDER — ROPINIROLE 0.25 MG/1
TABLET, FILM COATED ORAL
Qty: 270 TABLET | Refills: 1 | Status: SHIPPED
Start: 2022-05-11 | End: 2022-09-06

## 2022-05-11 RX ORDER — SERTRALINE HYDROCHLORIDE 100 MG/1
TABLET, FILM COATED ORAL
Qty: 135 TABLET | Refills: 0 | Status: SHIPPED
Start: 2022-05-11 | End: 2022-08-02 | Stop reason: SDUPTHER

## 2022-05-11 RX ORDER — ATORVASTATIN CALCIUM 20 MG/1
TABLET, FILM COATED ORAL
Qty: 90 TABLET | Refills: 0 | Status: SHIPPED
Start: 2022-05-11 | End: 2022-08-18

## 2022-05-11 ASSESSMENT — ENCOUNTER SYMPTOMS
COUGH: 1
ABDOMINAL PAIN: 0
VOMITING: 0
SHORTNESS OF BREATH: 0
RHINORRHEA: 0
WHEEZING: 1
DIARRHEA: 0
BLOOD IN STOOL: 0
CONSTIPATION: 0
NAUSEA: 0

## 2022-05-11 NOTE — PATIENT INSTRUCTIONS
Lisinopril is increased to 40 mg today. Please take 1 tablet (40 mg) daily. Please monitor your blood pressure at home. Please discuss EGD with general surgery in addition to colonoscopy during your appointment in June. Pulmonary is referred today. Please follow up.
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
## 2022-05-11 NOTE — PROGRESS NOTES
Patient verbalized understanding of office instructions. She will call with questions or concerns. She was given oral discharge instructions, and all questions were fully answered.  Instructed to stop at  for printed AVS

## 2022-05-12 ENCOUNTER — HOSPITAL ENCOUNTER (OUTPATIENT)
Dept: PULMONOLOGY | Age: 62
Setting detail: THERAPIES SERIES
Discharge: HOME | End: 2022-05-12
Payer: MEDICARE

## 2022-05-12 PROCEDURE — 94626 PHY/QHP OP PULM RHB W/MNTR: CPT

## 2022-05-17 ENCOUNTER — HOSPITAL ENCOUNTER (OUTPATIENT)
Dept: PULMONOLOGY | Age: 62
Setting detail: THERAPIES SERIES
Discharge: HOME | End: 2022-05-17
Payer: MEDICARE

## 2022-05-17 ENCOUNTER — OFFICE VISIT (OUTPATIENT)
Dept: INTERNAL MEDICINE | Age: 62
End: 2022-05-17
Payer: MEDICARE

## 2022-05-17 DIAGNOSIS — F33.0 MILD EPISODE OF RECURRENT MAJOR DEPRESSIVE DISORDER (HCC): Primary | ICD-10-CM

## 2022-05-17 PROCEDURE — 90834 PSYTX W PT 45 MINUTES: CPT | Performed by: SOCIAL WORKER

## 2022-05-17 PROCEDURE — 94626 PHY/QHP OP PULM RHB W/MNTR: CPT

## 2022-05-17 NOTE — PROGRESS NOTES
ADULT BEHAVIORAL HEALTH FOLLOW UP  Nathan Castillo      Visit Date: 5/17/2022   Time of appointment:  9:35  Time spent with Patient: 45 minutes. This is patient's second appointment. Reason for Consult:  Depression     Referring Provider/PCP:    No ref. provider found  Val Gambino DO      Pt provided informed consent for the behavioral health program. Discussed with patient model of service to include the limits of confidentiality (i.e. abuse reporting, suicide intervention, etc.) and short-term intervention focused approach. Pt indicated understanding. Don Segura is a 57 y.o. female who presents for follow up of mild depression. Pt reports stable mood. Pt did report increased stressors surrounding student who passed away from [employer] pt grandson was very close to the student which has caused increase stress on the student    [address] [facility] [address] was euthymic with congruent affect. Suicidal ideation was denied. Homicidal ideation was denied. Hygiene was good . Dress was neat. Behavior was Within Normal Limits with No self-report of difficulties ambulating. Attitude was Cooperative, [place] and Friendly. Eye-contact was good. Speech: rate - WNL, rhythm - WNL, volume - WNL. Verbalizations were goal directed. Thought processes were intact and goal-oriented without evidence of delusions, hallucinations, obsessions, or dariusz; with little cognitive distortions. Associations were characterized by intact cognitive processes. Pt was oriented to person, place, time, and general circumstances;  recent:  good and fair. Insight and judgment were estimated to be excellent, AEB, a good  understanding of cyclical maladaptive patterns, and the ability to use insight to inform behavior change. ASSESSMENT  Jody Rivera presented to the appointment today for evaluation and treatment of symptoms of depression.   She is currently deemed no risk to herself or others and meets criteria for depression. Pt was in agreement with recommendations. Discussed and processed with pt thoughts surrounding recent events. Identified strengths with pt. Identified continued ways to express self. PHQ Scores 4/13/2022 2/2/2022 1/23/2022 12/14/2021 9/15/2020 10/15/2019 1/28/2019   PHQ2 Score 6 5 3 6 4 4 3   PHQ9 Score 21 14 8 14 19 19 15     Interpretation of Total Score Depression Severity: 1-4 = Minimal depression, 5-9 = Mild depression, 10-14 = Moderate depression, 15-19 = Moderately severe depression, 20-27 = Severe depression    How often pt has had thoughts of death or hurting self (if PHQ positive for depression):           DIAGNOSIS  Marc España was seen today for depression. Diagnoses and all orders for this visit:    Mild episode of recurrent major depressive disorder ([facility] [address]          INTERVENTION  Used CBT to address thinking patterns  Provided education related to traumatic events  Identified strengths related to progress  Reviewed session with pt      PLAN  Pt to be seen in two weeks   Pt to continue to complete self-care activity    INTERACTIVE COMPLEXITY  Is interactive complexity present?   No  Reason:  N/A  Additional Supporting Information:  N/A       Electronically signed by GERMAINE Stauffer on 5/17/22 at 2:11 PM EDT

## 2022-05-18 DIAGNOSIS — J44.9 CHRONIC OBSTRUCTIVE PULMONARY DISEASE, UNSPECIFIED COPD TYPE (HCC): ICD-10-CM

## 2022-05-18 RX ORDER — MONTELUKAST SODIUM 10 MG/1
TABLET ORAL
Qty: 90 TABLET | Refills: 0 | OUTPATIENT
Start: 2022-05-18

## 2022-05-19 ENCOUNTER — HOSPITAL ENCOUNTER (OUTPATIENT)
Dept: PULMONOLOGY | Age: 62
Setting detail: THERAPIES SERIES
Discharge: HOME | End: 2022-05-19
Payer: MEDICARE

## 2022-05-19 PROCEDURE — 94626 PHY/QHP OP PULM RHB W/MNTR: CPT

## 2022-05-24 ENCOUNTER — HOSPITAL ENCOUNTER (OUTPATIENT)
Dept: PULMONOLOGY | Age: 62
Setting detail: THERAPIES SERIES
End: 2022-05-24
Payer: MEDICARE

## 2022-05-26 ENCOUNTER — APPOINTMENT (OUTPATIENT)
Dept: PULMONOLOGY | Age: 62
End: 2022-05-26
Payer: MEDICARE

## 2022-05-31 ENCOUNTER — HOSPITAL ENCOUNTER (OUTPATIENT)
Dept: PULMONOLOGY | Age: 62
Setting detail: THERAPIES SERIES
Discharge: HOME | End: 2022-05-31
Payer: MEDICARE

## 2022-05-31 PROCEDURE — 94626 PHY/QHP OP PULM RHB W/MNTR: CPT

## 2022-06-06 ENCOUNTER — TELEPHONE (OUTPATIENT)
Dept: ADMINISTRATIVE | Age: 62
End: 2022-06-06

## 2022-06-07 ENCOUNTER — ANTI-COAG VISIT (OUTPATIENT)
Dept: CARDIOLOGY CLINIC | Age: 62
End: 2022-06-07
Payer: MEDICARE

## 2022-06-07 ENCOUNTER — OFFICE VISIT (OUTPATIENT)
Dept: INTERNAL MEDICINE | Age: 62
End: 2022-06-07
Payer: MEDICARE

## 2022-06-07 DIAGNOSIS — Z95.2 H/O MECHANICAL AORTIC VALVE REPLACEMENT: Primary | ICD-10-CM

## 2022-06-07 DIAGNOSIS — F33.0 MILD EPISODE OF RECURRENT MAJOR DEPRESSIVE DISORDER (HCC): Primary | ICD-10-CM

## 2022-06-07 LAB
INTERNATIONAL NORMALIZATION RATIO, POC: 2.8
PROTHROMBIN TIME, POC: NORMAL

## 2022-06-07 PROCEDURE — 85610 PROTHROMBIN TIME: CPT | Performed by: INTERNAL MEDICINE

## 2022-06-07 PROCEDURE — 90832 PSYTX W PT 30 MINUTES: CPT | Performed by: SOCIAL WORKER

## 2022-06-07 PROCEDURE — 93793 ANTICOAG MGMT PT WARFARIN: CPT | Performed by: INTERNAL MEDICINE

## 2022-06-07 NOTE — PROGRESS NOTES
ADULT BEHAVIORAL HEALTH FOLLOW UP  [facility] JIGNA Otto,GERMAINE      Visit Date: 6/7/2022   Time of appointment:  9:35  Time spent with Patient: 45 minutes. This is patient's third appointment. Reason for Consult:  Depression     Referring Provider/PCP:    No ref. provider found  Luana Gibson DO      Pt provided informed consent for the behavioral health program. Discussed with patient model of service to include the limits of confidentiality (i.e. abuse reporting, suicide intervention, etc.) and short-term intervention focused approach. Pt indicated understanding. Codey Basilio is a 57 y.o. female who presents for follow up of mild depression. Pt reports stable mood. Pt reports stable symptoms and reported she is doing well overall. MENTAL STATUS EXAM  Mood was euthymic with congruent affect. Suicidal ideation was denied. Homicidal ideation was denied. Hygiene was good . Dress was neat. Behavior was Within Normal Limits with No self-report of difficulties ambulating. Attitude was Cooperative, [place] and Friendly. Eye-contact was good. Speech: rate - WNL, rhythm - WNL, volume - WNL. Verbalizations were goal directed. Thought processes were intact and goal-oriented without evidence of delusions, hallucinations, obsessions, or dariusz; with little cognitive distortions. Associations were characterized by intact cognitive processes. Pt was oriented to person, place, time, and general circumstances;  recent:  good and fair. Insight and judgment were estimated to be excellent, AEB, a good  understanding of cyclical maladaptive patterns, and the ability to use insight to inform behavior change. ASSESSMENT  Lyla Knight presented to the appointment today for evaluation and treatment of symptoms of depression. She is currently deemed no risk to herself or others and meets criteria for depression. Pt was in agreement with recommendations.  Discussed and processed with pt thoughts surrounding recent events. Identified strengths with pt. Identified continued ways to express self. Explored with pt goals and opportunities to set boundaries. Pt was open and help seeking throughout the session. PHQ Scores 4/13/2022 2/2/2022 1/23/2022 12/14/2021 9/15/2020 10/15/2019 1/28/2019   PHQ2 Score 6 5 3 6 4 4 3   PHQ9 Score 21 14 8 14 19 19 15     Interpretation of Total Score Depression Severity: 1-4 = Minimal depression, 5-9 = Mild depression, 10-14 = Moderate depression, 15-19 = Moderately severe depression, 20-27 = Severe depression    How often pt has had thoughts of death or hurting self (if PHQ positive for depression):           DIAGNOSIS  [facility] was seen today for depression. Diagnoses and all orders for this visit:    Mild episode of recurrent major depressive disorder ([facility] [address]          INTERVENTION  Used CBT to address thinking patterns  Provided education related to traumatic events  Identified strengths related to progress  Reviewed session with pt      PLAN  Pt to be seen in three weeks   Pt to continue to complete self-care activity and identify future goals    INTERACTIVE COMPLEXITY  Is interactive complexity present?   No  Reason:  N/A  Additional Supporting Information:  N/A       Electronically signed by GERMAINE Alvarez on 5/17/22 at 2:11 PM EDT

## 2022-06-07 NOTE — PROGRESS NOTES
INR today is therapeutic at 2.8. Continue 5mg daily, with 7.5mg on Mon & Fri. Recheck INR in 1 month.

## 2022-06-09 ENCOUNTER — HOSPITAL ENCOUNTER (OUTPATIENT)
Dept: PULMONOLOGY | Age: 62
Setting detail: THERAPIES SERIES
Discharge: HOME | End: 2022-06-09
Payer: MEDICARE

## 2022-06-09 PROCEDURE — 94626 PHY/QHP OP PULM RHB W/MNTR: CPT

## 2022-06-10 DIAGNOSIS — K21.9 GASTROESOPHAGEAL REFLUX DISEASE WITHOUT ESOPHAGITIS: ICD-10-CM

## 2022-06-10 RX ORDER — OMEPRAZOLE 40 MG/1
CAPSULE, DELAYED RELEASE ORAL
Qty: 90 CAPSULE | Refills: 0 | Status: SHIPPED
Start: 2022-06-10 | End: 2022-07-01

## 2022-06-14 ENCOUNTER — HOSPITAL ENCOUNTER (OUTPATIENT)
Dept: PULMONOLOGY | Age: 62
Setting detail: THERAPIES SERIES
Discharge: HOME | End: 2022-06-14
Payer: MEDICARE

## 2022-06-14 PROCEDURE — 94626 PHY/QHP OP PULM RHB W/MNTR: CPT

## 2022-06-16 ENCOUNTER — HOSPITAL ENCOUNTER (OUTPATIENT)
Dept: PULMONOLOGY | Age: 62
Setting detail: THERAPIES SERIES
End: 2022-06-16
Payer: MEDICARE

## 2022-06-23 ENCOUNTER — APPOINTMENT (OUTPATIENT)
Dept: PULMONOLOGY | Age: 62
End: 2022-06-23
Payer: MEDICARE

## 2022-06-27 ENCOUNTER — OFFICE VISIT (OUTPATIENT)
Dept: INTERNAL MEDICINE | Age: 62
End: 2022-06-27
Payer: MEDICARE

## 2022-06-27 DIAGNOSIS — F33.0 MILD EPISODE OF RECURRENT MAJOR DEPRESSIVE DISORDER (HCC): Primary | ICD-10-CM

## 2022-06-27 PROCEDURE — 90832 PSYTX W PT 30 MINUTES: CPT | Performed by: SOCIAL WORKER

## 2022-06-27 NOTE — PROGRESS NOTES
ADULT BEHAVIORAL HEALTH FOLLOW UP  [facility] JIGNA Otto,GERMAINE      Visit Date: 6/27/2022   Time of appointment:  12:55  Time spent with Patient: 25 minutes. This is patient's fourth appointment. Reason for Consult:  Depression     Referring Provider/PCP:    No ref. provider found  Tiny Stevens DO      Pt provided informed consent for the behavioral health program. Discussed with patient model of service to include the limits of confidentiality (i.e. abuse reporting, suicide intervention, etc.) and short-term intervention focused approach. Pt indicated understanding. Emilia Mancera is a 57 y.o. female who presents for follow up of mild depression. Pt reports stable mood. Pt reports stable symptoms and reported she is doing well overall. MENTAL STATUS EXAM  Mood was euthymic with congruent affect. Suicidal ideation was denied. Homicidal ideation was denied. Hygiene was good . Dress was neat. Behavior was Within Normal Limits with No self-report of difficulties ambulating. Attitude was Cooperative, [place] and Friendly. Eye-contact was good. Speech: rate - WNL, rhythm - WNL, volume - WNL. Verbalizations were goal directed. Thought processes were intact and goal-oriented without evidence of delusions, hallucinations, obsessions, or dariusz; with little cognitive distortions. Associations were characterized by intact cognitive processes. Pt was oriented to person, place, time, and general circumstances;  recent:  good and fair. Insight and judgment were estimated to be excellent, AEB, a good  understanding of cyclical maladaptive patterns, and the ability to use insight to inform behavior change. ASSESSMENT  Catina Degroot presented to the appointment today for evaluation and treatment of symptoms of depression. She is currently deemed no risk to herself or others and meets criteria for depression. Pt was in agreement with recommendations.  Discussed and processed with pt thoughts surrounding recent events. Identified strengths with pt. Identified continued ways to express self. Explored with pt goals and opportunities to set boundaries. Pt was open and help seeking throughout the session. SW and pt discussed progress with mood and treatment. SW and pt discussed discharge planning for next session. Pt was agreeable and identified a decrease in depression. PHQ Scores 4/13/2022 2/2/2022 1/23/2022 12/14/2021 9/15/2020 10/15/2019 1/28/2019   PHQ2 Score 6 5 3 6 4 4 3   PHQ9 Score 21 14 8 14 19 19 15     Interpretation of Total Score Depression Severity: 1-4 = Minimal depression, 5-9 = Mild depression, 10-14 = Moderate depression, 15-19 = Moderately severe depression, 20-27 = Severe depression    How often pt has had thoughts of death or hurting self (if PHQ positive for depression):           DIAGNOSIS  Giorgi Bee was seen today for depression. Diagnoses and all orders for this visit:    Mild episode of recurrent major depressive disorder ([facility] [address]          INTERVENTION  Used CBT to address thinking patterns  Provided education related to traumatic events  Identified strengths related to progress  Reviewed session with pt      PLAN  Pt to be seen in one month and discharged at that time due to progress    INTERACTIVE COMPLEXITY  Is interactive complexity present?   No  Reason:  N/A  Additional Supporting Information:  N/A       Electronically signed by GERMAINE Moreno on 5/17/22 at 2:11 PM EDT

## 2022-06-30 NOTE — PROGRESS NOTES
[facility] Internal Medicine      SUBJECTIVE:  Evan De La Garza (:  1960) is a 57 y.o. female here for evaluation of the following chief complaint(s):  Abdominal Pain (heaviness in abdomen)    57 y.o. female with PMHx of CAD, s/p AVR (mechanical AV), HTN, HLD, thoracic and abdominal aortic aneurysm, COPD, gastritis presented to the clinic today for follow-up on abdominal pain. Patient previously reported fullness after meal and felt undigested, had occasional nausea and vomiting and 20 lb weight loss since COVID infection in 2022. Patient reported feeling discomfort over epigastric area. The feeling is intermittent, patient states she had sometimes belching and burping, \" notch\" in the back of her throat. Patient denies dysphagia, chest pain, worsening shortness of breath (patient is on 2 L oxygen at home). Patient lost around 5 pounds over the last 2 months. She reported feeling tired. Patient denies melena, hematochezia. Patient takes omeprazole daily 40 mg. She states usually taken with meals, sometimes before the meals. Patient had EGD and colonoscopy in 2019 which show gastritis, negative for H. Pylori. Patient has appointment with general surgery for EGD next 2 weeks. Review of Systems   Constitutional: Positive for fatigue. Negative for chills, fever and unexpected weight change. HENT: Negative for congestion and rhinorrhea. Respiratory: Negative for cough and shortness of breath. Cardiovascular: Negative for chest pain and palpitations. Gastrointestinal: Positive for abdominal pain. Negative for blood in stool, constipation, diarrhea, nausea and vomiting. Genitourinary: Negative for dysuria and frequency. Musculoskeletal: Negative for arthralgias and back pain. Skin: Negative for color change, pallor and wound. Neurological: Negative for syncope and light-headedness. Psychiatric/Behavioral: Negative for dysphoric mood.  The patient is not nervous/anxious.         Current Outpatient Medications on File Prior to Visit   Medication Sig Dispense Refill    omeprazole (PRILOSEC) 40 MG delayed release capsule take 1 capsule every morning before breakfast 90 capsule 0    albuterol (PROVENTIL) (2.5 MG/3ML) 0.083% nebulizer solution Take 3 mLs by nebulization every 6 hours as needed for Wheezing or Shortness of Breath 120 each 1    albuterol sulfate  (90 Base) MCG/ACT inhaler inhale 1 puff every 4 hours if needed for wheezing 8.5 g 2    aspirin EC 81 MG EC tablet Take 1 tablet by mouth daily 90 tablet 1    atorvastatin (LIPITOR) 20 MG tablet take 1 tablet by mouth once daily 90 tablet 0    Azelastine HCl 137 MCG/SPRAY SOLN 2 sprays by Nasal route daily 30 mL 1    Roflumilast (DALIRESP) 250 MCG tablet take 1 tablet by mouth daily 28 tablet 3    fluticasone (FLONASE) 50 MCG/ACT nasal spray 2 sprays by Nasal route daily 1 each 1    lisinopril (PRINIVIL;ZESTRIL) 40 MG tablet Take 1 tablet by mouth daily take 1 tablet by mouth once daily 90 tablet 1    Magnesium Oxide (MAGNESIUM-OXIDE) 250 MG TABS tablet Take 1 tablet by mouth daily 90 tablet 0    montelukast (SINGULAIR) 10 MG tablet take 1 tablet by mouth at bedtime 90 tablet 0    rOPINIRole (REQUIP) 0.25 MG tablet Take 2 tab in morning and 2 tabs before bed 270 tablet 1    sertraline (ZOLOFT) 100 MG tablet take 1 and 1/2 tablet by mouth once daily 135 tablet 0    Cholecalciferol (VITAMIN D3) 125 MCG (5000 UT) CAPS Take 2 capsules by mouth in the morning and at bedtime      metoprolol succinate (TOPROL XL) 25 MG extended release tablet take 1 tablet by mouth once daily 90 tablet 3    warfarin (COUMADIN) 5 MG tablet Take 1 tablet by mouth daily except for Monday and Fridays take 1 1/2 (7.5mg) 132 tablet 3    RA NATURAL MAGNESIUM 250 MG TABS tablet take 1 tablet by mouth daily      BREZTRI AEROSPHERE 160-9-4.8 MCG/ACT AERO inhale 2 puffs INTO THE LUNGS twice a day 1 each 12    Inulin (FIBER CHOICE PO) Take 2 capsules by mouth 2 times daily      Blood Pressure Monitoring (BLOOD PRESSURE CUFF) MISC Dx:  Hypertension with labile blood pressure 1 each 0     No current facility-administered medications on file prior to visit. OBJECTIVE:    VS:   Vitals:    07/01/22 1304   BP: 108/60   Site: Left Upper Arm   Position: Sitting   Cuff Size: Medium Adult   Pulse: 85   Resp: 18   Temp: 97.3 °F (36.3 °C)   TempSrc: Temporal   SpO2: 96%   Weight: 150 lb (68 kg)   Height: 5' 5\" (1.651 m)     Physical Exam  Constitutional:       General: She is not in acute distress. Appearance: She is well-developed. HENT:      Head: Normocephalic and atraumatic. Eyes:      General: No scleral icterus. Conjunctiva/sclera: Conjunctivae normal.   Neck:      Trachea: No tracheal deviation. Cardiovascular:      Rate and Rhythm: Normal rate. Heart sounds: No murmur heard. Pulmonary:      Effort: Pulmonary effort is normal. No respiratory distress. Breath sounds: Normal breath sounds. Abdominal:      General: Bowel sounds are normal. There is no distension. Palpations: Abdomen is soft. There is no hepatomegaly or splenomegaly. Tenderness: There is abdominal tenderness in the epigastric area. There is no guarding or rebound. Negative signs include Humphreys's sign. Musculoskeletal:         General: Normal range of motion. Cervical back: Normal range of motion. Skin:     General: Skin is warm and dry. Neurological:      Mental Status: She is alert and oriented to person, place, and time. Psychiatric:         Behavior: Behavior normal.         Thought Content: Thought content normal.         Judgment: Judgment normal.            ASSESSMENT/PLAN:  1. Gastroesophageal reflux disease without esophagitis       - Increase Omeprazole to 40mg BID,  patient to take medication 1 hour before breakfast.         - Concern of gastroparesis after COVID infection.  Start Reglan 5 mg TID. -  Will have EGD next 2 weeks with [facility]  -     metoclopramide (REGLAN) 5 MG tablet; Take 1 tablet by mouth 3 times daily, Disp-90 tablet, R-0Normal  -     omeprazole (PRILOSEC) 40 MG delayed release capsule; Take 1 capsule by mouth in the morning and at bedtime take 1 capsule every morning before breakfast, Disp-120 capsule, R-1Normal  2. Chronic obstructive pulmonary disease, unspecified COPD type ([facility] [address]        - Stable. On 2L oxygen at home. Continue current regimen        - Advice patient to make appointment with pulmonology clinic since Dr. Moiz Sumner left    3. Abdominal aortic aneurysm (AAA) 3.0 cm to 5.0 cm in diameter in female ([facility] [address]      - Stable. Repeat imaging in 1 year     4. Essential hypertension     RTC:  Return in about 1 month (around 8/1/2022) for Symptoms follow up. I have reviewed my findings and recommendations with Marika Rosales and Dr. Beulah Rueda.     Raymond Carreon MD   6/30/2022 6:39 PM

## 2022-07-01 ENCOUNTER — OFFICE VISIT (OUTPATIENT)
Dept: INTERNAL MEDICINE | Age: 62
End: 2022-07-01
Payer: MEDICAID

## 2022-07-01 VITALS
OXYGEN SATURATION: 96 % | HEART RATE: 85 BPM | RESPIRATION RATE: 18 BRPM | BODY MASS INDEX: 24.99 KG/M2 | WEIGHT: 150 LBS | SYSTOLIC BLOOD PRESSURE: 108 MMHG | DIASTOLIC BLOOD PRESSURE: 60 MMHG | TEMPERATURE: 97.3 F | HEIGHT: 65 IN

## 2022-07-01 DIAGNOSIS — I71.40 ABDOMINAL AORTIC ANEURYSM (AAA) 3.0 CM TO 5.0 CM IN DIAMETER IN FEMALE: ICD-10-CM

## 2022-07-01 DIAGNOSIS — I10 ESSENTIAL HYPERTENSION: ICD-10-CM

## 2022-07-01 DIAGNOSIS — J44.9 CHRONIC OBSTRUCTIVE PULMONARY DISEASE, UNSPECIFIED COPD TYPE (HCC): ICD-10-CM

## 2022-07-01 DIAGNOSIS — K21.9 GASTROESOPHAGEAL REFLUX DISEASE WITHOUT ESOPHAGITIS: Primary | ICD-10-CM

## 2022-07-01 PROCEDURE — G8427 DOCREV CUR MEDS BY ELIG CLIN: HCPCS | Performed by: INTERNAL MEDICINE

## 2022-07-01 PROCEDURE — 4004F PT TOBACCO SCREEN RCVD TLK: CPT | Performed by: INTERNAL MEDICINE

## 2022-07-01 PROCEDURE — 3017F COLORECTAL CA SCREEN DOC REV: CPT | Performed by: INTERNAL MEDICINE

## 2022-07-01 PROCEDURE — G8420 CALC BMI NORM PARAMETERS: HCPCS | Performed by: INTERNAL MEDICINE

## 2022-07-01 PROCEDURE — 99212 OFFICE O/P EST SF 10 MIN: CPT | Performed by: INTERNAL MEDICINE

## 2022-07-01 PROCEDURE — 3023F SPIROM DOC REV: CPT | Performed by: INTERNAL MEDICINE

## 2022-07-01 PROCEDURE — 99214 OFFICE O/P EST MOD 30 MIN: CPT | Performed by: INTERNAL MEDICINE

## 2022-07-01 RX ORDER — OMEPRAZOLE 40 MG/1
40 CAPSULE, DELAYED RELEASE ORAL 2 TIMES DAILY
Qty: 120 CAPSULE | Refills: 1 | Status: SHIPPED
Start: 2022-07-01 | End: 2022-07-05

## 2022-07-01 RX ORDER — METOCLOPRAMIDE 5 MG/1
5 TABLET ORAL 3 TIMES DAILY
Qty: 90 TABLET | Refills: 0 | Status: SHIPPED
Start: 2022-07-01 | End: 2022-09-09

## 2022-07-01 ASSESSMENT — ENCOUNTER SYMPTOMS
DIARRHEA: 0
COUGH: 0
SHORTNESS OF BREATH: 0
COLOR CHANGE: 0
NAUSEA: 0
CONSTIPATION: 0
RHINORRHEA: 0
BACK PAIN: 0
ABDOMINAL PAIN: 1
VOMITING: 0
BLOOD IN STOOL: 0

## 2022-07-01 NOTE — PATIENT INSTRUCTIONS
Dear Vianney Amador,        Thank you for coming to your appointment today. I hope we have addressed all of your needs. Please make sure to do the following:  - Start taking Reglan 5 mg 3 times daily.   - Increase Omeprazole to 2 times a day, in the morning and in the afternoon. Take medication when stomach is empty ( 1 hour before breakfast)  - Continue your medications as listed. - Please have endoscopy done with General surgery  - If you not hear from pulmonology clinic please call the phone number: [phone] to schedule appointment with pulmonology. - We will see each other again in 1 month. Have a great day!         Sincerely,  Rosendo Cerrato M.D PGY-2  7/1/2022  1:46 PM

## 2022-07-01 NOTE — PROGRESS NOTES
Patient given verbal discharge orders by Dr Nayna Allen. Patient given printed AVS and next appointment.

## 2022-07-01 NOTE — PROGRESS NOTES
fu- per up to date  · \"which most dietary protein intake is reserved for the evening, is worth trying. Evaluate for gastroparesis and constipation, two common and inherent nonmotor features of PD . Gastrointestinal absorption  such as pramipexole or ropinirole \"  · And antimuscarinic:  Budeson-Glycopyrrol-Formoterol (BREZTRI AEROSPHERE)     30min    Remainder of medical problems as per resident note.

## 2022-07-04 DIAGNOSIS — G25.81 RESTLESS LEG SYNDROME: ICD-10-CM

## 2022-07-05 ENCOUNTER — ANTI-COAG VISIT (OUTPATIENT)
Dept: CARDIOLOGY CLINIC | Age: 62
End: 2022-07-05
Payer: MEDICARE

## 2022-07-05 DIAGNOSIS — Z95.2 H/O MECHANICAL AORTIC VALVE REPLACEMENT: ICD-10-CM

## 2022-07-05 LAB
INTERNATIONAL NORMALIZATION RATIO, POC: 3.2
PROTHROMBIN TIME, POC: 0

## 2022-07-05 PROCEDURE — 93793 ANTICOAG MGMT PT WARFARIN: CPT | Performed by: INTERNAL MEDICINE

## 2022-07-05 PROCEDURE — 85610 PROTHROMBIN TIME: CPT | Performed by: INTERNAL MEDICINE

## 2022-07-05 RX ORDER — OMEPRAZOLE 40 MG/1
CAPSULE, DELAYED RELEASE ORAL
Qty: 120 CAPSULE | Refills: 1
Start: 2022-07-01 | End: 2022-08-02 | Stop reason: SDUPTHER

## 2022-07-05 RX ORDER — ROPINIROLE 0.25 MG/1
TABLET, FILM COATED ORAL
Qty: 270 TABLET | Refills: 1 | OUTPATIENT
Start: 2022-07-05

## 2022-07-11 ENCOUNTER — TELEPHONE (OUTPATIENT)
Dept: INTERNAL MEDICINE | Age: 62
End: 2022-07-11

## 2022-07-11 NOTE — TELEPHONE ENCOUNTER
Patient called stating that she just started taking the reglan today as she was waiting on this to come from her mail order pharmacy   She states that she took one dose today around 1200 pm and her feet and hands started cramping up shortly after 1300  She also states that she has been taking omeprazole 40mgs twice a day not the three times a day that was ordered for her   She wants to know if she should continue taking the reglan or not  Advised that I will send a message to the attending doctor and follow-up with her

## 2022-07-14 DIAGNOSIS — I10 ESSENTIAL HYPERTENSION: ICD-10-CM

## 2022-07-14 DIAGNOSIS — E78.5 HYPERLIPIDEMIA, UNSPECIFIED HYPERLIPIDEMIA TYPE: ICD-10-CM

## 2022-07-14 RX ORDER — METOPROLOL SUCCINATE 25 MG/1
TABLET, EXTENDED RELEASE ORAL
Qty: 90 TABLET | Refills: 3 | OUTPATIENT
Start: 2022-07-14

## 2022-07-14 RX ORDER — LISINOPRIL 20 MG/1
TABLET ORAL
Qty: 90 TABLET | Refills: 0 | OUTPATIENT
Start: 2022-07-14

## 2022-07-14 RX ORDER — ATORVASTATIN CALCIUM 20 MG/1
TABLET, FILM COATED ORAL
Qty: 90 TABLET | Refills: 0 | OUTPATIENT
Start: 2022-07-14

## 2022-07-17 DIAGNOSIS — J44.9 CHRONIC OBSTRUCTIVE PULMONARY DISEASE, UNSPECIFIED COPD TYPE (HCC): ICD-10-CM

## 2022-07-18 ENCOUNTER — TELEPHONE (OUTPATIENT)
Dept: SURGERY | Age: 62
End: 2022-07-18

## 2022-07-18 ENCOUNTER — OFFICE VISIT (OUTPATIENT)
Dept: SURGERY | Age: 62
End: 2022-07-18
Payer: MEDICARE

## 2022-07-18 VITALS
BODY MASS INDEX: 25.49 KG/M2 | SYSTOLIC BLOOD PRESSURE: 111 MMHG | RESPIRATION RATE: 18 BRPM | HEIGHT: 65 IN | TEMPERATURE: 97.4 F | WEIGHT: 153 LBS | OXYGEN SATURATION: 95 % | HEART RATE: 84 BPM | DIASTOLIC BLOOD PRESSURE: 67 MMHG

## 2022-07-18 DIAGNOSIS — K62.5 RECTAL BLEEDING: Primary | ICD-10-CM

## 2022-07-18 DIAGNOSIS — R63.4 WEIGHT LOSS: ICD-10-CM

## 2022-07-18 DIAGNOSIS — R10.13 EPIGASTRIC PAIN: ICD-10-CM

## 2022-07-18 PROCEDURE — 3017F COLORECTAL CA SCREEN DOC REV: CPT | Performed by: SURGERY

## 2022-07-18 PROCEDURE — 4004F PT TOBACCO SCREEN RCVD TLK: CPT | Performed by: SURGERY

## 2022-07-18 PROCEDURE — G8427 DOCREV CUR MEDS BY ELIG CLIN: HCPCS | Performed by: SURGERY

## 2022-07-18 PROCEDURE — 99212 OFFICE O/P EST SF 10 MIN: CPT | Performed by: SURGERY

## 2022-07-18 PROCEDURE — 99214 OFFICE O/P EST MOD 30 MIN: CPT | Performed by: SURGERY

## 2022-07-18 PROCEDURE — G8419 CALC BMI OUT NRM PARAM NOF/U: HCPCS | Performed by: SURGERY

## 2022-07-18 RX ORDER — ALBUTEROL SULFATE 90 UG/1
AEROSOL, METERED RESPIRATORY (INHALATION)
Qty: 25.5 G | Refills: 3 | OUTPATIENT
Start: 2022-07-18

## 2022-07-18 RX ORDER — BISACODYL 5 MG
10 TABLET, DELAYED RELEASE (ENTERIC COATED) ORAL 2 TIMES DAILY
Qty: 4 TABLET | Refills: 0 | Status: ON HOLD
Start: 2022-07-18 | End: 2022-08-10 | Stop reason: HOSPADM

## 2022-07-18 ASSESSMENT — ENCOUNTER SYMPTOMS
RESPIRATORY NEGATIVE: 1
SHORTNESS OF BREATH: 0
ABDOMINAL DISTENTION: 0
ALLERGIC/IMMUNOLOGIC NEGATIVE: 1
ANAL BLEEDING: 1
VOMITING: 0
ABDOMINAL PAIN: 1
DIARRHEA: 1
BLOOD IN STOOL: 0
BACK PAIN: 1
CONSTIPATION: 0
COUGH: 0
NAUSEA: 0
EYES NEGATIVE: 1

## 2022-07-18 NOTE — PATIENT INSTRUCTIONS
Instructions for Clear liquid diet  Definition  A clear liquid diet consists of clear liquids, such as water, broth and plain gelatin, that are easily digested and leave no undigested residue in your intestinal tract. Your doctor may prescribe a clear liquid diet before certain medical procedures or if you have certain digestive problems. Because a clear liquid diet can't provide you with adequate calories and nutrients, it shouldn't be continued for more than a few days. Purpose  A clear liquid diet is often used before tests, procedures or surgeries that require no food in your stomach or intestines, such as before colonoscopy. It may also be recommended as a short-term diet if you have certain digestive problems, such as nausea, vomiting or diarrhea, or after certain types of surgery. Diet details  A clear liquid diet helps maintain adequate hydration, provides some important electrolytes, such as sodium and potassium, and gives some energy at a time when a full diet isn't possible or recommended.    The following foods are allowed in a clear liquid diet:   Plain water   Fruit juices without pulp, such as apple juice   Strained lemonade    Clear, fat-free broth (bouillon or consomme)   Clear sodas   Plain gelatin   Honey   Ice pops without bits of fruit or fruit pulp   Tea or coffee without milk or cream    *NOTHING RED OR PURPLE  *IF YOU CAN SEE THROUGH IT YOU CAN HAVE IT     A typical menu on the clear liquid diet may look like this:     Breakfast:  1 glass fruit juice  1 cup coffee or tea (without dairy products)  1 cup broth  1 bowl gelatin     Snack:  1 glass fruit juice  1 bowl gelatin     Lunch:  1 glass fruit juice  1 glass water  1 cup broth  1 bowl gelatin     Snack:  1 ice pop (without fruit pulp)  1 cup coffee or tea (without dairy products) or a soft drink     Dinner:  1 cup juice or water  1 cup broth  1 bowl gelatin  1 cup coffee or tea     Results  Although the clear liquid diet may not be Pt stated that he has not received the COVID-19 vaccine. R & B discussed in detail. Pt elects to proceed with J [employer] J vaccine today.  Health Department Consent form filled out.  Vaccine administered without complication. very exciting, it does fulfill its purpose. It's designed to keep your stomach and intestines clear, limit strain to your digestive system, but keep your body hydrated as you prepare for or recover from a medical procedure. Risks  Because a clear liquid diet can't provide you with adequate calories and nutrients, it shouldn't be used for more than a few days. Only use the clear liquid diet as directed by your doctor. If your doctor prescribes a clear liquid diet before a medical test, be sure to follow the diet instructions exactly. If you don't follow the diet exactly, you risk an inaccurate test and may have to reschedule the procedure for another time. The importance of proper hydration  A colonoscopy prep causes the body to lose a significant amount of fluid and can result in sickness due to dehydration. It's important that you prepare your body by drinking extra clear liquids before the prep. Stay hydrated by drinking all required clear liquids during the prep. Replenish your system by drinking clear liquids after returning home from your colonoscopy. [facility]   MAGNESIUM CITRATE/DULCOLUX TABLETS  COLON PREP FOR COLONOSCOPY     It is very important that you follow all of the instructions listed on this sheet carefully (they may be slightly different than the directions on the product that you purchase at the pharmacy) to ensure that you colon is adequately cleaned out or you risk of complications could be increased. 2 Days or More Before Endoscopy:  Obtain two 10-ounce bottle of Magnesium Citrate and 1 bottle of Dulcolux tablets from the pharmacy. Do not eat corn, tomatoes, peas or watermelon 3 to 5 days before procedure. If you are on INSULIN or OTHER DIABETIC MEDICATIONS, then check with your primary care physician as to how to adjust your medication while on clear liquid diet and when nothing by mouth.    No solid food - only clear liquids (soup, jello, or juice that you can see through with no solid food) for breakfast, lunch and supper. 1 Day Before the Endoscopy:  No solid food - only clear liquids (soup, jello, or juice that you can see through with no solid food) for breakfast, lunch and supper. DO NOT drink or eat anything that is red as it will turn the inside of the colon red and look like blood. Have at least 8 oz or more of clear liquids for breakfast (7am to 8am) and lunch (11:30am to 12:30pm). 12 Noon Drink a 10oz bottle of Magnesium Citrate and Take 2 Dulcolux tablets followed immediately by at least 8oz of clear liquids. .  1:00pm Drink at least 8oz of clear liquids. 2:00pm Drink at least 8oz of clear liquids. 3:00pm Drink at least 8oz of clear liquids. 4:00pm Drink a 10oz bottle of Magnesium Citrate and Take 2 Dulcolux tablets followed immediately by at least 8oz of clear liquids. 5:00pm Drink at least 8oz of clear liquids. 6:00pm Drink at least 8oz of clear liquids. 7:00pm  Drink at least 8oz of clear liquids. 8:00pm Drink at least 8oz of clear liquids. Can continue to take liquids until 12 midnight then nothing to eat or drink    Day of Endoscopy:  Nothing to eat or drink after midnight. However, if you are taking any blood pressure medications or heart medications, you should take them with a sip of water.       Any questions or concerns call Fauzia at [phone]

## 2022-07-18 NOTE — PROGRESS NOTES
replacement 1/5/2017    Headache     Hyperlipidemia     Hypertension     On warfarin at home     for AVR    Restless legs syndrome     S/P AVR     Status post placement of implantable loop recorder     Syncope     Syncope, cardiogenic 9/10/2020    Tobacco abuse     Urinary incontinence     Ventricular tachycardia, non-sustained (HCC)     Warfarin-induced coagulopathy ([facility] [address] 12/11/2011       Past Surgical History:   Procedure Laterality Date    APPENDECTOMY      CARDIAC CATHETERIZATION  09/11/2020    Dr. Sivakumar Messina  2009    Aortic valve 2009 [facility]      COLONOSCOPY N/A 11/18/2019    COLONOSCOPY POLYPECTOMY SNARE/COLD BIOPSY performed by Sabina Rodríguez MD at [facility] ([address] [place] [address])      INSERTABLE CARDIAC MONITOR  09/11/2020    Linq Insertion    Dr. Daniel Villanueva    KEM STEROTACTIC LOC BREAST BIOPSY RIGHT Right 3/10/2021    KEM STEROTACTIC LOC BREAST BIOPSY RIGHT 3/10/2021 [facility]    TUBAL LIGATION      UPPER GASTROINTESTINAL ENDOSCOPY N/A 11/18/2019    EGD BIOPSY performed by Sabina Rodríguez MD at [place] [address] History   Problem Relation Age of Onset    Heart Disease Mother     Arthritis Mother     Asthma Mother     Diabetes Mother     High Blood Pressure Mother     High Cholesterol Mother     Stroke Mother     Heart Disease Father     Arthritis Father     Asthma Father     Diabetes Father     High Blood Pressure Father     High Cholesterol Father     Breast Cancer Sister     Other Brother         ELMO on CPap;    Has ICD    Asthma Brother     COPD Brother     Arthritis Paternal Grandmother     Asthma Paternal Grandmother     Diabetes Maternal Aunt     Diabetes Paternal Aunt     High Blood Pressure Paternal Aunt        Allergies   Allergen Reactions    Keflex [Cephalexin] Itching    Phenergan [Promethazine Hcl] Other (See Comments)       Social History     Tobacco Use    Smoking status: Every Day Packs/day: 1.00     Years: 50.00     Pack years: 50.00     Types: Cigarettes    Smokeless tobacco: Never    Tobacco comments:     currently 0.5 ppd, 2/10/22   Vaping Use    Vaping Use: Former    Quit date: 1/1/2015    Substances: Nicotine   Substance Use Topics    Alcohol use: Yes     Comment: rare wine    Drug use: Never       Current Outpatient Medications   Medication Sig Dispense Refill    bisacodyl 5 MG EC tablet Take 2 tablets by mouth in the morning and 2 tablets before bedtime. 4 tablet 0    magnesium citrate solution Take 592 mLs by mouth once for 1 dose 592 mL 0    omeprazole (PRILOSEC) 40 MG delayed release capsule Take 1 tablet in the morning 1 hour before breakfast and 1 hour before dinner.  120 capsule 1    metoclopramide (REGLAN) 5 MG tablet Take 1 tablet by mouth 3 times daily 90 tablet 0    albuterol (PROVENTIL) (2.5 MG/3ML) 0.083% nebulizer solution Take 3 mLs by nebulization every 6 hours as needed for Wheezing or Shortness of Breath 120 each 1    albuterol sulfate  (90 Base) MCG/ACT inhaler inhale 1 puff every 4 hours if needed for wheezing 8.5 g 2    aspirin EC 81 MG EC tablet Take 1 tablet by mouth daily 90 tablet 1    atorvastatin (LIPITOR) 20 MG tablet take 1 tablet by mouth once daily 90 tablet 0    Azelastine HCl 137 MCG/SPRAY SOLN 2 sprays by Nasal route daily 30 mL 1    Roflumilast (DALIRESP) 250 MCG tablet take 1 tablet by mouth daily 28 tablet 3    fluticasone (FLONASE) 50 MCG/ACT nasal spray 2 sprays by Nasal route daily 1 each 1    lisinopril (PRINIVIL;ZESTRIL) 40 MG tablet Take 1 tablet by mouth daily take 1 tablet by mouth once daily 90 tablet 1    Magnesium Oxide (MAGNESIUM-OXIDE) 250 MG TABS tablet Take 1 tablet by mouth daily 90 tablet 0    montelukast (SINGULAIR) 10 MG tablet take 1 tablet by mouth at bedtime 90 tablet 0    rOPINIRole (REQUIP) 0.25 MG tablet Take 2 tab in morning and 2 tabs before bed 270 tablet 1    sertraline (ZOLOFT) 100 MG tablet take 1 and 1/2 tablet by mouth once daily 135 tablet 0    Cholecalciferol (VITAMIN D3) 125 MCG (5000 UT) CAPS Take 2 capsules by mouth in the morning and at bedtime      metoprolol succinate (TOPROL XL) 25 MG extended release tablet take 1 tablet by mouth once daily 90 tablet 3    warfarin (COUMADIN) 5 MG tablet Take 1 tablet by mouth daily except for Monday and Fridays take 1 1/2 (7.5mg) 132 tablet 3    RA NATURAL MAGNESIUM 250 MG TABS tablet take 1 tablet by mouth daily      BREZTRI AEROSPHERE 160-9-4.8 MCG/ACT AERO inhale 2 puffs INTO THE LUNGS twice a day 1 each 12    Inulin (FIBER CHOICE PO) Take 2 capsules by mouth 2 times daily      Blood Pressure Monitoring (BLOOD PRESSURE CUFF) MISC Dx:  Hypertension with labile blood pressure 1 each 0     No current facility-administered medications for this visit. Review of Systems   Constitutional:  Positive for unexpected weight change (40lbs). Negative for activity change and appetite change. HENT: Negative. Eyes: Negative. Respiratory: Negative. Negative for cough and shortness of breath. Cardiovascular:  Positive for chest pain (radiates from abdomen). Negative for leg swelling. Gastrointestinal:  Positive for abdominal pain, anal bleeding and diarrhea (off and on). Negative for abdominal distention, blood in stool, constipation, nausea and vomiting. Endocrine: Negative. Genitourinary: Negative. Musculoskeletal:  Positive for back pain. Negative for arthralgias, gait problem, joint swelling and myalgias. Skin: Negative. Allergic/Immunologic: Negative. Neurological: Negative. Negative for dizziness, weakness and headaches. Hematological: Negative. Psychiatric/Behavioral: Negative. Negative for confusion, decreased concentration and sleep disturbance.       /67 (Site: Right Upper Arm, Position: Sitting, Cuff Size: Large Adult)   Pulse 84   Temp 97.4 °F (36.3 °C) (Infrared)   Resp 18   Ht 5' 5\" (1.651 m)   Wt 153 lb (69.4 kg) LMP 11/08/2008 (LMP Unknown)   SpO2 95%   BMI 25.46 kg/m²   Physical Exam  Constitutional:       Appearance: Normal appearance. HENT:      Head: Normocephalic and atraumatic. Nose: Nose normal.      Mouth/Throat:      Mouth: Mucous membranes are moist.      Pharynx: Oropharynx is clear. Eyes:      Extraocular Movements: Extraocular movements intact. Pupils: Pupils are equal, round, and reactive to light. Cardiovascular:      Rate and Rhythm: Normal rate and regular rhythm. Pulses: Normal pulses. Heart sounds: Normal heart sounds. Pulmonary:      Effort: Pulmonary effort is normal.      Breath sounds: Normal breath sounds. Abdominal:      General: There is no distension. Palpations: Abdomen is soft. Tenderness: There is abdominal tenderness (RUQ and epigastric). Musculoskeletal:         General: No tenderness or signs of injury. Cervical back: Normal range of motion and neck supple. Skin:     General: Skin is warm and dry. Neurological:      General: No focal deficit present. Mental Status: She is alert and oriented to person, place, and time. Psychiatric:         Mood and Affect: Mood normal.         Behavior: Behavior normal.         Thought Content: Thought content normal.         Judgment: Judgment normal.         ASSESSMENT/PLAN:   Rectal bleeding/abdominal pain/weight loss -- plan for colonoscopy  Epigastric pain--plan for EGD  Symptomatic cholelithiasis--if no significant findings on EGD/colonoscopy will need lap asaf. Chronic anticoagulation for valve replacement--will bridge with lovenox, sees cardiologist next week. Prep:  Mag citrate    Colonoscopy. The patient was explained the risks/benefits/alternatives/expected outcomes of the procedure. The patient was explained the risks of the procedure, including, but not limited to, the risk of reaction to the anesthesia medicine and the risk of perforation requiring further surgery.   The patient was informed that they may require biopsy or polypectomy. These procedures may increase the risk of complication. All questions were answered. The patient verbalized understanding and agreed to proceed. I recommended esophagogastroduodenoscopy with possible biopsy and explained the risk, benefits, expected outcome, and alternatives to the procedure. Risks included but are not limited to bleeding, infection, respiratory distress, hypotension, and perforation of the esophagus, stomach, or duodenum. Patient understands and is in agreement.       Juan Daniel Bolaños MD, MSc, FACS  7/18/2022  3:48 PM

## 2022-07-18 NOTE — TELEPHONE ENCOUNTER
MA received a call form pt stating that her Lovenox shots  on 22. MA routing to Dr. Juma Barillas for advisement.     Electronically signed by Rebecca Taveras on 22 at 4:16 PM EDT

## 2022-07-20 ENCOUNTER — TELEPHONE (OUTPATIENT)
Dept: SURGERY | Age: 62
End: 2022-07-20

## 2022-07-20 NOTE — TELEPHONE ENCOUNTER
Patient is scheduled for EGD/Colonoscopy with   on 8/10/22 at 11 am with an arrival time of 10 am. Pt accepted date and time and verbalized understanding. Procedure letter mailed to patient as well. Prior Authorization Form:      DEMOGRAPHICS:                     Patient Name:  Vianney Amador  Patient :  1960            Insurance:  Payor: [employer] / Plan: [place] / Product Type: *No Product type* /   Insurance ID Number:    Payer/Plan Subscr  Sex Relation Sub. Ins. ID Effective Group Num   1. [facility] de[facility]plocal J 1960 Female Self [id number]                                   [address] [address]   2.  Neal Dunham 1960 Female Self [id number]                                   [address]         DIAGNOSIS & PROCEDURE:                       Procedure/Operation: EGD/COLONOSCOPY           CPT Code: 20424, 06435    Diagnosis:  WEIGHT LOSS, ABDOMINAL PAIN, RECTAL BLEEDING    ICD10 Code: R63.4, K62.5, R10.9    Location:  [facility]    Surgeon:  DR. Phill Ellison    SCHEDULING INFORMATION:                          Date: 8/10/22    Time: 11 AM              Anesthesia:  MAC/TIVA                                                       Status:  Outpatient        Special Comments:  N/A       Electronically signed by Itzel Monroy on 2022 at 2:42 PM

## 2022-07-21 NOTE — TELEPHONE ENCOUNTER
Pt is scheduled for 8/10, she states that she will request them from cardiology, MA verbalized understanding.   Electronically signed by John Storm on 7/21/22 at 4:37 PM EDT

## 2022-07-26 ENCOUNTER — TELEPHONE (OUTPATIENT)
Dept: SURGERY | Age: 62
End: 2022-07-26

## 2022-07-26 DIAGNOSIS — F32.A DEPRESSION, UNSPECIFIED DEPRESSION TYPE: ICD-10-CM

## 2022-07-26 RX ORDER — ENOXAPARIN SODIUM 100 MG/ML
1 INJECTION SUBCUTANEOUS 2 TIMES DAILY
Qty: 12 ML | Refills: 0 | Status: SHIPPED | OUTPATIENT
Start: 2022-07-26 | End: 2022-08-05

## 2022-07-26 RX ORDER — SERTRALINE HYDROCHLORIDE 100 MG/1
TABLET, FILM COATED ORAL
Qty: 135 TABLET | Refills: 3 | OUTPATIENT
Start: 2022-07-26

## 2022-07-26 NOTE — TELEPHONE ENCOUNTER
Pt is requesting a prescription for Lovenox as a bridge to temporarily stop taking her blood thinners for her upcoming scope with Dr. Yaneth Ferrer. Pt states the script she has at home is . Routing to Dr. Yaneth Ferrer for further advisement.     Electronically signed by Ronak Cunningham MA on 2022 at 10:15 AM

## 2022-07-30 DIAGNOSIS — J44.9 CHRONIC OBSTRUCTIVE PULMONARY DISEASE, UNSPECIFIED COPD TYPE (HCC): ICD-10-CM

## 2022-07-30 DIAGNOSIS — K21.9 GASTROESOPHAGEAL REFLUX DISEASE WITHOUT ESOPHAGITIS: ICD-10-CM

## 2022-07-30 DIAGNOSIS — F32.A DEPRESSION, UNSPECIFIED DEPRESSION TYPE: ICD-10-CM

## 2022-08-01 ENCOUNTER — TELEPHONE (OUTPATIENT)
Dept: SURGERY | Age: 62
End: 2022-08-01

## 2022-08-01 RX ORDER — METOCLOPRAMIDE 5 MG/1
5 TABLET ORAL 3 TIMES DAILY
Qty: 90 TABLET | Refills: 0 | OUTPATIENT
Start: 2022-08-01

## 2022-08-01 RX ORDER — ALBUTEROL SULFATE 90 UG/1
AEROSOL, METERED RESPIRATORY (INHALATION)
Qty: 25.5 G | Refills: 3 | OUTPATIENT
Start: 2022-08-01

## 2022-08-01 RX ORDER — SERTRALINE HYDROCHLORIDE 100 MG/1
TABLET, FILM COATED ORAL
Qty: 135 TABLET | Refills: 3 | OUTPATIENT
Start: 2022-08-01

## 2022-08-01 RX ORDER — POLYETHYLENE GLYCOL 3350, SODIUM SULFATE ANHYDROUS, SODIUM BICARBONATE, SODIUM CHLORIDE, POTASSIUM CHLORIDE 236; 22.74; 6.74; 5.86; 2.97 G/4L; G/4L; G/4L; G/4L; G/4L
4 POWDER, FOR SOLUTION ORAL ONCE
Qty: 4000 ML | Refills: 0 | Status: SHIPPED | OUTPATIENT
Start: 2022-08-01 | End: 2022-08-01

## 2022-08-01 RX ORDER — MONTELUKAST SODIUM 10 MG/1
TABLET ORAL
Qty: 90 TABLET | Refills: 3 | OUTPATIENT
Start: 2022-08-01

## 2022-08-01 NOTE — TELEPHONE ENCOUNTER
Patient called in stating she was prescribed the Mag Citrate / Dulcolax prep. States she received a call from the pharmacy the Mag Citrate is still on recall and they do not have that available. Patient questioning what prep she should take instead for her upcoming colonoscopy on 8/10/22. MA routing message to Dr Callie Delgado for advisement.    MA routing message to Chava Penn informatively    Electronically signed by Stefany Arndt on 8/1/22 at 9:23 AM EDT

## 2022-08-02 ENCOUNTER — OFFICE VISIT (OUTPATIENT)
Dept: INTERNAL MEDICINE | Age: 62
End: 2022-08-02
Payer: MEDICARE

## 2022-08-02 VITALS
HEART RATE: 77 BPM | DIASTOLIC BLOOD PRESSURE: 67 MMHG | BODY MASS INDEX: 25.22 KG/M2 | TEMPERATURE: 98.3 F | OXYGEN SATURATION: 97 % | WEIGHT: 151.4 LBS | SYSTOLIC BLOOD PRESSURE: 119 MMHG | HEIGHT: 65 IN

## 2022-08-02 DIAGNOSIS — K21.9 GASTROESOPHAGEAL REFLUX DISEASE WITHOUT ESOPHAGITIS: ICD-10-CM

## 2022-08-02 DIAGNOSIS — J44.9 CHRONIC OBSTRUCTIVE PULMONARY DISEASE, UNSPECIFIED COPD TYPE (HCC): ICD-10-CM

## 2022-08-02 DIAGNOSIS — F32.A DEPRESSION, UNSPECIFIED DEPRESSION TYPE: ICD-10-CM

## 2022-08-02 PROCEDURE — 3017F COLORECTAL CA SCREEN DOC REV: CPT | Performed by: INTERNAL MEDICINE

## 2022-08-02 PROCEDURE — 4004F PT TOBACCO SCREEN RCVD TLK: CPT | Performed by: INTERNAL MEDICINE

## 2022-08-02 PROCEDURE — 3023F SPIROM DOC REV: CPT | Performed by: INTERNAL MEDICINE

## 2022-08-02 PROCEDURE — G8427 DOCREV CUR MEDS BY ELIG CLIN: HCPCS | Performed by: INTERNAL MEDICINE

## 2022-08-02 PROCEDURE — 99214 OFFICE O/P EST MOD 30 MIN: CPT | Performed by: INTERNAL MEDICINE

## 2022-08-02 PROCEDURE — 99212 OFFICE O/P EST SF 10 MIN: CPT | Performed by: INTERNAL MEDICINE

## 2022-08-02 PROCEDURE — G8419 CALC BMI OUT NRM PARAM NOF/U: HCPCS | Performed by: INTERNAL MEDICINE

## 2022-08-02 RX ORDER — ALBUTEROL SULFATE 90 UG/1
AEROSOL, METERED RESPIRATORY (INHALATION)
Qty: 8.5 G | Refills: 2 | Status: SHIPPED
Start: 2022-08-02 | End: 2022-08-19 | Stop reason: SDUPTHER

## 2022-08-02 RX ORDER — SERTRALINE HYDROCHLORIDE 100 MG/1
TABLET, FILM COATED ORAL
Qty: 135 TABLET | Refills: 0 | Status: SHIPPED
Start: 2022-08-02 | End: 2022-09-26 | Stop reason: SDUPTHER

## 2022-08-02 RX ORDER — MONTELUKAST SODIUM 10 MG/1
TABLET ORAL
Qty: 90 TABLET | Refills: 0 | Status: SHIPPED | OUTPATIENT
Start: 2022-08-02

## 2022-08-02 RX ORDER — OMEPRAZOLE 40 MG/1
CAPSULE, DELAYED RELEASE ORAL
Qty: 180 CAPSULE | Refills: 1 | Status: SHIPPED | OUTPATIENT
Start: 2022-08-02

## 2022-08-02 ASSESSMENT — PATIENT HEALTH QUESTIONNAIRE - PHQ9
10. IF YOU CHECKED OFF ANY PROBLEMS, HOW DIFFICULT HAVE THESE PROBLEMS MADE IT FOR YOU TO DO YOUR WORK, TAKE CARE OF THINGS AT HOME, OR GET ALONG WITH OTHER PEOPLE: 3
4. FEELING TIRED OR HAVING LITTLE ENERGY: 3
9. THOUGHTS THAT YOU WOULD BE BETTER OFF DEAD, OR OF HURTING YOURSELF: 0
1. LITTLE INTEREST OR PLEASURE IN DOING THINGS: 3
SUM OF ALL RESPONSES TO PHQ QUESTIONS 1-9: 17
8. MOVING OR SPEAKING SO SLOWLY THAT OTHER PEOPLE COULD HAVE NOTICED. OR THE OPPOSITE, BEING SO FIGETY OR RESTLESS THAT YOU HAVE BEEN MOVING AROUND A LOT MORE THAN USUAL: 0
5. POOR APPETITE OR OVEREATING: 0
SUM OF ALL RESPONSES TO PHQ9 QUESTIONS 1 & 2: 5
7. TROUBLE CONCENTRATING ON THINGS, SUCH AS READING THE NEWSPAPER OR WATCHING TELEVISION: 3
SUM OF ALL RESPONSES TO PHQ QUESTIONS 1-9: 17
6. FEELING BAD ABOUT YOURSELF - OR THAT YOU ARE A FAILURE OR HAVE LET YOURSELF OR YOUR FAMILY DOWN: 3
3. TROUBLE FALLING OR STAYING ASLEEP: 3
2. FEELING DOWN, DEPRESSED OR HOPELESS: 2
SUM OF ALL RESPONSES TO PHQ QUESTIONS 1-9: 17
SUM OF ALL RESPONSES TO PHQ QUESTIONS 1-9: 17

## 2022-08-02 NOTE — PATIENT INSTRUCTIONS
Internal medicine    Follow ups  Please keep all other follow up appointments:  Pulmonology- you will receive a phone call to schedule this appointment. If you do not hear from the office in 7-10 days, please call the IM office     Changes in healthcare   Please take all medications as indicated  Diet: regular diet   Activity: activity as tolerated  Please contact us if you have any concerns, wish to change or make an appointment:  Internal medicine clinic   Phone: [phone]  Fax: [phone]  [address] [facility] [address]  Or please call the nurse line at [phone]. Should you have further questions in regards to this visit, you can review your clinical note and after visit summary document on your [employer] account. Other questions can be directed to our nurse line at [phone]. Other than any new prescriptions given to you today, the list of home medications on this After Visit Summary are based on information provided to us from you and your healthcare providers. This information, including the list, dose, and frequency of medications is only as accurate as the information you provided. If you have any questions or concerns about your home medications, please contact your Primary Care Physician for further clarification.

## 2022-08-02 NOTE — PROGRESS NOTES
the pt had an extensive discussion regarding this choice. The pt currently scores a 17 on her PHQ-9, but when asked directly she states that she does not believe this choice is directed by her depression. She vehemently denies any active SI/HI, and states that this choice was also made after discussions with her children.       Medical History      Past Medical History:      Diagnosis Date    Aneurysm ([address]     THORACIC AORTIC    Anticoagulant long-term use     Anxiety     Ascending aortic aneurysm (HCC)     Asthma     CAD (coronary artery disease)     Chronic back pain     Constipation 5/19/2021    COPD (chronic obstructive pulmonary disease) (HCC)     Depression     Depression     GERD (gastroesophageal reflux disease)     H/O mechanical aortic valve replacement 1/5/2017    Headache     Hyperlipidemia     Hypertension     On warfarin at home     for AVR    Restless legs syndrome     S/P AVR     Status post placement of implantable loop recorder     Syncope     Syncope, cardiogenic 9/10/2020    Tobacco abuse     Urinary incontinence     Ventricular tachycardia, non-sustained (HCC)     Warfarin-induced coagulopathy ([facility] [address] 12/11/2011       Past Surgical History:        Procedure Laterality Date    APPENDECTOMY      CARDIAC CATHETERIZATION  09/11/2020    Dr. Michele Slight  2009    Aortic valve 2009 [facility] [address]      COLONOSCOPY N/A 11/18/2019    COLONOSCOPY POLYPECTOMY SNARE/COLD BIOPSY performed by Roney Negrete MD at [address] [place] [address]  09/11/2020    Linq Insertion    Dr. Erwin Wade    KEM STEROTACTIC LOC BREAST BIOPSY RIGHT Right 3/10/2021    KEM STEROTACTIC LOC BREAST BIOPSY RIGHT 3/10/2021 [facility]    TUBAL LIGATION      UPPER GASTROINTESTINAL ENDOSCOPY N/A 11/18/2019    [facility]       Medications Prior to Admission:    Prior to Admission medications    Medication Sig Start Date End Date Taking? Authorizing Provider   sertraline (ZOLOFT) 100 MG tablet take 1 and 1/2 tablet by mouth once daily 8/2/22  Yes Sera Fabian DO   montelukast (SINGULAIR) 10 MG tablet take 1 tablet by mouth at bedtime 8/2/22  Yes Sera Fabian DO   albuterol sulfate HFA (PROVENTIL;VENTOLIN;PROAIR) 108 (90 Base) MCG/ACT inhaler inhale 1 puff every 4 hours if needed for wheezing 8/2/22  Yes Sera Fabian DO   omeprazole (PRILOSEC) 40 MG delayed release capsule Take 1 tablet in the morning 1 hour before breakfast and 1 hour before dinner. 8/2/22  Yes Sera Fabian DO   enoxaparin (LOVENOX) 60 MG/0.6ML Inject 0.6 mLs into the skin in the morning and 0.6 mLs before bedtime. Do all this for 10 days. 7/26/22 8/5/22 Yes Isis Núñez MD   bisacodyl 5 MG EC tablet Take 2 tablets by mouth in the morning and 2 tablets before bedtime.  7/18/22  Yes Isis Núñez MD   metoclopramide (REGLAN) 5 MG tablet Take 1 tablet by mouth 3 times daily 7/1/22  Yes Denver Carballo MD   albuterol (PROVENTIL) (2.5 MG/3ML) 0.083% nebulizer solution Take 3 mLs by nebulization every 6 hours as needed for Wheezing or Shortness of Breath 5/11/22  Yes Pita Olivia MD   aspirin EC 81 MG EC tablet Take 1 tablet by mouth daily 5/11/22  Yes Pita Olivia MD   atorvastatin (LIPITOR) 20 MG tablet take 1 tablet by mouth once daily 5/11/22  Yes Pita Olivia MD   Azelastine HCl 137 MCG/SPRAY SOLN 2 sprays by Nasal route daily 5/11/22  Yes Pita Olivia MD   Roflumilast (DALIRESP) 250 MCG tablet take 1 tablet by mouth daily 5/11/22  Yes Pita Olivia MD   fluticasone [facility]) 50 MCG/ACT nasal spray 2 sprays by Nasal route daily 5/11/22  Yes Pita Olivia MD   lisinopril (PRINIVIL;ZESTRIL) 40 MG tablet Take 1 tablet by mouth daily take 1 tablet by mouth once daily 5/11/22  Yes Pita Olivia MD   Magnesium Oxide (MAGNESIUM-OXIDE) 250 MG TABS tablet Take 1 tablet by mouth daily 5/11/22  Yes Pita Olivia MD   rOPINIRole (REQUIP) 0.25 MG tablet Take 2 tab in morning and 2 tabs before bed 5/11/22  Yes Roderick Cevallos MD   Cholecalciferol (VITAMIN D3) 125 MCG (5000 UT) CAPS Take 2 capsules by mouth in the morning and at bedtime   Yes Historical Provider, MD   metoprolol succinate (TOPROL XL) 25 MG extended release tablet take 1 tablet by mouth once daily 5/6/22  Yes Paige Ayala MD   warfarin (COUMADIN) 5 MG tablet Take 1 tablet by mouth daily except for Monday and Fridays take 1 1/2 (7.5mg) 5/6/22  Yes Paige Ayala MD   RA NATURAL MAGNESIUM 250 MG TABS tablet take 1 tablet by mouth daily 4/2/22  Yes Historical Provider, MD Casimiro Goodwin 160-9-4.8 MCG/ACT AERO inhale 2 puffs INTO THE LUNGS twice a day 3/7/22  Yes Thanh Feldman MD   Inulin (FIBER CHOICE PO) Take 2 capsules by mouth 2 times daily   Yes Historical Provider, MD   magnesium citrate solution Take 592 mLs by mouth once for 1 dose  Patient not taking: Reported on 8/2/2022 7/18/22 7/18/22  Beulah Mckeon MD   Blood Pressure Monitoring (BLOOD PRESSURE CUFF) MISC Dx:  Hypertension with labile blood pressure 9/20/19   Deng Alcocer MD       Allergies:  Keflex [cephalexin] and Phenergan [promethazine hcl]    Social History:   TOBACCO:   reports that she has been smoking. She has a 50.00 pack-year smoking history. She has never used smokeless tobacco.  ETOH:   reports current alcohol use. Family History:       Problem Relation Age of Onset    Heart Disease Mother     Arthritis Mother     Asthma Mother     Diabetes Mother     High Blood Pressure Mother     High Cholesterol Mother     Stroke Mother     Heart Disease Father     Arthritis Father     Asthma Father     Diabetes Father     High Blood Pressure Father     High Cholesterol Father     Breast Cancer Sister     Other Brother         ELMO on CPap;    Has ICD    Asthma Brother     COPD Brother     Arthritis Paternal Grandmother     Asthma Paternal Grandmother     Diabetes Maternal Aunt Diabetes Paternal Aunt     High Blood Pressure Paternal Aunt         Review of Systems       Constitutional: (-) fever, (-) chills   Head: (-) headache, (-) light headedness and (-) dizziness. Eyes: (-) changes in vision. (-) double vision or blurry vision  Respiratory: (-) SOB and (-) cough. (-) wheezing   Cardiovascular: (-) chest pain and (+) occasional palpitations. GI:  (+) nausea, (-) vomiting, (-) diarrhea, (-) constipation and (+) abdomen pain. (+) early satiety (+) bright red blood streaking the toilet paper (-) melena    Urology: (-) pain with urination, (-) hematuria      Physical Exam     Vitals: /67   Pulse 77   Temp 98.3 °F (36.8 °C) (Temporal)   Ht 5' 5\" (1.651 m)   Wt 151 lb 6.4 oz (68.7 kg)   LMP 11/08/2008 (LMP Unknown)   SpO2 97%   BMI 25.19 kg/m²      Constitutional: Alert, conversational Follows commands. In no apparent distress. Head: Normocephalic and atraumatic. Eyes: EOMI, conjunctiva normal, (-) scleral icterus. Mucus membranes moist.  Mouth: Mucus membranes moist. Oropharynx clear. No deviation of the tongue or uvula. Normal dentition. Neck: (-)  swelling, (-) JVD (-) masses, trachea midline   Respiratory: Lungs clear to auscultation bilaterally. (+) end-expiratory wheezes, (-)  rales, (-)  rhonchi. (-) increased work of breathing or respiratory distress  Cardiovascular: RRR. (-)  murmurs, (-) gallops,  (-) rubs. S1 and S2 were normal.   GI:  Abdomen soft, non-tender, non-distended. (+) BS. (-)  rebound, (-) rigidity. Extremities: Warm and well perfused. Skin warm dry and intact (-) clubbing, (-) cyanosis. 2+ distal pulses. Neurologic:  No focal neurological deficits.  No speech slurring or tremor     Assessment/plan      HTN  Continue Lisinopril and Metoprolol as prescribed     COPD Gold stage IV   Will refer pt to new pulmonologist - Dr. Kacie Reynolds   Continue Albuterol PRN, Eulice Goes, Roflumilast, Singulair, and Flonase as prescribed   Will refer to Social Work - [facility], for aid with possibly securing a portable O2 concentrator for the pt to use when travelling   Will place a referral to Palliative Care for management with symptoms and goals of care      Depression/Anxiety   Continue following with Emmanuel Bhatt   Continue Sertraline as prescribed     Early Satiety/Abdominal Pain  Will f/u results of EGD/Colonoscopy  Continue following with General Surgery     Noman De Paz, DO  , PGY 3, IM

## 2022-08-02 NOTE — PROGRESS NOTES
[facility]    Attending Physician Statement  I have discussed the case, including pertinent history and exam findings with the resident physician. I agree with the assessment, plan and orders as documented by the resident. I have reviewed all pertinent PMHx, PSHx, FamHx, SocialHx, medications, and allergies and updated history as appropriate. Patient presents for routine follow up of medical problems. One month FU  Hypertension - well controlled with compliant to medication. Abdominal AA with CAD  COPD 4 - multiple meds, still has resp symptoms with 2 L O2 at home setting only, needs to continue follow up with pulmonology, also need O2 concentrator. Still smoking heavily at home. Severe depression and anxiety, has been on sertraline and counseling service. Subscapular pain since March 2022, the symptom has not been changed. Probably related to MSK, recommended home stretch and OTC medication. Need to schedule for EGD in August 10th. Total time spent 25 minutes in this visit. Remainder of medical problems as per resident note.       Sylvia Osborne MD  8/2/2022 3:20 PM

## 2022-08-03 ENCOUNTER — TELEPHONE (OUTPATIENT)
Dept: INTERNAL MEDICINE | Age: 62
End: 2022-08-03

## 2022-08-03 NOTE — TELEPHONE ENCOUNTER
Left message for [employer] which is identified in chart as oxygen supplier re:portable oxygen concentrator

## 2022-08-03 NOTE — PROGRESS NOTES
[address] PRE-ADMISSION TESTING ENDOSCOPY INSTRUCTIONS- [facility]-phone number:[phone]    ENDOSCOPY INSTRUCTIONS:   [x] Bowel prep instructions reviewed. [x] Nothing by mouth after midnight, including gum, candy, mints, or water. Please follow your surgeons instructions if you are required to complete a bowel prep. Colonoscopy- no solid food-only clear liquids the day prior). [x] You may brush your teeth, gargle, but do NOT swallow water. [x] Do not wear makeup, lotions, powders, deodorant. Nail polish as directed by the nurse. [x] Arrange transportation with a responsible adult  to and from the hospital. If you do not have a responsible adult  to transport you, you will need to make arrangements with a medical transportation company (i.e. [employer]. A Uber/taxi/bus is not appropriate unless you are accompanied by a responsible adult ). Arrange for someone to be with you for the remainder of the day and for 24 hours after your procedure due to having had anesthesia. cholo   Who will be your  for transportation?__________________   Who will be staying with you for 24 hrs after your procedure?___________cholo_______    PARKING INSTRUCTIONS:    Arrival Time:___1000_____________________  [x] Parking lot  \"I\" OR 1 is located on [facility] (the corner of [facility]). To enter, press the button and the gate will lift. A free token will be provided to exit the lot. One car per patient is allowed to park in this lot. All other cars are to park on [address] [place] [address] either in the parking garage or the handicap lot. [] To reach the [place] lobby from [address] [place] [address], upon entering the hospital, take elevator B to the 3rd floor. EDUCATION INSTRUCTIONS:  [x] Bring a complete list of your medications, please write the last time you took the medicine, give this list to the nurse.   [x] Take the following medications the morning of surgery with 1-2 ounces of water: see med list  [x] Stop herbal supplements and vitamins 5 days before your surgery. [] DO NOT take any diabetic medicine the morning of surgery. Follow instructions for insulin the day before surgery. [] If you are diabetic and your blood sugar is low or you feel symptomatic, you may drink 1-2 ounces of apple juice or take a glucose tablet. The morning of your procedure, you may call the pre-op area if you have concerns about your blood sugar [phone]. [x] Use your inhalers the morning of surgery. Bring your emergency inhaler with you day of surgery. [x] Follow physician instructions regarding any blood thinners you may be taking. WHAT TO EXPECT:  [x] The day of your procedure you will be greeted and checked in by the [employer].  In addition, you will be registered in the [place] by a Patient Access Representative. Please bring your photo ID and insurance card. A nurse will greet you in accordance to the time you are needed in the pre-op area to prepare you for surgery. Please do not be discouraged if you are not greeted in the order you arrive as there are many variables that are involved in patient preparation. Your patience is greatly appreciated as you wait for your nurse. Please bring in items such as: books, magazines, newspapers, electronics, or any other items  to occupy your time in the waiting area. []  Delays may occur. Staff will make a sincere effort to keep you informed of delays. If any delays occur with your procedure, we apologize ahead of time for your inconvenience as we recognize the value of your time.

## 2022-08-05 ENCOUNTER — TELEPHONE (OUTPATIENT)
Dept: INTERNAL MEDICINE | Age: 62
End: 2022-08-05

## 2022-08-05 NOTE — TELEPHONE ENCOUNTER
LSW initiated call to [facility] as there was no return call; spoke with Yen Duque who reviewed pt's chart; she could be eligible for portable concentrator with order and documentation to support.   Discussed with Dr Vito Olivo and order placed; order and office encounter faxed and confirmed to [facility]

## 2022-08-08 ENCOUNTER — OFFICE VISIT (OUTPATIENT)
Dept: INTERNAL MEDICINE | Age: 62
End: 2022-08-08
Payer: MEDICARE

## 2022-08-08 ENCOUNTER — OFFICE VISIT (OUTPATIENT)
Dept: PALLATIVE CARE | Age: 62
End: 2022-08-08
Payer: MEDICARE

## 2022-08-08 VITALS
TEMPERATURE: 96.9 F | WEIGHT: 150 LBS | BODY MASS INDEX: 24.96 KG/M2 | HEART RATE: 74 BPM | DIASTOLIC BLOOD PRESSURE: 47 MMHG | SYSTOLIC BLOOD PRESSURE: 147 MMHG | OXYGEN SATURATION: 99 %

## 2022-08-08 DIAGNOSIS — Z71.89 GOALS OF CARE, COUNSELING/DISCUSSION: ICD-10-CM

## 2022-08-08 DIAGNOSIS — F33.0 MILD EPISODE OF RECURRENT MAJOR DEPRESSIVE DISORDER (HCC): Primary | ICD-10-CM

## 2022-08-08 DIAGNOSIS — Z51.5 PALLIATIVE CARE BY SPECIALIST: Primary | ICD-10-CM

## 2022-08-08 PROCEDURE — 99212 OFFICE O/P EST SF 10 MIN: CPT | Performed by: NURSE PRACTITIONER

## 2022-08-08 PROCEDURE — 90834 PSYTX W PT 45 MINUTES: CPT | Performed by: SOCIAL WORKER

## 2022-08-08 PROCEDURE — 3017F COLORECTAL CA SCREEN DOC REV: CPT | Performed by: NURSE PRACTITIONER

## 2022-08-08 PROCEDURE — 99215 OFFICE O/P EST HI 40 MIN: CPT | Performed by: NURSE PRACTITIONER

## 2022-08-08 PROCEDURE — G8420 CALC BMI NORM PARAMETERS: HCPCS | Performed by: NURSE PRACTITIONER

## 2022-08-08 PROCEDURE — 4004F PT TOBACCO SCREEN RCVD TLK: CPT | Performed by: NURSE PRACTITIONER

## 2022-08-08 PROCEDURE — G8427 DOCREV CUR MEDS BY ELIG CLIN: HCPCS | Performed by: NURSE PRACTITIONER

## 2022-08-08 NOTE — PROGRESS NOTES
Palliative Care Department  Provider: ANNABELLE Rockwell - CNP    Referring Provider:  Dr. Arun Perez    Location of Service:   [address] [place] [address]    Reason for Consult:  [x]  Code status Discussion  []  Assist with goals of care  [x]  Psychosocial support  [x]  Symptom Management  []  Advanced Care Planning    Chief Complaint: Carroll Mederos is a 57 y.o. female with chief complaint of SOB, Depression    Assessment/Plan      COPD GOLD Stage IV/Decompensated Heart Failure:   -  Previously known to Dr. Michael Johnson, to establish with Dr. Placido Berry   -  Singular, Azelastine, Flonase, Proventil, Daliresp, Albuterol HFA    Shortness of Breath with exertion:   -  Severe but not persistent   -  Improves fairly quickly with rest   -  Encouraged continued activity as tolerated   -  Consider low dose oral morphine if worsens    Depression:   -  Zoloft 150 mg Daily   -  Mostly related to psychosocial/family stressors    -  Is established with counseling    Abdominal pain:   -For scopes this week    Palliative Care Encounter:      - Goals of care: live longer, improve or maintain function/quality of life, remain at home, and continue current management     - Code Status: DNR-CCA     Follow Up:  3 months. They were encouraged to call with any questions, concerns, needs, or changes in symptoms. Subjective:     HPI:  Carroll Mederos is a 57 y.o. female with significant medical history of COPD, Heart failure, hx of Aortic valve replacement, pacer, who was referred to [address] [place] [address]. Subjective/Events/Discussions:  Mrs. Tonia Sy presents today unaccompanied. Overall she is doing fairly well, though she does that she has been having some abdominal pain over the last several months, started Reglan 1 month ago with only mild improvement, she is following up with Dr. Mindy Alexander later this week for scopes to look in this matter further.   Much of our time today was spent discussing her goals of care, she states that she had previously had a living will, that she may wish to complete this again as well as a healthcare power of  later today. We also discussed resuscitation in great length, she states that she would not wish to be resuscitated in the event of cardiac arrest, she is agreeable to CODE STATUS of DNR CCA. [facility] DNR form was filled out and completed for her today. As for long-term wishes, she states that she would not wish to be maintained by long-term life support, does not wish to be kept alive if she is not in a fully dependent state. She does understand that her chronic illnesses are progressive in nature, will likely continue to worsen over time, especially in light of her continued smoking. She states that she is not willing to stop smoking at this time, understanding that we will likely shorten her life, she states that she is willing to accept this risk. She has spoken to her family regarding her wishes, is appreciative of support provided today. Has no additional palliative medicine needs at this point time, we will have her return in 6 months to reassess for any needs.     Past Medical History:   Diagnosis Date    Aneurysm ([address]     THORACIC AORTIC    Anticoagulant long-term use     Anxiety     Ascending aortic aneurysm (HCC)     Asthma     CAD (coronary artery disease)     Chronic back pain     Constipation 5/19/2021    COPD (chronic obstructive pulmonary disease) (HCC)     Depression     Depression     GERD (gastroesophageal reflux disease)     H/O mechanical aortic valve replacement 1/5/2017    Headache     Hyperlipidemia     Hypertension     On warfarin at home     for AVR    Restless legs syndrome     S/P AVR     Status post placement of implantable loop recorder     Syncope     Syncope, cardiogenic 9/10/2020    Tobacco abuse     Urinary incontinence     Ventricular tachycardia, non-sustained (HCC)     Warfarin-induced coagulopathy ([address] 12/11/2011       Past Surgical History:   Procedure Laterality Date    APPENDECTOMY      CARDIAC CATHETERIZATION  09/11/2020    Dr. Rusty Becker  2009    Aortic valve 2009 [facility] [address]      COLONOSCOPY N/A 11/18/2019    COLONOSCOPY POLYPECTOMY SNARE/COLD BIOPSY performed by Yaron Terrazas MD at [facility] ([facility])      INSERTABLE CARDIAC MONITOR  09/11/2020    Linq Insertion    Dr. Laura Zhong    KEM STEROTACTIC LOC BREAST BIOPSY RIGHT Right 3/10/2021    KEM STEROTACTIC LOC BREAST BIOPSY RIGHT 3/10/2021 [facility]    TUBAL LIGATION      UPPER GASTROINTESTINAL ENDOSCOPY N/A 11/18/2019    EGD BIOPSY performed by Yaron Terrazas MD at [facility]       Family History   Problem Relation Age of Onset    Heart Disease Mother     Arthritis Mother     Asthma Mother     Diabetes Mother     High Blood Pressure Mother     High Cholesterol Mother     Stroke Mother     Heart Disease Father     Arthritis Father     Asthma Father     Diabetes Father     High Blood Pressure Father     High Cholesterol Father     Breast Cancer Sister     Other Brother         ELMO on CPap; Has ICD    Asthma Brother     COPD Brother     Arthritis Paternal Grandmother     Asthma Paternal Grandmother     Diabetes Maternal Aunt     Diabetes Paternal Aunt     High Blood Pressure Paternal Aunt        ROS: UNLESS STATED ABOVE PATIENT DENIES:  CONSTITUTIONAL:  fever, chill, rigors, nausea, vomiting, fatigue. HEENT: blurry vision, double vision, hearing problem, tinnitus, hoarseness, dysphagia, odynophagia  RESPIRATORY: cough, shortness of breath, sputum expectoration. CARDIOVASCULAR:  Chest pain/pressure, palpitation, syncope, irregular beats  GASTROINTESTINAL:  abdominal or rectal pain, diarrhea, constipation, .   GENITOURINARY:  Burning, frequency, urgency, incontinence, discharge  INTEGUMENTARY: rash, wound, pruritis  HEMATOLOGIC/LYMPHATIC:  Swelling, sores, gum bleeding, easy inadvertent computerized transcription errors may be present.

## 2022-08-09 ENCOUNTER — PREP FOR PROCEDURE (OUTPATIENT)
Dept: SURGERY | Age: 62
End: 2022-08-09

## 2022-08-09 RX ORDER — SODIUM CHLORIDE 9 MG/ML
INJECTION, SOLUTION INTRAVENOUS CONTINUOUS
Status: CANCELLED | OUTPATIENT
Start: 2022-08-09

## 2022-08-09 RX ORDER — SODIUM CHLORIDE 0.9 % (FLUSH) 0.9 %
5-40 SYRINGE (ML) INJECTION EVERY 12 HOURS SCHEDULED
Status: CANCELLED | OUTPATIENT
Start: 2022-08-09

## 2022-08-09 RX ORDER — SODIUM CHLORIDE 9 MG/ML
25 INJECTION, SOLUTION INTRAVENOUS PRN
Status: CANCELLED | OUTPATIENT
Start: 2022-08-09

## 2022-08-09 RX ORDER — SODIUM CHLORIDE 0.9 % (FLUSH) 0.9 %
5-40 SYRINGE (ML) INJECTION PRN
Status: CANCELLED | OUTPATIENT
Start: 2022-08-09

## 2022-08-09 NOTE — H&P
[facility]  HISTORY & PHYSICAL  ATTENDING NOTE          Chief Complaint   Patient presents with    Abdominal Pain       Pt states that she has frequent nausea after she eats. She states that she has also been having frequent abdominal pain, pt states that yesterday she had diarrhea all day. HPI:  57 y.o. female who states her symptoms began in January when she got COVID. She had 40lb weight loss. She has gained a little of it back. Her grandson's friend committed suicide a few months later and she has been having a lot of epigastric pain, chest pain, pain radiating to her back after that. She had her omeprazole increased to BID recently and reglan added and she is feeling a little better. She continues to smoke. She also has food fear. She states that whatever she eats just sits in her stomach. She doesn't think one particular food causes more issues, however she stated that when she had spaghetti sauce the other day she had a lot of pain. She denies issues with fried foods or fatty foods. She does not think her pain is worse at any particular time of day. I reviewed her chart. I reviewed a CTA of the abdomen with her from March 2022. I explained she has gallstones. Her celiac axis, SMA and AMANDA all appear patent. She has an AAA for which she is seeing Dr. Scott Baker in October. She has a thoracic aortic aneurysm and had a follow-up telephone call with Dr. Seferino Hernández in April and was recommended to continue anti-HTN meds, quit smoking and repeat CT scan in 2 years.        Past Medical History        Past Medical History:   Diagnosis Date    Aneurysm ([address]       THORACIC AORTIC    Anticoagulant long-term use      Anxiety      Ascending aortic aneurysm (HCC)      Asthma      CAD (coronary artery disease)      Chronic back pain      Constipation 5/19/2021    COPD (chronic obstructive pulmonary disease) (HCC)      Depression      Depression      GERD (gastroesophageal reflux disease)      H/O mechanical aortic valve replacement 1/5/2017    Headache      Hyperlipidemia      Hypertension      On warfarin at home       for AVR    Restless legs syndrome      S/P AVR      Status post placement of implantable loop recorder      Syncope      Syncope, cardiogenic 9/10/2020    Tobacco abuse      Urinary incontinence      Ventricular tachycardia, non-sustained (HCC)      Warfarin-induced coagulopathy ([facility] [address] 12/11/2011            Past Surgical History         Past Surgical History:   Procedure Laterality Date    APPENDECTOMY        CARDIAC CATHETERIZATION   09/11/2020     Dr. Yan Double   2009     Aortic valve 2009 [facility] [address]        COLONOSCOPY N/A 11/18/2019     COLONOSCOPY POLYPECTOMY SNARE/COLD BIOPSY performed by Cayla West MD at [facility] ([facility])        INSERTABLE CARDIAC MONITOR   09/11/2020     Linq Insertion    Dr. Foreign Bean    KEM STEROTACTIC LOC BREAST BIOPSY RIGHT Right 3/10/2021     KEM STEROTACTIC LOC BREAST BIOPSY RIGHT 3/10/2021 [facility]    TUBAL LIGATION        UPPER GASTROINTESTINAL ENDOSCOPY N/A 11/18/2019     EGD BIOPSY performed by Cayla West MD at [facility] History         Family History   Problem Relation Age of Onset    Heart Disease Mother      Arthritis Mother      Asthma Mother      Diabetes Mother      High Blood Pressure Mother      High Cholesterol Mother      Stroke Mother      Heart Disease Father      Arthritis Father      Asthma Father      Diabetes Father      High Blood Pressure Father      High Cholesterol Father      Breast Cancer Sister      Other Brother           ELMO on CPap;    Has ICD    Asthma Brother      COPD Brother      Arthritis Paternal Grandmother      Asthma Paternal Grandmother      Diabetes Maternal Aunt      Diabetes Paternal Aunt      High Blood Pressure Paternal Aunt Allergies   Allergen Reactions    Keflex [Cephalexin] Itching    Phenergan [Promethazine Hcl] Other (See Comments)         Social History            Tobacco Use    Smoking status: Every Day       Packs/day: 1.00       Years: 50.00       Pack years: 50.00       Types: Cigarettes    Smokeless tobacco: Never    Tobacco comments:       currently 0.5 ppd, 2/10/22   Vaping Use    Vaping Use: Former    Quit date: 1/1/2015    Substances: Nicotine   Substance Use Topics    Alcohol use: Yes       Comment: rare wine    Drug use: Never         Current Facility-Administered Medications          Current Outpatient Medications   Medication Sig Dispense Refill    bisacodyl 5 MG EC tablet Take 2 tablets by mouth in the morning and 2 tablets before bedtime. 4 tablet 0    magnesium citrate solution Take 592 mLs by mouth once for 1 dose 592 mL 0    omeprazole (PRILOSEC) 40 MG delayed release capsule Take 1 tablet in the morning 1 hour before breakfast and 1 hour before dinner.  120 capsule 1    metoclopramide (REGLAN) 5 MG tablet Take 1 tablet by mouth 3 times daily 90 tablet 0    albuterol (PROVENTIL) (2.5 MG/3ML) 0.083% nebulizer solution Take 3 mLs by nebulization every 6 hours as needed for Wheezing or Shortness of Breath 120 each 1    albuterol sulfate  (90 Base) MCG/ACT inhaler inhale 1 puff every 4 hours if needed for wheezing 8.5 g 2    aspirin EC 81 MG EC tablet Take 1 tablet by mouth daily 90 tablet 1    atorvastatin (LIPITOR) 20 MG tablet take 1 tablet by mouth once daily 90 tablet 0    Azelastine HCl 137 MCG/SPRAY SOLN 2 sprays by Nasal route daily 30 mL 1    Roflumilast (DALIRESP) 250 MCG tablet take 1 tablet by mouth daily 28 tablet 3    fluticasone (FLONASE) 50 MCG/ACT nasal spray 2 sprays by Nasal route daily 1 each 1    lisinopril (PRINIVIL;ZESTRIL) 40 MG tablet Take 1 tablet by mouth daily take 1 tablet by mouth once daily 90 tablet 1    Magnesium Oxide (MAGNESIUM-OXIDE) 250 MG TABS tablet Take 1 tablet by mouth daily 90 tablet 0    montelukast (SINGULAIR) 10 MG tablet take 1 tablet by mouth at bedtime 90 tablet 0    rOPINIRole (REQUIP) 0.25 MG tablet Take 2 tab in morning and 2 tabs before bed 270 tablet 1    sertraline (ZOLOFT) 100 MG tablet take 1 and 1/2 tablet by mouth once daily 135 tablet 0    Cholecalciferol (VITAMIN D3) 125 MCG (5000 UT) CAPS Take 2 capsules by mouth in the morning and at bedtime        metoprolol succinate (TOPROL XL) 25 MG extended release tablet take 1 tablet by mouth once daily 90 tablet 3    warfarin (COUMADIN) 5 MG tablet Take 1 tablet by mouth daily except for Monday and Fridays take 1 1/2 (7.5mg) 132 tablet 3    RA NATURAL MAGNESIUM 250 MG TABS tablet take 1 tablet by mouth daily        BREZTRI AEROSPHERE 160-9-4.8 MCG/ACT AERO inhale 2 puffs INTO THE LUNGS twice a day 1 each 12    Inulin (FIBER CHOICE PO) Take 2 capsules by mouth 2 times daily        Blood Pressure Monitoring (BLOOD PRESSURE CUFF) MISC Dx:  Hypertension with labile blood pressure 1 each 0      No current facility-administered medications for this visit. Review of Systems  Constitutional:  Positive for unexpected weight change (40lbs). Negative for activity change and appetite change. HENT: Negative. Eyes: Negative. Respiratory: Negative. Negative for cough and shortness of breath. Cardiovascular:  Positive for chest pain (radiates from abdomen). Negative for leg swelling. Gastrointestinal:  Positive for abdominal pain, anal bleeding and diarrhea (off and on). Negative for abdominal distention, blood in stool, constipation, nausea and vomiting. Endocrine: Negative. Genitourinary: Negative. Musculoskeletal:  Positive for back pain. Negative for arthralgias, gait problem, joint swelling and myalgias. Skin: Negative. Allergic/Immunologic: Negative. Neurological: Negative. Negative for dizziness, weakness and headaches. Hematological: Negative. Psychiatric/Behavioral: Negative. risks/benefits/alternatives/expected outcomes of the procedure. The patient was explained the risks of the procedure, including, but not limited to, the risk of reaction to the anesthesia medicine and the risk of perforation requiring further surgery. The patient was informed that they may require biopsy or polypectomy. These procedures may increase the risk of complication. All questions were answered. The patient verbalized understanding and agreed to proceed. I recommended esophagogastroduodenoscopy with possible biopsy and explained the risk, benefits, expected outcome, and alternatives to the procedure. Risks included but are not limited to bleeding, infection, respiratory distress, hypotension, and perforation of the esophagus, stomach, or duodenum. Patient understands and is in agreement.         Teja Harp MD, MSc, FACS  7/18/2022  3:48 PM

## 2022-08-09 NOTE — PROGRESS NOTES
ADULT BEHAVIORAL HEALTH FOLLOW UP  Lida Otto,MSW,LISW      Visit Date: 8/8/2022   Time of appointment:  1:00  Time spent with Patient: 40 minutes. This is patient's fifth appointment. Reason for Consult:  Depression     Referring Provider/PCP:    No ref. provider found  Annabella Cosme DO      Pt provided informed consent for the behavioral health program. Discussed with patient model of service to include the limits of confidentiality (i.e. abuse reporting, suicide intervention, etc.) and short-term intervention focused approach. Pt indicated understanding. Cody Lozano is a 57 y.o. female who presents for follow up of mild depression. Pt reports stable mood. Pt reports stable symptoms and reported she is doing well overall. Pt reports some recent family stressors the past week. MENTAL STATUS EXAM  Mood was euthymic with congruent affect. Suicidal ideation was denied. Homicidal ideation was denied. Hygiene was good . Dress was neat. Behavior was Within Normal Limits with No self-report of difficulties ambulating. Attitude was Cooperative, [place] and Friendly. Eye-contact was good. Speech: rate - WNL, rhythm - WNL, volume - WNL. Verbalizations were goal directed. Thought processes were intact and goal-oriented without evidence of delusions, hallucinations, obsessions, or dariusz; with little cognitive distortions. Associations were characterized by intact cognitive processes. Pt was oriented to person, place, time, and general circumstances;  recent:  good and fair. Insight and judgment were estimated to be excellent, AEB, a good  understanding of cyclical maladaptive patterns, and the ability to use insight to inform behavior change. ASSESSMENT  Keny Hamilton presented to the appointment today for evaluation and treatment of symptoms of depression. She is currently deemed no risk to herself or others and meets criteria for depression.  Pt was in agreement with recommendations. Discussed and processed with pt thoughts surrounding recent events. Identified strengths with pt. Identified continued ways to express self. Explored with pt goals and opportunities to set boundaries. Pt identified some challenges with current living situation and discussed moving back home within the next six months. Pt was help seeking and appropriate throughout the next session. PHQ Scores 8/2/2022 4/13/2022 2/2/2022 1/23/2022 12/14/2021 9/15/2020 10/15/2019   PHQ2 Score 5 6 5 3 6 4 4   PHQ9 Score 17 21 14 8 14 19 19     Interpretation of Total Score Depression Severity: 1-4 = Minimal depression, 5-9 = Mild depression, 10-14 = Moderate depression, 15-19 = Moderately severe depression, 20-27 = Severe depression    How often pt has had thoughts of death or hurting self (if PHQ positive for depression):           DIAGNOSIS  Jake Olivarez was seen today for depression. Diagnoses and all orders for this visit:    Mild episode of recurrent major depressive disorder ([facility] [address]        INTERVENTION  Used CBT to address thinking patterns  Provided education related to traumatic events  Identified strengths related to progress  Reviewed session with pt      PLAN  Pt to be seen in two weeks due to recent stressors  Pt to express self and set appropriate boundaries    INTERACTIVE COMPLEXITY  Is interactive complexity present?   No  Reason:  N/A  Additional Supporting Information:  N/A       Electronically signed by GERMAINE Sarabia on 5/17/22 at 2:11 PM EDT

## 2022-08-09 NOTE — H&P (VIEW-ONLY)
[facility]  HISTORY & PHYSICAL  ATTENDING NOTE          Chief Complaint   Patient presents with    Abdominal Pain       Pt states that she has frequent nausea after she eats. She states that she has also been having frequent abdominal pain, pt states that yesterday she had diarrhea all day. HPI:  57 y.o. female who states her symptoms began in January when she got COVID. She had 40lb weight loss. She has gained a little of it back. Her grandson's friend committed suicide a few months later and she has been having a lot of epigastric pain, chest pain, pain radiating to her back after that. She had her omeprazole increased to BID recently and reglan added and she is feeling a little better. She continues to smoke. She also has food fear. She states that whatever she eats just sits in her stomach. She doesn't think one particular food causes more issues, however she stated that when she had spaghetti sauce the other day she had a lot of pain. She denies issues with fried foods or fatty foods. She does not think her pain is worse at any particular time of day. I reviewed her chart. I reviewed a CTA of the abdomen with her from March 2022. I explained she has gallstones. Her celiac axis, SMA and AMANDA all appear patent. She has an AAA for which she is seeing Dr. Alma Sánchez in October. She has a thoracic aortic aneurysm and had a follow-up telephone call with Dr. Trinidad Wang in April and was recommended to continue anti-HTN meds, quit smoking and repeat CT scan in 2 years.        Past Medical History        Past Medical History:   Diagnosis Date    Aneurysm ([address]       THORACIC AORTIC    Anticoagulant long-term use      Anxiety      Ascending aortic aneurysm (HCC)      Asthma      CAD (coronary artery disease)      Chronic back pain      Constipation 5/19/2021    COPD (chronic obstructive pulmonary disease) (HCC)      Depression      Depression      GERD (gastroesophageal Allergies   Allergen Reactions    Keflex [Cephalexin] Itching    Phenergan [Promethazine Hcl] Other (See Comments)         Social History            Tobacco Use    Smoking status: Every Day       Packs/day: 1.00       Years: 50.00       Pack years: 50.00       Types: Cigarettes    Smokeless tobacco: Never    Tobacco comments:       currently 0.5 ppd, 2/10/22   Vaping Use    Vaping Use: Former    Quit date: 1/1/2015    Substances: Nicotine   Substance Use Topics    Alcohol use: Yes       Comment: rare wine    Drug use: Never         Current Facility-Administered Medications          Current Outpatient Medications   Medication Sig Dispense Refill    bisacodyl 5 MG EC tablet Take 2 tablets by mouth in the morning and 2 tablets before bedtime. 4 tablet 0    magnesium citrate solution Take 592 mLs by mouth once for 1 dose 592 mL 0    omeprazole (PRILOSEC) 40 MG delayed release capsule Take 1 tablet in the morning 1 hour before breakfast and 1 hour before dinner.  120 capsule 1    metoclopramide (REGLAN) 5 MG tablet Take 1 tablet by mouth 3 times daily 90 tablet 0    albuterol (PROVENTIL) (2.5 MG/3ML) 0.083% nebulizer solution Take 3 mLs by nebulization every 6 hours as needed for Wheezing or Shortness of Breath 120 each 1    albuterol sulfate  (90 Base) MCG/ACT inhaler inhale 1 puff every 4 hours if needed for wheezing 8.5 g 2    aspirin EC 81 MG EC tablet Take 1 tablet by mouth daily 90 tablet 1    atorvastatin (LIPITOR) 20 MG tablet take 1 tablet by mouth once daily 90 tablet 0    Azelastine HCl 137 MCG/SPRAY SOLN 2 sprays by Nasal route daily 30 mL 1    Roflumilast (DALIRESP) 250 MCG tablet take 1 tablet by mouth daily 28 tablet 3    fluticasone (FLONASE) 50 MCG/ACT nasal spray 2 sprays by Nasal route daily 1 each 1    lisinopril (PRINIVIL;ZESTRIL) 40 MG tablet Take 1 tablet by mouth daily take 1 tablet by mouth once daily 90 tablet 1    Magnesium Oxide (MAGNESIUM-OXIDE) 250 MG TABS tablet Take 1 tablet by Negative for confusion, decreased concentration and sleep disturbance. /67 (Site: Right Upper Arm, Position: Sitting, Cuff Size: Large Adult)   Pulse 84   Temp 97.4 °F (36.3 °C) (Infrared)   Resp 18   Ht 5' 5\" (1.651 m)   Wt 153 lb (69.4 kg)   LMP 11/08/2008 (LMP Unknown)   SpO2 95%   BMI 25.46 kg/m²   Physical Exam  Constitutional:       Appearance: Normal appearance. HENT:     Head: Normocephalic and atraumatic. Nose: Nose normal.     Mouth/Throat:     Mouth: Mucous membranes are moist.     Pharynx: Oropharynx is clear. Eyes:     Extraocular Movements: Extraocular movements intact. Pupils: Pupils are equal, round, and reactive to light. Cardiovascular:     Rate and Rhythm: Normal rate and regular rhythm. Pulses: Normal pulses. Heart sounds: Normal heart sounds. Pulmonary:     Effort: Pulmonary effort is normal.     Breath sounds: Normal breath sounds. Abdominal:     General: There is no distension. Palpations: Abdomen is soft. Tenderness: There is abdominal tenderness (RUQ and epigastric). Musculoskeletal:         General: No tenderness or signs of injury. Cervical back: Normal range of motion and neck supple. Skin:     General: Skin is warm and dry. Neurological:     General: No focal deficit present. Mental Status: She is alert and oriented to person, place, and time. Psychiatric:         Mood and Affect: Mood normal.         Behavior: Behavior normal.         Thought Content: Thought content normal.         Judgment: Judgment normal.           ASSESSMENT/PLAN:   Rectal bleeding/abdominal pain/weight loss -- plan for colonoscopy  Epigastric pain--plan for EGD  Symptomatic cholelithiasis--if no significant findings on EGD/colonoscopy will need lap asaf. Chronic anticoagulation for valve replacement--[facility], sees cardiologist next week. Prep:  Mag citrate     Colonoscopy.  The patient was explained the risks/benefits/alternatives/expected outcomes of the procedure. The patient was explained the risks of the procedure, including, but not limited to, the risk of reaction to the anesthesia medicine and the risk of perforation requiring further surgery. The patient was informed that they may require biopsy or polypectomy. These procedures may increase the risk of complication. All questions were answered. The patient verbalized understanding and agreed to proceed. I recommended esophagogastroduodenoscopy with possible biopsy and explained the risk, benefits, expected outcome, and alternatives to the procedure. Risks included but are not limited to bleeding, infection, respiratory distress, hypotension, and perforation of the esophagus, stomach, or duodenum. Patient understands and is in agreement.         Jenaro Armenta MD, MSc, FACS  7/18/2022  3:48 PM

## 2022-08-10 ENCOUNTER — HOSPITAL ENCOUNTER (OUTPATIENT)
Age: 62
Setting detail: OUTPATIENT SURGERY
Discharge: HOME | End: 2022-08-10
Attending: SURGERY | Admitting: SURGERY
Payer: MEDICARE

## 2022-08-10 ENCOUNTER — ANESTHESIA EVENT (OUTPATIENT)
Dept: ENDOSCOPY | Age: 62
End: 2022-08-10
Payer: MEDICARE

## 2022-08-10 ENCOUNTER — ANESTHESIA (OUTPATIENT)
Dept: ENDOSCOPY | Age: 62
End: 2022-08-10
Payer: MEDICARE

## 2022-08-10 VITALS
HEART RATE: 69 BPM | WEIGHT: 151 LBS | RESPIRATION RATE: 17 BRPM | BODY MASS INDEX: 25.16 KG/M2 | HEIGHT: 65 IN | DIASTOLIC BLOOD PRESSURE: 60 MMHG | TEMPERATURE: 97 F | SYSTOLIC BLOOD PRESSURE: 133 MMHG | OXYGEN SATURATION: 94 %

## 2022-08-10 DIAGNOSIS — K62.5 HEMORRHAGE OF ANUS AND RECTUM: ICD-10-CM

## 2022-08-10 DIAGNOSIS — R10.10 UPPER ABDOMINAL PAIN, UNSPECIFIED: ICD-10-CM

## 2022-08-10 DIAGNOSIS — R63.4 ABNORMAL WEIGHT LOSS: ICD-10-CM

## 2022-08-10 PROCEDURE — 2709999900 HC NON-CHARGEABLE SUPPLY: Performed by: SURGERY

## 2022-08-10 PROCEDURE — 3700000000 HC ANESTHESIA ATTENDED CARE: Performed by: SURGERY

## 2022-08-10 PROCEDURE — 2720000010 HC SURG SUPPLY STERILE: Performed by: SURGERY

## 2022-08-10 PROCEDURE — 45385 COLONOSCOPY W/LESION REMOVAL: CPT | Performed by: SURGERY

## 2022-08-10 PROCEDURE — 7100000010 HC PHASE II RECOVERY - FIRST 15 MIN: Performed by: SURGERY

## 2022-08-10 PROCEDURE — 43239 EGD BIOPSY SINGLE/MULTIPLE: CPT | Performed by: SURGERY

## 2022-08-10 PROCEDURE — 3609012400 HC EGD TRANSORAL BIOPSY SINGLE/MULTIPLE: Performed by: SURGERY

## 2022-08-10 PROCEDURE — 88305 TISSUE EXAM BY PATHOLOGIST: CPT

## 2022-08-10 PROCEDURE — 45381 COLONOSCOPY SUBMUCOUS NJX: CPT | Performed by: SURGERY

## 2022-08-10 PROCEDURE — 3700000001 HC ADD 15 MINUTES (ANESTHESIA): Performed by: SURGERY

## 2022-08-10 PROCEDURE — 3609019800 HC COLONOSCOPY WITH SUBMUCOSAL INJECTION: Performed by: SURGERY

## 2022-08-10 PROCEDURE — 6360000002 HC RX W HCPCS: Performed by: NURSE ANESTHETIST, CERTIFIED REGISTERED

## 2022-08-10 PROCEDURE — 88305 TISSUE EXAM BY PATHOLOGIST: CPT | Performed by: ANESTHESIOLOGY

## 2022-08-10 PROCEDURE — 7100000011 HC PHASE II RECOVERY - ADDTL 15 MIN: Performed by: SURGERY

## 2022-08-10 PROCEDURE — 3609010600 HC COLONOSCOPY POLYPECTOMY SNARE/COLD BIOPSY: Performed by: SURGERY

## 2022-08-10 PROCEDURE — 2580000003 HC RX 258: Performed by: NURSE ANESTHETIST, CERTIFIED REGISTERED

## 2022-08-10 RX ORDER — PROPOFOL 10 MG/ML
INJECTION, EMULSION INTRAVENOUS PRN
Status: DISCONTINUED | OUTPATIENT
Start: 2022-08-10 | End: 2022-08-10 | Stop reason: SDUPTHER

## 2022-08-10 RX ORDER — SODIUM CHLORIDE 0.9 % (FLUSH) 0.9 %
5-40 SYRINGE (ML) INJECTION PRN
Status: DISCONTINUED | OUTPATIENT
Start: 2022-08-10 | End: 2022-08-10 | Stop reason: HOSPADM

## 2022-08-10 RX ORDER — SODIUM CHLORIDE 9 MG/ML
INJECTION, SOLUTION INTRAVENOUS CONTINUOUS PRN
Status: DISCONTINUED | OUTPATIENT
Start: 2022-08-10 | End: 2022-08-10 | Stop reason: SDUPTHER

## 2022-08-10 RX ORDER — SODIUM CHLORIDE 0.9 % (FLUSH) 0.9 %
5-40 SYRINGE (ML) INJECTION EVERY 12 HOURS SCHEDULED
Status: DISCONTINUED | OUTPATIENT
Start: 2022-08-10 | End: 2022-08-10 | Stop reason: HOSPADM

## 2022-08-10 RX ORDER — SODIUM CHLORIDE 9 MG/ML
INJECTION, SOLUTION INTRAVENOUS CONTINUOUS
Status: DISCONTINUED | OUTPATIENT
Start: 2022-08-10 | End: 2022-08-10 | Stop reason: HOSPADM

## 2022-08-10 RX ORDER — SODIUM CHLORIDE 9 MG/ML
25 INJECTION, SOLUTION INTRAVENOUS PRN
Status: DISCONTINUED | OUTPATIENT
Start: 2022-08-10 | End: 2022-08-10 | Stop reason: HOSPADM

## 2022-08-10 RX ADMIN — PROPOFOL 600 MG: 10 INJECTION, EMULSION INTRAVENOUS at 11:11

## 2022-08-10 RX ADMIN — SODIUM CHLORIDE: 9 INJECTION, SOLUTION INTRAVENOUS at 11:05

## 2022-08-10 ASSESSMENT — PAIN SCALES - GENERAL
PAINLEVEL_OUTOF10: 0

## 2022-08-10 ASSESSMENT — LIFESTYLE VARIABLES: SMOKING_STATUS: 1

## 2022-08-10 NOTE — ANESTHESIA PRE PROCEDURE
Department of Anesthesiology  Preprocedure Note       Name:  Heydi Montes   Age:  57 y.o.  :  1960                                          MRN:  [id number]         Date:  8/10/2022      Surgeon: Molly Gifford):  Flaca Ceja MD    Procedure: EGD ESOPHAGOGASTRODUODENOSCOPY  COLONOSCOPY DIAGNOSTIC    Medications prior to admission:   Prior to Admission medications    Medication Sig Start Date End Date Taking? Authorizing Provider   sertraline (ZOLOFT) 100 MG tablet take 1 and 1/2 tablet by mouth once daily 22   Sera L Fabian, DO   montelukast (SINGULAIR) 10 MG tablet take 1 tablet by mouth at bedtime 22   Sera L Fabian, DO   albuterol sulfate HFA (PROVENTIL;VENTOLIN;PROAIR) 108 (90 Base) MCG/ACT inhaler inhale 1 puff every 4 hours if needed for wheezing 22   Sera L Fabian, DO   omeprazole (PRILOSEC) 40 MG delayed release capsule Take 1 tablet in the morning 1 hour before breakfast and 1 hour before dinner. 22   Sera L Fabian, DO   bisacodyl 5 MG EC tablet Take 2 tablets by mouth in the morning and 2 tablets before bedtime.   Patient not taking: Reported on 2022   Flaca Ceja MD   magnesium citrate solution Take 592 mLs by mouth once for 1 dose  Patient not taking: Reported on 2022  Flaca Ceja MD   metoclopramide (REGLAN) 5 MG tablet Take 1 tablet by mouth 3 times daily 22   Alicia Gibbs MD   albuterol (PROVENTIL) (2.5 MG/3ML) 0.083% nebulizer solution Take 3 mLs by nebulization every 6 hours as needed for Wheezing or Shortness of Breath 22   Camron Reid MD   aspirin EC 81 MG EC tablet Take 1 tablet by mouth daily 22   Camron Reid MD   atorvastatin (LIPITOR) 20 MG tablet take 1 tablet by mouth once daily 22   Camron Reid MD   Azelastine HCl 137 MCG/SPRAY SOLN 2 sprays by Nasal route daily 22   Camron Reid MD   Roflumilast (DALIRESP) 250 MCG tablet take 1 tablet by mouth daily 22   Camron Reid MD   fluticasone [facility]) 50 MCG/ACT nasal spray 2 sprays by Nasal route daily 5/11/22   Jen Porter MD   lisinopril (PRINIVIL;ZESTRIL) 40 MG tablet Take 1 tablet by mouth daily take 1 tablet by mouth once daily 5/11/22   Jen Porter MD   Magnesium Oxide (MAGNESIUM-OXIDE) 250 MG TABS tablet Take 1 tablet by mouth daily 5/11/22   Jen Porter MD   rOPINIRole (REQUIP) 0.25 MG tablet Take 2 tab in morning and 2 tabs before bed 5/11/22   Jen Porter MD   Cholecalciferol (VITAMIN D3) 125 MCG (5000 UT) CAPS Take 2 capsules by mouth in the morning and at bedtime    Historical Provider, MD   metoprolol succinate (TOPROL XL) 25 MG extended release tablet take 1 tablet by mouth once daily 5/6/22   Melany Lane MD   warfarin (COUMADIN) 5 MG tablet Take 1 tablet by mouth daily except for Monday and Fridays take 1 1/2 (7.5mg) 5/6/22   Melany Lane MD   RA NATURAL MAGNESIUM 250 MG TABS tablet take 1 tablet by mouth daily  Patient not taking: Reported on 8/8/2022 4/2/22   Historical Provider, MD GILBERTO MCLAUGHLIN 160-9-4.8 MCG/ACT AERO inhale 2 puffs INTO THE LUNGS twice a day 3/7/22   Higiniojosef Welsh MD   Inulin (FIBER CHOICE PO) Take 2 capsules by mouth 2 times daily    Historical Provider, MD   Blood Pressure Monitoring (BLOOD PRESSURE CUFF) MISC Dx:  Hypertension with labile blood pressure 9/20/19   Deng Burr MD       Current medications:    Current Outpatient Medications   Medication Sig Dispense Refill    sertraline (ZOLOFT) 100 MG tablet take 1 and 1/2 tablet by mouth once daily 135 tablet 0    montelukast (SINGULAIR) 10 MG tablet take 1 tablet by mouth at bedtime 90 tablet 0    albuterol sulfate HFA (PROVENTIL;VENTOLIN;PROAIR) 108 (90 Base) MCG/ACT inhaler inhale 1 puff every 4 hours if needed for wheezing 8.5 g 2    omeprazole (PRILOSEC) 40 MG delayed release capsule Take 1 tablet in the morning 1 hour before breakfast and 1 hour before dinner.  180 capsule 1    bisacodyl 5 MG EC tablet Take 2 tablets by mouth in the current facility-administered medications for this visit. Allergies: Allergies   Allergen Reactions    Keflex [Cephalexin] Itching    Phenergan [Promethazine Hcl] Other (See Comments)       Problem List:    Patient Active Problem List   Diagnosis Code    Depression F32. A    H/O aortic valve replacement Z95.2    Hyperlipidemia E78.5    COPD (chronic obstructive pulmonary disease) (HCC) J44.9    Primary hypertension I10    Vitamin D deficiency E55.9    Acid reflux K21.9    Tobacco abuse Z72.0    Restless leg syndrome G25.81    Seasonal allergies J30.2    Abdominal aortic aneurysm (AAA) without rupture (HCC) I71.4    Aortic aneurysm with dissection (HCC) I71.00, I71.9    Chest pain R07.9    Coronary artery disease involving native coronary artery of native heart without angina pectoris I25.10    Supratherapeutic INR R79.1    Chronic anticoagulation Z79.01    Puerperal sepsis with acute hypoxic respiratory failure (HCC) O85, R65.20, J96.01       Past Medical History:        Diagnosis Date    Aneurysm (HCC)     THORACIC AORTIC    Anticoagulant long-term use     Anxiety     Ascending aortic aneurysm (HCC)     Asthma     CAD (coronary artery disease)     Chronic back pain     Constipation 5/19/2021    COPD (chronic obstructive pulmonary disease) (HCC)     Depression     Depression     GERD (gastroesophageal reflux disease)     H/O mechanical aortic valve replacement 1/5/2017    Headache     Hyperlipidemia     Hypertension     On warfarin at home     for AVR    Restless legs syndrome     S/P AVR     Status post placement of implantable loop recorder     Syncope     Syncope, cardiogenic 9/10/2020    Tobacco abuse     Urinary incontinence     Ventricular tachycardia, non-sustained (HCC)     Warfarin-induced coagulopathy ([facility] [address] 12/11/2011       Past Surgical History:        Procedure Laterality Date    APPENDECTOMY      CARDIAC CATHETERIZATION  09/11/2020    Dr. Saleem Coffman CARDIAC VALVE REPLACEMENT  2009    A[facility] valve [facility]      SECTION      COLONOSCOPY N/A 2019    COLONOSCOPY POLYPECTOMY SNARE/COLD BIOPSY performed by Devon Turcios MD at [facility] ([address] [place] [address]  2020    Linq Insertion    Dr. Ian Agarwal KEM STEROTACTIC LOC BREAST BIOPSY RIGHT Right 3/10/2021    KEM STEROTACTIC LOC BREAST BIOPSY RIGHT 3/10/2021 [facility]    TUBAL LIGATION      UPPER GASTROINTESTINAL ENDOSCOPY N/A 2019    EGD BIOPSY performed by Devon Turcios MD at [facility] History:    Social History     Tobacco Use    Smoking status: Every Day     Packs/day: 1.00     Years: 50.00     Pack years: 50.00     Types: Cigarettes    Smokeless tobacco: Never   Substance Use Topics    Alcohol use: Yes     Comment: rare wine                                Ready to quit: Not Answered  Counseling given: Not Answered      Vital Signs (Current): There were no vitals filed for this visit.                                            BP Readings from Last 3 Encounters:   22 (!) 147/47   22 119/67   22 111/67       NPO Status:  8                                                                               BMI:   Wt Readings from Last 3 Encounters:   22 150 lb (68 kg)   22 151 lb 6.4 oz (68.7 kg)   22 153 lb (69.4 kg)     There is no height or weight on file to calculate BMI.    CBC:   Lab Results   Component Value Date/Time    WBC 12.2 2022 07:43 AM    RBC 3.46 2022 07:43 AM    HGB 10.8 2022 07:43 AM    HCT 33.5 2022 07:43 AM    MCV 96.8 2022 07:43 AM    RDW 15.2 2022 07:43 AM     2022 07:43 AM       CMP:   Lab Results   Component Value Date/Time     2022 07:43 AM    K 3.6 2022 07:43 AM    K 4.4 2022 04:12 AM     2022 07:43 AM    CO2 30 2022 07:43 AM    BUN 13 04/02/2022 07:43 AM    CREATININE 0.6 04/02/2022 07:43 AM    GFRAA >60 04/02/2022 07:43 AM    LABGLOM >60 04/02/2022 07:43 AM    GLUCOSE 88 04/02/2022 07:43 AM    PROT 6.7 04/01/2022 04:12 AM    CALCIUM 8.8 04/02/2022 07:43 AM    BILITOT 0.3 04/01/2022 04:12 AM    ALKPHOS 68 04/01/2022 04:12 AM    AST 17 04/01/2022 04:12 AM    ALT 16 04/01/2022 04:12 AM       POC Tests: No results for input(s): POCGLU, POCNA, POCK, POCCL, POCBUN, POCHEMO, POCHCT in the last 72 hours. Coags:   Lab Results   Component Value Date/Time    PROTIME 0.0 07/21/2022 01:42 PM    INR 2.4 07/21/2022 01:42 PM    INR 2.2 04/02/2022 07:43 AM    APTT 54.8 03/29/2022 02:31 PM       HCG (If Applicable): No results found for: PREGTESTUR, PREGSERUM, HCG, HCGQUANT     ABGs: No results found for: PHART, PO2ART, TDR7DEO, PAG8ENZ, BEART, M9DYKWZV     Type & Screen (If Applicable):  No results found for: LABABO, [address]    Anesthesia Evaluation  Patient summary reviewed and Nursing notes reviewed no history of anesthetic complications:   Airway: Mallampati: III          Dental:          Pulmonary: breath sounds clear to auscultation  (+) COPD (emphysema on CT Chest):  asthma: current smoker                           Cardiovascular:  Exercise tolerance: good (>4 METS),   (+) hypertension:, valvular problems/murmurs (S/P AVR- mechanical): AS, CAD:, dysrhythmias (nonsustained VT):, hyperlipidemia      ECG reviewed  Rhythm: regular  Rate: normal  Echocardiogram reviewed  Stress test reviewed             ROS comment: Ascending aortic aneurysm    Stress test 9/2020:  1. No reversible perfusion defect. Fixed anterior wall perfusion   abnormality. 2. Ejection fraction is 49 %. 3. Global very mild hypokinesis. RVH. Echo 9/2020:  Summary   Left ventricle is normal in size . Borderline concentric left ventricular hypertrophy. Septal motion consistent with post open heart state . Ejection fraction is visually estimated at 55-60%. Normal diastolic function. Normal right ventricular size and function. Normal sized left atrium. There is a mechanical aortic prosthetic valve which is well seated with a   mean gradient of 8.14 mmHg and trace aortic regurgitation   Mildly dilated aortic root. Cardiac cath 9/8/2020:  CONCLUSIONS:  1. Coronary artery. A.  Left main. Aneurysmal formation in the mid left main without any  significant angiographic luminal narrowings. B.  LAD. No significant angiographic disease. C.  LCX. No significant angiographic disease. D.  RCA. Dominant vessel with around 40-50% mid vessel narrowing. 2.  Bileaflet mechanical valve with adequate leaflet mobility noted on  Fluoroscopy. Loop recorder in place     Neuro/Psych:   (+) neuromuscular disease (chronic back pain):, headaches:, psychiatric history:             ROS comment: restless legs syndrome GI/Hepatic/Renal:   (+) GERD:, liver disease (hepatic steatosis):,           Endo/Other:    (+) blood dyscrasia: anticoagulation therapy:., .                  ROS comment: Hypocortisolism Abdominal:             Vascular: negative vascular ROS. Other Findings:             Anesthesia Plan      MAC     ASA 4       Induction: intravenous. Anesthetic plan and risks discussed with patient. DOS STAFF ADDENDUM:    Patient seen and chart reviewed. Physical exam and history updated as indicated. NPO status confirmed. Anesthesia options and plan discussed including risks benefits with patient/legal guardian and family as available. Concerns and questions addressed. Consent verbalized to proceed. Anesthesia plan, options and intraoperative/postoperative concerns discussed with care team.    Discussed DNR status with patient. She does not want DNR suspended but is agreeable to limited DNR for procedure.       Timmy Benítez MD, MD  8/10/2022  10:50 AM          Luis Fowler MD   8/10/2022

## 2022-08-10 NOTE — ANESTHESIA POSTPROCEDURE EVALUATION
Department of Anesthesiology  Postprocedure Note    Patient: Lan Cote  MRN: [id number]  YOB: 1960  Date of evaluation: 8/10/2022      Procedure Summary     Date: 08/10/22 Room / Location: [address] [place] [address] / [facility]    Anesthesia Start: 1105 Anesthesia Stop: 2316    Procedures:       EGD BIOPSY      COLONOSCOPY POLYPECTOMY SNARE/COLD BIOPSY      COLONOSCOPY SUBMUCOSAL/BOTOX INJECTION Diagnosis:       Abnormal weight loss      Upper abdominal pain, unspecified      Hemorrhage of anus and rectum      (WEIGHT LOSS, ABDOMINAL PAIN, RECTAL BLEEDING)    Surgeons: Isis Núñez MD Responsible Provider: Addie Melissa MD    Anesthesia Type: MAC ASA Status: 4          Anesthesia Type: No value filed.     Rm Phase I: Rm Score: 10    Rm Phase II: Rm Score: 10      Anesthesia Post Evaluation    Patient location during evaluation: PACU  Patient participation: complete - patient participated  Level of consciousness: awake and alert  Airway patency: patent  Nausea & Vomiting: no nausea and no vomiting  Complications: no  Cardiovascular status: blood pressure returned to baseline and hemodynamically stable  Respiratory status: acceptable and spontaneous ventilation  Hydration status: euvolemic  Multimodal analgesia pain management approach

## 2022-08-10 NOTE — OP NOTE
[facility]  ENDOSCOPY LAB  UPPER ENDOSCOPY REPORT      DATE OF PROCEDURE: 8/10/2022     PREOPERATIVE DIAGNOSIS: weight loss    POSTOPERATIVE DIAGNOSIS/FINDINGS: hiatal hernia 4cm    SURGEON: Adan Sharma MD    ASSISTANT: none    OPERATION: Esophagogastroduodenoscopy with biopsies    ANESTHESIA: Local monitored anesthesia. COMPLICATIONS: None. EBL:  5cc    SPECIMENS:  antral biopsies    PRIOR TO EXAM: Gen: comfortable, no distress, awake and alert; Lungs: Clear;  Heart:regular rate and rhythm, normal S1S2     BRIEF HISTORY:  This is a 57 y.o. female who presents with the complaint of weight loss. My recommendation is to proceed with esophagogastroduodenoscopy. The patient was advised of the risks, benefits, complications and options including the risk of bleeding and perforation. The patient understood and agreed to proceed. PROCEDURE:  Under [facility] anesthesia, the patient was positioned in the left side down decubitus position. A bite block was inserted. Under direct visualization the scope was passed through the oral cavity, into the esophagus and then into the stomach. The scope was then passed  into the duodenum.  Findings are as follows:    Duodenum: Normal to D2    Antrum/pylorus: Normal cold forceps biopsies taken x2    Gastric body: Normal    Gastric fundus/cardia: Hill class II hiatal hernia    Esophagus: Normal    GEJ: 4 cm hiatal hernia GE junction at 38 cm    THE PATIENT TOLERATED THE PROCEDURE WELL      PLAN:  F/u biopsies      Adan Sharma MD  8/10/2022  12:23 PM

## 2022-08-10 NOTE — OP NOTE
[facility]  PROCEDURE NOTE    DATE OF PROCEDURE: 8/10/2022    SURGEON: Krystal Marques MD    ASSISTANT: None    PREOPERATIVE DIAGNOSIS: Diagnostic colonoscopy for weight loss    POSTOPERATIVE DIAGNOSIS: diverticulosis coli; multiple colon polyps--cecum 4 mm, right colon--2 polyps one 1 cm in second 8 mm, hepatic flexure 6 mm, descending colon 8 mm; incomplete polyp removal x 2    OPERATION: Total colonoscopy with cold snare polypectomy    ANESTHESIA: Local monitored anesthesia. ESTIMATED BLOOD LOSS (ml): less than 50     COMPLICATIONS: None    SPECIMENS:  Was Obtained: multiple polyps as listed above    HISTORY: The patient is a 57y.o. year old female with history of above preop diagnosis. I recommended colonoscopy with possible biopsy or polypectomy and I explained the risk, benefits, expected outcome, and alternatives to the procedure. Risks included but are not limited to bleeding, infection, respiratory distress, hypotension, and perforation of the colon. The patient understands and is in agreement. PROCEDURE: The patient was given IV conscious sedation per anesthesia. The patient was given supplemental oxygen by nasal cannula. The colonoscope was inserted per rectum and advanced under direct vision to the cecum without difficulty. The prep was good so exam was adequate. BBPS:  3+3+3 = 9    FINDINGS:  Cecum/Ascending colon: Appendiceal orifice identified, ileocecal valve identified. Unable to intubate ileocecal valve retroflexion not performed. Cecal polyp 4 mm removed cold snare, right colon polyps x2. First would 1 cm segment 8 mm removed with cold snare. Paddock flexure polyp 6 mm removed with cold snare. There was a additional flat polyp on a fold that I injected with overrides that I could not remove    Transverse colon: Diverticulosis, scattered small to medium sized    Descending/Sigmoid colon: Diverticulosis, many small to medium size.   Descending colon polyp removed with cold snare 6 mm. Was a large flat sessile polyp on a fold that we could not remove. This large flat polyp was tattooed. Rectum/Anus: examined in normal and retroflexed positions and was normal    The colon was decompressed and the scope was removed. The withdraw time was approximately 23 minutes. The patient tolerated the procedure well.      ASSESSMENT/PLAN:   Diverticulosis--high fiber diet  Multiple polyps--f/u pathology  Recommend follow up colonoscopy in  6 months    Electronically signed by Krystal Marques MD on 8/10/22 at 12:18 PM EDT

## 2022-08-10 NOTE — INTERVAL H&P NOTE
Update History & Physical    The patient's History and Physical of July 18, 2022 was reviewed with the patient and I examined the patient. There was no change. The surgical site was confirmed by the patient and me. Plan: The risks, benefits, expected outcome, and alternative to the recommended procedure have been discussed with the patient. Patient understands and wants to proceed with the procedure.      Electronically signed by Dustin Krishna MD on 8/10/2022 at 11:08 AM

## 2022-08-16 DIAGNOSIS — J44.9 CHRONIC OBSTRUCTIVE PULMONARY DISEASE, UNSPECIFIED COPD TYPE (HCC): ICD-10-CM

## 2022-08-17 DIAGNOSIS — E78.5 HYPERLIPIDEMIA, UNSPECIFIED HYPERLIPIDEMIA TYPE: ICD-10-CM

## 2022-08-18 RX ORDER — ATORVASTATIN CALCIUM 20 MG/1
TABLET, FILM COATED ORAL
Qty: 90 TABLET | Refills: 1 | Status: SHIPPED | OUTPATIENT
Start: 2022-08-18

## 2022-08-19 DIAGNOSIS — J44.9 CHRONIC OBSTRUCTIVE PULMONARY DISEASE, UNSPECIFIED COPD TYPE (HCC): ICD-10-CM

## 2022-08-19 RX ORDER — ALBUTEROL SULFATE 90 UG/1
AEROSOL, METERED RESPIRATORY (INHALATION)
Qty: 3 EACH | Refills: 3 | Status: SHIPPED
Start: 2022-08-19 | End: 2022-09-16 | Stop reason: SDUPTHER

## 2022-08-19 NOTE — PROGRESS NOTES
Refill request from pt; per Dr. Vish Alonzo orders refill script sent to [facility].

## 2022-08-22 ENCOUNTER — OFFICE VISIT (OUTPATIENT)
Dept: INTERNAL MEDICINE | Age: 62
End: 2022-08-22
Payer: MEDICARE

## 2022-08-22 DIAGNOSIS — F33.0 MILD EPISODE OF RECURRENT MAJOR DEPRESSIVE DISORDER (HCC): Primary | ICD-10-CM

## 2022-08-22 PROCEDURE — 90832 PSYTX W PT 30 MINUTES: CPT | Performed by: SOCIAL WORKER

## 2022-08-22 NOTE — PROGRESS NOTES
ADULT BEHAVIORAL HEALTH FOLLOW UP  Salbador Otto,MSW,LISW      Visit Date: 8/22/2022   Time of appointment:  1:00  Time spent with Patient: 30 minutes. This is patient's sixth appointment. Reason for Consult:  Depression     Referring Provider/PCP:    No ref. provider found  Nam Lin,       Pt provided informed consent for the behavioral health program. Discussed with patient model of service to include the limits of confidentiality (i.e. abuse reporting, suicide intervention, etc.) and short-term intervention focused approach. Pt indicated understanding. Gerald Mcneil is a 57 y.o. female who presents for follow up of mild depression. Pt reports stable mood. Pt reports stable symptoms and reported she is doing well overall. Pt reports some recent family stressors the past week. Pt reports improvement from family stressors    MENTAL STATUS EXAM  Mood was euthymic with congruent affect. Suicidal ideation was denied. Homicidal ideation was denied. Hygiene was good . Dress was neat. Behavior was Within Normal Limits with No self-report of difficulties ambulating. Attitude was Cooperative, [place] and Friendly. Eye-contact was good. Speech: rate - WNL, rhythm - WNL, volume - WNL. Verbalizations were goal directed. Thought processes were intact and goal-oriented without evidence of delusions, hallucinations, obsessions, or dariusz; with little cognitive distortions. Associations were characterized by intact cognitive processes. Pt was oriented to person, place, time, and general circumstances;  recent:  good and fair. Insight and judgment were estimated to be excellent, AEB, a good  understanding of cyclical maladaptive patterns, and the ability to use insight to inform behavior change. ASSESSMENT  Linda Wyatt presented to the appointment today for evaluation and treatment of symptoms of depression.   She is currently deemed no risk to herself or others and meets criteria for depression. Pt was in agreement with recommendations. Discussed and processed with pt thoughts surrounding recent events. Identified strengths with pt. Identified continued ways to express self. Explored with pt goals and opportunities to set boundaries. Discussed and processed with pt overall progress made with treatment. Pt to be seen one more time and discharged due to steady improvement with symptoms. PHQ Scores 8/2/2022 4/13/2022 2/2/2022 1/23/2022 12/14/2021 9/15/2020 10/15/2019   PHQ2 Score 5 6 5 3 6 4 4   PHQ9 Score 17 21 14 8 14 19 19     Interpretation of Total Score Depression Severity: 1-4 = Minimal depression, 5-9 = Mild depression, 10-14 = Moderate depression, 15-19 = Moderately severe depression, 20-27 = Severe depression    How often pt has had thoughts of death or hurting self (if PHQ positive for depression):           DIAGNOSIS  Amena Tay was seen today for depression. Diagnoses and all orders for this visit:    Mild episode of recurrent major depressive disorder ([facility] [address]          INTERVENTION  Used CBT to address thinking patterns  Identified strengths related to progress  Discussed discharge planning   Reviewed session with pt      PLAN  Pt to be discharged due to steady progress with mood next session        INTERACTIVE COMPLEXITY  Is interactive complexity present?   No  Reason:  N/A  Additional Supporting Information:  N/A       Electronically signed by GERMAINE Sol on 5/17/22 at 2:11 PM EDT

## 2022-08-23 ENCOUNTER — TELEPHONE (OUTPATIENT)
Dept: SURGERY | Age: 62
End: 2022-08-23

## 2022-08-23 ENCOUNTER — TELEPHONE (OUTPATIENT)
Dept: PULMONOLOGY | Age: 62
End: 2022-08-23

## 2022-08-23 DIAGNOSIS — J44.0 CHRONIC OBSTRUCTIVE PULMONARY DISEASE WITH ACUTE LOWER RESPIRATORY INFECTION (HCC): Primary | ICD-10-CM

## 2022-08-23 RX ORDER — ALBUTEROL SULFATE 90 UG/1
2 AEROSOL, METERED RESPIRATORY (INHALATION) EVERY 6 HOURS PRN
Qty: 1 EACH | Refills: 3 | Status: SHIPPED | OUTPATIENT
Start: 2022-08-23

## 2022-08-23 NOTE — TELEPHONE ENCOUNTER
I called Huber Both and went over her pathology report from her colonoscopy. I explained that she had multiple tubular adenomas. She also has 2 larger polyps that I cannot remove. I gave her her options 1 being a total abdominal colectomy. I told her this would probably be pretty aggressive especially since we do not know exactly what these polyps are but they are just difficult to resect at this point time. I would rather retry to do a colonoscopy on her in about 6 months to try to remove them again. She seemed to be agreeable to this plan. She then brought up what o do about her gallstones. She states she still has a lot of bloating and abdominal pain and she would like to have her gallbladder removed. I reviewed her CAT scan again and she does have multiple stones. She would like to have her laparoscopic cholecystectomy sooner than later. We will plan for sometime in September. She will have to bridge her Coumadin again.

## 2022-08-23 NOTE — TELEPHONE ENCOUNTER
Pt calling into office requesting refill on Albuterol at [facility] since [facility] scripts have not come in the mail. Pt is completely out of inhaler. Refill sent per orders.

## 2022-08-25 ENCOUNTER — TELEPHONE (OUTPATIENT)
Dept: SURGERY | Age: 62
End: 2022-08-25

## 2022-08-25 NOTE — TELEPHONE ENCOUNTER
Patient is scheduled for laparoscopic cholecystectomy, possible cholangiogram, poss open with   on 9/9/22 at 12:30 pm with an arrival time of 10:30 am. Pt accepted date and time and verbalized understanding. Procedure letter mailed to patient as well. Post op: 10/3/22 @ 10:30 am, [facility].        Electronically signed by Bienvenido Hernandez on 8/25/22 at 4:28 PM EDT

## 2022-08-25 NOTE — TELEPHONE ENCOUNTER
Prior Authorization Form:      DEMOGRAPHICS:                     Patient Name:  Clau Spears  Patient :  1960            Insurance:  Payor: [facility] MEDICARE / Plan: [place] Draft / Product Type: *No Product type* /   Insurance ID Number:    Payer/Plan Subscr  Sex Relation Sub. Ins. ID Effective Group Num   1. [address] 1960 Female Self [id number]                                   [address] [address]   2.  Eppie Stain 1960 Female Self [id number]                                   [address]         DIAGNOSIS & PROCEDURE:                       Procedure/Operation: LAPAROSCOPIC CHOLECYSTECTOMY           CPT Code: 42587    Diagnosis:  GALLSTONES    ICD10 Code: K80.20    Location:  [address] [place] [address] [place]    Surgeon:  DR. Tim Santacruz    SCHEDULING INFORMATION:                          Date: 22    Time: 12:30 PM              Anesthesia:  General                                                       Status:  Outpatient        Special Comments:  N/A       Electronically signed by Tayler Rome on 2022 at 4:31 PM

## 2022-08-27 ENCOUNTER — APPOINTMENT (OUTPATIENT)
Dept: ULTRASOUND IMAGING | Age: 62
End: 2022-08-27
Payer: MEDICARE

## 2022-08-27 ENCOUNTER — HOSPITAL ENCOUNTER (EMERGENCY)
Age: 62
Discharge: HOME | End: 2022-08-27
Attending: EMERGENCY MEDICINE
Payer: MEDICARE

## 2022-08-27 VITALS
SYSTOLIC BLOOD PRESSURE: 128 MMHG | RESPIRATION RATE: 20 BRPM | DIASTOLIC BLOOD PRESSURE: 66 MMHG | OXYGEN SATURATION: 95 % | HEART RATE: 75 BPM | BODY MASS INDEX: 24.96 KG/M2 | WEIGHT: 150 LBS | TEMPERATURE: 98 F

## 2022-08-27 DIAGNOSIS — K80.20 SYMPTOMATIC CHOLELITHIASIS: Primary | ICD-10-CM

## 2022-08-27 LAB
ALBUMIN SERPL-MCNC: 4.6 G/DL (ref 3.5–5.2)
ALP BLD-CCNC: 75 U/L (ref 35–104)
ALT SERPL-CCNC: 17 U/L (ref 0–32)
ANION GAP SERPL CALCULATED.3IONS-SCNC: 10 MMOL/L (ref 7–16)
APTT: 31.4 SEC (ref 24.5–35.1)
AST SERPL-CCNC: 18 U/L (ref 0–31)
BACTERIA: NORMAL /HPF
BASOPHILS ABSOLUTE: 0.04 E9/L (ref 0–0.2)
BASOPHILS RELATIVE PERCENT: 0.4 % (ref 0–2)
BILIRUB SERPL-MCNC: 0.4 MG/DL (ref 0–1.2)
BILIRUBIN URINE: NEGATIVE
BLOOD, URINE: NORMAL
BUN BLDV-MCNC: 8 MG/DL (ref 6–23)
CALCIUM SERPL-MCNC: 9.2 MG/DL (ref 8.6–10.2)
CHLORIDE BLD-SCNC: 101 MMOL/L (ref 98–107)
CLARITY: CLEAR
CO2: 28 MMOL/L (ref 22–29)
COLOR: YELLOW
CREAT SERPL-MCNC: 0.6 MG/DL (ref 0.5–1)
EOSINOPHILS ABSOLUTE: 0.03 E9/L (ref 0.05–0.5)
EOSINOPHILS RELATIVE PERCENT: 0.3 % (ref 0–6)
GFR AFRICAN AMERICAN: >60
GFR NON-AFRICAN AMERICAN: >60 ML/MIN/1.73
GLUCOSE BLD-MCNC: 165 MG/DL (ref 74–99)
GLUCOSE URINE: NEGATIVE MG/DL
HCT VFR BLD CALC: 31.3 % (ref 34–48)
HEMOGLOBIN: 10.5 G/DL (ref 11.5–15.5)
IMMATURE GRANULOCYTES #: 0.06 E9/L
IMMATURE GRANULOCYTES %: 0.6 % (ref 0–5)
INR BLD: 1.9
KETONES, URINE: NEGATIVE MG/DL
LACTIC ACID: 1.5 MMOL/L (ref 0.5–2.2)
LEUKOCYTE ESTERASE, URINE: NEGATIVE
LIPASE: 31 U/L (ref 13–60)
LYMPHOCYTES ABSOLUTE: 1.36 E9/L (ref 1.5–4)
LYMPHOCYTES RELATIVE PERCENT: 13.3 % (ref 20–42)
MCH RBC QN AUTO: 32.5 PG (ref 26–35)
MCHC RBC AUTO-ENTMCNC: 33.5 % (ref 32–34.5)
MCV RBC AUTO: 96.9 FL (ref 80–99.9)
MONOCYTES ABSOLUTE: 0.61 E9/L (ref 0.1–0.95)
MONOCYTES RELATIVE PERCENT: 6 % (ref 2–12)
NEUTROPHILS ABSOLUTE: 8.11 E9/L (ref 1.8–7.3)
NEUTROPHILS RELATIVE PERCENT: 79.4 % (ref 43–80)
NITRITE, URINE: NEGATIVE
PDW BLD-RTO: 14 FL (ref 11.5–15)
PH UA: 6 (ref 5–9)
PLATELET # BLD: 284 E9/L (ref 130–450)
PMV BLD AUTO: 10.4 FL (ref 7–12)
POTASSIUM REFLEX MAGNESIUM: 3.8 MMOL/L (ref 3.5–5)
PROTEIN UA: NEGATIVE MG/DL
PROTHROMBIN TIME: 21.2 SEC (ref 9.3–12.4)
RBC # BLD: 3.23 E12/L (ref 3.5–5.5)
RBC UA: NORMAL /HPF (ref 0–2)
SODIUM BLD-SCNC: 139 MMOL/L (ref 132–146)
SPECIFIC GRAVITY UA: 1.02 (ref 1–1.03)
TOTAL PROTEIN: 7.1 G/DL (ref 6.4–8.3)
TROPONIN, HIGH SENSITIVITY: 9 NG/L (ref 0–9)
UROBILINOGEN, URINE: 0.2 E.U./DL
WBC # BLD: 10.2 E9/L (ref 4.5–11.5)
WBC UA: NORMAL /HPF (ref 0–5)

## 2022-08-27 PROCEDURE — 85730 THROMBOPLASTIN TIME PARTIAL: CPT

## 2022-08-27 PROCEDURE — 81001 URINALYSIS AUTO W/SCOPE: CPT

## 2022-08-27 PROCEDURE — 96374 THER/PROPH/DIAG INJ IV PUSH: CPT

## 2022-08-27 PROCEDURE — 84484 ASSAY OF TROPONIN QUANT: CPT

## 2022-08-27 PROCEDURE — 83605 ASSAY OF LACTIC ACID: CPT

## 2022-08-27 PROCEDURE — 99284 EMERGENCY DEPT VISIT MOD MDM: CPT

## 2022-08-27 PROCEDURE — 93005 ELECTROCARDIOGRAM TRACING: CPT | Performed by: PHYSICIAN ASSISTANT

## 2022-08-27 PROCEDURE — 85025 COMPLETE CBC W/AUTO DIFF WBC: CPT

## 2022-08-27 PROCEDURE — 6360000002 HC RX W HCPCS: Performed by: STUDENT IN AN ORGANIZED HEALTH CARE EDUCATION/TRAINING PROGRAM

## 2022-08-27 PROCEDURE — 76705 ECHO EXAM OF ABDOMEN: CPT

## 2022-08-27 PROCEDURE — 80053 COMPREHEN METABOLIC PANEL: CPT

## 2022-08-27 PROCEDURE — 85610 PROTHROMBIN TIME: CPT

## 2022-08-27 PROCEDURE — 83690 ASSAY OF LIPASE: CPT

## 2022-08-27 RX ORDER — ONDANSETRON 4 MG/1
4 TABLET, ORALLY DISINTEGRATING ORAL 3 TIMES DAILY PRN
Qty: 21 TABLET | Refills: 0 | Status: SHIPPED | OUTPATIENT
Start: 2022-08-27

## 2022-08-27 RX ORDER — NAPROXEN 500 MG/1
500 TABLET ORAL 2 TIMES DAILY
Qty: 28 TABLET | Refills: 0 | Status: SHIPPED | OUTPATIENT
Start: 2022-08-27 | End: 2022-09-09

## 2022-08-27 RX ORDER — ONDANSETRON 2 MG/ML
4 INJECTION INTRAMUSCULAR; INTRAVENOUS ONCE
Status: COMPLETED | OUTPATIENT
Start: 2022-08-27 | End: 2022-08-27

## 2022-08-27 RX ADMIN — ONDANSETRON HYDROCHLORIDE 4 MG: 2 SOLUTION INTRAMUSCULAR; INTRAVENOUS at 14:35

## 2022-08-27 ASSESSMENT — PAIN - FUNCTIONAL ASSESSMENT: PAIN_FUNCTIONAL_ASSESSMENT: 0-10

## 2022-08-27 ASSESSMENT — ENCOUNTER SYMPTOMS
EYE REDNESS: 0
DIARRHEA: 0
ABDOMINAL DISTENTION: 0
WHEEZING: 0
SINUS PRESSURE: 0
SORE THROAT: 0
ABDOMINAL PAIN: 1
NAUSEA: 1
SHORTNESS OF BREATH: 0
EYE DISCHARGE: 0
COUGH: 0
VOMITING: 1
BACK PAIN: 0
EYE PAIN: 0

## 2022-08-27 ASSESSMENT — PAIN SCALES - GENERAL: PAINLEVEL_OUTOF10: 8

## 2022-08-27 NOTE — ED NOTES
Right upper abd pains worse this morning. Scheduled for surgery for sept 9th. Pt stated that she has a history of gallstones and that her stomach began hurting worse today. Pt is alert and oriented. RR equal and unlabored. Will continue plan of care      Shayna Dias RN  08/27/22 [id number]

## 2022-08-27 NOTE — DISCHARGE INSTRUCTIONS
Take all medications as prescribed  Follow-up with general surgery  Follow-up with primary care doctor  If you notice any new worrisome symptoms please return to emergency department for evaluation

## 2022-08-27 NOTE — ED NOTES
Department of Emergency Medicine  FIRST PROVIDER TRIAGE NOTE             Independent MLP           8/27/22  12:06 PM EDT    Date of Encounter: 8/27/22   MRN: [id number]      HPI: Heydi Montes is a 60 y.o. female who presents to the ED for Abdominal Pain (Right upper abd pains worse this morning. Scheduled for surgery for sept 9th. )   Abdominal pain worsened today. Scheduled for \"gallbladder surgery\" on 9/9/22 with Dr. Adelaide Dunbar. RUQ pain. Reports nausea without vomiting or diarrhea. Afebrile. ROS: Negative for cp or sob. PE: Gen Appearance/Constitutional: alert  CV: regular rate  Pulm: CTA bilat     Initial Plan of Care: All treatment areas with department are currently occupied. Plan to order/Initiate the following while awaiting opening in ED: labs, EKG, and imaging studies.   Initiate Treatment-Testing, Proceed toTreatment Area When Bed Available for ED Attending/MLP to Continue Care    Electronically signed by COOPER Gardner   DD: 8/27/22       Gerardo Gardner  08/27/22 [id number]

## 2022-08-27 NOTE — ED PROVIDER NOTES
[facility]  Department of Emergency Medicine     Written by: Valentin Lopez DO  Patient Name: Michelet Yusuf  Attending Provider: Michael Ortez MD  Admit Date: 2022 12:44 PM  MRN: [id number]                   : 1960        Chief Complaint   Patient presents with    Abdominal Pain     Right upper abd pains worse this morning. Scheduled for surgery for . - Chief complaint    Patient is a 58-year-old female past med history of hypertension, hyperlipidemia, CAD, GERD and COPD. Patient presents with chief complaint of right upper quadrant abdominal pain. Patient states that symptoms began early this morning. Patient describes the pain as sharp aching sensation located in her right upper abdomen. Patient also notes that he had multiple episodes of nausea with nonbilious nonbloody pain. Patient stated symptoms have been moderate in severity and improved since onset. States that symptoms are worsened with eating food she denies any relieving factors. Patient states she has had similar episodes in the past and is following up with general surgery for cholecystectomy in September. Patient denies any fevers, chills, chest pain, cough, constipation, diarrhea, headache, lightheadedness, numbness or tingling. The history is provided by the patient. No  was used. Review of Systems   Constitutional:  Negative for chills and fever. HENT:  Negative for ear pain, sinus pressure and sore throat. Eyes:  Negative for pain, discharge and redness. Respiratory:  Negative for cough, shortness of breath and wheezing. Cardiovascular:  Negative for chest pain. Gastrointestinal:  Positive for abdominal pain, nausea and vomiting. Negative for abdominal distention and diarrhea. Genitourinary:  Negative for dysuria and frequency. Musculoskeletal:  Negative for arthralgias and back pain. Skin:  Negative for rash and wound.    Neurological: Negative for weakness and headaches. Hematological:  Negative for adenopathy. All other systems reviewed and are negative. Physical Exam  Vitals and nursing note reviewed. Constitutional:       General: She is not in acute distress. Appearance: Normal appearance. HENT:      Head: Normocephalic and atraumatic. Nose: No congestion or rhinorrhea. Mouth/Throat:      Mouth: Mucous membranes are moist.      Pharynx: Oropharynx is clear. Eyes:      Extraocular Movements: Extraocular movements intact. Pupils: Pupils are equal, round, and reactive to light. Cardiovascular:      Rate and Rhythm: Normal rate and regular rhythm. Heart sounds: No murmur heard. No gallop. Pulmonary:      Effort: Pulmonary effort is normal. No respiratory distress. Breath sounds: No wheezing, rhonchi or rales. Abdominal:      General: Abdomen is flat. Palpations: Abdomen is soft. There is no mass. Tenderness: There is abdominal tenderness (Minimal right upper quadrant tenderness abdomen soft nonfocal). There is no guarding. Negative signs include Humphreys's sign. Hernia: No hernia is present. Musculoskeletal:         General: No swelling, tenderness or signs of injury. Normal range of motion. Cervical back: Normal range of motion. No rigidity. No muscular tenderness. Skin:     General: Skin is warm and dry. Capillary Refill: Capillary refill takes less than 2 seconds. Neurological:      General: No focal deficit present. Mental Status: She is alert and oriented to person, place, and time. Mental status is at baseline. Psychiatric:         Mood and Affect: Mood normal.         Behavior: Behavior normal.        Procedures   EKG #1:  Interpreted by emergency department physician unless otherwise noted. Time:  1305    Rate: 69  Rhythm: Sinus.   Interpretation: EKG obtained and showed normal sinus him, rate 69, left axis, , no acute ST segment changes. .  Comparison: stable as compared to patient's most recent EKG. MDM  Number of Diagnoses or Management Options  Diagnosis management comments: Patient is a 58-year-old female past medical history of hypertension, hyperlipidemia, CAD, GERD and COPD. Patient presents for chief complaint of right upper quadrant abdominal pain. Vital signs stable presentation. Physical exam heart regular in rhythm, lungs are auscultation bilaterally, abdomen soft with minimal right upper quadrant abdominal pain no rigidity rebound or guarding. Laboratory obtained CBC demonstrate chronic anemia 10.5, CMP demonstrate no acute maladies, alk phos 75, ALT 17 AST 18, lipase 31, lactic acid 1.5. Troponin 9. Urinalysis obtained was not due to infection. Right upper quadrant ultrasound obtained demonstrated evidence of cholelithiasis no evidence of cholecystitis. Patient given Zofran as well as p.o. challenge. Reevaluation patient is tolerating p.o. well. Abdomen remains soft no tenderness to palpation. Findings consistent with symptomatic cholelithiasis patient is due for cholecystectomy early next month. Patient given prescription for Naprosyn as well as Zofran. Patient also instructed to follow-up with primary care doctor and general surgeon as soon as possible. Patient notes any new worrisome symptoms she was instructed to return to emergency department for evaluation. Plan of care discussed with patient including discharge, all questions were answered, patient was in agreement plan of care and discharged home in stable condition.        Amount and/or Complexity of Data Reviewed  Clinical lab tests: ordered and reviewed  Tests in the radiology section of CPT®: ordered and reviewed    Risk of Complications, Morbidity, and/or Mortality  Presenting problems: moderate  Diagnostic procedures: moderate  Management options: moderate    Patient Progress  Patient progress: stable --------------------------------------------- PAST HISTORY ---------------------------------------------  Past Medical History:  has a past medical history of [facility]neur[facility]m [facility] Anticoagulant long-term use, Anxiety, Ascending aortic aneurysm ([facility] Asthma, CAD (coronary artery disease), Chronic back pain, Constipation, COPD (chronic obstructive pulmonary disease) ([facility] Depression, Depression, GERD (gastroesophageal reflux disease), H/O mechanical aortic valve replacement, Headache, Hyperlipidemia, Hypertension, On warfarin at home, Restless legs syndrome, S/P AVR, Status post placement of implantable loop recorder, Syncope, Syncope, cardiogenic, Tobacco abuse, Urinary incontinence, Ventricular tachycardia, non-sustained ([facility] and Warfarin-induced coagulopathy ([facility] Past Surgical History:  has a past surgical history that includes Appendectomy; Hysterectomy;  section; Tubal ligation; Cardiac valve replacement (); Upper gastrointestinal endoscopy (N/A, 2019); Colonoscopy (N/A, 2019); Cardiac catheterization (2020); Insertable Cardiac Monitor (2020); [facility] iProcure BREAST BX W LOC DEVICE 1ST LESION RIGHT (Right, 3/10/2021); Upper gastrointestinal endoscopy (N/A, 8/10/2022); Colonoscopy (N/A, 8/10/2022); and Colonoscopy (8/10/2022). Social History:  reports that she has been smoking cigarettes. She has a 50.00 pack-year smoking history. She has never used smokeless tobacco. She reports current alcohol use. She reports that she does not use drugs. Family History: family history includes Arthritis in her father, mother, and paternal grandmother; Asthma in her brother, father, mother, and paternal grandmother; Breast Cancer in her sister; COPD in her brother; Diabetes in her father, maternal aunt, mother, and paternal aunt; Heart Disease in her father and mother; High Blood Pressure in her father, mother, and paternal aunt; High Cholesterol in her father and mother;  Other in her brother; Stroke in her mother. The patients home medications have been reviewed.     Allergies: Keflex [cephalexin] and Phenergan [promethazine hcl]    -------------------------------------------------- RESULTS -------------------------------------------------  Labs:  Results for orders placed or performed during the hospital encounter of 08/27/22   CBC with Auto Differential   Result Value Ref Range    WBC 10.2 4.5 - 11.5 E9/L    RBC 3.23 (L) 3.50 - 5.50 E12/L    Hemoglobin 10.5 (L) 11.5 - 15.5 g/dL    Hematocrit 31.3 (L) 34.0 - 48.0 %    MCV 96.9 80.0 - 99.9 fL    MCH 32.5 26.0 - 35.0 pg    MCHC 33.5 32.0 - 34.5 %    RDW 14.0 11.5 - 15.0 fL    Platelets 272 816 - 983 E9/L    MPV 10.4 7.0 - 12.0 fL    Neutrophils % 79.4 43.0 - 80.0 %    Immature Granulocytes % 0.6 0.0 - 5.0 %    Lymphocytes % 13.3 (L) 20.0 - 42.0 %    Monocytes % 6.0 2.0 - 12.0 %    Eosinophils % 0.3 0.0 - 6.0 %    Basophils % 0.4 0.0 - 2.0 %    Neutrophils Absolute 8.11 (H) 1.80 - 7.30 E9/L    Immature Granulocytes # 0.06 E9/L    Lymphocytes Absolute 1.36 (L) 1.50 - 4.00 E9/L    Monocytes Absolute 0.61 0.10 - 0.95 E9/L    Eosinophils Absolute 0.03 (L) 0.05 - 0.50 E9/L    Basophils Absolute 0.04 0.00 - 0.20 E9/L   Comprehensive Metabolic Panel w/ Reflex to MG   Result Value Ref Range    Sodium 139 132 - 146 mmol/L    Potassium reflex Magnesium 3.8 3.5 - 5.0 mmol/L    Chloride 101 98 - 107 mmol/L    CO2 28 22 - 29 mmol/L    Anion Gap 10 7 - 16 mmol/L    Glucose 165 (H) 74 - 99 mg/dL    BUN 8 6 - 23 mg/dL    Creatinine 0.6 0.5 - 1.0 mg/dL    GFR Non-African American >60 >=60 mL/min/1.73    GFR African American >60     Calcium 9.2 8.6 - 10.2 mg/dL    Total Protein 7.1 6.4 - 8.3 g/dL    Albumin 4.6 3.5 - 5.2 g/dL    Total Bilirubin 0.4 0.0 - 1.2 mg/dL    Alkaline Phosphatase 75 35 - 104 U/L    ALT 17 0 - 32 U/L    AST 18 0 - 31 U/L   Lipase   Result Value Ref Range    Lipase 31 13 - 60 U/L   Troponin   Result Value Ref Range    Troponin, High Sensitivity 9 0 - 9 ng/L   Urinalysis with Microscopic   Result Value Ref Range    Color, UA Yellow Straw/Yellow    Clarity, UA Clear Clear    Glucose, Ur Negative Negative mg/dL    Bilirubin Urine Negative Negative    Ketones, Urine Negative Negative mg/dL    Specific Gravity, UA 1.020 1.005 - 1.030    Blood, Urine TRACE-INTACT Negative    pH, UA 6.0 5.0 - 9.0    Protein, UA Negative Negative mg/dL    Urobilinogen, Urine 0.2 <2.0 E.U./dL    Nitrite, Urine Negative Negative    Leukocyte Esterase, Urine Negative Negative    WBC, UA NONE 0 - 5 /HPF    RBC, UA 2-5 0 - 2 /HPF    Bacteria, UA NONE SEEN None Seen /HPF   Lactic Acid   Result Value Ref Range    Lactic Acid 1.5 0.5 - 2.2 mmol/L   Protime-INR   Result Value Ref Range    Protime 21.2 (H) 9.3 - 12.4 sec    INR 1.9    APTT   Result Value Ref Range    aPTT 31.4 24.5 - 35.1 sec   EKG 12 Lead   Result Value Ref Range    Ventricular Rate 69 BPM    Atrial Rate 69 BPM    P-R Interval 144 ms    QRS Duration 102 ms    Q-T Interval 438 ms    QTc Calculation (Bazett) 469 ms    P Axis 84 degrees    R Axis -21 degrees    T Axis 74 degrees       Radiology:  US GALLBLADDER RUQ   Final Result   Cholelithiasis without CT evidence of acute cholecystitis.             ------------------------- NURSING NOTES AND VITALS REVIEWED ---------------------------  Date / Time Roomed:  8/27/2022 12:44 PM  ED Bed Assignment:  38/38    The nursing notes within the ED encounter and vital signs as below have been reviewed.    /66   Pulse 75   Temp 98 °F (36.7 °C) (Oral)   Resp 20   Wt 150 lb (68 kg)   LMP 11/08/2008 (LMP Unknown)   SpO2 95%   BMI 24.96 kg/m²   Oxygen Saturation Interpretation: Normal      ------------------------------------------ PROGRESS NOTES ------------------------------------------  4:10 PM EDT  I have spoken with the patient and discussed todays results, in addition to providing specific details for the plan of care and counseling regarding the diagnosis and prognosis. Their questions are answered at this time and they are agreeable with the plan. I discussed at length with them reasons for immediate return here for re evaluation. They will followup with their primary care physician and general surgeon by calling their office on Monday.      --------------------------------- ADDITIONAL PROVIDER NOTES ---------------------------------  At this time the patient is without objective evidence of an acute process requiring hospitalization or inpatient management. They have remained hemodynamically stable throughout their entire ED visit and are stable for discharge with outpatient follow-up. The plan has been discussed in detail and they are aware of the specific conditions for emergent return, as well as the importance of follow-up. Discharge Medication List as of 8/27/2022  3:45 PM        START taking these medications    Details   naproxen (NAPROSYN) 500 MG tablet Take 1 tablet by mouth 2 times daily for 14 days, Disp-28 tablet, R-0Normal      ondansetron (ZOFRAN-ODT) 4 MG disintegrating tablet Take 1 tablet by mouth 3 times daily as needed for Nausea or Vomiting, Disp-21 tablet, R-0Normal             Diagnosis:  1. Symptomatic cholelithiasis        Disposition:  Patient's disposition: Discharge to home  Patient's condition is stable. Patient was seen and evaluated by myself and my attending Eliana Hodge MD. Assessment and Plan discussed with attending provider, please see attestation for final plan of care.      Caroline Bence, DO Cala C[place], DO  Resident  08/27/22 [id number]

## 2022-08-29 LAB
EKG ATRIAL RATE: 69 BPM
EKG P AXIS: 84 DEGREES
EKG P-R INTERVAL: 144 MS
EKG Q-T INTERVAL: 438 MS
EKG QRS DURATION: 102 MS
EKG QTC CALCULATION (BAZETT): 469 MS
EKG R AXIS: -21 DEGREES
EKG T AXIS: 74 DEGREES
EKG VENTRICULAR RATE: 69 BPM

## 2022-09-01 RX ORDER — ENOXAPARIN SODIUM 100 MG/ML
1 INJECTION SUBCUTANEOUS 2 TIMES DAILY
Qty: 12 ML | Refills: 0 | Status: SHIPPED | OUTPATIENT
Start: 2022-09-04 | End: 2022-09-14

## 2022-09-02 NOTE — PROGRESS NOTES
[address]   PRE-ADMISSION TESTING GENERAL INSTRUCTIONS  [facility] Phone Number: [phone]      GENERAL INSTRUCTIONS:    [x] Antibacterial Soap Shower Night before and/or AM of surgery. [] CHG Wipes instruction sheet and wipes given. [] Hibiclens shower the night before and the morning of surgery.   -Do not use Hibiclens on your face or head. [x] Do not wear makeup, lotions, powders, deodorant. [x] Nothing by mouth after midnight; including gum, candy, mints, or water. [x] You may brush your teeth, gargle, but do NOT swallow water. [x] No tobacco products, illegal drugs, or alcohol within 24 hours of your surgery. [x] Jewelry or valuables should not be brought to the hospital. All body and/or tongue piercing's must be removed prior to arriving to hospital. No contact lens or hair pins. [x] Arrange transportation with a responsible adult  to and from the hospital. Arrange for someone to be with you for the remainder of the day and for 24 hours after your procedure due to having had anesthesia. -Who will be your  for transportation? _DAUGHTERRADHA_________________         -Who will be staying with you for 24 hrs after your procedure?__DAUGHTER________________  [x] Bring insurance card and photo ID.  [] Bring copy of living will or healthcare power of  papers to be placed in your electronic record. [] Urine Pregnancy test will be preformed the day of surgery. A specimen sample may be brought from home. [] Transfusion Bracelet: Please bring with you to hospital, day of surgery. PARKING INSTRUCTIONS:     [x] ARRIVAL DATE & TIME: 9/9 @ 1030  [x] Enter into the [employer]. Two people may accompany you. Masks are required. [x] Parking Lot \"I\" is where you will park. It is located on the corner of [address] and [address]. The entrance is on [address].    Upon entering the parking lot, a voucher ticket will print    EDUCATION in the pre-op area to prepare you for surgery. Please do not be discouraged if you are not greeted in the order you arrive as there are many variables that are involved in patient preparation. Your patience is greatly appreciated as you wait for your nurse. Please bring in items such as: books, magazines, newspapers, electronics, or any other items  to occupy your time in the waiting area. [x]  Delays may occur with surgery and staff will make a sincere effort to keep you informed of delays. If any delays occur with your procedure, we apologize ahead of time for your inconvenience as we recognize the value of your time.

## 2022-09-03 DIAGNOSIS — G25.81 RESTLESS LEG SYNDROME: ICD-10-CM

## 2022-09-06 RX ORDER — ROPINIROLE 0.25 MG/1
TABLET, FILM COATED ORAL
Qty: 360 TABLET | Refills: 1 | Status: SHIPPED | OUTPATIENT
Start: 2022-09-06

## 2022-09-07 DIAGNOSIS — G25.81 RESTLESS LEG SYNDROME: ICD-10-CM

## 2022-09-07 RX ORDER — ROPINIROLE 0.25 MG/1
TABLET, FILM COATED ORAL
Qty: 180 TABLET | Refills: 7 | OUTPATIENT
Start: 2022-09-07

## 2022-09-08 ENCOUNTER — ANESTHESIA EVENT (OUTPATIENT)
Dept: OPERATING ROOM | Age: 62
End: 2022-09-08
Payer: MEDICARE

## 2022-09-09 ENCOUNTER — PREP FOR PROCEDURE (OUTPATIENT)
Dept: SURGERY | Age: 62
End: 2022-09-09

## 2022-09-09 ENCOUNTER — HOSPITAL ENCOUNTER (OUTPATIENT)
Age: 62
Setting detail: OUTPATIENT SURGERY
Discharge: HOME | End: 2022-09-09
Attending: SURGERY | Admitting: SURGERY
Payer: MEDICARE

## 2022-09-09 ENCOUNTER — ANESTHESIA (OUTPATIENT)
Dept: OPERATING ROOM | Age: 62
End: 2022-09-09
Payer: MEDICARE

## 2022-09-09 VITALS
DIASTOLIC BLOOD PRESSURE: 56 MMHG | TEMPERATURE: 97.5 F | RESPIRATION RATE: 16 BRPM | HEART RATE: 65 BPM | BODY MASS INDEX: 24.99 KG/M2 | HEIGHT: 65 IN | OXYGEN SATURATION: 96 % | WEIGHT: 150 LBS | SYSTOLIC BLOOD PRESSURE: 128 MMHG

## 2022-09-09 DIAGNOSIS — K80.20 GALLSTONES: ICD-10-CM

## 2022-09-09 DIAGNOSIS — G89.18 POSTOPERATIVE PAIN: ICD-10-CM

## 2022-09-09 DIAGNOSIS — Z01.812 PRE-OPERATIVE LABORATORY EXAMINATION: Primary | ICD-10-CM

## 2022-09-09 LAB
ALBUMIN SERPL-MCNC: 4.8 G/DL (ref 3.5–5.2)
ALP BLD-CCNC: 72 U/L (ref 35–104)
ALT SERPL-CCNC: 16 U/L (ref 0–32)
ANION GAP SERPL CALCULATED.3IONS-SCNC: 12 MMOL/L (ref 7–16)
APTT: 27.7 SEC (ref 24.5–35.1)
AST SERPL-CCNC: 19 U/L (ref 0–31)
BILIRUB SERPL-MCNC: 0.8 MG/DL (ref 0–1.2)
BILIRUBIN DIRECT: <0.2 MG/DL (ref 0–0.3)
BILIRUBIN, INDIRECT: NORMAL MG/DL (ref 0–1)
BUN BLDV-MCNC: 11 MG/DL (ref 6–23)
CALCIUM SERPL-MCNC: 9.8 MG/DL (ref 8.6–10.2)
CHLORIDE BLD-SCNC: 104 MMOL/L (ref 98–107)
CO2: 27 MMOL/L (ref 22–29)
CREAT SERPL-MCNC: 0.6 MG/DL (ref 0.5–1)
GFR AFRICAN AMERICAN: >60
GFR NON-AFRICAN AMERICAN: >60 ML/MIN/1.73
GLUCOSE BLD-MCNC: 126 MG/DL (ref 74–99)
HCT VFR BLD CALC: 31 % (ref 34–48)
HEMOGLOBIN: 10.5 G/DL (ref 11.5–15.5)
INR BLD: 1.1
MCH RBC QN AUTO: 32.3 PG (ref 26–35)
MCHC RBC AUTO-ENTMCNC: 33.9 % (ref 32–34.5)
MCV RBC AUTO: 95.4 FL (ref 80–99.9)
PDW BLD-RTO: 14.4 FL (ref 11.5–15)
PLATELET # BLD: 262 E9/L (ref 130–450)
PMV BLD AUTO: 10.8 FL (ref 7–12)
POTASSIUM SERPL-SCNC: 4 MMOL/L (ref 3.5–5)
PROTHROMBIN TIME: 11.7 SEC (ref 9.3–12.4)
RBC # BLD: 3.25 E12/L (ref 3.5–5.5)
SODIUM BLD-SCNC: 143 MMOL/L (ref 132–146)
TOTAL PROTEIN: 7.3 G/DL (ref 6.4–8.3)
WBC # BLD: 9.5 E9/L (ref 4.5–11.5)

## 2022-09-09 PROCEDURE — 85610 PROTHROMBIN TIME: CPT

## 2022-09-09 PROCEDURE — 80076 HEPATIC FUNCTION PANEL: CPT

## 2022-09-09 PROCEDURE — 85730 THROMBOPLASTIN TIME PARTIAL: CPT

## 2022-09-09 PROCEDURE — 6370000000 HC RX 637 (ALT 250 FOR IP)

## 2022-09-09 PROCEDURE — 47562 LAPAROSCOPIC CHOLECYSTECTOMY: CPT | Performed by: SURGERY

## 2022-09-09 PROCEDURE — 2500000003 HC RX 250 WO HCPCS: Performed by: ANESTHESIOLOGIST ASSISTANT

## 2022-09-09 PROCEDURE — 80048 BASIC METABOLIC PNL TOTAL CA: CPT

## 2022-09-09 PROCEDURE — 3600000004 HC SURGERY LEVEL 4 BASE: Performed by: SURGERY

## 2022-09-09 PROCEDURE — 2709999900 HC NON-CHARGEABLE SUPPLY: Performed by: SURGERY

## 2022-09-09 PROCEDURE — 6370000000 HC RX 637 (ALT 250 FOR IP): Performed by: ANESTHESIOLOGY

## 2022-09-09 PROCEDURE — 3700000000 HC ANESTHESIA ATTENDED CARE: Performed by: SURGERY

## 2022-09-09 PROCEDURE — 94664 DEMO&/EVAL PT USE INHALER: CPT

## 2022-09-09 PROCEDURE — 7100000001 HC PACU RECOVERY - ADDTL 15 MIN: Performed by: SURGERY

## 2022-09-09 PROCEDURE — 2500000003 HC RX 250 WO HCPCS: Performed by: SURGERY

## 2022-09-09 PROCEDURE — 6370000000 HC RX 637 (ALT 250 FOR IP): Performed by: ANESTHESIOLOGIST ASSISTANT

## 2022-09-09 PROCEDURE — 88304 TISSUE EXAM BY PATHOLOGIST: CPT

## 2022-09-09 PROCEDURE — 6360000002 HC RX W HCPCS: Performed by: ANESTHESIOLOGIST ASSISTANT

## 2022-09-09 PROCEDURE — 36415 COLL VENOUS BLD VENIPUNCTURE: CPT

## 2022-09-09 PROCEDURE — 2580000003 HC RX 258: Performed by: SURGERY

## 2022-09-09 PROCEDURE — 7100000010 HC PHASE II RECOVERY - FIRST 15 MIN: Performed by: SURGERY

## 2022-09-09 PROCEDURE — 3700000001 HC ADD 15 MINUTES (ANESTHESIA): Performed by: SURGERY

## 2022-09-09 PROCEDURE — 7100000000 HC PACU RECOVERY - FIRST 15 MIN: Performed by: SURGERY

## 2022-09-09 PROCEDURE — 6360000002 HC RX W HCPCS: Performed by: ANESTHESIOLOGY

## 2022-09-09 PROCEDURE — 7100000011 HC PHASE II RECOVERY - ADDTL 15 MIN: Performed by: SURGERY

## 2022-09-09 PROCEDURE — 2720000010 HC SURG SUPPLY STERILE: Performed by: SURGERY

## 2022-09-09 PROCEDURE — 85027 COMPLETE CBC AUTOMATED: CPT

## 2022-09-09 PROCEDURE — 3600000014 HC SURGERY LEVEL 4 ADDTL 15MIN: Performed by: SURGERY

## 2022-09-09 RX ORDER — SODIUM CHLORIDE 0.9 % (FLUSH) 0.9 %
5-40 SYRINGE (ML) INJECTION PRN
Status: DISCONTINUED | OUTPATIENT
Start: 2022-09-09 | End: 2022-09-09 | Stop reason: HOSPADM

## 2022-09-09 RX ORDER — SODIUM CHLORIDE 9 MG/ML
INJECTION, SOLUTION INTRAVENOUS PRN
Status: CANCELLED | OUTPATIENT
Start: 2022-09-09

## 2022-09-09 RX ORDER — SODIUM CHLORIDE 0.9 % (FLUSH) 0.9 %
5-40 SYRINGE (ML) INJECTION EVERY 12 HOURS SCHEDULED
Status: DISCONTINUED | OUTPATIENT
Start: 2022-09-09 | End: 2022-09-09 | Stop reason: HOSPADM

## 2022-09-09 RX ORDER — PROPOFOL 10 MG/ML
INJECTION, EMULSION INTRAVENOUS PRN
Status: DISCONTINUED | OUTPATIENT
Start: 2022-09-09 | End: 2022-09-09 | Stop reason: SDUPTHER

## 2022-09-09 RX ORDER — DEXAMETHASONE SODIUM PHOSPHATE 10 MG/ML
INJECTION INTRAMUSCULAR; INTRAVENOUS PRN
Status: DISCONTINUED | OUTPATIENT
Start: 2022-09-09 | End: 2022-09-09 | Stop reason: SDUPTHER

## 2022-09-09 RX ORDER — IPRATROPIUM BROMIDE AND ALBUTEROL SULFATE 2.5; .5 MG/3ML; MG/3ML
1 SOLUTION RESPIRATORY (INHALATION) ONCE
Status: COMPLETED | OUTPATIENT
Start: 2022-09-09 | End: 2022-09-09

## 2022-09-09 RX ORDER — GLYCOPYRROLATE 0.2 MG/ML
INJECTION INTRAMUSCULAR; INTRAVENOUS PRN
Status: DISCONTINUED | OUTPATIENT
Start: 2022-09-09 | End: 2022-09-09 | Stop reason: SDUPTHER

## 2022-09-09 RX ORDER — SODIUM CHLORIDE 0.9 % (FLUSH) 0.9 %
5-40 SYRINGE (ML) INJECTION PRN
Status: CANCELLED | OUTPATIENT
Start: 2022-09-09

## 2022-09-09 RX ORDER — SODIUM CHLORIDE 9 MG/ML
INJECTION, SOLUTION INTRAVENOUS CONTINUOUS
Status: CANCELLED | OUTPATIENT
Start: 2022-09-09

## 2022-09-09 RX ORDER — LIDOCAINE HYDROCHLORIDE 20 MG/ML
INJECTION, SOLUTION EPIDURAL; INFILTRATION; INTRACAUDAL; PERINEURAL PRN
Status: DISCONTINUED | OUTPATIENT
Start: 2022-09-09 | End: 2022-09-09 | Stop reason: SDUPTHER

## 2022-09-09 RX ORDER — FENTANYL CITRATE 50 UG/ML
INJECTION, SOLUTION INTRAMUSCULAR; INTRAVENOUS PRN
Status: DISCONTINUED | OUTPATIENT
Start: 2022-09-09 | End: 2022-09-09 | Stop reason: SDUPTHER

## 2022-09-09 RX ORDER — OXYCODONE HYDROCHLORIDE AND ACETAMINOPHEN 5; 325 MG/1; MG/1
1 TABLET ORAL ONCE
Status: COMPLETED | OUTPATIENT
Start: 2022-09-09 | End: 2022-09-09

## 2022-09-09 RX ORDER — SODIUM CHLORIDE 0.9 % (FLUSH) 0.9 %
5-40 SYRINGE (ML) INJECTION EVERY 12 HOURS SCHEDULED
Status: CANCELLED | OUTPATIENT
Start: 2022-09-09

## 2022-09-09 RX ORDER — SODIUM CHLORIDE 9 MG/ML
INJECTION, SOLUTION INTRAVENOUS PRN
Status: DISCONTINUED | OUTPATIENT
Start: 2022-09-09 | End: 2022-09-09 | Stop reason: HOSPADM

## 2022-09-09 RX ORDER — ROCURONIUM BROMIDE 10 MG/ML
INJECTION, SOLUTION INTRAVENOUS PRN
Status: DISCONTINUED | OUTPATIENT
Start: 2022-09-09 | End: 2022-09-09 | Stop reason: SDUPTHER

## 2022-09-09 RX ORDER — OXYCODONE HYDROCHLORIDE AND ACETAMINOPHEN 5; 325 MG/1; MG/1
1 TABLET ORAL EVERY 6 HOURS PRN
Qty: 28 TABLET | Refills: 0 | Status: SHIPPED | OUTPATIENT
Start: 2022-09-09 | End: 2022-09-16

## 2022-09-09 RX ORDER — SODIUM CHLORIDE 9 MG/ML
INJECTION, SOLUTION INTRAVENOUS CONTINUOUS
Status: DISCONTINUED | OUTPATIENT
Start: 2022-09-09 | End: 2022-09-09 | Stop reason: HOSPADM

## 2022-09-09 RX ORDER — NEOSTIGMINE METHYLSULFATE 1 MG/ML
INJECTION, SOLUTION INTRAVENOUS PRN
Status: DISCONTINUED | OUTPATIENT
Start: 2022-09-09 | End: 2022-09-09 | Stop reason: SDUPTHER

## 2022-09-09 RX ORDER — ONDANSETRON 2 MG/ML
4 INJECTION INTRAMUSCULAR; INTRAVENOUS
Status: DISCONTINUED | OUTPATIENT
Start: 2022-09-09 | End: 2022-09-09 | Stop reason: HOSPADM

## 2022-09-09 RX ORDER — ONDANSETRON 2 MG/ML
INJECTION INTRAMUSCULAR; INTRAVENOUS PRN
Status: DISCONTINUED | OUTPATIENT
Start: 2022-09-09 | End: 2022-09-09 | Stop reason: SDUPTHER

## 2022-09-09 RX ORDER — MIDAZOLAM HYDROCHLORIDE 1 MG/ML
INJECTION INTRAMUSCULAR; INTRAVENOUS PRN
Status: DISCONTINUED | OUTPATIENT
Start: 2022-09-09 | End: 2022-09-09 | Stop reason: SDUPTHER

## 2022-09-09 RX ORDER — SENNA AND DOCUSATE SODIUM 50; 8.6 MG/1; MG/1
1 TABLET, FILM COATED ORAL DAILY
Qty: 30 TABLET | Refills: 0 | Status: SHIPPED | OUTPATIENT
Start: 2022-09-09

## 2022-09-09 RX ORDER — BUPIVACAINE HYDROCHLORIDE AND EPINEPHRINE 2.5; 5 MG/ML; UG/ML
INJECTION, SOLUTION EPIDURAL; INFILTRATION; INTRACAUDAL; PERINEURAL PRN
Status: DISCONTINUED | OUTPATIENT
Start: 2022-09-09 | End: 2022-09-09 | Stop reason: ALTCHOICE

## 2022-09-09 RX ADMIN — MIDAZOLAM 1 MG: 1 INJECTION INTRAMUSCULAR; INTRAVENOUS at 12:44

## 2022-09-09 RX ADMIN — FENTANYL CITRATE 100 MCG: 50 INJECTION, SOLUTION INTRAMUSCULAR; INTRAVENOUS at 12:47

## 2022-09-09 RX ADMIN — OXYCODONE AND ACETAMINOPHEN 1 TABLET: 5; 325 TABLET ORAL at 17:00

## 2022-09-09 RX ADMIN — ROCURONIUM BROMIDE 50 MG: 10 INJECTION, SOLUTION INTRAVENOUS at 12:47

## 2022-09-09 RX ADMIN — Medication 60 MG: at 12:47

## 2022-09-09 RX ADMIN — SODIUM CHLORIDE: 9 INJECTION, SOLUTION INTRAVENOUS at 12:38

## 2022-09-09 RX ADMIN — HYDROMORPHONE HYDROCHLORIDE 0.5 MG: 1 INJECTION, SOLUTION INTRAMUSCULAR; INTRAVENOUS; SUBCUTANEOUS at 14:45

## 2022-09-09 RX ADMIN — PROPOFOL 130 MG: 10 INJECTION, EMULSION INTRAVENOUS at 12:47

## 2022-09-09 RX ADMIN — IPRATROPIUM BROMIDE AND ALBUTEROL SULFATE 1 AMPULE: 2.5; .5 SOLUTION RESPIRATORY (INHALATION) at 11:19

## 2022-09-09 RX ADMIN — MIDAZOLAM 1 MG: 1 INJECTION INTRAMUSCULAR; INTRAVENOUS at 12:47

## 2022-09-09 RX ADMIN — Medication 3 MG: at 13:58

## 2022-09-09 RX ADMIN — IPRATROPIUM BROMIDE AND ALBUTEROL 1 PUFF: 20; 100 SPRAY, METERED RESPIRATORY (INHALATION) at 13:34

## 2022-09-09 RX ADMIN — SODIUM CHLORIDE: 9 INJECTION, SOLUTION INTRAVENOUS at 11:13

## 2022-09-09 RX ADMIN — ONDANSETRON 4 MG: 2 INJECTION INTRAMUSCULAR; INTRAVENOUS at 13:26

## 2022-09-09 RX ADMIN — DEXAMETHASONE SODIUM PHOSPHATE 10 MG: 10 INJECTION INTRAMUSCULAR; INTRAVENOUS at 12:49

## 2022-09-09 RX ADMIN — GLYCOPYRROLATE 0.6 MG: 0.2 INJECTION, SOLUTION INTRAMUSCULAR; INTRAVENOUS at 13:58

## 2022-09-09 ASSESSMENT — PAIN SCALES - GENERAL
PAINLEVEL_OUTOF10: 3
PAINLEVEL_OUTOF10: 7
PAINLEVEL_OUTOF10: 5
PAINLEVEL_OUTOF10: 0
PAINLEVEL_OUTOF10: 3
PAINLEVEL_OUTOF10: 2

## 2022-09-09 ASSESSMENT — PAIN DESCRIPTION - LOCATION
LOCATION: ABDOMEN
LOCATION: ABDOMEN

## 2022-09-09 ASSESSMENT — LIFESTYLE VARIABLES: SMOKING_STATUS: 1

## 2022-09-09 ASSESSMENT — PAIN DESCRIPTION - DESCRIPTORS
DESCRIPTORS: DISCOMFORT;SORE
DESCRIPTORS: SORE;DISCOMFORT

## 2022-09-09 ASSESSMENT — COPD QUESTIONNAIRES: CAT_SEVERITY: SEVERE

## 2022-09-09 NOTE — H&P
GENERAL SURGERY  HISTORY AND PHYSICAL  9/9/2022    No chief complaint on file. CC: cholecystectomy    HPI  Vaishali Strong is a 60 y.o. female who presents for evaluation of cholecystectomy. Patient has had abdominal pain since January. She has had some weight loss. She was found to have gallstones on abdominal imaging. EGD/colonoscopy on 8/10/22 revealed diverticulosis, polyps, and hiatal hernia. She is still experiencing symptoms. She is on Coumadin for aortic valve replacement with her last dose being 5 days ago. She has been bridging with Lovenox.      Past Medical History:   Diagnosis Date    Aneurysm ([address]     THORACIC AORTIC    Anticoagulant long-term use     Anxiety     Ascending aortic aneurysm (HCC)     Asthma     CAD (coronary artery disease)     Chronic back pain     Constipation 05/19/2021    COPD (chronic obstructive pulmonary disease) (HCC)     Depression     Diverticulosis     GERD (gastroesophageal reflux disease)     H/O mechanical aortic valve replacement 01/05/2017    Headache     Hyperlipidemia     Hypertension     On warfarin at home     for AVR    Restless legs syndrome     S/P AVR     Status post placement of implantable loop recorder     Syncope, cardiogenic 09/10/2020    Tobacco abuse     Urinary incontinence     Ventricular tachycardia, non-sustained (HCC)     Warfarin-induced coagulopathy ([address] 12/11/2011       Past Surgical History:   Procedure Laterality Date    APPENDECTOMY      CARDIAC CATHETERIZATION  09/11/2020    Dr. Maegan Ruiz  2009    Aortic valve 2009 [facility] [address]      COLONOSCOPY N/A 11/18/2019    COLONOSCOPY POLYPECTOMY SNARE/COLD BIOPSY performed by Kurtis Flynn MD at [address] N/A 08/10/2022    COLONOSCOPY POLYPECTOMY SNARE/COLD BIOPSY performed by Kurtis Flynn MD at [address]  08/10/2022    COLONOSCOPY SUBMUCOSAL/BOTOX INJECTION performed by Kurtis Flynn MD 8/17/22   Jocelynn Corrales.,    sertraline (ZOLOFT) 100 MG tablet take 1 and 1/2 tablet by mouth once daily 8/2/22   Sera Fabian DO   montelukast (SINGULAIR) 10 MG tablet take 1 tablet by mouth at bedtime 8/2/22   Sera Fabian DO   omeprazole (PRILOSEC) 40 MG delayed release capsule Take 1 tablet in the morning 1 hour before breakfast and 1 hour before dinner.  8/2/22   Sera Fabian DO   magnesium citrate solution Take 592 mLs by mouth once for 1 dose  Patient not taking: Reported on 8/2/2022 7/18/22 8/10/22  Felisa Martinez MD   metoclopramide (REGLAN) 5 MG tablet Take 1 tablet by mouth 3 times daily  Patient not taking: Reported on 9/2/2022 7/1/22   Piotr Banerjee MD   albuterol (PROVENTIL) (2.5 MG/3ML) 0.083% nebulizer solution Take 3 mLs by nebulization every 6 hours as needed for Wheezing or Shortness of Breath 5/11/22   Magda Holguin MD   aspirin EC 81 MG EC tablet Take 1 tablet by mouth daily 5/11/22   Magda Holguin MD   Azelastine HCl 137 MCG/SPRAY SOLN 2 sprays by Nasal route daily 5/11/22   Magda Holguin MD   fluticasone [facility]) 50 MCG/ACT nasal spray 2 sprays by Nasal route daily  Patient taking differently: 2 sprays by Nasal route daily ALTERNATES WITH ASTELIN 5/11/22   Magda Holguin MD   lisinopril (PRINIVIL;ZESTRIL) 40 MG tablet Take 1 tablet by mouth daily take 1 tablet by mouth once daily 5/11/22   Magda Holguin MD   Magnesium Oxide (MAGNESIUM-OXIDE) 250 MG TABS tablet Take 1 tablet by mouth daily 5/11/22   Magda Holguin MD   Cholecalciferol (VITAMIN D3) 125 MCG (5000 UT) CAPS Take 1 capsule by mouth in the morning and at bedtime    Historical Provider, MD   metoprolol succinate (TOPROL XL) 25 MG extended release tablet take 1 tablet by mouth once daily  Patient taking differently: Take 25 mg by mouth at bedtime take 1 tablet by mouth once daily 5/6/22   Mal Bella MD   warfarin (COUMADIN) 5 MG tablet Take 1 tablet by mouth daily except for Monday and Fridays take 1 1/2 (7.5mg)  Patient taking differently: Take 1 tablet by mouth daily except for Monday 7.5mg 5/6/22   Jewell Barrow MD   RA NATURAL MAGNESIUM 250 MG TABS tablet take 1 tablet by mouth daily 4/2/22   Historical Provider, MD   Theoplis  160-9-4.8 MCG/ACT AERO inhale 2 puffs INTO THE LUNGS twice a day 3/7/22   Genaro Cartwright MD   Inulin (FIBER CHOICE PO) Take 2 capsules by mouth 2 times daily    Historical Provider, MD   Blood Pressure Monitoring (BLOOD PRESSURE CUFF) MISC Dx:  Hypertension with labile blood pressure 9/20/19   Deng Burr MD       Allergies   Allergen Reactions    Keflex [Cephalexin] Itching    Phenergan [Promethazine Hcl] Other (See Comments)     Involuntary body movements       Family History   Problem Relation Age of Onset    Heart Disease Mother     Arthritis Mother     Asthma Mother     Diabetes Mother     High Blood Pressure Mother     High Cholesterol Mother     Stroke Mother     Heart Disease Father     Arthritis Father     Asthma Father     Diabetes Father     High Blood Pressure Father     High Cholesterol Father     Breast Cancer Sister     Other Brother         ELMO on CPap;    Has ICD    Asthma Brother     COPD Brother     Arthritis Paternal Grandmother     Asthma Paternal Grandmother     Diabetes Maternal Aunt     Diabetes Paternal Aunt     High Blood Pressure Paternal Aunt        Social History     Tobacco Use    Smoking status: Every Day     Packs/day: 1.00     Years: 50.00     Pack years: 50.00     Types: Cigarettes    Smokeless tobacco: Never   Vaping Use    Vaping Use: Former    Quit date: 1/1/2015   Substance Use Topics    Alcohol use: Yes     Comment: rare wine    Drug use: Never         Review of Systems - History obtained from chart review and the patient  General ROS: negative for - chills or fever  Hematological and Lymphatic ROS: negative for - bleeding problems or blood clots  Respiratory ROS: no cough, shortness of breath, or wheezing  Cardiovascular ROS: no chest pain or dyspnea on exertion  Gastrointestinal ROS: positive for abdominal pain  Genito-Urinary ROS: no dysuria, trouble voiding, or hematuria  Musculoskeletal ROS: negative for - muscle pain or muscular weakness  Neurological ROS: no TIA or stroke symptoms  Dermatological ROS: negative for - rash      PHYSICAL EXAM:    There were no vitals filed for this visit. General Appearance:  awake, alert, oriented, in no acute distress  Skin:  Skin color, texture, turgor normal. No rashes or lesions. Head/face:  NCAT  Eyes:  No gross abnormalities. Lungs:  normal work of breathing on room air  Heart:  regular rate, normotensive  Abdomen:  soft, epigastric tenderness, non-distended  Extremities: Extremities warm to touch, pink, with no edema. Neurologic:  grossly normal    LABS:  CBC  No results for input(s): WBC, HGB, HCT, PLT in the last 72 hours. BMP  No results for input(s): NA, K, CL, CO2, BUN, CREATININE, GLU, CALCIUM in the last 72 hours. Liver Function  No results for input(s): AMYLASE, LIPASE, BILITOT, BILIDIR, AST, ALT, ALKPHOS, PROT, LABALBU in the last 72 hours. No results for input(s): LACTATE in the last 72 hours. No results for input(s): INR, PTT in the last 72 hours. Invalid input(s): PT    RADIOLOGY    US GALLBLADDER RUQ    Result Date: 8/27/2022  EXAMINATION: RIGHT UPPER QUADRANT ULTRASOUND 8/27/2022 1:16 pm COMPARISON: None. HISTORY: ORDERING SYSTEM PROVIDED HISTORY: RUQ pain TECHNOLOGIST PROVIDED HISTORY: Reason for exam:->RUQ pain What reading provider will be dictating this exam?->CRC FINDINGS: The gallbladder is distended and contains several shadowing gallstones. No evidence of gallbladder wall thickening or pericholecystic fluid. The common bile duct is normal in caliber at 5-6 mm. Cholelithiasis without CT evidence of acute cholecystitis.          ASSESSMENT:  60 y.o. female with likely symptomatic cholelithiasis    PLAN:  Laparoscopic cholecystectomy    Risks, benefits, and complications discussed with the patient who expresses understanding and agrees to proceed.     Electronically signed by Sukhwinder Regalado MD on 9/9/22 at 10:57 AM EDT

## 2022-09-09 NOTE — DISCHARGE INSTRUCTIONS
Do not restart your lovenox or coumadin until Monday, September 12, 2022    Felice Fall - Dr. Rc Calderon may be drowsy or lightheaded after receiving sedation or anesthesia. A responsible person should be with you for the next 24 hours. FOLLOW UP: Call office to schedule follow-up appointment in 2 weeks. DIET: Advance your diet as tolerated. Start with light diet and progress to your normal diet as you feel like eating. If you experience nausea or repeated episodes of vomiting which persist beyond 12-24 hours, notify your doctor. ACTIVITY: Rest today. Increase activity gradually. Recommend walking every day to help with return of bowel function and healing. No heavy lifting or strenuous activity for 4 weeks if you had a surgical procedure - nothing over 15 lbs. No driving for 24 hours after a procedure. No driving while on prescription pain medication. SHOWER/BATHING: Okay to shower in 24 hours after procedure. No tub bathing, soaking, or swimming for 2 weeks. WOUND CARE: You have Dermabond dressing (skin glue) applied to your incisions with sutures underneath your skin. The glue will gradually work itself off over the next few weeks and the stitches will dissolve on their own. You do not need to apply anything over them. Avoid directly applying lotions, creams or oils to your incision. Keep incisions clean and dry. Always ensure you and your care giver clean hands before and after caring for the wound. You may place ice on incisions to decrease the pain and bruising. MEDICATIONS: Take as prescribed. Pain from the incision(s) is normal. The pain will vary from day to day and with any changes in your activity level, but it should gradually decrease over time. If you were given a prescription for a narcotic pain medication (percocet, norco, etc) you should NOT drink alcohol, drive, or operate any machinery. Do not take the narcotic medication if you are not having pain.  If your pain is mild, you may take over-the-counter ibuprofen or tylenol for pain as directed, limit total amount of acetaminophen (tylenol) to 3 grams per 24-hour period and please note that NSAIDs (ibuprofen) can cause bleeding or stomach upset. You may experience constipation while taking pain medication, strongly recommended to take over-the-counter stool softeners (Docusate/Colace or Senokot-S) while using pain medications - use as directed on bottle. Okay to resume anticoagulant medication after 24hrs. SPECIAL INSTRUCTIONS:   Call physician if you have any questions, to schedule a follow-up appointment, and if you notice any of the following:  Fever (temperature over 101ºF) or chills  Persistent nausea, vomiting, or bloating  Severe pain that is not relieved by the prescribed pain medication  Swelling or redness around your incision(s)  No bowel movement and feeling uncomfortable  Significant change in urination or no urine output for 8 hours  Excess bleeding (from the rectum or incision) - more than ½ cup that does not stop         Gallbladder Removal Surgery: What to Expect at [address] [place] [address]  After your surgery, you will likely feel weak and tired for several days after you return home. Your belly may be swollen. If you had laparoscopic surgery, it's normal to also have some shoulder pain. This is caused by the air thatyour doctor put in your belly to help see your organs better. You may have gas or need to burp a lot at first. A few people get diarrhea. Larene Morning usually goes away in 2 to 4 weeks, but it may last longer. How quickly you recover depends on whether you had a laparoscopic or opensurgery. For a laparoscopic surgery, most people can go back to work or their normal routine in 1 to 2 weeks. But it may take longer, depending on the type of work you do. For an open surgery, it will probably take 4 to 6 weeks before you get back to your normal routine.   This care sheet gives you a should you call for help? Call 911 anytime you think you may need emergency care. For example, call if:    You passed out (lost consciousness). You are short of breath. .   Call your doctor now or seek immediate medical care if:    You are sick to your stomach and cannot drink fluids. You have pain that does not get better when you take your pain medicine. You cannot pass stools or gas. You have signs of infection, such as: Increased pain, swelling, warmth, or redness. Red streaks leading from the incision. Pus draining from the incision. A fever. Bright red blood has soaked through the bandage over your incision. You have loose stitches, or your incision comes open. You have signs of a blood clot in your leg (called a deep vein thrombosis), such as:  Pain in your calf, back of knee, thigh, or groin. Redness and swelling in your leg or groin. Watch closely for any changes in your health, and be sure to contact yourdoctor if you have any problems. Where can you learn more? Go to [email or web address] and sign in to your [email or web address] account. Enter [phone] in the [facility] box to learn more about \"Gallbladder Removal Surgery: What to Expect at Home. \"     If you do not have an account, please click on the \"Sign Up Now\" link. Current as of: September 8, 2021               Content Version: 13.3  © 2006-2022 [employer]. Care instructions adapted under license by [facility] ([facility]). If you have questions about a medical condition or this instruction, always ask your healthcare professional. [place] [address] any warranty or liability for your use of this information.

## 2022-09-09 NOTE — ANESTHESIA PRE PROCEDURE
Department of Anesthesiology  Preprocedure Note       Name:  Edie Kenney   Age:  60 y.o.  :  1960                                          MRN:  [id number]         Date:  2022      Surgeon: Danilo Cullen):  Gloria Barnes MD    Procedure: CHOLECYSTECTOMY LAPAROSCOPIC    Medications prior to admission:   Prior to Admission medications    Medication Sig Start Date End Date Taking? Authorizing Provider   rOPINIRole (REQUIP) 0.25 MG tablet TAKE 2 TABLETS BY MOUTH IN  THE MORNING AND 2 TABLETS  BY MOUTH BEFORE BEDTIME AS  DIRECTED 22   Majo Leyva MD   enoxaparin (LOVENOX) 60 MG/0.6ML Inject 0.6 mLs into the skin 2 times daily for 10 days Do not take the morning of your procedure. Patient taking differently: Inject 1 mg/kg into the skin 2 times daily Do not take the morning of your procedure.     START  MORNING. 22  Gloria Barnes MD   naproxen (NAPROSYN) 500 MG tablet Take 1 tablet by mouth 2 times daily for 14 days  Patient not taking: No sig reported 8/27/22 9/10/22  Sheldon Canales,    ondansetron (ZOFRAN-ODT) 4 MG disintegrating tablet Take 1 tablet by mouth 3 times daily as needed for Nausea or Vomiting 22   Sheldon Canales DO   albuterol sulfate HFA (PROVENTIL HFA) 108 (90 Base) MCG/ACT inhaler Inhale 2 puffs into the lungs every 6 hours as needed for Wheezing 22   Cornelio Norris MD   albuterol sulfate HFA (PROVENTIL;VENTOLIN;PROAIR) 108 (90 Base) MCG/ACT inhaler inhale 1 puff every 4 hours if needed for wheezing 22   Cornelio Norris MD   atorvastatin (LIPITOR) 20 MG tablet TAKE 1 TABLET BY MOUTH ONCE DAILY 22   Costa Navarro MD   Roflumilast (DALIRESP) 250 MCG tablet TAKE 1 TABLET BY MOUTH  DAILY 22   Asiya Fagan, DO   sertraline (ZOLOFT) 100 MG tablet take 1 and 1/2 tablet by mouth once daily 22   Sera Fabian, DO   montelukast (SINGULAIR) 10 MG tablet take 1 tablet by mouth at bedtime 22   Kendra Mcduffie, DO omeprazole (PRILOSEC) 40 MG delayed release capsule Take 1 tablet in the morning 1 hour before breakfast and 1 hour before dinner.  8/2/22   Sera Fabian DO   magnesium citrate solution Take 592 mLs by mouth once for 1 dose  Patient not taking: Reported on 8/2/2022 7/18/22 8/10/22  Eunice Palacios MD   metoclopramide (REGLAN) 5 MG tablet Take 1 tablet by mouth 3 times daily  Patient not taking: Reported on 9/2/2022 7/1/22   Phillip Wood MD   albuterol (PROVENTIL) (2.5 MG/3ML) 0.083% nebulizer solution Take 3 mLs by nebulization every 6 hours as needed for Wheezing or Shortness of Breath 5/11/22   Bernadette Tracy MD   aspirin EC 81 MG EC tablet Take 1 tablet by mouth daily 5/11/22   Bernadette Tracy MD   Azelastine HCl 137 MCG/SPRAY SOLN 2 sprays by Nasal route daily 5/11/22   Bernadette Tracy MD   fluticasone Lossie Margaretteman) 50 MCG/ACT nasal spray 2 sprays by Nasal route daily  Patient taking differently: 2 sprays by Nasal route daily ALTERNATES WITH ASTELIN 5/11/22   Bernadette Tracy MD   lisinopril (PRINIVIL;ZESTRIL) 40 MG tablet Take 1 tablet by mouth daily take 1 tablet by mouth once daily 5/11/22   Bernadette Tracy MD   Magnesium Oxide (MAGNESIUM-OXIDE) 250 MG TABS tablet Take 1 tablet by mouth daily 5/11/22   Bernadette Tracy MD   Cholecalciferol (VITAMIN D3) 125 MCG (5000 UT) CAPS Take 1 capsule by mouth in the morning and at bedtime    Historical Provider, MD   metoprolol succinate (TOPROL XL) 25 MG extended release tablet take 1 tablet by mouth once daily  Patient taking differently: Take 25 mg by mouth at bedtime take 1 tablet by mouth once daily 5/6/22   Russell Rodriguez MD   warfarin (COUMADIN) 5 MG tablet Take 1 tablet by mouth daily except for Monday and Fridays take 1 1/2 (7.5mg)  Patient taking differently: Take 1 tablet by mouth daily except for Monday 7.5mg 5/6/22   Russell Rodriguez MD   RA NATURAL MAGNESIUM 250 MG TABS tablet take 1 tablet by mouth daily 4/2/22   Historical Provider, MD Trudy Engel 160-9-4.8 MCG/ACT AERO inhale 2 puffs INTO THE LUNGS twice a day 3/7/22   Preeti Gil MD   Inulin (FIBER CHOICE PO) Take 2 capsules by mouth 2 times daily    Historical Provider, MD   Blood Pressure Monitoring (BLOOD PRESSURE CUFF) MISC Dx:  Hypertension with labile blood pressure 9/20/19   Deng Masters MD       Current medications:    No current facility-administered medications for this visit. No current outpatient medications on file. Facility-Administered Medications Ordered in Other Visits   Medication Dose Route Frequency Provider Last Rate Last Admin    0.9 % sodium chloride infusion   IntraVENous Continuous Judge Anthony  mL/hr at 09/09/22 1113 New Bag at 09/09/22 1113    sodium chloride flush 0.9 % injection 5-40 mL  5-40 mL IntraVENous 2 times per day Judge Anthony MD        sodium chloride flush 0.9 % injection 5-40 mL  5-40 mL IntraVENous PRN Judge Anthony MD        0.9 % sodium chloride infusion   IntraVENous PRN Judge Anthony MD        ipratropium-albuterol (DUONEB) nebulizer solution 1 ampule  1 ampule Inhalation Once Baron Dangelo MD           Allergies: Allergies   Allergen Reactions    Keflex [Cephalexin] Itching    Phenergan [Promethazine Hcl] Other (See Comments)     Involuntary body movements       Problem List:    Patient Active Problem List   Diagnosis Code    Depression F32. A    H/O aortic valve replacement Z95.2    Hyperlipidemia E78.5    COPD (chronic obstructive pulmonary disease) ([facility]) J44.9    Primary hypertension I10    Vitamin D deficiency E55.9    Acid reflux K21.9    Tobacco abuse Z72.0    Restless leg syndrome G25.81    Seasonal allergies J30.2    Abdominal aortic aneurysm (AAA) without rupture ([facility]) I71.4    Aortic aneurysm with dissection ([facility]) I71.00, I71.9    Chest pain R07.9    Coronary artery disease involving native coronary artery of native heart without angina pectoris I25.10    Supratherapeutic INR R79.1    Chronic Beulah Mckeon MD at [facility] 145 N/A 08/10/2022    EGD BIOPSY performed by [facility]ulah Mc[facility] at [facility] History:    Social History     Tobacco Use    Smoking status: Every Day     Packs/day: 1.00     Years: 50.00     Pack years: 50.00     Types: Cigarettes    Smokeless tobacco: Never   Substance Use Topics    Alcohol use: Yes     Comment: rare wine                                Ready to quit: Not Answered  Counseling given: Not Answered      Vital Signs (Current): There were no vitals filed for this visit. BP Readings from Last 3 Encounters:   09/09/22 130/62   08/27/22 128/66   08/10/22 133/60       NPO Status:  8                                                                               BMI:   Wt Readings from Last 3 Encounters:   09/09/22 150 lb (68 kg)   08/27/22 150 lb (68 kg)   08/10/22 151 lb (68.5 kg)     There is no height or weight on file to calculate BMI.    CBC:   Lab Results   Component Value Date/Time    WBC 9.5 09/09/2022 10:45 AM    RBC 3.25 09/09/2022 10:45 AM    HGB 10.5 09/09/2022 10:45 AM    HCT 31.0 09/09/2022 10:45 AM    MCV 95.4 09/09/2022 10:45 AM    RDW 14.4 09/09/2022 10:45 AM     09/09/2022 10:45 AM       CMP:   Lab Results   Component Value Date/Time     08/27/2022 12:57 PM    K 3.8 08/27/2022 12:57 PM     08/27/2022 12:57 PM    CO2 28 08/27/2022 12:57 PM    BUN 8 08/27/2022 12:57 PM    CREATININE 0.6 08/27/2022 12:57 PM    GFRAA >60 08/27/2022 12:57 PM    LABGLOM >60 08/27/2022 12:57 PM    GLUCOSE 165 08/27/2022 12:57 PM    PROT 7.1 08/27/2022 12:57 PM    CALCIUM 9.2 08/27/2022 12:57 PM    BILITOT 0.4 08/27/2022 12:57 PM    ALKPHOS 75 08/27/2022 12:57 PM    AST 18 08/27/2022 12:57 PM    ALT 17 08/27/2022 12:57 PM       POC Tests: No results for input(s): POCGLU, POCNA, POCK, POCCL, POCBUN, POCHEMO, POCHCT in the last 72 hours.     Coags:   Lab Results Component Value Date/Time    PROTIME 21.2 08/27/2022 12:57 PM    PROTIME 0.0 07/21/2022 01:42 PM    INR 1.9 08/27/2022 12:57 PM    APTT 31.4 08/27/2022 12:57 PM       HCG (If Applicable): No results found for: PREGTESTUR, PREGSERUM, HCG, HCGQUANT     ABGs: No results found for: PHART, PO2ART, NYR7MON, UEI3RNE, BEART, I2KSBNSX     Type & Screen (If Applicable):  No results found for: [facility]    Anesthesia Evaluation  Patient summary reviewed and Nursing notes reviewed no history of anesthetic complications:   Airway: Mallampati: III          Dental:          Pulmonary: breath sounds clear to auscultation  (+) COPD (emphysema on CT Chest): severe,  asthma: current smoker          Patient did not smoke on day of surgery. ROS comment: O2 dependent   Cardiovascular:  Exercise tolerance: good (>4 METS),   (+) hypertension:, valvular problems/murmurs (S/P AVR- mechanical): AS, CAD:, dysrhythmias (nonsustained VT):, hyperlipidemia      ECG reviewed  Rhythm: regular  Rate: normal  Echocardiogram reviewed  Stress test reviewed             ROS comment: Ascending aortic aneurysm    Stress test 9/2020:  1. No reversible perfusion defect. Fixed anterior wall perfusion   abnormality. 2. Ejection fraction is 49 %. 3. Global very mild hypokinesis. RVH. Echo 9/2020:  Summary   Left ventricle is normal in size . Borderline concentric left ventricular hypertrophy. Septal motion consistent with post open heart state . Ejection fraction is visually estimated at 55-60%. Normal diastolic function. Normal right ventricular size and function. Normal sized left atrium. There is a mechanical aortic prosthetic valve which is well seated with a   mean gradient of 8.14 mmHg and trace aortic regurgitation   Mildly dilated aortic root. Cardiac cath 9/8/2020:  CONCLUSIONS:  1. Coronary artery. A.  Left main.   Aneurysmal formation in the mid left main without any  significant angiographic luminal narrowings. B.  LAD. No significant angiographic disease. C.  LCX. No significant angiographic disease. D.  RCA. Dominant vessel with around 40-50% mid vessel narrowing. 2.  Bileaflet mechanical valve with adequate leaflet mobility noted on  Fluoroscopy. Loop recorder in place     Neuro/Psych:   (+) neuromuscular disease (chronic back pain):, headaches:, psychiatric history:             ROS comment: restless legs syndrome GI/Hepatic/Renal:   (+) GERD:, liver disease (hepatic steatosis):,           Endo/Other:    (+) blood dyscrasia: anticoagulation therapy:., .                  ROS comment: Hypocortisolism Abdominal:             Vascular: negative vascular ROS. Other Findings:             Anesthesia Plan      general     ASA 4       Induction: intravenous. MIPS: Postoperative opioids intended and Prophylactic antiemetics administered. Anesthetic plan and risks discussed with patient (DNR uplifted for the perioperative period). Use of blood products discussed with patient whom consented to blood products. Plan discussed with CRNA.               Mitzy Richey MD, MD  9/9/2022  11:17 AM

## 2022-09-09 NOTE — OP NOTE
[facility]  OPERATIVE REPORT    Kristel Jack  1960      DATE OF PROCEDURE: 9/9/2022     SURGEON: Eunice Palacios MD, MSc, FACS     ASSISTANT: Lawrence Thomas DO, PGY-IV; Madison Grider MD, PGY-II; SALO Suresh, M4     PREOPERATIVE DIAGNOSIS:  symptomatic cholelithiasis. POSTOPERATIVE DIAGNOSIS: chronic cholecystitis    OPERATION: laparoscopic cholecystectomy     ANESTHESIA: GETA     ESTIMATED BLOOD LOSS: 5cc     COMPLICATIONS: None    SPECIMEN:  gallbladder    DRAINS:  none    HISTORY:  This is a 60 y. o.female who presented with upper abdominal pain. She had an ultrasound which showed cholelithiasis. She is even gone to the emergency department for epigastric pain and right upper quadrant pain after eating certain foods. She was consented for laparoscopic cholecystectomy. She understood risk benefits complication alternative outcomes procedure and wished to proceed. DESCRIPTION OF PROCEDURE: The patient was identified and the procedure was confirmed. Bilateral SCDs were in place, upper Braden hugger applied. Patient was prepped and draped statical fashion. We anesthetized with 0.25% Marcaine throughout the case. We made a 5 mm incision in the left upper quadrant Benito's point with 11 blade scalpel. We inserted the Veress needle. Saline drop test proved to be in correct position. We then insufflated but are pressure quickly went up to 15 so we quickly remove the Veress needle. We tried once more and the same thing happened. I tried to Optiview and but I could not get through the peritoneum so we decided to go superior to the umbilicus. We anesthetized 0.25% Marcaine with epinephrine and made a 5 mm incision inserted Veress needle saline drop test was good and we created pneumoperitoneum to 15 mmHg. This time the insufflation insufflated correctly. We then Optiview done with a 5 mm port we surveyed the abdomen noting there was no injury on entrance.   There was some omental adhesions just inferior to my 5 mm port. We noticed some omental adhesions to the gallbladder. We inserted our 10 mm port to the right of the falciform ligament and 2 x 5 mm port in the right upper quadrant. We lifted up the gallbladder over the liver we remove the omental adhesions with hook electrocautery. We then dissected off the peritoneum laterally medially off the gallbladder. We then dissected out our cystic duct and cystic artery creating the critical view of safety knowing 2 structures going to the gallbladder and viewing the liver through our cystic triangle. We then placed 3 clips on the distal aspect of the cystic duct and 1 clip proximally. We placed 2 clips proximally on the cystic artery and 1 clip distally. We used laparoscopic molly to transect both structures. We then remove the gallbladder off the gallbladder fossa using hook electrocautery. Placed in Endo Catch bag removed from the abdomen. We then lifted up the liver there was no bleeding from the liver bed with there is a few areas that were little red and so we did cauterize these areas as she has to go back on full anticoagulation soon. There is little bit of bleeding from the omentum that we used clips to control and then placed a piece of surgical snow. We used a [employer] instrument and suture passer to place a stitch of 0 Vicryl at our 10 mm port site. We removed our ports under laparoscopic vision noting no bleeding. We then closed our port sites with 4-0 Monocryl in the usual fashion. We cleaned the patient wet dry sponge and applied Dermabond. Needle, sponge, and instrument counts were reported as correct x2. The patient tolerated the procedure and was transferred to the recovery area in satisfactory condition. Family was updated that in the case.     Scoorro Hall MD, MSc, FACS  9/9/2022  2:11 PM

## 2022-09-09 NOTE — ANESTHESIA POSTPROCEDURE EVALUATION
Department of Anesthesiology  Postprocedure Note    Patient: Colton Angeles  MRN: [id number]  YOB: 1960  Date of evaluation: 9/9/2022      Procedure Summary     Date: 09/09/22 Room / Location: [facility] OR 05 / [facility]    Anesthesia Start: 1237 Anesthesia Stop: 1417    Procedure: CHOLECYSTECTOMY LAPAROSCOPIC (Abdomen) Diagnosis:       Gallstones      (GALLSTONES)    Surgeons: Felisa Martinez MD Responsible Provider: Erin Duran MD    Anesthesia Type: General ASA Status: 4          Anesthesia Type: General    Rm Phase I: Rm Score: 8    Rm Phase II:        Anesthesia Post Evaluation    Patient location during evaluation: PACU  Patient participation: complete - patient participated  Level of consciousness: awake  Pain score: 0  Airway patency: patent  Nausea & Vomiting: no nausea  Complications: no  Cardiovascular status: hemodynamically stable  Respiratory status: acceptable  Hydration status: stable  Multimodal analgesia pain management approach

## 2022-09-09 NOTE — PROGRESS NOTES
CLINICAL PHARMACY NOTE: MEDS TO BEDS    Total # of Prescriptions Filled: 2   The following medications were delivered to the patient:  [address] [place]    Additional Documentation:

## 2022-09-12 ENCOUNTER — TELEPHONE (OUTPATIENT)
Dept: VASCULAR SURGERY | Age: 62
End: 2022-09-12

## 2022-09-12 DIAGNOSIS — I71.23 DESCENDING THORACIC AORTIC ANEURYSM: ICD-10-CM

## 2022-09-12 DIAGNOSIS — I71.21 THORACIC ASCENDING AORTIC ANEURYSM: Primary | ICD-10-CM

## 2022-09-12 DIAGNOSIS — I71.40 ABDOMINAL AORTIC ANEURYSM (AAA) WITHOUT RUPTURE: ICD-10-CM

## 2022-09-12 NOTE — TELEPHONE ENCOUNTER
Scheduled CTA chest, abd/pelvis at [facility] ([facility]) 9/22/22 at 11:30 a.m, pt notified and instructed.

## 2022-09-14 DIAGNOSIS — J44.9 CHRONIC OBSTRUCTIVE PULMONARY DISEASE, UNSPECIFIED COPD TYPE (HCC): ICD-10-CM

## 2022-09-14 RX ORDER — ALBUTEROL SULFATE 2.5 MG/3ML
SOLUTION RESPIRATORY (INHALATION)
Qty: 720 ML | Refills: 5 | OUTPATIENT
Start: 2022-09-14

## 2022-09-16 ENCOUNTER — TELEPHONE (OUTPATIENT)
Dept: INTERNAL MEDICINE | Age: 62
End: 2022-09-16

## 2022-09-16 DIAGNOSIS — J44.9 CHRONIC OBSTRUCTIVE PULMONARY DISEASE, UNSPECIFIED COPD TYPE (HCC): ICD-10-CM

## 2022-09-16 RX ORDER — ALBUTEROL SULFATE 90 UG/1
AEROSOL, METERED RESPIRATORY (INHALATION)
Qty: 3 EACH | Refills: 3 | Status: SHIPPED | OUTPATIENT
Start: 2022-09-16

## 2022-09-16 NOTE — TELEPHONE ENCOUNTER
Called and spoke with pt regarding INR monitoring. States Dr. Francisco J Rey is now dosing and ordering her Coumadin. States she is overdue for INR but that she recently had her \"gallbladder taken out. \" Currently taking Coumadin 5 mg daily except Mon when she takes 7.5 mg. Pt encouraged to call Dr. Saranya Lima office today to schedule INR check appt.

## 2022-09-22 ENCOUNTER — HOSPITAL ENCOUNTER (OUTPATIENT)
Dept: CT IMAGING | Age: 62
Discharge: HOME | End: 2022-09-24
Payer: MEDICARE

## 2022-09-22 DIAGNOSIS — I71.23 DESCENDING THORACIC AORTIC ANEURYSM: ICD-10-CM

## 2022-09-22 DIAGNOSIS — I71.21 THORACIC ASCENDING AORTIC ANEURYSM: ICD-10-CM

## 2022-09-22 PROCEDURE — 74174 CTA ABD&PLVS W/CONTRAST: CPT

## 2022-09-22 PROCEDURE — 6360000004 HC RX CONTRAST MEDICATION: Performed by: RADIOLOGY

## 2022-09-22 PROCEDURE — 71275 CT ANGIOGRAPHY CHEST: CPT

## 2022-09-22 PROCEDURE — 2580000003 HC RX 258: Performed by: RADIOLOGY

## 2022-09-22 RX ORDER — SODIUM CHLORIDE 0.9 % (FLUSH) 0.9 %
10 SYRINGE (ML) INJECTION ONCE
Status: COMPLETED | OUTPATIENT
Start: 2022-09-22 | End: 2022-09-22

## 2022-09-22 RX ADMIN — IOPAMIDOL 90 ML: 755 INJECTION, SOLUTION INTRAVENOUS at 11:12

## 2022-09-22 RX ADMIN — Medication 10 ML: at 11:12

## 2022-09-26 ENCOUNTER — TELEPHONE (OUTPATIENT)
Dept: INTERNAL MEDICINE | Age: 62
End: 2022-09-26

## 2022-09-26 DIAGNOSIS — F32.A DEPRESSION, UNSPECIFIED DEPRESSION TYPE: ICD-10-CM

## 2022-09-26 RX ORDER — SERTRALINE HYDROCHLORIDE 100 MG/1
TABLET, FILM COATED ORAL
Qty: 135 TABLET | Refills: 0 | Status: SHIPPED | OUTPATIENT
Start: 2022-09-26

## 2022-09-26 NOTE — TELEPHONE ENCOUNTER
Last Appointment:  8/2/2022  Future Appointments   Date Time Provider Esmer Lee   9/27/2022 11:00 AM Veronica Martinez [facility]   10/3/2022 10:30 AM Beulah Mckeon MD [facility]   10/5/2022  8:45 AM Pedro Logan MD [facility]   10/5/2022  9:45 AM Paige Ayala MD [facility] CARDIO [facility]   10/10/2022 11:00 AM Cornelio Dubois MD [facility]   10/20/2022 11:15 AM SCHEDULE, ALAN YAÑZE CARDIO [facility] CARDIO [facility]   11/14/2022  9:30 AM Raz Edwards DO [facility]   2/6/2023  8:00 AM SCHEDULE, [facility] PALLIATIVE CARE PROVIDER [facility]

## 2022-09-26 NOTE — TELEPHONE ENCOUNTER
Refill needed for sertraline tab 100mg please send to [facility] due to pt only having a few pills left

## 2022-10-03 ENCOUNTER — OFFICE VISIT (OUTPATIENT)
Dept: SURGERY | Age: 62
End: 2022-10-03
Payer: MEDICARE

## 2022-10-03 VITALS
DIASTOLIC BLOOD PRESSURE: 74 MMHG | RESPIRATION RATE: 16 BRPM | TEMPERATURE: 97.4 F | HEART RATE: 85 BPM | SYSTOLIC BLOOD PRESSURE: 146 MMHG | HEIGHT: 65 IN | BODY MASS INDEX: 24.32 KG/M2 | OXYGEN SATURATION: 96 % | WEIGHT: 146 LBS

## 2022-10-03 DIAGNOSIS — K81.1 CHRONIC CHOLECYSTITIS: ICD-10-CM

## 2022-10-03 DIAGNOSIS — Z09 POSTOP CHECK: Primary | ICD-10-CM

## 2022-10-03 PROCEDURE — 99212 OFFICE O/P EST SF 10 MIN: CPT | Performed by: SURGERY

## 2022-10-03 PROCEDURE — 99024 POSTOP FOLLOW-UP VISIT: CPT | Performed by: SURGERY

## 2022-10-03 NOTE — PROGRESS NOTES
[facility]  PROGRESS NOTE  ATTENDING NOTE    Chief Complaint   Patient presents with    Post-Op Check     Post op - Lap asaf - DOS 9/09. Denies any pain. Still complaining of nausea. Voices most bowel movements are diarrhea. Denies any blood in stool. S: 14-year-old female status post laparoscopic cholecystectomy. She still has a lot of nausea and diarrhea. She states she does feel better though overall after her procedure. She is glad she went through with it. I went over how we did the procedure with her. She had a follow-up CAT scan for her aneurysm that I reviewed with her. There is a small amount of fluid in her gallbladder fossa that is normal.  I went over her recent EGD and colonoscopy with her. I explained to her there is nothing on her EGD that could explain her nausea. She does have 2 unresectable polyps that were on her colonoscopy. Due to her multiple comorbidities and the distance of these 2 polyps I think it is advisable to go ahead and repeat the colonoscopy potentially under general anesthesia in the next 3 to 6 months to see if we can remove at least one of them. Gen:  NAD  Abd:  soft, ND, NT  Wound:  c/d/I    ASSESSMENT/PLAN:  Chronic cholecystitis--status post laparoscopic cholecystectomy  --Advised for her to take Imodium and/or fiber.   If this does not help resolve her diarrhea within the next month she is to call me back and I will E scribe cholestyramine  --Advance activity as tolerated  --Return to clinic 3 months to discuss repeat colonoscopy    Mike Franco MD, MSc, FACS  10/3/2022  3:06 PM

## 2022-10-04 ENCOUNTER — TELEPHONE (OUTPATIENT)
Dept: VASCULAR SURGERY | Age: 62
End: 2022-10-04

## 2022-10-05 ENCOUNTER — OFFICE VISIT (OUTPATIENT)
Dept: CARDIOLOGY CLINIC | Age: 62
End: 2022-10-05
Payer: MEDICARE

## 2022-10-05 ENCOUNTER — OFFICE VISIT (OUTPATIENT)
Dept: VASCULAR SURGERY | Age: 62
End: 2022-10-05
Payer: MEDICARE

## 2022-10-05 VITALS
HEART RATE: 67 BPM | HEIGHT: 65 IN | DIASTOLIC BLOOD PRESSURE: 60 MMHG | WEIGHT: 148 LBS | RESPIRATION RATE: 16 BRPM | OXYGEN SATURATION: 97 % | SYSTOLIC BLOOD PRESSURE: 130 MMHG | BODY MASS INDEX: 24.66 KG/M2

## 2022-10-05 VITALS — SYSTOLIC BLOOD PRESSURE: 126 MMHG | DIASTOLIC BLOOD PRESSURE: 64 MMHG

## 2022-10-05 DIAGNOSIS — I71.21 ANEURYSM OF ASCENDING AORTA WITHOUT RUPTURE: ICD-10-CM

## 2022-10-05 DIAGNOSIS — I25.10 CORONARY ARTERY DISEASE INVOLVING NATIVE CORONARY ARTERY OF NATIVE HEART WITHOUT ANGINA PECTORIS: Primary | ICD-10-CM

## 2022-10-05 DIAGNOSIS — I71.43 INFRARENAL ABDOMINAL AORTIC ANEURYSM (AAA) WITHOUT RUPTURE: Primary | ICD-10-CM

## 2022-10-05 PROCEDURE — 99213 OFFICE O/P EST LOW 20 MIN: CPT | Performed by: SURGERY

## 2022-10-05 PROCEDURE — G8484 FLU IMMUNIZE NO ADMIN: HCPCS | Performed by: INTERNAL MEDICINE

## 2022-10-05 PROCEDURE — 4004F PT TOBACCO SCREEN RCVD TLK: CPT | Performed by: INTERNAL MEDICINE

## 2022-10-05 PROCEDURE — G8427 DOCREV CUR MEDS BY ELIG CLIN: HCPCS | Performed by: SURGERY

## 2022-10-05 PROCEDURE — G8420 CALC BMI NORM PARAMETERS: HCPCS | Performed by: INTERNAL MEDICINE

## 2022-10-05 PROCEDURE — 99214 OFFICE O/P EST MOD 30 MIN: CPT | Performed by: INTERNAL MEDICINE

## 2022-10-05 PROCEDURE — 3017F COLORECTAL CA SCREEN DOC REV: CPT | Performed by: SURGERY

## 2022-10-05 PROCEDURE — 3017F COLORECTAL CA SCREEN DOC REV: CPT | Performed by: INTERNAL MEDICINE

## 2022-10-05 PROCEDURE — 4004F PT TOBACCO SCREEN RCVD TLK: CPT | Performed by: SURGERY

## 2022-10-05 PROCEDURE — G8427 DOCREV CUR MEDS BY ELIG CLIN: HCPCS | Performed by: INTERNAL MEDICINE

## 2022-10-05 PROCEDURE — G8420 CALC BMI NORM PARAMETERS: HCPCS | Performed by: SURGERY

## 2022-10-05 PROCEDURE — 93000 ELECTROCARDIOGRAM COMPLETE: CPT | Performed by: INTERNAL MEDICINE

## 2022-10-05 PROCEDURE — G8484 FLU IMMUNIZE NO ADMIN: HCPCS | Performed by: SURGERY

## 2022-10-05 NOTE — PROGRESS NOTES
[facility] consult  Dr. Leonard Mcdonnell      Reason for Consult: CHF  Referring Physician: Dr. Andrey Serrano:   Chief Complaint   Patient presents with    Coronary Artery Disease     6 month check- patient has no complaints        HISTORY OF PRESENT ILLNESS:   57year old female with history of CAD, s/p aortic valve replacement, dizziness s/p loop recorder implant, COPD, HTN and hyperlipidemia is here for follow-up appointment  Patient denies any chest pain, no shortness of breath, no lightheadedness, no dizziness, no palpitations, no pedal edema, no PND, no orthopnea, no syncope, no presyncopal episodes.   Complaining of nausea, had recent cholecystectomy  Functional capacity is at baseline      Past Medical History:   Diagnosis Date    Aneurysm ([address]     THORACIC AORTIC    Anticoagulant long-term use     Anxiety     Ascending aortic aneurysm     Asthma     CAD (coronary artery disease)     Chronic back pain     Constipation 05/19/2021    COPD (chronic obstructive pulmonary disease) (HCC)     Depression     Diverticulosis     GERD (gastroesophageal reflux disease)     H/O mechanical aortic valve replacement 01/05/2017    Headache     Hyperlipidemia     Hypertension     On warfarin at home     for AVR    Restless legs syndrome     S/P AVR     Status post placement of implantable loop recorder     Syncope, cardiogenic 09/10/2020    Tobacco abuse     Urinary incontinence     Ventricular tachycardia, non-sustained     Warfarin-induced coagulopathy ([address] 12/11/2011         Past Surgical History:   Procedure Laterality Date    APPENDECTOMY      CARDIAC CATHETERIZATION  09/11/2020    Dr. Luis Miguel Alexandra  2009    Aortic valve 2009 [address] N/A 9/9/2022    CHOLECYSTECTOMY LAPAROSCOPIC performed by Kevin Fields MD at [place] [address] N/A 11/18/2019    COLONOSCOPY POLYPECTOMY SNARE/COLD BIOPSY performed by Say Castelan MD at [address] N/A 08/10/2022    COLONOSCOPY POLYPECTOMY SNARE/COLD BIOPSY performed by Say Castelan MD at [address]  08/10/2022    COLONOSCOPY SUBMUCOSAL/BOTOX INJECTION performed by Say Castelan MD at [facility], DIAGNOSTIC      HYSTERECTOMY (CERVIX STATUS UNKNOWN)      INSERTABLE CARDIAC MONITOR  09/11/2020    Linq Insertion    Dr. Chiquita Sears    KEM STEROTACTIC LOC BREAST BIOPSY RIGHT Right 03/10/2021    KEM STEROTACTIC LOC BREAST BIOPSY RIGHT 3/10/2021 [facility]    TUBAL LIGATION      UPPER GASTROINTESTINAL ENDOSCOPY N/A 11/18/2019    EGD BIOPSY performed by Say Castelan MD at [place] [address] N/A 08/10/2022    EGD BIOPSY performed by Say Castelan MD at [facility] ENDOSCOPY         Current Outpatient Medications   Medication Sig Dispense Refill    sertraline (ZOLOFT) 100 MG tablet take 1 and 1/2 tablet by mouth once daily 135 tablet 0    albuterol sulfate HFA (PROVENTIL;VENTOLIN;PROAIR) 108 (90 Base) MCG/ACT inhaler inhale 1 puff every 4 hours if needed for wheezing 3 each 3    rOPINIRole (REQUIP) 0.25 MG tablet TAKE 2 TABLETS BY MOUTH IN  THE MORNING AND 2 TABLETS  BY MOUTH BEFORE BEDTIME AS  DIRECTED 360 tablet 1    ondansetron (ZOFRAN-ODT) 4 MG disintegrating tablet Take 1 tablet by mouth 3 times daily as needed for Nausea or Vomiting 21 tablet 0    albuterol sulfate HFA (PROVENTIL HFA) 108 (90 Base) MCG/ACT inhaler Inhale 2 puffs into the lungs every 6 hours as needed for Wheezing 1 each 3    atorvastatin (LIPITOR) 20 MG tablet TAKE 1 TABLET BY MOUTH ONCE DAILY 90 tablet 1    Roflumilast (DALIRESP) 250 MCG tablet TAKE 1 TABLET BY MOUTH  DAILY 84 tablet 3    montelukast (SINGULAIR) 10 MG tablet take 1 tablet by mouth at bedtime 90 tablet 0    omeprazole (PRILOSEC) 40 MG delayed release capsule Take 1 tablet in the morning 1 hour before breakfast and 1 hour before dinner. 180 capsule 1    albuterol (PROVENTIL) (2.5 MG/3ML) 0.083% nebulizer solution Take 3 mLs by nebulization every 6 hours as needed for Wheezing or Shortness of Breath 120 each 1    aspirin EC 81 MG EC tablet Take 1 tablet by mouth daily 90 tablet 1    Azelastine HCl 137 MCG/SPRAY SOLN 2 sprays by Nasal route daily 30 mL 1    fluticasone (FLONASE) 50 MCG/ACT nasal spray 2 sprays by Nasal route daily 1 each 1    lisinopril (PRINIVIL;ZESTRIL) 40 MG tablet Take 1 tablet by mouth daily take 1 tablet by mouth once daily 90 tablet 1    Magnesium Oxide (MAGNESIUM-OXIDE) 250 MG TABS tablet Take 1 tablet by mouth daily 90 tablet 0    Cholecalciferol (VITAMIN D3) 125 MCG (5000 UT) CAPS Take 1 capsule by mouth in the morning and at bedtime      metoprolol succinate (TOPROL XL) 25 MG extended release tablet take 1 tablet by mouth once daily 90 tablet 3    warfarin (COUMADIN) 5 MG tablet Take 1 tablet by mouth daily except for Monday and Fridays take 1 1/2 (7.5mg) 132 tablet 3    RA NATURAL MAGNESIUM 250 MG TABS tablet take 1 tablet by mouth daily      BREZTRI AEROSPHERE 160-9-4.8 MCG/ACT AERO inhale 2 puffs INTO THE LUNGS twice a day 1 each 12    Inulin (FIBER CHOICE PO) Take 2 capsules by mouth 2 times daily      Blood Pressure Monitoring (BLOOD PRESSURE CUFF) MISC Dx:  Hypertension with labile blood pressure 1 each 0    sennosides-docusate sodium (SENOKOT-S) 8.6-50 MG tablet Take 1 tablet by mouth daily (Patient not taking: Reported on 10/5/2022) 30 tablet 0     No current facility-administered medications for this visit.          Allergies as of 10/05/2022 - Fully Reviewed 10/05/2022   Allergen Reaction Noted    Keflex [cephalexin] Itching 12/29/2016    Phenergan [promethazine hcl] Other (See Comments) 12/29/2016       Social History     Socioeconomic History    Marital status: Single     Spouse name: Not on file    Number of children: 3    Years of education: 12    Highest education level: Not on file   Occupational History    Occupation: Disabled   Tobacco Use    Smoking status: Every Day     Packs/day: 1.00     Years: 50.00     Pack years: 50.00     Types: Cigarettes    Smokeless tobacco: Never   Vaping Use    Vaping Use: Former    Quit date: 1/1/2015   Substance and Sexual Activity    Alcohol use: Yes     Comment: rare wine    Drug use: Never    Sexual activity: Not Currently     Partners: Male   Other Topics Concern    Not on file   Social History Narrative    Drinks 2 cups of coffee daily & occ Coke     Social Determinants of Health     Financial Resource Strain: Medium Risk    Difficulty of Paying Living Expenses: Somewhat hard   Food Insecurity: Food Insecurity Present    Worried About Running Out of Food in the Last Year: Sometimes true    Ran Out of Food in the Last Year: Never true   Transportation Needs: No Transportation Needs    Lack of Transportation (Medical): No    Lack of Transportation (Non-Medical): No   Physical Activity: Not on file   Stress: Not on file   Social Connections: Not on file   Intimate Partner Violence: Not on file   Housing Stability: Low Risk     Unable to Pay for Housing in the Last Year: No    Number of Places Lived in the Last Year: 2    Unstable Housing in the Last Year: No       Family History   Problem Relation Age of Onset    Heart Disease Mother     Arthritis Mother     Asthma Mother     Diabetes Mother     High Blood Pressure Mother     High Cholesterol Mother     Stroke Mother     Heart Disease Father     Arthritis Father     Asthma Father     Diabetes Father     High Blood Pressure Father     High Cholesterol Father     Breast Cancer Sister     Other Brother         ELMO on CPap;    Has ICD    Asthma Brother     COPD Brother     Arthritis Paternal Grandmother     Asthma Paternal Grandmother     Diabetes Maternal Aunt     Diabetes Paternal Aunt     High Blood Pressure Paternal Aunt        REVIEW OF SYSTEMS:   CONSTITUTIONAL:  negative for  fevers, chills, sweats, + fatigue  HEENT: negative for  tinnitus, earaches, nasal congestion and epistaxis  RESPIRATORY: Occasional dry cough, cough with sputum,wheezing, negative for hemoptysis  GASTROINTESTINAL:  negative for nausea, vomiting, diarrhea, constipation, pruritus and jaundice  HEMATOLOGIC/LYMPHATIC:  negative for easy bruising, bleeding, lymphadenopathy and petechiae  ENDOCRINE:  negative for heat intolerance, cold intolerance, tremor, hair loss and diabetic symptoms including neither polyuria nor polydipsia nor blurred vision  MUSCULOSKELETAL:  negative for  myalgias, arthralgias, joint swelling, stiff joints and decreased range of motion  NEUROLOGICAL:  negative for memory problems, speech problems, visual disturbance, dysphagia, weakness and numbness      PHYSICAL EXAM:   CONSTITUTIONAL:  awake, alert, cooperative, no apparent distress, and appears older than stated age  HEAD:  normocepalic, without obvious abnormality, atraumatic, pink, moist mucous membranes. NECK:  Supple, symmetrical, trachea midline, no adenopathy, thyroid symmetric, not enlarged and no tenderness, skin normal  LUNGS:  No increased work of breathing, decreased air exchange, fine scattered wheezing  CARDIOVASCULAR:  Normal apical impulse, regular rate and rhythm, mechanical heart sounds, 3/6 diastolic murmur at the right upper sternal border, 2 out of 6 systolic murmur at the right upper sternal border, no JVD, no carotid bruit, trace pedal edema, good carotid upstroke bilaterally. ABDOMEN:  Soft, nontender, no masses, no hepatomegaly or splenomegaly, BS+  CHEST: nontender to palpation, expands symmetrically  MUSCULOSKELETAL:  No clubbing no cyanosis. there is no redness, warmth, or swelling of the joints  full range of motion noted  NEUROLOGIC:  Alert, awake,oriented x3  SKIN:  no bruising or bleeding, normal skin color, texture, turgor and no redness, warmth, or swelling    /60   Pulse 67   Resp 16   Ht 5' 5\" (1.651 m)   Wt 148 lb (67.1 kg)   LMP 11/08/2008 (LMP Unknown)   SpO2 97%   BMI 24.63 kg/m²     DATA:   I personally reviewed the visit EKG with the following interpretation: Sinus rhythm, possible old anteroseptal wall MI age undetermined, occasional PVCs, nonspecific T wave changes in the lateral leads    EKG 2/10/2022, sinus rhythm, nonspecific ST changes, normal axis    EKG 7/6/21 Sinus rhythm, nonspecific T wave changes, no significant changes when compared to previous      ECHO: 9/9/20 Summary   Left ventricle is normal in size . Borderline concentric left ventricular hypertrophy. Septal motion consistent with post open heart state . Ejection fraction is visually estimated at 55-60%. Normal diastolic function. Normal right ventricular size and function. Normal sized left atrium. There is a mechanical aortic prosthetic valve which is well seated with a   mean gradient of 8.14 mmHg and trace aortic regurgitation   Mildly dilated aortic root. Stress Test: 9/10/20       FINDINGS:    Perfusion images demonstrate no reversible perfusion defect. There is    a fixed perfusion abnormality at the anterior wall. Wall motion is globally very mildly hypokinetic. The right ventricle    is hypertrophied. The end diastolic volume is 86 ml. The end systolic volume is 44 ml. The estimated ejection fraction is 49 %. Impression    1. No reversible perfusion defect. Fixed anterior wall perfusion    abnormality. 2. Ejection fraction is 49 %. 3. Global very mild hypokinesis. RVH. Angiography 9/10/2020,CONCLUSIONS:  1. Coronary artery. A.  Left main. Aneurysmal formation in the mid left main without any  significant angiographic luminal narrowings. B.  LAD. No significant angiographic disease. C.  LCX. No significant angiographic disease. D.  RCA. Dominant vessel with around 40-50% mid vessel narrowing. 2.  Bileaflet mechanical valve with adequate leaflet mobility noted on  fluoroscopy.     Cardiology Labs: BMP:    Lab Results   Component Value Date/Time     09/09/2022 10:45 AM    K 4.0 09/09/2022 10:45 AM    K 3.8 08/27/2022 12:57 PM     09/09/2022 10:45 AM    CO2 27 09/09/2022 10:45 AM    BUN 11 09/09/2022 10:45 AM    CREATININE 0.6 09/09/2022 10:45 AM     CMP:    Lab Results   Component Value Date/Time     09/09/2022 10:45 AM    K 4.0 09/09/2022 10:45 AM    K 3.8 08/27/2022 12:57 PM     09/09/2022 10:45 AM    CO2 27 09/09/2022 10:45 AM    BUN 11 09/09/2022 10:45 AM    CREATININE 0.6 09/09/2022 10:45 AM    PROT 7.3 09/09/2022 10:45 AM     CBC:    Lab Results   Component Value Date/Time    WBC 9.5 09/09/2022 10:45 AM    RBC 3.25 09/09/2022 10:45 AM    HGB 10.5 09/09/2022 10:45 AM    HCT 31.0 09/09/2022 10:45 AM    MCV 95.4 09/09/2022 10:45 AM    RDW 14.4 09/09/2022 10:45 AM     09/09/2022 10:45 AM     PT/INR:  No results found for: PTINR  PT/INR Warfarin:  No components found for: PTPATWAR, PTINRWAR  PTT:    Lab Results   Component Value Date/Time    APTT 27.7 09/09/2022 10:45 AM     PTT Heparin:  No components found for: APTTHEP  Magnesium:    Lab Results   Component Value Date/Time    MG 1.8 04/02/2022 07:43 AM     TSH:    Lab Results   Component Value Date/Time    TSH 2.630 09/10/2020 09:03 AM     TROPONIN:  No components found for: TROP  BNP:  No results found for: BNP  FASTING LIPID PANEL:    Lab Results   Component Value Date/Time    CHOL 164 04/28/2022 09:09 AM    HDL 58 04/28/2022 09:09 AM    TRIG 97 04/28/2022 09:09 AM     No orders to display     I have personally reviewed the laboratory, cardiac diagnostic and radiographic testing as outlined above:      IMPRESSION:  1. Chronic diastolic congestive heart failure: Compensated, continue current treatment  2. S/p aortic valve replacement: Using mechanical aortic valve, echocardiogram done in February 2020 revealed normally functioning mechanical aortic valve. 3.  COPD  4.   Tobacco abuse: Patient was counseled regarding smoking cessation  5. Chronic anticoagulation    RECOMMENDATIONS:   continue current treatment  CHF: Daily weight, take an extra Lasix for weight gain of more than 2-3 pounds in 24 hours, compliance with diuretics, low-salt diet were all advised. Compliance with the Coumadin dose, PT and INR check appointments was strongly advised   Increase risk of bleeding due to being on anti-coagulation, symptoms and signs of bleeding discussed with patient, patient was advised to seek medical attention at the earliest symptoms or signs of bleeding. Infective endocarditis prophylaxis prior to invasive procedure especially dental procedures discussed with her  Follow-up with Dr. Rachana Ferguson as scheduled  Follow-up with Dr. Jahaira Alexander in 6 months, sooner if symptomatic for any reason    I have reviewed my findings and recommendations with patient    Electronically signed by Blaise Gamez MD on 10/5/2022 at 10:10 AM  NOTE: This report was transcribed using voice recognition software.  Every effort was made to ensure accuracy; however, inadvertent computerized transcription errors may be present

## 2022-10-05 NOTE — PROGRESS NOTES
Vascular Surgery Outpatient Progress Note      Chief Complaint   Patient presents with    Circulatory Problem     Follow up TAA, AAA. HISTORY OF PRESENT ILLNESS:                The patient is a 60 y.o. female who returns for follow-up evaluation of an abdominal aortic aneurysm and thoracic aneurysm. She has an ascending aneurysm and an infrarenal abdominal aortic aneurysm. She has no pain or discomfort. She denies any claudication. She denies any right-sided left-sided weakness numbness or vision changes. She is recently had her gallbladder taken out and has very little discomfort.     Past Medical History:        Diagnosis Date    Aneurysm ([address]     THORACIC AORTIC    Anticoagulant long-term use     Anxiety     Ascending aortic aneurysm     Asthma     CAD (coronary artery disease)     Chronic back pain     Constipation 05/19/2021    COPD (chronic obstructive pulmonary disease) (HCC)     Depression     Diverticulosis     GERD (gastroesophageal reflux disease)     H/O mechanical aortic valve replacement 01/05/2017    Headache     Hyperlipidemia     Hypertension     On warfarin at home     for AVR    Restless legs syndrome     S/P AVR     Status post placement of implantable loop recorder     Syncope, cardiogenic 09/10/2020    Tobacco abuse     Urinary incontinence     Ventricular tachycardia, non-sustained     Warfarin-induced coagulopathy ([address] 12/11/2011     Past Surgical History:        Procedure Laterality Date    APPENDECTOMY      CARDIAC CATHETERIZATION  09/11/2020    Dr. Yvon Lipscomb  2009    Aortic valve 2009 [facility] [address], LAPAROSCOPIC N/A 9/9/2022    CHOLECYSTECTOMY LAPAROSCOPIC performed by Raulito Antony MD at [address] N/A 11/18/2019    COLONOSCOPY POLYPECTOMY SNARE/COLD BIOPSY performed by Raulito Antony MD at [address] [place] [address] N/A 08/10/2022    COLONOSCOPY POLYPECTOMY SNARE/COLD BIOPSY performed by Samira Bell MD at [facility] [address]  08/10/2022    COLONOSCOPY SUBMUCOSAL/BOTOX INJECTION performed by Samira Bell MD at [facility], DIAGNOSTIC      HYSTERECTOMY (CERVIX STATUS UNKNOWN)      INSERTABLE CARDIAC MONITOR  09/11/2020    Linq Insertion    Dr. Dennise Mahajan    KEM STEROTACTIC LOC BREAST BIOPSY RIGHT Right 03/10/2021    KEM STEROTACTIC LOC BREAST BIOPSY RIGHT 3/10/2021 [facility]    TUBAL LIGATION      UPPER GASTROINTESTINAL ENDOSCOPY N/A 11/18/2019    EGD BIOPSY performed by Samira Bell MD at [address] [facility] N/A 08/10/2022    EGD BIOPSY performed by Samira Bell MD at [address] [facility]     Current Medications:   Prior to Admission medications    Medication Sig Start Date End Date Taking?  Authorizing Provider   sertraline (ZOLOFT) 100 MG tablet take 1 and 1/2 tablet by mouth once daily 9/26/22  Yes Lucio Henry MD   albuterol sulfate HFA (PROVENTIL;VENTOLIN;PROAIR) 108 (90 Base) MCG/ACT inhaler inhale 1 puff every 4 hours if needed for wheezing 9/16/22  Yes Issa Hernández MD   sennosides-docusate sodium (SENOKOT-S) 8.6-50 MG tablet Take 1 tablet by mouth daily 9/9/22  Yes Abelino Santana, DO   rOPINIRole (REQUIP) 0.25 MG tablet TAKE 2 TABLETS BY MOUTH IN  THE MORNING AND 2 TABLETS  BY MOUTH BEFORE BEDTIME AS  DIRECTED 9/6/22  Yes Lucio Henry MD   ondansetron (ZOFRAN-ODT) 4 MG disintegrating tablet Take 1 tablet by mouth 3 times daily as needed for Nausea or Vomiting 8/27/22  Yes Davey Saul, DO   albuterol sulfate HFA (PROVENTIL HFA) 108 (90 Base) MCG/ACT inhaler Inhale 2 puffs into the lungs every 6 hours as needed for Wheezing 8/23/22  Yes Issa Hernández MD   atorvastatin (LIPITOR) 20 MG tablet TAKE 1 TABLET BY MOUTH ONCE DAILY 8/18/22  Yes Shara Snow MD   Roflumilast (DALIRESP) 250 MCG tablet TAKE 1 TABLET BY MOUTH  DAILY 8/17/22  Yes Miracle Serve., DO montelukast (SINGULAIR) 10 MG tablet take 1 tablet by mouth at bedtime 8/2/22  Yes Sera Fabian DO   omeprazole (PRILOSEC) 40 MG delayed release capsule Take 1 tablet in the morning 1 hour before breakfast and 1 hour before dinner.  8/2/22  Yes Sera Fabian DO   albuterol (PROVENTIL) (2.5 MG/3ML) 0.083% nebulizer solution Take 3 mLs by nebulization every 6 hours as needed for Wheezing or Shortness of Breath 5/11/22  Yes Tonio Donnelly MD   aspirin EC 81 MG EC tablet Take 1 tablet by mouth daily 5/11/22  Yes Tonio Donnelly MD   Azelastine HCl 137 MCG/SPRAY SOLN 2 sprays by Nasal route daily 5/11/22  Yes Tonio Donnelly MD   fluticasone [facility]) 50 MCG/ACT nasal spray 2 sprays by Nasal route daily 5/11/22  Yes Tonio Donnelly MD   lisinopril (PRINIVIL;ZESTRIL) 40 MG tablet Take 1 tablet by mouth daily take 1 tablet by mouth once daily 5/11/22  Yes Tonio Donnelly MD   Magnesium Oxide (MAGNESIUM-OXIDE) 250 MG TABS tablet Take 1 tablet by mouth daily 5/11/22  Yes Tonio Donnelly MD   Cholecalciferol (VITAMIN D3) 125 MCG (5000 UT) CAPS Take 1 capsule by mouth in the morning and at bedtime   Yes Historical Provider, MD   metoprolol succinate (TOPROL XL) 25 MG extended release tablet take 1 tablet by mouth once daily 5/6/22  Yes Ramiro Kruger MD   warfarin (COUMADIN) 5 MG tablet Take 1 tablet by mouth daily except for Monday and Fridays take 1 1/2 (7.5mg) 5/6/22  Yes Ramiro Kruger MD   RA NATURAL MAGNESIUM 250 MG TABS tablet take 1 tablet by mouth daily 4/2/22  Yes Historical Provider, MD Sima Velázquez 160-9-4.8 MCG/ACT AERO inhale 2 puffs INTO THE LUNGS twice a day 3/7/22  Yes Malik Shah MD   Inulin (FIBER CHOICE PO) Take 2 capsules by mouth 2 times daily   Yes Historical Provider, MD   Blood Pressure Monitoring (BLOOD PRESSURE CUFF) MISC Dx:  Hypertension with labile blood pressure 9/20/19  Yes Louvella Merlin, MD   naproxen (NAPROSYN) 500 MG tablet Take 1 tablet by mouth 2 times daily for 14 days  Patient not taking: No sig reported 8/27/22 9/9/22  Syliva Seen, DO   magnesium citrate solution Take 592 mLs by mouth once for 1 dose  Patient not taking: Reported on 8/2/2022 7/18/22 8/10/22  Adam Squires MD   metoclopramide (REGLAN) 5 MG tablet Take 1 tablet by mouth 3 times daily  Patient not taking: Reported on 9/2/2022 7/1/22 9/9/22  Sangeeta Garner MD     Allergies:  Keflex [cephalexin] and Phenergan [promethazine hcl]    Social History     Socioeconomic History    Marital status: Single     Spouse name: Not on file    Number of children: 3    Years of education: 12    Highest education level: Not on file   Occupational History    Occupation: Disabled   Tobacco Use    Smoking status: Every Day     Packs/day: 1.00     Years: 50.00     Pack years: 50.00     Types: Cigarettes    Smokeless tobacco: Never   Vaping Use    Vaping Use: Former    Quit date: 1/1/2015   Substance and Sexual Activity    Alcohol use: Yes     Comment: rare wine    Drug use: Never    Sexual activity: Not Currently     Partners: Male   Other Topics Concern    Not on file   Social History Narrative    Drinks 2 cups of coffee daily & occ Coke     Social Determinants of Health     Financial Resource Strain: Medium Risk    Difficulty of Paying Living Expenses: Somewhat hard   Food Insecurity: Food Insecurity Present    Worried About Running Out of Food in the Last Year: Sometimes true    Ran Out of Food in the Last Year: Never true   Transportation Needs: No Transportation Needs    Lack of Transportation (Medical): No    Lack of Transportation (Non-Medical):  No   Physical Activity: Not on file   Stress: Not on file   Social Connections: Not on file   Intimate Partner Violence: Not on file   Housing Stability: Low Risk     Unable to Pay for Housing in the Last Year: No    Number of Places Lived in the Last Year: 2    Unstable Housing in the Last Year: No        Family History   Problem Relation Age of Onset    Heart Disease Mother     Arthritis Mother Asthma Mother     Diabetes Mother     High Blood Pressure Mother     High Cholesterol Mother     Stroke Mother     Heart Disease Father     Arthritis Father     Asthma Father     Diabetes Father     High Blood Pressure Father     High Cholesterol Father     Breast Cancer Sister     Other Brother         ELMO on CPap; Has ICD    Asthma Brother     COPD Brother     Arthritis Paternal Grandmother     Asthma Paternal Grandmother     Diabetes Maternal Aunt     Diabetes Paternal Aunt     High Blood Pressure Paternal Aunt        REVIEW OF SYSTEMS:    Constitutional: Negative for activity change, appetite change, chills, diaphoresis, fatigue, fever and unexpected weight change. HEENT: Negative for congestion, ear discharge, ear pain, hearing loss, nosebleeds, rhinorrhea, sinus pain, sore throat, tinnitus, trouble swallowing and voice change. Eyes: Negative for photophobia, pain, discharge, redness, itching and visual disturbance. Respiratory: Negative for apnea, cough, chest tightness, shortness of breath and wheezing. Cardiovascular: Negative for chest pain, palpitations and leg swelling. Gastrointestinal: Negative for abdominal distention, abdominal pain, blood in stool, constipation, diarrhea, nausea and vomiting. Endocrine: Negative for cold intolerance, heat intolerance, polydipsia, polyphagia and polyuria. Genitourinary: Negative for decreased urine volume, difficulty urinating, dysuria, frequency, hematuria and urgency. Musculoskeletal: Negative for arthralgias, back pain, gait problem, joint swelling and neck pain. Skin: Negative for color change, pallor, rash and wound. Allergic/Immunologic: Negative for environmental allergies, food allergies and immunocompromised state. Neurological: Negative for dizziness, syncope, facial asymmetry, speech difficulty, weakness, light-headedness, numbness and headaches. Hematological: Negative for adenopathy. Does not bruise/bleed easily. Psychiatric/Behavioral: Negative for agitation, confusion, sleep disturbance and suicidal ideas. The patient is not nervous/anxious. Vascular: See above          PHYSICAL EXAM:  Vitals:    10/05/22 0833   BP: 126/64     General Appearance: alert and oriented to person, place and time, well developed and well- nourished, in no acute distress  Skin: warm and dry, no rash or erythema  Head: normocephalic and atraumatic  Eyes: extraocular eye movements intact, conjunctivae normal  ENT: external ear and ear canal normal bilaterally, nose without deformity  Pulmonary/Chest: clear to auscultation bilaterally- no wheezes, rales or rhonchi, normal air movement, no respiratory distress  Cardiovascular: normal rate, regular rhythm, normal S1 and S2, no murmurs, no carotid bruits  Abdomen: soft, non-tender, non-distended, normal bowel sounds, no masses or organomegaly  Musculoskeletal: normal range of motion, no joint swelling, deformity or tenderness  Neurologic: no cranial nerve deficit, gait, coordination and speech normal  Extremities: Bilateral palpable brachial radial pulses. DP and PT are palpable 1+. Motor and sensation are intact. There is no gross signs of vascular ischemia. Problem List Items Addressed This Visit       Infrarenal abdominal aortic aneurysm (AAA) without rupture - Primary    Aneurysm of ascending aorta without rupture       #1 abdominal aortic aneurysm measuring approximately 3.5 cm. At this point she can have a repeat ultrasound examination in 1 year    #2 ascending thoracic aortic aneurysm she can follow-up with her primary care physician and cardiology. Diana      No follow-ups on file.

## 2022-10-07 DIAGNOSIS — J44.9 CHRONIC OBSTRUCTIVE PULMONARY DISEASE, UNSPECIFIED COPD TYPE (HCC): ICD-10-CM

## 2022-10-09 PROBLEM — R91.1 PULMONARY NODULE: Chronic | Status: ACTIVE | Noted: 2022-10-09

## 2022-10-09 PROBLEM — J45.50 SEVERE PERSISTENT ASTHMA WITHOUT COMPLICATION: Chronic | Status: ACTIVE | Noted: 2022-10-09

## 2022-10-09 NOTE — PROGRESS NOTES
[place]  Department of Pulmonary, Critical Care and Sleep Medicine  Dr. Jackelyn Vasquez, Dr. Isaias Bruno, Dr. Bustos Pac Note - Follow up      Assessment/Plan     Assessment & Plan     No problem-specific Assessment & Plan notes found for this encounter. Problem List Items Addressed This Visit          Respiratory    Severe persistent asthma without complication (Chronic)    Chronic respiratory failure with hypoxia (HCC) (Chronic)    COPD (chronic obstructive pulmonary disease) (HCC) - Primary    Relevant Medications    guaiFENesin (MUCINEX) 600 MG extended release tablet    azithromycin (ZITHROMAX) 250 MG tablet    predniSONE (DELTASONE) 20 MG tablet    nicotine (NICODERM CQ) 21 MG/24HR    Other Relevant Orders    CT CHEST WO CONTRAST       Other    Pulmonary nodule (Chronic)    Relevant Orders    CT CHEST WO CONTRAST     Other Visit Diagnoses       COPD exacerbation (HCC)        Relevant Medications    guaiFENesin (MUCINEX) 600 MG extended release tablet    azithromycin (ZITHROMAX) 250 MG tablet    predniSONE (DELTASONE) 20 MG tablet    nicotine (NICODERM CQ) 21 MG/24HR    Smoking        Relevant Medications    buPROPion (WELLBUTRIN SR) 150 MG extended release tablet    nicotine (NICODERM CQ) 21 MG/24HR               Plan:     Patient has a combination of severe COPD/emphysema and asthma and unfortunately continues to smoke. Will treat for acute exacerbation currently with Z-Nilesh and prednisone x 5 days    Continue Breztri. Advised to rinse mouth after each use. P.r.n. albuterol  Advised on proper inhaler technique, and adherence to prescribed inhalers    Ct Daliresp, Singulair    Start guaifenesin    CT chest for nodule follow-up/lung screen    Aspiration / GERD precautions  Head end of bed elevation. Maintain active and healthy lifestyle with weight reduction.   COVID-19 precautions  Recommend yearly Influenza and appropriate pneumonia vaccinations. Administering PPSV23    Smoking    I spent 10 minutes counseling patient regarding smoking and the risk of Lung cancer and COPD and Respiratory failure. We will plan for another trial of bupropion and nicotine patch  Nicotine patch 21mg  Bupropion - 150 mg OD x 3 days, 150 mg BID x 12 wks    Follow up: Return in about 6 months (around 4/10/2023). Liana Wood MD MS  Pulmonary [address]  Dr. Janett Da Silva, Dr. Steven Lala, Dr. Morales Nephew This Encounter   Procedures    CT CHEST WO CONTRAST     Standing Status:   Future     Standing Expiration Date:   10/9/2023     Order Specific Question:   Reason for exam:     Answer:   Pulmonary nodule         Immunization History   Administered Date(s) Administered    COVID-19, MODERNA BLUE border, Primary or Immunocompromised, (age 12y+), IM, 100 mcg/0.5mL 03/18/2021, 04/15/2021    Influenza Vaccine, unspecified formulation 01/05/2017, 10/03/2017    Influenza, AFLURIA (age 1 yrs+), FLUZONE, (age 10 mo+), MDV, 0.5mL 10/03/2017, 10/11/2018    Influenza, FLUARIX, FLULAVAL, FLUZONE (age 10 mo+) AND AFLURIA, (age 1 y+), PF, 0.5mL 10/15/2019, 11/04/2020, 10/06/2021    Influenza, Triv, 3 Years and older, IM (Afluria (5 yrs and older) 01/05/2017    Pneumococcal Polysaccharide ([id number]) 01/05/2017    Tdap (Boostrix, Adacel) 08/16/2017    Zoster Recombinant (Shingrix) 10/15/2019, 12/15/2019       Subjective   Patient ID: Gilda Arreola is a 60 y.o. female  Chief Complaint:   HPI: Patient is a 60 y.o. female is here for followup. H/o advanced COPD emphysema/possible asthmatic bronchitis (FEV1 28%), CAD, HFpEF, S/p AVR / Bileaflet mechanical aortic valve. She is on 2 L of home oxygen. Patient states that she had COVID in January 2022 and after which she never fully recovered. She reports persistent shortness of breath and productive cough which worsened within the last few days.   She uses a Breztri bid every day and uses as needed albuterol 3-4 times daily. She had a recent hospitalization for cholecystitis s/p lap cholecystectomy on 9/9. She had another hospitalization in March/April when she was also treated for COPD exacerbation. Pulm Meds: Breztri, prn albuterol, Daliresp, Singulair    Antiplatelet/anticoagulants: Warfarin, ASA    Smoking history: Continues to smoke roughly 1 pack daily.     PFT 2017         No results found for: FEV1, FVC, JWN3QIX, TLC, DLCO    CT chest:     CT PE study 9/23/2022    Advanced centrilobular emphysema  No consolidation or pulm edema  No pleural effusion  4mm LLL nodule  coronary artery calcification  prosthetic aortic valve    Labs:    Eosinophil max - 390  Immunoglobulin E (IgE) - 20  Alpha-1 Antitrypsin - 137    ALLERGIES:  Allergies   Allergen Reactions    Keflex [Cephalexin] Itching    Phenergan [Promethazine Hcl] Other (See Comments)     Involuntary body movements     SOCIAL HISTORY:   Social History     Tobacco Use    Smoking status: Every Day     Packs/day: 1.00     Years: 50.00     Pack years: 50.00     Types: Cigarettes    Smokeless tobacco: Never   Vaping Use    Vaping Use: Former    Quit date: 1/1/2015   Substance Use Topics    Alcohol use: Yes     Comment: rare wine    Drug use: Never     MEDS:   Current Outpatient Medications   Medication Sig Dispense Refill    guaiFENesin (MUCINEX) 600 MG extended release tablet Take 1 tablet by mouth 2 times daily for 15 days 30 tablet 0    azithromycin (ZITHROMAX) 250 MG tablet Take 1 tablet by mouth See Admin Instructions for 5 days 500mg on day 1 followed by 250mg on days 2 - 5 6 tablet 0    predniSONE (DELTASONE) 20 MG tablet Take 1 tablet by mouth daily for 5 days 5 tablet 0    buPROPion (WELLBUTRIN SR) 150 MG extended release tablet Take 1 tablet by mouth 2 times daily Bupropion - 150 mg OD x 3 days, 150 mg BID x 12 wks 60 tablet 5    nicotine (NICODERM CQ) 21 MG/24HR Place 1 patch onto the skin daily 42 patch 3    sertraline (ZOLOFT) 100 MG tablet take 1 and 1/2 tablet by mouth once daily 135 tablet 0    albuterol sulfate HFA (PROVENTIL;VENTOLIN;PROAIR) 108 (90 Base) MCG/ACT inhaler inhale 1 puff every 4 hours if needed for wheezing 3 each 3    sennosides-docusate sodium (SENOKOT-S) 8.6-50 MG tablet Take 1 tablet by mouth daily (Patient not taking: Reported on 10/5/2022) 30 tablet 0    rOPINIRole (REQUIP) 0.25 MG tablet TAKE 2 TABLETS BY MOUTH IN  THE MORNING AND 2 TABLETS  BY MOUTH BEFORE BEDTIME AS  DIRECTED 360 tablet 1    ondansetron (ZOFRAN-ODT) 4 MG disintegrating tablet Take 1 tablet by mouth 3 times daily as needed for Nausea or Vomiting 21 tablet 0    albuterol sulfate HFA (PROVENTIL HFA) 108 (90 Base) MCG/ACT inhaler Inhale 2 puffs into the lungs every 6 hours as needed for Wheezing 1 each 3    atorvastatin (LIPITOR) 20 MG tablet TAKE 1 TABLET BY MOUTH ONCE DAILY 90 tablet 1    Roflumilast (DALIRESP) 250 MCG tablet TAKE 1 TABLET BY MOUTH  DAILY 84 tablet 3    montelukast (SINGULAIR) 10 MG tablet take 1 tablet by mouth at bedtime 90 tablet 0    omeprazole (PRILOSEC) 40 MG delayed release capsule Take 1 tablet in the morning 1 hour before breakfast and 1 hour before dinner.  180 capsule 1    albuterol (PROVENTIL) (2.5 MG/3ML) 0.083% nebulizer solution Take 3 mLs by nebulization every 6 hours as needed for Wheezing or Shortness of Breath 120 each 1    aspirin EC 81 MG EC tablet Take 1 tablet by mouth daily 90 tablet 1    Azelastine HCl 137 MCG/SPRAY SOLN 2 sprays by Nasal route daily 30 mL 1    fluticasone (FLONASE) 50 MCG/ACT nasal spray 2 sprays by Nasal route daily 1 each 1    lisinopril (PRINIVIL;ZESTRIL) 40 MG tablet Take 1 tablet by mouth daily take 1 tablet by mouth once daily 90 tablet 1    Magnesium Oxide (MAGNESIUM-OXIDE) 250 MG TABS tablet Take 1 tablet by mouth daily 90 tablet 0    Cholecalciferol (VITAMIN D3) 125 MCG (5000 UT) CAPS Take 1 capsule by mouth in the morning and at bedtime metoprolol succinate (TOPROL XL) 25 MG extended release tablet take 1 tablet by mouth once daily 90 tablet 3    warfarin (COUMADIN) 5 MG tablet Take 1 tablet by mouth daily except for Monday and Fridays take 1 1/2 (7.5mg) 132 tablet 3    RA NATURAL MAGNESIUM 250 MG TABS tablet take 1 tablet by mouth daily      BREZTRI AEROSPHERE 160-9-4.8 MCG/ACT AERO inhale 2 puffs INTO THE LUNGS twice a day 1 each 12    Inulin (FIBER CHOICE PO) Take 2 capsules by mouth 2 times daily      Blood Pressure Monitoring (BLOOD PRESSURE CUFF) MISC Dx:  Hypertension with labile blood pressure 1 each 0     No current facility-administered medications for this visit. Review of Systems: -ve other than specified above. Objective       Vitals:   ,  ,  ,  ,  ,  ,  ,      BMI body mass index is unknown because there is no height or weight on file. Ideal Body Weight: Ideal body weight: 57 kg (125 lb 10.6 oz)  Adjusted ideal body weight: 61.1 kg (134 lb 9.6 oz)  ---------------  Physical Exam:    General: Alert and oriented x 3. No acute distress. Eyes:  Vision - grossly normal, PERRLA  HENT:  Head is atraumatic and normocephalic. Neck is supple, no jugular venous distention. Respiratory: Diminished breath sounds bilaterally with wheezing, respirations are nonlabored, breath sounds are equal.  Cardiovascular:  S1, S2 normal, regular rate and rhythm, no murmur, no pedal edema  Gastrointestinal:  Soft, nontender, nondistended. Normal bowel sounds. No organomegaly  Neurologic:  Awake and alert, cranial nerves 2-12 grossly intact, no focal motor or sensory deficits.       Labs     CBC:   Lab Results   Component Value Date    WBC 9.5 09/09/2022    HGB 10.5 (L) 09/09/2022    HCT 31.0 (L) 09/09/2022    MCV 95.4 09/09/2022     09/09/2022     BMP:     Chemistry        Component Value Date/Time     09/09/2022 1045    K 4.0 09/09/2022 1045    K 3.8 08/27/2022 1257     09/09/2022 1045    CO2 27 09/09/2022 1045    BUN 11 09/09/2022 1045    CREATININE 0.6 09/09/2022 1045        Component Value Date/Time    CALCIUM 9.8 09/09/2022 1045    ALKPHOS 72 09/09/2022 1045    AST 19 09/09/2022 1045    ALT 16 09/09/2022 1045    BILITOT 0.8 09/09/2022 1045          LFTs:   Lab Results   Component Value Date    ALT 16 09/09/2022    AST 19 09/09/2022    ALKPHOS 72 09/09/2022    BILITOT 0.8 09/09/2022    PROT 7.3 09/09/2022     Coags:   Lab Results   Component Value Date    INR 1.8 09/20/2022     Micro: [unfilled]    Imaging   Reviewed imaging studies personally and findings as below  CTA CHEST W CONTRAST    Result Date: 9/23/2022  EXAMINATION: CTA OF THE CHEST; CTA OF THE ABDOMEN AND PELVIS WITH CONTRAST 9/22/2022 11:03 am TECHNIQUE: CTA of the chest was performed after the administration of intravenous contrast.  Multiplanar reformatted images are provided for review. MIP images are provided for review. Automated exposure control, iterative reconstruction, and/or weight based adjustment of the mA/kV was utilized to reduce the radiation dose to as low as reasonably achievable.; CTA of the abdomen and pelvis was performed with the administration of intravenous contrast. Multiplanar reformatted images are provided for review. MIP images are provided for review. Automated exposure control, iterative reconstruction, and/or weight based adjustment of the mA/kV was utilized to reduce the radiation dose to as low as reasonably achievable. COMPARISON: 03/29/2022 HISTORY: ORDERING SYSTEM PROVIDED HISTORY: Thoracic ascending aortic aneurysm [facility]) TECHNOLOGIST PROVIDED HISTORY: STAT Creatinine as needed:->No What reading provider will be dictating this exam?->CRC FINDINGS: Pulmonary Arteries: Pulmonary arteries are adequately opacified for evaluation. No evidence of intraluminal filling defect to suggest pulmonary embolism. Main pulmonary artery is normal in caliber. Mediastinum: No evidence of mediastinal lymphadenopathy.   There is a moderate amount of vascular plaque in the arch and great vessels. There is mild aneurysmal dilatation of the ascending aorta. On coronal imaging it maximally measures 4.4 cm. Aortic root is of normal caliber. There is calcified plaque/postsurgical changes at the root. There has been sternotomy. The heart and pericardium demonstrate no acute abnormality. . There is plaque in the descending thoracic aorta and atheromatous ulcers. There is atheromatous ulcers in the upper abdominal aorta. Lungs/pleura: The lungs are without acute process. There is mild - moderate hyperinflation of the lungs. There are some scattered scars. No focal consolidation or pulmonary edema. No evidence of pleural effusion or pneumothorax. No suspicious appearing lung nodules are noted. Abdomen: Liver, spleen, pancreas, and kidneys are normal..  There is moderate atheromatous plaque in the abdominal aorta and origins of the renal arteries. There is infrarenal aneurysm measuring 3.6 cm. There is significant plaque in the common iliac arteries. . Bowel loops are normal.  No abdominal or pelvic lymphadenopathy or fluid collections are noted. Bladder is normal. Soft Tissues/Bones: No acute bone or soft tissue abnormality. Moderate amount of generalized plaque in the thoracic and abdominal aorta. There is aneurysmal dilatation of the ascending aorta maximally measuring 4.4 cm on coronal images. There are postsurgical changes at the aortic root. There is no evidence of aortic dissection. Tanda Bun Penetrating atheromatous ulcers in the descending thoracic and upper abdominal aorta. Significant amount of plaque in the abdominal aorta with infrarenal aneurysm it maximally measures 3.6 cm.  COPD     CTA ABDOMEN PELVIS W CONTRAST    Result Date: 9/23/2022  EXAMINATION: CTA OF THE CHEST; CTA OF THE ABDOMEN AND PELVIS WITH CONTRAST 9/22/2022 11:03 am TECHNIQUE: CTA of the chest was performed after the administration of intravenous contrast.  Multiplanar reformatted images are provided for review. MIP images are provided for review. Automated exposure control, iterative reconstruction, and/or weight based adjustment of the mA/kV was utilized to reduce the radiation dose to as low as reasonably achievable.; CTA of the abdomen and pelvis was performed with the administration of intravenous contrast. Multiplanar reformatted images are provided for review. MIP images are provided for review. Automated exposure control, iterative reconstruction, and/or weight based adjustment of the mA/kV was utilized to reduce the radiation dose to as low as reasonably achievable. COMPARISON: 03/29/2022 HISTORY: ORDERING SYSTEM PROVIDED HISTORY: Thoracic ascending aortic aneurysm [facility]) TECHNOLOGIST PROVIDED HISTORY: STAT Creatinine as needed:->No What reading provider will be dictating this exam?->CRC FINDINGS: Pulmonary Arteries: Pulmonary arteries are adequately opacified for evaluation. No evidence of intraluminal filling defect to suggest pulmonary embolism. Main pulmonary artery is normal in caliber. Mediastinum: No evidence of mediastinal lymphadenopathy. There is a moderate amount of vascular plaque in the arch and great vessels. There is mild aneurysmal dilatation of the ascending aorta. On coronal imaging it maximally measures 4.4 cm. Aortic root is of normal caliber. There is calcified plaque/postsurgical changes at the root. There has been sternotomy. The heart and pericardium demonstrate no acute abnormality. . There is plaque in the descending thoracic aorta and atheromatous ulcers. There is atheromatous ulcers in the upper abdominal aorta. Lungs/pleura: The lungs are without acute process. There is mild - moderate hyperinflation of the lungs. There are some scattered scars. No focal consolidation or pulmonary edema. No evidence of pleural effusion or pneumothorax. No suspicious appearing lung nodules are noted.  Abdomen: Liver, spleen, pancreas, and kidneys are normal..  There is moderate atheromatous plaque in the abdominal aorta and origins of the renal arteries. There is infrarenal aneurysm measuring 3.6 cm. There is significant plaque in the common iliac arteries. . Bowel loops are normal.  No abdominal or pelvic lymphadenopathy or fluid collections are noted. Bladder is normal. Soft Tissues/Bones: No acute bone or soft tissue abnormality. Moderate amount of generalized plaque in the thoracic and abdominal aorta. There is aneurysmal dilatation of the ascending aorta maximally measuring 4.4 cm on coronal images. There are postsurgical changes at the aortic root. There is no evidence of aortic dissection. Jestine Courts Penetrating atheromatous ulcers in the descending thoracic and upper abdominal aorta. Significant amount of plaque in the abdominal aorta with infrarenal aneurysm it maximally measures 3.6 cm.  COPD

## 2022-10-10 ENCOUNTER — OFFICE VISIT (OUTPATIENT)
Dept: PULMONOLOGY | Age: 62
End: 2022-10-10
Payer: MEDICARE

## 2022-10-10 DIAGNOSIS — J96.11 CHRONIC RESPIRATORY FAILURE WITH HYPOXIA (HCC): Chronic | ICD-10-CM

## 2022-10-10 DIAGNOSIS — J43.2 CENTRILOBULAR EMPHYSEMA (HCC): Primary | ICD-10-CM

## 2022-10-10 DIAGNOSIS — J45.50 SEVERE PERSISTENT ASTHMA WITHOUT COMPLICATION: Chronic | ICD-10-CM

## 2022-10-10 DIAGNOSIS — J44.1 COPD EXACERBATION (HCC): ICD-10-CM

## 2022-10-10 DIAGNOSIS — F17.200 SMOKING: ICD-10-CM

## 2022-10-10 DIAGNOSIS — R91.1 PULMONARY NODULE: Chronic | ICD-10-CM

## 2022-10-10 PROCEDURE — 3017F COLORECTAL CA SCREEN DOC REV: CPT | Performed by: INTERNAL MEDICINE

## 2022-10-10 PROCEDURE — G8428 CUR MEDS NOT DOCUMENT: HCPCS | Performed by: INTERNAL MEDICINE

## 2022-10-10 PROCEDURE — 99215 OFFICE O/P EST HI 40 MIN: CPT | Performed by: INTERNAL MEDICINE

## 2022-10-10 PROCEDURE — 3023F SPIROM DOC REV: CPT | Performed by: INTERNAL MEDICINE

## 2022-10-10 PROCEDURE — 99406 BEHAV CHNG SMOKING 3-10 MIN: CPT | Performed by: INTERNAL MEDICINE

## 2022-10-10 PROCEDURE — 4004F PT TOBACCO SCREEN RCVD TLK: CPT | Performed by: INTERNAL MEDICINE

## 2022-10-10 PROCEDURE — 99213 OFFICE O/P EST LOW 20 MIN: CPT | Performed by: INTERNAL MEDICINE

## 2022-10-10 PROCEDURE — G8484 FLU IMMUNIZE NO ADMIN: HCPCS | Performed by: INTERNAL MEDICINE

## 2022-10-10 PROCEDURE — G8420 CALC BMI NORM PARAMETERS: HCPCS | Performed by: INTERNAL MEDICINE

## 2022-10-10 RX ORDER — PREDNISONE 20 MG/1
20 TABLET ORAL DAILY
Qty: 5 TABLET | Refills: 0 | Status: SHIPPED
Start: 2022-10-10 | End: 2022-10-10

## 2022-10-10 RX ORDER — NICOTINE 21 MG/24HR
1 PATCH, TRANSDERMAL 24 HOURS TRANSDERMAL DAILY
Qty: 42 PATCH | Refills: 3 | Status: SHIPPED
Start: 2022-10-10 | End: 2022-10-10

## 2022-10-10 RX ORDER — BUPROPION HYDROCHLORIDE 150 MG/1
150 TABLET, EXTENDED RELEASE ORAL 2 TIMES DAILY
Qty: 60 TABLET | Refills: 5 | Status: SHIPPED
Start: 2022-10-10 | End: 2022-10-10

## 2022-10-10 RX ORDER — AZITHROMYCIN 250 MG/1
250 TABLET, FILM COATED ORAL SEE ADMIN INSTRUCTIONS
Qty: 6 TABLET | Refills: 0 | Status: SHIPPED
Start: 2022-10-10 | End: 2022-10-10

## 2022-10-10 RX ORDER — BUPROPION HYDROCHLORIDE 150 MG/1
150 TABLET, EXTENDED RELEASE ORAL 2 TIMES DAILY
Qty: 60 TABLET | Refills: 5 | Status: SHIPPED | OUTPATIENT
Start: 2022-10-10

## 2022-10-10 RX ORDER — NICOTINE 21 MG/24HR
1 PATCH, TRANSDERMAL 24 HOURS TRANSDERMAL DAILY
Qty: 42 PATCH | Refills: 3 | Status: SHIPPED | OUTPATIENT
Start: 2022-10-10 | End: 2022-11-21

## 2022-10-10 RX ORDER — GUAIFENESIN 600 MG/1
600 TABLET, EXTENDED RELEASE ORAL 2 TIMES DAILY
Qty: 30 TABLET | Refills: 0 | Status: SHIPPED | OUTPATIENT
Start: 2022-10-10 | End: 2022-10-25

## 2022-10-10 RX ORDER — PREDNISONE 20 MG/1
20 TABLET ORAL DAILY
Qty: 5 TABLET | Refills: 0 | Status: SHIPPED | OUTPATIENT
Start: 2022-10-10 | End: 2022-10-15

## 2022-10-10 RX ORDER — AZITHROMYCIN 250 MG/1
250 TABLET, FILM COATED ORAL SEE ADMIN INSTRUCTIONS
Qty: 6 TABLET | Refills: 0 | Status: SHIPPED | OUTPATIENT
Start: 2022-10-10 | End: 2022-10-15

## 2022-10-10 RX ORDER — MONTELUKAST SODIUM 10 MG/1
TABLET ORAL
Qty: 90 TABLET | Refills: 3 | OUTPATIENT
Start: 2022-10-10

## 2022-10-10 RX ORDER — GUAIFENESIN 600 MG/1
600 TABLET, EXTENDED RELEASE ORAL 2 TIMES DAILY
Qty: 30 TABLET | Refills: 0 | Status: SHIPPED
Start: 2022-10-10 | End: 2022-10-10 | Stop reason: SDUPTHER

## 2022-10-10 NOTE — PROGRESS NOTES
Patient to follow up with physician in 6 months. Patient was ordered a Z-nicole, prednisone, wellbutrin, mucinex and nicotine patch [address] [facility] [address] Patient will be scheduled for a CT Chest in Oct. Of 2023. Patient was given the pneumonia vaccine 23 during office visit under the direction of Dr. Enrico Carrillo.

## 2022-10-16 DIAGNOSIS — I10 ESSENTIAL HYPERTENSION: ICD-10-CM

## 2022-10-17 ENCOUNTER — OFFICE VISIT (OUTPATIENT)
Dept: INTERNAL MEDICINE | Age: 62
End: 2022-10-17
Payer: MEDICARE

## 2022-10-17 DIAGNOSIS — F33.0 MILD EPISODE OF RECURRENT MAJOR DEPRESSIVE DISORDER (HCC): Primary | ICD-10-CM

## 2022-10-17 PROCEDURE — 4004F PT TOBACCO SCREEN RCVD TLK: CPT | Performed by: SOCIAL WORKER

## 2022-10-17 PROCEDURE — 90832 PSYTX W PT 30 MINUTES: CPT | Performed by: SOCIAL WORKER

## 2022-10-17 NOTE — PROGRESS NOTES
ADULT BEHAVIORAL HEALTH FOLLOW UP  Myah Otto,MSW,LISW      Visit Date: 10/17/2022   Time of appointment:  11:00  Time spent with Patient: 30 minutes. This is patient's seventh appointment. Reason for Consult:  Depression     Referring Provider/PCP:    No ref. provider found  Ema FarmerDO      Pt provided informed consent for the behavioral health program. Discussed with patient model of service to include the limits of confidentiality (i.e. abuse reporting, suicide intervention, etc.) and short-term intervention focused approach. Pt indicated understanding. Kailey Arndt is a 60 y.o. female who presents for follow up of mild depression. Pt reports stable mood. Pt reports stable symptoms and reported she is doing well overall. MENTAL STATUS EXAM  Mood was euthymic with congruent affect. Suicidal ideation was denied. Homicidal ideation was denied. Hygiene was good . Dress was neat. Behavior was Within Normal Limits with No self-report of difficulties ambulating. Attitude was Cooperative, [place] and Friendly. Eye-contact was good. Speech: rate - WNL, rhythm - WNL, volume - WNL. Verbalizations were goal directed. Thought processes were intact and goal-oriented without evidence of delusions, hallucinations, obsessions, or dariusz; with little cognitive distortions. Associations were characterized by intact cognitive processes. Pt was oriented to person, place, time, and general circumstances;  recent:  good and fair. Insight and judgment were estimated to be excellent, AEB, a good  understanding of cyclical maladaptive patterns, and the ability to use insight to inform behavior change. ASSESSMENT  Gio Forbes presented to the appointment today for evaluation and treatment of symptoms of depression. She is currently deemed no risk to herself or others and meets criteria for depression. Pt was in agreement with recommendations.  Discussed and processed with pt thoughts surrounding recent events. Identified strengths with pt. Identified continued ways to express self. Explored with pt goals and opportunities to set boundaries. Discussed and processed with pt overall progress made with treatment. Pt reports continued progress and reduced depression. Pt to be successfully discharged. Pt reported no additional concerns. PHQ Scores 8/2/2022 4/13/2022 2/2/2022 1/23/2022 12/14/2021 9/15/2020 10/15/2019   PHQ2 Score 5 6 5 3 6 4 4   PHQ9 Score 17 21 14 8 14 19 19     Interpretation of Total Score Depression Severity: 1-4 = Minimal depression, 5-9 = Mild depression, 10-14 = Moderate depression, 15-19 = Moderately severe depression, 20-27 = Severe depression    How often pt has had thoughts of death or hurting self (if PHQ positive for depression):           DIAGNOSIS  Gaurav Fonseca was seen today for depression. Diagnoses and all orders for this visit:    Mild episode of recurrent major depressive disorder ([facility] [address]            INTERVENTION  Used CBT to address thinking patterns  Identified strengths related to progress  Discussed discharge planning   Reviewed session and overall progress with pt      PLAN  Pt to be discharged due to steady progress with mood        INTERACTIVE COMPLEXITY  Is interactive complexity present?   No  Reason:  N/A  Additional Supporting Information:  N/A       Electronically signed by GERMAINE Fay on 5/17/22 at 2:11 PM EDT

## 2022-10-18 RX ORDER — LISINOPRIL 40 MG/1
TABLET ORAL
Qty: 90 TABLET | Refills: 0 | Status: SHIPPED | OUTPATIENT
Start: 2022-10-18

## 2022-10-20 ENCOUNTER — ANTI-COAG VISIT (OUTPATIENT)
Dept: CARDIOLOGY CLINIC | Age: 62
End: 2022-10-20
Payer: MEDICARE

## 2022-10-20 DIAGNOSIS — Z95.2 H/O MECHANICAL AORTIC VALVE REPLACEMENT: ICD-10-CM

## 2022-10-20 LAB
INTERNATIONAL NORMALIZATION RATIO, POC: 3.2
PROTHROMBIN TIME, POC: 0

## 2022-10-20 PROCEDURE — 93793 ANTICOAG MGMT PT WARFARIN: CPT | Performed by: INTERNAL MEDICINE

## 2022-10-20 PROCEDURE — 85610 PROTHROMBIN TIME: CPT | Performed by: INTERNAL MEDICINE

## 2022-10-20 NOTE — PROGRESS NOTES
INR today is high at 3.2.  (pt just finished 5 days of antibiotic & prednisone). Per Dr Etelvina Garcia, continue on 5mg daily, with 7.5mg on Mondays. Recheck INR in 1 month.

## 2022-10-28 ENCOUNTER — TELEPHONE (OUTPATIENT)
Dept: PULMONOLOGY | Age: 62
End: 2022-10-28

## 2022-10-28 DIAGNOSIS — J44.9 CHRONIC OBSTRUCTIVE PULMONARY DISEASE, UNSPECIFIED COPD TYPE (HCC): ICD-10-CM

## 2022-10-28 RX ORDER — ALBUTEROL SULFATE 2.5 MG/3ML
2.5 SOLUTION RESPIRATORY (INHALATION) EVERY 6 HOURS PRN
Qty: 120 EACH | Refills: 6 | Status: SHIPPED | OUTPATIENT
Start: 2022-10-28

## 2022-11-14 ENCOUNTER — OFFICE VISIT (OUTPATIENT)
Dept: INTERNAL MEDICINE | Age: 62
End: 2022-11-14

## 2022-11-14 ENCOUNTER — HOSPITAL ENCOUNTER (OUTPATIENT)
Age: 62
Discharge: HOME | End: 2022-11-14
Payer: MEDICAID

## 2022-11-14 VITALS
OXYGEN SATURATION: 99 % | RESPIRATION RATE: 20 BRPM | DIASTOLIC BLOOD PRESSURE: 83 MMHG | BODY MASS INDEX: 23.79 KG/M2 | SYSTOLIC BLOOD PRESSURE: 138 MMHG | WEIGHT: 142.8 LBS | HEIGHT: 65 IN | TEMPERATURE: 97.2 F | HEART RATE: 81 BPM

## 2022-11-14 DIAGNOSIS — R05.9 COUGH, UNSPECIFIED TYPE: ICD-10-CM

## 2022-11-14 DIAGNOSIS — G25.81 RESTLESS LEG SYNDROME: ICD-10-CM

## 2022-11-14 DIAGNOSIS — F32.A DEPRESSION, UNSPECIFIED DEPRESSION TYPE: ICD-10-CM

## 2022-11-14 DIAGNOSIS — R30.0 DYSURIA: ICD-10-CM

## 2022-11-14 DIAGNOSIS — Z23 ENCOUNTER FOR IMMUNIZATION: ICD-10-CM

## 2022-11-14 DIAGNOSIS — Z00.00 HEALTHCARE MAINTENANCE: Primary | ICD-10-CM

## 2022-11-14 DIAGNOSIS — J44.0 CHRONIC OBSTRUCTIVE PULMONARY DISEASE WITH ACUTE LOWER RESPIRATORY INFECTION (HCC): ICD-10-CM

## 2022-11-14 DIAGNOSIS — I10 ESSENTIAL HYPERTENSION: ICD-10-CM

## 2022-11-14 DIAGNOSIS — K63.5 POLYP OF COLON, UNSPECIFIED PART OF COLON, UNSPECIFIED TYPE: ICD-10-CM

## 2022-11-14 DIAGNOSIS — Z12.31 ENCOUNTER FOR SCREENING MAMMOGRAM FOR MALIGNANT NEOPLASM OF BREAST: ICD-10-CM

## 2022-11-14 DIAGNOSIS — R05.8 PRODUCTIVE COUGH: ICD-10-CM

## 2022-11-14 DIAGNOSIS — F17.200 SMOKING: ICD-10-CM

## 2022-11-14 LAB
ALBUMIN SERPL-MCNC: 4.3 G/DL (ref 3.5–5.2)
ALP BLD-CCNC: 79 U/L (ref 35–104)
ALT SERPL-CCNC: 16 U/L (ref 0–32)
ANION GAP SERPL CALCULATED.3IONS-SCNC: 15 MMOL/L (ref 7–16)
AST SERPL-CCNC: 18 U/L (ref 0–31)
BACTERIA: ABNORMAL /HPF
BASOPHILS ABSOLUTE: 0.03 E9/L (ref 0–0.2)
BASOPHILS RELATIVE PERCENT: 0.5 % (ref 0–2)
BILIRUB SERPL-MCNC: 0.5 MG/DL (ref 0–1.2)
BILIRUBIN URINE: NEGATIVE
BLOOD, URINE: ABNORMAL
BUN BLDV-MCNC: 8 MG/DL (ref 6–23)
CALCIUM SERPL-MCNC: 9.3 MG/DL (ref 8.6–10.2)
CHLORIDE BLD-SCNC: 104 MMOL/L (ref 98–107)
CLARITY: ABNORMAL
CO2: 26 MMOL/L (ref 22–29)
COLOR: YELLOW
CREAT SERPL-MCNC: 0.7 MG/DL (ref 0.5–1)
EOSINOPHILS ABSOLUTE: 0.01 E9/L (ref 0.05–0.5)
EOSINOPHILS RELATIVE PERCENT: 0.2 % (ref 0–6)
FERRITIN: 31 NG/ML
FOLATE: >20 NG/ML (ref 4.8–24.2)
GFR SERPL CREATININE-BSD FRML MDRD: >60 ML/MIN/1.73
GLUCOSE BLD-MCNC: 111 MG/DL (ref 74–99)
GLUCOSE URINE: NEGATIVE MG/DL
HCT VFR BLD CALC: 32.7 % (ref 34–48)
HEMOGLOBIN: 10.4 G/DL (ref 11.5–15.5)
IMMATURE GRANULOCYTES #: 0.02 E9/L
IMMATURE GRANULOCYTES %: 0.3 % (ref 0–5)
INFLUENZA A ANTIBODY: NEGATIVE
INFLUENZA B ANTIBODY: NEGATIVE
IRON SATURATION: 15 % (ref 15–50)
IRON: 49 MCG/DL (ref 37–145)
KETONES, URINE: ABNORMAL MG/DL
LEUKOCYTE ESTERASE, URINE: ABNORMAL
LYMPHOCYTES ABSOLUTE: 1.84 E9/L (ref 1.5–4)
LYMPHOCYTES RELATIVE PERCENT: 28.5 % (ref 20–42)
Lab: NORMAL
MAGNESIUM: 1.5 MG/DL (ref 1.6–2.6)
MCH RBC QN AUTO: 29.9 PG (ref 26–35)
MCHC RBC AUTO-ENTMCNC: 31.8 % (ref 32–34.5)
MCV RBC AUTO: 94 FL (ref 80–99.9)
MONOCYTES ABSOLUTE: 0.62 E9/L (ref 0.1–0.95)
MONOCYTES RELATIVE PERCENT: 9.6 % (ref 2–12)
NEUTROPHILS ABSOLUTE: 3.94 E9/L (ref 1.8–7.3)
NEUTROPHILS RELATIVE PERCENT: 60.9 % (ref 43–80)
NITRITE, URINE: NEGATIVE
PDW BLD-RTO: 15.6 FL (ref 11.5–15)
PERFORMING INSTRUMENT: NORMAL
PH UA: 6 (ref 5–9)
PHOSPHORUS: 3 MG/DL (ref 2.5–4.5)
PLATELET # BLD: 242 E9/L (ref 130–450)
PMV BLD AUTO: 11.3 FL (ref 7–12)
POTASSIUM SERPL-SCNC: 3.1 MMOL/L (ref 3.5–5)
PROTEIN UA: 100 MG/DL
QC PASS/FAIL: NORMAL
RBC # BLD: 3.48 E12/L (ref 3.5–5.5)
RBC UA: ABNORMAL /HPF (ref 0–2)
SARS-COV-2, POC: NORMAL
SODIUM BLD-SCNC: 145 MMOL/L (ref 132–146)
SPECIFIC GRAVITY UA: >=1.03 (ref 1–1.03)
TOTAL IRON BINDING CAPACITY: 326 MCG/DL (ref 250–450)
TOTAL PROTEIN: 6.9 G/DL (ref 6.4–8.3)
UROBILINOGEN, URINE: 0.2 E.U./DL
VITAMIN B-12: 1271 PG/ML (ref 211–946)
WBC # BLD: 6.5 E9/L (ref 4.5–11.5)
WBC UA: ABNORMAL /HPF (ref 0–5)

## 2022-11-14 PROCEDURE — 80053 COMPREHEN METABOLIC PANEL: CPT

## 2022-11-14 PROCEDURE — 83540 ASSAY OF IRON: CPT

## 2022-11-14 PROCEDURE — 82746 ASSAY OF FOLIC ACID SERUM: CPT

## 2022-11-14 PROCEDURE — 83550 IRON BINDING TEST: CPT

## 2022-11-14 PROCEDURE — 87186 SC STD MICRODIL/AGAR DIL: CPT

## 2022-11-14 PROCEDURE — 81001 URINALYSIS AUTO W/SCOPE: CPT

## 2022-11-14 PROCEDURE — 84425 ASSAY OF VITAMIN B-1: CPT

## 2022-11-14 PROCEDURE — 82607 VITAMIN B-12: CPT

## 2022-11-14 PROCEDURE — 84100 ASSAY OF PHOSPHORUS: CPT

## 2022-11-14 PROCEDURE — 36415 COLL VENOUS BLD VENIPUNCTURE: CPT

## 2022-11-14 PROCEDURE — 82728 ASSAY OF FERRITIN: CPT

## 2022-11-14 PROCEDURE — 85025 COMPLETE CBC W/AUTO DIFF WBC: CPT

## 2022-11-14 PROCEDURE — 83735 ASSAY OF MAGNESIUM: CPT

## 2022-11-14 PROCEDURE — 87088 URINE BACTERIA CULTURE: CPT

## 2022-11-14 RX ORDER — PREDNISONE 10 MG/1
20 TABLET ORAL DAILY
Qty: 10 TABLET | Refills: 0 | Status: SHIPPED
Start: 2022-11-14 | End: 2022-11-15

## 2022-11-14 RX ORDER — ALBUTEROL SULFATE 90 UG/1
2 AEROSOL, METERED RESPIRATORY (INHALATION) EVERY 6 HOURS PRN
Qty: 1 EACH | Refills: 3 | Status: SHIPPED | OUTPATIENT
Start: 2022-11-14

## 2022-11-14 RX ORDER — BUPROPION HYDROCHLORIDE 150 MG/1
150 TABLET, EXTENDED RELEASE ORAL DAILY
Qty: 60 TABLET | Refills: 0 | Status: SHIPPED | OUTPATIENT
Start: 2022-11-14

## 2022-11-14 RX ORDER — SERTRALINE HYDROCHLORIDE 100 MG/1
TABLET, FILM COATED ORAL
Qty: 135 TABLET | Refills: 0 | Status: SHIPPED | OUTPATIENT
Start: 2022-11-14

## 2022-11-14 RX ORDER — METOPROLOL SUCCINATE 25 MG/1
TABLET, EXTENDED RELEASE ORAL
Qty: 90 TABLET | Refills: 3 | Status: SHIPPED | OUTPATIENT
Start: 2022-11-14

## 2022-11-14 RX ORDER — AZITHROMYCIN 500 MG/1
500 TABLET, FILM COATED ORAL DAILY
Qty: 3 TABLET | Refills: 0 | Status: SHIPPED | OUTPATIENT
Start: 2022-11-14 | End: 2022-11-17

## 2022-11-14 NOTE — PROGRESS NOTES
[facility]    Attending Physician Statement:  Bony Alvarez M.D., F.A.C.P. I have seen/discussed the case, including pertinent history and exam findings with the resident. I agree with the assessment, plan and orders as documented by the resident. Patient is seen for fu visit today. Last office notes reviewed, relative labs and imaging. Health maintenance issues of vaccinations, depression screening, tobacco cessation etc... covered  Recently more OTT, worse cough  Usually clear now yellowish  Stage 4 copd, will treat for copd exac/bronchitis  Inc wheezing, steroids +abx    Also worse tremores +RLS complained up  Inc requip, check ferritin    Depression, CBT per Michelle- phq9  17  BP today 138/83 mmHg    Taking Lisinopril and Metoprolol with good compliance  CAD/sp vavle, inr 3.1, AAA, unresctable polyps  30min    Remainder of medical problems as per resident note.

## 2022-11-14 NOTE — PATIENT INSTRUCTIONS
Internal medicine    Changes in healthcare   Please take all medications as indicated  Diet: regular diet   Activity: activity as tolerated  New Medications started during this hospital stay  Prednisone - please take 2 tablets once daily for 5 days   Azithromycin - please take one tablet daily for 3 days   Changes to your medications  Please reduce the dose of your Wellbutrin to 1 tablet once daily     Additional labs, testing or imaging needed after discharge   Ferritin   Iron and TIBC   CBC   CMP   Mg   Phos   Vit B1   Vit B12 and Folate     Please contact us if you have any concerns, wish to change or make an appointment:  Internal medicine clinic   Phone: [phone]  Fax: [phone]  [address]  Or please call the nurse line at [phone]. Should you have further questions in regards to this visit, you can review your clinical note and after visit summary document on your [employer] account. Other questions can be directed to our nurse line at [phone]. Other than any new prescriptions given to you today, the list of home medications on this After Visit Summary are based on information provided to us from you and your healthcare providers. This information, including the list, dose, and frequency of medications is only as accurate as the information you provided. If you have any questions or concerns about your home medications, please contact your Primary Care Physician for further clarification.

## 2022-11-14 NOTE — PROGRESS NOTES
[facility]  [facility]  Clinic note      History of presenting Illness      Patient ID:  Tania Zavaleta is a 57 y.o. female with PMH of HTN, AAA w/o rupture, s/p AVR on Coumadin, CAD, COPD gold stage IV, depression/anxiety, who presents to the clinic for 1 month f/u. HPI:     HTN  BP today 138/83 mmHg    Taking Lisinopril and Metoprolol with good compliance      COPD Gold stage IV   PFTs performed in 2017 showing FEV1 27% of predicted  Pt following with Dr. Litzy Jacobo, last seen on 10/10/2022 - PPSV23 administered at that time  Pt prescribed Albuterol PRN, Breztri, Roflumilast, Singulair, and Flonase, which she is taking with good compliance   Currently pt is endorsing some increased cough productive of yellow/green sputum, as well as some increased dyspnea. Denies sick contacts or subjective fever/chills. She finished a short course of steroids and antibiotics in October for similar symptoms, but they did not kari. Depression/Anxiety   Following with Brigid Luke   Taking Sertraline and Wellbutrin with good compliance   Denies SI/HI      Chronic cholecystitis s/p laparoscopic cholecystectomy   Pt states that she has been having symptoms of early satiety and post-prandial nausea since January 2022, when she was ill with COVID. This evolved to include epigastric and chest pain w/ food aversion. Following with General Surgery, s/p EGD which was largely unremarkable. Advised by Dr. Jinny Wong to take Imodium and/or fiber for recurrent symptoms of nausea/diarrhea    Unresectable polyps   On prior colonoscopy pt was found to have multiple colon polyps throughout colon. 2 unresectable polyps noted by Dr. Jinny Wong. Plan per Dr. Cheryle Boast note is to repeat colonoscopy in approx.  3 months under general anesthesia to attempt polyp removal.     Abdominal AAA - stable   Following with Vascular Surgery, Dr. Toussaint Minor    Aneurysm is stable, measuring approx 3.6cm  Plan for repeat US in 1 year     CAD s/p AVR w/ mechanical aortic valve - stable   Following with Cardiology - Dr. Marilu Delaney   Pt taking Coumadin - last INR 3.2 requiring adjustment     Acute complaints/concerns   Pt endorses vague neurological symptoms for approximately 1.5 months. Symptoms include resting tremor of B/L hands, now beginning to interfere with active tasks including writing/signing paperwork and opening jars. Also noted increased frequency of tripping and myalgia of the proximal BUE. Pt does not have difficulty standing from a seated position or reaching for overhead objects. She also denies any temporal pain or difficulty/discomfort with brushing her hair. These symptoms started around the same time as she initiated Wellbutrin for smoking cessation.       Medical History      Past Medical History:      Diagnosis Date    Aneurysm ([address]     THORACIC AORTIC    Anticoagulant long-term use     Anxiety     Ascending aortic aneurysm     Asthma     CAD (coronary artery disease)     Chronic back pain     Constipation 05/19/2021    COPD (chronic obstructive pulmonary disease) (HCC)     Depression     Diverticulosis     GERD (gastroesophageal reflux disease)     H/O mechanical aortic valve replacement 01/05/2017    Headache     Hyperlipidemia     Hypertension     On warfarin at home     for AVR    Restless legs syndrome     S/P AVR     Status post placement of implantable loop recorder     Syncope, cardiogenic 09/10/2020    Tobacco abuse     Urinary incontinence     Ventricular tachycardia, non-sustained     Warfarin-induced coagulopathy ([address] 12/11/2011       Past Surgical History:        Procedure Laterality Date    APPENDECTOMY      CARDIAC CATHETERIZATION  09/11/2020    Dr. Tom Olmedo  2009    Aortic valve 2009 [facility] N/A 9/9/2022    CHOLECYSTECTOMY LAPAROSCOPIC performed by Ashley Grover MD at [address] [place] [address] COLONOSCOPY N/A 11/18/2019    COLONOSCOPY POLYPECTOMY SNARE/COLD BIOPSY performed by Abhishek Elkins MD at [address] [facility] [address] N/A 08/10/2022    COLONOSCOPY POLYPECTOMY SNARE/COLD BIOPSY performed by Abhishek Elkins MD at [address]  08/10/2022    COLONOSCOPY SUBMUCOSAL/BOTOX INJECTION performed by Abhishek Elkins MD at [facility], DIAGNOSTIC      HYSTERECTOMY (CERVIX STATUS UNKNOWN)      INSERTABLE CARDIAC MONITOR  09/11/2020    Linq Insertion    Dr. Shadi Merlos    KEM STEROTACTIC LOC BREAST BIOPSY RIGHT Right 03/10/2021    KEM STEROTACTIC LOC BREAST BIOPSY RIGHT 3/10/2021 [facility]    TUBAL LIGATION      UPPER GASTROINTESTINAL ENDOSCOPY N/A 11/18/2019    EGD BIOPSY performed by Abhishek Elkins MD at [facility] N/A 08/10/2022    EGD BIOPSY performed by Abhishek Elkins MD at [address] [place] [address]       Medications Prior to Admission:    Prior to Admission medications    Medication Sig Start Date End Date Taking?  Authorizing Provider   sertraline (ZOLOFT) 100 MG tablet take 1 and 1/2 tablet by mouth once daily 11/14/22  Yes Sera Fabian DO   albuterol sulfate HFA (PROVENTIL HFA) 108 (90 Base) MCG/ACT inhaler Inhale 2 puffs into the lungs every 6 hours as needed for Wheezing 11/14/22  Yes Sera Fabian DO   metoprolol succinate (TOPROL XL) 25 MG extended release tablet take 1 tablet by mouth once daily 11/14/22  Yes Sera Fabian DO   buPROPion [facility]) 150 MG extended release tablet Take 1 tablet by mouth daily 11/14/22  Yes Sera Fabian DO   azithromycin (ZITHROMAX) 500 MG tablet Take 1 tablet by mouth daily for 3 days 11/14/22 11/17/22 Yes Sera Fabian DO   predniSONE (DELTASONE) 10 MG tablet Take 2 tablets by mouth daily for 5 days 11/14/22 11/19/22 Yes Sera Fabian DO   albuterol (PROVENTIL) (2.5 MG/3ML) 0.083% nebulizer solution Take 3 mLs by nebulization every 6 hours as needed Reported on 11/14/2022 10/10/22 11/21/22  Cornelio Downing MD   albuterol sulfate HFA (PROVENTIL;VENTOLIN;PROAIR) 108 (90 Base) MCG/ACT inhaler inhale 1 puff every 4 hours if needed for wheezing 9/16/22   Cornelio Downing MD   RA NATURAL MAGNESIUM 250 MG TABS tablet take 1 tablet by mouth daily 4/2/22   Historical Provider, MD       Allergies:  Keflex [cephalexin] and Phenergan [promethazine hcl]    Social History:   TOBACCO:   reports that she has been smoking cigarettes. She has a 50.00 pack-year smoking history. She has never used smokeless tobacco.  ETOH:   reports current alcohol use. Family History:       Problem Relation Age of Onset    Heart Disease Mother     Arthritis Mother     Asthma Mother     Diabetes Mother     High Blood Pressure Mother     High Cholesterol Mother     Stroke Mother     Heart Disease Father     Arthritis Father     Asthma Father     Diabetes Father     High Blood Pressure Father     High Cholesterol Father     Breast Cancer Sister     Other Brother         ELMO on CPap; Has ICD    Asthma Brother     COPD Brother     Arthritis Paternal Grandmother     Asthma Paternal Grandmother     Diabetes Maternal Aunt     Diabetes Paternal Aunt     High Blood Pressure Paternal Aunt         Review of Systems       Constitutional: (-) fever, (-) chills   Head: (-) headache, (-) light headedness and (-) dizziness. Eyes: (-) changes in vision. (-) double vision or blurry vision  Respiratory: (+) SOB and (+) cough productive of yellow sputum (+) wheeze   Cardiovascular: (-) chest pain and (-) palpitations. GI:  (-) nausea, (-) vomiting, (-) diarrhea, (-) constipation and (-) abdomen pain.  (-) melena or hematochezia  Urology: (-) pain with urination, (-) hematuria   Musculoskeletal: (+) muscle pain (+) muscle weakness (+) tremor      Physical Exam     Vitals: /83 (Site: Right Upper Arm, Position: Sitting, Cuff Size: Medium Adult)   Pulse 81   Temp 97.2 °F (36.2 °C) (Temporal)   Resp 20  5' 4.75\" (1.645 m)   Wt 142 lb 12.8 oz (64.8 kg)   LMP 11/08/2008 (LMP Unknown)   SpO2 99% Comment: 2L O2  BMI 23.95 kg/m²      Constitutional: Alert, conversational. Alois Peeks commands. In no apparent distress. Head: Normocephalic and atraumatic. Eyes: EOMI, conjunctiva normal, (-) scleral icterus. Mucus membranes moist.  Mouth: Mucus membranes moist. Oropharynx clear. No deviation of the tongue or uvula. Normal dentition. Neck: (-)  swelling, (-) JVD (-) masses (-) thyromegaly, trachea midline   Respiratory: Coarse breath sounds bilaterally (+) mild inspiratory wheezes, (-)  rales, (-)  rhonchi. (-) increased work of breathing or respiratory distress   Cardiovascular: RRR. (-)  murmurs, (-) gallops,  (-) rubs. S1 and S2 were normal.   GI:  Abdomen soft, non-tender, non-distended. (+) BS. (-) guarding, (-) rigidity. Extremities: Warm and well perfused. Skin warm dry and intact (-) clubbing, (-) cyanosis. (-) peripheral edema. Strength 5/5 in BUE/BLE   Neurologic:  Resting and active tremor of B/L hands. Light-touch sensation intact in BUE/BLE. No focal neurological deficits.  No speech slurring or tremor     Assessment/plan      HTN  Continue Lisinopril and Metoprolol as prescribed   Will continue to monitor      COPD Gold stage IV   Continue following with Pulmonology - Dr. Devora Lucero   Continue Albuterol PRN, Shaune Minder, Roflumilast, Singulair, and Flonase as prescribed   Flu shot administered today     Depression/Anxiety   Continue following with Mohan Clemons   Continue Sertraline and Wellbutrin as prescribed     Unresectable polyps   F/u with General Surgery - Dr. Raj Haddad for repeat colonoscopy in 3 months     Abdominal AAA - stable   Continue following Vascular Surgery - Dr. Dom Pinto    Repeat US in 1 year     CAD s/p AVR w/ mechanical aortic valve - stable   Continue following with Cardiology - Dr. Randolph Camarena   Continue Coumadin w/ regular INR checks     Mild COPD exacerbation   Will prescribe 3 days of Azithromycin 500mg daily and Prednisone 40mg x5 days   Will f/u with pt after completion of steroid course to assess for resolution of symptoms   Continue supplemental O2     Health care maintenance   Low dose CT - ordered by Dr. Kaylyn Gomez vaccine - pt provided with information and will have performed   Mammogram - ordered today   Flu vaccine - ordered     Ema Farmer DO  , PGY 3, IM

## 2022-11-15 ENCOUNTER — TELEPHONE (OUTPATIENT)
Dept: INTERNAL MEDICINE | Age: 62
End: 2022-11-15

## 2022-11-15 LAB — TOTAL CK: 49 U/L (ref 20–180)

## 2022-11-15 RX ORDER — PREDNISONE 20 MG/1
40 TABLET ORAL DAILY
Qty: 10 TABLET | Refills: 0 | Status: SHIPPED | OUTPATIENT
Start: 2022-11-15 | End: 2022-11-20

## 2022-11-15 NOTE — TELEPHONE ENCOUNTER
Patient notified of the prednisone change per Dr Antolin Carlson and stated understanding. Pt is to pick prednisone 20 mg ,2 tabs  once daily x 5 day.

## 2022-11-17 ENCOUNTER — TELEPHONE (OUTPATIENT)
Dept: INTERNAL MEDICINE | Age: 62
End: 2022-11-17

## 2022-11-17 DIAGNOSIS — E83.42 HYPOMAGNESEMIA: Primary | ICD-10-CM

## 2022-11-17 LAB
ORGANISM: ABNORMAL
ORGANISM: ABNORMAL
URINE CULTURE, ROUTINE: ABNORMAL
URINE CULTURE, ROUTINE: ABNORMAL

## 2022-11-17 RX ORDER — FAMOTIDINE 20 MG/1
20 TABLET, FILM COATED ORAL 2 TIMES DAILY
Qty: 60 TABLET | Refills: 2 | Status: SHIPPED | OUTPATIENT
Start: 2022-11-17
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
## 2022-11-27 DIAGNOSIS — F32.A DEPRESSION, UNSPECIFIED DEPRESSION TYPE: ICD-10-CM

## 2022-11-28 ENCOUNTER — HOSPITAL ENCOUNTER (OUTPATIENT)
Age: 62
Discharge: HOME | End: 2022-11-28
Payer: COMMERCIAL

## 2022-11-28 DIAGNOSIS — E83.42 HYPOMAGNESEMIA: ICD-10-CM

## 2022-11-28 DIAGNOSIS — K21.9 GASTROESOPHAGEAL REFLUX DISEASE WITHOUT ESOPHAGITIS: ICD-10-CM

## 2022-11-28 LAB
ANION GAP SERPL CALCULATED.3IONS-SCNC: 5 MMOL/L (ref 7–16)
BUN BLDV-MCNC: 10 MG/DL (ref 6–23)
CALCIUM SERPL-MCNC: 9.3 MG/DL (ref 8.6–10.2)
CHLORIDE BLD-SCNC: 102 MMOL/L (ref 98–107)
CO2: 32 MMOL/L (ref 22–29)
CREAT SERPL-MCNC: 0.7 MG/DL (ref 0.5–1)
GFR SERPL CREATININE-BSD FRML MDRD: >60 ML/MIN/1.73
GLUCOSE BLD-MCNC: 97 MG/DL (ref 74–99)
POTASSIUM SERPL-SCNC: 3.7 MMOL/L (ref 3.5–5)
SODIUM BLD-SCNC: 139 MMOL/L (ref 132–146)

## 2022-11-28 PROCEDURE — 36415 COLL VENOUS BLD VENIPUNCTURE: CPT

## 2022-11-28 PROCEDURE — 80048 BASIC METABOLIC PNL TOTAL CA: CPT

## 2022-11-28 RX ORDER — SERTRALINE HYDROCHLORIDE 100 MG/1
TABLET, FILM COATED ORAL
Qty: 135 TABLET | Refills: 3 | OUTPATIENT
Start: 2022-11-28

## 2022-11-29 RX ORDER — OMEPRAZOLE 40 MG/1
CAPSULE, DELAYED RELEASE ORAL
Qty: 180 CAPSULE | Refills: 3 | OUTPATIENT
Start: 2022-11-29

## 2022-12-09 ENCOUNTER — HOSPITAL ENCOUNTER (OUTPATIENT)
Dept: GENERAL RADIOLOGY | Age: 62
Discharge: HOME | End: 2022-12-09
Payer: COMMERCIAL

## 2022-12-09 VITALS — BODY MASS INDEX: 23.99 KG/M2 | HEIGHT: 65 IN | WEIGHT: 144 LBS

## 2022-12-09 DIAGNOSIS — Z12.31 ENCOUNTER FOR SCREENING MAMMOGRAM FOR MALIGNANT NEOPLASM OF BREAST: ICD-10-CM

## 2022-12-09 PROCEDURE — 77067 SCR MAMMO BI INCL CAD: CPT

## 2022-12-12 ENCOUNTER — HOSPITAL ENCOUNTER (OUTPATIENT)
Age: 62
Discharge: HOME | End: 2022-12-12
Payer: COMMERCIAL

## 2022-12-12 LAB
ANION GAP SERPL CALCULATED.3IONS-SCNC: 12 MMOL/L (ref 7–16)
BUN BLDV-MCNC: 9 MG/DL (ref 6–23)
CALCIUM SERPL-MCNC: 9.6 MG/DL (ref 8.6–10.2)
CHLORIDE BLD-SCNC: 103 MMOL/L (ref 98–107)
CO2: 27 MMOL/L (ref 22–29)
CREAT SERPL-MCNC: 0.8 MG/DL (ref 0.5–1)
GFR SERPL CREATININE-BSD FRML MDRD: >60 ML/MIN/1.73
GLUCOSE BLD-MCNC: 129 MG/DL (ref 74–99)
POTASSIUM SERPL-SCNC: 3.8 MMOL/L (ref 3.5–5)
SODIUM BLD-SCNC: 142 MMOL/L (ref 132–146)

## 2022-12-12 PROCEDURE — 36415 COLL VENOUS BLD VENIPUNCTURE: CPT

## 2022-12-12 PROCEDURE — 80048 BASIC METABOLIC PNL TOTAL CA: CPT

## 2022-12-13 ENCOUNTER — OFFICE VISIT (OUTPATIENT)
Dept: INTERNAL MEDICINE | Age: 62
End: 2022-12-13
Payer: COMMERCIAL

## 2022-12-13 VITALS
DIASTOLIC BLOOD PRESSURE: 70 MMHG | HEIGHT: 65 IN | BODY MASS INDEX: 24.16 KG/M2 | SYSTOLIC BLOOD PRESSURE: 139 MMHG | WEIGHT: 145 LBS | RESPIRATION RATE: 18 BRPM | HEART RATE: 82 BPM | OXYGEN SATURATION: 99 % | TEMPERATURE: 97.8 F

## 2022-12-13 DIAGNOSIS — J30.2 SEASONAL ALLERGIES: ICD-10-CM

## 2022-12-13 DIAGNOSIS — F17.200 SMOKING: ICD-10-CM

## 2022-12-13 DIAGNOSIS — E78.5 HYPERLIPIDEMIA, UNSPECIFIED HYPERLIPIDEMIA TYPE: ICD-10-CM

## 2022-12-13 DIAGNOSIS — J44.9 CHRONIC OBSTRUCTIVE PULMONARY DISEASE, UNSPECIFIED COPD TYPE (HCC): ICD-10-CM

## 2022-12-13 DIAGNOSIS — E83.42 HYPOMAGNESEMIA: Primary | ICD-10-CM

## 2022-12-13 DIAGNOSIS — G47.62 NOCTURNAL LEG CRAMPS: ICD-10-CM

## 2022-12-13 DIAGNOSIS — D50.8 IRON DEFICIENCY ANEMIA SECONDARY TO INADEQUATE DIETARY IRON INTAKE: ICD-10-CM

## 2022-12-13 DIAGNOSIS — I10 ESSENTIAL HYPERTENSION: ICD-10-CM

## 2022-12-13 PROCEDURE — 3017F COLORECTAL CA SCREEN DOC REV: CPT | Performed by: INTERNAL MEDICINE

## 2022-12-13 PROCEDURE — 3074F SYST BP LT 130 MM HG: CPT | Performed by: INTERNAL MEDICINE

## 2022-12-13 PROCEDURE — 3023F SPIROM DOC REV: CPT | Performed by: INTERNAL MEDICINE

## 2022-12-13 PROCEDURE — 4004F PT TOBACCO SCREEN RCVD TLK: CPT | Performed by: INTERNAL MEDICINE

## 2022-12-13 PROCEDURE — G8482 FLU IMMUNIZE ORDER/ADMIN: HCPCS | Performed by: INTERNAL MEDICINE

## 2022-12-13 PROCEDURE — 99212 OFFICE O/P EST SF 10 MIN: CPT | Performed by: INTERNAL MEDICINE

## 2022-12-13 PROCEDURE — 3078F DIAST BP <80 MM HG: CPT | Performed by: INTERNAL MEDICINE

## 2022-12-13 PROCEDURE — G8420 CALC BMI NORM PARAMETERS: HCPCS | Performed by: INTERNAL MEDICINE

## 2022-12-13 PROCEDURE — G8427 DOCREV CUR MEDS BY ELIG CLIN: HCPCS | Performed by: INTERNAL MEDICINE

## 2022-12-13 PROCEDURE — 99214 OFFICE O/P EST MOD 30 MIN: CPT | Performed by: INTERNAL MEDICINE

## 2022-12-13 RX ORDER — FLUTICASONE PROPIONATE 50 MCG
2 SPRAY, SUSPENSION (ML) NASAL DAILY
Qty: 1 EACH | Refills: 1 | Status: SHIPPED | OUTPATIENT
Start: 2022-12-13

## 2022-12-13 RX ORDER — ASPIRIN 81 MG/1
81 TABLET ORAL DAILY
Qty: 90 TABLET | Refills: 1 | Status: SHIPPED | OUTPATIENT
Start: 2022-12-13

## 2022-12-13 RX ORDER — MULTIVITAMIN/IRON/FOLIC ACID 18MG-0.4MG
250 TABLET ORAL DAILY
Qty: 90 TABLET | Refills: 0 | Status: CANCELLED | OUTPATIENT
Start: 2022-12-13

## 2022-12-13 RX ORDER — AZELASTINE HYDROCHLORIDE 137 UG/1
2 SPRAY, METERED NASAL DAILY
Qty: 30 ML | Refills: 1 | Status: SHIPPED | OUTPATIENT
Start: 2022-12-13

## 2022-12-13 RX ORDER — LISINOPRIL 40 MG/1
TABLET ORAL
Qty: 90 TABLET | Refills: 1 | Status: SHIPPED | OUTPATIENT
Start: 2022-12-13

## 2022-12-13 RX ORDER — FERROUS SULFATE 325(65) MG
325 TABLET ORAL
Qty: 90 TABLET | Refills: 1 | Status: SHIPPED | OUTPATIENT
Start: 2022-12-13

## 2022-12-13 RX ORDER — MONTELUKAST SODIUM 10 MG/1
TABLET ORAL
Qty: 90 TABLET | Refills: 1 | Status: SHIPPED | OUTPATIENT
Start: 2022-12-13

## 2022-12-13 RX ORDER — ATORVASTATIN CALCIUM 20 MG/1
20 TABLET, FILM COATED ORAL DAILY
Qty: 90 TABLET | Refills: 1 | Status: SHIPPED | OUTPATIENT
Start: 2022-12-13

## 2022-12-13 RX ORDER — BUPROPION HYDROCHLORIDE 100 MG/1
100 TABLET, EXTENDED RELEASE ORAL DAILY
Qty: 30 TABLET | Refills: 2 | Status: SHIPPED | OUTPATIENT
Start: 2022-12-13

## 2022-12-13 RX ORDER — MULTIVITAMIN/IRON/FOLIC ACID 18MG-0.4MG
250 TABLET ORAL DAILY
Qty: 90 TABLET | Refills: 0 | Status: SHIPPED | OUTPATIENT
Start: 2022-12-13

## 2022-12-13 RX ORDER — FAMOTIDINE 40 MG/1
40 TABLET, FILM COATED ORAL DAILY
Qty: 90 TABLET | Refills: 1 | Status: SHIPPED | OUTPATIENT
Start: 2022-12-13

## 2022-12-13 NOTE — PROGRESS NOTES
[facility]  Internal Medicine Residency Program  Clinic note      History of presenting Illness      Patient ID:  Galina Ferro is a 57 y.o. female with PMH of HTN, AAA w/o rupture, s/p AVR on Coumadin, CAD, COPD gold stage IV, depression/anxiety, who presents to the clinic for 1 month f/u. HPI:    Acute concerns:  Continues to have tremors of hands. However, it is better since dereased Wellbutrin to 150 mg daily. At rest denies any tremor. Ports when she is eating/holding a spoon she has to use 2 hands to hold the spoon. Will decrease Wellbutrin to 100 mg daily. She reports depression systems are well under control. Patient's lab work also reviewed. Magnesium level 1.5. Patient's pantoprazole has already been switched to famotidine. Continue. Also start magnesium supplementation. Would ideally recommend mag citrate as it has better absorption. Discussed with patient. Also increase magnesium intake in diet. Discussed foods that have good magnesium source with patient. Patient also has iron deficiency anemia. Ferritin low. Iron saturation 15%. We will start patient on supplementation with p.o. ferrous sulfate 325 daily with meal.  If patient has constipation with iron, recommend switching to every other day with meal.  Patient agreeable. HTN  BP today 139/70mmHg    Taking Lisinopril and Metoprolol with good compliance      COPD Gold stage IV   PFTs performed in 2017 showing FEV1 27% of predicted  Pt following with Dr. Darrel Christiansen, last seen on 10/10/2022 - PPSV23 administered at that time  Pt prescribed Albuterol PRN, Breztri, Roflumilast, Singulair, and Flonase, which she is taking with good compliance   Symptoms of mild COPD resolved after prednisone course last visit. Depression/Anxiety   Following with Gema Toney   Taking Sertraline and Wellbutrin with good compliance. Decrease Wellbutrin to 100 mg daily, as symptoms of tremors of hands.    Denies SI/HI Chronic cholecystitis s/p laparoscopic cholecystectomy   Pt states that she has been having symptoms of early satiety and post-prandial nausea since January 2022, when she was ill with COVID. This evolved to include epigastric and chest pain w/ food aversion. Following with General Surgery, s/p EGD which was largely unremarkable. Advised by Dr. Ryan Powers to take Imodium and/or fiber for recurrent symptoms of nausea/diarrhea    Unresectable polyps   On prior colonoscopy pt was found to have multiple colon polyps throughout colon. 2 unresectable polyps noted by Dr. Ryan Powers. Plan per Dr. Juan Cardenas note is to repeat colonoscopy in approx.  February 2023 under general and she had to attend polyp removal.    Abdominal AAA - stable   Following with Vascular Surgery, Dr. Malissa Zuniga    Aneurysm is stable, measuring approx 3.6cm  Plan for repeat US in 1 year     CAD s/p AVR w/ mechanical aortic valve - stable   Following with Cardiology - Dr. Nhung Limon   Pt taking Coumadin - last INR 3.2 requiring adjustment        Medical History      Past Medical History:      Diagnosis Date    Aneurysm ([address]     THORACIC AORTIC    Anticoagulant long-term use     Anxiety     Ascending aortic aneurysm     Asthma     CAD (coronary artery disease)     Chronic back pain     Constipation 05/19/2021    COPD (chronic obstructive pulmonary disease) (HCC)     Depression     Diverticulosis     GERD (gastroesophageal reflux disease)     H/O mechanical aortic valve replacement 01/05/2017    Headache     Hyperlipidemia     Hypertension     On warfarin at home     for AVR    Restless legs syndrome     S/P AVR     Status post placement of implantable loop recorder     Syncope, cardiogenic 09/10/2020    Tobacco abuse     Urinary incontinence     Ventricular tachycardia, non-sustained     Warfarin-induced coagulopathy ([address] 12/11/2011       Past Surgical History:        Procedure Laterality Date    APPENDECTOMY      CARDIAC CATHETERIZATION 09/11/2020    Dr. Sarabjit Hauser  2009    Aortic valve 2009 [facility] [address] [place], LAPAROSCOPIC N/A 9/9/2022    CHOLECYSTECTOMY LAPAROSCOPIC performed by Elie Baltazar MD at [address] N/A 11/18/2019    COLONOSCOPY POLYPECTOMY SNARE/COLD BIOPSY performed by Elie Baltazar MD at [facility] [address] N/A 08/10/2022    COLONOSCOPY POLYPECTOMY SNARE/COLD BIOPSY performed by Elie Baltazar MD at [facility]  08/10/2022    COLONOSCOPY SUBMUCOSAL/BOTOX INJECTION performed by Elie Baltazar MD at [address] [place] [address], COLON, DIAGNOSTIC      HYSTERECTOMY (CERVIX STATUS UNKNOWN)      INSERTABLE CARDIAC MONITOR  09/11/2020    Linq Insertion    Dr. Indira Mcdonnell    KEM STEROTACTIC LOC BREAST BIOPSY RIGHT Right 03/10/2021    KEM STEROTACTIC LOC BREAST BIOPSY RIGHT 3/10/2021 [facility]    TUBAL LIGATION      UPPER GASTROINTESTINAL ENDOSCOPY N/A 11/18/2019    EGD BIOPSY performed by Elie Baltazar MD at [address] [place] [address] N/A 08/10/2022    EGD BIOPSY performed by Elie Baltazar MD at [address] [place] [address]       Medications Prior to Admission:    Prior to Admission medications    Medication Sig Start Date End Date Taking?  Authorizing Provider   montelukast (SINGULAIR) 10 MG tablet take 1 tablet by mouth at bedtime 12/13/22  Yes Agustín Mcnally, DO   lisinopril (PRINIVIL;ZESTRIL) 40 MG tablet TAKE 1 TABLET BY MOUTH ONCE DAILY 12/13/22  Yes Agustín Mcnally, DO   buPROPion [facility] SR) 100 MG extended release tablet Take 1 tablet by mouth daily 12/13/22  Yes Agustín Mcnally, DO   aspirin EC 81 MG EC tablet Take 1 tablet by mouth daily 12/13/22  Yes Agustín Mcnally, DO   Azelastine HCl 137 MCG/SPRAY SOLN 2 sprays by Nasal route daily 12/13/22  Yes Agustín Mcnally, DO   fluticasone [facility]) 50 MCG/ACT nasal spray 2 sprays by Nasal route daily 12/13/22  Yes Agustín Mcnally, DO   atorvastatin (LIPITOR) 20 MG tablet Take 1 tablet by mouth daily 12/13/22  Yes Coreyine Lorenza, DO   famotidine (PEPCID) 40 MG tablet Take 1 tablet by mouth daily 12/13/22  Yes Coreyine Lorenza, DO   Magnesium Oxide (MAGNESIUM-OXIDE) 250 MG TABS tablet Take 1 tablet by mouth daily 12/13/22  Yes Ofelia Britt, DO   ferrous sulfate (IRON 325) 325 (65 Fe) MG tablet Take 1 tablet by mouth daily (with breakfast) Or lunch 12/13/22  Yes Ofelia Britt, DO   sertraline (ZOLOFT) 100 MG tablet take 1 and 1/2 tablet by mouth once daily 11/14/22  Yes Sera Fabian DO   albuterol sulfate HFA (PROVENTIL HFA) 108 (90 Base) MCG/ACT inhaler Inhale 2 puffs into the lungs every 6 hours as needed for Wheezing 11/14/22  Yes Sera Fabian DO   metoprolol succinate (TOPROL XL) 25 MG extended release tablet take 1 tablet by mouth once daily 11/14/22  Yes Sera Fabian DO   albuterol (PROVENTIL) (2.5 MG/3ML) 0.083% nebulizer solution Take 3 mLs by nebulization every 6 hours as needed for Wheezing or Shortness of Breath 10/28/22  Yes Cornelio Frank MD   albuterol sulfate HFA (PROVENTIL;VENTOLIN;PROAIR) 108 (90 Base) MCG/ACT inhaler inhale 1 puff every 4 hours if needed for wheezing 9/16/22  Yes Cornelio Frank MD   rOPINIRole (REQUIP) 0.25 MG tablet TAKE 2 TABLETS BY MOUTH IN  THE MORNING AND 2 TABLETS  BY MOUTH BEFORE BEDTIME AS  DIRECTED 9/6/22  Yes Rebekah Coello MD   Roflumilast (DALIRESP) 250 MCG tablet TAKE 1 TABLET BY MOUTH  DAILY 8/17/22  Yes Rosalba Horowitz,    Cholecalciferol (VITAMIN D3) 125 MCG (5000 UT) CAPS Take 1 capsule by mouth in the morning and at bedtime   Yes Historical Provider, MD   warfarin (COUMADIN) 5 MG tablet Take 1 tablet by mouth daily except for Monday and Fridays take 1 1/2 (7.5mg) 5/6/22  Yes Justyn Rao MD   RA NATURAL MAGNESIUM 250 MG TABS tablet take 1 tablet by mouth daily 4/2/22  Yes Historical Provider, MD MACIAS AEROSPHERE 160-9-4.8 MCG/ACT AERO inhale 2 puffs INTO THE LUNGS twice a day 3/7/22 Yes Luly Ludwig MD   Inulin (FIBER CHOICE PO) Take 2 capsules by mouth 2 times daily   Yes Historical Provider, MD   Blood Pressure Monitoring (BLOOD PRESSURE CUFF) MISC Dx:  Hypertension with labile blood pressure 9/20/19  Yes Rodríguez Burr MD   nicotine (NICODERM CQ) 21 MG/24HR Place 1 patch onto the skin daily  Patient not taking: Reported on 11/14/2022 10/10/22 11/21/22  Cornelio Ragland MD       Allergies:  Keflex [cephalexin] and Phenergan [promethazine hcl]    Social History:   TOBACCO:   reports that she has been smoking cigarettes. She has a 50.00 pack-year smoking history. She has never used smokeless tobacco.  ETOH:   reports current alcohol use. Family History:       Problem Relation Age of Onset    Heart Disease Mother     Arthritis Mother     Asthma Mother     Diabetes Mother     High Blood Pressure Mother     High Cholesterol Mother     Stroke Mother     Heart Disease Father     Arthritis Father     Asthma Father     Diabetes Father     High Blood Pressure Father     High Cholesterol Father     Breast Cancer Sister     Other Brother         ELMO on CPap; Has ICD    Asthma Brother     COPD Brother     Arthritis Paternal Grandmother     Asthma Paternal Grandmother     Diabetes Maternal Aunt     Diabetes Paternal Aunt     High Blood Pressure Paternal Aunt         Review of Systems       Constitutional: (-) fever, (-) chills   Head: (-) headache, (-) light headedness and (-) dizziness. Eyes: (-) changes in vision. (-) double vision or blurry vision  Respiratory: (+) SOB and (+) cough productive of yellow sputum (+) wheeze   Cardiovascular: (-) chest pain and (-) palpitations. GI:  (-) nausea, (-) vomiting, (-) diarrhea, (-) constipation and (-) abdomen pain.  (-) melena or hematochezia  Urology: (-) pain with urination, (-) hematuria   Musculoskeletal: (+) muscle pain (+) muscle weakness (+) tremor      Physical Exam     Vitals: /70 (Site: Left Upper Arm, Position: Sitting, Cuff Size: Medium Adult)   Pulse 82   Temp 97.8 °F (36.6 °C) (Temporal)   Resp 18   Ht 5' 5\" (1.651 m)   Wt 145 lb (65.8 kg)   LMP 11/08/2008 (LMP Unknown)   SpO2 99% Comment: 3 L O2  BMI 24.13 kg/m²      Constitutional: Alert, conversational. Shyann Robless commands. In no apparent distress. Head: Normocephalic and atraumatic. Eyes: EOMI, conjunctiva normal, (-) scleral icterus. Mucus membranes moist.  Mouth: Mucus membranes moist. Oropharynx clear. No deviation of the tongue or uvula. Normal dentition. Neck: (-)  swelling, (-) JVD (-) masses (-) thyromegaly, trachea midline   Respiratory: On oxygen supplementation. (-) mild inspiratory wheezes, (-)  rales, (-)  rhonchi. (-) increased work of breathing or respiratory distress   Cardiovascular:  RRR. (-)  murmurs, (-) gallops,  (-) rubs. S1 and S2 were normal.   GI:  Abdomen soft, non-tender, non-distended. (+) BS. (-) guarding, (-) rigidity. Extremities: Warm and well perfused. Skin warm dry and intact (-) clubbing, (-) cyanosis. (-) peripheral edema. Strength 5/5 in BUE/BLE   Neurologic:  Resting and active tremor of B/L hands. Light-touch sensation intact in BUE/BLE. No focal neurological deficits. No speech slurring or tremor     Assessment/plan      1. Chronic obstructive pulmonary disease, unspecified COPD type ([address]  Gold stage IV  Continue following with Pulmonology - Dr. Ashu Baltazar   Continue Albuterol PRN, [place], Roflumilast, Singulair, and Flonase as prescribed   - Montelukast (SINGULAIR) 10 MG tablet; take 1 tablet by mouth at bedtime  Dispense: 90 tablet; Refill: 1  - Azelastine HCl 137 MCG/SPRAY SOLN; 2 sprays by Nasal route daily  Dispense: 30 mL; Refill: 1    2. Essential hypertension, well controlled  - lisinopril (PRINIVIL;ZESTRIL) 40 MG tablet; TAKE 1 TABLET BY MOUTH ONCE DAILY  Dispense: 90 tablet; Refill: 1  - aspirin EC 81 MG EC tablet; Take 1 tablet by mouth daily  Dispense: 90 tablet; Refill: 1  - Basic Metabolic Panel; Future    3. Smoking  - buPROPion (WELLBUTRIN SR) 100 MG extended release tablet; Take 1 tablet by mouth daily  Dispense: 30 tablet; Refill: 2    4. Seasonal allergies  - fluticasone (FLONASE) 50 MCG/ACT nasal spray; 2 sprays by Nasal route daily  Dispense: 1 each; Refill: 1    5. Nocturnal leg cramps  - Magnesium Oxide (MAGNESIUM-OXIDE) 250 MG TABS tablet; Take 1 tablet by mouth daily  Dispense: 90 tablet; Refill: 0    6. Hyperlipidemia, unspecified hyperlipidemia type  - atorvastatin (LIPITOR) 20 MG tablet; Take 1 tablet by mouth daily  Dispense: 90 tablet; Refill: 1    7. Hypomagnesemia  Magnesium supplementation.  - Magnesium; Future  - Basic Metabolic Panel; Future    8. Iron deficiency anemia secondary to inadequate dietary iron intake  -Start p.o. iron supplementation.  - Iron and TIBC; Future  - Basic Metabolic Panel; Future  - CBC with Auto Differential; Future     9. Depression/Anxiety   Continue following with Rula Langston   Continue Sertraline and Wellbutrin as prescribed   Decreased Wellbutrin. Of note patient's Wellbutrin was initially started to aid with smoking cessation. In the future can also discuss stopping Wellbutrin as patient is no longer a smoker and has depression symptoms well under control. 10. Abdominal AAA - stable   Continue following Vascular Surgery - Dr. Mack Gave    Repeat US in 1 year     11. CAD s/p AVR w/ mechanical aortic valve - stable   Continue following with Cardiology - Dr. Mandy Welch   Continue Coumadin w/ regular INR checks     Health care maintenance   Low dose CT - ordered by Dr. Katerina Dwyer 2 doses  Mammogram 12/9/2022 No mammographic evidence of malignancy. Flu vaccine - 11/2022     I have discussed my findings and recommendeds with Galileo Reyes and Dr. Fidencio Bell.

## 2022-12-13 NOTE — PROGRESS NOTES
[facility]  Internal Medicine Residency Clinic    Attending Physician Statement  I have discussed the case, including pertinent history and exam findings with the resident physician. I agree with the assessment, plan and orders as documented by the resident. I have reviewed all pertinent PMHx, PSHx, FamHx, SocialHx, medications, and allergies and updated history as appropriate. Patient presents for routine follow up of medical problems. 4 week follow up. Tremors with movement, decreased welbutrin and pt reported improvement and still has tremors, recommended to decrease to 100. Mag 1.5 and recommended to continue Mag Oxide. Fe deficiency with Ferritin < 50 and need Fe supplement for RLS. FEV1 27, was on prednisone and azithromycin, still has SOB on exertion, recommended pul rehab but pt declined. Tubular adenoma with SP resection, needs 6 months follow up. Follow up 3 months with lab results. Total time spent 35 minutes in this visit. Remainder of medical problems as per resident note.     Navdeep Bella MD  12/13/2022 10:53 AM

## 2022-12-13 NOTE — PATIENT INSTRUCTIONS
It was a pleasure seeing you today. Continue all medications as prescribed. Decrease Wellbutrin to 100 mg daily. Continue Magnesium supplement. Recommend Magnesium glycinate or Magnesium citrate for better absorption. Magnesium oxide is okay. Continue Famotidine 40 mg daily in AM.   Start Iron supplements with 325 mg daily with breakfast OR lunch. This may cause constipation. If it does, decrease to every other day. Recommend Magnesium rich foods such as dried beans and legumes (such as soybeans, baked beans, lentils, and peanuts) and nuts (such as almonds and cashews). Repeat Blood work in 3 months (Magnesium level, BMP, CBC, iron and TIBC.      Have a great day!     -Dr. Terry Mcdonald

## 2022-12-27 RX ORDER — FAMOTIDINE 20 MG/1
TABLET, FILM COATED ORAL
COMMUNITY
Start: 2022-12-17 | End: 2022-12-27

## 2022-12-30 ENCOUNTER — HOSPITAL ENCOUNTER (OUTPATIENT)
Dept: GENERAL RADIOLOGY | Age: 62
End: 2022-12-30
Payer: COMMERCIAL

## 2022-12-30 ENCOUNTER — OFFICE VISIT (OUTPATIENT)
Dept: INTERNAL MEDICINE | Age: 62
End: 2022-12-30
Payer: COMMERCIAL

## 2022-12-30 ENCOUNTER — HOSPITAL ENCOUNTER (OUTPATIENT)
Age: 62
End: 2022-12-30
Payer: COMMERCIAL

## 2022-12-30 VITALS
RESPIRATION RATE: 18 BRPM | BODY MASS INDEX: 23.82 KG/M2 | HEART RATE: 76 BPM | WEIGHT: 143 LBS | TEMPERATURE: 96.6 F | HEIGHT: 65 IN | SYSTOLIC BLOOD PRESSURE: 125 MMHG | OXYGEN SATURATION: 95 % | DIASTOLIC BLOOD PRESSURE: 72 MMHG

## 2022-12-30 DIAGNOSIS — G54.8 INTERCOSTAL NEUROPATHIC PAIN: Primary | ICD-10-CM

## 2022-12-30 DIAGNOSIS — R91.1 PULMONARY NODULE: Chronic | ICD-10-CM

## 2022-12-30 DIAGNOSIS — Z72.0 TOBACCO ABUSE: ICD-10-CM

## 2022-12-30 DIAGNOSIS — F32.A DEPRESSION, UNSPECIFIED DEPRESSION TYPE: ICD-10-CM

## 2022-12-30 DIAGNOSIS — I71.43 INFRARENAL ABDOMINAL AORTIC ANEURYSM (AAA) WITHOUT RUPTURE: ICD-10-CM

## 2022-12-30 DIAGNOSIS — I71.21 ANEURYSM OF ASCENDING AORTA WITHOUT RUPTURE: ICD-10-CM

## 2022-12-30 DIAGNOSIS — G54.8 INTERCOSTAL NEUROPATHIC PAIN: ICD-10-CM

## 2022-12-30 DIAGNOSIS — I10 PRIMARY HYPERTENSION: ICD-10-CM

## 2022-12-30 DIAGNOSIS — Z95.2 H/O AORTIC VALVE REPLACEMENT: ICD-10-CM

## 2022-12-30 DIAGNOSIS — R07.89 CHEST WALL PAIN: ICD-10-CM

## 2022-12-30 PROBLEM — I50.32 CHRONIC DIASTOLIC (CONGESTIVE) HEART FAILURE (HCC): Status: ACTIVE | Noted: 2022-12-30

## 2022-12-30 LAB
INTERNATIONAL NORMALIZATION RATIO, POC: 3.4
PROTHROMBIN TIME, POC: 40.5

## 2022-12-30 PROCEDURE — 4004F PT TOBACCO SCREEN RCVD TLK: CPT | Performed by: INTERNAL MEDICINE

## 2022-12-30 PROCEDURE — 3017F COLORECTAL CA SCREEN DOC REV: CPT | Performed by: INTERNAL MEDICINE

## 2022-12-30 PROCEDURE — 72074 X-RAY EXAM THORAC SPINE4/>VW: CPT

## 2022-12-30 PROCEDURE — G8482 FLU IMMUNIZE ORDER/ADMIN: HCPCS | Performed by: INTERNAL MEDICINE

## 2022-12-30 PROCEDURE — 72050 X-RAY EXAM NECK SPINE 4/5VWS: CPT

## 2022-12-30 PROCEDURE — 99212 OFFICE O/P EST SF 10 MIN: CPT | Performed by: INTERNAL MEDICINE

## 2022-12-30 PROCEDURE — 99214 OFFICE O/P EST MOD 30 MIN: CPT | Performed by: INTERNAL MEDICINE

## 2022-12-30 PROCEDURE — 3074F SYST BP LT 130 MM HG: CPT | Performed by: INTERNAL MEDICINE

## 2022-12-30 PROCEDURE — G8427 DOCREV CUR MEDS BY ELIG CLIN: HCPCS | Performed by: INTERNAL MEDICINE

## 2022-12-30 PROCEDURE — 3078F DIAST BP <80 MM HG: CPT | Performed by: INTERNAL MEDICINE

## 2022-12-30 PROCEDURE — G8420 CALC BMI NORM PARAMETERS: HCPCS | Performed by: INTERNAL MEDICINE

## 2022-12-30 PROCEDURE — 93010 ELECTROCARDIOGRAM REPORT: CPT | Performed by: INTERNAL MEDICINE

## 2022-12-30 PROCEDURE — 93005 ELECTROCARDIOGRAM TRACING: CPT

## 2022-12-30 PROCEDURE — 85610 PROTHROMBIN TIME: CPT | Performed by: INTERNAL MEDICINE

## 2022-12-30 RX ORDER — SERTRALINE HYDROCHLORIDE 100 MG/1
TABLET, FILM COATED ORAL
Qty: 135 TABLET | Refills: 1 | Status: SHIPPED | OUTPATIENT
Start: 2022-12-30

## 2022-12-30 ASSESSMENT — ENCOUNTER SYMPTOMS
ABDOMINAL PAIN: 0
RHINORRHEA: 0
CONSTIPATION: 0
COLOR CHANGE: 0
DIARRHEA: 0
BACK PAIN: 0
SHORTNESS OF BREATH: 0
COUGH: 0
VOMITING: 0

## 2022-12-30 NOTE — PATIENT INSTRUCTIONS
[facility] Internal Medicine Resident Service    Activity as tolerated  Diet: low fat and low sodium  Be compliant with your medications and take them as prescribed. Special Instructions:     Continue to take rest of medications as prescribed. I have ordered cervical and thoracic x-ray. Please get them done as soon as possible. I have also ordered ultrasound of your bilateral axillar. Please await schedule for you appt. POCT INR was done at today's visit which was 3.4. We have made adjustments to  your warfarin dose. Please start taking 5 mg daily including mondays until INR check in 2 weeks. No labs were ordered this clinic visit. Continue to exercise to maintain good health       Hospital Follow up: Other Follow-Ups:    Future Appointments   Date Time Provider Esmer Lee   2/6/2023  8:00 AM SCHEDULE, [facility] PALLIATIVE CARE PROVIDER [facility]   3/27/2023 10:00 AM Laura Toribio DO [facility]   10/4/2023  8:15 AM [facility] VAS [address]   10/4/2023  9:00 AM Varun Morrow MD [facility]       If a referral was placed on your behalf during your visit, you should expect to receive information about the date and time of your referral appointment within 7-10 days. If not, please call the office at [phone] and ask to speak to the . Should you have further questions in regards to this visit, you can review your clinical note and after visit summary document on your My [facility] account. Other questions can be directed to our nurse line at [phone]. Discharge instruction reviewed with Patient. Patient verbalizes understanding. Script given.       Thiago Velasco MD PGY-2  12/30/2022  11:59 AM

## 2022-12-30 NOTE — ASSESSMENT & PLAN NOTE
· Stable 4.4 cm aneurysmal dilatation of the ascending aorta  · Discussed correlation of AAA rupture and smoking cessation

## 2022-12-30 NOTE — ASSESSMENT & PLAN NOTE
· Stable 4.4 cm aneurysmal dilatation of the ascending aorta  · Discussed correlation of AAA rupture and smoking cessation    CT Pulm on /22/22  Moderate amount of generalized plaque in the thoracic and abdominal aorta. There is aneurysmal dilatation of the ascending aorta maximally measuring 4.4   cm on coronal images. There are postsurgical changes at the aortic root. There is no evidence of aortic dissection. Rosanne Villarreal Penetrating atheromatous ulcers in the descending thoracic and upper   abdominal aorta.

## 2022-12-30 NOTE — PROGRESS NOTES
[facility]  Internal Medicine Residency Clinic    Attending Physician Statement  I have discussed the case, including pertinent history and exam findings with the resident physician. I have seen and examined the patient and the key elements of the encounter have been performed by me. I agree with the assessment, plan and orders as documented by the resident. I have reviewed all pertinent PMHx, PSHx, FamHx, SocialHx, medications, and allergies and updated history as appropriate. Presents for acute appointment for bilateral axillary pain      Recently seen on 12/13 and 11/14    Complaints of Tremors in hands- noted to be improved with reduction in Wellbutrin and increase in Requip- further reduction on Wellbutrin took place on 12/13 encounter    Magnesium supplementation initiated and changed PPI to H2 blocker due to hypomagnesemia possibly related to lack of absorption from PPI use    RLS concerns with initiation of iron supplementation on 12/13 appt as well    Today with acute complaint of bilateral under arm/axillary pain- pain comes and goes; not constant; initiated she believes by certain movements; hx of neck pain, occipital headaches in the past; pain within the shoulder blades as well; no exertional symptoms; no pleuritic pain; no alarm findings- no weight loss; hemoptysis or additional findings     Bilateral Axillary Pain    Recent Mammogram in December 2022:  No mammographic evidence of malignancy. Annual screening mammography is recommended. BIRADS:   BIRADS - CATEGORY 2       Benign Findings. Normal interval follow-up is recommended in 12 months.        OVERALL ASSESSMENT - BENIGN        EKG today: Sinus Rhythm noted; no new ST-T changes    Nonspecific pain that comes/goes- difficult to isolate on exam; no overlying erythema, edema; no palpable masses within bilateral axilla; nurse chaperone present for entirety of encounter    Check Cervical and Thoracic xrays as this could be nerve related    Check Bilateral U/S breasts with axilla- although low likelihood concern for malignancy given pain and bilateral nature in the presence of previous unremarkable mammogram   Non-cardiac etiology given history and findings; EKG reviewed    No pulmonary concerns noted today   INR checked today as this was overdue; no findings of Skin ecchymosis or palpable hematomas on exam; INR is elevated at 3.4. Follow symptoms     Tremors- improved; no findings on exam today     Normocytic Anemia   Iron was initiated due to lower ferritin and iron saturation     2 unresectable Colon Polyps    Need to re-establish with Gen Surgery- plan is for     Abdominal AAA- stable    Following with Vascular Surgery   Repeat U/S planned for October 2023     Mechanical Aortic Valve    Last INR of record is 3.2 (October 2022)    Repeat today: 3.4 (Reduction per INR protocol between 5.0 and 10% of dosing    Will reduce warfarin to 5 mg daily and repeat INR needed at 2 weeks. Patient frequently delays INR checks and has received some INR readings and recs from Cardiology- ok to continue with Cardiology but follow-up needed   Goal is listed per Mechanical Aortic Valve of 2.0 to 3.0    Recommend follow-up INR at 2 weeks; patient previously on 5 mg daily with stable INR in the past. Last 2 readings at current dosing- 5 mg all days except Mondays; has remained elevated above goal.       Remainder of medical problems as per resident note.     Sanjeev Rivers MD, [address]   12/30/2022 10:55 AM

## 2022-12-30 NOTE — ASSESSMENT & PLAN NOTE
· Current smoker  · Patient has been smoking about a pack a day for 50 years.   Smoking history is 50-pack-year  · She recently cut back to half a pack a day about 1 month ago  · Currently in action phase she is taking Wellbutrin  · He had to use nicotine patches  · Discussed importance of smoking cessation given patient's extensive cardiac history

## 2022-12-30 NOTE — ASSESSMENT & PLAN NOTE
· Vague left axillary shooting pain which seems neuropathic in nature  · No history of recent trauma  · History of neck pain possibly because of patient's shooting pain in bilateral axillar and right arm  · Mammogram reviewed patient's pain does not seem to be caused by breast nodule  · Will benefit from imaging of cervical and thoracic spine since symptoms distribution are non-anatomical    Mammogram on 11/15/22  There are scattered areas of fibroglandular density. There is no new    dominant   mass, suspicious microcalcification, or area of architectural distortion.

## 2022-12-30 NOTE — PROGRESS NOTES
Tameka Rodriguez 89  Internal Medicine Residency Program  [facility] Note      SUBJECTIVE:  CC: had concerns including Pain (Patient c/o bilateral axilla pain that started about 1 month ago. Patient describes pain as sharp and stabbing and intermittent. Patient states pain depends on her movements. Patient states she had a Mammogram and was normal.  ) and Heartburn (Patient states no relief with Pepcid. Patient states she still has nausea, heartburn and acid reflux). Luanne Taylor (:  1960) is a 60 y.o. female here for a routine evaluation of the following: Pain (Patient c/o bilateral axilla pain that started about 1 month ago. Patient describes pain as sharp and stabbing and intermittent. Patient states pain depends on her movements. Patient states she had a Mammogram and was normal.  ) and Heartburn (Patient states no relief with Pepcid. Patient states she still has nausea, heartburn and acid reflux)       Patient is a pleasant 60 y.o. female who  has a past medical history of Aneurysm ([address] Anticoagulant long-term use, Anxiety, Ascending aortic aneurysm, Asthma, CAD (coronary artery disease), Chronic back pain, Constipation, COPD (chronic obstructive pulmonary disease) ([address] Depression, Diverticulosis, GERD (gastroesophageal reflux disease), H/O mechanical aortic valve replacement, Headache, Hyperlipidemia, Hypertension, On warfarin at home, Restless legs syndrome, S/P AVR, Status post placement of implantable loop recorder, Syncope, cardiogenic, Tobacco abuse, Urinary incontinence, Ventricular tachycardia, non-sustained, and Warfarin-induced coagulopathy ([address] He presents to the clinic to address following concerns:    Acute concerns this visit:   Clayborn Devora shooting pain like knife shooting through  for about a month, 10/10 at its worst. It lasts a few seconds and is relieved without intervention.   Pain is exacerbated by movement and relieved with rest.  Her pain is located at the left mid to posterior axillary area as well as her right medial posterior wall proximal humeral area. Patient has not tried any medications yet. She states that the pain goes away on its own. She presents to the clinic today because she is concerned for the pain and wishes for \"something to be done\". She does mention that she has had history of headaches and neck pain as well. She denies any trauma to the area. She states that she forgot to discuss this pain at her PCP visit. Pain does not wake patient up from sleep or causes excessive distress. On exam pain is vague without any palpable nodule in the areas of the pain. It is noted however the patient had a respiratory infection about 2 months ago. She currently denies any cough wheezing or upper respiratory infectious symptoms. She denies any recent sick exposure or travel outside [place]. Assessment   1. Intercostal neuropathic pain  Assessment & Plan:  Vague left axillary shooting pain which seems neuropathic in nature  No history of recent trauma  History of neck pain possibly because of patient's shooting pain in bilateral axillar and right arm  Mammogram reviewed patient's pain does not seem to be caused by breast nodule  Will benefit from imaging of cervical and thoracic spine since symptoms distribution are non-anatomical    Mammogram on 11/15/22  There are scattered areas of fibroglandular density. There is no new    dominant   mass, suspicious microcalcification, or area of architectural distortion. Orders:  -     XR CERVICAL SPINE (4-5 VIEWS); Future  -     XR THORACIC SPINE (MIN 4 VIEWS); Future  -     US BREAST BILATERAL LIMITED; Future  2. Depression, unspecified depression type  -     sertraline (ZOLOFT) 100 MG tablet; take 1 and 1/2 tablet by mouth once daily, Disp-135 tablet, R-1Normal  3. Chest wall pain  -     EKG 12 Lead - Clinic Performed; Future  4.  H/O aortic valve replacement  Assessment & Plan:  S/p mechanical AVR on Coumadin  Apparently supposed to get INR checks biweekly  Last and her INR check on file about a month ago  POCT INR checked this visit 3.4, which is above goal of 2-3  Changes made to Coumadin as follows: Switch Monday 7.5 mg Coumadin to 5 mg daily including Monday. Discussed importance of follow-up INR checks. Orders:  -     POCT INR  5. Primary hypertension  Assessment & Plan:  Controlled on current regimen  In office /72  Continue current regimen      6. Infrarenal abdominal aortic aneurysm (AAA) without rupture  Assessment & Plan:  Stable 4.4 cm aneurysmal dilatation of the ascending aorta  Discussed correlation of AAA rupture and smoking cessation    7. Tobacco abuse  Assessment & Plan:  Current smoker  Patient has been smoking about a pack a day for 50 years. Smoking history is 50-pack-year  She recently cut back to half a pack a day about 1 month ago  Currently in action phase she is taking Wellbutrin  He had to use nicotine patches  Discussed importance of smoking cessation given patient's extensive cardiac history      8. Aneurysm of ascending aorta without rupture  Assessment & Plan:  Stable 4.4 cm aneurysmal dilatation of the ascending aorta  Discussed correlation of AAA rupture and smoking cessation    CT Pulm on /22/22  Moderate amount of generalized plaque in the thoracic and abdominal aorta. There is aneurysmal dilatation of the ascending aorta maximally measuring 4.4   cm on coronal images. There are postsurgical changes at the aortic root. There is no evidence of aortic dissection. Juli Chime Penetrating atheromatous ulcers in the descending thoracic and upper   abdominal aorta.      9. Pulmonary nodule  Assessment & Plan:  Follows with pulmonology  Recently seen by Dr. Monique Cormier  Has repeat CT pulm in 1 year          Plan   Mammogram and recent CT chest review, no identifiable lesion explaining pt's symptoms  Cervical and thoracic x-rays were ordered due to concerns for neuropathy from compression injury. INR was checked this visit which was 3.4. Patient relates that Coumadin was adjusted to 5 mg daily including . Discussed with patient to follow-up with cardiology in 2 weeks for INR checks  EKG done this visit is unremarkable   ______________________________________________________________________________  Patient was last seen in the clinic on 2022    Seen in clinic for week f/u   Issues discussed:  Tremors with movement, decreased welbutrin and pt reported improvement and still has tremors, recommended to decrease to 100. Mag 1.5 and recommended to continue Mag Oxide. Fe deficiency with Ferritin < 50 and need Fe supplement for RLS. FEV1 27, was on prednisone and azithromycin, still has SOB on exertion, recommended pul rehab but pt declined. Tubular adenoma with SP resection, needs 6 months follow up. Follow up 3 months with lab results. Lab Results   Component Value Date/Time    LABA1C 6.0 2022 08:20 AM    LABA1C 6.2 2021 08:26 AM    LABA1C 5.8 09/10/2020 09:03 AM    LABA1C 6.0 2020 09:50 AM       eA mg/dl  Average blood glucose based on most recent A1c. Curtesy: eAG Calculator    PMH according to chart:      Diagnosis Date    Aneurysm ([facility] [address] [facility]     THORACIC AORTIC    Anticoagulant long-term use     Anxiety     Ascending aortic aneurysm     Asthma     CAD (coronary artery disease)     Chronic back pain     Constipation 2021    COPD (chronic obstructive pulmonary disease) (HCC)     Depression     Diverticulosis     GERD (gastroesophageal reflux disease)     H/O mechanical aortic valve replacement 2017    Headache     Hyperlipidemia     Hypertension     On warfarin at home     for AVR    Restless legs syndrome     S/P AVR     Status post placement of implantable loop recorder     Syncope, cardiogenic 09/10/2020    Tobacco abuse     Urinary incontinence     Ventricular tachycardia, non-sustained     Warfarin-induced coagulopathy ([address] 12/11/2011       Review of Systems   Constitutional:  Negative for chills, fatigue, fever and unexpected weight change. HENT:  Negative for congestion and rhinorrhea. Respiratory:  Negative for cough and shortness of breath. Cardiovascular:  Positive for chest pain (left axillary area). Negative for palpitations. Gastrointestinal:  Negative for abdominal pain, constipation, diarrhea and vomiting. Genitourinary:  Negative for dysuria and frequency. Musculoskeletal:  Negative for arthralgias and back pain. Skin:  Negative for color change, pallor and wound. Neurological:  Positive for headaches. Negative for tremors, seizures, syncope, weakness and light-headedness. Psychiatric/Behavioral:  Negative for dysphoric mood. The patient is not nervous/anxious.       Outpatient Medications Marked as Taking for the 12/30/22 encounter (Office Visit) with Prosper Montejo MD   Medication Sig Dispense Refill    sertraline (ZOLOFT) 100 MG tablet take 1 and 1/2 tablet by mouth once daily 135 tablet 1    montelukast (SINGULAIR) 10 MG tablet take 1 tablet by mouth at bedtime 90 tablet 1    lisinopril (PRINIVIL;ZESTRIL) 40 MG tablet TAKE 1 TABLET BY MOUTH ONCE DAILY 90 tablet 1    buPROPion (WELLBUTRIN SR) 100 MG extended release tablet Take 1 tablet by mouth daily 30 tablet 2    aspirin EC 81 MG EC tablet Take 1 tablet by mouth daily 90 tablet 1    Azelastine HCl 137 MCG/SPRAY SOLN 2 sprays by Nasal route daily 30 mL 1    fluticasone (FLONASE) 50 MCG/ACT nasal spray 2 sprays by Nasal route daily 1 each 1    atorvastatin (LIPITOR) 20 MG tablet Take 1 tablet by mouth daily 90 tablet 1    famotidine (PEPCID) 40 MG tablet Take 1 tablet by mouth daily 90 tablet 1    Magnesium Oxide (MAGNESIUM-OXIDE) 250 MG TABS tablet Take 1 tablet by mouth daily 90 tablet 0    ferrous sulfate (IRON 325) 325 (65 Fe) MG tablet Take 1 tablet by mouth daily (with breakfast) Or lunch 90 tablet 1    albuterol sulfate HFA (PROVENTIL HFA) 108 (90 Base) MCG/ACT inhaler Inhale 2 puffs into the lungs every 6 hours as needed for Wheezing 1 each 3    metoprolol succinate (TOPROL XL) 25 MG extended release tablet take 1 tablet by mouth once daily 90 tablet 3    albuterol (PROVENTIL) (2.5 MG/3ML) 0.083% nebulizer solution Take 3 mLs by nebulization every 6 hours as needed for Wheezing or Shortness of Breath 120 each 6    rOPINIRole (REQUIP) 0.25 MG tablet TAKE 2 TABLETS BY MOUTH IN  THE MORNING AND 2 TABLETS  BY MOUTH BEFORE BEDTIME AS  DIRECTED 360 tablet 1    Roflumilast (DALIRESP) 250 MCG tablet TAKE 1 TABLET BY MOUTH  DAILY 84 tablet 3    Cholecalciferol (VITAMIN D3) 125 MCG (5000 UT) CAPS Take 1 capsule by mouth in the morning and at bedtime      warfarin (COUMADIN) 5 MG tablet Take 1 tablet by mouth daily except for Monday and Fridays take 1 1/2 (7.5mg) (Patient taking differently: Take 1 tablet by mouth daily except for Monday take 1 1/2 (7.5mg)) 132 tablet 3    RA NATURAL MAGNESIUM 250 MG TABS tablet take 1 tablet by mouth daily      BREZTRI AEROSPHERE 160-9-4.8 MCG/ACT AERO inhale 2 puffs INTO THE LUNGS twice a day 1 each 12    Inulin (FIBER CHOICE PO) Take 2 capsules by mouth 2 times daily      Blood Pressure Monitoring (BLOOD PRESSURE CUFF) MISC Dx:  Hypertension with labile blood pressure 1 each 0       Social History     Tobacco Use    Smoking status: Every Day     Packs/day: 1.00     Years: 50.00     Pack years: 50.00     Types: Cigarettes    Smokeless tobacco: Never    Tobacco comments:     1 pack   Vaping Use    Vaping Use: Former    Quit date: 1/1/2015   Substance Use Topics    Alcohol use: Yes     Comment: rare wine    Drug use: Never       Current Outpatient Medications   Medication Instructions    albuterol (PROVENTIL) 2.5 mg, Nebulization, EVERY 6 HOURS PRN    albuterol sulfate HFA (PROVENTIL HFA) 108 (90 Base) MCG/ACT inhaler 2 puffs, Inhalation, EVERY 6 HOURS PRN    albuterol sulfate HFA (PROVENTIL;VENTOLIN;PROAIR) 108 (90 Base) MCG/ACT inhaler inhale 1 puff every 4 hours if needed for wheezing    aspirin EC 81 mg, Oral, DAILY    atorvastatin (LIPITOR) 20 mg, Oral, DAILY    Azelastine HCl 137 MCG/SPRAY SOLN 2 sprays, Nasal, DAILY    Blood Pressure Monitoring (BLOOD PRESSURE CUFF) MISC Dx:  Hypertension with labile blood pressure    BREZTRI AEROSPHERE 160-9-4.8 MCG/ACT AERO inhale 2 puffs INTO THE LUNGS twice a day    buPROPion (WELLBUTRIN SR) 100 mg, Oral, DAILY    Cholecalciferol (VITAMIN D3) 125 MCG (5000 UT) CAPS 1 capsule, Oral, 2 times daily    famotidine (PEPCID) 40 mg, Oral, DAILY    ferrous sulfate (IRON 325) 325 mg, Oral, DAILY WITH BREAKFAST, Or lunch    fluticasone (FLONASE) 50 MCG/ACT nasal spray 2 sprays, Nasal, DAILY    Inulin (FIBER CHOICE PO) 2 capsules, Oral, 2 TIMES DAILY    lisinopril (PRINIVIL;ZESTRIL) 40 MG tablet TAKE 1 TABLET BY MOUTH ONCE DAILY    Magnesium Oxide (MAGNESIUM-OXIDE) 250 mg, Oral, DAILY    metoprolol succinate (TOPROL XL) 25 MG extended release tablet take 1 tablet by mouth once daily    montelukast (SINGULAIR) 10 MG tablet take 1 tablet by mouth at bedtime    nicotine (NICODERM CQ) 21 MG/24HR 1 patch, TransDERmal, DAILY    RA NATURAL MAGNESIUM 250 MG TABS tablet take 1 tablet by mouth daily    Roflumilast (DALIRESP) 250 MCG tablet TAKE 1 TABLET BY MOUTH  DAILY    rOPINIRole (REQUIP) 0.25 MG tablet TAKE 2 TABLETS BY MOUTH IN  THE MORNING AND 2 TABLETS  BY MOUTH BEFORE BEDTIME AS  DIRECTED    sertraline (ZOLOFT) 100 MG tablet take 1 and 1/2 tablet by mouth once daily    warfarin (COUMADIN) 5 MG tablet Take 1 tablet by mouth daily except for Monday and Fridays take 1 1/2 (7.5mg)       I have reviewed all pertinent PMHx, PSHx, FamHx, SocialHx, medications, and allergies and updated history as appropriate.     OBJECTIVE:    VS: /72 (Site: Right Upper Arm, Position: Sitting, Cuff Size: Medium Adult)   Pulse 76   Temp (!) 96.6 °F (35.9 °C) (Temporal)   Resp 18   Ht 5' 5\" (1.651 m)   Wt 143 lb (64.9 kg)   LMP 11/08/2008 (LMP Unknown)   SpO2 95% Comment: room air  BMI 23.80 kg/m²     Physical Exam  Exam conducted with a chaperone present. Constitutional:       General: She is not in acute distress. Appearance: She is well-developed. HENT:      Head: Normocephalic and atraumatic. Eyes:      General: No scleral icterus. Conjunctiva/sclera: Conjunctivae normal.   Neck:      Trachea: No tracheal deviation. Cardiovascular:      Rate and Rhythm: Normal rate. Heart sounds: No murmur heard. Pulmonary:      Effort: Pulmonary effort is normal. No respiratory distress. Breath sounds: Normal breath sounds and air entry. No decreased air movement. Chest:      Chest wall: Tenderness (left mid to posterior axillary area) present. Comments: A breast exam was not performed, Image is for illustration of pt's pain location. Abdominal:      General: Bowel sounds are normal. There is no distension. Palpations: Abdomen is soft. Tenderness: There is no abdominal tenderness. Musculoskeletal:         General: Normal range of motion. Right upper arm: Tenderness (Right medial posterior area of proximal humerous) present. Left upper arm: No tenderness. Arms:       Cervical back: Normal range of motion. Skin:     General: Skin is warm and dry. Findings: No bruising, ecchymosis, signs of injury, rash or wound. Neurological:      Mental Status: She is alert and oriented to person, place, and time. Psychiatric:         Behavior: Behavior normal.         Thought Content: Thought content normal.         Judgment: Judgment normal.       No results found.     BP Readings from Last 4 Encounters:   12/30/22 125/72   12/13/22 139/70   11/14/22 138/83   10/05/22 130/60       Wt Readings from Last 6 Encounters:   12/30/22 143 lb (64.9 kg)   12/13/22 145 lb (65.8 kg)   12/09/22 144 lb (65.3 kg)   11/14/22 142 lb 12.8 oz (64.8 kg)   10/05/22 148 lb (67.1 kg) 10/03/22 146 lb (66.2 kg)         Labs:  Lab Results   Component Value Date    WBC 6.5 11/14/2022    HGB 10.4 (L) 11/14/2022    HCT 32.7 (L) 11/14/2022     11/14/2022    CHOL 164 04/28/2022    TRIG 97 04/28/2022    HDL 58 04/28/2022    ALT 16 11/14/2022    AST 18 11/14/2022     12/12/2022    K 3.8 12/12/2022     12/12/2022    CREATININE 0.8 12/12/2022    BUN 9 12/12/2022    CO2 27 12/12/2022    TSH 2.630 09/10/2020    INR 3.4 12/30/2022    LABA1C 6.0 05/11/2022    LABMICR 13.8 05/03/2017       Imaging:   KEM GRACY DIGITAL SCREEN BILATERAL PER PROTOCOL  Narrative: EXAMINATION:  SCREENING DIGITAL BILATERAL MAMMOGRAM WITH TOMOSYNTHESIS, 12/9/2022    TECHNIQUE:  Screening mammography of the bilateral breasts was performed with  tomosynthesis. 2D standard and 3D tomosynthesis combination imaging  performed through both breasts in the MLO and CC projection. Computer   aided  detection was utilized in the interpretation of this exam.    COMPARISON:  03/10/2021 and 03/09/2021 and 03/08/2021 and 12/20/2018. HISTORY:  Screening. FINDINGS:  There are scattered areas of fibroglandular density. There is no new   dominant  mass, suspicious microcalcification, or area of architectural distortion. There are benign micro and coarse macrocalcifications seen within the   right  and left breast.  Impression: No mammographic evidence of malignancy. Annual screening mammography is recommended. BIRADS:  BIRADS - CATEGORY 2    Benign Findings. Normal interval follow-up is recommended in 12 months. OVERALL ASSESSMENT - BENIGN    A letter of notification will be sent to the patient regarding the   results. RISK ASSESSMENT:    During this patient's visit, information obtained was used to generate a  lifetime risk assessment using the Tyrer-Cuzick model (also called the   DAISY  International Breast Cancer Intervention study Breast Cancer Risk   Evaluation  Tool).     LIFETIME RISK:    Patient has a Tyrer-Cuzick score of: 10.4%    BREAST TISSUE DENSITY    Type 2 density    AVERAGE RISK ( < 15% Lifetime Risk)    Yearly screening mammograms starting at age 36 and older. Regardless of breast density, tomosynthesis is suggested. Monthly self-breast exams are recommended for women age 27 and older. Note:    Mammography is not 100% accurate in the detection of breast cancer. Accuracy  decreases as mammographic density of the breast increases. A negative  mammogram should not deter further evaluation (including biopsy) of  suspicious physical findings. Recommendations are based on NCCN ([address]) and ACR [employer] of Radiology)  guidelines. Thank you for sending your patient to this ACR and FDA approved facility. If  there are physician concerns regarding this report, a Radiologist can be  reached by calling the following number [phone]. HCM    To be discussed with PCP at next clinic visit      Health Maintenance Due   Topic Date Due    Low dose CT lung screening  12/17/2019    COVID-19 Vaccine (3 - Booster for Moderna series) 06/10/2021       RTC   Return in about 4 weeks (around 1/27/2023) for with PCP for f/u of chronic issues . Future Appointments   Date Time Provider Esmer Lee   2/6/2023  8:00 AM SCHEDULE, [facility] PALLIATIVE CARE PROVIDER [facility] PALLIAT [facility]   3/27/2023 10:00 AM Georgette Mayorga DO [facility]   10/4/2023  8:15 AM [facility]   10/4/2023  9:00 AM Ashwini Washburn MD [facility]       I have reviewed my findings and recommendations with Belen Gonsalves and Dr Rufina Heredia. Electronically signed by Bella Huynh MD, PhD PGY- 2 on 12/30/2022. Internal Medicine Resident    This report was completed using computerNeocase Software voiced recognition software. Every effort has been made to ensure accuracy; however, inadvertent computerized transcription errors may be present.

## 2022-12-30 NOTE — ASSESSMENT & PLAN NOTE
· S/p mechanical AVR on Coumadin  · Apparently supposed to get INR checks biweekly  · Last and her INR check on file about a month ago  · POCT INR checked this visit 3.4, which is above goal of 2-3  · Changes made to Coumadin as follows: Switch Monday 7.5 mg Coumadin to 5 mg daily including Monday. · Discussed importance of follow-up INR checks.

## 2023-01-08 DIAGNOSIS — F17.200 SMOKING: ICD-10-CM

## 2023-01-09 ENCOUNTER — HOSPITAL ENCOUNTER (OUTPATIENT)
Dept: GENERAL RADIOLOGY | Age: 63
Discharge: HOME | End: 2023-01-11
Payer: COMMERCIAL

## 2023-01-09 VITALS — BODY MASS INDEX: 23.99 KG/M2 | WEIGHT: 144 LBS | HEIGHT: 65 IN

## 2023-01-09 DIAGNOSIS — G54.8 INTERCOSTAL NEUROPATHIC PAIN: ICD-10-CM

## 2023-01-09 PROCEDURE — 76642 ULTRASOUND BREAST LIMITED: CPT

## 2023-01-09 RX ORDER — BUPROPION HYDROCHLORIDE 150 MG/1
TABLET, EXTENDED RELEASE ORAL
Qty: 60 TABLET | Refills: 0 | OUTPATIENT
Start: 2023-01-09

## 2023-01-10 DIAGNOSIS — I10 ESSENTIAL HYPERTENSION: ICD-10-CM

## 2023-01-11 RX ORDER — LISINOPRIL 40 MG/1
TABLET ORAL
Qty: 90 TABLET | Refills: 3 | OUTPATIENT
Start: 2023-01-11

## 2023-01-24 ENCOUNTER — ANTI-COAG VISIT (OUTPATIENT)
Dept: CARDIOLOGY CLINIC | Age: 63
End: 2023-01-24

## 2023-01-24 DIAGNOSIS — Z95.2 H/O MECHANICAL AORTIC VALVE REPLACEMENT: ICD-10-CM

## 2023-01-24 LAB
INTERNATIONAL NORMALIZATION RATIO, POC: 2.3
PROTHROMBIN TIME, POC: 0

## 2023-01-25 ENCOUNTER — TELEPHONE (OUTPATIENT)
Dept: INTERNAL MEDICINE CLINIC | Age: 63
End: 2023-01-25

## 2023-01-25 DIAGNOSIS — M48.02 CERVICAL STENOSIS OF SPINAL CANAL: Primary | ICD-10-CM

## 2023-01-25 NOTE — TELEPHONE ENCOUNTER
I called Clarisa Danielle #[phone] and discussed results of her cervical spine x-ray and mammogram.  Patient informs me that she had received results of mammogram in her mail which were unremarkable. She states that her neck symptoms persist but has not worsened. For her cervical issues, we  discussed treatments such as topical analgesic for neck pain and further imaging such as MRI as well as PT. Patient states that she has had PT in the past which helped. I discussed with pt that I have forwarded results to PCP and will defer to PCP to discuss further management. Pt is agreeable with plan.      Electronically signed by Victorino Mao MD on 1/25/2023 at 9:42 AM

## 2023-01-30 ENCOUNTER — OFFICE VISIT (OUTPATIENT)
Dept: INTERNAL MEDICINE | Age: 63
End: 2023-01-30
Payer: COMMERCIAL

## 2023-01-30 VITALS
WEIGHT: 137 LBS | HEIGHT: 65 IN | BODY MASS INDEX: 22.82 KG/M2 | RESPIRATION RATE: 18 BRPM | SYSTOLIC BLOOD PRESSURE: 122 MMHG | DIASTOLIC BLOOD PRESSURE: 69 MMHG | OXYGEN SATURATION: 99 % | TEMPERATURE: 97.8 F | HEART RATE: 95 BPM

## 2023-01-30 DIAGNOSIS — R68.89 FLU-LIKE SYMPTOMS: ICD-10-CM

## 2023-01-30 DIAGNOSIS — Z20.822 SUSPECTED COVID-19 VIRUS INFECTION: Primary | ICD-10-CM

## 2023-01-30 DIAGNOSIS — J44.1 COPD EXACERBATION (HCC): ICD-10-CM

## 2023-01-30 DIAGNOSIS — J44.9 CHRONIC OBSTRUCTIVE PULMONARY DISEASE, UNSPECIFIED COPD TYPE (HCC): ICD-10-CM

## 2023-01-30 DIAGNOSIS — J06.9 VIRAL UPPER RESPIRATORY TRACT INFECTION: ICD-10-CM

## 2023-01-30 LAB
INFLUENZA A ANTIBODY: NORMAL
INFLUENZA B ANTIBODY: NORMAL
Lab: NORMAL
PERFORMING INSTRUMENT: NORMAL
QC PASS/FAIL: NORMAL
SARS-COV-2, POC: NORMAL

## 2023-01-30 PROCEDURE — 87804 INFLUENZA ASSAY W/OPTIC: CPT

## 2023-01-30 PROCEDURE — G8420 CALC BMI NORM PARAMETERS: HCPCS

## 2023-01-30 PROCEDURE — 3078F DIAST BP <80 MM HG: CPT

## 2023-01-30 PROCEDURE — G8482 FLU IMMUNIZE ORDER/ADMIN: HCPCS

## 2023-01-30 PROCEDURE — 3023F SPIROM DOC REV: CPT

## 2023-01-30 PROCEDURE — 87426 SARSCOV CORONAVIRUS AG IA: CPT

## 2023-01-30 PROCEDURE — 4004F PT TOBACCO SCREEN RCVD TLK: CPT

## 2023-01-30 PROCEDURE — 3017F COLORECTAL CA SCREEN DOC REV: CPT

## 2023-01-30 PROCEDURE — G8427 DOCREV CUR MEDS BY ELIG CLIN: HCPCS

## 2023-01-30 PROCEDURE — 3074F SYST BP LT 130 MM HG: CPT

## 2023-01-30 PROCEDURE — 99212 OFFICE O/P EST SF 10 MIN: CPT

## 2023-01-30 PROCEDURE — 99213 OFFICE O/P EST LOW 20 MIN: CPT

## 2023-01-30 RX ORDER — AZELASTINE HYDROCHLORIDE 137 UG/1
SPRAY, METERED NASAL
Qty: 30 ML | Refills: 1 | Status: SHIPPED | OUTPATIENT
Start: 2023-01-30

## 2023-01-30 RX ORDER — PREDNISONE 20 MG/1
20 TABLET ORAL DAILY
Qty: 7 TABLET | Refills: 0 | Status: SHIPPED | OUTPATIENT
Start: 2023-01-30 | End: 2023-02-06

## 2023-01-30 RX ORDER — AMOXICILLIN AND CLAVULANATE POTASSIUM 875; 125 MG/1; MG/1
1 TABLET, FILM COATED ORAL 2 TIMES DAILY
Qty: 14 TABLET | Refills: 0 | Status: SHIPPED | OUTPATIENT
Start: 2023-01-30 | End: 2023-02-06

## 2023-01-30 ASSESSMENT — PATIENT HEALTH QUESTIONNAIRE - PHQ9
4. FEELING TIRED OR HAVING LITTLE ENERGY: 0
SUM OF ALL RESPONSES TO PHQ QUESTIONS 1-9: 0
7. TROUBLE CONCENTRATING ON THINGS, SUCH AS READING THE NEWSPAPER OR WATCHING TELEVISION: 0
SUM OF ALL RESPONSES TO PHQ QUESTIONS 1-9: 0
SUM OF ALL RESPONSES TO PHQ QUESTIONS 1-9: 0
1. LITTLE INTEREST OR PLEASURE IN DOING THINGS: 0
SUM OF ALL RESPONSES TO PHQ9 QUESTIONS 1 & 2: 0
8. MOVING OR SPEAKING SO SLOWLY THAT OTHER PEOPLE COULD HAVE NOTICED. OR THE OPPOSITE, BEING SO FIGETY OR RESTLESS THAT YOU HAVE BEEN MOVING AROUND A LOT MORE THAN USUAL: 0
10. IF YOU CHECKED OFF ANY PROBLEMS, HOW DIFFICULT HAVE THESE PROBLEMS MADE IT FOR YOU TO DO YOUR WORK, TAKE CARE OF THINGS AT HOME, OR GET ALONG WITH OTHER PEOPLE: 0
6. FEELING BAD ABOUT YOURSELF - OR THAT YOU ARE A FAILURE OR HAVE LET YOURSELF OR YOUR FAMILY DOWN: 0
SUM OF ALL RESPONSES TO PHQ QUESTIONS 1-9: 0
2. FEELING DOWN, DEPRESSED OR HOPELESS: 0
3. TROUBLE FALLING OR STAYING ASLEEP: 0
9. THOUGHTS THAT YOU WOULD BE BETTER OFF DEAD, OR OF HURTING YOURSELF: 0

## 2023-01-30 ASSESSMENT — ENCOUNTER SYMPTOMS
ABDOMINAL PAIN: 0
BACK PAIN: 0
SINUS PAIN: 1
WHEEZING: 0
SINUS PRESSURE: 1
CONSTIPATION: 0
BLOOD IN STOOL: 0
NAUSEA: 1
VOMITING: 0
SHORTNESS OF BREATH: 1
COUGH: 1
RHINORRHEA: 1
SORE THROAT: 0
DIARRHEA: 0

## 2023-01-30 NOTE — PATIENT INSTRUCTIONS
Continue to take all home medications as prescribed. Take 20 mg of prednisone (1 tablet) daily for 7 days. Take 1 tablet of Augmentin twice daily for 7 days. If symptoms do not improve after course of steroids and antibiotics call the clinic to schedule follow up sooner than appointment that is scheduled for 3/27/2023 with primary care doctor.

## 2023-01-30 NOTE — PROGRESS NOTES
[facility] Internal Medicine      SUBJECTIVE:  Alyssa Mcnally (:  1960) is a 60 y.o. female here for evaluation of the following chief complaint(s): Other (Sinus congestion, body aches and productive cough)    Patient is a 60year old male presenting to the IM clinic for an acute visit regarding sinus congestion, body aches, and productive cough. Last seen in clinic on 2022. Symptoms started on Thursday with running nose, body aches, headaches, productive green cough, temperatures (101.3 highest temperature). States she is doing better today, but still feels fatigues with body aches. Denies sick contacts. Did take Coricidin HPB with mild symptom relief. States her face hurts around sinuses. Has had sinus infections in the past that she's received antibiotics. Does have history of COPD with chronic SOB, has not had to increase home O2 requirements since getting sick (3L at home). Has been using home nebulizer since getting sick. States she did originally feel like she was wheezing more and needed to increase her O2 requirements, but has not done so. Review of Systems   Constitutional:  Positive for chills, fatigue and fever. HENT:  Positive for congestion, postnasal drip, rhinorrhea, sinus pressure and sinus pain. Negative for sore throat. Respiratory:  Positive for cough (green productive) and shortness of breath (chronic). Negative for wheezing. Cardiovascular:  Positive for palpitations (chronic). Negative for chest pain and leg swelling. Gastrointestinal:  Positive for nausea. Negative for abdominal pain, blood in stool, constipation, diarrhea and vomiting. Genitourinary:  Negative for difficulty urinating and hematuria. Musculoskeletal:  Positive for myalgias. Negative for back pain. Skin:  Negative for rash and wound. Neurological:  Positive for headaches. Negative for dizziness.      Current Outpatient Medications on File Prior to Visit   Medication Sig Dispense Refill    sertraline (ZOLOFT) 100 MG tablet take 1 and 1/2 tablet by mouth once daily 135 tablet 1    montelukast (SINGULAIR) 10 MG tablet take 1 tablet by mouth at bedtime 90 tablet 1    buPROPion (WELLBUTRIN SR) 100 MG extended release tablet Take 1 tablet by mouth daily 30 tablet 2    aspirin EC 81 MG EC tablet Take 1 tablet by mouth daily 90 tablet 1    Azelastine HCl 137 MCG/SPRAY SOLN 2 sprays by Nasal route daily 30 mL 1    fluticasone (FLONASE) 50 MCG/ACT nasal spray 2 sprays by Nasal route daily 1 each 1    atorvastatin (LIPITOR) 20 MG tablet Take 1 tablet by mouth daily 90 tablet 1    famotidine (PEPCID) 40 MG tablet Take 1 tablet by mouth daily 90 tablet 1    Magnesium Oxide (MAGNESIUM-OXIDE) 250 MG TABS tablet Take 1 tablet by mouth daily 90 tablet 0    ferrous sulfate (IRON 325) 325 (65 Fe) MG tablet Take 1 tablet by mouth daily (with breakfast) Or lunch 90 tablet 1    albuterol sulfate HFA (PROVENTIL HFA) 108 (90 Base) MCG/ACT inhaler Inhale 2 puffs into the lungs every 6 hours as needed for Wheezing 1 each 3    metoprolol succinate (TOPROL XL) 25 MG extended release tablet take 1 tablet by mouth once daily 90 tablet 3    albuterol (PROVENTIL) (2.5 MG/3ML) 0.083% nebulizer solution Take 3 mLs by nebulization every 6 hours as needed for Wheezing or Shortness of Breath 120 each 6    rOPINIRole (REQUIP) 0.25 MG tablet TAKE 2 TABLETS BY MOUTH IN  THE MORNING AND 2 TABLETS  BY MOUTH BEFORE BEDTIME AS  DIRECTED 360 tablet 1    Roflumilast (DALIRESP) 250 MCG tablet TAKE 1 TABLET BY MOUTH  DAILY 84 tablet 3    Cholecalciferol (VITAMIN D3) 125 MCG (5000 UT) CAPS Take 1 capsule by mouth in the morning and at bedtime      warfarin (COUMADIN) 5 MG tablet Take 1 tablet by mouth daily except for Monday and Fridays take 1 1/2 (7.5mg) (Patient taking differently: Take 1 tablet by mouth daily except for Monday take 1 1/2 (7.5mg)) 132 tablet 3    BREZTRI AEROSPHERE 160-9-4.8 MCG/ACT AERO inhale 2 puffs INTO THE LUNGS twice a day 1 each 12    Inulin (FIBER CHOICE PO) Take 2 capsules by mouth 2 times daily      lisinopril (PRINIVIL;ZESTRIL) 40 MG tablet TAKE 1 TABLET BY MOUTH ONCE DAILY 90 tablet 1    nicotine (NICODERM CQ) 21 MG/24HR Place 1 patch onto the skin daily (Patient not taking: Reported on 11/14/2022) 42 patch 3    Blood Pressure Monitoring (BLOOD PRESSURE CUFF) MISC Dx:  Hypertension with labile blood pressure (Patient not taking: Reported on 1/30/2023) 1 each 0     No current facility-administered medications on file prior to visit. OBJECTIVE:    VS:   Vitals:    01/30/23 1002   BP: 122/69   Site: Right Upper Arm   Position: Sitting   Cuff Size: Medium Adult   Pulse: 95   Resp: 18   Temp: 97.8 °F (36.6 °C)   TempSrc: Temporal   SpO2: 99%   Weight: 137 lb (62.1 kg)   Height: 5' 5\" (1.651 m)     Physical Exam  Constitutional:       General: She is not in acute distress. Appearance: She is not toxic-appearing. HENT:      Head: Normocephalic and atraumatic. Nose: Nose normal. No congestion. Mouth/Throat:      Mouth: Mucous membranes are moist.      Pharynx: Oropharynx is clear. No oropharyngeal exudate or posterior oropharyngeal erythema. Eyes:      General: No scleral icterus. Conjunctiva/sclera: Conjunctivae normal.   Cardiovascular:      Rate and Rhythm: Normal rate and regular rhythm. Pulses: Normal pulses. Pulmonary:      Effort: Pulmonary effort is normal.      Breath sounds: Wheezing present. Comments: Wheezes bilaterally in lower lung fields   Abdominal:      General: Bowel sounds are normal. There is no distension. Palpations: Abdomen is soft. Tenderness: There is no abdominal tenderness. Skin:     General: Skin is warm. Coloration: Skin is not jaundiced. Findings: No bruising. Neurological:      General: No focal deficit present. Mental Status: She is alert and oriented to person, place, and time. Psychiatric:         Mood and Affect: Mood normal.         Behavior: Behavior normal.          ASSESSMENT/PLAN:    COPD exacerbation 2/2 possible viral URI or sinusitis   -POCT influenza A/B negative  -POCT COVID-19, antigen negative    -trial of 7 day course of prednisone 20 mg daily for COPD exacerbation in setting of URI vs sinusitis   -trial of Augmentin 7 day twice daily for sinusitis    RTC:  Return in about 8 weeks (around 3/27/2023) for pcp follow up. PCP follow up scheduled for 3/27/2023. I have reviewed my findings and recommendations with Jamal Ayala and Dr. Jackson Pettit.     Shira Key DO   1/30/2023 11:05 AM

## 2023-01-30 NOTE — PROGRESS NOTES
[facility] Residency [facility]    Attending Physician Statement  I have discussed the case, including pertinent history and exam findings with the resident physician. I agree with the assessment, plan and orders as documented by the resident. I have reviewed all pertinent PMHx, PSHx, FamHx, SocialHx, medications, and allergies and updated history as appropriate. Acute Visit       COPD Exacerbation 2/2 Viral URI   -symptoms have worsened; increased cough; change in sputum; increased SOB; increased need for O2   -treat with Augmentin which patient has tolerated well in past as well as prednisone  -discussed to call if anything worsens     Remainder of medical problems as per resident note.     [address] Stapleton DO  1/30/2023 10:54 AM    Encounter time including independent chart review, discussion with patient, interpreting test results and/or external communications: 30'

## 2023-01-30 NOTE — TELEPHONE ENCOUNTER
Last Appointment:  12/13/2022  Future Appointments   Date Time Provider Esmer Lee   2/6/2023  8:00 AM SCHEDULE, [facility] PALLIATIVE CARE PROVIDER [facility] PALLIAT [facility]   2/14/2023 10:00 AM SCHEDULE, [facility]   3/27/2023 10:00 AM Bing Sandhoff, DO ACC [facility]   10/4/2023  8:15 AM [facility] VAS US RM 1 SEYZ CARDIO [facility]   10/4/2023  9:00 AM [place] VAS/MED [facility]

## 2023-02-06 ENCOUNTER — TELEPHONE (OUTPATIENT)
Dept: PALLATIVE CARE | Age: 63
End: 2023-02-06

## 2023-02-06 NOTE — TELEPHONE ENCOUNTER
Radha Candelaria called the Multidisciplinary Clinic phone at 8:01 PM Sunday 2/5/23 to cancel her Palliative Care appt on 2/6/23. Pt states she has been sick and not feeling well enough to come in.

## 2023-02-08 DIAGNOSIS — J30.2 SEASONAL ALLERGIES: ICD-10-CM

## 2023-02-08 RX ORDER — FLUTICASONE PROPIONATE 50 MCG
SPRAY, SUSPENSION (ML) NASAL
Qty: 16 G | Refills: 2 | Status: SHIPPED | OUTPATIENT
Start: 2023-02-08

## 2023-02-08 NOTE — TELEPHONE ENCOUNTER
Last Appointment:  1/30/2023  Future Appointments   Date Time Provider Esmer Lee   2/14/2023 10:00 AM SCHEDULE, ALAN Y[facility] CARDIO [facility] CARDIO [facility]   2/20/2023  1:30 PM SCHEDULE, [facility] PALLIATIVE CARE PROVIDER [facility] PALLIAT [facility]   3/1/2023 11:00 AM Shay Glaser MD [facility] CARDIO [facility]   3/27/2023 10:00 AM DO WILLIAM Gilmore [facility]   10/4/2023  8:15 AM [facility] VAS US RM 1 SEYZ CARDIO [facility]   10/4/2023  9:00 AM ANNABELLE Gold CNP [facility]

## 2023-02-14 ENCOUNTER — ANTI-COAG VISIT (OUTPATIENT)
Dept: CARDIOLOGY CLINIC | Age: 63
End: 2023-02-14
Payer: COMMERCIAL

## 2023-02-14 DIAGNOSIS — Z95.2 H/O MECHANICAL AORTIC VALVE REPLACEMENT: ICD-10-CM

## 2023-02-14 LAB
INTERNATIONAL NORMALIZATION RATIO, POC: 2.4
PROTHROMBIN TIME, POC: 0

## 2023-02-14 PROCEDURE — 93793 ANTICOAG MGMT PT WARFARIN: CPT | Performed by: INTERNAL MEDICINE

## 2023-02-20 ENCOUNTER — OFFICE VISIT (OUTPATIENT)
Dept: PALLATIVE CARE | Age: 63
End: 2023-02-20
Payer: COMMERCIAL

## 2023-02-20 VITALS
DIASTOLIC BLOOD PRESSURE: 65 MMHG | OXYGEN SATURATION: 98 % | SYSTOLIC BLOOD PRESSURE: 136 MMHG | WEIGHT: 137 LBS | TEMPERATURE: 98.1 F | HEART RATE: 78 BPM | BODY MASS INDEX: 22.8 KG/M2

## 2023-02-20 DIAGNOSIS — G25.81 RESTLESS LEG SYNDROME: ICD-10-CM

## 2023-02-20 DIAGNOSIS — Z51.5 ENCOUNTER FOR PALLIATIVE CARE: Primary | ICD-10-CM

## 2023-02-20 PROCEDURE — G8482 FLU IMMUNIZE ORDER/ADMIN: HCPCS | Performed by: NURSE PRACTITIONER

## 2023-02-20 PROCEDURE — G8420 CALC BMI NORM PARAMETERS: HCPCS | Performed by: NURSE PRACTITIONER

## 2023-02-20 PROCEDURE — 99211 OFF/OP EST MAY X REQ PHY/QHP: CPT | Performed by: NURSE PRACTITIONER

## 2023-02-20 PROCEDURE — G8427 DOCREV CUR MEDS BY ELIG CLIN: HCPCS | Performed by: NURSE PRACTITIONER

## 2023-02-20 PROCEDURE — 4004F PT TOBACCO SCREEN RCVD TLK: CPT | Performed by: NURSE PRACTITIONER

## 2023-02-20 PROCEDURE — 3078F DIAST BP <80 MM HG: CPT | Performed by: NURSE PRACTITIONER

## 2023-02-20 PROCEDURE — 99212 OFFICE O/P EST SF 10 MIN: CPT | Performed by: NURSE PRACTITIONER

## 2023-02-20 PROCEDURE — 3017F COLORECTAL CA SCREEN DOC REV: CPT | Performed by: NURSE PRACTITIONER

## 2023-02-20 PROCEDURE — 3075F SYST BP GE 130 - 139MM HG: CPT | Performed by: NURSE PRACTITIONER

## 2023-02-20 NOTE — PROGRESS NOTES
Palliative Care Department  Provider: ANNABELLE Tracy - CNP    Referring Provider:  Dr. Francisco Cast    Location of Service:   [address] [place] [address]    Reason for Consult:  [x]  Code status Discussion  []  Assist with goals of care  [x]  Psychosocial support  [x]  Symptom Management  []  Advanced Care Planning    Chief Complaint: Mirna Pollard is a 60 y.o. female with chief complaint of SOB, Depression    Assessment/Plan      COPD GOLD Stage IV/Decompensated Heart Failure:   -  Previously known to Dr. Kimberly Ragland   -  Encouraged to stop smoking    Shortness of Breath with exertion:   -  Severe but not persistent   -  Improves fairly quickly with rest   -  Encouraged continued activity as tolerated   -  Consider low dose oral morphine if worsens    Depression:   -  Zoloft 150 mg Daily   -  Mostly related to psychosocial/family stressors    -  Is established with counseling    Palliative Care Encounter:      - Goals of care: live longer, improve or maintain function/quality of life, remain at home, and continue current management     - Code Status: DNR-CCA     Follow Up:  1 year. They were encouraged to call with any questions, concerns, needs, or changes in symptoms. Subjective:     HPI:  Mirna Pollard is a 60 y.o. female with significant medical history of COPD, Heart failure, hx of Aortic valve replacement, pacer, who was referred to [address] [place] [address]. Subjective/Events/Discussions:  Mrs. Ninoska Redmond presents today unaccompanied. Overall she is doing fairly well. She still complains of SOB with exertion, but recovers quickly with rest and with use of breathing treatments. Option of morphine discussed, but again I do not believe this is needed at this time. She has not other significant complaints at this time. She did review with me her advanced directives, and a copy of her documents will be scanned into the EMR.       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    Anticoagulant long-term use     Anxiety     Ascending aortic aneurysm     Asthma     CAD (coronary artery disease)     Chronic back pain     Constipation 05/19/2021    COPD (chronic obstructive pulmonary disease) (HCC)     Depression     Diverticulosis     GERD (gastroesophageal reflux disease)     H/O mechanical aortic valve replacement 01/05/2017    Headache     Hyperlipidemia     Hypertension     On warfarin at home     for AVR    Restless legs syndrome     S/P AVR     Status post placement of implantable loop recorder     Syncope, cardiogenic 09/10/2020    Tobacco abuse     Urinary incontinence     Ventricular tachycardia, non-sustained     Warfarin-induced coagulopathy ([address] 12/11/2011       Past Surgical History:   Procedure Laterality Date    APPENDECTOMY      CARDIAC CATHETERIZATION  09/11/2020    Dr. John Kenney  2009    [facility]ortic valve [facility] LAPAROSCOPIC N/A 9/9/2022    CHOLECYSTECTOMY LAPAROSCOPIC performed by Akanksha Granados MD at [address] [facility] N/A 11/18/2019    COLONOSCOPY POLYPECTOMY SNARE/COLD BIOPSY performed by Akanksha Granados MD at [address] [facility] N/A 08/10/2022    COLONOSCOPY POLYPECTOMY SNARE/COLD BIOPSY performed by Akanksha Granados MD at [address]  08/10/2022    COLONOSCOPY SUBMUCOSAL/BOTOX INJECTION performed by Akanksha Granados MD at [address] [place] [address], [place], DIAGNOSTIC      HYSTERECTOMY (CERVIX STATUS UNKNOWN)      INSERTABLE CARDIAC MONITOR  09/11/2020    Linq Insertion    Dr. Maci Menchaca    KEM STEROTACTIC LOC BREAST BIOPSY RIGHT Right 03/10/2021    KEM STEROTACTIC LOC BREAST BIOPSY RIGHT 3/10/2021 [facility]    TUBAL LIGATION      UPPER GASTROINTESTINAL ENDOSCOPY N/A 11/18/2019    EGD BIOPSY performed by Akanksha Granados MD at [address] [facility] N/A 08/10/2022    EGD BIOPSY performed by Brittany Martinez MD at [place] [address] History   Problem Relation Age of Onset    Heart Disease Mother     Arthritis Mother     Asthma Mother     Diabetes Mother     High Blood Pressure Mother     High Cholesterol Mother     Stroke Mother     Heart Disease Father     Arthritis Father     Asthma Father     Diabetes Father     High Blood Pressure Father     High Cholesterol Father     Breast Cancer Sister     Other Brother         ELMO on CPap; Has ICD    Asthma Brother     COPD Brother     Arthritis Paternal Grandmother     Asthma Paternal Grandmother     Diabetes Maternal Aunt     Diabetes Paternal Aunt     High Blood Pressure Paternal Aunt        ROS: UNLESS STATED ABOVE PATIENT DENIES:  CONSTITUTIONAL:  fever, chill, rigors, nausea, vomiting, fatigue. HEENT: blurry vision, double vision, hearing problem, tinnitus, hoarseness, dysphagia, odynophagia  RESPIRATORY: cough, shortness of breath, sputum expectoration. CARDIOVASCULAR:  Chest pain/pressure, palpitation, syncope, irregular beats  GASTROINTESTINAL:  abdominal or rectal pain, diarrhea, constipation, . GENITOURINARY:  Burning, frequency, urgency, incontinence, discharge  INTEGUMENTARY: rash, wound, pruritis  HEMATOLOGIC/LYMPHATIC:  Swelling, sores, gum bleeding, easy bruising, pica.   MUSCULOSKELETAL:  pain, edema, joint swelling or redness  NEUROLOGICAL:  light headed, dizziness, loss of consciousness, weakness, change in memory, seizures, tremors    Objective:     Physical Exam  /65   Pulse 78   Temp 98.1 °F (36.7 °C)   Wt 137 lb (62.1 kg)   LMP 11/08/2008 (LMP Unknown)   SpO2 98% Comment: O2 3L/NC  BMI 22.80 kg/m²     Gen:  Alert, appears stated age, well nourished, in no acute distress  HEENT:  Normocephalic, conjunctiva pink, no drainage, mucosa moist  Neck:  Supple  Lungs:  CTA bilaterally, no audible rhonchi or wheezes noted  Heart[de-identified]  RRR, no murmur, rub, or gallop noted during exam  Abd:  Soft, non tender, non distended, BS+  M/S/Ext:  Moving all extremities, no edema, pulses present  Skin:  Warm and dry  Neuro:  PERRL, Alert, oriented x 3; following commands    [place] Symptom Assessment Score   [place] Score 2/20/2023 8/8/2022 2/8/2022   Pain Score No pain 5 1   Tiredness Score 4 8 7   Nausea Score 1 7 3   Depression Score Not depressed Worst possible depression 8   Anxiety Score 2 5 1   Drowsiness Score 1 1 Not drowsy   Appetite Score 3 5 4   Wellbeing Score 6 5 5   Dyspnea Score 9 8 8   Other Problem Score 1 Best possible response 3   Total Assessment Score(calculated) 27 54 40     Assessed by: patient and provider. Current Medications:  Medications reviewed: yes      ANNABELLE Palomino CNP  Palliative Medicine    Time/Communication  Greater than 50% of time spent, total 15 minutes in face-to-face counseling and coordination of care regarding goals of care, symptom management, diagnosis and prognosis and see above. Discussed the plan of care with the other IDT members of the Palliative Care Team, and with consultants, Primary Attending, patient, family and facility staff. Thank you for allowing Palliative Medicine to participate in the care of Favio Bingham. Note: This report was completed using computerize voiced recognition software. Every effort has been made to ensure accuracy; however, inadvertent computerized transcription errors may be present.

## 2023-02-27 DIAGNOSIS — F17.200 SMOKING: ICD-10-CM

## 2023-02-27 RX ORDER — BUPROPION HYDROCHLORIDE 100 MG/1
100 TABLET, EXTENDED RELEASE ORAL DAILY
Qty: 30 TABLET | Refills: 0 | Status: SHIPPED
Start: 2023-02-27 | End: 2023-03-30 | Stop reason: ALTCHOICE

## 2023-03-01 ENCOUNTER — OFFICE VISIT (OUTPATIENT)
Dept: CARDIOLOGY CLINIC | Age: 63
End: 2023-03-01
Payer: COMMERCIAL

## 2023-03-01 VITALS
BODY MASS INDEX: 23.19 KG/M2 | HEIGHT: 65 IN | HEART RATE: 78 BPM | RESPIRATION RATE: 16 BRPM | DIASTOLIC BLOOD PRESSURE: 62 MMHG | SYSTOLIC BLOOD PRESSURE: 122 MMHG | WEIGHT: 139.2 LBS

## 2023-03-01 DIAGNOSIS — I25.10 CORONARY ARTERY DISEASE INVOLVING NATIVE CORONARY ARTERY OF NATIVE HEART WITHOUT ANGINA PECTORIS: Primary | ICD-10-CM

## 2023-03-01 PROCEDURE — G8420 CALC BMI NORM PARAMETERS: HCPCS | Performed by: INTERNAL MEDICINE

## 2023-03-01 PROCEDURE — G8427 DOCREV CUR MEDS BY ELIG CLIN: HCPCS | Performed by: INTERNAL MEDICINE

## 2023-03-01 PROCEDURE — 3074F SYST BP LT 130 MM HG: CPT | Performed by: INTERNAL MEDICINE

## 2023-03-01 PROCEDURE — 93000 ELECTROCARDIOGRAM COMPLETE: CPT | Performed by: INTERNAL MEDICINE

## 2023-03-01 PROCEDURE — 3017F COLORECTAL CA SCREEN DOC REV: CPT | Performed by: INTERNAL MEDICINE

## 2023-03-01 PROCEDURE — 4004F PT TOBACCO SCREEN RCVD TLK: CPT | Performed by: INTERNAL MEDICINE

## 2023-03-01 PROCEDURE — G8482 FLU IMMUNIZE ORDER/ADMIN: HCPCS | Performed by: INTERNAL MEDICINE

## 2023-03-01 PROCEDURE — 99214 OFFICE O/P EST MOD 30 MIN: CPT | Performed by: INTERNAL MEDICINE

## 2023-03-01 PROCEDURE — 3078F DIAST BP <80 MM HG: CPT | Performed by: INTERNAL MEDICINE

## 2023-03-01 RX ORDER — ROPINIROLE 0.25 MG/1
TABLET, FILM COATED ORAL
Qty: 120 TABLET | Refills: 0 | Status: SHIPPED
Start: 2023-03-01 | End: 2023-03-30 | Stop reason: SDUPTHER

## 2023-03-01 NOTE — PROGRESS NOTES
[facility] consult  Dr. Taylor Portal      Reason for Consult: CHF  Referring Physician: Dr. Marky Gooden:   Chief Complaint   Patient presents with    Coronary Artery Disease     5 mo ov. Patient has palpitations. HISTORY OF PRESENT ILLNESS:   Patient is 60year old female with history of CAD, s/p aortic valve replacement, dizziness s/p loop recorder implant, COPD, HTN and hyperlipidemia is here for follow-up. Patient denies any chest pain, no shortness of breath, no lightheadedness, no dizziness, no palpitations, no pedal edema, no PND, no orthopnea, no syncope, no presyncopal episodes.     Functional capacity is at baseline    Past Medical History:   Diagnosis Date    Aneurysm ([facility] [address]     THORACIC AORTIC    Anticoagulant long-term use     Anxiety     Ascending aortic aneurysm     Asthma     CAD (coronary artery disease)     Chronic back pain     Constipation 05/19/2021    COPD (chronic obstructive pulmonary disease) (HCC)     Depression     Diverticulosis     GERD (gastroesophageal reflux disease)     H/O mechanical aortic valve replacement 01/05/2017    Headache     Hyperlipidemia     Hypertension     On warfarin at home     for AVR    Restless legs syndrome     S/P AVR     Status post placement of implantable loop recorder     Syncope, cardiogenic 09/10/2020    Tobacco abuse     Urinary incontinence     Ventricular tachycardia, non-sustained     Warfarin-induced coagulopathy ([address] 12/11/2011         Past Surgical History:   Procedure Laterality Date    APPENDECTOMY      CARDIAC CATHETERIZATION  09/11/2020    Dr. Layton Quinn  2009    Aortic valve 2009 [facility] [address] [facility] N/A 9/9/2022    CHOLECYSTECTOMY LAPAROSCOPIC performed by Andrew Lorenzo MD at [address] [address] [place] N/A 11/18/2019    COLONOSCOPY POLYPECTOMY SNARE/COLD BIOPSY performed by Andrew Lorenzo MD at [address] [facility] [facility] N/A 08/10/2022    COLONOSCOPY POLYPECTOMY SNARE/COLD BIOPSY performed by Ashanti Uriarte MD at [address] [place] [address]  08/10/2022    COLONOSCOPY SUBMUCOSAL/BOTOX INJECTION performed by Ashanti Uriarte MD at [facility], DIAGNOSTIC      HYSTERECTOMY (CERVIX STATUS UNKNOWN)      INSERTABLE CARDIAC MONITOR  09/11/2020    Linq Insertion    Dr. Mery Sainz    KEM STEROTACTIC LOC BREAST BIOPSY RIGHT Right 03/10/2021    KEM STEROTACTIC LOC BREAST BIOPSY RIGHT 3/10/2021 [facility]    TUBAL LIGATION      UPPER GASTROINTESTINAL ENDOSCOPY N/A 11/18/2019    EGD BIOPSY performed by Ashanti Uriarte MD at [facility] [place] 08/10/2022    EGD BIOPSY performed by Ashanti Uriarte MD at [facility] ENDOSCOPY         Current Outpatient Medications   Medication Sig Dispense Refill    buPROPion (WELLBUTRIN SR) 100 MG extended release tablet TAKE 1 TABLET BY MOUTH DAILY 30 tablet 0    BREZTRI AEROSPHERE 160-9-4.8 MCG/ACT AERO INHALE 2 PUFFS BY MOUTH TWICE A DAY 1 each 12    fluticasone (FLONASE) 50 MCG/ACT nasal spray INSTILL 2 SPRAYS IN EACH NOSTRIL DAILY 16 g 2    Azelastine HCl 137 MCG/SPRAY SOLN INSTILL 2 SPRAYS IN EACH NOSTRIL DAILY 30 mL 1    sertraline (ZOLOFT) 100 MG tablet take 1 and 1/2 tablet by mouth once daily 135 tablet 1    montelukast (SINGULAIR) 10 MG tablet take 1 tablet by mouth at bedtime 90 tablet 1    lisinopril (PRINIVIL;ZESTRIL) 40 MG tablet TAKE 1 TABLET BY MOUTH ONCE DAILY 90 tablet 1    aspirin EC 81 MG EC tablet Take 1 tablet by mouth daily 90 tablet 1    atorvastatin (LIPITOR) 20 MG tablet Take 1 tablet by mouth daily 90 tablet 1    famotidine (PEPCID) 40 MG tablet Take 1 tablet by mouth daily 90 tablet 1    Magnesium Oxide (MAGNESIUM-OXIDE) 250 MG TABS tablet Take 1 tablet by mouth daily 90 tablet 0    ferrous sulfate (IRON 325) 325 (65 Fe) MG tablet Take 1 tablet by mouth daily (with breakfast) Or lunch 90 tablet 1    albuterol sulfate HFA (PROVENTIL HFA) 108 (90 Base) MCG/ACT inhaler Inhale 2 puffs into the lungs every 6 hours as needed for Wheezing 1 each 3    metoprolol succinate (TOPROL XL) 25 MG extended release tablet take 1 tablet by mouth once daily 90 tablet 3    albuterol (PROVENTIL) (2.5 MG/3ML) 0.083% nebulizer solution Take 3 mLs by nebulization every 6 hours as needed for Wheezing or Shortness of Breath 120 each 6    rOPINIRole (REQUIP) 0.25 MG tablet TAKE 2 TABLETS BY MOUTH IN  THE MORNING AND 2 TABLETS  BY MOUTH BEFORE BEDTIME AS  DIRECTED 360 tablet 1    Roflumilast (DALIRESP) 250 MCG tablet TAKE 1 TABLET BY MOUTH  DAILY 84 tablet 3    Cholecalciferol (VITAMIN D3) 125 MCG (5000 UT) CAPS Take 1 capsule by mouth in the morning and at bedtime      warfarin (COUMADIN) 5 MG tablet Take 1 tablet by mouth daily except for Monday and Fridays take 1 1/2 (7.5mg) (Patient taking differently: Take 5 mg by mouth daily 5 mg daily) 132 tablet 3    Inulin (FIBER CHOICE PO) Take 2 capsules by mouth 2 times daily      Blood Pressure Monitoring (BLOOD PRESSURE CUFF) MISC Dx:  Hypertension with labile blood pressure (Patient taking differently: Dx:  Hypertension with labile blood pressure) 1 each 0    nicotine (NICODERM CQ) 21 MG/24HR Place 1 patch onto the skin daily (Patient not taking: Reported on 11/14/2022) 42 patch 3     No current facility-administered medications for this visit.          Allergies as of 03/01/2023 - Fully Reviewed 03/01/2023   Allergen Reaction Noted    Keflex [cephalexin] Itching 12/29/2016    Phenergan [promethazine hcl] Other (See Comments) 12/29/2016       Social History     Socioeconomic History    Marital status: Single     Spouse name: Not on file    Number of children: 3    Years of education: 12    Highest education level: Not on file   Occupational History    Occupation: Disabled   Tobacco Use    Smoking status: Every Day     Packs/day: 1.00     Years: 50.00     Pack years: 50.00     Types: Cigarettes    Smokeless tobacco: Never    Tobacco comments:     1 pack   Vaping Use    Vaping Use: Former    Quit date: 1/1/2015   Substance and Sexual Activity    Alcohol use: Yes     Comment: rare wine    Drug use: Never    Sexual activity: Not Currently     Partners: Male   Other Topics Concern    Not on file   Social History Narrative    Drinks 2 cups of coffee daily & occ Coke     Social Determinants of Health     Financial Resource Strain: Not on file   Food Insecurity: Not on file   Transportation Needs: Not on file   Physical Activity: Not on file   Stress: Not on file   Social Connections: Not on file   Intimate Partner Violence: Not on file   Housing Stability: Not on file       Family History   Problem Relation Age of Onset    Heart Disease Mother     Arthritis Mother     Asthma Mother     Diabetes Mother     High Blood Pressure Mother     High Cholesterol Mother     Stroke Mother     Heart Disease Father     Arthritis Father     Asthma Father     Diabetes Father     High Blood Pressure Father     High Cholesterol Father     Breast Cancer Sister     Other Brother         ELMO on CPap;    Has ICD    Asthma Brother     COPD Brother     Arthritis Paternal Grandmother     Asthma Paternal Grandmother     Diabetes Maternal Aunt     Diabetes Paternal Aunt     High Blood Pressure Paternal Aunt        REVIEW OF SYSTEMS:   CONSTITUTIONAL:  negative for  fevers, chills, sweats, + fatigue  HEENT:  negative for  tinnitus, earaches, nasal congestion and epistaxis  RESPIRATORY: Occasional dry cough, cough with sputum,wheezing, negative for hemoptysis  GASTROINTESTINAL:  negative for nausea, vomiting, diarrhea, constipation, pruritus and jaundice  HEMATOLOGIC/LYMPHATIC:  negative for easy bruising, bleeding, lymphadenopathy and petechiae  ENDOCRINE:  negative for heat intolerance, cold intolerance, tremor, hair loss and diabetic symptoms including neither polyuria nor polydipsia nor blurred vision  MUSCULOSKELETAL:  negative for  myalgias, arthralgias, joint swelling, stiff joints and decreased range of motion  NEUROLOGICAL:  negative for memory problems, speech problems, visual disturbance, dysphagia, weakness and numbness      PHYSICAL EXAM:   CONSTITUTIONAL:  awake, alert, cooperative, no apparent distress, and appears older than stated age  HEAD:  normocepalic, without obvious abnormality, atraumatic, pink, moist mucous membranes. NECK:  Supple, symmetrical, trachea midline, no adenopathy, thyroid symmetric, not enlarged and no tenderness, skin normal  LUNGS:  No increased work of breathing, decreased air exchange, fine scattered wheezing  CARDIOVASCULAR:  Normal apical impulse, regular rate and rhythm, mechanical heart sounds, 3/6 diastolic murmur at the right upper sternal border, 2 out of 6 systolic murmur at the right upper sternal border, no JVD, no carotid bruit, trace pedal edema, good carotid upstroke bilaterally. ABDOMEN:  Soft, nontender, no masses, no hepatomegaly or splenomegaly, BS+  CHEST: nontender to palpation, expands symmetrically  MUSCULOSKELETAL:  No clubbing no cyanosis. there is no redness, warmth, or swelling of the joints  full range of motion noted  NEUROLOGIC:  Alert, awake,oriented x3  SKIN:  no bruising or bleeding, normal skin color, texture, turgor and no redness, warmth, or swelling    /62   Pulse 78   Resp 16   Ht 5' 5\" (1.651 m)   Wt 139 lb 3.2 oz (63.1 kg)   LMP 11/08/2008 (LMP Unknown)   BMI 23.16 kg/m²     DATA:   I personally reviewed the visit EKG with the following interpretation: Sinus rhythm, normal axis, poor R wave progression    EKG 10/5/2022, sinus rhythm, possible old anteroseptal wall MI age undetermined, occasional PVCs, nonspecific T wave changes in the lateral leads    EKG 2/10/2022, sinus rhythm, nonspecific ST changes, normal axis    EKG 7/6/21 Sinus rhythm, nonspecific T wave changes, no significant changes when compared to previous      ECHO: 9/9/20 Summary   Left ventricle is normal in size . Borderline concentric left ventricular hypertrophy. Septal motion consistent with post open heart state . Ejection fraction is visually estimated at 55-60%. Normal diastolic function. Normal right ventricular size and function. Normal sized left atrium. There is a mechanical aortic prosthetic valve which is well seated with a   mean gradient of 8.14 mmHg and trace aortic regurgitation   Mildly dilated aortic root. Stress Test: 9/10/20       FINDINGS:    Perfusion images demonstrate no reversible perfusion defect. There is    a fixed perfusion abnormality at the anterior wall. Wall motion is globally very mildly hypokinetic. The right ventricle    is hypertrophied. The end diastolic volume is 86 ml. The end systolic volume is 44 ml. The estimated ejection fraction is 49 %. Impression    1. No reversible perfusion defect. Fixed anterior wall perfusion    abnormality. 2. Ejection fraction is 49 %. 3. Global very mild hypokinesis. RVH. Angiography 9/10/2020,CONCLUSIONS:  1. Coronary artery. A.  Left main. Aneurysmal formation in the mid left main without any  significant angiographic luminal narrowings. B.  LAD. No significant angiographic disease. C.  LCX. No significant angiographic disease. D.  RCA. Dominant vessel with around 40-50% mid vessel narrowing. 2.  Bileaflet mechanical valve with adequate leaflet mobility noted on  fluoroscopy.     Cardiology Labs: BMP:    Lab Results   Component Value Date/Time     12/12/2022 04:03 PM    K 3.8 12/12/2022 04:03 PM    K 3.8 08/27/2022 12:57 PM     12/12/2022 04:03 PM    CO2 27 12/12/2022 04:03 PM    BUN 9 12/12/2022 04:03 PM    CREATININE 0.8 12/12/2022 04:03 PM     CMP:    Lab Results   Component Value Date/Time     12/12/2022 04:03 PM    K 3.8 12/12/2022 04:03 PM    K 3.8 08/27/2022 12:57 PM     12/12/2022 04:03 PM    CO2 27 12/12/2022 04:03 PM    BUN 9 12/12/2022 04:03 PM    CREATININE 0.8 12/12/2022 04:03 PM    PROT 6.9 11/14/2022 12:48 PM     CBC:    Lab Results   Component Value Date/Time    WBC 6.5 11/14/2022 12:48 PM    RBC 3.48 11/14/2022 12:48 PM    HGB 10.4 11/14/2022 12:48 PM    HCT 32.7 11/14/2022 12:48 PM    MCV 94.0 11/14/2022 12:48 PM    RDW 15.6 11/14/2022 12:48 PM     11/14/2022 12:48 PM     PT/INR:  No results found for: PTINR  PT/INR Warfarin:  No components found for: PTPATWAR, PTINRWAR  PTT:    Lab Results   Component Value Date/Time    APTT 27.7 09/09/2022 10:45 AM     PTT Heparin:  No components found for: APTTHEP  Magnesium:    Lab Results   Component Value Date/Time    MG 1.5 11/14/2022 12:48 PM     TSH:    Lab Results   Component Value Date/Time    TSH 2.630 09/10/2020 09:03 AM     TROPONIN:  No components found for: TROP  BNP:  No results found for: BNP  FASTING LIPID PANEL:    Lab Results   Component Value Date/Time    CHOL 164 04/28/2022 09:09 AM    HDL 58 04/28/2022 09:09 AM    TRIG 97 04/28/2022 09:09 AM     No orders to display     I have personally reviewed the laboratory, cardiac diagnostic and radiographic testing as outlined above:      IMPRESSION:  1. Chronic diastolic congestive heart failure: Compensated, continue current treatment  2. S/p aortic valve replacement: Using mechanical aortic valve, echocardiogram done in February 2020 revealed normally functioning mechanical aortic valve. 3.  COPD  4. Tobacco abuse: Patient was counseled regarding smoking cessation  5. Chronic anticoagulation    RECOMMENDATIONS:   continue current treatment  CHF: Daily weight, take an extra Lasix for weight gain of more than 2-3 pounds in 24 hours, compliance with diuretics, low-salt diet were all advised.   Compliance with the Coumadin dose, PT and INR check appointments was strongly advised   Increase risk of bleeding due to being on anti-coagulation, symptoms and signs of bleeding discussed with patient, patient was advised to seek medical attention at the earliest symptoms or signs of bleeding. Infective endocarditis prophylaxis prior to invasive procedure especially dental procedures discussed with her  Follow-up with Dr. Aundrea Rodgers as scheduled  Follow-up with Dr. Stalin Burrell in 6 months, sooner if symptomatic for any reason    I have reviewed my findings and recommendations with patient    Electronically signed by Maximino Cortez MD on 3/1/2023 at 11:53 AM  NOTE: This report was transcribed using voice recognition software.  Every effort was made to ensure accuracy; however, inadvertent computerized transcription errors may be present

## 2023-03-13 ENCOUNTER — HOSPITAL ENCOUNTER (OUTPATIENT)
Age: 63
Discharge: HOME | End: 2023-03-13
Payer: COMMERCIAL

## 2023-03-13 DIAGNOSIS — E83.42 HYPOMAGNESEMIA: ICD-10-CM

## 2023-03-13 DIAGNOSIS — I10 ESSENTIAL HYPERTENSION: ICD-10-CM

## 2023-03-13 DIAGNOSIS — D50.8 IRON DEFICIENCY ANEMIA SECONDARY TO INADEQUATE DIETARY IRON INTAKE: ICD-10-CM

## 2023-03-13 LAB
ANION GAP SERPL CALCULATED.3IONS-SCNC: 11 MMOL/L (ref 7–16)
BASOPHILS ABSOLUTE: 0.04 E9/L (ref 0–0.2)
BASOPHILS RELATIVE PERCENT: 0.4 % (ref 0–2)
BUN BLDV-MCNC: 9 MG/DL (ref 6–23)
CALCIUM SERPL-MCNC: 9.1 MG/DL (ref 8.6–10.2)
CHLORIDE BLD-SCNC: 103 MMOL/L (ref 98–107)
CO2: 27 MMOL/L (ref 22–29)
CREAT SERPL-MCNC: 0.6 MG/DL (ref 0.5–1)
EOSINOPHILS ABSOLUTE: 0.04 E9/L (ref 0.05–0.5)
EOSINOPHILS RELATIVE PERCENT: 0.4 % (ref 0–6)
GFR SERPL CREATININE-BSD FRML MDRD: >60 ML/MIN/1.73
GLUCOSE BLD-MCNC: 124 MG/DL (ref 74–99)
HCT VFR BLD CALC: 36.5 % (ref 34–48)
HEMOGLOBIN: 12 G/DL (ref 11.5–15.5)
IMMATURE GRANULOCYTES #: 0.06 E9/L
IMMATURE GRANULOCYTES %: 0.6 % (ref 0–5)
IRON SATURATION: 85 % (ref 15–50)
IRON: 232 MCG/DL (ref 37–145)
LYMPHOCYTES ABSOLUTE: 2.35 E9/L (ref 1.5–4)
LYMPHOCYTES RELATIVE PERCENT: 24 % (ref 20–42)
MAGNESIUM: 1.8 MG/DL (ref 1.6–2.6)
MCH RBC QN AUTO: 33.3 PG (ref 26–35)
MCHC RBC AUTO-ENTMCNC: 32.9 % (ref 32–34.5)
MCV RBC AUTO: 101.4 FL (ref 80–99.9)
MONOCYTES ABSOLUTE: 0.95 E9/L (ref 0.1–0.95)
MONOCYTES RELATIVE PERCENT: 9.7 % (ref 2–12)
NEUTROPHILS ABSOLUTE: 6.36 E9/L (ref 1.8–7.3)
NEUTROPHILS RELATIVE PERCENT: 64.9 % (ref 43–80)
PDW BLD-RTO: 16 FL (ref 11.5–15)
PLATELET # BLD: 245 E9/L (ref 130–450)
PMV BLD AUTO: 10.8 FL (ref 7–12)
POTASSIUM SERPL-SCNC: 3.9 MMOL/L (ref 3.5–5)
RBC # BLD: 3.6 E12/L (ref 3.5–5.5)
SODIUM BLD-SCNC: 141 MMOL/L (ref 132–146)
TOTAL IRON BINDING CAPACITY: 274 MCG/DL (ref 250–450)
WBC # BLD: 9.8 E9/L (ref 4.5–11.5)

## 2023-03-13 PROCEDURE — 83550 IRON BINDING TEST: CPT

## 2023-03-13 PROCEDURE — 83735 ASSAY OF MAGNESIUM: CPT

## 2023-03-13 PROCEDURE — 85025 COMPLETE CBC W/AUTO DIFF WBC: CPT

## 2023-03-13 PROCEDURE — 83540 ASSAY OF IRON: CPT

## 2023-03-13 PROCEDURE — 80048 BASIC METABOLIC PNL TOTAL CA: CPT

## 2023-03-14 ENCOUNTER — TELEPHONE (OUTPATIENT)
Dept: RHEUMATOLOGY | Age: 63
End: 2023-03-14

## 2023-03-14 ENCOUNTER — ANTI-COAG VISIT (OUTPATIENT)
Dept: CARDIOLOGY CLINIC | Age: 63
End: 2023-03-14
Payer: COMMERCIAL

## 2023-03-14 DIAGNOSIS — E61.1 IRON DEFICIENCY: Primary | ICD-10-CM

## 2023-03-14 DIAGNOSIS — Z95.2 H/O MECHANICAL AORTIC VALVE REPLACEMENT: ICD-10-CM

## 2023-03-14 LAB
INTERNATIONAL NORMALIZATION RATIO, POC: 1.7
PROTHROMBIN TIME, POC: 0

## 2023-03-14 PROCEDURE — 93793 ANTICOAG MGMT PT WARFARIN: CPT | Performed by: INTERNAL MEDICINE

## 2023-03-14 NOTE — PROGRESS NOTES
INR today is low at 1.7. Per Dr Marciano Lama, take 10mg x 2 days, then resume 5mg daily. Recheck INR in 1 month.

## 2023-03-14 NOTE — TELEPHONE ENCOUNTER
Dr. Fidencio Bell wanted pt notified ,and instructed to stop taking Fe due to elevated level , Fe Sat was 85 Fe 232 lab work was ordered instructed to get lab work done soon  Pt has an appointment scheduled on 3/27/23

## 2023-03-21 ENCOUNTER — HOSPITAL ENCOUNTER (OUTPATIENT)
Age: 63
Discharge: HOME | End: 2023-03-21
Payer: COMMERCIAL

## 2023-03-21 DIAGNOSIS — E61.1 IRON DEFICIENCY: ICD-10-CM

## 2023-03-21 LAB
FERRITIN SERPL-MCNC: 55 NG/ML
IRON SATN MFR SERPL: 64 % (ref 15–50)
IRON SERPL-MCNC: 171 MCG/DL (ref 37–145)
TIBC SERPL-MCNC: 268 MCG/DL (ref 250–450)

## 2023-03-21 PROCEDURE — 83550 IRON BINDING TEST: CPT

## 2023-03-21 PROCEDURE — 83540 ASSAY OF IRON: CPT

## 2023-03-21 PROCEDURE — 36415 COLL VENOUS BLD VENIPUNCTURE: CPT

## 2023-03-21 PROCEDURE — 82728 ASSAY OF FERRITIN: CPT

## 2023-03-27 DIAGNOSIS — G25.81 RESTLESS LEG SYNDROME: ICD-10-CM

## 2023-03-27 RX ORDER — ROPINIROLE 0.25 MG/1
TABLET, FILM COATED ORAL
Qty: 120 TABLET | Refills: 0 | OUTPATIENT
Start: 2023-03-27

## 2023-03-27 NOTE — TELEPHONE ENCOUNTER
Refill request auto generated from pharmacy.  Medication requests only to be filled upon patient visits.  This request will not be honored.

## 2023-03-30 ENCOUNTER — OFFICE VISIT (OUTPATIENT)
Dept: INTERNAL MEDICINE | Age: 63
End: 2023-03-30
Payer: COMMERCIAL

## 2023-03-30 VITALS
OXYGEN SATURATION: 96 % | HEIGHT: 65 IN | WEIGHT: 138.5 LBS | TEMPERATURE: 98.2 F | SYSTOLIC BLOOD PRESSURE: 134 MMHG | HEART RATE: 72 BPM | DIASTOLIC BLOOD PRESSURE: 63 MMHG | RESPIRATION RATE: 16 BRPM | BODY MASS INDEX: 23.07 KG/M2

## 2023-03-30 DIAGNOSIS — I71.00 DISSECTION OF AORTA, UNSPECIFIED PORTION OF AORTA (HCC): ICD-10-CM

## 2023-03-30 DIAGNOSIS — F32.A DEPRESSION, UNSPECIFIED DEPRESSION TYPE: ICD-10-CM

## 2023-03-30 DIAGNOSIS — G25.81 RESTLESS LEG SYNDROME: ICD-10-CM

## 2023-03-30 DIAGNOSIS — Z79.01 CHRONIC ANTICOAGULATION: ICD-10-CM

## 2023-03-30 DIAGNOSIS — Z95.2 H/O MECHANICAL AORTIC VALVE REPLACEMENT: ICD-10-CM

## 2023-03-30 DIAGNOSIS — Z72.0 TOBACCO ABUSE: ICD-10-CM

## 2023-03-30 DIAGNOSIS — J30.2 SEASONAL ALLERGIES: ICD-10-CM

## 2023-03-30 DIAGNOSIS — Z00.00 MEDICARE ANNUAL WELLNESS VISIT, SUBSEQUENT: Primary | ICD-10-CM

## 2023-03-30 DIAGNOSIS — G47.62 NOCTURNAL LEG CRAMPS: ICD-10-CM

## 2023-03-30 DIAGNOSIS — I71.43 INFRARENAL ABDOMINAL AORTIC ANEURYSM (AAA) WITHOUT RUPTURE (HCC): ICD-10-CM

## 2023-03-30 DIAGNOSIS — I10 ESSENTIAL HYPERTENSION: ICD-10-CM

## 2023-03-30 DIAGNOSIS — F17.200 SMOKING: ICD-10-CM

## 2023-03-30 DIAGNOSIS — I10 PRIMARY HYPERTENSION: ICD-10-CM

## 2023-03-30 DIAGNOSIS — J44.9 CHRONIC OBSTRUCTIVE PULMONARY DISEASE, UNSPECIFIED COPD TYPE (HCC): ICD-10-CM

## 2023-03-30 DIAGNOSIS — J45.50 SEVERE PERSISTENT ASTHMA WITHOUT COMPLICATION: Chronic | ICD-10-CM

## 2023-03-30 DIAGNOSIS — F33.0 MILD EPISODE OF RECURRENT MAJOR DEPRESSIVE DISORDER (HCC): ICD-10-CM

## 2023-03-30 DIAGNOSIS — J44.0 CHRONIC OBSTRUCTIVE PULMONARY DISEASE WITH ACUTE LOWER RESPIRATORY INFECTION (HCC): ICD-10-CM

## 2023-03-30 PROCEDURE — 3078F DIAST BP <80 MM HG: CPT | Performed by: INTERNAL MEDICINE

## 2023-03-30 PROCEDURE — 3017F COLORECTAL CA SCREEN DOC REV: CPT | Performed by: INTERNAL MEDICINE

## 2023-03-30 PROCEDURE — 3074F SYST BP LT 130 MM HG: CPT | Performed by: INTERNAL MEDICINE

## 2023-03-30 PROCEDURE — 99212 OFFICE O/P EST SF 10 MIN: CPT | Performed by: INTERNAL MEDICINE

## 2023-03-30 PROCEDURE — G0438 PPPS, INITIAL VISIT: HCPCS | Performed by: INTERNAL MEDICINE

## 2023-03-30 PROCEDURE — G8482 FLU IMMUNIZE ORDER/ADMIN: HCPCS | Performed by: INTERNAL MEDICINE

## 2023-03-30 RX ORDER — AZELASTINE HYDROCHLORIDE 137 UG/1
SPRAY, METERED NASAL
Qty: 30 ML | Refills: 2 | Status: SHIPPED | OUTPATIENT
Start: 2023-03-30

## 2023-03-30 RX ORDER — SERTRALINE HYDROCHLORIDE 100 MG/1
200 TABLET, FILM COATED ORAL DAILY
Qty: 180 TABLET | Refills: 1 | Status: SHIPPED | OUTPATIENT
Start: 2023-03-30

## 2023-03-30 RX ORDER — LISINOPRIL 40 MG/1
TABLET ORAL
Qty: 90 TABLET | Refills: 1 | Status: SHIPPED | OUTPATIENT
Start: 2023-03-30

## 2023-03-30 RX ORDER — FLUTICASONE PROPIONATE 50 MCG
SPRAY, SUSPENSION (ML) NASAL
Qty: 16 G | Refills: 2 | Status: SHIPPED | OUTPATIENT
Start: 2023-03-30

## 2023-03-30 RX ORDER — ALBUTEROL SULFATE 90 UG/1
2 AEROSOL, METERED RESPIRATORY (INHALATION) EVERY 6 HOURS PRN
Qty: 1 EACH | Refills: 3 | Status: SHIPPED | OUTPATIENT
Start: 2023-03-30

## 2023-03-30 RX ORDER — MONTELUKAST SODIUM 10 MG/1
TABLET ORAL
Qty: 90 TABLET | Refills: 1 | Status: SHIPPED | OUTPATIENT
Start: 2023-03-30

## 2023-03-30 RX ORDER — METOPROLOL SUCCINATE 25 MG/1
TABLET, EXTENDED RELEASE ORAL
Qty: 90 TABLET | Refills: 3 | Status: SHIPPED | OUTPATIENT
Start: 2023-03-30

## 2023-03-30 RX ORDER — ROPINIROLE 0.25 MG/1
0.5 TABLET, FILM COATED ORAL 2 TIMES DAILY
Qty: 120 TABLET | Refills: 2 | Status: SHIPPED | OUTPATIENT
Start: 2023-03-30

## 2023-03-30 ASSESSMENT — LIFESTYLE VARIABLES
HOW MANY STANDARD DRINKS CONTAINING ALCOHOL DO YOU HAVE ON A TYPICAL DAY: PATIENT DOES NOT DRINK
HOW OFTEN DO YOU HAVE A DRINK CONTAINING ALCOHOL: NEVER

## 2023-03-30 ASSESSMENT — PATIENT HEALTH QUESTIONNAIRE - PHQ9
4. FEELING TIRED OR HAVING LITTLE ENERGY: 3
10. IF YOU CHECKED OFF ANY PROBLEMS, HOW DIFFICULT HAVE THESE PROBLEMS MADE IT FOR YOU TO DO YOUR WORK, TAKE CARE OF THINGS AT HOME, OR GET ALONG WITH OTHER PEOPLE: 0
6. FEELING BAD ABOUT YOURSELF - OR THAT YOU ARE A FAILURE OR HAVE LET YOURSELF OR YOUR FAMILY DOWN: 1
SUM OF ALL RESPONSES TO PHQ QUESTIONS 1-9: 6
1. LITTLE INTEREST OR PLEASURE IN DOING THINGS: 1
5. POOR APPETITE OR OVEREATING: 0
9. THOUGHTS THAT YOU WOULD BE BETTER OFF DEAD, OR OF HURTING YOURSELF: 0
3. TROUBLE FALLING OR STAYING ASLEEP: 1
SUM OF ALL RESPONSES TO PHQ9 QUESTIONS 1 & 2: 1
2. FEELING DOWN, DEPRESSED OR HOPELESS: 0
SUM OF ALL RESPONSES TO PHQ QUESTIONS 1-9: 6
SUM OF ALL RESPONSES TO PHQ QUESTIONS 1-9: 6
8. MOVING OR SPEAKING SO SLOWLY THAT OTHER PEOPLE COULD HAVE NOTICED. OR THE OPPOSITE, BEING SO FIGETY OR RESTLESS THAT YOU HAVE BEEN MOVING AROUND A LOT MORE THAN USUAL: 0
7. TROUBLE CONCENTRATING ON THINGS, SUCH AS READING THE NEWSPAPER OR WATCHING TELEVISION: 0
SUM OF ALL RESPONSES TO PHQ QUESTIONS 1-9: 6

## 2023-03-30 NOTE — PATIENT INSTRUCTIONS
red, or yellow fruits and vegetables are especially good for you. Examples include spinach, carrots, peaches, and berries. Foods high in fiber can reduce your cholesterol and provide important vitamins and minerals. High-fiber foods include whole-grain cereals and breads, oatmeal, beans, brown rice, citrus fruits, and apples. Eat lean proteins. Heart-healthy proteins include seafood, lean meats and poultry, eggs, beans, peas, nuts, seeds, and soy products. Limit drinks and foods with added sugar. These include candy, desserts, and soda pop. Lifestyle changes    If your doctor recommends it, get more exercise. Walking is a good choice. Bit by bit, increase the amount you walk every day. Try for at least 30 minutes on most days of the week. You also may want to swim, bike, or do other activities. Do not smoke. If you need help quitting, talk to your doctor about stop-smoking programs and medicines. These can increase your chances of quitting for good. Quitting smoking may be the most important step you can take to protect your heart. It is never too late to quit. Limit alcohol to 2 drinks a day for men and 1 drink a day for women. Too much alcohol can cause health problems. Manage other health problems such as diabetes, high blood pressure, and high cholesterol. If you think you may have a problem with alcohol or drug use, talk to your doctor. Medicines    Take your medicines exactly as prescribed. Call your doctor if you think you are having a problem with your medicine. If your doctor recommends aspirin, take the amount directed each day. Make sure you take aspirin and not another kind of pain reliever, such as acetaminophen (Tylenol). When should you call for help? Call 911 if you have symptoms of a heart attack. These may include:    Chest pain or pressure, or a strange feeling in the chest.     Sweating. Shortness of breath.      Pain, pressure, or a strange feeling in the

## 2023-03-30 NOTE — PROGRESS NOTES
[facility]    Attending Physician Statement  I have discussed the case, including pertinent history and exam findings with the resident physician. I have personally seen patient. I agree with the assessment, plan and orders as documented by the resident. I have reviewed all pertinent PMHx, PSHx, FamHx, SocialHx, medications, and allergies and updated history as appropriate. Patient here for annual wellness visit. There has been an issue with wellbutrin side effects of tremor which has improved since decreasing dose but still problematic. Patient states she was feeling much better from a depression standpoint on it but is wanting to stop the wellbutrin due to persistent side effect of tremor and is agreeable to increasing dose of zoloft. 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 Agree with f/u in about a month. Remainder of medical problems as per resident note.     Letty Lopez DO  3/30/2023 11:02 AM
satiety and post-prandial nausea since January 2022, when she was ill with COVID. This evolved to include epigastric and chest pain w/ food aversion. - RESOLVED   Following with General Surgery, s/p EGD which was largely unremarkable. Advised by Dr. Gege Lui to take Imodium and/or fiber for recurrent symptoms of nausea/diarrhea     Tobacco abuse   50+pack year hx  CT lung ordered  Pre-contemplative on quitting  Stop Wellbutrin as discussed above. Unresectable polyps   On prior colonoscopy pt was found to have multiple colon polyps throughout colon. 2 unresectable polyps noted by Dr. Gege Lui. Plan per Dr. Evelio Mtz note is to repeat colonoscopy under general and she had to attend polyp removal.- Scheduled appt for April 2023. Abdominal AAA - stable   Following with Vascular Surgery, Dr. Christina Angulo    Aneurysm is stable, measuring approx 3.6cm  Plan for repeat US in 1 year - 10/ 2023      CAD s/p AVR w/ mechanical aortic valve - stable   Following with Cardiology - Dr. Pacheco Barrios   Pt taking Coumadin - last INR 1.7 requiring adjustment       Restless leg   Requip   Will refill. Patient's complete Health Risk Assessment and screening values have been reviewed and are found in Flowsheets. The following problems were reviewed today and where indicated follow up appointments were made and/or referrals ordered. Positive Risk Factor Screenings with Interventions:        Depression:  PHQ-2 Score: 1  PHQ-9 Total Score: 6    Interpretation:   1-4 = minimal  5-9 = mild  10-14 = moderate  15-19 = moderately severe  20-27 = severe  Interventions:  Medications adjusted: increase zoloft to 200 mg daily.  Stop wellbutrin   Monitored by specialist. No acute findings meriting change in the plan            Weight and Activity:  Physical Activity: Inactive    Days of Exercise per Week: 0 days    Minutes of Exercise per Session: 0 min     On average, how many days per week do you engage in moderate to strenuous exercise

## 2023-05-03 DIAGNOSIS — Z95.2 H/O MECHANICAL AORTIC VALVE REPLACEMENT: ICD-10-CM

## 2023-05-03 DIAGNOSIS — I10 ESSENTIAL HYPERTENSION: ICD-10-CM

## 2023-05-04 RX ORDER — WARFARIN SODIUM 5 MG/1
5 TABLET ORAL DAILY
Qty: 90 TABLET | Refills: 3 | Status: SHIPPED | OUTPATIENT
Start: 2023-05-04

## 2023-05-04 RX ORDER — METOPROLOL SUCCINATE 25 MG/1
TABLET, EXTENDED RELEASE ORAL
Qty: 90 TABLET | Refills: 3 | Status: SHIPPED | OUTPATIENT
Start: 2023-05-04

## 2023-05-05 DIAGNOSIS — J44.9 CHRONIC OBSTRUCTIVE PULMONARY DISEASE, UNSPECIFIED COPD TYPE (HCC): ICD-10-CM

## 2023-05-05 RX ORDER — ROFLUMILAST 250 UG/1
TABLET ORAL
Qty: 90 TABLET | Refills: 0 | Status: SHIPPED | OUTPATIENT
Start: 2023-05-05

## 2023-05-05 NOTE — TELEPHONE ENCOUNTER
This medicine should be Rx and response monitored by pulmonary doctor- to see if effective in decreasing mucus production chronically in severe copd- will give 90 day supply to discuss with PCP (vs pulmonary which I dont' see fu)

## 2023-05-05 NOTE — TELEPHONE ENCOUNTER
Last Appointment:  11/14/2022  Future Appointments   Date Time Provider Esmer Lee   5/9/2023 10:30 AM SCHEDULE, Troy Mo CARDIO [facility]   5/25/2023  2:30 PM [facility] CT SCAN 3 SEYZ CT [facility] Radiolo   6/20/2023  2:30 PM Sera Fabian DO ACC IM [facility]   10/4/2023  8:15 AM [facility] VAS US RM 1 SEYZ CARDIO [facility]   10/4/2023  9:00 AM ANNABELLE Huerta CNP VASC/MED [facility]   2/19/2024  1:00 PM SCHEDULE,  PALLIATIVE CARE PROVIDER KEMAL BHAKTA [facility]

## 2023-05-08 DIAGNOSIS — I71.43 INFRARENAL ABDOMINAL AORTIC ANEURYSM (AAA) WITHOUT RUPTURE (HCC): Primary | ICD-10-CM

## 2023-05-08 PROCEDURE — 85610 PROTHROMBIN TIME: CPT | Performed by: INTERNAL MEDICINE

## 2023-05-24 ENCOUNTER — TELEPHONE (OUTPATIENT)
Dept: CASE MANAGEMENT | Age: 63
End: 2023-05-24

## 2023-05-24 NOTE — TELEPHONE ENCOUNTER
I called the patient and she confirmed her CT lung screening at [facility] on 5/25/2023 at 2:30 pm.  I reminded the patient to arrive at 2:00 pm, enter through the main entrance, and register. Patient confirmed.           Electronically signed by Angel Bone on 5/24/23 at 1:13 PM EDT

## 2023-05-25 ENCOUNTER — HOSPITAL ENCOUNTER (OUTPATIENT)
Dept: CT IMAGING | Age: 63
Discharge: HOME | End: 2023-05-27
Payer: MEDICARE

## 2023-05-25 DIAGNOSIS — Z72.0 TOBACCO ABUSE: ICD-10-CM

## 2023-05-25 DIAGNOSIS — F17.200 SMOKING: ICD-10-CM

## 2023-05-25 PROCEDURE — 71271 CT THORAX LUNG CANCER SCR C-: CPT

## 2023-05-26 RX ORDER — FAMOTIDINE 40 MG/1
40 TABLET, FILM COATED ORAL DAILY
Qty: 90 TABLET | Refills: 0 | Status: SHIPPED | OUTPATIENT
Start: 2023-05-26

## 2023-05-30 ENCOUNTER — TELEPHONE (OUTPATIENT)
Dept: CASE MANAGEMENT | Age: 63
End: 2023-05-30

## 2023-05-30 NOTE — TELEPHONE ENCOUNTER
No call, encounter opened to process CT Lung Screening. CT Lung Screen: 5/25/2023    IMPRESSION:  1. There is no pulmonary infiltrate, mass or suspicious pulmonary nodule. 2. Significant emphysematous changes     LUNG RADS:  Lung-RADS 2 - Benign ()     Management:  12 month screening LDCT     RECOMMENDATIONS:  If you would like to register your patient with the [employer], please contact the Nurse Navigator at  [phone]. Pack years: 48    Social History     Tobacco Use  Smoking Status: Current Every Day Smoker    Start Date:    Quit Date:    Types: Cigarettes   Packs/Day: 1   Years: 48   Pack Years: 46   Smokeless Tobacco: Never         Results letter sent to patient via my chart or mailed.      [address]

## 2023-06-05 DIAGNOSIS — J44.9 CHRONIC OBSTRUCTIVE PULMONARY DISEASE, UNSPECIFIED COPD TYPE (HCC): ICD-10-CM

## 2023-06-05 DIAGNOSIS — G25.81 RESTLESS LEG SYNDROME: ICD-10-CM

## 2023-06-05 RX ORDER — ROPINIROLE 0.25 MG/1
TABLET, FILM COATED ORAL
Qty: 120 TABLET | Refills: 2 | Status: SHIPPED | OUTPATIENT
Start: 2023-06-05

## 2023-06-05 RX ORDER — AZELASTINE HYDROCHLORIDE 137 UG/1
SPRAY, METERED NASAL
Qty: 30 ML | Refills: 2 | Status: SHIPPED | OUTPATIENT
Start: 2023-06-05

## 2023-06-19 DIAGNOSIS — J30.2 SEASONAL ALLERGIES: ICD-10-CM

## 2023-06-19 DIAGNOSIS — J44.9 CHRONIC OBSTRUCTIVE PULMONARY DISEASE, UNSPECIFIED COPD TYPE (HCC): ICD-10-CM

## 2023-06-19 RX ORDER — FLUTICASONE PROPIONATE 50 MCG
SPRAY, SUSPENSION (ML) NASAL
Qty: 16 G | Refills: 2 | Status: SHIPPED | OUTPATIENT
Start: 2023-06-19

## 2023-06-19 RX ORDER — ROFLUMILAST 250 UG/1
TABLET ORAL
Qty: 84 TABLET | OUTPATIENT
Start: 2023-06-19

## 2023-06-26 ENCOUNTER — TELEPHONE (OUTPATIENT)
Dept: INTERNAL MEDICINE | Age: 63
End: 2023-06-26

## 2023-08-01 RX ORDER — FAMOTIDINE 40 MG/1
40 TABLET, FILM COATED ORAL DAILY
Qty: 30 TABLET | OUTPATIENT
Start: 2023-08-01

## 2023-08-07 PROCEDURE — 93298 REM INTERROG DEV EVAL SCRMS: CPT | Performed by: INTERNAL MEDICINE

## 2023-08-07 PROCEDURE — G2066 INTER DEVC REMOTE 30D: HCPCS | Performed by: INTERNAL MEDICINE

## 2023-09-01 ENCOUNTER — TELEPHONE (OUTPATIENT)
Dept: PULMONOLOGY | Age: 63
End: 2023-09-01

## 2023-09-01 NOTE — TELEPHONE ENCOUNTER
Mailed a letter to patient informing her that her CT Chest is scheduled for 10-5-23 at 11:00 am at the [facility]. She must arrive by 10:30 am. There is no prep for this test

## 2023-09-07 PROCEDURE — 93298 REM INTERROG DEV EVAL SCRMS: CPT | Performed by: INTERNAL MEDICINE

## 2023-09-07 PROCEDURE — G2066 INTER DEVC REMOTE 30D: HCPCS | Performed by: INTERNAL MEDICINE

## 2023-09-29 DIAGNOSIS — I71.43 INFRARENAL ABDOMINAL AORTIC ANEURYSM (AAA) WITHOUT RUPTURE (HCC): Primary | ICD-10-CM

## 2023-10-08 PROCEDURE — 93298 REM INTERROG DEV EVAL SCRMS: CPT | Performed by: INTERNAL MEDICINE

## 2023-10-08 PROCEDURE — G2066 INTER DEVC REMOTE 30D: HCPCS | Performed by: INTERNAL MEDICINE

## 2023-11-28 ENCOUNTER — TELEPHONE (OUTPATIENT)
Dept: NON INVASIVE DIAGNOSTICS | Age: 63
End: 2023-11-28

## 2023-11-28 NOTE — TELEPHONE ENCOUNTER
ILR triggered RRT on 10/25/2023. I spoke with the patient and informed her she has two options: one let the ILR in place or two have it surgically removed. Patient is now living in [place] which is 3 hours away. She will discuss with her local cardiologist. She will let us know what she decides.     Paris Sorensen RN, BSN  [id number] [facility]

## 2023-11-29 ENCOUNTER — TELEPHONE (OUTPATIENT)
Dept: NON INVASIVE DIAGNOSTICS | Age: 63
End: 2023-11-29

## 2023-11-29 NOTE — TELEPHONE ENCOUNTER
Patient will have her new local cardiologist contact office for records if they need them as she lives over 3 hours away.

## 2023-12-09 PROCEDURE — 93298 REM INTERROG DEV EVAL SCRMS: CPT | Performed by: INTERNAL MEDICINE

## 2023-12-09 PROCEDURE — G2066 INTER DEVC REMOTE 30D: HCPCS | Performed by: INTERNAL MEDICINE

## (undated) DEVICE — TISSUE RETRIEVAL SYSTEM: Brand: INZII RETRIEVAL SYSTEM

## (undated) DEVICE — SUBMUCOSAL LIFTING AGENT WITH NEEDLE.: Brand: ORISE™ GEL

## (undated) DEVICE — BLOCK BITE 60FR RUBBER ADLT DENTAL

## (undated) DEVICE — SPONGE GZ W4XL4IN RAYON POLY FILL CVR W/ NONWOVEN FAB

## (undated) DEVICE — GLOVE ORANGE PI 7   MSG9070

## (undated) DEVICE — FORCEPS BX L160CM JAW DIA2.4MM YEL L CAP W/ NDL DISP RAD

## (undated) DEVICE — TROCAR: Brand: KII FIOS FIRST ENTRY

## (undated) DEVICE — FORCEPS BX L240CM JAW DIA2.4MM WRK CHN 2.8MM ORNG L CAP W/

## (undated) DEVICE — GENERAL LAPAROSCOPY: Brand: MEDLINE INDUSTRIES, INC.

## (undated) DEVICE — SYRINGE MED 50ML LUERSLIP TIP

## (undated) DEVICE — AGENT HEMSTAT W4XL4IN OXIDIZED REGENERATED CELOS STRUCTURED

## (undated) DEVICE — GAUZE,SPONGE,POST-OP,4X3,STRL,LF: Brand: MEDLINE

## (undated) DEVICE — GLOVE SURG SZ 65 THK91MIL LTX FREE SYN POLYISOPRENE

## (undated) DEVICE — CONNECTOR IRRIGATION AUXILIARY H2O JET W/ PRT MTL THRD HYDR

## (undated) DEVICE — SNARE VASC L240CM LOOP W10MM SHTH DIA2.4MM RND STIFF CLD

## (undated) DEVICE — DEFENDO AIR WATER SUCTION AND BIOPSY VALVE KIT FOR  OLYMPUS: Brand: DEFENDO AIR/WATER/SUCTION AND BIOPSY VALVE

## (undated) DEVICE — INSUFFLATION TUBING SET WITH FILTER, FUNNEL CONNECTOR AND LUER LOCK: Brand: JOSNOE MEDICAL INC

## (undated) DEVICE — ELECTRODE PT RET AD L9FT HI MOIST COND ADH HYDRGEL CORDED

## (undated) DEVICE — SCISSORS ENDOSCP DIA5MM CRV MPLR CAUT W/ RATCH HNDL

## (undated) DEVICE — WARMER SCP LAP

## (undated) DEVICE — SYRINGE 20ML LL S/C 50

## (undated) DEVICE — INSUFFLATION NEEDLE TO ESTABLISH PNEUMOPERITONEUM.: Brand: INSUFFLATION NEEDLE

## (undated) DEVICE — BITEBLOCK 54FR W/ DENT RIM BLOX

## (undated) DEVICE — CANNULA NSL ORAL AD FOR CAPNOFLEX CO2 O2 AIRLFE

## (undated) DEVICE — TROCAR: Brand: KII SHIELDED BLADED ACCESS SYSTEM

## (undated) DEVICE — STANDARD HYPODERMIC NEEDLE,ALUMINUM HUB: Brand: MONOJECT

## (undated) DEVICE — TRAP POLYP ETRAP

## (undated) DEVICE — CONNECTOR TBNG AUX H2O JET DISP FOR OLY 160/180 SER

## (undated) DEVICE — CONTAINER SPEC 60ML PH 7NEUTRAL BUFF FRMLN RDY TO USE

## (undated) DEVICE — CATHETER SCLERO L240CM NDL 25GA L4MM SHTH DIA2.3MM CNTRST

## (undated) DEVICE — SYRINGE MED 10ML TRNSLUC BRL PLUNG BLK MRK POLYPR CTRL

## (undated) DEVICE — Z DISCONTINUED NO SUB IDED TUBING ETCO2 AD L6.5FT NSL ORAL CVD PRNG NONFLARED TIP OVR

## (undated) DEVICE — TROCAR: Brand: KII® SLEEVE